# Patient Record
Sex: MALE | Race: WHITE | NOT HISPANIC OR LATINO | Employment: OTHER | URBAN - METROPOLITAN AREA
[De-identification: names, ages, dates, MRNs, and addresses within clinical notes are randomized per-mention and may not be internally consistent; named-entity substitution may affect disease eponyms.]

---

## 2017-01-12 ENCOUNTER — AMBULATORY - HEALTHEAST (OUTPATIENT)
Dept: LAB | Facility: CLINIC | Age: 72
End: 2017-01-12

## 2017-01-12 DIAGNOSIS — H15.109 EPISCLERITIS: ICD-10-CM

## 2017-01-12 LAB
ALT SERPL W P-5'-P-CCNC: 76 U/L (ref 0–45)
CREAT SERPL-MCNC: 1.07 MG/DL (ref 0.7–1.3)
GFR SERPL CREATININE-BSD FRML MDRD: >60 ML/MIN/1.73M2

## 2017-01-18 ENCOUNTER — COMMUNICATION - HEALTHEAST (OUTPATIENT)
Dept: INTERNAL MEDICINE | Facility: CLINIC | Age: 72
End: 2017-01-18

## 2017-01-18 DIAGNOSIS — R39.11 URINARY HESITANCY: ICD-10-CM

## 2017-01-19 ENCOUNTER — COMMUNICATION - HEALTHEAST (OUTPATIENT)
Dept: INTERNAL MEDICINE | Facility: CLINIC | Age: 72
End: 2017-01-19

## 2017-01-19 DIAGNOSIS — R39.11 URINARY HESITANCY: ICD-10-CM

## 2017-01-23 ENCOUNTER — OFFICE VISIT - HEALTHEAST (OUTPATIENT)
Dept: INTERNAL MEDICINE | Facility: CLINIC | Age: 72
End: 2017-01-23

## 2017-01-23 DIAGNOSIS — E11.9 TYPE 2 DIABETES MELLITUS (H): ICD-10-CM

## 2017-01-23 DIAGNOSIS — E29.1 HYPOGONADISM IN MALE: ICD-10-CM

## 2017-01-23 DIAGNOSIS — H61.23 HEARING LOSS DUE TO CERUMEN IMPACTION, BILATERAL: ICD-10-CM

## 2017-01-23 DIAGNOSIS — Z12.5 PROSTATE CANCER SCREENING: ICD-10-CM

## 2017-01-23 DIAGNOSIS — R74.8 ABNORMAL LIVER ENZYMES: ICD-10-CM

## 2017-01-23 DIAGNOSIS — I10 HYPERTENSION: ICD-10-CM

## 2017-01-23 DIAGNOSIS — R53.83 FATIGUE: ICD-10-CM

## 2017-01-23 DIAGNOSIS — E78.5 HYPERLIPIDEMIA: ICD-10-CM

## 2017-01-23 DIAGNOSIS — Z23 NEED FOR PROPHYLACTIC VACCINATION AND INOCULATION AGAINST INFLUENZA: ICD-10-CM

## 2017-01-23 DIAGNOSIS — Z23 NEED FOR PROPHYLACTIC VACCINATION AGAINST STREPTOCOCCUS PNEUMONIAE (PNEUMOCOCCUS) AND INFLUENZA: ICD-10-CM

## 2017-01-23 DIAGNOSIS — Z51.81 MEDICATION MONITORING ENCOUNTER: ICD-10-CM

## 2017-01-23 LAB
CHOLEST SERPL-MCNC: 154 MG/DL
FASTING STATUS PATIENT QL REPORTED: YES
HBA1C MFR BLD: 7.4 % (ref 3.5–6)
HDLC SERPL-MCNC: 66 MG/DL
LDLC SERPL CALC-MCNC: 74 MG/DL
PSA SERPL-MCNC: 0.1 NG/ML (ref 0–6.5)
TRIGL SERPL-MCNC: 71 MG/DL

## 2017-01-23 ASSESSMENT — MIFFLIN-ST. JEOR: SCORE: 1655.71

## 2017-01-25 LAB — HCV AB SERPL QL IA: NEGATIVE

## 2017-01-27 ENCOUNTER — COMMUNICATION - HEALTHEAST (OUTPATIENT)
Dept: INTERNAL MEDICINE | Facility: CLINIC | Age: 72
End: 2017-01-27

## 2017-02-10 ENCOUNTER — COMMUNICATION - HEALTHEAST (OUTPATIENT)
Dept: RHEUMATOLOGY | Facility: CLINIC | Age: 72
End: 2017-02-10

## 2017-02-10 DIAGNOSIS — H15.002 SCLERITIS OF LEFT EYE: ICD-10-CM

## 2017-03-09 ENCOUNTER — COMMUNICATION - HEALTHEAST (OUTPATIENT)
Dept: RHEUMATOLOGY | Facility: CLINIC | Age: 72
End: 2017-03-09

## 2017-03-09 DIAGNOSIS — H15.002 SCLERITIS OF LEFT EYE: ICD-10-CM

## 2017-03-20 ENCOUNTER — AMBULATORY - HEALTHEAST (OUTPATIENT)
Dept: LAB | Facility: CLINIC | Age: 72
End: 2017-03-20

## 2017-03-20 DIAGNOSIS — H15.109 EPISCLERITIS: ICD-10-CM

## 2017-03-20 LAB
ALT SERPL W P-5'-P-CCNC: 43 U/L (ref 0–45)
CREAT SERPL-MCNC: 1.1 MG/DL (ref 0.7–1.3)
GFR SERPL CREATININE-BSD FRML MDRD: >60 ML/MIN/1.73M2

## 2017-03-23 ENCOUNTER — OFFICE VISIT - HEALTHEAST (OUTPATIENT)
Dept: RHEUMATOLOGY | Facility: CLINIC | Age: 72
End: 2017-03-23

## 2017-03-23 DIAGNOSIS — M17.0 PRIMARY OSTEOARTHRITIS OF BOTH KNEES: ICD-10-CM

## 2017-03-23 DIAGNOSIS — H15.102 EPISCLERITIS, LEFT: ICD-10-CM

## 2017-03-23 DIAGNOSIS — R76.8 ANTINEUTROPHIL CYTOPLASMIC ANTIBODY (ANCA) POSITIVE: ICD-10-CM

## 2017-03-23 DIAGNOSIS — H15.002 SCLERITIS OF LEFT EYE: ICD-10-CM

## 2017-03-30 ENCOUNTER — COMMUNICATION - HEALTHEAST (OUTPATIENT)
Dept: INTERNAL MEDICINE | Facility: CLINIC | Age: 72
End: 2017-03-30

## 2017-03-30 DIAGNOSIS — E78.5 HYPERLIPIDEMIA: ICD-10-CM

## 2017-03-30 DIAGNOSIS — E11.9 TYPE 2 DIABETES MELLITUS (H): ICD-10-CM

## 2017-04-04 ENCOUNTER — COMMUNICATION - HEALTHEAST (OUTPATIENT)
Dept: INTERNAL MEDICINE | Facility: CLINIC | Age: 72
End: 2017-04-04

## 2017-04-04 ENCOUNTER — AMBULATORY - HEALTHEAST (OUTPATIENT)
Dept: INTERNAL MEDICINE | Facility: CLINIC | Age: 72
End: 2017-04-04

## 2017-04-04 DIAGNOSIS — H15.009 SCLERITIS: ICD-10-CM

## 2017-04-24 ENCOUNTER — COMMUNICATION - HEALTHEAST (OUTPATIENT)
Dept: INTERNAL MEDICINE | Facility: CLINIC | Age: 72
End: 2017-04-24

## 2017-04-24 DIAGNOSIS — R39.11 URINARY HESITANCY: ICD-10-CM

## 2017-05-01 ENCOUNTER — COMMUNICATION - HEALTHEAST (OUTPATIENT)
Dept: INTERNAL MEDICINE | Facility: CLINIC | Age: 72
End: 2017-05-01

## 2017-05-01 ENCOUNTER — COMMUNICATION - HEALTHEAST (OUTPATIENT)
Dept: RHEUMATOLOGY | Facility: CLINIC | Age: 72
End: 2017-05-01

## 2017-05-01 DIAGNOSIS — R12 HEARTBURN: ICD-10-CM

## 2017-05-01 DIAGNOSIS — H15.002 SCLERITIS OF LEFT EYE: ICD-10-CM

## 2017-05-15 ENCOUNTER — AMBULATORY - HEALTHEAST (OUTPATIENT)
Dept: LAB | Facility: CLINIC | Age: 72
End: 2017-05-15

## 2017-05-15 DIAGNOSIS — H15.109 EPISCLERITIS: ICD-10-CM

## 2017-05-15 LAB
ALT SERPL W P-5'-P-CCNC: 35 U/L (ref 0–45)
CREAT SERPL-MCNC: 1.1 MG/DL (ref 0.7–1.3)
GFR SERPL CREATININE-BSD FRML MDRD: >60 ML/MIN/1.73M2

## 2017-06-27 ENCOUNTER — COMMUNICATION - HEALTHEAST (OUTPATIENT)
Dept: RHEUMATOLOGY | Facility: CLINIC | Age: 72
End: 2017-06-27

## 2017-06-27 DIAGNOSIS — H15.002 SCLERITIS OF LEFT EYE: ICD-10-CM

## 2017-07-17 ENCOUNTER — AMBULATORY - HEALTHEAST (OUTPATIENT)
Dept: LAB | Facility: CLINIC | Age: 72
End: 2017-07-17

## 2017-07-17 DIAGNOSIS — H15.109 EPISCLERITIS: ICD-10-CM

## 2017-07-17 LAB
ALT SERPL W P-5'-P-CCNC: 37 U/L (ref 0–45)
CREAT SERPL-MCNC: 1.17 MG/DL (ref 0.7–1.3)
GFR SERPL CREATININE-BSD FRML MDRD: >60 ML/MIN/1.73M2

## 2017-07-24 ENCOUNTER — COMMUNICATION - HEALTHEAST (OUTPATIENT)
Dept: RHEUMATOLOGY | Facility: CLINIC | Age: 72
End: 2017-07-24

## 2017-07-24 DIAGNOSIS — H15.002 SCLERITIS OF LEFT EYE: ICD-10-CM

## 2017-09-05 ENCOUNTER — COMMUNICATION - HEALTHEAST (OUTPATIENT)
Dept: LAB | Facility: CLINIC | Age: 72
End: 2017-09-05

## 2017-09-05 DIAGNOSIS — H15.102 EPISCLERITIS, LEFT: ICD-10-CM

## 2017-09-18 ENCOUNTER — AMBULATORY - HEALTHEAST (OUTPATIENT)
Dept: LAB | Facility: CLINIC | Age: 72
End: 2017-09-18

## 2017-09-18 DIAGNOSIS — H15.102 EPISCLERITIS, LEFT: ICD-10-CM

## 2017-09-18 LAB
ALT SERPL W P-5'-P-CCNC: 38 U/L (ref 0–45)
CREAT SERPL-MCNC: 1.07 MG/DL (ref 0.7–1.3)
GFR SERPL CREATININE-BSD FRML MDRD: >60 ML/MIN/1.73M2

## 2017-09-21 ENCOUNTER — OFFICE VISIT - HEALTHEAST (OUTPATIENT)
Dept: RHEUMATOLOGY | Facility: CLINIC | Age: 72
End: 2017-09-21

## 2017-09-21 DIAGNOSIS — H15.002 SCLERITIS OF LEFT EYE: ICD-10-CM

## 2017-09-21 DIAGNOSIS — M17.0 PRIMARY OSTEOARTHRITIS OF BOTH KNEES: ICD-10-CM

## 2017-09-21 DIAGNOSIS — R76.8 ANTINEUTROPHIL CYTOPLASMIC ANTIBODY (ANCA) POSITIVE: ICD-10-CM

## 2017-09-21 ASSESSMENT — MIFFLIN-ST. JEOR: SCORE: 1672.95

## 2017-09-22 ENCOUNTER — COMMUNICATION - HEALTHEAST (OUTPATIENT)
Dept: RHEUMATOLOGY | Facility: CLINIC | Age: 72
End: 2017-09-22

## 2017-09-22 DIAGNOSIS — H15.002 SCLERITIS OF LEFT EYE: ICD-10-CM

## 2017-11-03 ENCOUNTER — COMMUNICATION - HEALTHEAST (OUTPATIENT)
Dept: INTERNAL MEDICINE | Facility: CLINIC | Age: 72
End: 2017-11-03

## 2017-11-10 ENCOUNTER — COMMUNICATION - HEALTHEAST (OUTPATIENT)
Dept: INTERNAL MEDICINE | Facility: CLINIC | Age: 72
End: 2017-11-10

## 2017-11-10 DIAGNOSIS — I10 ESSENTIAL HYPERTENSION, MALIGNANT: ICD-10-CM

## 2017-12-13 ENCOUNTER — COMMUNICATION - HEALTHEAST (OUTPATIENT)
Dept: RHEUMATOLOGY | Facility: CLINIC | Age: 72
End: 2017-12-13

## 2017-12-13 DIAGNOSIS — H15.002 SCLERITIS OF LEFT EYE: ICD-10-CM

## 2017-12-20 ENCOUNTER — AMBULATORY - HEALTHEAST (OUTPATIENT)
Dept: LAB | Facility: CLINIC | Age: 72
End: 2017-12-20

## 2017-12-20 DIAGNOSIS — H15.102 EPISCLERITIS, LEFT: ICD-10-CM

## 2017-12-20 LAB
ALT SERPL W P-5'-P-CCNC: 50 U/L (ref 0–45)
CREAT SERPL-MCNC: 1.19 MG/DL (ref 0.7–1.3)
GFR SERPL CREATININE-BSD FRML MDRD: 60 ML/MIN/1.73M2

## 2017-12-27 ENCOUNTER — COMMUNICATION - HEALTHEAST (OUTPATIENT)
Dept: INTERNAL MEDICINE | Facility: CLINIC | Age: 72
End: 2017-12-27

## 2017-12-27 DIAGNOSIS — E11.9 TYPE 2 DIABETES MELLITUS (H): ICD-10-CM

## 2017-12-27 DIAGNOSIS — E78.5 HYPERLIPIDEMIA: ICD-10-CM

## 2018-01-21 ENCOUNTER — COMMUNICATION - HEALTHEAST (OUTPATIENT)
Dept: INTERNAL MEDICINE | Facility: CLINIC | Age: 73
End: 2018-01-21

## 2018-01-21 DIAGNOSIS — R39.11 URINARY HESITANCY: ICD-10-CM

## 2018-02-02 ENCOUNTER — OFFICE VISIT - HEALTHEAST (OUTPATIENT)
Dept: INTERNAL MEDICINE | Facility: CLINIC | Age: 73
End: 2018-02-02

## 2018-02-02 DIAGNOSIS — E78.00 HYPERCHOLESTEROLEMIA: ICD-10-CM

## 2018-02-02 DIAGNOSIS — I45.10 RIGHT BUNDLE BRANCH BLOCK: ICD-10-CM

## 2018-02-02 DIAGNOSIS — E78.2 MIXED HYPERLIPIDEMIA: ICD-10-CM

## 2018-02-02 DIAGNOSIS — Z00.00 HEALTH CARE MAINTENANCE: ICD-10-CM

## 2018-02-02 DIAGNOSIS — Z12.5 PROSTATE CANCER SCREENING: ICD-10-CM

## 2018-02-02 DIAGNOSIS — E11.9 TYPE 2 DIABETES MELLITUS (H): ICD-10-CM

## 2018-02-02 DIAGNOSIS — I10 ESSENTIAL HYPERTENSION: ICD-10-CM

## 2018-02-02 DIAGNOSIS — H15.002 SCLERITIS OF LEFT EYE: ICD-10-CM

## 2018-02-02 DIAGNOSIS — Z12.11 COLON CANCER SCREENING: ICD-10-CM

## 2018-02-02 DIAGNOSIS — R25.2 MUSCLE CRAMPS: ICD-10-CM

## 2018-02-02 DIAGNOSIS — Z51.81 MEDICATION MONITORING ENCOUNTER: ICD-10-CM

## 2018-02-02 LAB
ALBUMIN SERPL-MCNC: 3.4 G/DL (ref 3.5–5)
ALBUMIN UR-MCNC: NEGATIVE MG/DL
ALP SERPL-CCNC: 115 U/L (ref 45–120)
ALT SERPL W P-5'-P-CCNC: 47 U/L (ref 0–45)
ANION GAP SERPL CALCULATED.3IONS-SCNC: 8 MMOL/L (ref 5–18)
APPEARANCE UR: CLEAR
AST SERPL W P-5'-P-CCNC: 41 U/L (ref 0–40)
BACTERIA #/AREA URNS HPF: ABNORMAL HPF
BILIRUB SERPL-MCNC: 0.6 MG/DL (ref 0–1)
BILIRUB UR QL STRIP: NEGATIVE
BUN SERPL-MCNC: 13 MG/DL (ref 8–28)
CALCIUM SERPL-MCNC: 8.7 MG/DL (ref 8.5–10.5)
CHLORIDE BLD-SCNC: 106 MMOL/L (ref 98–107)
CO2 SERPL-SCNC: 26 MMOL/L (ref 22–31)
COLOR UR AUTO: YELLOW
CREAT SERPL-MCNC: 1.12 MG/DL (ref 0.7–1.3)
CREAT UR-MCNC: 216.2 MG/DL
ERYTHROCYTE [DISTWIDTH] IN BLOOD BY AUTOMATED COUNT: 13.3 % (ref 11–14.5)
GFR SERPL CREATININE-BSD FRML MDRD: >60 ML/MIN/1.73M2
GLUCOSE BLD-MCNC: 138 MG/DL (ref 70–125)
GLUCOSE UR STRIP-MCNC: NEGATIVE MG/DL
HBA1C MFR BLD: 7.7 % (ref 3.5–6)
HCT VFR BLD AUTO: 34.3 % (ref 40–54)
HGB BLD-MCNC: 11.3 G/DL (ref 14–18)
HGB UR QL STRIP: NEGATIVE
KETONES UR STRIP-MCNC: NEGATIVE MG/DL
LEUKOCYTE ESTERASE UR QL STRIP: NEGATIVE
MAGNESIUM SERPL-MCNC: 1.2 MG/DL (ref 1.8–2.6)
MCH RBC QN AUTO: 29.5 PG (ref 27–34)
MCHC RBC AUTO-ENTMCNC: 33 G/DL (ref 32–36)
MCV RBC AUTO: 89 FL (ref 80–100)
MICROALBUMIN UR-MCNC: 1.03 MG/DL (ref 0–1.99)
MICROALBUMIN/CREAT UR: 4.8 MG/G
NITRATE UR QL: NEGATIVE
PH UR STRIP: 5.5 [PH] (ref 5–8)
PLATELET # BLD AUTO: 123 THOU/UL (ref 140–440)
PMV BLD AUTO: 8.2 FL (ref 7–10)
POTASSIUM BLD-SCNC: 4.4 MMOL/L (ref 3.5–5)
PROT SERPL-MCNC: 7 G/DL (ref 6–8)
PSA SERPL-MCNC: 0.2 NG/ML (ref 0–6.5)
RBC # BLD AUTO: 3.83 MILL/UL (ref 4.4–6.2)
RBC #/AREA URNS AUTO: ABNORMAL HPF
SODIUM SERPL-SCNC: 140 MMOL/L (ref 136–145)
SP GR UR STRIP: >=1.03 (ref 1–1.03)
SQUAMOUS #/AREA URNS AUTO: ABNORMAL LPF
UROBILINOGEN UR STRIP-ACNC: ABNORMAL
WBC #/AREA URNS AUTO: ABNORMAL HPF
WBC: 4.9 THOU/UL (ref 4–11)

## 2018-02-02 ASSESSMENT — MIFFLIN-ST. JEOR: SCORE: 1660.36

## 2018-02-07 LAB
CHOLEST SERPL-MCNC: 133 MG/DL
HDLC SERPL-MCNC: 64 MG/DL
LDLC SERPL CALC-MCNC: 56 MG/DL
TRIGL SERPL-MCNC: 66 MG/DL

## 2018-02-12 ENCOUNTER — COMMUNICATION - HEALTHEAST (OUTPATIENT)
Dept: INTERNAL MEDICINE | Facility: CLINIC | Age: 73
End: 2018-02-12

## 2018-02-12 DIAGNOSIS — E11.9 TYPE 2 DIABETES MELLITUS (H): ICD-10-CM

## 2018-02-12 DIAGNOSIS — E83.42 HYPOMAGNESEMIA: ICD-10-CM

## 2018-02-13 ENCOUNTER — COMMUNICATION - HEALTHEAST (OUTPATIENT)
Dept: INTERNAL MEDICINE | Facility: CLINIC | Age: 73
End: 2018-02-13

## 2018-02-14 ENCOUNTER — RECORDS - HEALTHEAST (OUTPATIENT)
Dept: ADMINISTRATIVE | Facility: OTHER | Age: 73
End: 2018-02-14

## 2018-02-22 ENCOUNTER — COMMUNICATION - HEALTHEAST (OUTPATIENT)
Dept: INTERNAL MEDICINE | Facility: CLINIC | Age: 73
End: 2018-02-22

## 2018-02-22 DIAGNOSIS — R19.5 POSITIVE COLORECTAL CANCER SCREENING USING DNA-BASED STOOL TEST: ICD-10-CM

## 2018-03-07 ENCOUNTER — COMMUNICATION - HEALTHEAST (OUTPATIENT)
Dept: RHEUMATOLOGY | Facility: CLINIC | Age: 73
End: 2018-03-07

## 2018-03-07 ENCOUNTER — COMMUNICATION - HEALTHEAST (OUTPATIENT)
Dept: INTERNAL MEDICINE | Facility: CLINIC | Age: 73
End: 2018-03-07

## 2018-03-07 DIAGNOSIS — H15.002 SCLERITIS OF LEFT EYE: ICD-10-CM

## 2018-03-07 DIAGNOSIS — E11.9 TYPE 2 DIABETES MELLITUS (H): ICD-10-CM

## 2018-03-09 ENCOUNTER — COMMUNICATION - HEALTHEAST (OUTPATIENT)
Dept: NURSING | Facility: CLINIC | Age: 73
End: 2018-03-09

## 2018-03-09 DIAGNOSIS — E11.9 TYPE 2 DIABETES MELLITUS (H): ICD-10-CM

## 2018-03-12 ENCOUNTER — COMMUNICATION - HEALTHEAST (OUTPATIENT)
Dept: INTERNAL MEDICINE | Facility: CLINIC | Age: 73
End: 2018-03-12

## 2018-03-12 DIAGNOSIS — R12 HEARTBURN: ICD-10-CM

## 2018-03-21 ENCOUNTER — AMBULATORY - HEALTHEAST (OUTPATIENT)
Dept: LAB | Facility: CLINIC | Age: 73
End: 2018-03-21

## 2018-03-21 DIAGNOSIS — H15.102 EPISCLERITIS, LEFT: ICD-10-CM

## 2018-03-21 LAB
ALBUMIN SERPL-MCNC: 3.3 G/DL (ref 3.5–5)
ALT SERPL W P-5'-P-CCNC: 52 U/L (ref 0–45)
CREAT SERPL-MCNC: 1.28 MG/DL (ref 0.7–1.3)
ERYTHROCYTE [DISTWIDTH] IN BLOOD BY AUTOMATED COUNT: 14 % (ref 11–14.5)
GFR SERPL CREATININE-BSD FRML MDRD: 55 ML/MIN/1.73M2
HCT VFR BLD AUTO: 31.9 % (ref 40–54)
HGB BLD-MCNC: 10.9 G/DL (ref 14–18)
MCH RBC QN AUTO: 30.5 PG (ref 27–34)
MCHC RBC AUTO-ENTMCNC: 34.1 G/DL (ref 32–36)
MCV RBC AUTO: 89 FL (ref 80–100)
PLATELET # BLD AUTO: 126 THOU/UL (ref 140–440)
PMV BLD AUTO: 9.1 FL (ref 7–10)
RBC # BLD AUTO: 3.56 MILL/UL (ref 4.4–6.2)
WBC: 4.6 THOU/UL (ref 4–11)

## 2018-03-26 ENCOUNTER — OFFICE VISIT - HEALTHEAST (OUTPATIENT)
Dept: RHEUMATOLOGY | Facility: CLINIC | Age: 73
End: 2018-03-26

## 2018-03-26 DIAGNOSIS — D64.9 ANEMIA: ICD-10-CM

## 2018-03-26 DIAGNOSIS — H15.002 SCLERITIS OF LEFT EYE: ICD-10-CM

## 2018-03-26 DIAGNOSIS — R76.8 ANTINEUTROPHIL CYTOPLASMIC ANTIBODY (ANCA) POSITIVE: ICD-10-CM

## 2018-03-26 DIAGNOSIS — M17.0 PRIMARY OSTEOARTHRITIS OF BOTH KNEES: ICD-10-CM

## 2018-03-26 DIAGNOSIS — R74.01 ALT (SGPT) LEVEL RAISED: ICD-10-CM

## 2018-03-26 ASSESSMENT — MIFFLIN-ST. JEOR: SCORE: 1648.11

## 2018-04-23 ENCOUNTER — COMMUNICATION - HEALTHEAST (OUTPATIENT)
Dept: INTERNAL MEDICINE | Facility: CLINIC | Age: 73
End: 2018-04-23

## 2018-04-23 DIAGNOSIS — R39.11 URINARY HESITANCY: ICD-10-CM

## 2018-05-07 ENCOUNTER — RECORDS - HEALTHEAST (OUTPATIENT)
Dept: ADMINISTRATIVE | Facility: OTHER | Age: 73
End: 2018-05-07

## 2018-05-08 ENCOUNTER — RECORDS - HEALTHEAST (OUTPATIENT)
Dept: ADMINISTRATIVE | Facility: OTHER | Age: 73
End: 2018-05-08

## 2018-05-09 ENCOUNTER — AMBULATORY - HEALTHEAST (OUTPATIENT)
Dept: LAB | Facility: CLINIC | Age: 73
End: 2018-05-09

## 2018-05-09 DIAGNOSIS — H15.102 EPISCLERITIS, LEFT: ICD-10-CM

## 2018-05-09 LAB
ALBUMIN SERPL-MCNC: 3.2 G/DL (ref 3.5–5)
ALT SERPL W P-5'-P-CCNC: 46 U/L (ref 0–45)
CREAT SERPL-MCNC: 1.34 MG/DL (ref 0.7–1.3)
ERYTHROCYTE [DISTWIDTH] IN BLOOD BY AUTOMATED COUNT: 14.1 % (ref 11–14.5)
GFR SERPL CREATININE-BSD FRML MDRD: 53 ML/MIN/1.73M2
HCT VFR BLD AUTO: 31.5 % (ref 40–54)
HGB BLD-MCNC: 10.6 G/DL (ref 14–18)
MCH RBC QN AUTO: 29.6 PG (ref 27–34)
MCHC RBC AUTO-ENTMCNC: 33.8 G/DL (ref 32–36)
MCV RBC AUTO: 88 FL (ref 80–100)
PLATELET # BLD AUTO: 120 THOU/UL (ref 140–440)
PMV BLD AUTO: 8.1 FL (ref 7–10)
RBC # BLD AUTO: 3.6 MILL/UL (ref 4.4–6.2)
WBC: 5.4 THOU/UL (ref 4–11)

## 2018-06-27 ENCOUNTER — AMBULATORY - HEALTHEAST (OUTPATIENT)
Dept: LAB | Facility: CLINIC | Age: 73
End: 2018-06-27

## 2018-06-27 DIAGNOSIS — H15.102 EPISCLERITIS, LEFT: ICD-10-CM

## 2018-06-27 LAB
ALBUMIN SERPL-MCNC: 3.2 G/DL (ref 3.5–5)
ALT SERPL W P-5'-P-CCNC: 34 U/L (ref 0–45)
CREAT SERPL-MCNC: 1.29 MG/DL (ref 0.7–1.3)
ERYTHROCYTE [DISTWIDTH] IN BLOOD BY AUTOMATED COUNT: 15.5 % (ref 11–14.5)
GFR SERPL CREATININE-BSD FRML MDRD: 55 ML/MIN/1.73M2
HCT VFR BLD AUTO: 28.1 % (ref 40–54)
HGB BLD-MCNC: 9.1 G/DL (ref 14–18)
MCH RBC QN AUTO: 28.2 PG (ref 27–34)
MCHC RBC AUTO-ENTMCNC: 32.2 G/DL (ref 32–36)
MCV RBC AUTO: 88 FL (ref 80–100)
PLATELET # BLD AUTO: 124 THOU/UL (ref 140–440)
PMV BLD AUTO: 7.9 FL (ref 7–10)
RBC # BLD AUTO: 3.21 MILL/UL (ref 4.4–6.2)
WBC: 4.1 THOU/UL (ref 4–11)

## 2018-08-06 ENCOUNTER — OFFICE VISIT - HEALTHEAST (OUTPATIENT)
Dept: INTERNAL MEDICINE | Facility: CLINIC | Age: 73
End: 2018-08-06

## 2018-08-06 DIAGNOSIS — I10 ESSENTIAL HYPERTENSION, MALIGNANT: ICD-10-CM

## 2018-08-06 DIAGNOSIS — E11.9 DIABETES MELLITUS, TYPE 2 (H): ICD-10-CM

## 2018-08-06 DIAGNOSIS — D64.9 ANEMIA: ICD-10-CM

## 2018-08-06 DIAGNOSIS — R06.02 SHORTNESS OF BREATH: ICD-10-CM

## 2018-08-06 DIAGNOSIS — Z86.73 HISTORY OF CVA (CEREBROVASCULAR ACCIDENT): ICD-10-CM

## 2018-08-06 DIAGNOSIS — E78.2 MIXED HYPERLIPIDEMIA: ICD-10-CM

## 2018-08-06 DIAGNOSIS — Z51.81 ENCOUNTER FOR MEDICATION MONITORING: ICD-10-CM

## 2018-08-06 LAB
ANION GAP SERPL CALCULATED.3IONS-SCNC: 10 MMOL/L (ref 5–18)
AST SERPL W P-5'-P-CCNC: 34 U/L (ref 0–40)
BNP SERPL-MCNC: 25 PG/ML (ref 0–70)
BUN SERPL-MCNC: 15 MG/DL (ref 8–28)
CALCIUM SERPL-MCNC: 8.5 MG/DL (ref 8.5–10.5)
CHLORIDE BLD-SCNC: 107 MMOL/L (ref 98–107)
CHOLEST SERPL-MCNC: 116 MG/DL
CO2 SERPL-SCNC: 21 MMOL/L (ref 22–31)
CREAT SERPL-MCNC: 1.3 MG/DL (ref 0.7–1.3)
ERYTHROCYTE [DISTWIDTH] IN BLOOD BY AUTOMATED COUNT: 15.4 % (ref 11–14.5)
FASTING STATUS PATIENT QL REPORTED: YES
GFR SERPL CREATININE-BSD FRML MDRD: 54 ML/MIN/1.73M2
GLUCOSE BLD-MCNC: 140 MG/DL (ref 70–125)
HBA1C MFR BLD: 7.5 % (ref 3.5–6)
HCT VFR BLD AUTO: 29.6 % (ref 40–54)
HDLC SERPL-MCNC: 53 MG/DL
HGB BLD-MCNC: 9.6 G/DL (ref 14–18)
LDLC SERPL CALC-MCNC: 50 MG/DL
MCH RBC QN AUTO: 27.9 PG (ref 27–34)
MCHC RBC AUTO-ENTMCNC: 32.5 G/DL (ref 32–36)
MCV RBC AUTO: 86 FL (ref 80–100)
PLATELET # BLD AUTO: 110 THOU/UL (ref 140–440)
PMV BLD AUTO: 9 FL (ref 7–10)
POTASSIUM BLD-SCNC: 4.8 MMOL/L (ref 3.5–5)
RBC # BLD AUTO: 3.44 MILL/UL (ref 4.4–6.2)
SODIUM SERPL-SCNC: 138 MMOL/L (ref 136–145)
TRIGL SERPL-MCNC: 66 MG/DL
WBC: 5.3 THOU/UL (ref 4–11)

## 2018-08-07 LAB
FERRITIN SERPL-MCNC: 9 NG/ML (ref 27–300)
IRON SATN MFR SERPL: 13 % (ref 20–50)
IRON SERPL-MCNC: 56 UG/DL (ref 42–175)
TIBC SERPL-MCNC: 447 UG/DL (ref 313–563)
TRANSFERRIN SERPL-MCNC: 358 MG/DL (ref 212–360)
VIT B12 SERPL-MCNC: 1748 PG/ML (ref 213–816)

## 2018-08-13 ENCOUNTER — COMMUNICATION - HEALTHEAST (OUTPATIENT)
Dept: INTERNAL MEDICINE | Facility: CLINIC | Age: 73
End: 2018-08-13

## 2018-08-13 ENCOUNTER — AMBULATORY - HEALTHEAST (OUTPATIENT)
Dept: INTERNAL MEDICINE | Facility: CLINIC | Age: 73
End: 2018-08-13

## 2018-08-13 DIAGNOSIS — D50.9 IRON DEFICIENCY ANEMIA, UNSPECIFIED IRON DEFICIENCY ANEMIA TYPE: ICD-10-CM

## 2018-08-20 ENCOUNTER — HOSPITAL ENCOUNTER (OUTPATIENT)
Dept: CARDIOLOGY | Facility: CLINIC | Age: 73
Discharge: HOME | End: 2018-08-20
Attending: INTERNAL MEDICINE

## 2018-08-20 DIAGNOSIS — R06.02 SHORTNESS OF BREATH: ICD-10-CM

## 2018-08-20 ASSESSMENT — MIFFLIN-ST. JEOR: SCORE: 1681.23

## 2018-08-21 ENCOUNTER — COMMUNICATION - HEALTHEAST (OUTPATIENT)
Dept: INTERNAL MEDICINE | Facility: CLINIC | Age: 73
End: 2018-08-21

## 2018-08-21 LAB
AORTIC ROOT: 3.7 CM
BSA FOR ECHO PROCEDURE: 2.15 M2
CV ECHO HEIGHT: 69.8 IN
CV ECHO WEIGHT: 207 LBS
DOP CALC LVOT AREA: 3.14 CM2
DOP CALC LVOT DIAMETER: 2 CM
DOP CALC LVOT PEAK VEL: 99.3 CM/S
DOP CALC LVOT STROKE VOLUME: 69.1 CM3
DOP CALCLVOT PEAK VEL VTI: 22 CM
ECHO EJECTION FRACTION ESTIMATED: 60 %
LA AREA 1: 22.2 CM2
LA AREA 2: 20.3 CM2
LEFT ATRIUM LENGTH: 5.73 CM
LEFT ATRIUM VOLUME INDEX: 31.1 ML/M2
LEFT ATRIUM VOLUME: 66.9 ML
LEFT VENTRICULAR OUTFLOW TRACT MEAN GRADIENT: 2 MMHG
LEFT VENTRICULAR OUTFLOW TRACT MEAN VELOCITY: 73.5 CM/S
LEFT VENTRICULAR OUTFLOW TRACT PEAK GRADIENT: 4 MMHG
LV STROKE VOLUME INDEX: 32.1 ML/M2
MITRAL VALVE E/A RATIO: 0.8
MV AVERAGE E/E' RATIO: 10.6 CM/S
MV DECELERATION TIME: 271 MS
MV E'TISSUE VEL-LAT: 7.7 CM/S
MV E'TISSUE VEL-MED: 4.48 CM/S
MV LATERAL E/E' RATIO: 8.4
MV MEDIAL E/E' RATIO: 14.5
MV PEAK A VELOCITY: 79.1 CM/S
MV PEAK E VELOCITY: 64.8 CM/S
NUC REST DIASTOLIC VOLUME INDEX: 3312 LBS
NUC REST SYSTOLIC VOLUME INDEX: 69.75 IN
TRICUSPID VALVE ANULAR PLANE SYSTOLIC EXCURSION: 2.3 CM

## 2018-09-24 ENCOUNTER — AMBULATORY - HEALTHEAST (OUTPATIENT)
Dept: LAB | Facility: CLINIC | Age: 73
End: 2018-09-24

## 2018-09-24 ENCOUNTER — COMMUNICATION - HEALTHEAST (OUTPATIENT)
Dept: INTERNAL MEDICINE | Facility: CLINIC | Age: 73
End: 2018-09-24

## 2018-09-24 DIAGNOSIS — D50.9 IRON DEFICIENCY ANEMIA, UNSPECIFIED IRON DEFICIENCY ANEMIA TYPE: ICD-10-CM

## 2018-09-24 LAB
ERYTHROCYTE [DISTWIDTH] IN BLOOD BY AUTOMATED COUNT: 16.2 % (ref 11–14.5)
HCT VFR BLD AUTO: 29.8 % (ref 40–54)
HGB BLD-MCNC: 9.9 G/DL (ref 14–18)
MCH RBC QN AUTO: 28.8 PG (ref 27–34)
MCHC RBC AUTO-ENTMCNC: 33.1 G/DL (ref 32–36)
MCV RBC AUTO: 87 FL (ref 80–100)
PLATELET # BLD AUTO: 111 THOU/UL (ref 140–440)
PMV BLD AUTO: 8.2 FL (ref 7–10)
RBC # BLD AUTO: 3.43 MILL/UL (ref 4.4–6.2)
WBC: 4.2 THOU/UL (ref 4–11)

## 2018-09-26 ENCOUNTER — OFFICE VISIT - HEALTHEAST (OUTPATIENT)
Dept: RHEUMATOLOGY | Facility: CLINIC | Age: 73
End: 2018-09-26

## 2018-09-26 DIAGNOSIS — Z79.899 HIGH RISK MEDICATION USE: ICD-10-CM

## 2018-09-26 DIAGNOSIS — M17.0 PRIMARY OSTEOARTHRITIS OF BOTH KNEES: ICD-10-CM

## 2018-09-26 DIAGNOSIS — H15.002 SCLERITIS OF LEFT EYE: ICD-10-CM

## 2018-09-26 DIAGNOSIS — R76.8 ANTINEUTROPHIL CYTOPLASMIC ANTIBODY (ANCA) POSITIVE: ICD-10-CM

## 2018-10-15 ENCOUNTER — COMMUNICATION - HEALTHEAST (OUTPATIENT)
Dept: RHEUMATOLOGY | Facility: CLINIC | Age: 73
End: 2018-10-15

## 2018-10-15 DIAGNOSIS — H15.002 SCLERITIS OF LEFT EYE: ICD-10-CM

## 2018-10-22 ENCOUNTER — COMMUNICATION - HEALTHEAST (OUTPATIENT)
Dept: INTERNAL MEDICINE | Facility: CLINIC | Age: 73
End: 2018-10-22

## 2018-10-22 ENCOUNTER — OFFICE VISIT - HEALTHEAST (OUTPATIENT)
Dept: INTERNAL MEDICINE | Facility: CLINIC | Age: 73
End: 2018-10-22

## 2018-10-22 DIAGNOSIS — E78.2 MIXED HYPERLIPIDEMIA: ICD-10-CM

## 2018-10-22 DIAGNOSIS — E11.9 DIABETES MELLITUS, TYPE 2 (H): ICD-10-CM

## 2018-10-22 DIAGNOSIS — Z00.00 HEALTHCARE MAINTENANCE: ICD-10-CM

## 2018-10-22 DIAGNOSIS — R39.11 URINARY HESITANCY: ICD-10-CM

## 2018-10-22 DIAGNOSIS — D50.9 ANEMIA, IRON DEFICIENCY: ICD-10-CM

## 2018-10-22 DIAGNOSIS — Z23 FLU VACCINE NEED: ICD-10-CM

## 2018-10-22 LAB
CHOLEST SERPL-MCNC: 122 MG/DL
ERYTHROCYTE [DISTWIDTH] IN BLOOD BY AUTOMATED COUNT: 15.8 % (ref 11–14.5)
FASTING STATUS PATIENT QL REPORTED: YES
FERRITIN SERPL-MCNC: 17 NG/ML (ref 27–300)
HBA1C MFR BLD: 7.3 % (ref 3.5–6)
HCT VFR BLD AUTO: 31.4 % (ref 40–54)
HDLC SERPL-MCNC: 55 MG/DL
HGB BLD-MCNC: 10.5 G/DL (ref 14–18)
LDLC SERPL CALC-MCNC: 51 MG/DL
MCH RBC QN AUTO: 28.9 PG (ref 27–34)
MCHC RBC AUTO-ENTMCNC: 33.6 G/DL (ref 32–36)
MCV RBC AUTO: 86 FL (ref 80–100)
PLATELET # BLD AUTO: 113 THOU/UL (ref 140–440)
PMV BLD AUTO: 7.8 FL (ref 7–10)
RBC # BLD AUTO: 3.65 MILL/UL (ref 4.4–6.2)
TRIGL SERPL-MCNC: 78 MG/DL
WBC: 6 THOU/UL (ref 4–11)

## 2018-10-23 ENCOUNTER — COMMUNICATION - HEALTHEAST (OUTPATIENT)
Dept: INTERNAL MEDICINE | Facility: CLINIC | Age: 73
End: 2018-10-23

## 2018-12-20 ENCOUNTER — OFFICE VISIT - HEALTHEAST (OUTPATIENT)
Dept: FAMILY MEDICINE | Facility: CLINIC | Age: 73
End: 2018-12-20

## 2018-12-20 ENCOUNTER — COMMUNICATION - HEALTHEAST (OUTPATIENT)
Dept: SCHEDULING | Facility: CLINIC | Age: 73
End: 2018-12-20

## 2018-12-20 DIAGNOSIS — Z79.899 IMMUNOSUPPRESSION DUE TO DRUG THERAPY (H): ICD-10-CM

## 2018-12-20 DIAGNOSIS — R10.32 LLQ ABDOMINAL PAIN: ICD-10-CM

## 2018-12-20 DIAGNOSIS — R50.81 FEVER IN OTHER DISEASES: ICD-10-CM

## 2018-12-20 DIAGNOSIS — D84.821 IMMUNOSUPPRESSION DUE TO DRUG THERAPY (H): ICD-10-CM

## 2018-12-20 DIAGNOSIS — M62.81 GENERALIZED MUSCLE WEAKNESS: ICD-10-CM

## 2018-12-20 LAB
ALBUMIN UR-MCNC: NEGATIVE MG/DL
APPEARANCE UR: CLEAR
BILIRUB UR QL STRIP: NEGATIVE
COLOR UR AUTO: YELLOW
GLUCOSE UR STRIP-MCNC: NEGATIVE MG/DL
HGB UR QL STRIP: NEGATIVE
KETONES UR STRIP-MCNC: NEGATIVE MG/DL
LEUKOCYTE ESTERASE UR QL STRIP: NEGATIVE
NITRATE UR QL: NEGATIVE
PH UR STRIP: 6.5 [PH] (ref 5–8)
SP GR UR STRIP: 1.02 (ref 1–1.03)
UROBILINOGEN UR STRIP-ACNC: NORMAL

## 2018-12-26 ENCOUNTER — COMMUNICATION - HEALTHEAST (OUTPATIENT)
Dept: CARE COORDINATION | Facility: CLINIC | Age: 73
End: 2018-12-26

## 2018-12-26 ENCOUNTER — AMBULATORY - HEALTHEAST (OUTPATIENT)
Dept: LAB | Facility: CLINIC | Age: 73
End: 2018-12-26

## 2018-12-26 DIAGNOSIS — Z79.899 HIGH RISK MEDICATION USE: ICD-10-CM

## 2018-12-26 LAB
ALBUMIN SERPL-MCNC: 3.1 G/DL (ref 3.5–5)
ALT SERPL W P-5'-P-CCNC: 46 U/L (ref 0–45)
CREAT SERPL-MCNC: 1.26 MG/DL (ref 0.7–1.3)
ERYTHROCYTE [DISTWIDTH] IN BLOOD BY AUTOMATED COUNT: 16.1 % (ref 11–14.5)
GFR SERPL CREATININE-BSD FRML MDRD: 56 ML/MIN/1.73M2
HCT VFR BLD AUTO: 30.5 % (ref 40–54)
HGB BLD-MCNC: 9.9 G/DL (ref 14–18)
MCH RBC QN AUTO: 29.3 PG (ref 27–34)
MCHC RBC AUTO-ENTMCNC: 32.6 G/DL (ref 32–36)
MCV RBC AUTO: 90 FL (ref 80–100)
PLATELET # BLD AUTO: 104 THOU/UL (ref 140–440)
PMV BLD AUTO: 7.8 FL (ref 7–10)
RBC # BLD AUTO: 3.39 MILL/UL (ref 4.4–6.2)
WBC: 3.9 THOU/UL (ref 4–11)

## 2018-12-28 ENCOUNTER — OFFICE VISIT - HEALTHEAST (OUTPATIENT)
Dept: INTERNAL MEDICINE | Facility: CLINIC | Age: 73
End: 2018-12-28

## 2018-12-28 ENCOUNTER — COMMUNICATION - HEALTHEAST (OUTPATIENT)
Dept: INTERNAL MEDICINE | Facility: CLINIC | Age: 73
End: 2018-12-28

## 2018-12-28 DIAGNOSIS — E78.5 HYPERLIPIDEMIA: ICD-10-CM

## 2018-12-28 DIAGNOSIS — I10 ESSENTIAL HYPERTENSION, BENIGN: ICD-10-CM

## 2018-12-28 DIAGNOSIS — E11.9 TYPE 2 DIABETES MELLITUS (H): ICD-10-CM

## 2018-12-28 DIAGNOSIS — D50.0 IRON DEFICIENCY ANEMIA DUE TO CHRONIC BLOOD LOSS: ICD-10-CM

## 2018-12-28 DIAGNOSIS — K74.60 HEPATIC CIRRHOSIS, UNSPECIFIED HEPATIC CIRRHOSIS TYPE (H): ICD-10-CM

## 2018-12-28 LAB
IRON SATN MFR SERPL: 32 % (ref 20–50)
IRON SERPL-MCNC: 126 UG/DL (ref 42–175)
TIBC SERPL-MCNC: 398 UG/DL (ref 313–563)
TRANSFERRIN SERPL-MCNC: 318 MG/DL (ref 212–360)

## 2018-12-28 ASSESSMENT — MIFFLIN-ST. JEOR: SCORE: 1704.25

## 2018-12-31 ENCOUNTER — COMMUNICATION - HEALTHEAST (OUTPATIENT)
Dept: INTERNAL MEDICINE | Facility: CLINIC | Age: 73
End: 2018-12-31

## 2018-12-31 LAB — HEPATITIS B SURFACE ANTIBODY LHE- HISTORICAL: NEGATIVE

## 2019-01-03 ENCOUNTER — COMMUNICATION - HEALTHEAST (OUTPATIENT)
Dept: RHEUMATOLOGY | Facility: CLINIC | Age: 74
End: 2019-01-03

## 2019-01-03 DIAGNOSIS — H15.002 SCLERITIS OF LEFT EYE: ICD-10-CM

## 2019-01-11 ENCOUNTER — COMMUNICATION - HEALTHEAST (OUTPATIENT)
Dept: SCHEDULING | Facility: CLINIC | Age: 74
End: 2019-01-11

## 2019-01-18 ENCOUNTER — RECORDS - HEALTHEAST (OUTPATIENT)
Dept: ADMINISTRATIVE | Facility: OTHER | Age: 74
End: 2019-01-18

## 2019-01-21 ENCOUNTER — COMMUNICATION - HEALTHEAST (OUTPATIENT)
Dept: ADMINISTRATIVE | Facility: CLINIC | Age: 74
End: 2019-01-21

## 2019-01-21 ENCOUNTER — RECORDS - HEALTHEAST (OUTPATIENT)
Dept: ADMINISTRATIVE | Facility: OTHER | Age: 74
End: 2019-01-21

## 2019-01-22 ENCOUNTER — RECORDS - HEALTHEAST (OUTPATIENT)
Dept: ADMINISTRATIVE | Facility: OTHER | Age: 74
End: 2019-01-22

## 2019-01-29 ENCOUNTER — OFFICE VISIT - HEALTHEAST (OUTPATIENT)
Dept: RHEUMATOLOGY | Facility: CLINIC | Age: 74
End: 2019-01-29

## 2019-01-29 DIAGNOSIS — R76.8 ANTINEUTROPHIL CYTOPLASMIC ANTIBODY (ANCA) POSITIVE: ICD-10-CM

## 2019-01-29 DIAGNOSIS — Z79.899 HIGH RISK MEDICATION USE: ICD-10-CM

## 2019-01-29 DIAGNOSIS — H15.002 SCLERITIS OF LEFT EYE: ICD-10-CM

## 2019-01-29 DIAGNOSIS — R18.8 CIRRHOSIS OF LIVER WITH ASCITES, UNSPECIFIED HEPATIC CIRRHOSIS TYPE (H): ICD-10-CM

## 2019-01-29 DIAGNOSIS — K74.60 CIRRHOSIS OF LIVER WITH ASCITES, UNSPECIFIED HEPATIC CIRRHOSIS TYPE (H): ICD-10-CM

## 2019-01-31 ENCOUNTER — COMMUNICATION - HEALTHEAST (OUTPATIENT)
Dept: ADMINISTRATIVE | Facility: CLINIC | Age: 74
End: 2019-01-31

## 2019-01-31 LAB
ANA SER QL: 0.6 U
MYELOPEROXIDASE AB SER IA-ACNC: <0.2 AI (ref 0–0.9)
PROTEINASE3 AB SER IA-ACNC: <0.2 AI (ref 0–0.9)

## 2019-02-01 LAB
GAMMA INTERFERON BACKGROUND BLD IA-ACNC: 0.03 IU/ML
M TB IFN-G BLD-IMP: NEGATIVE
MITOGEN IGNF BCKGRD COR BLD-ACNC: 0.01 IU/ML
MITOGEN IGNF BCKGRD COR BLD-ACNC: 0.01 IU/ML
QTF INTERPRETATION: NORMAL
QTF MITOGEN - NIL: 7.87 IU/ML

## 2019-02-05 ENCOUNTER — OFFICE VISIT - HEALTHEAST (OUTPATIENT)
Dept: RHEUMATOLOGY | Facility: CLINIC | Age: 74
End: 2019-02-05

## 2019-02-05 DIAGNOSIS — K74.60 CIRRHOSIS OF LIVER WITH ASCITES, UNSPECIFIED HEPATIC CIRRHOSIS TYPE (H): ICD-10-CM

## 2019-02-05 DIAGNOSIS — H44.119 PANUVEITIS, UNSPECIFIED LATERALITY: ICD-10-CM

## 2019-02-05 DIAGNOSIS — H15.002 SCLERITIS OF LEFT EYE: ICD-10-CM

## 2019-02-05 DIAGNOSIS — R18.8 CIRRHOSIS OF LIVER WITH ASCITES, UNSPECIFIED HEPATIC CIRRHOSIS TYPE (H): ICD-10-CM

## 2019-02-05 DIAGNOSIS — H15.102 EPISCLERITIS OF LEFT EYE: ICD-10-CM

## 2019-02-05 ASSESSMENT — MIFFLIN-ST. JEOR: SCORE: 1710.15

## 2019-02-08 ENCOUNTER — RECORDS - HEALTHEAST (OUTPATIENT)
Dept: ADMINISTRATIVE | Facility: OTHER | Age: 74
End: 2019-02-08

## 2019-02-15 ENCOUNTER — COMMUNICATION - HEALTHEAST (OUTPATIENT)
Dept: ADMINISTRATIVE | Facility: CLINIC | Age: 74
End: 2019-02-15

## 2019-02-15 DIAGNOSIS — H15.002 SCLERITIS OF LEFT EYE: ICD-10-CM

## 2019-02-20 ENCOUNTER — COMMUNICATION - HEALTHEAST (OUTPATIENT)
Dept: INTERNAL MEDICINE | Facility: CLINIC | Age: 74
End: 2019-02-20

## 2019-02-22 ENCOUNTER — COMMUNICATION - HEALTHEAST (OUTPATIENT)
Dept: SCHEDULING | Facility: CLINIC | Age: 74
End: 2019-02-22

## 2019-03-19 ENCOUNTER — RECORDS - HEALTHEAST (OUTPATIENT)
Dept: ADMINISTRATIVE | Facility: OTHER | Age: 74
End: 2019-03-19

## 2019-03-20 ENCOUNTER — COMMUNICATION - HEALTHEAST (OUTPATIENT)
Dept: INTERNAL MEDICINE | Facility: CLINIC | Age: 74
End: 2019-03-20

## 2019-03-20 DIAGNOSIS — D50.9 ANEMIA, IRON DEFICIENCY: ICD-10-CM

## 2019-03-27 ENCOUNTER — RECORDS - HEALTHEAST (OUTPATIENT)
Dept: HEALTH INFORMATION MANAGEMENT | Facility: CLINIC | Age: 74
End: 2019-03-27

## 2019-03-28 ENCOUNTER — INFUSION - HEALTHEAST (OUTPATIENT)
Dept: INFUSION THERAPY | Facility: CLINIC | Age: 74
End: 2019-03-28

## 2019-03-28 DIAGNOSIS — H15.002 SCLERITIS OF LEFT EYE: ICD-10-CM

## 2019-03-28 DIAGNOSIS — H44.112 PANUVEITIS OF LEFT EYE: ICD-10-CM

## 2019-03-28 DIAGNOSIS — H44.119 PANUVEITIS, UNSPECIFIED LATERALITY: ICD-10-CM

## 2019-04-02 ENCOUNTER — RECORDS - HEALTHEAST (OUTPATIENT)
Dept: ADMINISTRATIVE | Facility: OTHER | Age: 74
End: 2019-04-02

## 2019-04-03 ENCOUNTER — COMMUNICATION - HEALTHEAST (OUTPATIENT)
Dept: RHEUMATOLOGY | Facility: CLINIC | Age: 74
End: 2019-04-03

## 2019-04-11 ENCOUNTER — INFUSION - HEALTHEAST (OUTPATIENT)
Dept: INFUSION THERAPY | Facility: CLINIC | Age: 74
End: 2019-04-11

## 2019-04-11 DIAGNOSIS — H44.119 PANUVEITIS, UNSPECIFIED LATERALITY: ICD-10-CM

## 2019-04-11 DIAGNOSIS — H44.112 PANUVEITIS OF LEFT EYE: ICD-10-CM

## 2019-04-11 DIAGNOSIS — H15.002 SCLERITIS OF LEFT EYE: ICD-10-CM

## 2019-04-15 ENCOUNTER — RECORDS - HEALTHEAST (OUTPATIENT)
Dept: HEALTH INFORMATION MANAGEMENT | Facility: CLINIC | Age: 74
End: 2019-04-15

## 2019-04-16 ENCOUNTER — RECORDS - HEALTHEAST (OUTPATIENT)
Dept: ADMINISTRATIVE | Facility: OTHER | Age: 74
End: 2019-04-16

## 2019-04-20 ENCOUNTER — COMMUNICATION - HEALTHEAST (OUTPATIENT)
Dept: INTERNAL MEDICINE | Facility: CLINIC | Age: 74
End: 2019-04-20

## 2019-04-20 DIAGNOSIS — R12 HEARTBURN: ICD-10-CM

## 2019-04-30 ENCOUNTER — RECORDS - HEALTHEAST (OUTPATIENT)
Dept: ADMINISTRATIVE | Facility: OTHER | Age: 74
End: 2019-04-30

## 2019-05-09 ENCOUNTER — INFUSION - HEALTHEAST (OUTPATIENT)
Dept: INFUSION THERAPY | Facility: CLINIC | Age: 74
End: 2019-05-09

## 2019-05-09 DIAGNOSIS — H15.002 SCLERITIS OF LEFT EYE: ICD-10-CM

## 2019-05-09 DIAGNOSIS — H44.112 PANUVEITIS OF LEFT EYE: ICD-10-CM

## 2019-05-09 DIAGNOSIS — H44.119 PANUVEITIS, UNSPECIFIED LATERALITY: ICD-10-CM

## 2019-05-28 ENCOUNTER — RECORDS - HEALTHEAST (OUTPATIENT)
Dept: ADMINISTRATIVE | Facility: OTHER | Age: 74
End: 2019-05-28

## 2019-06-25 ENCOUNTER — COMMUNICATION - HEALTHEAST (OUTPATIENT)
Dept: INTERNAL MEDICINE | Facility: CLINIC | Age: 74
End: 2019-06-25

## 2019-06-25 DIAGNOSIS — E11.9 TYPE 2 DIABETES MELLITUS (H): ICD-10-CM

## 2019-07-02 ENCOUNTER — COMMUNICATION - HEALTHEAST (OUTPATIENT)
Dept: INTERNAL MEDICINE | Facility: CLINIC | Age: 74
End: 2019-07-02

## 2019-07-02 DIAGNOSIS — E11.65 TYPE 2 DIABETES MELLITUS WITH HYPERGLYCEMIA, WITHOUT LONG-TERM CURRENT USE OF INSULIN (H): ICD-10-CM

## 2019-07-11 ENCOUNTER — INFUSION - HEALTHEAST (OUTPATIENT)
Dept: INFUSION THERAPY | Facility: CLINIC | Age: 74
End: 2019-07-11

## 2019-07-11 DIAGNOSIS — H44.112 PANUVEITIS OF LEFT EYE: ICD-10-CM

## 2019-07-11 DIAGNOSIS — H44.119 PANUVEITIS, UNSPECIFIED LATERALITY: ICD-10-CM

## 2019-07-11 DIAGNOSIS — H15.002 SCLERITIS OF LEFT EYE: ICD-10-CM

## 2019-07-30 ENCOUNTER — RECORDS - HEALTHEAST (OUTPATIENT)
Dept: ADMINISTRATIVE | Facility: OTHER | Age: 74
End: 2019-07-30

## 2019-08-02 ENCOUNTER — RECORDS - HEALTHEAST (OUTPATIENT)
Dept: HEALTH INFORMATION MANAGEMENT | Facility: CLINIC | Age: 74
End: 2019-08-02

## 2019-08-02 ENCOUNTER — COMMUNICATION - HEALTHEAST (OUTPATIENT)
Dept: INTERNAL MEDICINE | Facility: CLINIC | Age: 74
End: 2019-08-02

## 2019-08-02 DIAGNOSIS — Z86.73 HISTORY OF CVA (CEREBROVASCULAR ACCIDENT): ICD-10-CM

## 2019-08-05 ENCOUNTER — AMBULATORY - HEALTHEAST (OUTPATIENT)
Dept: LAB | Facility: CLINIC | Age: 74
End: 2019-08-05

## 2019-08-05 DIAGNOSIS — Z79.899 HIGH RISK MEDICATION USE: ICD-10-CM

## 2019-08-05 LAB
ALBUMIN SERPL-MCNC: 3.4 G/DL (ref 3.5–5)
ALT SERPL W P-5'-P-CCNC: 31 U/L (ref 0–45)
CREAT SERPL-MCNC: 1.42 MG/DL (ref 0.7–1.3)
ERYTHROCYTE [DISTWIDTH] IN BLOOD BY AUTOMATED COUNT: 12.5 % (ref 11–14.5)
GFR SERPL CREATININE-BSD FRML MDRD: 49 ML/MIN/1.73M2
HCT VFR BLD AUTO: 35.6 % (ref 40–54)
HGB BLD-MCNC: 12 G/DL (ref 14–18)
MCH RBC QN AUTO: 30.8 PG (ref 27–34)
MCHC RBC AUTO-ENTMCNC: 33.6 G/DL (ref 32–36)
MCV RBC AUTO: 92 FL (ref 80–100)
PLATELET # BLD AUTO: 102 THOU/UL (ref 140–440)
PMV BLD AUTO: 8.2 FL (ref 7–10)
RBC # BLD AUTO: 3.88 MILL/UL (ref 4.4–6.2)
WBC: 5.1 THOU/UL (ref 4–11)

## 2019-08-08 ENCOUNTER — OFFICE VISIT - HEALTHEAST (OUTPATIENT)
Dept: RHEUMATOLOGY | Facility: CLINIC | Age: 74
End: 2019-08-08

## 2019-08-08 DIAGNOSIS — H44.112 PANUVEITIS OF LEFT EYE: ICD-10-CM

## 2019-08-08 DIAGNOSIS — H15.002 SCLERITIS OF LEFT EYE: ICD-10-CM

## 2019-08-08 DIAGNOSIS — M17.0 PRIMARY OSTEOARTHRITIS OF BOTH KNEES: ICD-10-CM

## 2019-08-08 DIAGNOSIS — Z79.899 HIGH RISK MEDICATION USE: ICD-10-CM

## 2019-08-08 DIAGNOSIS — R76.8 ANTINEUTROPHIL CYTOPLASMIC ANTIBODY (ANCA) POSITIVE: ICD-10-CM

## 2019-08-13 ENCOUNTER — COMMUNICATION - HEALTHEAST (OUTPATIENT)
Dept: INTERNAL MEDICINE | Facility: CLINIC | Age: 74
End: 2019-08-13

## 2019-08-13 DIAGNOSIS — I10 ESSENTIAL HYPERTENSION, MALIGNANT: ICD-10-CM

## 2019-09-05 ENCOUNTER — INFUSION - HEALTHEAST (OUTPATIENT)
Dept: INFUSION THERAPY | Facility: CLINIC | Age: 74
End: 2019-09-05

## 2019-09-05 DIAGNOSIS — H44.112 PANUVEITIS OF LEFT EYE: ICD-10-CM

## 2019-09-05 DIAGNOSIS — H15.002 SCLERITIS OF LEFT EYE: ICD-10-CM

## 2019-09-05 DIAGNOSIS — H44.119 PANUVEITIS, UNSPECIFIED LATERALITY: ICD-10-CM

## 2019-09-27 ENCOUNTER — OFFICE VISIT - HEALTHEAST (OUTPATIENT)
Dept: INTERNAL MEDICINE | Facility: CLINIC | Age: 74
End: 2019-09-27

## 2019-09-27 DIAGNOSIS — B02.7 DISSEMINATED HERPES ZOSTER: ICD-10-CM

## 2019-09-27 DIAGNOSIS — G50.0 TIC DOULOUREUX: ICD-10-CM

## 2019-09-27 DIAGNOSIS — B02.29 POSTHERPETIC NEURALGIA: ICD-10-CM

## 2019-09-27 DIAGNOSIS — E78.5 DYSLIPIDEMIA: ICD-10-CM

## 2019-09-27 DIAGNOSIS — Z12.5 SCREENING FOR PROSTATE CANCER: ICD-10-CM

## 2019-09-27 DIAGNOSIS — I10 ESSENTIAL HYPERTENSION, BENIGN: ICD-10-CM

## 2019-09-27 DIAGNOSIS — I45.10 RIGHT BUNDLE BRANCH BLOCK: ICD-10-CM

## 2019-09-27 DIAGNOSIS — E11.8 TYPE 2 DIABETES MELLITUS WITH COMPLICATION, WITHOUT LONG-TERM CURRENT USE OF INSULIN (H): ICD-10-CM

## 2019-09-27 DIAGNOSIS — Z00.00 MEDICARE ANNUAL WELLNESS VISIT, SUBSEQUENT: ICD-10-CM

## 2019-09-27 DIAGNOSIS — K74.60 CIRRHOSIS OF LIVER WITH ASCITES, UNSPECIFIED HEPATIC CIRRHOSIS TYPE (H): ICD-10-CM

## 2019-09-27 DIAGNOSIS — R18.8 CIRRHOSIS OF LIVER WITH ASCITES, UNSPECIFIED HEPATIC CIRRHOSIS TYPE (H): ICD-10-CM

## 2019-09-27 LAB
ALBUMIN SERPL-MCNC: 3.9 G/DL (ref 3.5–5)
ALBUMIN UR-MCNC: NEGATIVE MG/DL
ALP SERPL-CCNC: 145 U/L (ref 45–120)
ALT SERPL W P-5'-P-CCNC: 65 U/L (ref 0–45)
ANION GAP SERPL CALCULATED.3IONS-SCNC: 11 MMOL/L (ref 5–18)
APPEARANCE UR: CLEAR
AST SERPL W P-5'-P-CCNC: 72 U/L (ref 0–40)
BILIRUB SERPL-MCNC: 0.8 MG/DL (ref 0–1)
BILIRUB UR QL STRIP: NEGATIVE
BUN SERPL-MCNC: 17 MG/DL (ref 8–28)
CALCIUM SERPL-MCNC: 9.8 MG/DL (ref 8.5–10.5)
CHLORIDE BLD-SCNC: 98 MMOL/L (ref 98–107)
CHOLEST SERPL-MCNC: 121 MG/DL
CO2 SERPL-SCNC: 24 MMOL/L (ref 22–31)
COLOR UR AUTO: YELLOW
CREAT SERPL-MCNC: 1.5 MG/DL (ref 0.7–1.3)
CREAT UR-MCNC: 113.9 MG/DL
FASTING STATUS PATIENT QL REPORTED: YES
GFR SERPL CREATININE-BSD FRML MDRD: 46 ML/MIN/1.73M2
GLUCOSE BLD-MCNC: 143 MG/DL (ref 70–125)
GLUCOSE UR STRIP-MCNC: NEGATIVE MG/DL
HBA1C MFR BLD: 7.5 % (ref 3.5–6)
HDLC SERPL-MCNC: 56 MG/DL
HGB UR QL STRIP: NEGATIVE
KETONES UR STRIP-MCNC: NEGATIVE MG/DL
LDLC SERPL CALC-MCNC: 42 MG/DL
LEUKOCYTE ESTERASE UR QL STRIP: NEGATIVE
MICROALBUMIN UR-MCNC: 0.64 MG/DL (ref 0–1.99)
MICROALBUMIN/CREAT UR: 5.6 MG/G
NITRATE UR QL: NEGATIVE
PH UR STRIP: 6 [PH] (ref 5–8)
POTASSIUM BLD-SCNC: 4.8 MMOL/L (ref 3.5–5)
PROT SERPL-MCNC: 8.2 G/DL (ref 6–8)
PSA SERPL-MCNC: <0.1 NG/ML (ref 0–6.5)
SODIUM SERPL-SCNC: 133 MMOL/L (ref 136–145)
SP GR UR STRIP: 1.01 (ref 1–1.03)
TRIGL SERPL-MCNC: 117 MG/DL
UROBILINOGEN UR STRIP-ACNC: NORMAL

## 2019-09-27 RX ORDER — HYDROCODONE BITARTRATE AND ACETAMINOPHEN 5; 325 MG/1; MG/1
1-2 TABLET ORAL EVERY 6 HOURS PRN
Qty: 30 TABLET | Refills: 0 | Status: SHIPPED | OUTPATIENT
Start: 2019-09-27 | End: 2021-09-20

## 2019-09-27 RX ORDER — GABAPENTIN 100 MG/1
CAPSULE ORAL
Qty: 180 CAPSULE | Refills: 1 | Status: SHIPPED | OUTPATIENT
Start: 2019-09-27 | End: 2021-09-20

## 2019-09-27 ASSESSMENT — ANXIETY QUESTIONNAIRES
1. FEELING NERVOUS, ANXIOUS, OR ON EDGE: NOT AT ALL
2. NOT BEING ABLE TO STOP OR CONTROL WORRYING: NOT AT ALL

## 2019-09-27 ASSESSMENT — MIFFLIN-ST. JEOR: SCORE: 1660.25

## 2019-09-29 ENCOUNTER — COMMUNICATION - HEALTHEAST (OUTPATIENT)
Dept: INTERNAL MEDICINE | Facility: CLINIC | Age: 74
End: 2019-09-29

## 2019-09-29 DIAGNOSIS — E11.65 TYPE 2 DIABETES MELLITUS WITH HYPERGLYCEMIA, WITHOUT LONG-TERM CURRENT USE OF INSULIN (H): ICD-10-CM

## 2019-09-30 ENCOUNTER — COMMUNICATION - HEALTHEAST (OUTPATIENT)
Dept: INTERNAL MEDICINE | Facility: CLINIC | Age: 74
End: 2019-09-30

## 2019-09-30 ENCOUNTER — COMMUNICATION - HEALTHEAST (OUTPATIENT)
Dept: ADMINISTRATIVE | Facility: CLINIC | Age: 74
End: 2019-09-30

## 2019-10-01 ENCOUNTER — RECORDS - HEALTHEAST (OUTPATIENT)
Dept: ADMINISTRATIVE | Facility: OTHER | Age: 74
End: 2019-10-01

## 2019-10-11 ENCOUNTER — OFFICE VISIT - HEALTHEAST (OUTPATIENT)
Dept: INTERNAL MEDICINE | Facility: CLINIC | Age: 74
End: 2019-10-11

## 2019-10-11 DIAGNOSIS — B02.8 HERPES ZOSTER WITH COMPLICATION: ICD-10-CM

## 2019-10-11 DIAGNOSIS — R18.8 CIRRHOSIS OF LIVER WITH ASCITES, UNSPECIFIED HEPATIC CIRRHOSIS TYPE (H): ICD-10-CM

## 2019-10-11 DIAGNOSIS — H15.002 SCLERITIS OF LEFT EYE: ICD-10-CM

## 2019-10-11 DIAGNOSIS — E11.8 TYPE 2 DIABETES MELLITUS WITH COMPLICATION, WITHOUT LONG-TERM CURRENT USE OF INSULIN (H): ICD-10-CM

## 2019-10-11 DIAGNOSIS — Z23 FLU VACCINE NEED: ICD-10-CM

## 2019-10-11 DIAGNOSIS — K74.60 CIRRHOSIS OF LIVER WITH ASCITES, UNSPECIFIED HEPATIC CIRRHOSIS TYPE (H): ICD-10-CM

## 2019-10-11 ASSESSMENT — MIFFLIN-ST. JEOR: SCORE: 1692

## 2019-10-22 ENCOUNTER — COMMUNICATION - HEALTHEAST (OUTPATIENT)
Dept: INTERNAL MEDICINE | Facility: CLINIC | Age: 74
End: 2019-10-22

## 2019-10-22 DIAGNOSIS — R12 HEARTBURN: ICD-10-CM

## 2019-10-22 DIAGNOSIS — R39.11 URINARY HESITANCY: ICD-10-CM

## 2019-10-24 ENCOUNTER — COMMUNICATION - HEALTHEAST (OUTPATIENT)
Dept: ADMINISTRATIVE | Facility: CLINIC | Age: 74
End: 2019-10-24

## 2019-11-05 ENCOUNTER — RECORDS - HEALTHEAST (OUTPATIENT)
Dept: ADMINISTRATIVE | Facility: OTHER | Age: 74
End: 2019-11-05

## 2019-11-05 LAB — RETINOPATHY: NEGATIVE

## 2019-11-07 ENCOUNTER — COMMUNICATION - HEALTHEAST (OUTPATIENT)
Dept: INTERNAL MEDICINE | Facility: CLINIC | Age: 74
End: 2019-11-07

## 2019-11-07 DIAGNOSIS — I10 ESSENTIAL HYPERTENSION, MALIGNANT: ICD-10-CM

## 2019-11-08 ENCOUNTER — INFUSION - HEALTHEAST (OUTPATIENT)
Dept: INFUSION THERAPY | Facility: CLINIC | Age: 74
End: 2019-11-08

## 2019-11-08 DIAGNOSIS — H44.119 PANUVEITIS, UNSPECIFIED LATERALITY: ICD-10-CM

## 2019-11-08 DIAGNOSIS — H44.112 PANUVEITIS OF LEFT EYE: ICD-10-CM

## 2019-11-08 DIAGNOSIS — H15.002 SCLERITIS OF LEFT EYE: ICD-10-CM

## 2019-11-15 ENCOUNTER — RECORDS - HEALTHEAST (OUTPATIENT)
Dept: HEALTH INFORMATION MANAGEMENT | Facility: CLINIC | Age: 74
End: 2019-11-15

## 2019-12-26 ENCOUNTER — COMMUNICATION - HEALTHEAST (OUTPATIENT)
Dept: INTERNAL MEDICINE | Facility: CLINIC | Age: 74
End: 2019-12-26

## 2019-12-26 DIAGNOSIS — D50.9 ANEMIA, IRON DEFICIENCY: ICD-10-CM

## 2019-12-26 DIAGNOSIS — E11.9 TYPE 2 DIABETES MELLITUS (H): ICD-10-CM

## 2019-12-26 DIAGNOSIS — I10 ESSENTIAL HYPERTENSION, BENIGN: ICD-10-CM

## 2019-12-26 DIAGNOSIS — E78.5 HYPERLIPIDEMIA: ICD-10-CM

## 2020-01-03 ENCOUNTER — INFUSION - HEALTHEAST (OUTPATIENT)
Dept: INFUSION THERAPY | Facility: CLINIC | Age: 75
End: 2020-01-03

## 2020-01-03 DIAGNOSIS — H44.112 PANUVEITIS OF LEFT EYE: ICD-10-CM

## 2020-01-03 DIAGNOSIS — H15.002 SCLERITIS OF LEFT EYE: ICD-10-CM

## 2020-01-03 DIAGNOSIS — H44.119 PANUVEITIS, UNSPECIFIED LATERALITY: ICD-10-CM

## 2020-01-07 ENCOUNTER — RECORDS - HEALTHEAST (OUTPATIENT)
Dept: ADMINISTRATIVE | Facility: OTHER | Age: 75
End: 2020-01-07

## 2020-01-21 ENCOUNTER — COMMUNICATION - HEALTHEAST (OUTPATIENT)
Dept: INTERNAL MEDICINE | Facility: CLINIC | Age: 75
End: 2020-01-21

## 2020-01-21 DIAGNOSIS — R39.11 URINARY HESITANCY: ICD-10-CM

## 2020-01-21 DIAGNOSIS — R12 HEARTBURN: ICD-10-CM

## 2020-01-31 ENCOUNTER — COMMUNICATION - HEALTHEAST (OUTPATIENT)
Dept: LAB | Facility: CLINIC | Age: 75
End: 2020-01-31

## 2020-01-31 DIAGNOSIS — H15.002 SCLERITIS OF LEFT EYE: ICD-10-CM

## 2020-01-31 DIAGNOSIS — Z79.899 HIGH RISK MEDICATION USE: ICD-10-CM

## 2020-01-31 DIAGNOSIS — R76.8 ANTINEUTROPHIL CYTOPLASMIC ANTIBODY (ANCA) POSITIVE: ICD-10-CM

## 2020-02-06 ENCOUNTER — AMBULATORY - HEALTHEAST (OUTPATIENT)
Dept: LAB | Facility: CLINIC | Age: 75
End: 2020-02-06

## 2020-02-06 DIAGNOSIS — R76.8 ANTINEUTROPHIL CYTOPLASMIC ANTIBODY (ANCA) POSITIVE: ICD-10-CM

## 2020-02-06 DIAGNOSIS — H15.002 SCLERITIS OF LEFT EYE: ICD-10-CM

## 2020-02-06 DIAGNOSIS — Z79.899 HIGH RISK MEDICATION USE: ICD-10-CM

## 2020-02-06 LAB
ALBUMIN SERPL-MCNC: 3.5 G/DL (ref 3.5–5)
ALT SERPL W P-5'-P-CCNC: 28 U/L (ref 0–45)
CREAT SERPL-MCNC: 1.37 MG/DL (ref 0.7–1.3)
ERYTHROCYTE [DISTWIDTH] IN BLOOD BY AUTOMATED COUNT: 12.5 % (ref 11–14.5)
GFR SERPL CREATININE-BSD FRML MDRD: 51 ML/MIN/1.73M2
HCT VFR BLD AUTO: 31.9 % (ref 40–54)
HGB BLD-MCNC: 10.7 G/DL (ref 14–18)
MCH RBC QN AUTO: 31.6 PG (ref 27–34)
MCHC RBC AUTO-ENTMCNC: 33.6 G/DL (ref 32–36)
MCV RBC AUTO: 94 FL (ref 80–100)
PLATELET # BLD AUTO: 118 THOU/UL (ref 140–440)
PMV BLD AUTO: 7.9 FL (ref 7–10)
RBC # BLD AUTO: 3.4 MILL/UL (ref 4.4–6.2)
WBC: 5.8 THOU/UL (ref 4–11)

## 2020-02-10 ENCOUNTER — OFFICE VISIT - HEALTHEAST (OUTPATIENT)
Dept: RHEUMATOLOGY | Facility: CLINIC | Age: 75
End: 2020-02-10

## 2020-02-10 DIAGNOSIS — R76.8 ANTINEUTROPHIL CYTOPLASMIC ANTIBODY (ANCA) POSITIVE: ICD-10-CM

## 2020-02-10 DIAGNOSIS — H15.002 SCLERITIS OF LEFT EYE: ICD-10-CM

## 2020-02-10 DIAGNOSIS — Z79.899 HIGH RISK MEDICATION USE: ICD-10-CM

## 2020-02-10 DIAGNOSIS — N18.30 CRF (CHRONIC RENAL FAILURE), STAGE 3 (MODERATE) (H): ICD-10-CM

## 2020-02-10 ASSESSMENT — MIFFLIN-ST. JEOR: SCORE: 1646.64

## 2020-03-13 ENCOUNTER — INFUSION - HEALTHEAST (OUTPATIENT)
Dept: INFUSION THERAPY | Facility: CLINIC | Age: 75
End: 2020-03-13

## 2020-03-13 DIAGNOSIS — H15.002 SCLERITIS OF LEFT EYE: ICD-10-CM

## 2020-03-13 DIAGNOSIS — H44.112 PANUVEITIS OF LEFT EYE: ICD-10-CM

## 2020-03-13 DIAGNOSIS — H44.119 PANUVEITIS, UNSPECIFIED LATERALITY: ICD-10-CM

## 2020-03-17 ENCOUNTER — COMMUNICATION - HEALTHEAST (OUTPATIENT)
Dept: ADMINISTRATIVE | Facility: CLINIC | Age: 75
End: 2020-03-17

## 2020-03-25 ENCOUNTER — OFFICE VISIT - HEALTHEAST (OUTPATIENT)
Dept: INTERNAL MEDICINE | Facility: CLINIC | Age: 75
End: 2020-03-25

## 2020-03-25 DIAGNOSIS — L29.9 PRURITIC DISORDER: ICD-10-CM

## 2020-03-25 RX ORDER — HYDROXYZINE HYDROCHLORIDE 25 MG/1
25 TABLET, FILM COATED ORAL EVERY 6 HOURS PRN
Qty: 60 TABLET | Refills: 5 | Status: SHIPPED | OUTPATIENT
Start: 2020-03-25 | End: 2021-09-20

## 2020-05-08 ENCOUNTER — INFUSION - HEALTHEAST (OUTPATIENT)
Dept: INFUSION THERAPY | Facility: CLINIC | Age: 75
End: 2020-05-08

## 2020-05-08 DIAGNOSIS — H44.119 PANUVEITIS, UNSPECIFIED LATERALITY: ICD-10-CM

## 2020-05-08 DIAGNOSIS — H44.112 PANUVEITIS OF LEFT EYE: ICD-10-CM

## 2020-05-08 DIAGNOSIS — H15.002 SCLERITIS OF LEFT EYE: ICD-10-CM

## 2020-05-11 ENCOUNTER — AMBULATORY - HEALTHEAST (OUTPATIENT)
Dept: LAB | Facility: CLINIC | Age: 75
End: 2020-05-11

## 2020-05-11 DIAGNOSIS — R76.8 ANTINEUTROPHIL CYTOPLASMIC ANTIBODY (ANCA) POSITIVE: ICD-10-CM

## 2020-05-11 DIAGNOSIS — Z79.899 HIGH RISK MEDICATION USE: ICD-10-CM

## 2020-05-11 DIAGNOSIS — H15.002 SCLERITIS OF LEFT EYE: ICD-10-CM

## 2020-05-11 LAB
ALBUMIN SERPL-MCNC: 3.5 G/DL (ref 3.5–5)
ALT SERPL W P-5'-P-CCNC: 25 U/L (ref 0–45)
CREAT SERPL-MCNC: 1.34 MG/DL (ref 0.7–1.3)
ERYTHROCYTE [DISTWIDTH] IN BLOOD BY AUTOMATED COUNT: 12.6 % (ref 11–14.5)
GFR SERPL CREATININE-BSD FRML MDRD: 52 ML/MIN/1.73M2
HCT VFR BLD AUTO: 34.2 % (ref 40–54)
HGB BLD-MCNC: 11.5 G/DL (ref 14–18)
MCH RBC QN AUTO: 31.2 PG (ref 27–34)
MCHC RBC AUTO-ENTMCNC: 33.6 G/DL (ref 32–36)
MCV RBC AUTO: 93 FL (ref 80–100)
PLATELET # BLD AUTO: 117 THOU/UL (ref 140–440)
PMV BLD AUTO: 7.9 FL (ref 7–10)
RBC # BLD AUTO: 3.68 MILL/UL (ref 4.4–6.2)
WBC: 5 THOU/UL (ref 4–11)

## 2020-05-26 ENCOUNTER — RECORDS - HEALTHEAST (OUTPATIENT)
Dept: ADMINISTRATIVE | Facility: OTHER | Age: 75
End: 2020-05-26

## 2020-05-26 LAB — RETINOPATHY: NEGATIVE

## 2020-06-01 ENCOUNTER — RECORDS - HEALTHEAST (OUTPATIENT)
Dept: HEALTH INFORMATION MANAGEMENT | Facility: CLINIC | Age: 75
End: 2020-06-01

## 2020-07-03 ENCOUNTER — INFUSION - HEALTHEAST (OUTPATIENT)
Dept: INFUSION THERAPY | Facility: CLINIC | Age: 75
End: 2020-07-03

## 2020-07-03 DIAGNOSIS — H15.002 SCLERITIS OF LEFT EYE: ICD-10-CM

## 2020-07-03 DIAGNOSIS — H44.119 PANUVEITIS, UNSPECIFIED LATERALITY: ICD-10-CM

## 2020-07-03 DIAGNOSIS — H44.112 PANUVEITIS OF LEFT EYE: ICD-10-CM

## 2020-07-08 ENCOUNTER — COMMUNICATION - HEALTHEAST (OUTPATIENT)
Dept: INTERNAL MEDICINE | Facility: CLINIC | Age: 75
End: 2020-07-08

## 2020-07-08 DIAGNOSIS — I10 ESSENTIAL HYPERTENSION, MALIGNANT: ICD-10-CM

## 2020-07-08 DIAGNOSIS — D50.9 ANEMIA, IRON DEFICIENCY: ICD-10-CM

## 2020-08-01 ENCOUNTER — COMMUNICATION - HEALTHEAST (OUTPATIENT)
Dept: INTERNAL MEDICINE | Facility: CLINIC | Age: 75
End: 2020-08-01

## 2020-08-01 DIAGNOSIS — Z86.73 HISTORY OF CVA (CEREBROVASCULAR ACCIDENT): ICD-10-CM

## 2020-08-02 RX ORDER — CLOPIDOGREL BISULFATE 75 MG/1
TABLET ORAL
Qty: 90 TABLET | Refills: 3 | Status: SHIPPED | OUTPATIENT
Start: 2020-08-02 | End: 2021-09-23 | Stop reason: SINTOL

## 2020-08-06 ENCOUNTER — COMMUNICATION - HEALTHEAST (OUTPATIENT)
Dept: RHEUMATOLOGY | Facility: CLINIC | Age: 75
End: 2020-08-06

## 2020-08-06 ENCOUNTER — AMBULATORY - HEALTHEAST (OUTPATIENT)
Dept: LAB | Facility: CLINIC | Age: 75
End: 2020-08-06

## 2020-08-06 DIAGNOSIS — R76.8 ANTINEUTROPHIL CYTOPLASMIC ANTIBODY (ANCA) POSITIVE: ICD-10-CM

## 2020-08-06 DIAGNOSIS — Z79.899 HIGH RISK MEDICATION USE: ICD-10-CM

## 2020-08-06 DIAGNOSIS — H15.002 SCLERITIS OF LEFT EYE: ICD-10-CM

## 2020-08-06 LAB
ALBUMIN SERPL-MCNC: 3.5 G/DL (ref 3.5–5)
ALT SERPL W P-5'-P-CCNC: 23 U/L (ref 0–45)
CREAT SERPL-MCNC: 1.34 MG/DL (ref 0.7–1.3)
ERYTHROCYTE [DISTWIDTH] IN BLOOD BY AUTOMATED COUNT: 12.7 % (ref 11–14.5)
GFR SERPL CREATININE-BSD FRML MDRD: 52 ML/MIN/1.73M2
HCT VFR BLD AUTO: 31.5 % (ref 40–54)
HGB BLD-MCNC: 10.6 G/DL (ref 14–18)
MCH RBC QN AUTO: 31.1 PG (ref 27–34)
MCHC RBC AUTO-ENTMCNC: 33.8 G/DL (ref 32–36)
MCV RBC AUTO: 92 FL (ref 80–100)
PLATELET # BLD AUTO: 114 THOU/UL (ref 140–440)
PMV BLD AUTO: 7.9 FL (ref 7–10)
RBC # BLD AUTO: 3.42 MILL/UL (ref 4.4–6.2)
WBC: 4.4 THOU/UL (ref 4–11)

## 2020-08-10 ENCOUNTER — OFFICE VISIT - HEALTHEAST (OUTPATIENT)
Dept: RHEUMATOLOGY | Facility: CLINIC | Age: 75
End: 2020-08-10

## 2020-08-10 DIAGNOSIS — R76.8 ANTINEUTROPHIL CYTOPLASMIC ANTIBODY (ANCA) POSITIVE: ICD-10-CM

## 2020-08-10 DIAGNOSIS — Z79.899 HIGH RISK MEDICATION USE: ICD-10-CM

## 2020-08-10 DIAGNOSIS — N18.30 CRF (CHRONIC RENAL FAILURE), STAGE 3 (MODERATE) (H): ICD-10-CM

## 2020-08-10 DIAGNOSIS — H44.112 PANUVEITIS OF LEFT EYE: ICD-10-CM

## 2020-08-10 RX ORDER — PREDNISONE 10 MG/1
30 TABLET ORAL PRN
Qty: 12 TABLET | Refills: 0 | Status: SHIPPED | OUTPATIENT
Start: 2020-08-10 | End: 2021-08-10

## 2020-08-28 ENCOUNTER — INFUSION - HEALTHEAST (OUTPATIENT)
Dept: INFUSION THERAPY | Facility: CLINIC | Age: 75
End: 2020-08-28

## 2020-08-28 DIAGNOSIS — H15.002 SCLERITIS OF LEFT EYE: ICD-10-CM

## 2020-08-28 DIAGNOSIS — H44.119 PANUVEITIS, UNSPECIFIED LATERALITY: ICD-10-CM

## 2020-08-28 DIAGNOSIS — H44.112 PANUVEITIS OF LEFT EYE: ICD-10-CM

## 2020-09-22 ENCOUNTER — RECORDS - HEALTHEAST (OUTPATIENT)
Dept: ADMINISTRATIVE | Facility: OTHER | Age: 75
End: 2020-09-22

## 2020-09-22 ENCOUNTER — COMMUNICATION - HEALTHEAST (OUTPATIENT)
Dept: INTERNAL MEDICINE | Facility: CLINIC | Age: 75
End: 2020-09-22

## 2020-09-22 DIAGNOSIS — E11.9 TYPE 2 DIABETES MELLITUS (H): ICD-10-CM

## 2020-10-03 ENCOUNTER — COMMUNICATION - HEALTHEAST (OUTPATIENT)
Dept: INTERNAL MEDICINE | Facility: CLINIC | Age: 75
End: 2020-10-03

## 2020-10-03 DIAGNOSIS — E78.5 HYPERLIPIDEMIA: ICD-10-CM

## 2020-10-03 DIAGNOSIS — R39.11 URINARY HESITANCY: ICD-10-CM

## 2020-10-03 DIAGNOSIS — I10 ESSENTIAL HYPERTENSION, BENIGN: ICD-10-CM

## 2020-10-03 DIAGNOSIS — E11.65 TYPE 2 DIABETES MELLITUS WITH HYPERGLYCEMIA, WITHOUT LONG-TERM CURRENT USE OF INSULIN (H): ICD-10-CM

## 2020-10-03 DIAGNOSIS — I10 ESSENTIAL HYPERTENSION, MALIGNANT: ICD-10-CM

## 2020-10-03 DIAGNOSIS — R12 HEARTBURN: ICD-10-CM

## 2020-10-03 DIAGNOSIS — E11.9 TYPE 2 DIABETES MELLITUS (H): ICD-10-CM

## 2020-10-05 RX ORDER — LISINOPRIL 10 MG/1
TABLET ORAL
Qty: 90 TABLET | Refills: 3 | Status: SHIPPED | OUTPATIENT
Start: 2020-10-05 | End: 2021-10-08

## 2020-10-05 RX ORDER — PIOGLITAZONEHYDROCHLORIDE 30 MG/1
TABLET ORAL
Qty: 90 TABLET | Refills: 3 | Status: SHIPPED | OUTPATIENT
Start: 2020-10-05 | End: 2021-10-06

## 2020-10-22 ENCOUNTER — COMMUNICATION - HEALTHEAST (OUTPATIENT)
Dept: ADMINISTRATIVE | Facility: CLINIC | Age: 75
End: 2020-10-22

## 2020-10-23 ENCOUNTER — INFUSION - HEALTHEAST (OUTPATIENT)
Dept: INFUSION THERAPY | Facility: CLINIC | Age: 75
End: 2020-10-23

## 2020-10-23 DIAGNOSIS — H44.112 PANUVEITIS OF LEFT EYE: ICD-10-CM

## 2020-10-23 DIAGNOSIS — H15.002 SCLERITIS OF LEFT EYE: ICD-10-CM

## 2020-10-23 DIAGNOSIS — R76.8 ANTINEUTROPHIL CYTOPLASMIC ANTIBODY (ANCA) POSITIVE: ICD-10-CM

## 2020-12-18 ENCOUNTER — INFUSION - HEALTHEAST (OUTPATIENT)
Dept: INFUSION THERAPY | Facility: CLINIC | Age: 75
End: 2020-12-18

## 2020-12-18 DIAGNOSIS — R76.8 ANTINEUTROPHIL CYTOPLASMIC ANTIBODY (ANCA) POSITIVE: ICD-10-CM

## 2020-12-18 DIAGNOSIS — H15.002 SCLERITIS OF LEFT EYE: ICD-10-CM

## 2020-12-18 DIAGNOSIS — H44.112 PANUVEITIS OF LEFT EYE: ICD-10-CM

## 2021-01-26 ENCOUNTER — RECORDS - HEALTHEAST (OUTPATIENT)
Dept: ADMINISTRATIVE | Facility: OTHER | Age: 76
End: 2021-01-26

## 2021-01-26 LAB — RETINOPATHY: NEGATIVE

## 2021-01-29 ENCOUNTER — RECORDS - HEALTHEAST (OUTPATIENT)
Dept: HEALTH INFORMATION MANAGEMENT | Facility: CLINIC | Age: 76
End: 2021-01-29

## 2021-02-08 ENCOUNTER — AMBULATORY - HEALTHEAST (OUTPATIENT)
Dept: LAB | Facility: CLINIC | Age: 76
End: 2021-02-08

## 2021-02-08 ENCOUNTER — AMBULATORY - HEALTHEAST (OUTPATIENT)
Dept: RHEUMATOLOGY | Facility: CLINIC | Age: 76
End: 2021-02-08

## 2021-02-08 DIAGNOSIS — H44.112 PANUVEITIS OF LEFT EYE: ICD-10-CM

## 2021-02-08 DIAGNOSIS — H15.002 SCLERITIS OF LEFT EYE: ICD-10-CM

## 2021-02-08 LAB
ALBUMIN SERPL-MCNC: 3.7 G/DL (ref 3.5–5)
ALT SERPL W P-5'-P-CCNC: 24 U/L (ref 0–45)
CREAT SERPL-MCNC: 1.43 MG/DL (ref 0.7–1.3)
ERYTHROCYTE [DISTWIDTH] IN BLOOD BY AUTOMATED COUNT: 14.3 % (ref 11–14.5)
GFR SERPL CREATININE-BSD FRML MDRD: 48 ML/MIN/1.73M2
HCT VFR BLD AUTO: 30.6 % (ref 40–54)
HGB BLD-MCNC: 9.8 G/DL (ref 14–18)
MCH RBC QN AUTO: 29.3 PG (ref 27–34)
MCHC RBC AUTO-ENTMCNC: 32 G/DL (ref 32–36)
MCV RBC AUTO: 91 FL (ref 80–100)
PLATELET # BLD AUTO: 121 THOU/UL (ref 140–440)
PMV BLD AUTO: 9.9 FL (ref 7–10)
RBC # BLD AUTO: 3.35 MILL/UL (ref 4.4–6.2)
WBC: 4.9 THOU/UL (ref 4–11)

## 2021-02-11 ENCOUNTER — OFFICE VISIT - HEALTHEAST (OUTPATIENT)
Dept: RHEUMATOLOGY | Facility: CLINIC | Age: 76
End: 2021-02-11

## 2021-02-11 DIAGNOSIS — R76.8 ANTINEUTROPHIL CYTOPLASMIC ANTIBODY (ANCA) POSITIVE: ICD-10-CM

## 2021-02-11 DIAGNOSIS — Z79.899 HIGH RISK MEDICATION USE: ICD-10-CM

## 2021-02-11 DIAGNOSIS — M17.0 PRIMARY OSTEOARTHRITIS OF BOTH KNEES: ICD-10-CM

## 2021-02-11 DIAGNOSIS — H15.002 SCLERITIS OF LEFT EYE: ICD-10-CM

## 2021-02-23 ENCOUNTER — INFUSION - HEALTHEAST (OUTPATIENT)
Dept: INFUSION THERAPY | Facility: CLINIC | Age: 76
End: 2021-02-23

## 2021-02-23 DIAGNOSIS — H44.112 PANUVEITIS OF LEFT EYE: ICD-10-CM

## 2021-02-23 DIAGNOSIS — R76.8 ANTINEUTROPHIL CYTOPLASMIC ANTIBODY (ANCA) POSITIVE: ICD-10-CM

## 2021-02-23 DIAGNOSIS — H15.002 SCLERITIS OF LEFT EYE: ICD-10-CM

## 2021-03-23 ENCOUNTER — COMMUNICATION - HEALTHEAST (OUTPATIENT)
Dept: INTERNAL MEDICINE | Facility: CLINIC | Age: 76
End: 2021-03-23

## 2021-03-23 DIAGNOSIS — E11.9 TYPE 2 DIABETES MELLITUS (H): ICD-10-CM

## 2021-03-23 DIAGNOSIS — E78.5 HYPERLIPIDEMIA: ICD-10-CM

## 2021-03-23 DIAGNOSIS — R39.11 URINARY HESITANCY: ICD-10-CM

## 2021-03-23 DIAGNOSIS — D50.9 ANEMIA, IRON DEFICIENCY: ICD-10-CM

## 2021-03-23 DIAGNOSIS — R12 HEARTBURN: ICD-10-CM

## 2021-03-23 DIAGNOSIS — I10 ESSENTIAL HYPERTENSION, BENIGN: ICD-10-CM

## 2021-03-24 RX ORDER — TAMSULOSIN HYDROCHLORIDE 0.4 MG/1
CAPSULE ORAL
Qty: 90 CAPSULE | Refills: 0 | Status: SHIPPED | OUTPATIENT
Start: 2021-03-24 | End: 2021-08-10

## 2021-03-24 RX ORDER — PROPRANOLOL HYDROCHLORIDE 20 MG/1
TABLET ORAL
Qty: 180 TABLET | Refills: 0 | Status: SHIPPED | OUTPATIENT
Start: 2021-03-24 | End: 2021-08-10

## 2021-03-24 RX ORDER — FERROUS SULFATE 325(65) MG
TABLET ORAL
Qty: 90 TABLET | Refills: 0 | Status: SHIPPED | OUTPATIENT
Start: 2021-03-24 | End: 2021-09-20

## 2021-03-24 RX ORDER — OMEPRAZOLE 40 MG/1
CAPSULE, DELAYED RELEASE ORAL
Qty: 90 CAPSULE | Refills: 0 | Status: SHIPPED | OUTPATIENT
Start: 2021-03-24 | End: 2021-08-10

## 2021-03-24 RX ORDER — ATORVASTATIN CALCIUM 40 MG/1
TABLET, FILM COATED ORAL
Qty: 90 TABLET | Refills: 0 | Status: SHIPPED | OUTPATIENT
Start: 2021-03-24 | End: 2021-10-06

## 2021-04-07 ENCOUNTER — AMBULATORY - HEALTHEAST (OUTPATIENT)
Dept: PHARMACY | Facility: HOSPITAL | Age: 76
End: 2021-04-07

## 2021-04-20 ENCOUNTER — INFUSION - HEALTHEAST (OUTPATIENT)
Dept: INFUSION THERAPY | Facility: CLINIC | Age: 76
End: 2021-04-20

## 2021-04-20 DIAGNOSIS — R76.8 ANTINEUTROPHIL CYTOPLASMIC ANTIBODY (ANCA) POSITIVE: ICD-10-CM

## 2021-04-20 DIAGNOSIS — H44.112 PANUVEITIS OF LEFT EYE: ICD-10-CM

## 2021-04-20 DIAGNOSIS — H15.002 SCLERITIS OF LEFT EYE: ICD-10-CM

## 2021-05-05 ENCOUNTER — RECORDS - HEALTHEAST (OUTPATIENT)
Dept: RHEUMATOLOGY | Facility: CLINIC | Age: 76
End: 2021-05-05

## 2021-05-05 DIAGNOSIS — H44.112 PANUVEITIS OF LEFT EYE: ICD-10-CM

## 2021-05-05 DIAGNOSIS — R76.8 ANTINEUTROPHIL CYTOPLASMIC ANTIBODY (ANCA) POSITIVE: ICD-10-CM

## 2021-05-05 DIAGNOSIS — H15.002 SCLERITIS OF LEFT EYE: ICD-10-CM

## 2021-05-25 ENCOUNTER — RECORDS - HEALTHEAST (OUTPATIENT)
Dept: ADMINISTRATIVE | Facility: CLINIC | Age: 76
End: 2021-05-25

## 2021-05-26 NOTE — TELEPHONE ENCOUNTER
Refill Approved    Rx renewed per Medication Renewal Policy. Medication was last renewed on 10/22/18.    Mary Deluna, [facility] Triage/Med Refill 3/22/2019     Requested Prescriptions   Pending Prescriptions Disp Refills     ferrous sulfate 325 (65 FE) MG tablet [Pharmacy Med Name: FERROUS SULFATE 325MG (5GR) TABS] 30 tablet 0     Sig: TAKE 1 TABLET BY MOUTH EVERY DAY WITH BREAKFAST    Iron Supplements Refill Protocol Passed - 3/20/2019  9:25 AM       Passed - PCP or prescribing provider visit in past 12 months      Last office visit with prescriber/PCP: 12/28/2018 Fei Lopez MD OR same dept: 12/28/2018 Fei Lopez MD OR same specialty: 12/28/2018 Fei Lopez MD  Last physical: 2/2/2018 Last MTM visit: Visit date not found   Next visit within 3 mo: Visit date not found  Next physical within 3 mo: Visit date not found  Prescriber OR PCP: Fei Lopez MD  Last diagnosis associated with med order: 1. Anemia, iron deficiency  - ferrous sulfate 325 (65 FE) MG tablet [Pharmacy Med Name: FERROUS SULFATE 325MG (5GR) TABS]; TAKE 1 TABLET BY MOUTH EVERY DAY WITH BREAKFAST  Dispense: 30 tablet; Refill: 0    If protocol passes may refill for 12 months if within 3 months of last provider visit (or a total of 15 months).            Passed - Hemoglobin or Hematocrit in last 12 months    Hemoglobin   Date Value Ref Range Status   12/26/2018 9.9 (L) 14.0 - 18.0 g/dL Final     Hematocrit   Date Value Ref Range Status   12/26/2018 30.5 (L) 40.0 - 54.0 % Final

## 2021-05-27 VITALS — TEMPERATURE: 98.3 F | DIASTOLIC BLOOD PRESSURE: 67 MMHG | HEART RATE: 61 BPM | SYSTOLIC BLOOD PRESSURE: 132 MMHG

## 2021-05-27 NOTE — PROGRESS NOTES
Reviewed education to patient and patients wife, Gema, on remicade infusion and side effects. D/C instructions performed. Pt. Verbalizes understanding. Observed patient for 30 minutes after infusion and VSS.

## 2021-05-27 NOTE — TELEPHONE ENCOUNTER
Per Dr Chong- symptoms are unlikely to be related to Remicade infusion, Dr Chong recommends pt have second infusion as scheduled and call us back if he develops any other symptoms or issues. Pt notified and verbalized understanding. Pt states symptoms have not changed since this morning.

## 2021-05-27 NOTE — TELEPHONE ENCOUNTER
"Pt called stating he had his first Remicade infusion on 3/28/19, pt states last night he started having a sore throat and today he still feels the sore throat but really only hurts when he swallows. Pt states he has a discomfort further down his throat \"below the alli's apple\" when he swallows as well, pt denies history of acid reflex. Pt also complains of the skin in his mouth feeling different, more rough and loose feeling but pt states the color is not changed, no sores in the mouth, otherwise looks normal. Will have Dr Chong advise and call pt back.  "

## 2021-05-28 ENCOUNTER — RECORDS - HEALTHEAST (OUTPATIENT)
Dept: ADMINISTRATIVE | Facility: CLINIC | Age: 76
End: 2021-05-28

## 2021-05-28 NOTE — TELEPHONE ENCOUNTER
Refill Approved    Rx renewed per Medication Renewal Policy. Medication was last renewed on 3/12/18.    Mary Deluna, [facility] Triage/Med Refill 4/23/2019     Requested Prescriptions   Pending Prescriptions Disp Refills     omeprazole (PRILOSEC) 40 MG capsule [Pharmacy Med Name: OMEPRAZOLE 40MG CAPSULES] 90 capsule 0     Sig: TAKE 1 CAPSULE(40 MG) BY MOUTH DAILY       GI Medications Refill Protocol Passed - 4/20/2019  8:51 AM        Passed - PCP or prescribing provider visit in last 12 or next 3 months.     Last office visit with prescriber/PCP: 12/28/2018 Fei Lopez MD OR same dept: 12/28/2018 Fei Lopez MD OR same specialty: 12/28/2018 Fei Lopez MD  Last physical: 2/2/2018 Last MTM visit: Visit date not found   Next visit within 3 mo: Visit date not found  Next physical within 3 mo: Visit date not found  Prescriber OR PCP: Fei Lopez MD  Last diagnosis associated with med order: 1. Heartburn  - omeprazole (PRILOSEC) 40 MG capsule [Pharmacy Med Name: OMEPRAZOLE 40MG CAPSULES]; TAKE 1 CAPSULE(40 MG) BY MOUTH DAILY  Dispense: 90 capsule; Refill: 0    If protocol passes may refill for 12 months if within 3 months of last provider visit (or a total of 15 months).

## 2021-05-30 VITALS — WEIGHT: 206.9 LBS | BODY MASS INDEX: 29.69 KG/M2

## 2021-05-30 VITALS — WEIGHT: 200.5 LBS | HEIGHT: 70 IN | BODY MASS INDEX: 28.71 KG/M2

## 2021-05-30 NOTE — TELEPHONE ENCOUNTER
Medication Question or Clarification  Who is calling: Patient  What medication are you calling about?:  Test strips   What dose do you take?:   How often are you taking the medication?:   Who prescribed the medication?: .elder   What is your question/concern?:  RX not covered. Need NEW RX , a form faxed to MD 2x today from pharmacy, Pt has Medicare insurance, need notes and documentation supporting test strips.  how many times testing . Dose & Sig.   Pharmacy:  [facility]   Okay to leave a detailed message?: Yes  Site CMT - Please call the pharmacy to obtain any additional needed information.

## 2021-05-30 NOTE — TELEPHONE ENCOUNTER
Contacted pharmacy and they are unable to help because the patient is on Medicare, Emmanuelle gave phone number [phone] to contact and get update on form status.    Contacted [facility] Medicare department and after 18 minutes they state they are faxing the CMN form to Dr Lopez [phone].

## 2021-05-30 NOTE — TELEPHONE ENCOUNTER
Clinic has not yet received fax. On hold for 10 minutes.  Contacted [facility]'s Medicare Department and they will be faxing form to clinic fax [phone].

## 2021-05-30 NOTE — TELEPHONE ENCOUNTER
RN cannot approve Refill Request    RN can NOT refill this medication Protocol failed and RN gave 3 month refill.       Mary Deluna, [facility] Triage/Med Refill 6/26/2019    Requested Prescriptions   Pending Prescriptions Disp Refills     BREEZE 2 TEST STRIPS Strp [Pharmacy Med Name: BREEZE 2 TEST DISCS 100'S]  0     Sig: USE TO TEST UP TO TWICE DAILY AS DIRECTED       Diabetic Supplies Refill Protocol Failed - 6/25/2019 10:33 AM        Failed - A1C in last 6 months     Hemoglobin A1c   Date Value Ref Range Status   10/22/2018 7.3 (H) 3.5 - 6.0 % Final               Passed - Visit with PCP or prescribing provider visit in last 6 months     Last office visit with prescriber/PCP: 12/28/2018 Fei Lopez MD OR same dept: 12/28/2018 Fei Lopez MD OR same specialty: 12/28/2018 Fei Lopez MD  Last physical: 2/2/2018 Last MTM visit: Visit date not found   Next visit within 3 mo: Visit date not found  Next physical within 3 mo: Visit date not found  Prescriber OR PCP: Fei Lopez MD  Last diagnosis associated with med order: 1. Type 2 diabetes mellitus (H)  - BREEZE 2 TEST STRIPS Strp [Pharmacy Med Name: BREEZE 2 TEST DISCS 100'S]; USE TO TEST UP TO TWICE DAILY AS DIRECTED; Refill: 0    If protocol passes may refill for 12 months if within 3 months of last provider visit (or a total of 15 months).

## 2021-05-31 ENCOUNTER — RECORDS - HEALTHEAST (OUTPATIENT)
Dept: ADMINISTRATIVE | Facility: CLINIC | Age: 76
End: 2021-05-31

## 2021-05-31 VITALS — HEIGHT: 70 IN | BODY MASS INDEX: 28.98 KG/M2 | WEIGHT: 202.4 LBS

## 2021-05-31 VITALS — BODY MASS INDEX: 29.25 KG/M2 | HEIGHT: 70 IN | WEIGHT: 204.3 LBS

## 2021-05-31 NOTE — TELEPHONE ENCOUNTER
RN cannot approve Refill Request    RN can NOT refill this medication Protocol failed and NO refill given.     Mary Deluna, [facility] Triage/Med Refill 8/2/2019    Requested Prescriptions   Pending Prescriptions Disp Refills     clopidogrel (PLAVIX) 75 mg tablet [Pharmacy Med Name: CLOPIDOGREL 75MG TABLETS] 90 tablet 0     Sig: TAKE 1 TABLET(75 MG) BY MOUTH DAILY       Clopidogrel/Prasugrel/Ticagrelor Refill Protocol Failed - 8/2/2019  5:25 PM        Failed - PCP or prescribing provider visit in past 6 months       Last office visit with prescriber/PCP: Visit date not found OR same dept: 12/28/2018 Fei Lopez MD OR same specialty: 12/28/2018 Fei Lopez MD Last physical: Visit date not found Last MTM visit: Visit date not found     Next appt within 3 mo: Visit date not found  Next physical within 3 mo: Visit date not found  Prescriber OR PCP: Fei Lopez MD  Last diagnosis associated with med order: 1. History of CVA (cerebrovascular accident)  - clopidogrel (PLAVIX) 75 mg tablet [Pharmacy Med Name: CLOPIDOGREL 75MG TABLETS]; TAKE 1 TABLET(75 MG) BY MOUTH DAILY  Dispense: 90 tablet; Refill: 0    If protocol passes may refill for 6 months if within 3 months of last provider visit (or a total of 9 months).              Passed - Hemoglobin in past 12 months     Hemoglobin   Date Value Ref Range Status   12/26/2018 9.9 (L) 14.0 - 18.0 g/dL Final

## 2021-05-31 NOTE — TELEPHONE ENCOUNTER
Who is calling:  Patient   Reason for Call:  Refill Request , States he is out of medication . Please expedite   Date of last appointment with primary care: 08/08/19  Okay to leave a detailed message: Yes

## 2021-05-31 NOTE — TELEPHONE ENCOUNTER
Refill Given    Refill given per Policy, patient informed they are overdue for Labs and Physical  Patient is due for physical before next refill due.  Call 24/7 to schedule      Andra Hanley, [facility] Connection Triage/Med Refill 8/14/2019    Requested Prescriptions   Pending Prescriptions Disp Refills     lisinopril (PRINIVIL,ZESTRIL) 10 MG tablet [Pharmacy Med Name: LISINOPRIL 10MG TABLETS] 90 tablet 0     Sig: TAKE 1 TABLET(10 MG) BY MOUTH DAILY       Ace Inhibitors Refill Protocol Passed - 8/14/2019  9:13 AM        Passed - PCP or prescribing provider visit in past 12 months       Last office visit with prescriber/PCP: 12/28/2018 Fei Lopez MD OR same dept: 12/28/2018 Fei Lopez MD OR same specialty: 12/28/2018 Fei Lopez MD  Last physical: 2/2/2018 Last MTM visit: Visit date not found   Next visit within 3 mo: Visit date not found  Next physical within 3 mo: Visit date not found  Prescriber OR PCP: Fei Lopez MD  Last diagnosis associated with med order: 1. Essential hypertension, malignant  - lisinopril (PRINIVIL,ZESTRIL) 10 MG tablet [Pharmacy Med Name: LISINOPRIL 10MG TABLETS]; TAKE 1 TABLET(10 MG) BY MOUTH DAILY  Dispense: 90 tablet; Refill: 0    If protocol passes may refill for 12 months if within 3 months of last provider visit (or a total of 15 months).             Passed - Serum Potassium in past 12 months     Lab Results   Component Value Date    Potassium 4.3 12/21/2018             Passed - Blood pressure filed in past 12 months     BP Readings from Last 1 Encounters:   08/08/19 102/60             Passed - Serum Creatinine in past 12 months     Creatinine   Date Value Ref Range Status   08/05/2019 1.42 (H) 0.70 - 1.30 mg/dL Final

## 2021-05-31 NOTE — PROGRESS NOTES
ASSESSMENT AND PLAN   Frank Obando 74 y.o. 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  He had noted pain in his left shoulder, knee consistent with tendinopathy, osteoarthritis respectively.  He is well aware of the management options.  Drop in GFR noted, hemoglobin improved.  He has no evidence of vasculitis, given the background of positive ANCA, negative for NC-3/MP0.  He was seen in ophthalmology recently.  Will meet here in 6 months.                 Diagnoses and all orders for this visit:    Scleritis of left eye    Panuveitis of left eye    Primary osteoarthritis of both knees    Antineutrophil cytoplasmic antibody (ANCA) positive    High risk medication use      HISTORY OF PRESENTING ILLNESS:  Frank Obando, 74 y.o., male is here for follow-up.  He is now on Remicade infusion every 8 weeks, gets at [facility], for left eye scleritis that has been well controlled.  He was seen in ophthalmology recently.  Around 4 July weekend there was some delay and he had recurrence of symptoms in the left eye.  He has noted pain in the knees worse with activity such as going up the steps, left shoulder such as with reaching particularly abduction.  He is known to have osteoarthritis.  He had worsening of his renal function he does not take nonsteroidals.  While he is positive for ANCA without proteinase 3 or myeloperoxidase there is little to suggest sinus, respiratory, cutaneous, peripheral neurologic, synovial symptoms.   Recently evaluated for abdominal discomfort, fever, found to have fatty liver and found to have cirrhosis of the liver.  He has been seen flaring up in his eye as well.  I discussed this with Dr. Abdi his ophthalmologist.  He clearly requires more intensive treatment.  Not a candidate for methotrexate leflunomide  azathioprine class of drugs.  We will go for tumor necrosis factor inhibitors.  American College of rheumatology handout is provided major side effects outlined.          .  He is also had over the past few weeks redness of the eye.  He is due to see ophthalmologist in May.  I have asked him to have that done as soon as possible made his way to the leg.  He has noted mild discomfort in the knees, first MTPs, known to have osteoarthritis, but without morning stiffness.   Dr. Abdi in the next few weeks.  He understands that inflammation is under good control despite lowering of the methotrexate dose in view of the liver function abnormalities.  He gets some discomfort in his knees at rest at night or when he is up and about for long time such as visiting the [facility].  He noted pain level to be mild, 1.0/10, able to do all his day-to-day activities without difficulty.  He has no stiffness when he wakes up in the morning.  There is no fever weight loss blurry vision eye redness mouth also nausea or rash.   Further historical information and ADL limitations as noted in the multidimensional health assessment questionnaire attached in the EMR.   ALLERGIES:Morphine; Other allergy (see comments); and Scopolamine hbr    PAST MEDICAL/ACTIVE PROBLEMS/MEDICATION/ FAMILY HISTORY/SOCIAL DATA:  The patient has a family history of  Past Medical History:   Diagnosis Date     Anemia      Arthritis     osteoarthritis     Calculus of kidney      Diabetes mellitus (H)      Episcleritis of left eye      Hyperlipidemia      Hypertension      Peripheral neuropathy      Stroke (H)      Social History     Tobacco Use   Smoking Status Never Smoker   Smokeless Tobacco Never Used     Patient Active Problem List   Diagnosis     Nephrolithiasis     Normocytic anemia     Hypercholesterolemia     Peripheral Neuropathy     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     Essential hypertension, benign     Type 2 diabetes mellitus  with complication, without long-term current use of insulin (H)     Carpal tunnel syndrome     Episcleritis     Right bundle branch block     Scleritis, unspecified     Primary osteoarthritis of both knees     ALT (SGPT) level raised     Scleritis of left eye     Urinary hesitancy     Antineutrophil cytoplasmic antibody (ANCA) positive     High risk medication use     Abdominal pain     Cholecystitis     Dyslipidemia     Thrombocytopenia (H)     Cirrhosis of liver with ascites, unspecified hepatic cirrhosis type (H)     Hyponatremia     Acute renal failure, unspecified acute renal failure type (H)     SIRS (systemic inflammatory response syndrome) (H)     Episcleritis of left eye     Panuveitis, unspecified eye     Current Outpatient Medications   Medication Sig Dispense Refill     atorvastatin (LIPITOR) 40 MG tablet Take 1 tablet (40 mg total) by mouth daily. 90 tablet 3     blood glucose meter (GLUCOMETER) Use 1 each As Directed 2 (two) times a day. Dispense glucometer brand per patient's insurance at pharmacy discretion. 1 each 0     blood glucose test strips Use 1 each As Directed 2 (two) times a day. Dispense brand per patient's insurance at pharmacy discretion. 100 strip 3     BREEZE 2 TEST STRIPS Strp USE TO TEST UP TO TWICE DAILY AS DIRECTED 200 strip 3     clopidogrel (PLAVIX) 75 mg tablet TAKE 1 TABLET(75 MG) BY MOUTH DAILY 90 tablet 3     cyanocobalamin, vitamin B-12, 2,500 mcg Tab Take 2,500 mcg by mouth daily.       ferrous sulfate 325 (65 FE) MG tablet TAKE 1 TABLET BY MOUTH EVERY DAY WITH BREAKFAST 90 tablet 2     generic lancets (ACCU-CHEK SOFTCLIX LANCETS) Use 1 each As Directed 2 (two) times a day. Dispense brand per patient's insurance at pharmacy discretion. 100 each 3     lisinopril (PRINIVIL,ZESTRIL) 10 MG tablet Take 1 tablet (10 mg total) by mouth daily. 90 tablet 3     magnesium oxide 250 mg Tab Take 250 mg by mouth 2 (two) times a day.       metFORMIN (GLUCOPHAGE) 500 MG tablet TAKE 2  TABLETS BY MOUTH TWICE DAILY AT 6AM AND 4PM 360 tablet 3     omeprazole (PRILOSEC) 40 MG capsule TAKE 1 CAPSULE(40 MG) BY MOUTH DAILY 90 capsule 1     pioglitazone (ACTOS) 15 MG tablet TAKE 1 TABLET(15 MG) BY MOUTH DAILY 90 tablet 3     propranolol (INDERAL) 20 MG tablet TAKE 1 TABLET(20 MG) BY MOUTH TWICE DAILY 180 tablet 3     tamsulosin (FLOMAX) 0.4 mg cap TAKE ONE CAPSULE BY MOUTH DAILY AFTER SUPPER 90 capsule 3     No current facility-administered medications for this visit.      DETAILED EXAMINATION  08/08/19  :  There were no vitals filed for this visit.  Alert oriented. Head including the face is examined for malar rash, heliotropes, scarring, lupus pernio. Eyes examined for redness such as in episcleritis/scleritis, periorbital lesions.   Neck/ Face examined for parotid gland swelling, range of motion of neck.  Left upper and lower and right upper and lower extremities examined for tenderness, swelling, warmth of the appendicular joints, range of motion, edema, rash.  Some of the important findings included: No apparent ocular inflammatory changes.  There is no deformity of the nose, ears.  He does not have synovitis of palpable joints of upper extremities.  Knees minimal JLT.  Full range of motion of the shoulder but painful arc on the left side.   .        LAB / IMAGING DATA:  ALT   Date Value Ref Range Status   08/05/2019 31 0 - 45 U/L Final   12/26/2018 46 (H) 0 - 45 U/L Final   12/21/2018 37 0 - 45 U/L Final     Albumin   Date Value Ref Range Status   08/05/2019 3.4 (L) 3.5 - 5.0 g/dL Final   12/26/2018 3.1 (L) 3.5 - 5.0 g/dL Final   12/21/2018 2.9 (L) 3.5 - 5.0 g/dL Final     Creatinine   Date Value Ref Range Status   08/05/2019 1.42 (H) 0.70 - 1.30 mg/dL Final   12/26/2018 1.26 0.70 - 1.30 mg/dL Final   12/21/2018 1.27 0.70 - 1.30 mg/dL Final       WBC   Date Value Ref Range Status   08/05/2019 5.1 4.0 - 11.0 thou/uL Final   12/26/2018 3.9 (L) 4.0 - 11.0 ou/uL Final   08/27/2015 7.0 4.0 - 11.0  thou/uL Final   07/31/2015 7.1 4.0 - 11.0 thou/uL Final     Hemoglobin   Date Value Ref Range Status   08/05/2019 12.0 (L) 14.0 - 18.0 g/dL Final   12/26/2018 9.9 (L) 14.0 - 18.0 g/dL Final   12/22/2018 9.3 (L) 14.0 - 18.0 g/dL Final     Platelets   Date Value Ref Range Status   08/05/2019 102 (L) 140 - 440 thou/uL Final   12/26/2018 104 (L) 140 - 440 thou/uL Final   12/22/2018 81 (L) 140 - 440 thou/uL Final       Lab Results   Component Value Date    RF <15.0 06/25/2014    SEDRATE 12 04/17/2015

## 2021-06-01 ENCOUNTER — RECORDS - HEALTHEAST (OUTPATIENT)
Dept: ADMINISTRATIVE | Facility: CLINIC | Age: 76
End: 2021-06-01

## 2021-06-01 VITALS — BODY MASS INDEX: 28.59 KG/M2 | HEIGHT: 70 IN | WEIGHT: 199.7 LBS

## 2021-06-01 VITALS — BODY MASS INDEX: 29.91 KG/M2 | WEIGHT: 207 LBS

## 2021-06-01 VITALS — HEIGHT: 70 IN | BODY MASS INDEX: 29.63 KG/M2 | WEIGHT: 207 LBS

## 2021-06-01 NOTE — TELEPHONE ENCOUNTER
Pt was seen in ER and started on Valtrex last Tuesday 9/24, pt also seen PCP last Friday, pt states shingles have improved somewhat since starting Valtrex. Pt states the rash was on his stomach, back and neck. Pt wondering about Remicade infusion and if he can have the next one or if he will have to switch to a different medication. Instructed pt to call a week prior to next Remicade infusion which is scheduled for 11/1 to let Dr Chong know how he is doing with the Shingles and to see if he would recommend having the infusion at that time or holding it longer. Pt verbalized understanding and will call back the end of October.

## 2021-06-01 NOTE — TELEPHONE ENCOUNTER
Castillo    In regards of remicade, has caused him to get shingles in two seprate area: stomach runs together to back and face, has seen PCP on friday.     He can be reached @ [phone]     Pt has been notified that Dr Chong is out of the office this week. Wondering if CSS can suggest anything.

## 2021-06-01 NOTE — TELEPHONE ENCOUNTER
Left pt detailed message that he had been scheduled for an 8:00am appt with PCP for 10/11/19.  Advised pt to call back with further questions.

## 2021-06-01 NOTE — TELEPHONE ENCOUNTER
New Appointment Needed  What is the reason for the visit:    Office visit, 2 wk follow up  Provider Preference: Dr Lopez  How soon do you need to be seen?: approximately 10/11/19, in the am  Waitlist offered?: Yes  Okay to leave a detailed message:  Yes

## 2021-06-01 NOTE — PROGRESS NOTES
Assessment and Plan:     1. Medicare annual wellness visit, subsequent  Antonio is seen with his wife.  He is semiretired.  Currently is ill with acute shingles involving 2 different dermatomes.  He scores 4 on mini cog.  No cognitive deficits are noted.  His health risk assessment is reviewed.  He is generally independent in activities of daily living.  He does have a healthcare directive    2. Type 2 diabetes mellitus with complication, without long-term current use of insulin (H)  He is currently treated with metformin and pioglitazone.  He denies adverse effects from the current regimen.  Hemoglobin A1c will be checked.  - Glycosylated Hemoglobin A1c  - Comprehensive Metabolic Panel  - Microalbumin, Random Urine  - Lipid Cascade    3. Essential hypertension, benign  Treated with lisinopril.  He is also on low-dose propranolol to decrease portal pressure.  Pressure is acceptable  - Comprehensive Metabolic Panel  - Urinalysis-UC if Indicated    4. Right bundle branch block  No evidence for more advanced degrees of heart block    5. Dyslipidemia  Treated with atorvastatin 40 mg daily.  Fasting lipid cascade will be checked  - Lipid Cascade    6. Cirrhosis of liver with ascites, unspecified hepatic cirrhosis type (H)  He has no history of excessive alcohol intake or infectious hepatitis.  This is felt to possibly be an adverse reaction to methotrexate or possibly due to nonalcoholic steatohepatitis.  Liver enzymes will be checked.  He will check with [facility] as to when they would like to see him back in follow-up.  He has not had an upper GI endoscopy to check for varices  - Comprehensive Metabolic Panel    7. Screening for prostate cancer  PSA will be checked  - PSA (Prostatic-Specific Antigen), Annual Screen    8.  Herpes zoster:  He initially developed an outbreak in the right lower thoracic area.  He subsequently has developed an outbreak on the left side of the neck and lower face.  He is on Remicade which would  increase the risk for dissemination  He has already started on Valtrex.    9.  Possible trigeminal neuralgia:  He was started on carbamazepine for possible trigeminal neuralgia when seen in the Emergency room on 9/24.  I suspect his headache and symptoms were related to the herpes zoster.  He does note postural instability which could possibly be related to carbamazepine.  I would favor changing to gabapentin for pain related to the herpes zoster.    10 history of scleritis:  He sees Dr. Chong and is now treated with Remicade.  He formerly was on methotrexate.  I symptoms are under good control, but the Remicade does increase risk for disseminated herpes zoster    11.  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  The patient's current medical problems were reviewed.  2.  Stop carbamazepine.  Start gabapentin 100 mg.  Work gradually up to 3 tabs 3 times daily.  3.  Complete Valtrex  4.  Refill hydrocodone for pain related to herpes zoster  5.  Shingrix vaccine in the future  6..  He will be notified of test results  7.  See for follow-up of herpes zoster in 2 weeks or as needed.  Diabetic recheck in 4 to 6 months  8.  Follow-up with [facility] regarding hepatic cirrhosis  9.  He will discuss his shingles outbreak with Dr. Chong prior to his next Remicade infusion  The following health maintenance schedule was reviewed with the patient and provided in printed form in the after visit summary:   Health Maintenance   Topic Date Due     ZOSTER VACCINES (1 of 2) 07/21/1996     MEDICARE ANNUAL WELLNESS VISIT  02/02/2019     DIABETES URINE MICROALBUMIN  02/02/2019     DIABETES FOLLOW-UP  02/06/2019     DIABETES HEMOGLOBIN A1C  04/22/2019     INFLUENZA VACCINE RULE BASED (1) 08/01/2019     DIABETES FOOT EXAM  08/06/2019     PNEUMOCOCCAL IMMUNIZATION 65+ LOW/MEDIUM RISK (2 of 2 - PPSV23) 10/22/2019     FALL RISK ASSESSMENT  12/28/2019     DIABETES  OPHTHALMOLOGY EXAM  07/30/2020     ADVANCE CARE PLANNING  02/02/2023     TD 18+ HE  12/06/2023     COLONOSCOPY  05/07/2028     HEPATITIS C SCREENING  Completed        Subjective:   Chief Complaint: Frank Obando is an 74 y.o. male here for an Annual Wellness visit with his wife.  HPI:     Shingles: He visited the ED four days ago for shingles. He has the rash on the left side of his face, his neck, as well as his stomach and back. He is experiencing some severe back pain that waxes and wanes. Additionally, he complained of some intermittent sharp pain on the left side of his face. He was prescribed carbamazepine 200 mg, hydrocodone-acetaminophen 325 mg, and valacyclovir 1000 mg. Last night after he took the medication he had a hard time walking, he felt weak, and had poor balance.    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     Health Maintenance: He has not been able to get an endoscopy due to being on Plavix.     Review of Systems:    He has not had any problems with swelling, sticking of food when swallowing, or bladder problems.  Please see above.  The rest of the review of systems are negative for all systems.       Patient Care Team:  eFi Lopez MD as PCP - General  Fei Lopez MD as Assigned PCP     Patient Active Problem List   Diagnosis     Nephrolithiasis     Normocytic anemia     Hypercholesterolemia     Peripheral Neuropathy     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     Essential hypertension, benign     Type 2 diabetes mellitus with complication, without long-term current use of insulin (H)     Carpal tunnel syndrome     Right bundle branch block     Primary osteoarthritis of both knees     ALT (SGPT) level raised     Scleritis of left eye     Urinary hesitancy     Antineutrophil cytoplasmic antibody (ANCA) positive     High risk medication use     Abdominal pain     Cholecystitis     Dyslipidemia     Thrombocytopenia (H)      Cirrhosis of liver with ascites, unspecified hepatic cirrhosis type (H)     Hyponatremia     Panuveitis, unspecified eye     Past Medical History:   Diagnosis Date     Anemia      Arthritis     osteoarthritis     Calculus of kidney      Diabetes mellitus (H)      Episcleritis of left eye      Hyperlipidemia      Hypertension      Peripheral neuropathy      Stroke (H)       Past Surgical History:   Procedure Laterality Date     CYSTOSCOPY TUMOR / CONDYLOMATA W/ LASER Left 7/18/2014     [place] CYSTOSCOPY,INSERT URETERAL STENT      Description: Cystoscopy With Insertion Of Ureteral Stent Right;  Recorded: 10/14/2009;  Comments: stone      Family History   Problem Relation Age of Onset     Lung cancer Father      Coronary artery disease Mother      Diabetes Mother       Social History     Socioeconomic History     Marital status:      Spouse name: Not on file     Number of children: Not on file     Years of education: Not on file     Highest education level: Not on file   Occupational History     Not on file   Social Needs     Financial resource strain: Not on file     Food insecurity:     Worry: Not on file     Inability: Not on file     Transportation needs:     Medical: Not on file     Non-medical: Not on file   Tobacco Use     Smoking status: Never Smoker     Smokeless tobacco: Never Used   Substance and Sexual Activity     Alcohol use: No     Drug use: No     Sexual activity: Not on file   Lifestyle     Physical activity:     Days per week: Not on file     Minutes per session: Not on file     Stress: Not on file   Relationships     Social connections:     Talks on phone: Not on file     Gets together: Not on file     Attends Yazidism service: Not on file     Active member of club or organization: Not on file     Attends meetings of clubs or organizations: Not on file     Relationship status: Not on file     Intimate partner violence:     Fear of current or ex partner: Not on file     Emotionally abused: Not  on file     Physically abused: Not on file     Forced sexual activity: Not on file   Other Topics Concern     Not on file   Social History Narrative     Not on file      Current Outpatient Medications   Medication Sig Dispense Refill     atorvastatin (LIPITOR) 40 MG tablet Take 1 tablet (40 mg total) by mouth daily. 90 tablet 3     blood glucose meter (GLUCOMETER) Use 1 each As Directed 2 (two) times a day. Dispense glucometer brand per patient's insurance at pharmacy discretion. 1 each 0     blood glucose test strips Use 1 each As Directed 2 (two) times a day. Dispense brand per patient's insurance at pharmacy discretion. 100 strip 3     BREEZE 2 TEST STRIPS Strp USE TO TEST UP TO TWICE DAILY AS DIRECTED 200 strip 3     carBAMazepine (TEGRETOL) 200 mg tablet Take 1 tablet (200 mg total) by mouth 2 (two) times a day. After 5 days, may increase to two tablets BID if pain is poorly controlled 30 tablet 0     clopidogrel (PLAVIX) 75 mg tablet TAKE 1 TABLET(75 MG) BY MOUTH DAILY 90 tablet 3     cyanocobalamin, vitamin B-12, 2,500 mcg Tab Take 2,500 mcg by mouth daily.       ferrous sulfate 325 (65 FE) MG tablet TAKE 1 TABLET BY MOUTH EVERY DAY WITH BREAKFAST 90 tablet 2     generic lancets (ACCU-CHEK SOFTCLIX LANCETS) Use 1 each As Directed 2 (two) times a day. Dispense brand per patient's insurance at pharmacy discretion. 100 each 3     HYDROcodone-acetaminophen 5-325 mg per tablet Take 1-2 tablets by mouth every 6 (six) hours as needed for pain. 30 tablet 0     lisinopril (PRINIVIL,ZESTRIL) 10 MG tablet TAKE 1 TABLET(10 MG) BY MOUTH DAILY 90 tablet 0     magnesium oxide 250 mg Tab Take 250 mg by mouth 2 (two) times a day.       metFORMIN (GLUCOPHAGE) 500 MG tablet TAKE 2 TABLETS BY MOUTH TWICE DAILY AT 6AM AND 4PM 360 tablet 3     omeprazole (PRILOSEC) 40 MG capsule TAKE 1 CAPSULE(40 MG) BY MOUTH DAILY 90 capsule 1     pioglitazone (ACTOS) 15 MG tablet TAKE 1 TABLET(15 MG) BY MOUTH DAILY 90 tablet 3     propranolol  "(INDERAL) 20 MG tablet TAKE 1 TABLET(20 MG) BY MOUTH TWICE DAILY 180 tablet 3     tamsulosin (FLOMAX) 0.4 mg cap TAKE ONE CAPSULE BY MOUTH DAILY AFTER SUPPER 90 capsule 3     valACYclovir (VALTREX) 1000 MG tablet Take 1 tablet (1,000 mg total) by mouth 3 (three) times a day for 7 days. 21 tablet 0     gabapentin (NEURONTIN) 100 MG capsule One tab three times daily, work up gradually to three tabs three times daily 180 capsule 1     No current facility-administered medications for this visit.       Objective:   Vital Signs:   Visit Vitals  /80   Pulse 62   Resp 18   Ht 5' 11\" (1.803 m)   Wt 198 lb (89.8 kg)   SpO2 96%   BMI 27.62 kg/m         VisionScreening:  No exam data present       PHYSICAL EXAM  Constitutional:   Reveals an alert talkative man that appears weak.  Vitals: per nursing notes.  BP by MD: 122/80  HEENT: Atraumatic. Ears:  External canals, TMs clear.    Eyes:  EOMs full, PERRL.  Oropharynx:   Mouth and throat clear, no thrush or exudate.  Neck:  Supple, no carotid bruits or adenopathy.  Back:  No spine or CVA pain.  Thorax:  No bony deformities.  Lungs: Clear to A&P without rales or wheezes.  Respiratory effort normal.  Cardiac:   Regular rate and rhythm, normal S1, S2, no murmur or gallop.  Abdomen:  Soft, active bowel sounds. Right lower quadrant scar. No bruits, mass, or tenderness. No ascites.  :  Deferred.   Extremities:   No peripheral edema, pulses in the feet intact.    Skin: Erythematous eruption in a dermatomal distribution in the right flank with grouped vesicles on a erythematous base. Eruption on the left side of the neck extending to the lower jaw on the left. Grouped vesicles on a erythematous base. Skin otherwise clear.  Neuro:  Appears weak and indicates mild numbness in his toes. No obvious stocking neuropathy on diabetic foot exam. Cranial nerves, motor and sensory exams otherwise seem intact.   Psychiatric:  Memory intact, mood appropriate.    Assessment Results 9/27/2019 "   Activities of Daily Living No help needed   Instrumental Activities of Daily Living No help needed   Get Up and Go Score Less than 12 seconds   Mini Cog Total Score 4   Some recent data might be hidden     A Mini-Cog score of 0-2 suggests the possibility of dementia, score of 3-5 suggests no dementia    Identified Health Risks:     The patient was provided with suggestions to help him develop a healthy physical lifestyle.   He is at risk for lack of exercise and has been provided with information to increase physical activity for the benefit of his well-being.  The patient was counseled and encouraged to consider modifying their diet and eating habits. He was provided with information on recommended healthy diet options.  The patient was provided with written information regarding signs of hearing loss.  Patient's advanced directive was discussed and I am comfortable with the patient's wishes.      ADDITIONAL HISTORY SUMMARIZED (2): Reviewed 2/06/2019 GI note. Reviewed 8/08/2019 note. Reviewed 2/05/2019 [facility] note. Reviewed 9/24/2019 ED note. Additional information from wife.  DECISION TO OBTAIN EXTRA INFORMATION (1): None.   RADIOLOGY TESTS (1): None.  LABS (1): Labs from 8/5/2019 reviewed. Labs ordered.   MEDICINE TESTS (1): None.  INDEPENDENT REVIEW (2 each): None.       The visit lasted a total of 40 minutes face to face with the patient. Over 50% of the time was spent counseling and educating the patient about general health maintenance and shingles.    ISuyapa, am scribing for and in the presence of, Dr. Lopez.    I, Dr. Fei Lopez, personally performed the services described in this documentation, as scribed by Suyapa Norman in my presence, and it is both accurate and complete.    Total data points: 3

## 2021-06-01 NOTE — TELEPHONE ENCOUNTER
Refill Approved    Rx renewed per Medication Renewal Policy. Medication was last renewed on 9/29/29 #30.    Nelsy Muñoz, [facility] Triage/Med Refill 9/29/2019     Requested Prescriptions   Pending Prescriptions Disp Refills     pioglitazone (ACTOS) 30 MG tablet [Pharmacy Med Name: PIOGLITAZONE 30MG TABLETS] 90 tablet 11     Sig: TAKE 1 TABLET(30 MG) BY MOUTH DAILY       Pioglitazone Protocol Passed - 9/29/2019 12:10 PM        Passed - Blood pressure in last 12 months     BP Readings from Last 1 Encounters:   09/27/19 122/80             Passed - LFT or AST or ALT in last 12 months     Albumin   Date Value Ref Range Status   09/27/2019 3.9 3.5 - 5.0 g/dL Final     Bilirubin, Total   Date Value Ref Range Status   09/27/2019 0.8 0.0 - 1.0 mg/dL Final     Bilirubin, Direct   Date Value Ref Range Status   08/04/2016 0.3 <=0.5 mg/dL Final     Alkaline Phosphatase   Date Value Ref Range Status   09/27/2019 145 (H) 45 - 120 U/L Final     AST   Date Value Ref Range Status   09/27/2019 72 (H) 0 - 40 U/L Final     ALT   Date Value Ref Range Status   09/27/2019 65 (H) 0 - 45 U/L Final     Protein, Total   Date Value Ref Range Status   09/27/2019 8.2 (H) 6.0 - 8.0 g/dL Final                Passed - Visit with PCP or prescribing provider visit in last 6 months      Last office visit with prescriber/PCP: Visit date not found OR same dept: 12/28/2018 Fei Lopez MD OR same specialty: 12/28/2018 Fei Lopez MD Last physical: 9/27/2019 Last [facility] visit: Visit date not found         Next appt within 3 mo: Visit date not found  Next physical within 3 mo: Visit date not found  Prescriber OR PCP: Fei Lopez MD  Last diagnosis associated with med order: 1. Type 2 diabetes mellitus with hyperglycemia, without long-term current use of insulin (H)  - pioglitazone (ACTOS) 30 MG tablet [Pharmacy Med Name: PIOGLITAZONE 30MG TABLETS]; TAKE 1 TABLET(30 MG) BY MOUTH DAILY  Dispense: 90 tablet; Refill: 11     If protocol  passes may refill for 12 months if within 3 months of last provider visit (or a total of 15 months).           Passed - A1C in last 6 months     Hemoglobin A1c   Date Value Ref Range Status   09/27/2019 7.5 (H) 3.5 - 6.0 % Final               Passed - Microalbumin in last year     Microalbumin, Random Urine   Date Value Ref Range Status   09/27/2019 0.64 0.00 - 1.99 mg/dL Final                  Passed - Serum creatinine in last year     Creatinine   Date Value Ref Range Status   09/27/2019 1.50 (H) 0.70 - 1.30 mg/dL Final

## 2021-06-02 VITALS — WEIGHT: 212.7 LBS | BODY MASS INDEX: 29.67 KG/M2

## 2021-06-02 VITALS — BODY MASS INDEX: 21.24 KG/M2 | WEIGHT: 147 LBS

## 2021-06-02 VITALS — HEIGHT: 71 IN | BODY MASS INDEX: 29.26 KG/M2 | WEIGHT: 209 LBS

## 2021-06-02 VITALS — BODY MASS INDEX: 29.91 KG/M2 | WEIGHT: 207 LBS

## 2021-06-02 VITALS — WEIGHT: 208.6 LBS | BODY MASS INDEX: 30.15 KG/M2

## 2021-06-02 VITALS — WEIGHT: 207 LBS | BODY MASS INDEX: 28.87 KG/M2

## 2021-06-02 VITALS — BODY MASS INDEX: 29.08 KG/M2 | HEIGHT: 71 IN | WEIGHT: 207.7 LBS

## 2021-06-02 NOTE — TELEPHONE ENCOUNTER
Last ov 8/8/19  Dx: Scleritis  On Remicade  Last labs 8/5/19 Drop in GFR noted, hemoglobin improved  Next appt 2/10/2020.

## 2021-06-02 NOTE — PATIENT INSTRUCTIONS - HE
1. Stop Gabapentin    2.  Use ibuprofen up to 600 mg three times daily as needed for pain    3.  Get Shingrix vaccine in one month    4.  Flu shot today    5.  Call GI to set follow-up    6.  DM check four to six months.  Earlier if cataract surgery is planned.

## 2021-06-02 NOTE — TELEPHONE ENCOUNTER
Pt called back in regards of this. His next infusion is this Friday 11.01.     Please call him back @ [phone]

## 2021-06-02 NOTE — PROGRESS NOTES
"ASSESSMENT:  1. Flu vaccine need  He will receive an influenza vaccine today  - Influenza High Dose,Seasonal,PF 65+ Yrs    2.  Herpes zoster  He had this both in the left neck and face, and right thoracic area.  He still has some postherpetic pain in the left side of the face, he otherwise is doing well.  He did not tolerate gabapentin well.  Since his symptoms are fairly mild, I would advise stopping it.  Shingrix is recommended in 1 month.    3.  Scleritis left eye:  He has done well on Remicade, but did have shingles in 2 dermatomal distributions.  He will follow-up with Dr. Chong.    4.  Type 2 diabetes mellitus  Recent hemoglobin A1c was 7.5    5.  Nonalcoholic hepatic cirrhosis  He was advised to call the GI clinic about follow-up for this    PLAN:  Patient Instructions   1. Stop Gabapentin    2.  Use ibuprofen up to 600 mg three times daily as needed for pain    3.  Get Shingrix vaccine in one month    4.  Flu shot today    5.  Call GI to set follow-up    6.  DM check four to six months.  Earlier if cataract surgery is planned.      Orders Placed This Encounter   Procedures     Influenza High Dose,Seasonal,PF 65+ Yrs     There are no discontinued medications.    Return in about 6 months (around 4/11/2020) for Recheck.      ASSESSED PROBLEMS:  1. Flu vaccine need  Influenza High Dose,Seasonal,PF 65+ Yrs       CHIEF COMPLAINT:  Chief Complaint   Patient presents with     Follow-up     two weeks     Medication Management     Discuss about gebapantin       HISTORY OF PRESENT ILLNESS:  Frank is a 74 y.o. male presenting to the clinic today for a two week follow-up regarding their hospitalization due to herpes zoster without complication. Accompanied by his wife.     Herpes zoster: He stated he is feeling a lot better, but he still has pain around his L ear and jaw down to his neck. He also has pain leading up into his hair on that side. He states the pain feels sore after palpation. His R flank is \"clearing up\" " "and does not have any associated pain. Pt reports taking his 1 pill of Gabapentin 3x/day posed no problems, but increasing it to 2 pills 3x/day caused some issues. After taking his morning dose, he reported feeling sick to his stomach and dizzy. He said he overall did not think the medication was helping much. He has been taking 3 Advil  3x/day for the pain and stated it has been quite helpful. He said he tried Tylenol and it did not have much effect for him.     He has not checked in with GI yet as he has been low in energy and preoccupied with maintaining his health.     He discussed his next infusion with Dr. Chong regarding his scleritis in his L eye.     Pt stated after visiting the eye doctor, he was told he is due for cataract surgery, but would like to wait until his current symptoms fade away.     He is interested in getting a shingles shot as soon as possible.     Pt is due for his flu shot and is willing to take it today.     REVIEW OF SYSTEMS:   All other systems are negative.    PFSH:  Reviewed, as below.     TOBACCO USE:  Social History     Tobacco Use   Smoking Status Never Smoker   Smokeless Tobacco Never Used       VITALS:  Vitals:    10/11/19 0808   BP: 120/60   Patient Site: Left Arm   Patient Position: Sitting   Cuff Size: Adult Regular   Pulse: (!) 48   SpO2: 97%   Weight: 205 lb (93 kg)   Height: 5' 11\" (1.803 m)     Wt Readings from Last 3 Encounters:   10/11/19 205 lb (93 kg)   09/27/19 198 lb (89.8 kg)   09/24/19 210 lb (95.3 kg)     Body mass index is 28.59 kg/m .    PHYSICAL EXAM:  Constitutional:   Reveals an alert, talkative male. Affect appropriate. Does not appear acutely ill.  Vitals: per nursing notes.  HEENT: Previously noted eruption left side of face resolved. Ears: External canals, TMs clear.    Neck:  Supple, no carotid bruits or adenopathy.  Back:  No spine or CVA pain.  Thorax:  No bony deformities.  Lungs: Clear to A&P without rales or wheezes.  Respiratory effort " normal.  Cardiac:   Regular rate and rhythm, normal S1, S2, no murmur or gallop.  Skin scabbed eruption in R flank with no open viscles noted- skin otherwise clear.   Psychiatric:  Memory intact, mood appropriate.      ADDITIONAL HISTORY SUMMARIZED (2): None.  DECISION TO OBTAIN EXTRA INFORMATION (1): None.   RADIOLOGY TESTS (1): None.  LABS (1): None.  MEDICINE TESTS (1): None.  INDEPENDENT REVIEW (2 each): None.     The visit lasted a total of 18 minutes face to face with the patient. Over 50% of the time was spent counseling and educating the patient about his shingles.    ISavita, am scribing for and in the presence of, Dr. Lopez.    IFei, personally performed the services described in this documentation, as scribed by Savita Kim in my presence, and it is both accurate and complete.    Dragon dictation was used for this note.  Speech recognition errors are a possibility.    MEDICATIONS:  Current Outpatient Medications   Medication Sig Dispense Refill     atorvastatin (LIPITOR) 40 MG tablet Take 1 tablet (40 mg total) by mouth daily. 90 tablet 3     blood glucose meter (GLUCOMETER) Use 1 each As Directed 2 (two) times a day. Dispense glucometer brand per patient's insurance at pharmacy discretion. 1 each 0     blood glucose test strips Use 1 each As Directed 2 (two) times a day. Dispense brand per patient's insurance at pharmacy discretion. 100 strip 3     BREEZE 2 TEST STRIPS Strp USE TO TEST UP TO TWICE DAILY AS DIRECTED 200 strip 3     clopidogrel (PLAVIX) 75 mg tablet TAKE 1 TABLET(75 MG) BY MOUTH DAILY 90 tablet 3     cyanocobalamin, vitamin B-12, 2,500 mcg Tab Take 2,500 mcg by mouth daily.       ferrous sulfate 325 (65 FE) MG tablet TAKE 1 TABLET BY MOUTH EVERY DAY WITH BREAKFAST 90 tablet 2     gabapentin (NEURONTIN) 100 MG capsule One tab three times daily, work up gradually to three tabs three times daily 180 capsule 1     generic lancets (ACCU-CHEK SOFTCLIX LANCETS) Use 1 each As  Directed 2 (two) times a day. Dispense brand per patient's insurance at pharmacy discretion. 100 each 3     HYDROcodone-acetaminophen 5-325 mg per tablet Take 1-2 tablets by mouth every 6 (six) hours as needed for pain. 30 tablet 0     lisinopril (PRINIVIL,ZESTRIL) 10 MG tablet TAKE 1 TABLET(10 MG) BY MOUTH DAILY 90 tablet 0     magnesium oxide 250 mg Tab Take 250 mg by mouth 2 (two) times a day.       metFORMIN (GLUCOPHAGE) 500 MG tablet TAKE 2 TABLETS BY MOUTH TWICE DAILY AT 6AM AND 4PM 360 tablet 3     omeprazole (PRILOSEC) 40 MG capsule TAKE 1 CAPSULE(40 MG) BY MOUTH DAILY 90 capsule 1     pioglitazone (ACTOS) 30 MG tablet TAKE 1 TABLET(30 MG) BY MOUTH DAILY 90 tablet 3     propranolol (INDERAL) 20 MG tablet TAKE 1 TABLET(20 MG) BY MOUTH TWICE DAILY 180 tablet 3     tamsulosin (FLOMAX) 0.4 mg cap TAKE ONE CAPSULE BY MOUTH DAILY AFTER SUPPER 90 capsule 3     No current facility-administered medications for this visit.        Total data points: 0

## 2021-06-02 NOTE — TELEPHONE ENCOUNTER
I called the pt to inform that no changes need to be made if the infusion is helping his eyes. The pt informs me that he was asking because he has shingles. I informed the pt he should reschedule his infusion until the shingles are completely resolved. The pt states that the areas on his chest and back are almost completely resolved, however his neck and ear itch, but he does not see wounds. I advise he reschedule one week out. Pt will do so.

## 2021-06-02 NOTE — TELEPHONE ENCOUNTER
Refill Approved    Rx renewed per Medication Renewal Policy. Medication was last renewed on 10/22/18 and 4/23/19.    Alexandra Sinclair, [facility] Triage/Med Refill 10/22/2019     Requested Prescriptions   Pending Prescriptions Disp Refills     omeprazole (PRILOSEC) 40 MG capsule [Pharmacy Med Name: OMEPRAZOLE 40MG CAPSULES] 90 capsule 0     Sig: TAKE 1 CAPSULE(40 MG) BY MOUTH DAILY       GI Medications Refill Protocol Passed - 10/22/2019  9:43 AM        Passed - PCP or prescribing provider visit in last 12 or next 3 months.     Last office visit with prescriber/PCP: 10/11/2019 Fei Lopez MD OR same dept: 10/11/2019 Fei Lopez MD OR same specialty: 10/11/2019 Fei Lopez MD  Last physical: 9/27/2019 Last MTM visit: Visit date not found   Next visit within 3 mo: Visit date not found  Next physical within 3 mo: Visit date not found  Prescriber OR PCP: Fei Lopez MD  Last diagnosis associated with med order: 1. Heartburn  - omeprazole (PRILOSEC) 40 MG capsule [Pharmacy Med Name: OMEPRAZOLE 40MG CAPSULES]; TAKE 1 CAPSULE(40 MG) BY MOUTH DAILY  Dispense: 90 capsule; Refill: 0    2. Urinary hesitancy  - tamsulosin (FLOMAX) 0.4 mg cap [Pharmacy Med Name: TAMSULOSIN 0.4MG CAPSULES]; TAKE 1 CAPSULE BY MOUTH DAILY AFTER SUPPER  Dispense: 90 capsule; Refill: 0    If protocol passes may refill for 12 months if within 3 months of last provider visit (or a total of 15 months).             tamsulosin (FLOMAX) 0.4 mg cap [Pharmacy Med Name: TAMSULOSIN 0.4MG CAPSULES] 90 capsule 0     Sig: TAKE 1 CAPSULE BY MOUTH DAILY AFTER SUPPER       Alfuzosin/Tamsulosin/Silodosin Refill Protocol  Passed - 10/22/2019  9:43 AM        Passed - PCP or prescribing provider visit in past 12 months       Last office visit with prescriber/PCP: 10/11/2019 Fei Lopez MD OR same dept: 10/11/2019 Fei Lopez MD OR same specialty: 10/11/2019 Fei Lopez MD  Last physical: 9/27/2019 Last MTM visit: Visit date not found    Next visit within 3 mo: Visit date not found  Next physical within 3 mo: Visit date not found  Prescriber OR PCP: Fei Lopez MD  Last diagnosis associated with med order: 1. Heartburn  - omeprazole (PRILOSEC) 40 MG capsule [Pharmacy Med Name: OMEPRAZOLE 40MG CAPSULES]; TAKE 1 CAPSULE(40 MG) BY MOUTH DAILY  Dispense: 90 capsule; Refill: 0    2. Urinary hesitancy  - tamsulosin (FLOMAX) 0.4 mg cap [Pharmacy Med Name: TAMSULOSIN 0.4MG CAPSULES]; TAKE 1 CAPSULE BY MOUTH DAILY AFTER SUPPER  Dispense: 90 capsule; Refill: 0    If protocol passes may refill for 12 months if within 3 months of last provider visit (or a total of 15 months).

## 2021-06-03 VITALS — WEIGHT: 209 LBS | BODY MASS INDEX: 29.15 KG/M2

## 2021-06-03 VITALS — BODY MASS INDEX: 30.15 KG/M2 | WEIGHT: 216.2 LBS

## 2021-06-03 VITALS
SYSTOLIC BLOOD PRESSURE: 120 MMHG | WEIGHT: 205 LBS | DIASTOLIC BLOOD PRESSURE: 60 MMHG | HEIGHT: 71 IN | BODY MASS INDEX: 28.7 KG/M2 | HEART RATE: 48 BPM | OXYGEN SATURATION: 97 %

## 2021-06-03 VITALS
SYSTOLIC BLOOD PRESSURE: 139 MMHG | TEMPERATURE: 98.1 F | OXYGEN SATURATION: 96 % | DIASTOLIC BLOOD PRESSURE: 64 MMHG | BODY MASS INDEX: 28.73 KG/M2 | HEART RATE: 67 BPM | WEIGHT: 206 LBS

## 2021-06-03 VITALS — BODY MASS INDEX: 30.13 KG/M2 | WEIGHT: 216 LBS

## 2021-06-03 VITALS
HEART RATE: 62 BPM | WEIGHT: 198 LBS | SYSTOLIC BLOOD PRESSURE: 122 MMHG | HEIGHT: 71 IN | OXYGEN SATURATION: 96 % | BODY MASS INDEX: 27.72 KG/M2 | RESPIRATION RATE: 18 BRPM | DIASTOLIC BLOOD PRESSURE: 80 MMHG

## 2021-06-03 VITALS — WEIGHT: 212.74 LBS | BODY MASS INDEX: 29.67 KG/M2

## 2021-06-03 NOTE — PROGRESS NOTES
Patient presents for IV remicade today. IV started without difficulty. He tolerated infusion without incident. Upon completion, IV d/c'd and he left ambulatory per self. Brook Stark

## 2021-06-03 NOTE — TELEPHONE ENCOUNTER
Refill Approved    Rx renewed per Medication Renewal Policy. Medication was last renewed on 8/14/19.    Norma Burden, [facility] Triage/Med Refill 11/7/2019     Requested Prescriptions   Pending Prescriptions Disp Refills     lisinopril (PRINIVIL,ZESTRIL) 10 MG tablet [Pharmacy Med Name: LISINOPRIL 10MG TABLETS] 90 tablet 0     Sig: TAKE 1 TABLET BY MOUTH DAILY       Ace Inhibitors Refill Protocol Passed - 11/7/2019  9:23 AM        Passed - PCP or prescribing provider visit in past 12 months       Last office visit with prescriber/PCP: 10/11/2019 Fei Lopez MD OR same dept: 10/11/2019 Fei Lopez MD OR same specialty: 10/11/2019 Fei Lopez MD  Last physical: 9/27/2019 Last MTM visit: Visit date not found   Next visit within 3 mo: Visit date not found  Next physical within 3 mo: Visit date not found  Prescriber OR PCP: Fei Lopez MD  Last diagnosis associated with med order: 1. Essential hypertension, malignant  - lisinopril (PRINIVIL,ZESTRIL) 10 MG tablet [Pharmacy Med Name: LISINOPRIL 10MG TABLETS]; TAKE 1 TABLET BY MOUTH DAILY  Dispense: 90 tablet; Refill: 0    If protocol passes may refill for 12 months if within 3 months of last provider visit (or a total of 15 months).             Passed - Serum Potassium in past 12 months     Lab Results   Component Value Date    Potassium 4.8 09/27/2019             Passed - Blood pressure filed in past 12 months     BP Readings from Last 1 Encounters:   10/11/19 120/60             Passed - Serum Creatinine in past 12 months     Creatinine   Date Value Ref Range Status   09/27/2019 1.50 (H) 0.70 - 1.30 mg/dL Final

## 2021-06-04 VITALS
HEART RATE: 66 BPM | BODY MASS INDEX: 27.3 KG/M2 | WEIGHT: 195 LBS | DIASTOLIC BLOOD PRESSURE: 68 MMHG | HEIGHT: 71 IN | SYSTOLIC BLOOD PRESSURE: 110 MMHG

## 2021-06-04 VITALS
TEMPERATURE: 97.6 F | HEART RATE: 60 BPM | WEIGHT: 207 LBS | OXYGEN SATURATION: 96 % | DIASTOLIC BLOOD PRESSURE: 75 MMHG | BODY MASS INDEX: 28.87 KG/M2 | SYSTOLIC BLOOD PRESSURE: 131 MMHG

## 2021-06-04 VITALS
TEMPERATURE: 98 F | WEIGHT: 204 LBS | SYSTOLIC BLOOD PRESSURE: 121 MMHG | BODY MASS INDEX: 28.45 KG/M2 | DIASTOLIC BLOOD PRESSURE: 62 MMHG | HEART RATE: 69 BPM | OXYGEN SATURATION: 96 %

## 2021-06-04 VITALS — WEIGHT: 195 LBS | BODY MASS INDEX: 27.2 KG/M2

## 2021-06-04 VITALS — BODY MASS INDEX: 28.31 KG/M2 | WEIGHT: 203 LBS

## 2021-06-04 VITALS — BODY MASS INDEX: 28.87 KG/M2 | WEIGHT: 207 LBS

## 2021-06-04 NOTE — TELEPHONE ENCOUNTER
Refill Approved    Rx renewed per Medication Renewal Policy. Medication was last renewed on 12/28/18.3/22/19.    Mary Deluna, [facility] Triage/Med Refill 12/28/2019     Requested Prescriptions   Pending Prescriptions Disp Refills     propranolol (INDERAL) 20 MG tablet [Pharmacy Med Name: PROPRANOLOL 20MG TABLETS] 180 tablet 3     Sig: TAKE 1 TABLET(20 MG) BY MOUTH TWICE DAILY       Beta-Blockers Refill Protocol Passed - 12/26/2019  1:40 PM        Passed - PCP or prescribing provider visit in past 12 months or next 3 months     Last office visit with prescriber/PCP: 10/11/2019 Fei Lopez MD OR same dept: 10/11/2019 Fei Lopez MD OR same specialty: 10/11/2019 Fei Lopez MD  Last physical: 9/27/2019 Last MTM visit: Visit date not found   Next visit within 3 mo: Visit date not found  Next physical within 3 mo: Visit date not found  Prescriber OR PCP: Fei Lopez MD  Last diagnosis associated with med order: 1. Essential hypertension, benign  - propranolol (INDERAL) 20 MG tablet [Pharmacy Med Name: PROPRANOLOL 20MG TABLETS]; TAKE 1 TABLET(20 MG) BY MOUTH TWICE DAILY  Dispense: 180 tablet; Refill: 3    2. Anemia, iron deficiency  - ferrous sulfate 325 (65 FE) MG tablet [Pharmacy Med Name: FERROUS SULFATE 325MG (5GR) TABS]; TAKE 1 TABLET BY MOUTH EVERY DAY WITH BREAKFAST  Dispense: 90 tablet; Refill: 2    3. Hyperlipidemia  - atorvastatin (LIPITOR) 40 MG tablet [Pharmacy Med Name: ATORVASTATIN 40MG TABLETS]; TAKE 1 TABLET(40 MG) BY MOUTH DAILY  Dispense: 90 tablet; Refill: 3    4. Type 2 diabetes mellitus (H)  - atorvastatin (LIPITOR) 40 MG tablet [Pharmacy Med Name: ATORVASTATIN 40MG TABLETS]; TAKE 1 TABLET(40 MG) BY MOUTH DAILY  Dispense: 90 tablet; Refill: 3    If protocol passes may refill for 12 months if within 3 months of last provider visit (or a total of 15 months).             Passed - Blood pressure filed in past 12 months     BP Readings from Last 1 Encounters:   11/08/19 139/64              ferrous sulfate 325 (65 FE) MG tablet [Pharmacy Med Name: FERROUS SULFATE 325MG (5GR) TABS] 90 tablet 2     Sig: TAKE 1 TABLET BY MOUTH EVERY DAY WITH BREAKFAST       Iron Supplements Refill Protocol Passed - 12/26/2019  1:40 PM        Passed - PCP or prescribing provider visit in past 12 months       Last office visit with prescriber/PCP: 10/11/2019 Fei Lopez MD OR same dept: 10/11/2019 Fei Lopez MD OR same specialty: 10/11/2019 Fei Lopez MD  Last physical: 9/27/2019 Last MTM visit: Visit date not found   Next visit within 3 mo: Visit date not found  Next physical within 3 mo: Visit date not found  Prescriber OR PCP: Fei Lopez MD  Last diagnosis associated with med order: 1. Essential hypertension, benign  - propranolol (INDERAL) 20 MG tablet [Pharmacy Med Name: PROPRANOLOL 20MG TABLETS]; TAKE 1 TABLET(20 MG) BY MOUTH TWICE DAILY  Dispense: 180 tablet; Refill: 3    2. Anemia, iron deficiency  - ferrous sulfate 325 (65 FE) MG tablet [Pharmacy Med Name: FERROUS SULFATE 325MG (5GR) TABS]; TAKE 1 TABLET BY MOUTH EVERY DAY WITH BREAKFAST  Dispense: 90 tablet; Refill: 2    3. Hyperlipidemia  - atorvastatin (LIPITOR) 40 MG tablet [Pharmacy Med Name: ATORVASTATIN 40MG TABLETS]; TAKE 1 TABLET(40 MG) BY MOUTH DAILY  Dispense: 90 tablet; Refill: 3    4. Type 2 diabetes mellitus (H)  - atorvastatin (LIPITOR) 40 MG tablet [Pharmacy Med Name: ATORVASTATIN 40MG TABLETS]; TAKE 1 TABLET(40 MG) BY MOUTH DAILY  Dispense: 90 tablet; Refill: 3    If protocol passes may refill for 12 months if within 3 months of last provider visit (or a total of 15 months).             Passed - Hemoglobin or Hematocrit in last 12 months     Hemoglobin   Date Value Ref Range Status   08/05/2019 12.0 (L) 14.0 - 18.0 g/dL Final     Hematocrit   Date Value Ref Range Status   08/05/2019 35.6 (L) 40.0 - 54.0 % Final             metFORMIN (GLUCOPHAGE) 500 MG tablet [Pharmacy Med Name: METFORMIN 500MG TABLETS] 360 tablet  3     Sig: TAKE 2 TABLETS BY MOUTH TWICE DAILY AT 6 AM AND AT 4 PM       Metformin Refill Protocol Passed - 12/26/2019  1:40 PM        Passed - Blood pressure in last 12 months     BP Readings from Last 1 Encounters:   11/08/19 139/64             Passed - LFT or AST or ALT in last 12 months     Albumin   Date Value Ref Range Status   09/27/2019 3.9 3.5 - 5.0 g/dL Final     Bilirubin, Total   Date Value Ref Range Status   09/27/2019 0.8 0.0 - 1.0 mg/dL Final     Bilirubin, Direct   Date Value Ref Range Status   08/04/2016 0.3 <=0.5 mg/dL Final     Alkaline Phosphatase   Date Value Ref Range Status   09/27/2019 145 (H) 45 - 120 U/L Final     AST   Date Value Ref Range Status   09/27/2019 72 (H) 0 - 40 U/L Final     ALT   Date Value Ref Range Status   09/27/2019 65 (H) 0 - 45 U/L Final     Protein, Total   Date Value Ref Range Status   09/27/2019 8.2 (H) 6.0 - 8.0 g/dL Final                Passed - GFR or Serum Creatinine in last 6 months     GFR MDRD Non Af Amer   Date Value Ref Range Status   09/27/2019 46 (L) >60 mL/min/1.73m2 Final     GFR MDRD Af Amer   Date Value Ref Range Status   09/27/2019 56 (L) >60 mL/min/1.73m2 Final             Passed - Visit with PCP or prescribing provider visit in last 6 months or next 3 months     Last office visit with prescriber/PCP: 10/11/2019 OR same dept: 10/11/2019 Fei Lopez MD OR same specialty: 10/11/2019 Fei Lopez MD Last physical: 9/27/2019 Last MTM visit: Visit date not found         Next appt within 3 mo: Visit date not found  Next physical within 3 mo: Visit date not found  Prescriber OR PCP: Fei Lopez MD  Last diagnosis associated with med order: 1. Essential hypertension, benign  - propranolol (INDERAL) 20 MG tablet [Pharmacy Med Name: PROPRANOLOL 20MG TABLETS]; TAKE 1 TABLET(20 MG) BY MOUTH TWICE DAILY  Dispense: 180 tablet; Refill: 3    2. Anemia, iron deficiency  - ferrous sulfate 325 (65 FE) MG tablet [Pharmacy Med Name: FERROUS SULFATE 325MG  (5GR) TABS]; TAKE 1 TABLET BY MOUTH EVERY DAY WITH BREAKFAST  Dispense: 90 tablet; Refill: 2    3. Hyperlipidemia  - atorvastatin (LIPITOR) 40 MG tablet [Pharmacy Med Name: ATORVASTATIN 40MG TABLETS]; TAKE 1 TABLET(40 MG) BY MOUTH DAILY  Dispense: 90 tablet; Refill: 3    4. Type 2 diabetes mellitus (H)  - atorvastatin (LIPITOR) 40 MG tablet [Pharmacy Med Name: ATORVASTATIN 40MG TABLETS]; TAKE 1 TABLET(40 MG) BY MOUTH DAILY  Dispense: 90 tablet; Refill: 3     If protocol passes may refill for 12 months if within 3 months of last provider visit (or a total of 15 months).           Passed - A1C in last 6 months     Hemoglobin A1c   Date Value Ref Range Status   09/27/2019 7.5 (H) 3.5 - 6.0 % Final               Passed - Microalbumin in last year      Microalbumin, Random Urine   Date Value Ref Range Status   09/27/2019 0.64 0.00 - 1.99 mg/dL Final                  atorvastatin (LIPITOR) 40 MG tablet [Pharmacy Med Name: ATORVASTATIN 40MG TABLETS] 90 tablet 3     Sig: TAKE 1 TABLET(40 MG) BY MOUTH DAILY       Statins Refill Protocol (Hmg CoA Reductase Inhibitors) Passed - 12/26/2019  1:40 PM        Passed - PCP or prescribing provider visit in past 12 months      Last office visit with prescriber/PCP: 10/11/2019 Fei Lopez MD OR same dept: 10/11/2019 Fei Lopez MD OR same specialty: 10/11/2019 Fei Lopez MD  Last physical: 9/27/2019 Last MTM visit: Visit date not found   Next visit within 3 mo: Visit date not found  Next physical within 3 mo: Visit date not found  Prescriber OR PCP: Fei Lopez MD  Last diagnosis associated with med order: 1. Essential hypertension, benign  - propranolol (INDERAL) 20 MG tablet [Pharmacy Med Name: PROPRANOLOL 20MG TABLETS]; TAKE 1 TABLET(20 MG) BY MOUTH TWICE DAILY  Dispense: 180 tablet; Refill: 3    2. Anemia, iron deficiency  - ferrous sulfate 325 (65 FE) MG tablet [Pharmacy Med Name: FERROUS SULFATE 325MG (5GR) TABS]; TAKE 1 TABLET BY MOUTH EVERY DAY WITH  BREAKFAST  Dispense: 90 tablet; Refill: 2    3. Hyperlipidemia  - atorvastatin (LIPITOR) 40 MG tablet [Pharmacy Med Name: ATORVASTATIN 40MG TABLETS]; TAKE 1 TABLET(40 MG) BY MOUTH DAILY  Dispense: 90 tablet; Refill: 3    4. Type 2 diabetes mellitus (H)  - atorvastatin (LIPITOR) 40 MG tablet [Pharmacy Med Name: ATORVASTATIN 40MG TABLETS]; TAKE 1 TABLET(40 MG) BY MOUTH DAILY  Dispense: 90 tablet; Refill: 3    If protocol passes may refill for 12 months if within 3 months of last provider visit (or a total of 15 months).

## 2021-06-05 ENCOUNTER — RECORDS - HEALTHEAST (OUTPATIENT)
Dept: UROLOGY | Facility: CLINIC | Age: 76
End: 2021-06-05

## 2021-06-05 VITALS
SYSTOLIC BLOOD PRESSURE: 110 MMHG | TEMPERATURE: 98.1 F | BODY MASS INDEX: 29.57 KG/M2 | OXYGEN SATURATION: 96 % | WEIGHT: 212 LBS | DIASTOLIC BLOOD PRESSURE: 68 MMHG | HEART RATE: 66 BPM

## 2021-06-05 VITALS
HEART RATE: 64 BPM | OXYGEN SATURATION: 97 % | DIASTOLIC BLOOD PRESSURE: 69 MMHG | WEIGHT: 210 LBS | BODY MASS INDEX: 29.29 KG/M2 | TEMPERATURE: 97.8 F | SYSTOLIC BLOOD PRESSURE: 119 MMHG

## 2021-06-05 VITALS
DIASTOLIC BLOOD PRESSURE: 70 MMHG | OXYGEN SATURATION: 96 % | TEMPERATURE: 97.6 F | HEART RATE: 64 BPM | BODY MASS INDEX: 28.59 KG/M2 | SYSTOLIC BLOOD PRESSURE: 114 MMHG | WEIGHT: 205 LBS

## 2021-06-05 DIAGNOSIS — N20.1 CALCULUS OF URETER: ICD-10-CM

## 2021-06-05 NOTE — TELEPHONE ENCOUNTER
Refill Approved    Rx renewed per Medication Renewal Policy. Medication was last renewed on 10/22/19.    Mary Deluna, [facility] Triage/Med Refill 1/22/2020     Requested Prescriptions   Pending Prescriptions Disp Refills     tamsulosin (FLOMAX) 0.4 mg cap [Pharmacy Med Name: TAMSULOSIN 0.4MG CAPSULES] 90 capsule 0     Sig: TAKE 1 CAPSULE BY MOUTH DAILY AFTER SUPPER       Alfuzosin/Tamsulosin/Silodosin Refill Protocol  Passed - 1/21/2020  2:03 PM        Passed - PCP or prescribing provider visit in past 12 months       Last office visit with prescriber/PCP: 10/11/2019 Fei Lopez MD OR same dept: 10/11/2019 Fei Lopez MD OR same specialty: 10/11/2019 Fei Lopez MD  Last physical: 9/27/2019 Last MTM visit: Visit date not found   Next visit within 3 mo: Visit date not found  Next physical within 3 mo: Visit date not found  Prescriber OR PCP: Fei Lopez MD  Last diagnosis associated with med order: 1. Urinary hesitancy  - tamsulosin (FLOMAX) 0.4 mg cap [Pharmacy Med Name: TAMSULOSIN 0.4MG CAPSULES]; TAKE 1 CAPSULE BY MOUTH DAILY AFTER SUPPER  Dispense: 90 capsule; Refill: 0    2. Heartburn  - omeprazole (PRILOSEC) 40 MG capsule [Pharmacy Med Name: OMEPRAZOLE 40MG CAPSULES]; TAKE 1 CAPSULE(40 MG) BY MOUTH DAILY  Dispense: 90 capsule; Refill: 0    If protocol passes may refill for 12 months if within 3 months of last provider visit (or a total of 15 months).             omeprazole (PRILOSEC) 40 MG capsule [Pharmacy Med Name: OMEPRAZOLE 40MG CAPSULES] 90 capsule 0     Sig: TAKE 1 CAPSULE(40 MG) BY MOUTH DAILY       GI Medications Refill Protocol Passed - 1/21/2020  2:03 PM        Passed - PCP or prescribing provider visit in last 12 or next 3 months.     Last office visit with prescriber/PCP: 10/11/2019 Fei Lopez MD OR same dept: 10/11/2019 Fei Lopez MD OR same specialty: 10/11/2019 Fei Lopez MD  Last physical: 9/27/2019 Last MTM visit: Visit date not found   Next visit  within 3 mo: Visit date not found  Next physical within 3 mo: Visit date not found  Prescriber OR PCP: Fei Lopez MD  Last diagnosis associated with med order: 1. Urinary hesitancy  - tamsulosin (FLOMAX) 0.4 mg cap [Pharmacy Med Name: TAMSULOSIN 0.4MG CAPSULES]; TAKE 1 CAPSULE BY MOUTH DAILY AFTER SUPPER  Dispense: 90 capsule; Refill: 0    2. Heartburn  - omeprazole (PRILOSEC) 40 MG capsule [Pharmacy Med Name: OMEPRAZOLE 40MG CAPSULES]; TAKE 1 CAPSULE(40 MG) BY MOUTH DAILY  Dispense: 90 capsule; Refill: 0    If protocol passes may refill for 12 months if within 3 months of last provider visit (or a total of 15 months).

## 2021-06-05 NOTE — PROGRESS NOTES
ASSESSMENT AND PLAN   Frank Obando 74 y.o. male is here for follow-up.  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  Chronic anemia.  Recent labs are reviewed.  He is going to continue Remicade as now.  For arthralgias associated with the above he is aware to avoid nonsteroidals and go for acetaminophen.  We will meet here in 6 months labs every 3.                      Diagnoses and all orders for this visit:    Scleritis of left eye    High risk medication use    Antineutrophil cytoplasmic antibody (ANCA) positive    CRF (chronic renal failure), stage 3 (moderate) (H)      HISTORY OF PRESENTING ILLNESS:  Frank Obando, 74 y.o., male is here for follow-up.  He is now on Remicade infusion every 8 weeks, gets at [facility], for left eye scleritis that has been well controlled.  He was seen in ophthalmology recently.  He has positive ANCA without CT-3/MPO, without systemic vasculitis, osteoarthritis, left shoulder pain intermittently commensurate with tendinopathy.  In the past he has had a fall resulting in right wrist injury for which she had taken Advil.  He has renal impairment.  He has history of steatohepatitis associated cirrhosis recent albumin ALT were normal.  He was seen in ophthalmology 3 weeks ago was reassured.  He has noted pain in the knees worse with activity such as going up the steps, left shoulder such as with reaching particularly abduction.  He is known to have osteoarthritis.  He had worsening of his renal function he does not take nonsteroidals.  While he is positive for ANCA without proteinase 3 or myeloperoxidase there is little to suggest sinus, respiratory, cutaneous, peripheral neurologic, synovial symptoms.  Not a  candidate for methotrexate leflunomide azathioprine class of drugs.  We will go for tumor necrosis factor inhibitors.  [employer] College of rheumat[employer] handout is provided major side effects outlined.          .  He is also had over the past few weeks redness of the eye.  He is due to see ophthalmologist in May.  I have asked him to have that done as soon as possible made his way to the leg.  He has noted mild discomfort in the knees, first MTPs, known to have osteoarthritis, but without morning stiffness.   Dr. Abdi in the next few weeks.  He understands that inflammation is under good control despite lowering of the methotrexate dose in view of the liver function abnormalities.  He gets some discomfort in his knees at rest at night or when he is up and about for long time such as visiting the [facility].  He noted pain level to be mild, 1.0/10, able to do all his day-to-day activities without difficulty.  He has no stiffness when he wakes up in the morning.  There is no fever weight loss blurry vision eye redness mouth also nausea or rash.   Further historical information and ADL limitations as noted in the multidimensional health assessment questionnaire attached in the EMR.   ALLERGIES:Morphine; Other allergy (see comments); and Scopolamine hbr    PAST MEDICAL/ACTIVE PROBLEMS/MEDICATION/ FAMILY HISTORY/SOCIAL DATA:  The patient has a family history of  Past Medical History:   Diagnosis Date     Anemia      Arthritis     osteoarthritis     Calculus of kidney      Diabetes mellitus (H)      Episcleritis of left eye      Hyperlipidemia      Hypertension      Peripheral neuropathy      Stroke (H)      Social History     Tobacco Use   Smoking Status Never Smoker   Smokeless Tobacco Never Used     Patient Active Problem List   Diagnosis     Nephrolithiasis     Normocytic anemia     Hypercholesterolemia     Peripheral Neuropathy     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     Essential  hypertension, benign     Type 2 diabetes mellitus with complication, without long-term current use of insulin (H)     Carpal tunnel syndrome     Right bundle branch block     Primary osteoarthritis of both knees     ALT (SGPT) level raised     Scleritis of left eye     Urinary hesitancy     Antineutrophil cytoplasmic antibody (ANCA) positive     High risk medication use     Abdominal pain     Cholecystitis     Dyslipidemia     Thrombocytopenia (H)     Cirrhosis of liver with ascites, unspecified hepatic cirrhosis type (H)     Hyponatremia     Panuveitis, unspecified eye     Current Outpatient Medications   Medication Sig Dispense Refill     atorvastatin (LIPITOR) 40 MG tablet TAKE 1 TABLET(40 MG) BY MOUTH DAILY 90 tablet 3     blood glucose meter (GLUCOMETER) Use 1 each As Directed 2 (two) times a day. Dispense glucometer brand per patient's insurance at pharmacy discretion. 1 each 0     blood glucose test strips Use 1 each As Directed 2 (two) times a day. Dispense brand per patient's insurance at pharmacy discretion. 100 strip 3     BREEZE 2 TEST STRIPS Strp USE TO TEST UP TO TWICE DAILY AS DIRECTED 200 strip 3     clopidogrel (PLAVIX) 75 mg tablet TAKE 1 TABLET(75 MG) BY MOUTH DAILY 90 tablet 3     cyanocobalamin, vitamin B-12, 2,500 mcg Tab Take 2,500 mcg by mouth daily.       ferrous sulfate 325 (65 FE) MG tablet TAKE 1 TABLET BY MOUTH EVERY DAY WITH BREAKFAST 90 tablet 2     gabapentin (NEURONTIN) 100 MG capsule One tab three times daily, work up gradually to three tabs three times daily 180 capsule 1     generic lancets (ACCU-CHEK SOFTCLIX LANCETS) Use 1 each As Directed 2 (two) times a day. Dispense brand per patient's insurance at pharmacy discretion. 100 each 3     HYDROcodone-acetaminophen 5-325 mg per tablet Take 1-2 tablets by mouth every 6 (six) hours as needed for pain. 30 tablet 0     lisinopril (PRINIVIL,ZESTRIL) 10 MG tablet TAKE 1 TABLET BY MOUTH DAILY 90 tablet 2     magnesium oxide 250 mg Tab  "Take 250 mg by mouth 2 (two) times a day.       metFORMIN (GLUCOPHAGE) 500 MG tablet TAKE 2 TABLETS BY MOUTH TWICE DAILY AT 6 AM AND AT 4  tablet 2     omeprazole (PRILOSEC) 40 MG capsule TAKE 1 CAPSULE(40 MG) BY MOUTH DAILY 90 capsule 2     pioglitazone (ACTOS) 30 MG tablet TAKE 1 TABLET(30 MG) BY MOUTH DAILY 90 tablet 3     propranolol (INDERAL) 20 MG tablet TAKE 1 TABLET(20 MG) BY MOUTH TWICE DAILY 180 tablet 3     tamsulosin (FLOMAX) 0.4 mg cap TAKE 1 CAPSULE BY MOUTH DAILY AFTER SUPPER 90 capsule 2     No current facility-administered medications for this visit.      DETAILED EXAMINATION  02/10/20  :  Vitals:    02/10/20 0836   BP: 110/68   Patient Site: Right Arm   Patient Position: Sitting   Cuff Size: Adult Regular   Pulse: 66   Weight: 195 lb (88.5 kg)   Height: 5' 11\" (1.803 m)     Alert oriented. Head including the face is examined for malar rash, heliotropes, scarring, lupus pernio. Eyes examined for redness such as in episcleritis/scleritis, periorbital lesions.   Neck/ Face examined for parotid gland swelling, range of motion of neck.  Left upper and lower and right upper and lower extremities examined for tenderness, swelling, warmth of the appendicular joints, range of motion, edema, rash.  Some of the important findings included: No apparent ocular inflammatory changes.  There is no deformity of the nose, ears.  He does not have synovitis of palpable joints of upper extremities.  Knees minimal JLT.  Full range of motion of the shoulder with minimal impingement left shoulder.   .        LAB / IMAGING DATA:  ALT   Date Value Ref Range Status   02/06/2020 28 0 - 45 U/L Final   09/27/2019 65 (H) 0 - 45 U/L Final   08/05/2019 31 0 - 45 U/L Final     Albumin   Date Value Ref Range Status   02/06/2020 3.5 3.5 - 5.0 g/dL Final   09/27/2019 3.9 3.5 - 5.0 g/dL Final   08/05/2019 3.4 (L) 3.5 - 5.0 g/dL Final     Creatinine   Date Value Ref Range Status   02/06/2020 1.37 (H) 0.70 - 1.30 mg/dL Final "   09/27/2019 1.50 (H) 0.70 - 1.30 mg/dL Final   08/05/2019 1.42 (H) 0.70 - 1.30 mg/dL Final       WBC   Date Value Ref Range Status   02/06/2020 5.8 4.0 - 11.0 thou/uL Final   08/05/2019 5.1 4.0 - 11.0 thou/uL Final   08/27/2015 7.0 4.0 - 11.0 thou/uL Final   07/31/2015 7.1 4.0 - 11.0 thou/uL Final     Hemoglobin   Date Value Ref Range Status   02/06/2020 10.7 (L) 14.0 - 18.0 g/dL Final   08/05/2019 12.0 (L) 14.0 - 18.0 g/dL Final   12/26/2018 9.9 (L) 14.0 - 18.0 g/dL Final     Platelets   Date Value Ref Range Status   02/06/2020 118 (L) 140 - 440 thou/uL Final   08/05/2019 102 (L) 140 - 440 thou/uL Final   12/26/2018 104 (L) 140 - 440 thou/uL Final       Lab Results   Component Value Date    RF <15.0 06/25/2014    SEDRATE 12 04/17/2015

## 2021-06-05 NOTE — TELEPHONE ENCOUNTER
Patient has a future lab appointment on 02/06/20 . There are no lab orders could you review chart and place orders? Thank you.

## 2021-06-06 NOTE — TELEPHONE ENCOUNTER
Pt notified that Dr Chong and Dr Cherry agree pt should continue Remicade infusions, pt notified and agrees. He has appt scheduled at infusion center on 5/8/20

## 2021-06-06 NOTE — TELEPHONE ENCOUNTER
Dr Chong    Pt would like a refill/RX sent to continue Infusions.    Please refill/send a new RX.    Any questions, he can be reached @ [phone]

## 2021-06-06 NOTE — PROGRESS NOTES
Pt arrived ambulatory to Infusion Appt. IV started in left wrist. Pre-meds given, Remicade infused with no adverse effects. Pt reports feeling well. IV removed. Pt reminded of Remicade prescription order expiring 3/20/20.

## 2021-06-06 NOTE — TELEPHONE ENCOUNTER
Dr Chong    Please call Dr Vu from [facility].    They will be closing soon.

## 2021-06-07 ENCOUNTER — COMMUNICATION - HEALTHEAST (OUTPATIENT)
Dept: INTERNAL MEDICINE | Facility: CLINIC | Age: 76
End: 2021-06-07

## 2021-06-07 DIAGNOSIS — E11.8 TYPE 2 DIABETES MELLITUS WITH COMPLICATION, WITHOUT LONG-TERM CURRENT USE OF INSULIN (H): ICD-10-CM

## 2021-06-07 RX ORDER — BLOOD SUGAR DIAGNOSTIC
1 STRIP MISCELLANEOUS 2 TIMES DAILY
Qty: 200 STRIP | Refills: 11 | Status: SHIPPED | OUTPATIENT
Start: 2021-06-07 | End: 2023-04-25

## 2021-06-07 NOTE — PROGRESS NOTES
"Frank Obando is a 74 y.o. male who is being evaluated via a billable telephone visit.      Frank Obando complains of    Chief Complaint   Patient presents with     Pruritus     On Back and Feet     Assessment/Plan:    1. Pruritic disorder  Unclear etiology.  No clinical indication of acute renal changes or hepatitis.  Suspect dry skin.  He will stop 'soaking' and use mild Dove soap.  Continue prn moisturizing lotion but use aquaphor cream prn.  Also hydroxyzine as needed.  Follow up if fails to improve or new symptoms or rash develops.    - hydrOXYzine HCL (ATARAX) 25 MG tablet; Take 1 tablet (25 mg total) by mouth every 6 (six) hours as needed for itching.  Dispense: 60 tablet; Refill: 5    Additional provider notes: he has diffuse pruritis, started a few days ago.  Started on feet, then hands, and now trunk.  He does not feel ill, or have fever. There is no rash.  He denies yellow eyes or brown urine.  No fever, or cough.  Had remicade 10 days ago without issues.  On no other medications.  Uses ivory soap and has 'soaked' in the bathtube.  Has started to try Aveda lotion.      I have reviewed and updated the patient's Past Medical History, Social History, Family History, Allergies and Medication List.    The patient has been notified of following:     \"This telephone visit will be conducted via a call between you and your physician/provider. We have found that certain health care needs can be provided without the need for a physical exam.  This service lets us provide the care you need with a short phone conversation.  If a prescription is necessary we can send it directly to your pharmacy.  If lab work is needed we can place an order for that and you can then stop by our lab to have the test done at a later time.    If during the course of the call the physician/provider feels a telephone visit is not appropriate, you will not be charged for this service.\"       Phone call duration:  10 " minutes

## 2021-06-08 NOTE — PROGRESS NOTES
Assessment and Plan:   1.  Annual wellness assessment:  Antonio scores 4 on the Mini-cog.  Has no difficulties on activities of daily living or other facets of the annual wellness review    1. Type 2 diabetes mellitus  1 Antonio is overduefor a recheck of the hemoglobin A1c.  This came back at 7.4 despite metformin 1000 mg twice daily.  I would favor adding Januvia if his insurance coverage is reasonable.  If not he should work harder with diet.  Is up-to-date on eye checks.  Microalbumin and other monitoring labs will be checked today.  - Glycosylated Hemoglobin A1c  - Comprehensive Metabolic Panel  - Microalbumin, Random Urine  - Lipid Cascade; Future  - Lipid Cascade    2. Hyperlipidemia  He remains on Lipitor 40 mg daily with good tolerance.  Fasting lipids will be checked today  - Lipid Cascade; Future  - Lipid Cascade    3. Hypertension  Control is good on lisinopril 10 mg  - Comprehensive Metabolic Panel  - Urinalysis-UC if Indicated    4. Prostate cancer screening  PSA will be checked.  The pros and cons of PSA testing were reviewed  - PSA (Prostatic-Specific Antigen), Annual Screen    5. Medication monitoring encounter  Labs for medication monitoring were drawn as below  - Comprehensive Metabolic Panel  - HM2(CBC w/o Differential)    6. Need for prophylactic vaccination and inoculation against influenza  Flu shot was advised  - Influenza High Dose, Seasonal 65+ yrs    7. Need for prophylactic vaccination against Streptococcus pneumoniae (pneumococcus) and influenza  Prevnar vaccine was advised  - Pneumococcal conjugate vaccine 13-valent 6wks-17yrs; >50yrs  8.  Hypogonadism:  Free and total testosterone were low when checked last year.  He does note increased weakness particularly in his legs.  Testosterone level will be rechecked.  9.  Abnormal liver enzymes:  AST was mildly elevated on monitoring labs drawn by Dr. Chong for methotrexate monitoring.  Liver enzymes will be rechecked.  Hepatitis C antibody also  will be checked.  10.  Diminished hearing due to cerumen impaction:  Ears will be irrigated  11.  Scleritis left eye:  He has had regular eye checks.  He has done well on methotrexate  12.  Right bundle branch block:  No issues with more advanced heart block noted  13.  History of CVA:  On Plavix.  No recent problems    The patient's current medical problems were reviewed.  Plan:  1.  Labs as outlined.  He will be notified of test results.  2.  Colonoscopy due 10/17  3.  Body fat is 28.1%.  A goal under 25% was advised  4.  Add Januvia 100 mg daily  assuming insurance coverage is reasonable.  5.  Clinic follow-up 6 months or as needed  The following high BMI interventions were performed this visit: encouragement to exercise  The following health maintenance schedule was reviewed with the patient and provided in printed form in the after visit summary:   Health Maintenance   Topic Date Due     ZOSTER VACCINE  07/21/2006     PNEUMOCOCCAL POLYSACCHARIDE VACCINE AGE 65 AND OVER  07/21/2011     COLONOSCOPY  10/24/2017     DIABETES FOLLOW-UP  07/23/2017     DIABETES OPHTHALMOLOGY EXAM  09/09/2017     DIABETES FOOT EXAM  01/23/2018     DIABETES URINE MICROALBUMIN  01/23/2018     FALL RISK ASSESSMENT  01/23/2018     ADVANCE DIRECTIVES DISCUSSED WITH PATIENT  01/23/2022     TD 18+ HE  12/06/2023     INFLUENZA VACCINE RULE BASED  Completed     PNEUMOCOCCAL CONJUGATE VACCINE FOR ADULTS (PCV13 OR PREVNAR)  Completed        Subjective:   Chief Complaint: Frank Obando is an 71 y.o. male here for an Annual Wellness visit. Overall, he has felt well.   HPI:    Night sweats: He has noticed that at night he becomes very cold, often shivering as he falls asleep. He has been using an electric blanket and quilts and wakes at 3 AM drenched in sweat, but not feeling chilled.     Leg weakness: Overall, his strength remains good, however, recently he has noticed that when he gets on his knees he has trouble standing back up. The  weakness is not profound but he would like more leg strength. He notes occasional right knee, though not with walking. He only has the right knee pain if he flexes and moves it a certain way while sitting.     Health maintenance:  His last colonoscopy was in 2007 and was recommended to have a 10 year follow up. He is up to date on immunizations.     Review of Systems:    He denies any joint pains, aside from those above.     Please see above.  The rest of the review of systems are negative for all systems.    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     Hypertension     Type 2 Diabetes Mellitus     Carpal tunnel syndrome     Episcleritis     Osteoarthrosis, unspecified whether generalized or localized, lower leg     Right bundle branch block     Heart burn     Primary osteoarthritis of both knees     ALT (SGPT) level raised     Scleritis of left eye     Urinary hesitancy     Past Medical History   Diagnosis Date     Anemia      Arthritis      osteoarthritis     Calculus of kidney      Diabetes mellitus      Episcleritis of left eye      Hyperlipidemia      Hypertension      Peripheral neuropathy      Stroke       Past Surgical History   Procedure Laterality Date     Pr cystoscopy,insert ureteral stent       Description: Cystoscopy With Insertion Of Ureteral Stent Right;  Recorded: 10/14/2009;  Comments: stone     Cystoscopy tumor / condylomata w/ laser Left 7/18/2014      Family History   Problem Relation Age of Onset     Lung cancer Father      Coronary artery disease Mother      Diabetes Mother       Social History     Social History     Marital status:      Spouse name: N/A     Number of children: N/A     Years of education: N/A     Occupational History     Not on file.     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    Current Outpatient Prescriptions   Medication Sig Dispense Refill     atorvastatin (LIPITOR) 40 MG tablet Take 1 tablet (40 mg total) by mouth daily. 90 tablet 3     BREEZE 2 TEST STRIPS Strp USE AS DIRECTED UP TO TWICE DAILY 200 strip 3     clopidogrel (PLAVIX) 75 mg tablet TAKE 1 TABLET BY MOUTH DAILY 90 tablet 3     cyanocobalamin (VITAMIN B-12) 1000 MCG tablet Take 2,000 mcg by mouth daily.       erythromycin ophthalmic ointment   11     folic acid (FOLVITE) 1 MG tablet TAKE 1 TO 3 TABLETS BY MOUTH EVERY DAY AS DIRECTED. START WITH 1 TABLET DAILY AND SKIP DAY OF METHOTREXATE. 270 tablet 11     lisinopril (PRINIVIL,ZESTRIL) 10 MG tablet TAKE 1 TABLET BY MOUTH DAILY 90 tablet 3     metFORMIN (GLUCOPHAGE) 500 MG tablet TAKE 2 TABLETS BY MOUTH TWICE DAILY AT 6AM AND 4PM 360 tablet 3     methotrexate 2.5 MG tablet TAKE 6 TABLETS(15 MG) BY MOUTH 1 TIME A WEEK 24 tablet 1     omeprazole (PRILOSEC) 40 MG capsule TAKE ONE CAPSULE BY MOUTH EVERY DAY 90 capsule 3     tamsulosin (FLOMAX) 0.4 mg Cp24 TAKE ONE CAPSULE BY MOUTH DAILY AFTER SUPPER 90 capsule 0     tamsulosin (FLOMAX) 0.4 mg Cp24 TAKE ONE CAPSULE BY MOUTH DAILY AFTER SUPPER 90 capsule 0     No current facility-administered medications for this visit.       Objective:   Vital Signs:   Visit Vitals     /76 (Patient Site: Left Arm, Patient Position: Sitting, Cuff Size: Adult Regular)     Pulse 72     Ht 5' 10\" (1.778 m)     Wt 200 lb 8 oz (90.9 kg)     BMI 28.77 kg/m2        VisionScreening:  No exam data present     Assessment Results 1/23/2017   Activities of Daily Living No help needed   Instrumental Activities " of Daily Living No help needed   Get Up and Go Score Less than 12 seconds   Mini Cog Total Score 4     A Mini-Cog score of 0-2 suggests the possibility of dementia, score of 3-5 suggests no dementia    Identified Health Risks:     He is at risk for lack of exercise and has been provided with information to increase physical activity for the benefit of his well-being.  The patient was counseled and encouraged to consider modifying their diet and eating habits. He was provided with information on recommended healthy diet options.  The patient was provided with written information regarding signs of hearing loss.  Information regarding advance directives (living [place]), including where he can download the appropriate form, was provided to the patient via the AVS.     PHYSICAL EXAM:  Constitutional:   Reveals an alert, pleasant male. Does not appear acutely ill. Affect appropriate. Body fat percentage: 28.1%  Vitals: per nursing notes.  HEENT: Atraumatic.  Ears: Occluded with cerumen bilaterally  External canals, TMs clear.    Eyes:  EOMs full, PERRL.   Oropharynx:   Mouth and throat clear, no thrush or exudate.  Neck:  Supple, no carotid bruits or adenopathy.  Back:  No spine or CVA pain.  Thorax:  No bony deformities.  Lungs: Scant basilar crackles.   Respiratory effort normal.  Cardiac:   Regular rate and rhythm, normal S1, S2, no murmur or gallop.  Abdomen:  Right lower abdominal scar, ventral hernia, no masses or tenderness  Extremities:   No peripheral edema, pulses in the feet intact.  Diabetic foot exam: Pulses intact, no foot ulcers, capillary filling normal. No evidence for stocking neuropathy  :  (Men)  No testicular masses, no penile lesions.    No inguinal hernias.  Testes down.   Skin:  No jaundice, peripheral cyanosis or lesions to suggest malignancy.  Neuro:  Alert and oriented. Cranial nerves, motor, sensory exams are intact.  No gross focal deficits.  Psychiatric:  Memory intact, mood  appropriate.    ADDITIONAL HISTORY SUMMARIZED (2): Notes from 4/17/2015 reviewed regarding body fat percent. Colonoscopy from 2007 reviewed.  DECISION TO OBTAIN EXTRA INFORMATION (1): None.   RADIOLOGY TESTS (1): None.  LABS (1): Labs ordered.  MEDICINE TESTS (1): None.  INDEPENDENT REVIEW (2 each): None.     The visit lasted a total of 21 minutes face to face with the patient. Over 50% of the time was spent counseling and educating the patient about health maintenance.    IMalachi, am scribing for and in the presence of, Dr. Lopez.    I, Dr. Lopez, personally performed the services described in this documentation, as scribed by Malachi Rodriguez in my presence, and it is both accurate and complete.    Dragon dictation was used for this note.  Speech recognition errors are a possibility.    Total data points: 3.

## 2021-06-09 NOTE — PROGRESS NOTES
ASSESSMENT AND PLAN   Frank Obando 71 y.o. male is here for follow-up of immunosuppression, arthralgias.  He has left-sided scleritis.  He reduce the dose of methotrexate on his previous visit 6 months ago in view of the abnormal liver function studies but despite that and taking 15 mg of methotrexate understands that the inflammation in his eye remains quite distant.  He was seen in ophthalmology 6 weeks ago.  There is polyarthralgias in association with osteoarthritis that is little to suggest an inflammatory arthropathy such as rheumatoid arthritis in association with his ocular inflammation.  His liver function abnormalities are back to normal.  With the background of an Anca P positivity there are no features to suggest any other vasculitic process. I have asked him to return for follow-up in 6 months.  Management principles of osteoarthritis were reviewed.        Diagnoses and all orders for this visit:    Scleritis of left eye    Primary osteoarthritis of both knees    Episcleritis, left    Antineutrophil cytoplasmic antibody (ANCA) positive      HISTORY OF PRESENTING ILLNESS:  Frank Obando, 71 y.o., male who is a  at a local home is here for follow-up of scleritis.  This has affected his left eye.  Since his previous visit in September 2016 he has not had a flareup of scleritis.  He saw Dr. Abdi a few weeks ago.  He understands that inflammation is under good control despite lowering of the methotrexate dose in view of the liver function abnormalities.  He gets some discomfort in his knees at rest at night not usually during the day when he is able to do most of his day-to-day activities without difficulty.    There are a few features to suggest inflammatory joint disease.  He has not had hemoptysis, he has recent respiratory symptoms but no long-standing sinus issues other ocular issues there is no history of rash, there are no features to suggest an nephritis.  His DE  "3M p.o. have been negative.  .  At one point he was given the understanding that he may have rheumatoid arthritis.  He reports he is able to do all his day-to-day activities without difficulty there is no stiffness in the morning when he wakes up.  He noted no mouth ulcers, photosensitivity, rash, there is no pleuritic symptoms, DVT PE.  He has numbness and tingling in the feet felt to be secondary to diabetes.     He used to be on methotrexate orally for approximately 2 months.  It was deemed at that time that this was \"not working\" or he had an upper GI side effects.  He was then changed to subcutaneous methotrexate.  This was initially done with the regular syringe and then recommendation for the automated 10.  Meanwhile there was some questions around various aspects of methotrexate management.  He at one point considered going to [facility] for second opinion.  At that stage V weeks or so ago when he was seen by Dr. Abdi his ophthalmologist his you his scleritis had cleared up there for that appointment at [facility] was canceled.  Since then it has recurred.  His scleritis symptoms were predominantly are watering of the eye, and mild discomfort especially when he pushes or touches the eye.  There has been no photosensitivity or blurry vision.  At one point he had a positive p-ANCA.  We will check his TN 3, MPO status.  He reports discomfort in his joints such as the left index finger MCP and the right knee.  This is particularly the case to especially for the knee when he is in bed at nighttime Further historical information and ADL limitations as noted in the multidimensional health assessment questionnaire attached in the EMR.   ALLERGIES:Morphine and Other allergy (see comments)    PAST MEDICAL/ACTIVE PROBLEMS/MEDICATION/ FAMILY HISTORY/SOCIAL DATA:  The patient has a family history of  Past Medical History:   Diagnosis Date     Anemia      Arthritis     osteoarthritis     Calculus of kidney      Diabetes " mellitus      Episcleritis of left eye      Hyperlipidemia      Hypertension      Peripheral neuropathy      Stroke      History   Smoking Status     Never Smoker   Smokeless Tobacco     Never Used     Patient Active Problem List   Diagnosis     Nephrolithiasis     Anemia     Hyperlipidemia     Peripheral Neuropathy     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 90 tablet 3     BREEZE 2 TEST STRIPS Strp USE AS DIRECTED UP TO TWICE DAILY 200 strip 3     clopidogrel (PLAVIX) 75 mg tablet TAKE 1 TABLET BY MOUTH DAILY 90 tablet 3     cyanocobalamin (VITAMIN B-12) 1000 MCG tablet Take 2,000 mcg by mouth daily.       folic acid (FOLVITE) 1 MG tablet TAKE 1 TO 3 TABLETS BY MOUTH EVERY DAY AS DIRECTED. START WITH 1 TABLET DAILY AND SKIP DAY OF METHOTREXATE. 270 tablet 11     lisinopril (PRINIVIL,ZESTRIL) 10 MG tablet TAKE 1 TABLET BY MOUTH DAILY 90 tablet 3     metFORMIN (GLUCOPHAGE) 500 MG tablet TAKE 2 TABLETS BY MOUTH TWICE DAILY AT 6AM AND 4PM 360 tablet 3     methotrexate 2.5 MG tablet TAKE 6 TABLETS(15 MG) BY MOUTH 1 TIME A WEEK 24 tablet 1     omeprazole (PRILOSEC) 40 MG capsule TAKE ONE CAPSULE BY MOUTH EVERY DAY 90 capsule 3     tamsulosin (FLOMAX) 0.4 mg Cp24 TAKE ONE CAPSULE BY MOUTH DAILY AFTER SUPPER 90 capsule 0     tamsulosin (FLOMAX) 0.4 mg Cp24 TAKE ONE CAPSULE BY MOUTH DAILY AFTER SUPPER 90 capsule 0     sitaGLIPtin (JANUVIA) 100 MG tablet Take 1 tablet (100 mg total) by mouth daily. With or without food 90 tablet 3     No current facility-administered medications for this visit.      DETAILED EXAMINATION (six  area) :  Vitals:    03/23/17 0840   BP: 120/72   Patient Site: Right Arm   Patient Position: Sitting   Cuff Size: Adult Regular   Pulse: 76   Weight: 206 lb 14.4 oz (93.8 kg)     Alert oriented. Head including the face is examined for malar rash, heliotropes, scarring, lupus pernio. Eyes examined for redness such as in episcleritis/scleritis, periorbital lesions.   Neck examined  for lymph nodes, range of motion Both upper and lower extremities (all four) examined for swollen, warm &/or  tender joints, range of motion, rash, muscle weakness, edema. The salient normal / abnormal findings are appended.    He has tenderness in the joint lines both the knees, scattered bony enlargement of the PIPs.  There is no synovitis in any of his palpable appendicular joints.    LAB / IMAGING DATA:  ALT   Date Value Ref Range Status   03/20/2017 43 0 - 45 U/L Final   01/23/2017 46 (H) 0 - 45 U/L Final   01/12/2017 76 (H) 0 - 45 U/L Final     Albumin   Date Value Ref Range Status   03/20/2017 3.8 3.5 - 5.0 g/dL Final   01/23/2017 3.8 3.5 - 5.0 g/dL Final   01/12/2017 3.8 3.5 - 5.0 g/dL Final     Creatinine   Date Value Ref Range Status   03/20/2017 1.10 0.70 - 1.30 mg/dL Final   01/23/2017 1.01 0.70 - 1.30 mg/dL Final   01/12/2017 1.07 0.70 - 1.30 mg/dL Final       WBC   Date Value Ref Range Status   03/20/2017 9.6 4.0 - 11.0 thou/uL Final   01/23/2017 5.2 4.0 - 11.0 thou/uL Final   08/27/2015 7.0 4.0 - 11.0 thou/uL Final   07/31/2015 7.1 4.0 - 11.0 thou/uL Final     Hemoglobin   Date Value Ref Range Status   03/20/2017 13.4 (L) 14.0 - 18.0 g/dL Final   01/23/2017 12.6 (L) 14.0 - 18.0 g/dL Final   01/12/2017 12.6 (L) 14.0 - 18.0 g/dL Final     Platelets   Date Value Ref Range Status   03/20/2017 167 140 - 440 thou/uL Final   01/23/2017 191 140 - 440 thou/uL Final   01/12/2017 202 140 - 440 thou/uL Final       Lab Results   Component Value Date    RF <15.0 06/25/2014    SEDRATE 12 04/17/2015

## 2021-06-09 NOTE — TELEPHONE ENCOUNTER
Refill Approved    Rx renewed per Medication Renewal Policy. Medication was last renewed on 12/28/19.11/7/19.    Mary Deluna, [facility] Triage/Med Refill 7/10/2020     Requested Prescriptions   Pending Prescriptions Disp Refills     lisinopriL (PRINIVIL,ZESTRIL) 10 MG tablet [Pharmacy Med Name: LISINOPRIL 10MG TABLETS] 90 tablet 2     Sig: TAKE 1 TABLET BY MOUTH DAILY       Ace Inhibitors Refill Protocol Passed - 7/8/2020  6:07 AM        Passed - PCP or prescribing provider visit in past 12 months       Last office visit with prescriber/PCP: 10/11/2019 Fei Lopez MD OR same dept: 10/11/2019 Fei Lopez MD OR same specialty: 10/11/2019 Fei Lopez MD  Last physical: 9/27/2019 Last MTM visit: Visit date not found   Next visit within 3 mo: Visit date not found  Next physical within 3 mo: Visit date not found  Prescriber OR PCP: Fei Lopez MD  Last diagnosis associated with med order: 1. Essential hypertension, malignant  - lisinopriL (PRINIVIL,ZESTRIL) 10 MG tablet [Pharmacy Med Name: LISINOPRIL 10MG TABLETS]; TAKE 1 TABLET BY MOUTH DAILY  Dispense: 90 tablet; Refill: 2    2. Anemia, iron deficiency  - ferrous sulfate 325 (65 FE) MG tablet [Pharmacy Med Name: IRON 325MG HIGH POTENCY TABLETS]; TAKE 1 TABLET BY MOUTH EVERY DAY WITH BREAKFAST  Dispense: 90 tablet; Refill: 2    If protocol passes may refill for 12 months if within 3 months of last provider visit (or a total of 15 months).             Passed - Serum Potassium in past 12 months     Lab Results   Component Value Date    Potassium 4.8 09/27/2019             Passed - Blood pressure filed in past 12 months     BP Readings from Last 1 Encounters:   03/13/20 131/75             Passed - Serum Creatinine in past 12 months     Creatinine   Date Value Ref Range Status   05/11/2020 1.34 (H) 0.70 - 1.30 mg/dL Final                ferrous sulfate 325 (65 FE) MG tablet [Pharmacy Med Name: IRON 325MG HIGH POTENCY TABLETS] 90 tablet 2     Sig:  TAKE 1 TABLET BY MOUTH EVERY DAY WITH BREAKFAST       Iron Supplements Refill Protocol Passed - 7/8/2020  6:07 AM        Passed - PCP or prescribing provider visit in past 12 months       Last office visit with prescriber/PCP: 10/11/2019 Fei Lopez MD OR same dept: 10/11/2019 Fei Lopez MD OR same specialty: 10/11/2019 Fei Lopez MD  Last physical: 9/27/2019 Last MTM visit: Visit date not found   Next visit within 3 mo: Visit date not found  Next physical within 3 mo: Visit date not found  Prescriber OR PCP: Fei Lopez MD  Last diagnosis associated with med order: 1. Essential hypertension, malignant  - lisinopriL (PRINIVIL,ZESTRIL) 10 MG tablet [Pharmacy Med Name: LISINOPRIL 10MG TABLETS]; TAKE 1 TABLET BY MOUTH DAILY  Dispense: 90 tablet; Refill: 2    2. Anemia, iron deficiency  - ferrous sulfate 325 (65 FE) MG tablet [Pharmacy Med Name: IRON 325MG HIGH POTENCY TABLETS]; TAKE 1 TABLET BY MOUTH EVERY DAY WITH BREAKFAST  Dispense: 90 tablet; Refill: 2    If protocol passes may refill for 12 months if within 3 months of last provider visit (or a total of 15 months).             Passed - Hemoglobin or Hematocrit in last 12 months     Hemoglobin   Date Value Ref Range Status   05/11/2020 11.5 (L) 14.0 - 18.0 g/dL Final     Hematocrit   Date Value Ref Range Status   05/11/2020 34.2 (L) 40.0 - 54.0 % Final

## 2021-06-10 NOTE — TELEPHONE ENCOUNTER
[facility] to reschedule because provider's hours have changed from the pandemic restrictions. appt on Monday 8/10/2020 is canceled.     JEFFY Magdaleno [facility] 8/6/2020 11:23 AM

## 2021-06-10 NOTE — TELEPHONE ENCOUNTER
RN cannot approve Refill Request    RN can NOT refill this medication PCP messaged that patient is overdue for Office Visit. Last office visit: 10/11/2019 Fei Lopez MD Last Physical: 9/27/2019 Last MTM visit: Visit date not found Last visit same specialty: 10/11/2019 Fei Lopez MD.  Next visit within 3 mo: Visit date not found  Next physical within 3 mo: Visit date not found      Natalia Taylor, [facility] Triage/Med Refill 8/2/2020    Requested Prescriptions   Pending Prescriptions Disp Refills     clopidogreL (PLAVIX) 75 mg tablet [Pharmacy Med Name: CLOPIDOGREL 75MG TABLETS] 90 tablet 3     Sig: TAKE 1 TABLET(75 MG) BY MOUTH DAILY       Clopidogrel/Prasugrel/Ticagrelor Refill Protocol Failed - 8/1/2020 11:37 AM        Failed - PCP or prescribing provider visit in past 6 months       Last office visit with prescriber/PCP: Visit date not found OR same dept: 10/11/2019 Fei Lopez MD OR same specialty: 10/11/2019 Fei Lopez MD Last physical: Visit date not found Last MTM visit: Visit date not found     Next appt within 3 mo: Visit date not found  Next physical within 3 mo: Visit date not found  Prescriber OR PCP: Fei Lopez MD  Last diagnosis associated with med order: 1. History of CVA (cerebrovascular accident)  - clopidogreL (PLAVIX) 75 mg tablet [Pharmacy Med Name: CLOPIDOGREL 75MG TABLETS]; TAKE 1 TABLET(75 MG) BY MOUTH DAILY  Dispense: 90 tablet; Refill: 3    If protocol passes may refill for 6 months if within 3 months of last provider visit (or a total of 9 months).              Passed - Hemoglobin in past 12 months     Hemoglobin   Date Value Ref Range Status   05/11/2020 11.5 (L) 14.0 - 18.0 g/dL Final

## 2021-06-10 NOTE — PROGRESS NOTES
"Frank Obando is a 75 y.o. male who is being evaluated via a billable telephone visit.      The patient has been notified of following:     \"This telephone visit will be conducted via a call between you and your physician/provider. We have found that certain health care needs can be provided without the need for a physical exam.  This service lets us provide the care you need with a short phone conversation.  If a prescription is necessary we can send it directly to your pharmacy.  If lab work is needed we can place an order for that and you can then stop by our lab to have the test done at a later time.    Telephone visits are billed at different rates depending on your insurance coverage. During this emergency period, for some insurers they may be billed the same as an in-person visit.  Please reach out to your insurance provider with any questions.    If during the course of the call the physician/provider feels a telephone visit is not appropriate, you will not be charged for this service.\"    Patient has given verbal consent to a Telephone visit? Yes    What phone number would you like to be contacted at? [phone]    Patient would like to receive their AVS by AVS Preference: Kori.      ASSESSMENT AND PLAN:    Diagnoses and all orders for this visit:    Panuveitis of left eye    Antineutrophil cytoplasmic antibody (ANCA) positive  -     predniSONE (DELTASONE) 10 mg tablet; Take 30 mg by mouth as needed (6 hours prior to the remicade infusion).  Dispense: 12 tablet; Refill: 0    CRF (chronic renal failure), stage 3 (moderate) (H)    High risk medication use  -     predniSONE (DELTASONE) 10 mg tablet; Take 30 mg by mouth as needed (6 hours prior to the remicade infusion).  Dispense: 12 tablet; Refill: 0    Other orders  -     acetaminophen tablet 650 mg (TYLENOL)  -     sodium chloride 0.9% 250 mL  -     diphenhydrAMINE tablet 25 mg (BENADRYL)  -     inFLIXimab 500 mg in sodium chloride 0.9% 200 mL " (REMICADE)  -     heparin lockflush (PF) porcine 300-600 Units  -     sodium chloride flush 10 mL (NS)  -     diphenhydrAMINE injection 50 mg (BENADRYL)  -     famotidine 20 mg/2 mL injection 20 mg (PEPCID)  -     hydrocortisone sod succ (PF) injection 100 mg  -     acetaminophen tablet 1,000 mg (TYLENOL)  -     sodium chloride 0.9% 500 mL          HISTORY OF PRESENTING ILLNESS:  Frank Obando 75 y.o. is evaluated here via video link.  This is for follow-up.  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  He is on Remicade every 2 months.  He is due to see ophthalmology in the next few weeks.  He is asymptomatic from a ocular perspective.  He has noted no new joint symptoms, rash, hemoptysis, he has not had pulmonary symptoms.  He reports no new joint pains.  He is aware to avoid nonsteroidals given renal impairment.  He has noted itching during her Remicade infusion.  He does not think there is a rash.  The itching location changes.  This is happened in the past 2 or 3 infusions.  He does get Benadryl.  He has not informed nursing staff that point yet.  There are no associated pulmonary symptoms no swelling of the lips, throat.  There is no wheezing.  I have encouraged him to make sure to see his ophthalmologist shortly.  Meanwhile we discussed options with regards to the itching.  One would be to take prednisone a few hours prior and the other one would be to change to different agent such as Humira.  As we reviewed his options we agreed to stay the course at prednisone 30 mg 6 hours prior to the infusion time that typically is 11:11 AM.  He is going to call us each time he gets the infusion.  Will follow-up accordingly should there be no new issues his symptoms subside will meet here in 6 months. ROS enquiry held for fever, ocular symptoms, rash, headache,  GI issues.  Today we also discussed the issues related to the current pandemic, the pros  and cons of the current treatment plan, the CDC guidelines such as social distancing washing the hands covering the cough.  ALLERGIES:Morphine; Other allergy (see comments); and Scopolamine hbr    PAST MEDICAL/ACTIVE PROBLEMS/MEDICATION/SOCIAL DATA  Past Medical History:   Diagnosis Date     Anemia      Arthritis     osteoarthritis     Calculus of kidney      Diabetes mellitus (H)      Episcleritis of left eye      Hyperlipidemia      Hypertension      Peripheral neuropathy      Stroke (H)      Social History     Tobacco Use   Smoking Status Never Smoker   Smokeless Tobacco Never Used     Patient Active Problem List   Diagnosis     Nephrolithiasis     Normocytic anemia     Hypercholesterolemia     Peripheral Neuropathy     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     Essential hypertension, benign     Type 2 diabetes mellitus with complication, without long-term current use of insulin (H)     Carpal tunnel syndrome     Right bundle branch block     Primary osteoarthritis of both knees     ALT (SGPT) level raised     Scleritis of left eye     Urinary hesitancy     Antineutrophil cytoplasmic antibody (ANCA) positive     High risk medication use     Abdominal pain     Cholecystitis     Dyslipidemia     Thrombocytopenia (H)     Cirrhosis of liver with ascites, unspecified hepatic cirrhosis type (H)     Hyponatremia     Panuveitis, unspecified eye     CRF (chronic renal failure), stage 3 (moderate) (H)     Current Outpatient Medications   Medication Sig Dispense Refill     atorvastatin (LIPITOR) 40 MG tablet TAKE 1 TABLET(40 MG) BY MOUTH DAILY 90 tablet 3     blood glucose meter (GLUCOMETER) Use 1 each As Directed 2 (two) times a day. Dispense glucometer brand per patient's insurance at pharmacy discretion. 1 each 0     BREEZE 2 TEST STRIPS Strp USE TO TEST UP TO TWICE DAILY AS DIRECTED 200 strip 3     clopidogreL (PLAVIX) 75 mg tablet TAKE 1 TABLET(75 MG) BY MOUTH DAILY 90 tablet 3     cyanocobalamin,  vitamin B-12, 2,500 mcg Tab Take 2,500 mcg by mouth daily.       ferrous sulfate 325 (65 FE) MG tablet TAKE 1 TABLET BY MOUTH EVERY DAY WITH BREAKFAST 90 tablet 2     gabapentin (NEURONTIN) 100 MG capsule One tab three times daily, work up gradually to three tabs three times daily 180 capsule 1     generic lancets (ACCU-CHEK SOFTCLIX LANCETS) Use 1 each As Directed 2 (two) times a day. Dispense brand per patient's insurance at pharmacy discretion. 100 each 3     HYDROcodone-acetaminophen 5-325 mg per tablet Take 1-2 tablets by mouth every 6 (six) hours as needed for pain. 30 tablet 0     hydrOXYzine HCL (ATARAX) 25 MG tablet Take 1 tablet (25 mg total) by mouth every 6 (six) hours as needed for itching. 60 tablet 5     lisinopriL (PRINIVIL,ZESTRIL) 10 MG tablet TAKE 1 TABLET BY MOUTH DAILY 90 tablet 0     magnesium oxide 250 mg Tab Take 250 mg by mouth 2 (two) times a day.       metFORMIN (GLUCOPHAGE) 500 MG tablet TAKE 2 TABLETS BY MOUTH TWICE DAILY AT 6 AM AND AT 4  tablet 2     min oil-petrolat (MINERAL OIL-HYDROPHILIC PETROLATUM) ointment Apply four times a day prn for itching. 50 g 5     omeprazole (PRILOSEC) 40 MG capsule TAKE 1 CAPSULE(40 MG) BY MOUTH DAILY 90 capsule 2     pioglitazone (ACTOS) 30 MG tablet TAKE 1 TABLET(30 MG) BY MOUTH DAILY 90 tablet 3     propranolol (INDERAL) 20 MG tablet TAKE 1 TABLET(20 MG) BY MOUTH TWICE DAILY 180 tablet 3     tamsulosin (FLOMAX) 0.4 mg cap TAKE 1 CAPSULE BY MOUTH DAILY AFTER SUPPER 90 capsule 2     No current facility-administered medications for this visit.          EXAMINATION: He sounded comfortable, he has intact speech, he is sounding alert oriented, his recall memory appears to be intact he was not sounding short of breath did not sound like he was in pain.  LAB / IMAGING DATA:  ALT   Date Value Ref Range Status   08/06/2020 23 0 - 45 U/L Final   05/11/2020 25 0 - 45 U/L Final   02/06/2020 28 0 - 45 U/L Final     Albumin   Date Value Ref Range Status    08/06/2020 3.5 3.5 - 5.0 g/dL Final   05/11/2020 3.5 3.5 - 5.0 g/dL Final   02/06/2020 3.5 3.5 - 5.0 g/dL Final     Creatinine   Date Value Ref Range Status   08/06/2020 1.34 (H) 0.70 - 1.30 mg/dL Final   05/11/2020 1.34 (H) 0.70 - 1.30 mg/dL Final   02/06/2020 1.37 (H) 0.70 - 1.30 mg/dL Final       WBC   Date Value Ref Range Status   08/06/2020 4.4 4.0 - 11.0 thou/uL Final   05/11/2020 5.0 4.0 - 11.0 thou/uL Final   08/27/2015 7.0 4.0 - 11.0 thou/uL Final   07/31/2015 7.1 4.0 - 11.0 thou/uL Final     Hemoglobin   Date Value Ref Range Status   08/06/2020 10.6 (L) 14.0 - 18.0 g/dL Final   05/11/2020 11.5 (L) 14.0 - 18.0 g/dL Final   02/06/2020 10.7 (L) 14.0 - 18.0 g/dL Final     Platelets   Date Value Ref Range Status   08/06/2020 114 (L) 140 - 440 thou/uL Final   05/11/2020 117 (L) 140 - 440 thou/uL Final   02/06/2020 118 (L) 140 - 440 thou/uL Final       Lab Results   Component Value Date    RF <15.0 06/25/2014    SEDRATE 12 04/17/2015     Duration of the call:10  Minutes  Call start: 940am   Call end:   950 am

## 2021-06-11 NOTE — TELEPHONE ENCOUNTER
RN cannot approve Refill Request    RN can NOT refill this medication Protocol failed and NO refill given. Last office visit: 10/11/2019 Fei Lopez MD Last Physical: 9/27/2019 Last MTM visit: Visit date not found Last visit same specialty: 10/11/2019 Fei Lopez MD.  Next visit within 3 mo: Visit date not found  Next physical within 3 mo: Visit date not found      Mary Deluna, [facility] [place] Connection Triage/Med Refill 9/24/2020    Requested Prescriptions   Pending Prescriptions Disp Refills     metFORMIN (GLUCOPHAGE) 500 MG tablet [Pharmacy Med Name: METFORMIN 500MG TABLETS] 360 tablet 2     Sig: TAKE 2 TABLETS BY MOUTH TWICE DAILY AT 6 AM AND AT 4 PM       Metformin Refill Protocol Failed - 9/22/2020 10:20 AM        Failed - Visit with PCP or prescribing provider visit in last 6 months or next 3 months     Last office visit with prescriber/PCP: Visit date not found OR same dept: 10/11/2019 Fei Lopez MD OR same specialty: 10/11/2019 Fei Lopez MD Last physical: Visit date not found Last MTM visit: Visit date not found         Next appt within 3 mo: Visit date not found  Next physical within 3 mo: Visit date not found  Prescriber OR PCP: Fei Lopez MD  Last diagnosis associated with med order: 1. Type 2 diabetes mellitus (H)  - metFORMIN (GLUCOPHAGE) 500 MG tablet [Pharmacy Med Name: METFORMIN 500MG TABLETS]; TAKE 2 TABLETS BY MOUTH TWICE DAILY AT 6 AM AND AT 4 PM  Dispense: 360 tablet; Refill: 2     If protocol passes may refill for 12 months if within 3 months of last provider visit (or a total of 15 months).           Failed - A1C in last 6 months     Hemoglobin A1c   Date Value Ref Range Status   09/27/2019 7.5 (H) 3.5 - 6.0 % Final               Passed - Blood pressure in last 12 months     BP Readings from Last 1 Encounters:   08/28/20 121/62             Passed - LFT or AST or ALT in last 12 months     Albumin   Date Value Ref Range Status   08/06/2020 3.5 3.5 - 5.0 g/dL Final      Bilirubin, Total   Date Value Ref Range Status   09/27/2019 0.8 0.0 - 1.0 mg/dL Final     Bilirubin, Direct   Date Value Ref Range Status   08/04/2016 0.3 <=0.5 mg/dL Final     Alkaline Phosphatase   Date Value Ref Range Status   09/27/2019 145 (H) 45 - 120 U/L Final     AST   Date Value Ref Range Status   09/27/2019 72 (H) 0 - 40 U/L Final     ALT   Date Value Ref Range Status   08/06/2020 23 0 - 45 U/L Final     Protein, Total   Date Value Ref Range Status   09/27/2019 8.2 (H) 6.0 - 8.0 g/dL Final                Passed - GFR or Serum Creatinine in last 6 months     GFR MDRD Non Af Amer   Date Value Ref Range Status   08/06/2020 52 (L) >60 mL/min/1.73m2 Final     GFR MDRD Af Amer   Date Value Ref Range Status   08/06/2020 >60 >60 mL/min/1.73m2 Final             Passed - Microalbumin in last year      Microalbumin, Random Urine   Date Value Ref Range Status   09/27/2019 0.64 0.00 - 1.99 mg/dL Final

## 2021-06-12 NOTE — TELEPHONE ENCOUNTER
Last OV 8/10/20  Last labs: 8/6/20, LFT WNL, creatinine elevated but stable, Hgb and platelets low but stable.  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  He is on Remicade every 2 months

## 2021-06-12 NOTE — TELEPHONE ENCOUNTER
Refill Approved    Rx renewed per Medication Renewal Policy. Medication was last renewed on 12/28/19.1/22/20.    Mary Deluna, [facility] Triage/Med Refill 10/5/2020     Requested Prescriptions   Pending Prescriptions Disp Refills     propranoloL (INDERAL) 20 MG tablet [Pharmacy Med Name: PROPRANOLOL 20MG TABLETS] 180 tablet 3     Sig: TAKE 1 TABLET(20 MG) BY MOUTH TWICE DAILY       Beta-Blockers Refill Protocol Passed - 10/3/2020  5:19 AM        Passed - PCP or prescribing provider visit in past 12 months or next 3 months     Last office visit with prescriber/PCP: 10/11/2019 Fei Lopez MD OR same dept: 10/11/2019 Fei Lopez MD OR same specialty: 10/11/2019 Fei Lopez MD  Last physical: 9/27/2019 Last MTM visit: Visit date not found   Next visit within 3 mo: Visit date not found  Next physical within 3 mo: Visit date not found  Prescriber OR PCP: Fei Lopez MD  Last diagnosis associated with med order: 1. Essential hypertension, benign  - propranoloL (INDERAL) 20 MG tablet [Pharmacy Med Name: PROPRANOLOL 20MG TABLETS]; TAKE 1 TABLET(20 MG) BY MOUTH TWICE DAILY  Dispense: 180 tablet; Refill: 3    2. Hyperlipidemia  - atorvastatin (LIPITOR) 40 MG tablet [Pharmacy Med Name: ATORVASTATIN 40MG TABLETS]; TAKE 1 TABLET(40 MG) BY MOUTH DAILY  Dispense: 90 tablet; Refill: 3    3. Type 2 diabetes mellitus (H)  - atorvastatin (LIPITOR) 40 MG tablet [Pharmacy Med Name: ATORVASTATIN 40MG TABLETS]; TAKE 1 TABLET(40 MG) BY MOUTH DAILY  Dispense: 90 tablet; Refill: 3    4. Urinary hesitancy  - tamsulosin (FLOMAX) 0.4 mg cap [Pharmacy Med Name: TAMSULOSIN 0.4MG CAPSULES]; TAKE 1 CAPSULE BY MOUTH DAILY AFTER SUPPER  Dispense: 90 capsule; Refill: 2    5. Heartburn  - omeprazole (PRILOSEC) 40 MG capsule [Pharmacy Med Name: OMEPRAZOLE 40MG CAPSULES]; TAKE 1 CAPSULE(40 MG) BY MOUTH DAILY  Dispense: 90 capsule; Refill: 2    If protocol passes may refill for 12 months if within 3 months of last provider visit  (or a total of 15 months).             Passed - Blood pressure filed in past 12 months     BP Readings from Last 1 Encounters:   08/28/20 121/62                atorvastatin (LIPITOR) 40 MG tablet [Pharmacy Med Name: ATORVASTATIN 40MG TABLETS] 90 tablet 3     Sig: TAKE 1 TABLET(40 MG) BY MOUTH DAILY       Statins Refill Protocol (Hmg CoA Reductase Inhibitors) Passed - 10/3/2020  5:19 AM        Passed - PCP or prescribing provider visit in past 12 months      Last office visit with prescriber/PCP: 10/11/2019 Fei Lopez MD OR same dept: 10/11/2019 Fei Lopez MD OR same specialty: 10/11/2019 Fei Lopez MD  Last physical: 9/27/2019 Last MTM visit: Visit date not found   Next visit within 3 mo: Visit date not found  Next physical within 3 mo: Visit date not found  Prescriber OR PCP: Fei Lopez MD  Last diagnosis associated with med order: 1. Essential hypertension, benign  - propranoloL (INDERAL) 20 MG tablet [Pharmacy Med Name: PROPRANOLOL 20MG TABLETS]; TAKE 1 TABLET(20 MG) BY MOUTH TWICE DAILY  Dispense: 180 tablet; Refill: 3    2. Hyperlipidemia  - atorvastatin (LIPITOR) 40 MG tablet [Pharmacy Med Name: ATORVASTATIN 40MG TABLETS]; TAKE 1 TABLET(40 MG) BY MOUTH DAILY  Dispense: 90 tablet; Refill: 3    3. Type 2 diabetes mellitus (H)  - atorvastatin (LIPITOR) 40 MG tablet [Pharmacy Med Name: ATORVASTATIN 40MG TABLETS]; TAKE 1 TABLET(40 MG) BY MOUTH DAILY  Dispense: 90 tablet; Refill: 3    4. Urinary hesitancy  - tamsulosin (FLOMAX) 0.4 mg cap [Pharmacy Med Name: TAMSULOSIN 0.4MG CAPSULES]; TAKE 1 CAPSULE BY MOUTH DAILY AFTER SUPPER  Dispense: 90 capsule; Refill: 2    5. Heartburn  - omeprazole (PRILOSEC) 40 MG capsule [Pharmacy Med Name: OMEPRAZOLE 40MG CAPSULES]; TAKE 1 CAPSULE(40 MG) BY MOUTH DAILY  Dispense: 90 capsule; Refill: 2    If protocol passes may refill for 12 months if within 3 months of last provider visit (or a total of 15 months).                tamsulosin (FLOMAX) 0.4 mg cap  [Pharmacy Med Name: TAMSULOSIN 0.4MG CAPSULES] 90 capsule 2     Sig: TAKE 1 CAPSULE BY MOUTH DAILY AFTER SUPPER       Alfuzosin/Tamsulosin/Silodosin Refill Protocol  Passed - 10/3/2020  5:19 AM        Passed - PCP or prescribing provider visit in past 12 months       Last office visit with prescriber/PCP: 10/11/2019 Fei Lopez MD OR same dept: 10/11/2019 Fei Lopez MD OR same specialty: 10/11/2019 Fei Lopez MD  Last physical: 9/27/2019 Last MTM visit: Visit date not found   Next visit within 3 mo: Visit date not found  Next physical within 3 mo: Visit date not found  Prescriber OR PCP: Fei Lopez MD  Last diagnosis associated with med order: 1. Essential hypertension, benign  - propranoloL (INDERAL) 20 MG tablet [Pharmacy Med Name: PROPRANOLOL 20MG TABLETS]; TAKE 1 TABLET(20 MG) BY MOUTH TWICE DAILY  Dispense: 180 tablet; Refill: 3    2. Hyperlipidemia  - atorvastatin (LIPITOR) 40 MG tablet [Pharmacy Med Name: ATORVASTATIN 40MG TABLETS]; TAKE 1 TABLET(40 MG) BY MOUTH DAILY  Dispense: 90 tablet; Refill: 3    3. Type 2 diabetes mellitus (H)  - atorvastatin (LIPITOR) 40 MG tablet [Pharmacy Med Name: ATORVASTATIN 40MG TABLETS]; TAKE 1 TABLET(40 MG) BY MOUTH DAILY  Dispense: 90 tablet; Refill: 3    4. Urinary hesitancy  - tamsulosin (FLOMAX) 0.4 mg cap [Pharmacy Med Name: TAMSULOSIN 0.4MG CAPSULES]; TAKE 1 CAPSULE BY MOUTH DAILY AFTER SUPPER  Dispense: 90 capsule; Refill: 2    5. Heartburn  - omeprazole (PRILOSEC) 40 MG capsule [Pharmacy Med Name: OMEPRAZOLE 40MG CAPSULES]; TAKE 1 CAPSULE(40 MG) BY MOUTH DAILY  Dispense: 90 capsule; Refill: 2    If protocol passes may refill for 12 months if within 3 months of last provider visit (or a total of 15 months).                omeprazole (PRILOSEC) 40 MG capsule [Pharmacy Med Name: OMEPRAZOLE 40MG CAPSULES] 90 capsule 2     Sig: TAKE 1 CAPSULE(40 MG) BY MOUTH DAILY       GI Medications Refill Protocol Passed - 10/3/2020  5:19 AM        Passed - PCP or  prescribing provider visit in last 12 or next 3 months.     Last office visit with prescriber/PCP: 10/11/2019 Fei Lopez MD OR same dept: 10/11/2019 Fei Lopez MD OR same specialty: 10/11/2019 Fei Lopez MD  Last physical: 9/27/2019 Last MTM visit: Visit date not found   Next visit within 3 mo: Visit date not found  Next physical within 3 mo: Visit date not found  Prescriber OR PCP: Fei Lopez MD  Last diagnosis associated with med order: 1. Essential hypertension, benign  - propranoloL (INDERAL) 20 MG tablet [Pharmacy Med Name: PROPRANOLOL 20MG TABLETS]; TAKE 1 TABLET(20 MG) BY MOUTH TWICE DAILY  Dispense: 180 tablet; Refill: 3    2. Hyperlipidemia  - atorvastatin (LIPITOR) 40 MG tablet [Pharmacy Med Name: ATORVASTATIN 40MG TABLETS]; TAKE 1 TABLET(40 MG) BY MOUTH DAILY  Dispense: 90 tablet; Refill: 3    3. Type 2 diabetes mellitus (H)  - atorvastatin (LIPITOR) 40 MG tablet [Pharmacy Med Name: ATORVASTATIN 40MG TABLETS]; TAKE 1 TABLET(40 MG) BY MOUTH DAILY  Dispense: 90 tablet; Refill: 3    4. Urinary hesitancy  - tamsulosin (FLOMAX) 0.4 mg cap [Pharmacy Med Name: TAMSULOSIN 0.4MG CAPSULES]; TAKE 1 CAPSULE BY MOUTH DAILY AFTER SUPPER  Dispense: 90 capsule; Refill: 2    5. Heartburn  - omeprazole (PRILOSEC) 40 MG capsule [Pharmacy Med Name: OMEPRAZOLE 40MG CAPSULES]; TAKE 1 CAPSULE(40 MG) BY MOUTH DAILY  Dispense: 90 capsule; Refill: 2    If protocol passes may refill for 12 months if within 3 months of last provider visit (or a total of 15 months).

## 2021-06-12 NOTE — TELEPHONE ENCOUNTER
RN cannot approve Refill Request    RN can NOT refill this medication PCP messaged that patient is overdue for Labs. Last office visit: 10/11/2019 Fei Lopez MD Last Physical: 9/27/2019 Last MTM visit: Visit date not found Last visit same specialty: 10/11/2019 Fei Lopez MD.  Next visit within 3 mo: Visit date not found  Next physical within 3 mo: Visit date not found      Natalia Taylor, [facility] Triage/Med Refill 10/4/2020    Requested Prescriptions   Pending Prescriptions Disp Refills     lisinopriL (PRINIVIL,ZESTRIL) 10 MG tablet [Pharmacy Med Name: LISINOPRIL 10MG TABLETS] 90 tablet 0     Sig: TAKE 1 TABLET BY MOUTH DAILY       Ace Inhibitors Refill Protocol Failed - 10/3/2020 11:23 AM        Failed - Serum Potassium in past 12 months     No results found for: LN-POTASSIUM          Passed - PCP or prescribing provider visit in past 12 months       Last office visit with prescriber/PCP: 10/11/2019 Fei Lopez MD OR same dept: 10/11/2019 Fei Lopez MD OR same specialty: 10/11/2019 Fei Lopez MD  Last physical: 9/27/2019 Last MTM visit: Visit date not found   Next visit within 3 mo: Visit date not found  Next physical within 3 mo: Visit date not found  Prescriber OR PCP: Fei Lopez MD  Last diagnosis associated with med order: 1. Essential hypertension, malignant  - lisinopriL (PRINIVIL,ZESTRIL) 10 MG tablet [Pharmacy Med Name: LISINOPRIL 10MG TABLETS]; TAKE 1 TABLET BY MOUTH DAILY  Dispense: 90 tablet; Refill: 0    2. Type 2 diabetes mellitus with hyperglycemia, without long-term current use of insulin (H)  - pioglitazone (ACTOS) 30 MG tablet [Pharmacy Med Name: PIOGLITAZONE 30MG TABLETS]; TAKE 1 TABLET(30 MG) BY MOUTH DAILY  Dispense: 90 tablet; Refill: 3    If protocol passes may refill for 12 months if within 3 months of last provider visit (or a total of 15 months).             Passed - Blood pressure filed in past 12 months     BP Readings from Last 1 Encounters:    08/28/20 121/62             Passed - Serum Creatinine in past 12 months     Creatinine   Date Value Ref Range Status   08/06/2020 1.34 (H) 0.70 - 1.30 mg/dL Final                pioglitazone (ACTOS) 30 MG tablet [Pharmacy Med Name: PIOGLITAZONE 30MG TABLETS] 90 tablet 3     Sig: TAKE 1 TABLET(30 MG) BY MOUTH DAILY       Pioglitazone Protocol Failed - 10/3/2020 11:23 AM        Failed - Visit with PCP or prescribing provider visit in last 6 months      Last office visit with prescriber/PCP: Visit date not found OR same dept: 10/11/2019 Fei Lopez MD OR same specialty: 10/11/2019 Fei Lopez MD Last physical: Visit date not found Last MTM visit: Visit date not found         Next appt within 3 mo: Visit date not found  Next physical within 3 mo: Visit date not found  Prescriber OR PCP: Fei Lopez MD  Last diagnosis associated with med order: 1. Essential hypertension, malignant  - lisinopriL (PRINIVIL,ZESTRIL) 10 MG tablet [Pharmacy Med Name: LISINOPRIL 10MG TABLETS]; TAKE 1 TABLET BY MOUTH DAILY  Dispense: 90 tablet; Refill: 0    2. Type 2 diabetes mellitus with hyperglycemia, without long-term current use of insulin (H)  - pioglitazone (ACTOS) 30 MG tablet [Pharmacy Med Name: PIOGLITAZONE 30MG TABLETS]; TAKE 1 TABLET(30 MG) BY MOUTH DAILY  Dispense: 90 tablet; Refill: 3     If protocol passes may refill for 12 months if within 3 months of last provider visit (or a total of 15 months).           Failed - A1C in last 6 months     Hemoglobin A1c   Date Value Ref Range Status   09/27/2019 7.5 (H) 3.5 - 6.0 % Final               Failed - Microalbumin in last year     Microalbumin, Random Urine   Date Value Ref Range Status   09/27/2019 0.64 0.00 - 1.99 mg/dL Final                  Passed - Blood pressure in last 12 months     BP Readings from Last 1 Encounters:   08/28/20 121/62             Passed - LFT or AST or ALT in last 12 months     Albumin   Date Value Ref Range Status   08/06/2020 3.5 3.5 - 5.0  g/dL Final     Bilirubin, Total   Date Value Ref Range Status   09/27/2019 0.8 0.0 - 1.0 mg/dL Final     Bilirubin, Direct   Date Value Ref Range Status   08/04/2016 0.3 <=0.5 mg/dL Final     Alkaline Phosphatase   Date Value Ref Range Status   09/27/2019 145 (H) 45 - 120 U/L Final     AST   Date Value Ref Range Status   09/27/2019 72 (H) 0 - 40 U/L Final     ALT   Date Value Ref Range Status   08/06/2020 23 0 - 45 U/L Final     Protein, Total   Date Value Ref Range Status   09/27/2019 8.2 (H) 6.0 - 8.0 g/dL Final                Passed - Serum creatinine in last year     Creatinine   Date Value Ref Range Status   08/06/2020 1.34 (H) 0.70 - 1.30 mg/dL Final

## 2021-06-13 NOTE — PROGRESS NOTES
ASSESSMENT AND PLAN   Frank Obando 72 y.o. 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  He is on methotrexate.  He is on folic acid.  It appears that his ocular disease is under good control he reports no symptoms.  There is been no clinical syndrome apart from the ocular involvement associated with the positive ANCA.  His recent labs are within acceptable range.  He requests 90 days of methotrexate.  I have asked him to continue as now.  He is aware of the management principles of osteoarthritis.  Return for follow-up in 6 months with labs now every 3 months.        Diagnoses and all orders for this visit:    Scleritis of left eye  -     methotrexate 2.5 MG tablet; TAKE 6 TABLETS(15 MG) BY MOUTH 1 TIME A WEEK  Dispense: 72 tablet; Refill: 0    Antineutrophil cytoplasmic antibody (ANCA) positive    Primary osteoarthritis of both knees      HISTORY OF PRESENTING ILLNESS:  Frank Obando, 72 y.o., male  who is a  at a local home is here for follow-up of scleritis.  This has affected his left eye.  Since his previous visit in March 2017 he has not had a flareup of scleritis.  He saw Dr. Abdi a few weeks ago.  He understands that inflammation is under good control despite lowering of the methotrexate dose in view of the liver function abnormalities.  He gets some discomfort in his knees at rest at night not usually during the day when he is able to do most of his day-to-day activities without difficulty.    There are a few features to suggest inflammatory joint disease.  He has not had hemoptysis, he has recent respiratory symptoms but no long-standing sinus issues other ocular issues there is no history of rash, there are no features to suggest an nephritis.  His NV 3M p.o. have been negative.  .  At one point he was given the understanding that he may have rheumatoid arthritis.  He reports he is able to do all his day-to-day activities without  "difficulty there is no stiffness in the morning when he wakes up.  He noted no mouth ulcers, photosensitivity, rash, there is no pleuritic symptoms, DVT PE.  He has numbness and tingling in the feet felt to be secondary to diabetes.     He used to be on methotrexate orally for approximately 2 months.  It was deemed at that time that this was \"not working\" or he had an upper GI side effects.  He was then changed to subcutaneous methotrexate.  This was initially done with the regular syringe and then recommendation for the automated 10.  Meanwhile there was some questions around various aspects of methotrexate management.  He at one point considered going to [facility] for second opinion.  At that stage V weeks or so ago when he was seen by Dr. Abdi his ophthalmologist his you his scleritis had cleared up there for that appointment at [facility] was canceled.  Since then it has recurred.  His scleritis symptoms were predominantly are watering of the eye, and mild discomfort especially when he pushes or touches the eye.  There has been no photosensitivity or blurry vision.  At one point he had a positive p-ANCA.  We will check his AL 3, MPO status.  He reports discomfort in his joints such as the left index finger MCP and the right knee.  This is particularly the case to especially for the knee when he is in bed at nighttime Further historical information and ADL limitations as noted in the multidimensional health assessment questionnaire attached in the EMR.   ALLERGIES:Morphine and Other allergy (see comments)    PAST MEDICAL/ACTIVE PROBLEMS/MEDICATION/ FAMILY HISTORY/SOCIAL DATA:  The patient has a family history of  Past Medical History:   Diagnosis Date     Anemia      Arthritis     osteoarthritis     Calculus of kidney      Diabetes mellitus      Episcleritis of left eye      Hyperlipidemia      Hypertension      Peripheral neuropathy      Stroke      History   Smoking Status     Never Smoker   Smokeless Tobacco     " "Never Used     Patient Active Problem List   Diagnosis     Nephrolithiasis     Anemia     Hyperlipidemia     Peripheral Neuropathy     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 90 tablet 2     BREEZE 2 TEST STRIPS Strp USE AS DIRECTED UP TO TWICE DAILY 200 strip 3     clopidogrel (PLAVIX) 75 mg tablet TAKE 1 TABLET BY MOUTH DAILY 90 tablet 3     cyanocobalamin (VITAMIN B-12) 1000 MCG tablet Take 2,000 mcg by mouth daily.       folic acid (FOLVITE) 1 MG tablet TAKE 1 TO 3 TABLETS BY MOUTH EVERY DAY AS DIRECTED. START WITH 1 TABLET DAILY AND SKIP DAY OF METHOTREXATE. 270 tablet 3     lisinopril (PRINIVIL,ZESTRIL) 10 MG tablet TAKE 1 TABLET BY MOUTH DAILY 90 tablet 3     metFORMIN (GLUCOPHAGE) 500 MG tablet TAKE 2 TABLETS BY MOUTH TWICE DAILY AT 6AM AND 4PM 360 tablet 3     methotrexate 2.5 MG tablet TAKE 6 TABLETS(15 MG) BY MOUTH 1 TIME A WEEK 24 tablet 1     omeprazole (PRILOSEC) 40 MG capsule TAKE 1 CAPSULE BY MOUTH EVERY DAY 90 capsule 2     tamsulosin (FLOMAX) 0.4 mg Cp24 TAKE ONE CAPSULE BY MOUTH DAILY AFTER SUPPER 90 capsule 2     sitaGLIPtin (JANUVIA) 100 MG tablet Take 1 tablet (100 mg total) by mouth daily. With or without food 90 tablet 3     No current facility-administered medications for this visit.      DETAILED EXAMINATION (six area) :  Vitals:    09/21/17 0928   BP: 124/78   Weight: 204 lb 4.8 oz (92.7 kg)   Height: 5' 10\" (1.778 m)     Alert oriented. Head including the face is examined for malar rash, heliotropes, scarring, lupus pernio. Eyes " examined for redness such as in episcleritis/scleritis, periorbital lesions.   Neck examined  for lymph nodes, range of motion Both upper and lower extremities (all four) examined for swollen, warm &/or  tender joints, range of motion, rash, muscle weakness, edema. The salient normal / abnormal findings are appended.    He has tenderness in the joint lines both the knees, scattered bony enlargement of the PIPs.  There is no synovitis in any of his palpable appendicular joints.    LAB / IMAGING DATA:  ALT   Date Value Ref Range Status   09/18/2017 38 0 - 45 U/L Final   07/17/2017 37 0 - 45 U/L Final   05/15/2017 35 0 - 45 U/L Final     Albumin   Date Value Ref Range Status   09/18/2017 3.4 (L) 3.5 - 5.0 g/dL Final   07/17/2017 3.5 3.5 - 5.0 g/dL Final   05/15/2017 3.5 3.5 - 5.0 g/dL Final     Creatinine   Date Value Ref Range Status   09/18/2017 1.07 0.70 - 1.30 mg/dL Final   07/17/2017 1.17 0.70 - 1.30 mg/dL Final   05/15/2017 1.10 0.70 - 1.30 mg/dL Final       WBC   Date Value Ref Range Status   09/18/2017 5.7 4.0 - 11.0 thou/uL Final   07/17/2017 5.2 4.0 - 11.0 thou/uL Final   08/27/2015 7.0 4.0 - 11.0 thou/uL Final   07/31/2015 7.1 4.0 - 11.0 thou/uL Final     Hemoglobin   Date Value Ref Range Status   09/18/2017 12.2 (L) 14.0 - 18.0 g/dL Final   07/17/2017 12.2 (L) 14.0 - 18.0 g/dL Final   05/15/2017 12.1 (L) 14.0 - 18.0 g/dL Final     Platelets   Date Value Ref Range Status   09/18/2017 135 (L) 140 - 440 thou/uL Final   07/17/2017 146 140 - 440 thou/uL Final   05/15/2017 144 140 - 440 thou/uL Final       Lab Results   Component Value Date    RF <15.0 06/25/2014    SEDRATE 12 04/17/2015

## 2021-06-15 ENCOUNTER — RECORDS - HEALTHEAST (OUTPATIENT)
Dept: RHEUMATOLOGY | Facility: CLINIC | Age: 76
End: 2021-06-15

## 2021-06-15 ENCOUNTER — INFUSION - HEALTHEAST (OUTPATIENT)
Dept: INFUSION THERAPY | Facility: CLINIC | Age: 76
End: 2021-06-15

## 2021-06-15 DIAGNOSIS — H44.112 PANUVEITIS OF LEFT EYE: ICD-10-CM

## 2021-06-15 DIAGNOSIS — H15.002 SCLERITIS OF LEFT EYE: ICD-10-CM

## 2021-06-15 DIAGNOSIS — R76.8 ANTINEUTROPHIL CYTOPLASMIC ANTIBODY (ANCA) POSITIVE: ICD-10-CM

## 2021-06-15 NOTE — PROGRESS NOTES
Frank Obando is a 75 y.o. male who is being evaluated via a billable telephone visit.      What phone number would you like to be contacted at? [phone]  How would you like to obtain your AVS? AVS Preference: [employer].  Phone call duration: 7 minutes    This document was created using a software with less than 100% fidelity, at times resulting in unintended, even erroneous syntax and grammar.  The reader is advised to keep this under consideration while reviewing, interpreting this note.           ASSESSMENT AND PLAN:    Diagnoses and all orders for this visit:    Scleritis of left eye    Antineutrophil cytoplasmic antibody (ANCA) positive    High risk medication use    Primary osteoarthritis of both knees          HISTORY OF PRESENTING ILLNESS:  Frank Obando 75 y.o. is evaluated here via audio link. This is for follow-up.  This is for scleritis of the left eye in the background of positive ANCA, negative IL-3/MPO autoantibodies.  He is on Remicade every 2 months.  He saw the ophthalmologist recently.  He was reassured.  He wonders about Covid vaccination that he is due to get the Saturday.  He has a Remicade infusion due tomorrow.  We discussed how the information in his context is very limited, given the participants in the trials did not have his profile.  It may be reasonable for him to postpone Remicade infusion for 2 weeks.  We discussed management of osteoarthritis is currently of the stomach limited well aware.  Continue Remicade as now.  Follow-up in 6 months with labs prior.  He is asymptomatic from a ocular perspective.  He has noted no new joint symptoms, rash, hemoptysis, he has not had pulmonary symptoms.  He reports no new joint pains.  . ROS enquiry held for fever, ocular symptoms, rash, headache,  GI issues.  Today we also discussed the issues related to the current pandemic, the pros and cons of the current treatment plan, the CDC guidelines such as social distancing washing the  hands covering the cough.         ROS enquiry held for fever, ocular symptoms, rash, headache,  GI issues.  Today we also discussed the issues related to the current pandemic, the pros and cons of the current treatment plan, the CDC guidelines such as social distancing washing the hands covering the cough.  ALLERGIES:Morphine, Other allergy (see comments), and Scopolamine hbr    PAST MEDICAL/ACTIVE PROBLEMS/MEDICATION/SOCIAL DATA  Past Medical History:   Diagnosis Date     Anemia      Arthritis     osteoarthritis     Calculus of kidney      Diabetes mellitus (H)      Episcleritis of left eye      Hyperlipidemia      Hypertension      Peripheral neuropathy      Stroke (H)      Social History     Tobacco Use   Smoking Status Never Smoker   Smokeless Tobacco Never Used     Patient Active Problem List   Diagnosis     Nephrolithiasis     Normocytic anemia     Hypercholesterolemia     Peripheral Neuropathy     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     Essential hypertension, benign     Type 2 diabetes mellitus with complication, without long-term current use of insulin (H)     Carpal tunnel syndrome     Right bundle branch block     Primary osteoarthritis of both knees     ALT (SGPT) level raised     Scleritis of left eye     Urinary hesitancy     Antineutrophil cytoplasmic antibody (ANCA) positive     High risk medication use     Abdominal pain     Cholecystitis     Dyslipidemia     Thrombocytopenia (H)     Cirrhosis of liver with ascites, unspecified hepatic cirrhosis type (H)     Hyponatremia     Panuveitis, unspecified eye     CRF (chronic renal failure), stage 3 (moderate)     Current Outpatient Medications   Medication Sig Dispense Refill     atorvastatin (LIPITOR) 40 MG tablet TAKE 1 TABLET(40 MG) BY MOUTH DAILY 90 tablet 1     blood glucose meter (GLUCOMETER) Use 1 each As Directed 2 (two) times a day. Dispense glucometer brand per patient's insurance at pharmacy discretion. 1 each 0     BREEZE 2  TEST STRIPS Strp USE TO TEST UP TO TWICE DAILY AS DIRECTED 200 strip 3     clopidogreL (PLAVIX) 75 mg tablet TAKE 1 TABLET(75 MG) BY MOUTH DAILY 90 tablet 3     cyanocobalamin, vitamin B-12, 2,500 mcg Tab Take 2,500 mcg by mouth daily.       ferrous sulfate 325 (65 FE) MG tablet TAKE 1 TABLET BY MOUTH EVERY DAY WITH BREAKFAST 90 tablet 2     gabapentin (NEURONTIN) 100 MG capsule One tab three times daily, work up gradually to three tabs three times daily 180 capsule 1     generic lancets (ACCU-CHEK SOFTCLIX LANCETS) Use 1 each As Directed 2 (two) times a day. Dispense brand per patient's insurance at pharmacy discretion. 100 each 3     HYDROcodone-acetaminophen 5-325 mg per tablet Take 1-2 tablets by mouth every 6 (six) hours as needed for pain. 30 tablet 0     hydrOXYzine HCL (ATARAX) 25 MG tablet Take 1 tablet (25 mg total) by mouth every 6 (six) hours as needed for itching. 60 tablet 5     lisinopriL (PRINIVIL,ZESTRIL) 10 MG tablet TAKE 1 TABLET BY MOUTH DAILY 90 tablet 3     magnesium oxide 250 mg Tab Take 250 mg by mouth 2 (two) times a day.       metFORMIN (GLUCOPHAGE) 500 MG tablet TAKE 2 TABLETS BY MOUTH TWICE DAILY AT 6 AM AND AT 4  tablet 3     min oil-petrolat (MINERAL OIL-HYDROPHILIC PETROLATUM) ointment Apply four times a day prn for itching. 50 g 5     omeprazole (PRILOSEC) 40 MG capsule TAKE 1 CAPSULE(40 MG) BY MOUTH DAILY 90 capsule 1     pioglitazone (ACTOS) 30 MG tablet TAKE 1 TABLET(30 MG) BY MOUTH DAILY 90 tablet 3     predniSONE (DELTASONE) 10 mg tablet Take 30 mg by mouth as needed (6 hours prior to the remicade infusion). 12 tablet 0     propranoloL (INDERAL) 20 MG tablet TAKE 1 TABLET(20 MG) BY MOUTH TWICE DAILY 180 tablet 1     tamsulosin (FLOMAX) 0.4 mg cap TAKE 1 CAPSULE BY MOUTH DAILY AFTER SUPPER 90 capsule 1     No current facility-administered medications for this visit.          EXAMINATION:     On the phone he sounded comfortable, alert, oriented, speech was fluent,  memory  recall appeared normal,  did not sound like he was in pain or short of breath.          LAB / IMAGING DATA:  ALT   Date Value Ref Range Status   02/08/2021 24 0 - 45 U/L Final   08/06/2020 23 0 - 45 U/L Final   05/11/2020 25 0 - 45 U/L Final     Albumin   Date Value Ref Range Status   02/08/2021 3.7 3.5 - 5.0 g/dL Final   08/06/2020 3.5 3.5 - 5.0 g/dL Final   05/11/2020 3.5 3.5 - 5.0 g/dL Final     Creatinine   Date Value Ref Range Status   02/08/2021 1.43 (H) 0.70 - 1.30 mg/dL Final   08/06/2020 1.34 (H) 0.70 - 1.30 mg/dL Final   05/11/2020 1.34 (H) 0.70 - 1.30 mg/dL Final       WBC   Date Value Ref Range Status   02/08/2021 4.9 4.0 - 11.0 thou/uL Final   08/06/2020 4.4 4.0 - 11.0 thou/uL Final   08/27/2015 7.0 4.0 - 11.0 thou/uL Final   07/31/2015 7.1 4.0 - 11.0 thou/uL Final     Hemoglobin   Date Value Ref Range Status   02/08/2021 9.8 (L) 14.0 - 18.0 g/dL Final   08/06/2020 10.6 (L) 14.0 - 18.0 g/dL Final   05/11/2020 11.5 (L) 14.0 - 18.0 g/dL Final     Platelets   Date Value Ref Range Status   02/08/2021 121 (L) 140 - 440 thou/uL Final   08/06/2020 114 (L) 140 - 440 thou/uL Final   05/11/2020 117 (L) 140 - 440 thou/uL Final       Lab Results   Component Value Date    RF <15.0 06/25/2014    SEDRATE 12 04/17/2015

## 2021-06-16 PROBLEM — H44.119: Status: ACTIVE | Noted: 2019-03-19

## 2021-06-16 PROBLEM — R10.9 ABDOMINAL PAIN: Status: ACTIVE | Noted: 2018-12-20

## 2021-06-16 PROBLEM — N18.30 CRF (CHRONIC RENAL FAILURE), STAGE 3 (MODERATE) (H): Chronic | Status: ACTIVE | Noted: 2020-02-10

## 2021-06-16 PROBLEM — Z79.899 HIGH RISK MEDICATION USE: Status: ACTIVE | Noted: 2018-09-26

## 2021-06-16 NOTE — TELEPHONE ENCOUNTER
Refill Approved    Rx renewed per Medication Renewal Policy. Medication was last renewed on 7/10/20, 10/5/20.    Duane Lambert, [facility] Triage/Med Refill 3/24/2021     Requested Prescriptions   Pending Prescriptions Disp Refills     ferrous sulfate 325 (65 FE) MG tablet [Pharmacy Med Name: FERROUS SULFATE 325MG (5GR) TABS] 90 tablet 2     Sig: TAKE 1 TABLET BY MOUTH EVERY DAY WITH BREAKFAST       Iron Supplements Refill Protocol Passed - 3/23/2021  5:18 AM        Passed - PCP or prescribing provider visit in past 12 months       Last office visit with prescriber/PCP: 10/11/2019 Fei Lopez MD OR same dept: Visit date not found OR same specialty: 10/11/2019 Fei Lopez MD  Last physical: 9/27/2019 Last MTM visit: Visit date not found   Next visit within 3 mo: Visit date not found  Next physical within 3 mo: Visit date not found  Prescriber OR PCP: Fei Lopez MD  Last diagnosis associated with med order: 1. Anemia, iron deficiency  - ferrous sulfate 325 (65 FE) MG tablet [Pharmacy Med Name: FERROUS SULFATE 325MG (5GR) TABS]; TAKE 1 TABLET BY MOUTH EVERY DAY WITH BREAKFAST  Dispense: 90 tablet; Refill: 2    2. Essential hypertension, benign  - propranoloL (INDERAL) 20 MG tablet [Pharmacy Med Name: PROPRANOLOL 20MG TABLETS]; TAKE 1 TABLET(20 MG) BY MOUTH TWICE DAILY  Dispense: 180 tablet; Refill: 1    3. Hyperlipidemia  - atorvastatin (LIPITOR) 40 MG tablet [Pharmacy Med Name: ATORVASTATIN 40MG TABLETS]; TAKE 1 TABLET(40 MG) BY MOUTH DAILY  Dispense: 90 tablet; Refill: 1    4. Type 2 diabetes mellitus (H)  - atorvastatin (LIPITOR) 40 MG tablet [Pharmacy Med Name: ATORVASTATIN 40MG TABLETS]; TAKE 1 TABLET(40 MG) BY MOUTH DAILY  Dispense: 90 tablet; Refill: 1    5. Heartburn  - omeprazole (PRILOSEC) 40 MG capsule [Pharmacy Med Name: OMEPRAZOLE 40MG CAPSULES]; TAKE 1 CAPSULE(40 MG) BY MOUTH DAILY  Dispense: 90 capsule; Refill: 1    6. Urinary hesitancy  - tamsulosin (FLOMAX) 0.4 mg cap [Pharmacy Med  Name: TAMSULOSIN 0.4MG CAPSULES]; TAKE 1 CAPSULE BY MOUTH DAILY AFTER SUPPER  Dispense: 90 capsule; Refill: 1    If protocol passes may refill for 12 months if within 3 months of last provider visit (or a total of 15 months).             Passed - Hemoglobin or Hematocrit in last 12 months     Hemoglobin   Date Value Ref Range Status   02/08/2021 9.8 (L) 14.0 - 18.0 g/dL Final     Hematocrit   Date Value Ref Range Status   02/08/2021 30.6 (L) 40.0 - 54.0 % Final                propranoloL (INDERAL) 20 MG tablet [Pharmacy Med Name: PROPRANOLOL 20MG TABLETS] 180 tablet 1     Sig: TAKE 1 TABLET(20 MG) BY MOUTH TWICE DAILY       Beta-Blockers Refill Protocol Passed - 3/23/2021  5:18 AM        Passed - PCP or prescribing provider visit in past 12 months or next 3 months     Last office visit with prescriber/PCP: 10/11/2019 Fei Lopez MD OR same dept: Visit date not found OR same specialty: 10/11/2019 Fei Lopez MD  Last physical: 9/27/2019 Last MTM visit: Visit date not found   Next visit within 3 mo: Visit date not found  Next physical within 3 mo: Visit date not found  Prescriber OR PCP: Fei Lopez MD  Last diagnosis associated with med order: 1. Anemia, iron deficiency  - ferrous sulfate 325 (65 FE) MG tablet [Pharmacy Med Name: FERROUS SULFATE 325MG (5GR) TABS]; TAKE 1 TABLET BY MOUTH EVERY DAY WITH BREAKFAST  Dispense: 90 tablet; Refill: 2    2. Essential hypertension, benign  - propranoloL (INDERAL) 20 MG tablet [Pharmacy Med Name: PROPRANOLOL 20MG TABLETS]; TAKE 1 TABLET(20 MG) BY MOUTH TWICE DAILY  Dispense: 180 tablet; Refill: 1    3. Hyperlipidemia  - atorvastatin (LIPITOR) 40 MG tablet [Pharmacy Med Name: ATORVASTATIN 40MG TABLETS]; TAKE 1 TABLET(40 MG) BY MOUTH DAILY  Dispense: 90 tablet; Refill: 1    4. Type 2 diabetes mellitus (H)  - atorvastatin (LIPITOR) 40 MG tablet [Pharmacy Med Name: ATORVASTATIN 40MG TABLETS]; TAKE 1 TABLET(40 MG) BY MOUTH DAILY  Dispense: 90 tablet; Refill: 1    5.  Heartburn  - omeprazole (PRILOSEC) 40 MG capsule [Pharmacy Med Name: OMEPRAZOLE 40MG CAPSULES]; TAKE 1 CAPSULE(40 MG) BY MOUTH DAILY  Dispense: 90 capsule; Refill: 1    6. Urinary hesitancy  - tamsulosin (FLOMAX) 0.4 mg cap [Pharmacy Med Name: TAMSULOSIN 0.4MG CAPSULES]; TAKE 1 CAPSULE BY MOUTH DAILY AFTER SUPPER  Dispense: 90 capsule; Refill: 1    If protocol passes may refill for 12 months if within 3 months of last provider visit (or a total of 15 months).             Passed - Blood pressure filed in past 12 months     BP Readings from Last 1 Encounters:   02/23/21 119/69                atorvastatin (LIPITOR) 40 MG tablet [Pharmacy Med Name: ATORVASTATIN 40MG TABLETS] 90 tablet 1     Sig: TAKE 1 TABLET(40 MG) BY MOUTH DAILY       Statins Refill Protocol (Hmg CoA Reductase Inhibitors) Passed - 3/23/2021  5:18 AM        Passed - PCP or prescribing provider visit in past 12 months      Last office visit with prescriber/PCP: 10/11/2019 Fei Lopez MD OR same dept: Visit date not found OR same specialty: 10/11/2019 Fei Lopez MD  Last physical: 9/27/2019 Last MTM visit: Visit date not found   Next visit within 3 mo: Visit date not found  Next physical within 3 mo: Visit date not found  Prescriber OR PCP: Fei Lopez MD  Last diagnosis associated with med order: 1. Anemia, iron deficiency  - ferrous sulfate 325 (65 FE) MG tablet [Pharmacy Med Name: FERROUS SULFATE 325MG (5GR) TABS]; TAKE 1 TABLET BY MOUTH EVERY DAY WITH BREAKFAST  Dispense: 90 tablet; Refill: 2    2. Essential hypertension, benign  - propranoloL (INDERAL) 20 MG tablet [Pharmacy Med Name: PROPRANOLOL 20MG TABLETS]; TAKE 1 TABLET(20 MG) BY MOUTH TWICE DAILY  Dispense: 180 tablet; Refill: 1    3. Hyperlipidemia  - atorvastatin (LIPITOR) 40 MG tablet [Pharmacy Med Name: ATORVASTATIN 40MG TABLETS]; TAKE 1 TABLET(40 MG) BY MOUTH DAILY  Dispense: 90 tablet; Refill: 1    4. Type 2 diabetes mellitus (H)  - atorvastatin (LIPITOR) 40 MG tablet  [Pharmacy Med Name: ATORVASTATIN 40MG TABLETS]; TAKE 1 TABLET(40 MG) BY MOUTH DAILY  Dispense: 90 tablet; Refill: 1    5. Heartburn  - omeprazole (PRILOSEC) 40 MG capsule [Pharmacy Med Name: OMEPRAZOLE 40MG CAPSULES]; TAKE 1 CAPSULE(40 MG) BY MOUTH DAILY  Dispense: 90 capsule; Refill: 1    6. Urinary hesitancy  - tamsulosin (FLOMAX) 0.4 mg cap [Pharmacy Med Name: TAMSULOSIN 0.4MG CAPSULES]; TAKE 1 CAPSULE BY MOUTH DAILY AFTER SUPPER  Dispense: 90 capsule; Refill: 1    If protocol passes may refill for 12 months if within 3 months of last provider visit (or a total of 15 months).                omeprazole (PRILOSEC) 40 MG capsule [Pharmacy Med Name: OMEPRAZOLE 40MG CAPSULES] 90 capsule 1     Sig: TAKE 1 CAPSULE(40 MG) BY MOUTH DAILY       GI Medications Refill Protocol Passed - 3/23/2021  5:18 AM        Passed - PCP or prescribing provider visit in last 12 or next 3 months.     Last office visit with prescriber/PCP: 10/11/2019 Fei Lopez MD OR same dept: Visit date not found OR same specialty: 10/11/2019 Fei Lopez MD  Last physical: 9/27/2019 Last MTM visit: Visit date not found   Next visit within 3 mo: Visit date not found  Next physical within 3 mo: Visit date not found  Prescriber OR PCP: Fei Lopez MD  Last diagnosis associated with med order: 1. Anemia, iron deficiency  - ferrous sulfate 325 (65 FE) MG tablet [Pharmacy Med Name: FERROUS SULFATE 325MG (5GR) TABS]; TAKE 1 TABLET BY MOUTH EVERY DAY WITH BREAKFAST  Dispense: 90 tablet; Refill: 2    2. Essential hypertension, benign  - propranoloL (INDERAL) 20 MG tablet [Pharmacy Med Name: PROPRANOLOL 20MG TABLETS]; TAKE 1 TABLET(20 MG) BY MOUTH TWICE DAILY  Dispense: 180 tablet; Refill: 1    3. Hyperlipidemia  - atorvastatin (LIPITOR) 40 MG tablet [Pharmacy Med Name: ATORVASTATIN 40MG TABLETS]; TAKE 1 TABLET(40 MG) BY MOUTH DAILY  Dispense: 90 tablet; Refill: 1    4. Type 2 diabetes mellitus (H)  - atorvastatin (LIPITOR) 40 MG tablet [Pharmacy Med  Name: ATORVASTATIN 40MG TABLETS]; TAKE 1 TABLET(40 MG) BY MOUTH DAILY  Dispense: 90 tablet; Refill: 1    5. Heartburn  - omeprazole (PRILOSEC) 40 MG capsule [Pharmacy Med Name: OMEPRAZOLE 40MG CAPSULES]; TAKE 1 CAPSULE(40 MG) BY MOUTH DAILY  Dispense: 90 capsule; Refill: 1    6. Urinary hesitancy  - tamsulosin (FLOMAX) 0.4 mg cap [Pharmacy Med Name: TAMSULOSIN 0.4MG CAPSULES]; TAKE 1 CAPSULE BY MOUTH DAILY AFTER SUPPER  Dispense: 90 capsule; Refill: 1    If protocol passes may refill for 12 months if within 3 months of last provider visit (or a total of 15 months).                tamsulosin (FLOMAX) 0.4 mg cap [Pharmacy Med Name: TAMSULOSIN 0.4MG CAPSULES] 90 capsule 1     Sig: TAKE 1 CAPSULE BY MOUTH DAILY AFTER SUPPER       Alfuzosin/Tamsulosin/Silodosin Refill Protocol  Passed - 3/23/2021  5:18 AM        Passed - PCP or prescribing provider visit in past 12 months       Last office visit with prescriber/PCP: 10/11/2019 Fei Lopez MD OR same dept: Visit date not found OR same specialty: 10/11/2019 Fei Lopez MD  Last physical: 9/27/2019 Last MTM visit: Visit date not found   Next visit within 3 mo: Visit date not found  Next physical within 3 mo: Visit date not found  Prescriber OR PCP: Fei Lopez MD  Last diagnosis associated with med order: 1. Anemia, iron deficiency  - ferrous sulfate 325 (65 FE) MG tablet [Pharmacy Med Name: FERROUS SULFATE 325MG (5GR) TABS]; TAKE 1 TABLET BY MOUTH EVERY DAY WITH BREAKFAST  Dispense: 90 tablet; Refill: 2    2. Essential hypertension, benign  - propranoloL (INDERAL) 20 MG tablet [Pharmacy Med Name: PROPRANOLOL 20MG TABLETS]; TAKE 1 TABLET(20 MG) BY MOUTH TWICE DAILY  Dispense: 180 tablet; Refill: 1    3. Hyperlipidemia  - atorvastatin (LIPITOR) 40 MG tablet [Pharmacy Med Name: ATORVASTATIN 40MG TABLETS]; TAKE 1 TABLET(40 MG) BY MOUTH DAILY  Dispense: 90 tablet; Refill: 1    4. Type 2 diabetes mellitus (H)  - atorvastatin (LIPITOR) 40 MG tablet [Pharmacy Med  Name: ATORVASTATIN 40MG TABLETS]; TAKE 1 TABLET(40 MG) BY MOUTH DAILY  Dispense: 90 tablet; Refill: 1    5. Heartburn  - omeprazole (PRILOSEC) 40 MG capsule [Pharmacy Med Name: OMEPRAZOLE 40MG CAPSULES]; TAKE 1 CAPSULE(40 MG) BY MOUTH DAILY  Dispense: 90 capsule; Refill: 1    6. Urinary hesitancy  - tamsulosin (FLOMAX) 0.4 mg cap [Pharmacy Med Name: TAMSULOSIN 0.4MG CAPSULES]; TAKE 1 CAPSULE BY MOUTH DAILY AFTER SUPPER  Dispense: 90 capsule; Refill: 1    If protocol passes may refill for 12 months if within 3 months of last provider visit (or a total of 15 months).

## 2021-06-16 NOTE — PROGRESS NOTES
ASSESSMENT AND PLAN   Frank Obando 72 y.o. male is a for follow-up.  Left eye scleritis, positive ANCA, osteoarthritis on methotrexate.  Mild JLT abnormalities, modest anemia, increase folic acid to 3 mg daily.  Continue current dose of methotrexate.  During his most recent visit to the ophthalmology recalls that the information was thought to be under good control.  Follow-up here in 6 months labs every 3 months.      Diagnoses and all orders for this visit:    Scleritis of left eye  -     Discontinue: methotrexate 2.5 MG tablet; TAKE 6 TABLETS(15 MG) BY MOUTH 1 TIME A WEEK  Dispense: 24 tablet; Refill: 1  -     methotrexate 2.5 MG tablet; TAKE 6 TABLETS(15 MG) BY MOUTH 1 TIME A WEEK  Dispense: 72 tablet; Refill: 0    Antineutrophil cytoplasmic antibody (ANCA) positive    Primary osteoarthritis of both knees    ALT (SGPT) level raised  -     folic acid (FOLVITE) 1 MG tablet; Take 3 tablets (3 mg total) by mouth daily.  Dispense: 270 tablet; Refill: 3    Anemia  -     folic acid (FOLVITE) 1 MG tablet; Take 3 tablets (3 mg total) by mouth daily.  Dispense: 270 tablet; Refill: 3      HISTORY OF PRESENTING ILLNESS:  Frank Obando, 72 y.o., male is here for follow-up.  He has scleritis in the left eye with associated positive ANCA, osteoarthritis, mild JLT abnormalities and modest anemia.  He is a  at a local home is here for follow-up of scleritis.  This has affected his left eye.  Since his previous visit in March 2017 he has not had a flareup of scleritis.  He saw Dr. Abdi a few weeks ago.  He understands that inflammation is under good control despite lowering of the methotrexate dose in view of the liver function abnormalities.  He gets some discomfort in his knees at rest at night not usually during the day when he is able to do most of his day-to-day activities without difficulty.    There are a few features to suggest inflammatory joint disease.  He has not had hemoptysis, he  has recent respiratory symptoms but no long-standing sinus issues other ocular issues there is no history of rash, there are no features to suggest an nephritis.  His KS 3M p.o. have been negative.  .  At one point he was given the understanding that he may have rheumatoid arthritis.  He reports he is able to do all his day-to-day activities without difficulty there is no stiffness in the morning when he wakes up.  He noted no mouth ulcers, photosensitivity, rash, there is no pleuritic symptoms, DVT PE.  He has numbness and tingling in the feet felt to be secondary to diabetes. He reports discomfort in his joints such as the left index finger MCP and the right knee.  This is particularly the case to especially for the knee when he is in bed at nighttime Further historical information and ADL limitations as noted in the multidimensional health assessment questionnaire attached in the EMR.   ALLERGIES:Morphine and Other allergy (see comments)    PAST MEDICAL/ACTIVE PROBLEMS/MEDICATION/ FAMILY HISTORY/SOCIAL DATA:  The patient has a family history of  Past Medical History:   Diagnosis Date     Anemia      Arthritis     osteoarthritis     Calculus of kidney      Diabetes mellitus      Episcleritis of left eye      Hyperlipidemia      Hypertension      Peripheral neuropathy      Stroke      History   Smoking Status     Never Smoker   Smokeless Tobacco     Never Used     Patient Active Problem List   Diagnosis     Nephrolithiasis     Anemia     Hypercholesterolemia     Peripheral Neuropathy     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     Hypertension     Type 2 diabetes mellitus     Carpal tunnel syndrome     Episcleritis     Right bundle branch block     Primary osteoarthritis of both knees     ALT (SGPT) level raised     Scleritis of left eye     Urinary hesitancy     Antineutrophil cytoplasmic antibody (ANCA) positive     Current Outpatient Prescriptions   Medication Sig Dispense Refill     atorvastatin  "(LIPITOR) 40 MG tablet TAKE 1 TABLET(40 MG) BY MOUTH DAILY 90 tablet 3     BREEZE 2 TEST STRIPS Strp USE TO TEST UP TO TWICE DAILY AS DIRECTED 200 strip 1     clopidogrel (PLAVIX) 75 mg tablet TAKE 1 TABLET BY MOUTH DAILY 90 tablet 3     cyanocobalamin (VITAMIN B-12) 1000 MCG tablet Take 2,000 mcg by mouth daily.       folic acid (FOLVITE) 1 MG tablet TAKE 1 TO 3 TABLETS BY MOUTH EVERY DAY AS DIRECTED. START WITH 1 TABLET DAILY AND SKIP DAY OF METHOTREXATE. 270 tablet 3     glimepiride (AMARYL) 2 MG tablet Take 1 tablet (2 mg total) by mouth every morning. 90 tablet 3     lisinopril (PRINIVIL,ZESTRIL) 10 MG tablet Take 1 tablet (10 mg total) by mouth daily. 90 tablet 2     magnesium oxide (MAGOX) 400 mg tablet Take 1 tablet (400 mg total) by mouth 2 (two) times a day. 180 tablet 1     metFORMIN (GLUCOPHAGE) 500 MG tablet TAKE 2 TABLETS BY MOUTH TWICE DAILY AT 6AM AND 4PM 360 tablet 3     methotrexate 2.5 MG tablet TAKE 6 TABLETS(15 MG) BY MOUTH 1 TIME A WEEK 24 tablet 1     omeprazole (PRILOSEC) 40 MG capsule Take 1 capsule (40 mg total) by mouth daily. 90 capsule 3     tamsulosin (FLOMAX) 0.4 mg Cp24 TAKE ONE CAPSULE BY MOUTH DAILY AFTER SUPPER 90 capsule 2     No current facility-administered medications for this visit.      DETAILED EXAMINATION  03/26/18  :  Vitals:    03/26/18 0913   BP: 122/64   Weight: 199 lb 11.2 oz (90.6 kg)   Height: 5' 9.75\" (1.772 m)     Alert oriented. Head including the face is examined for malar rash, heliotropes, scarring, lupus pernio. Eyes examined for redness such as in episcleritis/scleritis, periorbital lesions.   Neck/ Face examined for parotid gland swelling, range of motion of neck.  Left upper and lower and right upper and lower extremities examined for tenderness, swelling, warmth of the appendicular joints, range of motion, edema, rash.  Some of the important findings included: He has mild malacia of the left eye inferomedial quadrant.  Small Heberden nodes scattered PIP " tenderness.  No synovitis of the palpable appendicular joint.  Does not have tenderness of the sinuses, wheezing on auscultation.    LAB / IMAGING DATA:  ALT   Date Value Ref Range Status   03/21/2018 52 (H) 0 - 45 U/L Final   02/02/2018 47 (H) 0 - 45 U/L Final   12/20/2017 50 (H) 0 - 45 U/L Final     Albumin   Date Value Ref Range Status   03/21/2018 3.3 (L) 3.5 - 5.0 g/dL Final   02/02/2018 3.4 (L) 3.5 - 5.0 g/dL Final   12/20/2017 3.3 (L) 3.5 - 5.0 g/dL Final     Creatinine   Date Value Ref Range Status   03/21/2018 1.28 0.70 - 1.30 mg/dL Final   02/02/2018 1.12 0.70 - 1.30 mg/dL Final   12/20/2017 1.19 0.70 - 1.30 mg/dL Final       WBC   Date Value Ref Range Status   03/21/2018 4.6 4.0 - 11.0 thou/uL Final   02/02/2018 4.9 4.0 - 11.0 thou/uL Final   08/27/2015 7.0 4.0 - 11.0 thou/uL Final   07/31/2015 7.1 4.0 - 11.0 thou/uL Final     Hemoglobin   Date Value Ref Range Status   03/21/2018 10.9 (L) 14.0 - 18.0 g/dL Final   02/02/2018 11.3 (L) 14.0 - 18.0 g/dL Final   12/20/2017 11.9 (L) 14.0 - 18.0 g/dL Final     Platelets   Date Value Ref Range Status   03/21/2018 126 (L) 140 - 440 thou/uL Final   02/02/2018 123 (L) 140 - 440 thou/uL Final   12/20/2017 139 (L) 140 - 440 thou/uL Final       Lab Results   Component Value Date    RF <15.0 06/25/2014    SEDRATE 12 04/17/2015

## 2021-06-17 DIAGNOSIS — H44.119 PANUVEITIS, UNSPECIFIED EYE: Primary | ICD-10-CM

## 2021-06-17 RX ORDER — ALBUTEROL SULFATE 90 UG/1
1-2 AEROSOL, METERED RESPIRATORY (INHALATION)
Status: CANCELLED
Start: 2021-08-10

## 2021-06-17 RX ORDER — ALBUTEROL SULFATE 0.83 MG/ML
2.5 SOLUTION RESPIRATORY (INHALATION)
Status: CANCELLED | OUTPATIENT
Start: 2021-08-10

## 2021-06-17 RX ORDER — METHYLPREDNISOLONE SODIUM SUCCINATE 125 MG/2ML
125 INJECTION, POWDER, LYOPHILIZED, FOR SOLUTION INTRAMUSCULAR; INTRAVENOUS
Status: CANCELLED
Start: 2021-08-10

## 2021-06-17 RX ORDER — MEPERIDINE HYDROCHLORIDE 25 MG/ML
25 INJECTION INTRAMUSCULAR; INTRAVENOUS; SUBCUTANEOUS EVERY 30 MIN PRN
Status: CANCELLED | OUTPATIENT
Start: 2021-08-10

## 2021-06-17 RX ORDER — HEPARIN SODIUM,PORCINE 10 UNIT/ML
5 VIAL (ML) INTRAVENOUS
Status: CANCELLED | OUTPATIENT
Start: 2021-08-10

## 2021-06-17 RX ORDER — DIPHENHYDRAMINE HCL 25 MG
25 CAPSULE ORAL ONCE
Status: CANCELLED
Start: 2021-08-10 | End: 2021-08-10

## 2021-06-17 RX ORDER — ACETAMINOPHEN 325 MG/1
650 TABLET ORAL ONCE
Status: CANCELLED
Start: 2021-08-10 | End: 2021-08-10

## 2021-06-17 RX ORDER — DIPHENHYDRAMINE HYDROCHLORIDE 50 MG/ML
50 INJECTION INTRAMUSCULAR; INTRAVENOUS
Status: CANCELLED
Start: 2021-08-10

## 2021-06-17 RX ORDER — EPINEPHRINE 1 MG/ML
0.3 INJECTION, SOLUTION, CONCENTRATE INTRAVENOUS EVERY 5 MIN PRN
Status: CANCELLED | OUTPATIENT
Start: 2021-08-10

## 2021-06-17 RX ORDER — HEPARIN SODIUM (PORCINE) LOCK FLUSH IV SOLN 100 UNIT/ML 100 UNIT/ML
5 SOLUTION INTRAVENOUS
Status: CANCELLED | OUTPATIENT
Start: 2021-08-10

## 2021-06-17 RX ORDER — NALOXONE HYDROCHLORIDE 0.4 MG/ML
0.2 INJECTION, SOLUTION INTRAMUSCULAR; INTRAVENOUS; SUBCUTANEOUS
Status: CANCELLED | OUTPATIENT
Start: 2021-08-10

## 2021-06-17 NOTE — PATIENT INSTRUCTIONS - HE
Patient Instructions by Suyapa Norman Scribe at 9/27/2019 12:30 PM     Author: Suyapa Norman Scribe Service: -- Author Type: Ugo    Filed: 9/27/2019  1:12 PM Encounter Date: 9/27/2019 Status: Addendum    : Fei Lopez MD (Physician)    Related Notes: Original Note by Suyapa Norman Scribe ([email or web address]) filed at 9/27/2019 12:33 PM         Patient Education     Your Health Risk Assessment indicates you feel you are not in good physical health.    A healthy lifestyle helps keep the body fit and the mind alert. It helps protect you from disease, helps you fight disease, and helps prevent chronic disease (disease that doesn't go away) from getting worse. This is important as you get older and begin to notice twinges in muscles and joints and a decline in the strength and stamina you once took for granted. A healthy lifestyle includes good healthcare, good nutrition, weight control, recreation, and regular exercise. Avoid harmful substances and do what you can to keep safe. Another part of a healthy lifestyle is stay mentally active and socially involved.    Good healthcare     Have a wellness visit every year.     If you have new symptoms, let us know right away. Don't wait until the next checkup.     Take medicines exactly as prescribed and keep your medicines in a safe place. Tell us if your medicine causes problems.   Healthy diet and weight control     Eat 3 or 4 small, nutritious, low-fat, high-fiber meals a day. Include a variety of fruits, vegetables, and whole-grain foods.     Make sure you get enough calcium in your diet. Calcium, vitamin D, and exercise help prevent osteoporosis (bone thinning).     If you live alone, try eating with others when you can. That way you get a good meal and have company while you eat it.     Try to keep a healthy weight. If you eat more calories than your body uses for energy, it will be stored as fat and you will gain weight.     Recreation   Recreation is not limited to  sports and team events. It includes any activity that provides relaxation, interest, enjoyment, and exercise. Recreation provides an outlet for physical, mental, and social energy. It can give a sense of worth and achievement. It can help you stay healthy.       Patient Education     Exercise for a Healthier Heart  You may wonder how you can improve the health of your heart. If youre thinking about exercise, youre on the right track. You dont need to become an athlete, but you do need a certain amount of brisk exercise to help strengthen your heart. If you have been diagnosed with a heart condition, your doctor may recommend exercise to help stabilize your condition. To help make exercise a habit, choose safe, fun activities.       Be sure to check with your health care provider before starting an exercise program.    Why exercise?  Exercising regularly offers many healthy rewards. It can help you do all of the following:    Improve your blood cholesterol levels to help prevent further heart trouble    Lower your blood pressure to help prevent a stroke or heart attack    Control diabetes, or reduce your risk of getting this disease    Improve your heart and lung function    Reach and maintain a healthy weight    Make your muscles stronger and more limber so you can stay active    Prevent falls and fractures by slowing the loss of bone mass (osteoporosis)    Manage stress better  Exercise tips  Ease into your routine. Set small goals. Then build on them.  Exercise on most days. Aim for a total of 150 or more minutes of moderate to  vigorous intensity activity each week. Consider 40 minutes, 3 to 4 times a week. For best results, activity should last for 40 minutes on average. It is OK to work up to the 40 minute period over time. Examples of moderate-intensity activity is walking one mile in 15 minutes or 30 to 45 minutes of yard work.  Step up your daily activity level. Along with your exercise program, try being  more active throughout the day. Walk instead of drive. Do more household tasks or yard work.  Choose one or more activities you enjoy. Walking is one of the easiest things you can do. You can also try swimming, riding a bike, or taking an exercise class.  Stop exercising and call your doctor if you:    Have chest pain or feel dizzy or lightheaded    Feel burning, tightness, pressure, or heaviness in your chest, neck, shoulders, back, or arms    Have unusual shortness of breath    Have increased joint or muscle pain    Have palpitations or an irregular heartbeat      2549-5540 [employer]. [address] [place] [address]. All rights reserved. This information is not intended as a substitute for professional medical care. Always follow your healthcare professional's instructions.         Patient Education   Understanding [employer] MyPlate  The USDA (US Department of Agriculture) has guidelines to help you make healthy food choices. These are called MyPlate. MyPlate shows the food groups that make up healthy meals using the image of a place setting. Before you eat, think about the healthiest choices for what to put onto your plate or into your cup or bowl. To learn more about building a healthy plate, visit www.choosemyplate.gov.       The Food Groups    Fruits: Any fruit or 100% fruit juice counts as part of the Fruit Group. Fruits may be fresh, canned, frozen, or dried, and may be whole, cut-up, or pureed. Make half your plate fruits and vegetables.    Vegetables: Any vegetable or 100% vegetable juice counts as a member of the Vegetable Group. Vegetables may be fresh, frozen, canned, or dried. They can be served raw or cooked and may be whole, cut-up, or mashed. Make half your plate fruits and vegetables.     Grains: All foods made from grains are part of the Grains Group. These include wheat, rice, oats, cornmeal, and barley such as bread, pasta, oatmeal, cereal, tortillas, and grits. Grains  should be no more than a quarter of your plate. At least half of your grains should be whole grains.    Protein: This group includes meat, poultry, seafood, beans and peas, eggs, processed soy products (like tofu), nuts (including nut butters), and seeds. Make protein choices no more than a quarter of your plate. Meat and poultry choices should be lean or low fat.    Dairy: All fluid milk products and foods made from milk that contain calcium, like yogurt and cheese are part of the Dairy Group. (Foods that have little calcium, such as cream, butter, and cream cheese, are not part of the group.) Most dairy choices should be low-fat or fat-free.    Oils: These are fats that are liquid at room temperature. They include canola, corn, olive, soybean, and sunflower oil. Foods that are mainly oil include mayonnaise, certain salad dressings, and soft margarines. You should have only 5 to 7 teaspoons of oils a day. You probably already get this much from the food you eat.  Use Dick or Bro to Help Build Your Meals  The SuperTracker can help you plan and track your meals and activity. You can look up individual foods to see or compare their nutritional value. You can get guidelines for what and how much you should eat. You can compare your food choices. And you can assess personal physical activities and see ways you can improve. Go to www.Shoptagr.gov/supertracker/.    0870-2539 [employer]. [address] [place] [address]. All rights reserved. This information is not intended as a substitute for professional medical care. Always follow your healthcare professional's instructions.           Patient Education   Signs of Hearing Loss  Hearing loss is a problem shared by many people. In fact, it is one of the most common health conditions, particularly as people age. Most people over age 65 have some hearing loss, and by age 80, almost everyone does. Because hearing loss usually occurs slowly over  the years, you may not realize your hearing ability has gotten worse.       Have your hearing checked  Contact your [facility] care provider if you:    Have to strain to hear normal conversation.    Have to watch other peoples faces very carefully to follow what theyre saying.    Need to ask people to repeat what theyve said.    Often misunderstand what people are saying.    Turn the volume of the television or radio up so high that others complain.    Feel that people are mumbling when theyre talking to you.    Find that the effort to hear leaves you feeling tired and irritated.    Notice, when using the phone, that you hear better with 1 ear than the other.    2889-1727 [employer]. [address] [place] [address], [place] [address]. All rights reserved. This information is not intended as a substitute for professional medical care. Always follow your healthcare professional's instructions.           Advance Directive  Patients advance directive was discussed and I am comfortable with the patients wishes.  Patient Education   Personalized Prevention Plan  You are due for the preventive services outlined below.  Your care team is available to assist you in scheduling these services.  If you have already completed any of these items, please share that information with your care team to update in your medical record.  Health Maintenance   Topic Date Due   ? ZOSTER VACCINES (1 of 2) 07/21/1996   ? MEDICARE ANNUAL WELLNESS VISIT  02/02/2019   ? DIABETES URINE MICROALBUMIN  02/02/2019   ? DIABETES FOLLOW-UP  02/06/2019   ? DIABETES HEMOGLOBIN A1C  04/22/2019   ? INFLUENZA VACCINE RULE BASED (1) 08/01/2019   ? DIABETES FOOT EXAM  08/06/2019   ? PNEUMOCOCCAL IMMUNIZATION 65+ LOW/MEDIUM RISK (2 of 2 - PPSV23) 10/22/2019   ? FALL RISK ASSESSMENT  12/28/2019   ? DIABETES OPHTHALMOLOGY EXAM  07/30/2020   ? ADVANCE CARE PLANNING  02/02/2023   ? TD 18+ HE  12/06/2023   ? COLONOSCOPY  05/07/2028   ? HEPATITIS C SCREENING   Completed      1.  Gabapentin 100 mg three times daily.  Work up to three tabs three times daily    2.  Stop carbamazepine    3.  Complete Valtrex    4.  Refill hydrocodone    5.  Shingrix vaccine in the future    6.  I will notify you of test results    7.  Recheck in two weeks or as needed.  DM check 4 to 6 months    8.  Check with GI regarding follow-up for liver    9.  Notify Dr. Chong of [place]

## 2021-06-19 NOTE — LETTER
Letter by Fei Lopez MD at      Author: Fei Lopez MD Service: -- Author Type: --    Filed:  Encounter Date: 9/29/2019 Status: Signed         Frank [facility]bando  [address] [facility] [facility] [address]             September 29, 2019         Dear Mr. Obando,    Below are the results from your recent visit:    Resulted Orders   Glycosylated Hemoglobin A1c   Result Value Ref Range    Hemoglobin A1c 7.5 (H) 3.5 - 6.0 %   Comprehensive Metabolic Panel   Result Value Ref Range    Sodium 133 (L) 136 - 145 mmol/L    Potassium 4.8 3.5 - 5.0 mmol/L    Chloride 98 98 - 107 mmol/L    CO2 24 22 - 31 mmol/L    Anion Gap, Calculation 11 5 - 18 mmol/L    Glucose 143 (H) 70 - 125 mg/dL    BUN 17 8 - 28 mg/dL    Creatinine 1.50 (H) 0.70 - 1.30 mg/dL    GFR MDRD Af Amer 56 (L) >60 mL/min/1.73m2    GFR MDRD Non Af Amer 46 (L) >60 mL/min/1.73m2    Bilirubin, Total 0.8 0.0 - 1.0 mg/dL    Calcium 9.8 8.5 - 10.5 mg/dL    Protein, Total 8.2 (H) 6.0 - 8.0 g/dL    Albumin 3.9 3.5 - 5.0 g/dL    Alkaline Phosphatase 145 (H) 45 - 120 U/L    AST 72 (H) 0 - 40 U/L    ALT 65 (H) 0 - 45 U/L    Narrative    Fasting Glucose reference range is 70-99 mg/dL per  American Diabetes Association (ADA) guidelines.   Urinalysis-UC if Indicated   Result Value Ref Range    Color, UA Yellow Colorless, Yellow, Straw, Light Yellow    Clarity, UA Clear Clear    Glucose, UA Negative Negative    Bilirubin, UA Negative Negative    Ketones, UA Negative Negative    Specific Gravity, UA 1.015 1.005 - 1.030    Blood, UA Negative Negative    pH, UA 6.0 5.0 - 8.0    Protein, UA Negative Negative mg/dL    Urobilinogen, UA 0.2 E.U./dL 0.2 E.U./dL, 1.0 E.U./dL    Nitrite, UA Negative Negative    Leukocytes, UA Negative Negative    Narrative    Microscopic not indicated  UC not indicated   Microalbumin, Random Urine   Result Value Ref Range    Microalbumin, Random Urine 0.64 0.00 - 1.99 mg/dL    Creatinine, Urine 113.9 mg/dL    Microalbumin/Creatinine Ratio  Random Urine 5.6 <=19.9 mg/g    Narrative    Microalbumin, Random Urine  <2.0 mg/dL . . . . . . . . Normal  3.0-30.0 mg/dL . . . . . . Microalbuminuria  >30.0 mg/dL . . . . . .  . Clinical Proteinuria    Microalbumin/Creatinine Ratio, Random Urine  <20 mg/g . . . . .. . . . Normal   mg/g . . . . . . . Microalbuminuria  >300 mg/g . . . . . . . . Clinical Proteinuria       Lipid Cascade   Result Value Ref Range    Cholesterol 121 <=199 mg/dL    Triglycerides 117 <=149 mg/dL    HDL Cholesterol 56 >=40 mg/dL    LDL Calculated 42 <=129 mg/dL    Patient Fasting > 8hrs? Yes    PSA (Prostatic-Specific Antigen), Annual Screen   Result Value Ref Range    PSA <0.1 0.0 - 6.5 ng/mL    Narrative    Method is Abbott Prostate-Specific Antigen (PSA)  Standard-WHO 1st International (90:10)       Antonio: Diabetic control is fair with a blood sugar of 143 and a hemoglobin A1c of 7.5.  I would advise increasing your next pioglitazone prescription to 30 mg daily.  We will send a new prescription to your pharmacy.  The PSA, (prostate-specific antigen), is undetectable.  Lipids are great with a total cholesterol of 121 and an LDL fraction of 42.  Liver enzymes are notable for mild elevations in the alkaline phosphatase, AST, and ALT.  Labs should be rechecked in the future after you recover from shingles.  It was nice to see you.  I hope you have a quick recovery from shingles.    Please call with questions or contact us using [employer].    Sincerely,        Electronically signed by Fei Lopez MD

## 2021-06-19 NOTE — PROGRESS NOTES
ASSESSMENT:  1.  Type 2 diabetes mellitus with peripheral neuropathy: Hemoglobin A1c is fair at 7.5.  He has had occasional mild symptoms of hypoglycemia since beginning glimepiride.  He does not feel this is severe enough to warrant changing the medication.  His insurance coverage was poor on Januvia.  He did not feel he could afford to continue it.    2.  Essential hypertension: Good control on current regimen    3.  Shortness of breath: He has noted some increased shortness of breath with activity over the past 6 months.  No orthopnea, peripheral edema, or PND.  Chest x-ray today shows no evidence for COPD or pulmonary vascular congestion.  A BNP and echocardiogram will be done to evaluate for cardiac causes.  Hemogram will be checked to screen for anemia    4.  Minor cough: This is probably related to lisinopril.  He does not feel it is a severe enough issue to warrant changing the medication.  He had no evidence for pulmonary fibrosis on chest x-ray    5.  Past history of CVA: He is still on Plavix.  Aggressive management of risk factors is warranted    6.  Hypercholesterolemia: On atorvastatin 40 mg daily.  PLAN:  1.  Chest x-ray was done and reviewed.  There is no evidence for pulmonary fibrosis or CHF.  2.  Lab as outlined.  He will be notified of test results.  3.  Schedule echocardiogram  4.  Shingrix vaccine was advised.  Check insurance  5.  Consider stress test for further evaluation of shortness of breath based on echo result.  6.  Clinic follow-up in 6 months.  Possibly earlier based on test results    Orders Placed This Encounter   Procedures     XR Chest 2 Views     Order Specific Question:   Can the procedure be changed per Radiologist protocol?     Answer:   Yes     Glycosylated Hemoglobin A1C     JIC LAV     JIC RED     HM2(CBC w/o Differential)     BNP(B-type Natriuretic Peptide)     Lipid Cascade     Order Specific Question:   Fasting is required?     Answer:   Unknown     AST (SGOT)     Basic  Metabolic Panel     Echo Complete     Standing Status:   Future     Standing Expiration Date:   11/4/2018     Order Specific Question:   Reason for exam?     Answer:   Dyspnea, SOB, Wheezing, Tachypnea     Medications Discontinued During This Encounter   Medication Reason     cyanocobalamin (VITAMIN B-12) 2000 MCG tablet Therapy completed     cyanocobalamin 1,000 mcg/mL injection Therapy completed     tamsulosin (FLOMAX) 0.4 mg Cp24 Therapy completed     tamsulosin (FLOMAX) 0.4 mg Cp24 Duplicate order     clopidogrel (PLAVIX) 75 mg tablet Reorder     lisinopril (PRINIVIL,ZESTRIL) 10 MG tablet Reorder       No Follow-up on file.      ASSESSED PROBLEMS:  1. Diabetes mellitus, type 2 (H)  Glycosylated Hemoglobin A1C    JIC LAV    JIC RED    Basic Metabolic Panel   2. Essential hypertension, malignant  lisinopril (PRINIVIL,ZESTRIL) 10 MG tablet    Basic Metabolic Panel   3. Shortness of breath  XR Chest 2 Views    HM2(CBC w/o Differential)    BNP(B-type Natriuretic Peptide)    Echo Complete   4. Encounter for medication monitoring  AST (SGOT)   5. Mixed hyperlipidemia  Lipid Cascade   6. History of CVA (cerebrovascular accident)  clopidogrel (PLAVIX) 75 mg tablet       CHIEF COMPLAINT:  Chief Complaint   Patient presents with     Follow-up     Diabetes       HISTORY OF PRESENT ILLNESS:  Frank is a 73 y.o. male presenting to the clinic today for a diabetic check and with complaints of shortness of breath.     Diabetes: His last hemoglobin A1c was 7.7% on 2/2/2018. His A1c is 7.5% today. He continues to take metformin and glimepiride. His blood sugars have been dropping a little low during the day since starting glimepiride, but he tries to carry candy with him for episodes of hypoglycemia. He was going to try Januvia, but it was too expensive.     Shortness of Breath: He is frequently short of breath and is not sure what could be causing it. He gets the shortness of breath simply when walking around. He does not have an  issue at night. He does not have any lower extremity edema. This shortness of breath has been bothering him for quite a while. He has never had a problem with this in the past.     Cough: He develops a cough when he is sitting in his chair in the evening. It is a dry cough, and he does not get it during the middle of the day or at night. He has noticed this for the past four months or so. It is not overly bothersome to him at this point.     Hypertension: His blood pressure is in a good range today. He continues to take lisinopril 10 mg daily. It was discussed it could be the lisinopril that is causing his cough, but he does not want to try changing it at this time.     Health Maintenance: The Shingrix vaccine was discussed today.     REVIEW OF SYSTEMS:   He is developing cataracts, but his eyes are otherwise fine. His wife tells him he seems fatigued, but he does not think there is an issue. All other systems are negative.    PFSH:  He continues to work two days per week but plans to fully retire at the end of August.     TOBACCO USE:  History   Smoking Status     Never Smoker   Smokeless Tobacco     Never Used       VITALS:  Vitals:    08/06/18 0838 08/06/18 0900   BP: 110/62 118/70   Patient Site: Left Arm    Patient Position: Sitting    Cuff Size: Adult Regular    Pulse: 78    Weight: 207 lb (93.9 kg)      Wt Readings from Last 3 Encounters:   08/06/18 207 lb (93.9 kg)   03/26/18 199 lb 11.2 oz (90.6 kg)   02/02/18 202 lb 6.4 oz (91.8 kg)     Body mass index is 29.91 kg/(m^2).    PHYSICAL EXAM:  Constitutional:   Reveals an alert, pleasant, talkative male. Affect appropriate. Does not appear acutely ill.  Vitals: per nursing notes.  Repeat BP by MD: 118/70  HEENT: Atraumatic. Ears:  External canals, TMs clear.    Eyes: No conjunctival hyperemia. EOMs full, PERRL.  Oropharynx:   Mouth and throat clear, no thrush or exudate.  Neck:  Supple, no carotid bruits or adenopathy.  Back:  No spine or CVA pain.  Thorax:   No bony deformities.  Lungs: Clear to A&P without rales or wheezes.  Respiratory effort normal.  Cardiac:   Regular rate and rhythm, normal S1, S2, no murmur or gallop.  Abdomen:  Soft, active bowel sounds, old lower abdominal scar. Moderate ventral hernia, no incisional hernia.   Extremities:   No peripheral edema  Skin: Clear.   Neuro:  No gross focal deficits. Detailed exam not done.   Psychiatric:  Memory intact, mood appropriate.    Chest X-ray: Normal heart size, clear lung fields.       ADDITIONAL HISTORY SUMMARIZED (2): None.  DECISION TO OBTAIN EXTRA INFORMATION (1): None.   RADIOLOGY TESTS (1): Chest X-ray ordered.   LABS (1): Labs from 2/2/2/018 and 6/27/2018 reviewed. Labs ordered.   MEDICINE TESTS (1): Echo ordered.   INDEPENDENT REVIEW (2 each): Chest X-ray from today personally read.     The visit lasted a total of 23 minutes face to face with the patient. Over 50% of the time was spent counseling and educating the patient about his diabetes and shortness of breath.    I, Florentino Lizama, am scribing for and in the presence of, Dr. Lopez.    I, .Fei Lopez, personally performed the services described in this documentation, as scribed by Florentino Lizama in my presence, and it is both accurate and complete.    Dragon dictation was used for this note.  Speech recognition errors are a possibility.    MEDICATIONS:  Current Outpatient Prescriptions   Medication Sig Dispense Refill     atorvastatin (LIPITOR) 40 MG tablet TAKE 1 TABLET(40 MG) BY MOUTH DAILY 90 tablet 3     BREEZE 2 TEST STRIPS Strp USE TO TEST UP TO TWICE DAILY AS DIRECTED 200 strip 1     clopidogrel (PLAVIX) 75 mg tablet Take 1 tablet (75 mg total) by mouth daily. 90 tablet 3     cyanocobalamin (VITAMIN B-12) 1000 MCG tablet Take 2,000 mcg by mouth daily.       folic acid (FOLVITE) 1 MG tablet Take 3 tablets (3 mg total) by mouth daily. 270 tablet 3     glimepiride (AMARYL) 2 MG tablet Take 1 tablet (2 mg total) by mouth every morning. 90  tablet 3     lisinopril (PRINIVIL,ZESTRIL) 10 MG tablet Take 1 tablet (10 mg total) by mouth daily. 90 tablet 3     magnesium oxide (MAGOX) 400 mg tablet Take 1 tablet (400 mg total) by mouth 2 (two) times a day. 180 tablet 1     metFORMIN (GLUCOPHAGE) 500 MG tablet TAKE 2 TABLETS BY MOUTH TWICE DAILY AT 6AM AND 4PM 360 tablet 3     methotrexate 2.5 MG tablet TAKE 6 TABLETS(15 MG) BY MOUTH 1 TIME A WEEK 72 tablet 0     omeprazole (PRILOSEC) 40 MG capsule Take 1 capsule (40 mg total) by mouth daily. 90 capsule 3     tamsulosin (FLOMAX) 0.4 mg Cp24 TAKE ONE CAPSULE BY MOUTH DAILY AFTER SUPPER 90 capsule 2     No current facility-administered medications for this visit.        Total data points: 5

## 2021-06-20 NOTE — PROGRESS NOTES
ASSESSMENT AND PLAN   Frank Obando 73 y.o. male is a for follow-up.  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  He has osteoarthritis such as knees.  He is doing great as well as information his eyes concerned that methotrexate is due to be seen in ophthalmology shortly.  Recent labs within acceptable range.  Management principles of osteoarthritis are reviewed.  Continue methotrexate and the higher dose of folic acid as now we will meet her in 6 months labs every 3.          Diagnoses and all orders for this visit:    Scleritis of left eye  -     folic acid (FOLVITE) 1 MG tablet; Take 3 tablets (3 mg total) by mouth daily.  Dispense: 270 tablet; Refill: 3  -     methotrexate 2.5 MG tablet; TAKE 6 TABLETS(15 MG) BY MOUTH 1 TIME A WEEK  Dispense: 72 tablet; Refill: 0    Antineutrophil cytoplasmic antibody (ANCA) positive    High risk medication use  -     ALT (SGPT); Standing  -     Albumin; Standing  -     Creatinine; Standing  -     HM2(CBC w/o Differential); Standing    Primary osteoarthritis of both knees      HISTORY OF PRESENTING ILLNESS:  Frank Obando, 73 y.o., male is here for follow-up.  He has scleritis in the left eye with associated positive ANCA, without GA-3 and MPO.  He has bilateral knee osteoarthritis.  He is a  at a local home.  Since his previous visit he has not had a flareup of scleritis.  He is going to see his ophthalmologist, Dr. Abdi in the next few weeks.  He understands that inflammation is under good control despite lowering of the methotrexate dose in view of the liver function abnormalities.  He gets some discomfort in his knees at rest at night or when he is up and about for long time such as visiting the [facility].  He noted pain level to be mild, 1.0/10, able to do all his day-to-day activities without difficulty.  He has no stiffness when he wakes up in the morning.  There is no fever weight  loss blurry vision eye redness mouth also nausea or rash.   Further historical information and ADL limitations as noted in the multidimensional health assessment questionnaire attached in the EMR.   ALLERGIES:Morphine and Other allergy (see comments)    PAST MEDICAL/ACTIVE PROBLEMS/MEDICATION/ FAMILY HISTORY/SOCIAL DATA:  The patient has a family history of  Past Medical History:   Diagnosis Date     Anemia      Arthritis     osteoarthritis     Calculus of kidney      Diabetes mellitus (H)      Episcleritis of left eye      Hyperlipidemia      Hypertension      Peripheral neuropathy (H)      Stroke (H)      History   Smoking Status     Never Smoker   Smokeless Tobacco     Never Used     Patient Active Problem List   Diagnosis     Nephrolithiasis     Anemia     Hypercholesterolemia     Peripheral Neuropathy     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     Essential hypertension     Type 2 diabetes mellitus with neurologic complication (H)     Carpal tunnel syndrome     Episcleritis     Right bundle branch block     Primary osteoarthritis of both knees     ALT (SGPT) level raised     Scleritis of left eye     Urinary hesitancy     Antineutrophil cytoplasmic antibody (ANCA) positive     Current Outpatient Prescriptions   Medication Sig Dispense Refill     atorvastatin (LIPITOR) 40 MG tablet TAKE 1 TABLET(40 MG) BY MOUTH DAILY 90 tablet 3     BREEZE 2 TEST STRIPS Strp USE TO TEST UP TO TWICE DAILY AS DIRECTED 200 strip 1     clopidogrel (PLAVIX) 75 mg tablet Take 1 tablet (75 mg total) by mouth daily. 90 tablet 3     cyanocobalamin (VITAMIN B-12) 1000 MCG tablet Take 2,000 mcg by mouth daily.       ferrous sulfate 325 (65 FE) MG tablet Take 1 tablet (325 mg total) by mouth daily with breakfast. 30 tablet 3     glimepiride (AMARYL) 2 MG tablet Take 1 tablet (2 mg total) by mouth every morning. 90 tablet 3     lisinopril (PRINIVIL,ZESTRIL) 10 MG tablet Take 1 tablet (10 mg total) by mouth daily. 90 tablet 3      magnesium oxide (MAGOX) 400 mg tablet Take 1 tablet (400 mg total) by mouth 2 (two) times a day. 180 tablet 1     metFORMIN (GLUCOPHAGE) 500 MG tablet TAKE 2 TABLETS BY MOUTH TWICE DAILY AT 6AM AND 4PM 360 tablet 3     methotrexate 2.5 MG tablet TAKE 6 TABLETS(15 MG) BY MOUTH 1 TIME A WEEK 72 tablet 0     omeprazole (PRILOSEC) 40 MG capsule Take 1 capsule (40 mg total) by mouth daily. 90 capsule 3     tamsulosin (FLOMAX) 0.4 mg Cp24 TAKE ONE CAPSULE BY MOUTH DAILY AFTER SUPPER 90 capsule 2     folic acid (FOLVITE) 1 MG tablet Take 3 tablets (3 mg total) by mouth daily. 270 tablet 3     No current facility-administered medications for this visit.      DETAILED EXAMINATION  09/26/18  :  Vitals:    09/26/18 0931   BP: 100/56   Patient Site: Right Arm   Patient Position: Sitting   Cuff Size: Adult Regular   Pulse: 68   Weight: 207 lb (93.9 kg)     Alert oriented. Head including the face is examined for malar rash, heliotropes, scarring, lupus pernio. Eyes examined for redness such as in episcleritis/scleritis, periorbital lesions.   Neck/ Face examined for parotid gland swelling, range of motion of neck.  Left upper and lower and right upper and lower extremities examined for tenderness, swelling, warmth of the appendicular joints, range of motion, edema, rash.  Some of the important findings included: Tenderness scattered PIP is JLT of the knees.  He has scleromalacia of the left inferomedial quadrant. He does not have synovitis of the palpable joints of upper extremities.  No sinus tenderness wheezing on auscultation.  No redness of the eyes deformity of the nasal cartilage.    LAB / IMAGING DATA:  ALT   Date Value Ref Range Status   06/27/2018 34 0 - 45 U/L Final   05/09/2018 46 (H) 0 - 45 U/L Final   03/21/2018 52 (H) 0 - 45 U/L Final     Albumin   Date Value Ref Range Status   06/27/2018 3.2 (L) 3.5 - 5.0 g/dL Final   05/09/2018 3.2 (L) 3.5 - 5.0 g/dL Final   03/21/2018 3.3 (L) 3.5 - 5.0 g/dL Final      Creatinine   Date Value Ref Range Status   08/06/2018 1.30 0.70 - 1.30 mg/dL Final   06/27/2018 1.29 0.70 - 1.30 mg/dL Final   05/09/2018 1.34 (H) 0.70 - 1.30 mg/dL Final       WBC   Date Value Ref Range Status   09/24/2018 4.2 4.0 - 11.0 thou/uL Final   08/06/2018 5.3 4.0 - 11.0 thou/uL Final   08/27/2015 7.0 4.0 - 11.0 thou/uL Final   07/31/2015 7.1 4.0 - 11.0 thou/uL Final     Hemoglobin   Date Value Ref Range Status   09/24/2018 9.9 (L) 14.0 - 18.0 g/dL Final   08/06/2018 9.6 (L) 14.0 - 18.0 g/dL Final   06/27/2018 9.1 (L) 14.0 - 18.0 g/dL Final     Platelets   Date Value Ref Range Status   09/24/2018 111 (L) 140 - 440 thou/uL Final   08/06/2018 110 (L) 140 - 440 thou/uL Final   06/27/2018 124 (L) 140 - 440 thou/uL Final       Lab Results   Component Value Date    RF <15.0 06/25/2014    SEDRATE 12 04/17/2015

## 2021-06-21 ENCOUNTER — RECORDS - HEALTHEAST (OUTPATIENT)
Dept: INTERNAL MEDICINE | Facility: CLINIC | Age: 76
End: 2021-06-21

## 2021-06-21 DIAGNOSIS — E11.9 TYPE 2 DIABETES MELLITUS (H): ICD-10-CM

## 2021-06-21 DIAGNOSIS — I10 ESSENTIAL HYPERTENSION, BENIGN: ICD-10-CM

## 2021-06-21 DIAGNOSIS — E78.5 HYPERLIPIDEMIA: ICD-10-CM

## 2021-06-21 DIAGNOSIS — D50.9 ANEMIA, IRON DEFICIENCY: ICD-10-CM

## 2021-06-21 DIAGNOSIS — R39.11 URINARY HESITANCY: ICD-10-CM

## 2021-06-21 DIAGNOSIS — R12 HEARTBURN: ICD-10-CM

## 2021-06-21 DIAGNOSIS — Z86.73 HISTORY OF CVA (CEREBROVASCULAR ACCIDENT): ICD-10-CM

## 2021-06-21 RX ORDER — CLOPIDOGREL BISULFATE 75 MG/1
TABLET ORAL
Qty: 90 TABLET | Refills: 3 | Status: SHIPPED | OUTPATIENT
Start: 2021-06-21 | End: 2021-08-10

## 2021-06-21 RX ORDER — OMEPRAZOLE 40 MG/1
CAPSULE, DELAYED RELEASE ORAL
Qty: 90 CAPSULE | Refills: 0 | Status: SHIPPED | OUTPATIENT
Start: 2021-06-21 | End: 2021-10-06

## 2021-06-21 RX ORDER — ATORVASTATIN CALCIUM 40 MG/1
TABLET, FILM COATED ORAL
Qty: 90 TABLET | Refills: 0 | Status: SHIPPED | OUTPATIENT
Start: 2021-06-21 | End: 2021-08-10

## 2021-06-21 RX ORDER — FERROUS SULFATE 325(65) MG
TABLET ORAL
Qty: 90 TABLET | Refills: 0 | Status: SHIPPED | OUTPATIENT
Start: 2021-06-21 | End: 2021-09-23

## 2021-06-21 RX ORDER — TAMSULOSIN HYDROCHLORIDE 0.4 MG/1
CAPSULE ORAL
Qty: 90 CAPSULE | Refills: 0 | Status: SHIPPED | OUTPATIENT
Start: 2021-06-21 | End: 2021-10-06

## 2021-06-21 RX ORDER — PROPRANOLOL HYDROCHLORIDE 20 MG/1
TABLET ORAL
Qty: 180 TABLET | Refills: 0 | Status: SHIPPED | OUTPATIENT
Start: 2021-06-21 | End: 2021-10-06

## 2021-06-21 NOTE — PROGRESS NOTES
ASSESSMENT:  1.  Type 2 diabetes mellitus: Antonio has been treated with metformin and glimepiride.  Reports occasional unpredictable episodes of hypoglycemia since beginning glimepiride.  He previously was offered Januvia as an alternate, but found it too costly.  After discussion, he will try pioglitazone along with metformin.    2.  History of iron deficiency anemia: Hemogram and ferritin will be checked.    3.  Episcleritis: Currently doing well.  He is on methotrexate, and is followed by Dr. Chong as well as his ophthalmologist.    4.  Hypercholesterolemia: On atorvastatin 40 mg daily.  Fasting lipids will be checked    5.  Essential hypertension: On lisinopril 10 mg daily.  Blood pressure is good on current regimen.    6.  History of CVA involving the left anterior inferior cerebellar artery: No recurrent symptoms.  He remains on Plavix    7.  Health maintenance: He will receive a flu shot and pneumo-23 booster today.    8.  Urinary hesitancy: He feels Flomax has been a benefit.    PLAN:  1.  Stop glimepiride.  Start pioglitazone 15 mg daily.  Continue metformin  2.  Lab as below.  He will be notified of test results  4.  Clinic follow-up 3-4 months.  Recheck hemoglobin A1c then  5.  Shingrix is advised.  Check insurance  6.  Flu shot and pneumo-23 booster today    Orders Placed This Encounter   Procedures     Influenza High Dose, Seasonal     Pneumococcal polysaccharide vaccine 23-valent 3 yo or older, subq/IM     Glycosylated Hemoglobin A1c     Lipid Cascade     Order Specific Question:   Fasting is required?     Answer:   Unknown     HM2(CBC w/o Differential)     Ferritin     Medications Discontinued During This Encounter   Medication Reason     tamsulosin (FLOMAX) 0.4 mg Cp24 Reorder     ferrous sulfate 325 (65 FE) MG tablet Reorder         ASSESSED PROBLEMS:  1. Anemia, iron deficiency  ferrous sulfate 325 (65 FE) MG tablet    HM2(CBC w/o Differential)    Ferritin   2. Urinary hesitancy  tamsulosin (FLOMAX)  0.4 mg cap   3. Flu vaccine need  Influenza High Dose, Seasonal   4. Diabetes mellitus, type 2 (H)  Glycosylated Hemoglobin A1c    Lipid Cascade    pioglitazone (ACTOS) 15 MG tablet   5. Healthcare maintenance  Pneumococcal polysaccharide vaccine 23-valent 1 yo or older, subq/IM   6. Mixed hyperlipidemia         CHIEF COMPLAINT:  Chief Complaint   Patient presents with     Follow-up     1 month        HISTORY OF PRESENT ILLNESS:  Frank is a 73 y.o. male seen for follow-up of type 2 diabetes and other concerns.  Hemoglobin A1c was 7.5 on 8/6/18.  He has been on glimepiride 2 mg daily in addition to Metformin.  He is concerned due to episodes of sudden hypoglycemia since beginning Glimepiride.  Wonders about other options.  He was offered Januvia last spring, but found it too costly.  After discussion, he is interested in trying pioglitazone.    Other medical issues are stable.  He is followed by his ophthalmologist and Dr. Chong for episcleritis.  Symptoms have been under good control.  He does have methotrexate monitoring labs checked regularly.    He is on iron due to chronic iron deficiency anemia.  He tolerates that well.  Ferritin and hemoglobin will be checked.    Blood pressure has been under good control on lisinopril.    He remains on atorvastatin 40 mg daily for hyperlipidemia.    He is on tamsulosin due to symptoms of obstructive uropathy.  He feels it has been of benefit.    He has a previous history of a CVA involving the left anterior inferior cerebellar artery.  Remains on Plavix.  He has not had any symptoms to suggest a recent TIA.    REVIEW OF SYSTEMS:    Comprehensive review of systems is negative except as mentioned above    PFSH:  .  Still does some wrestling promotions    TOBACCO USE:  History   Smoking Status     Never Smoker   Smokeless Tobacco     Never Used       VITALS:  Vitals:    10/22/18 1430   BP: 104/58   Patient Site: Left Arm   Patient Position: Sitting   Cuff Size: Adult  Large   Pulse: 60   Resp: 14   Weight: 208 lb 9.6 oz (94.6 kg)     Wt Readings from Last 3 Encounters:   10/22/18 208 lb 9.6 oz (94.6 kg)   09/26/18 207 lb (93.9 kg)   08/20/18 207 lb (93.9 kg)       PHYSICAL EXAM:  Constitutional:   Reveals an alert pleasant talkative male who does not appear acutely ill.   Vitals: per nursing notes.  HEENT:      Eyes:  EOMs full, PERRL.  No conjunctival hyperemia  Oropharynx:   Mouth and throat clear, no thrush or exudate.  Neck:  Supple, no carotid bruits or adenopathy.  Back:  No spine or CVA pain.  Thorax:  No bony deformities.  Lungs: Clear to A&P without rales or wheezes.  Respiratory effort normal.  Cardiac:   Regular rate and rhythm, normal S1, S2, no murmur or gallop.  Abdomen:  Soft, active bowel sounds without bruits, mass, or tenderness.  :  (Men)  No testicular masses, no penile lesions.    Extremities:   No peripheral edema, or significant bony abnormality  Skin:  No jaundice, peripheral cyanosis or lesions to suggest malignancy.  Neuro:  Alert and oriented. .  No gross focal deficits.  Psychiatric:  Memory intact, mood appropriate.    DATA REVIEWED:  Additional History from Old Records Summarized (2): None.  Decision to Obtain Records (1): None.   Radiology Tests Summarized or Ordered (1): None.  Labs Reviewed or Ordered (1): None.  Medicine Test Summarized or Ordered (1): None.   Independent Review of EKG or X-RAY(2 each): None.    The visit lasted a total of 26 minutes face to face with the patient. Over 50% of the time was spent counseling and educating the patient about management of type 2 diabetes mellitus.    .    Dragon dictation was used for this note. Speech recognition errors are a possibility.     MEDICATIONS:  Current Outpatient Prescriptions   Medication Sig Dispense Refill     atorvastatin (LIPITOR) 40 MG tablet TAKE 1 TABLET(40 MG) BY MOUTH DAILY 90 tablet 3     BREEZE 2 TEST STRIPS Strp USE TO TEST UP TO TWICE DAILY AS DIRECTED 200 strip 1      clopidogrel (PLAVIX) 75 mg tablet Take 1 tablet (75 mg total) by mouth daily. 90 tablet 3     cyanocobalamin (VITAMIN B-12) 1000 MCG tablet Take 2,000 mcg by mouth daily.       ferrous sulfate 325 (65 FE) MG tablet Take 1 tablet (325 mg total) by mouth daily with breakfast. 30 tablet 3     folic acid (FOLVITE) 1 MG tablet Take 3 tablets (3 mg total) by mouth daily. 270 tablet 3     glimepiride (AMARYL) 2 MG tablet Take 1 tablet (2 mg total) by mouth every morning. 90 tablet 3     lisinopril (PRINIVIL,ZESTRIL) 10 MG tablet Take 1 tablet (10 mg total) by mouth daily. 90 tablet 3     magnesium oxide (MAGOX) 400 mg tablet Take 1 tablet (400 mg total) by mouth 2 (two) times a day. 180 tablet 1     metFORMIN (GLUCOPHAGE) 500 MG tablet TAKE 2 TABLETS BY MOUTH TWICE DAILY AT 6AM AND 4PM 360 tablet 3     methotrexate 2.5 MG tablet TAKE 6 TABLETS(15 MG) BY MOUTH 1 TIME A WEEK 72 tablet 0     omeprazole (PRILOSEC) 40 MG capsule Take 1 capsule (40 mg total) by mouth daily. 90 capsule 3     tamsulosin (FLOMAX) 0.4 mg cap TAKE ONE CAPSULE BY MOUTH DAILY AFTER SUPPER 90 capsule 3     pioglitazone (ACTOS) 15 MG tablet Take 1 tablet (15 mg total) by mouth daily. 30 tablet 11     No current facility-administered medications for this visit.

## 2021-06-22 NOTE — PROGRESS NOTES
TCM DISCHARGE FOLLOW UP CALL    Discharge Date:  12/22/2018  Reason for hospital stay (discharge diagnosis)::  Abdominal Pain  Are you feeling better, the same or worse since your discharge?:  Patient is feeling better (No N/V.  No recurrent abdominal pain)  Do you feel like you have a plan in the event of a health emergency?: Yes (Call Dr. Lopez's office)    As part of your discharge plan, were  home care services ordered for you?: No    Did you receive any new medications, or was there a change to your medications?: No    Do you have any follow up visits scheduled with your PCP or Specialist?:  Yes, with PCP (Dr. Lopez 12/28/18)  (RN) Is PCP appt scheduled soon enough (within 14 days of discharge date)?: Yes        Nelsy Patel RN Care Manager, Population Health

## 2021-06-22 NOTE — PROGRESS NOTES
ASSESSMENT:  1.  Hospital follow-up:  Antonio was hospitalized at [facility] from 12/20 to 12/22 with a left lower quadrant abdominal pain.  The ccause of his symptoms was not clear.  There was no evidence for acute diverticulitis.  He did have cholelithiasis noted, but did not have right upper quadrant pain.  He did have a hepato-biliary scan done which was normal.  He is now feeling back to baseline.  I feel this was a self-limited condition.       1. Hyperlipidemia  On atorvastatin 40 mg daily.  Lipids were excellent when checked in October with a total cholesterol of 122 and an LDL of 51  - atorvastatin (LIPITOR) 40 MG tablet; Take 1 tablet (40 mg total) by mouth daily.  Dispense: 90 tablet; Refill: 3    2. Type 2 diabetes mellitus (H)  Hemoglobin A1c was fair at 7.3 in October.  He is on pioglitazone which could possibly be of benefit for MCDONALD  - atorvastatin (LIPITOR) 40 MG tablet; Take 1 tablet (40 mg total) by mouth daily.  Dispense: 90 tablet; Refill: 3    3. Hepatic cirrhosis, unspecified hepatic cirrhosis type (H)  His CT scan did show evidence for hepatic cirrhosis, varices and splenomegaly.  He denies any past history of excessive alcohol intake.  He tested negative for hepatitis C several years ago.  CT of the abdomen without contrast in 2014 did not mention any liver abnormalities.  Previous iron studies showed no evidence for iron overload.   I suspect he has developed cirrhosis due to non-alcoholic steatohepatitis.  - Hepatitis B Surface Antibody (Anti-HBs)  - Ambulatory referral for Upper GI Endoscopy    4. Iron deficiency anemia due to chronic blood loss  He did have evidence for iron deficiency last summer.  Hemoglobin was down to 9.3 during his hospital stay.  I would be worried about occult GI blood loss related to portal hypertension.  Upper GI endoscopy will be ordered to see if he has significant varices.  He also will be started on low-dose propranolol  - Iron and Transferrin Iron Binding  Capacity  - Ambulatory referral for Upper GI Endoscopy    5.  Thrombocytopenia:  Most likely related to splenic sequestration associated with chronic liver disease    PLAN:  Patient Instructions   1.  Start Propranolol 20 mg twice daily.    2.  Check for chronic hepatitis B. Recheck iron studies.    3.  Schedule UGI endoscopy    4.  See in two to three months.  Diabetic studies will be checked at that time.    5.  I will check to see if cirrhosis could possibly be related to methotrexate      Orders Placed This Encounter   Procedures     Hepatitis B Surface Antibody (Anti-HBs)     Iron and Transferrin Iron Binding Capacity     Ambulatory referral for Upper GI Endoscopy     Referral Priority:   Routine     Referral Type:   Diagnostic Imaging     Referral Reason:   Evaluation and Treatment     Requested Specialty:   Gastroenterology     Number of Visits Requested:   1     Medications Discontinued During This Encounter   Medication Reason     metFORMIN (GLUCOPHAGE) 500 MG tablet Reorder     atorvastatin (LIPITOR) 40 MG tablet Reorder           All patient medications were reconciled.     ASSESSED PROBLEMS:  1. Hepatic cirrhosis, unspecified hepatic cirrhosis type (H)  Hepatitis B Surface Antibody (Anti-HBs)    Ambulatory referral for Upper GI Endoscopy   2. Hyperlipidemia  atorvastatin (LIPITOR) 40 MG tablet   3. Type 2 diabetes mellitus (H)  atorvastatin (LIPITOR) 40 MG tablet   4. Iron deficiency anemia due to chronic blood loss  Iron and Transferrin Iron Binding Capacity    Ambulatory referral for Upper GI Endoscopy       CHIEF COMPLAINT:  Chief Complaint   Patient presents with     Follow-up     ED: 12/20-12/23- abdominal pain       HISTORY OF PRESENT ILLNESS:  Frank is a 73 y.o. male presenting to the clinic today for outpatient follow up after discharge from a 2 day stay in the hospital for LLQ abdominal pain and weakness.     LLQ abdominal pain: He went to a party Wednesday night that had real spicy chicken  "wings, then got home about midnight. When he woke up on Thursday he had significant LLQ abdominal pain and felt very weak to the point that he could hardly get out of bed. So he went to ED. They could not determine the cause so they kept him in hospital and ran tests until Saturday morning. He has had no recurrence of symptoms and feels better now. There was no diarrhea or bowel problems. They looked at his gallbladder for gallstones and found 1, but the pain he was having was in his LLQ.     Cirrhosis: Ct Abdo pelvis showed some cirrhosis of the liver and paraesophageal varices. He does not drink any alcohol at all and never has. He does have diabetes. Last CT 5 years ago found liver WNLs.     DMII: 10/22/18 A1C 7.3. He is taking his medications and tolerating them well, including pioglitazone which is thought to help prevent MCDONALD cirrhosis.     Anemia: He is taking Fe for anemia. He is tolerating it well. He is very fatigued, and his significant other complains that he sleeps all the time. 12/26/18 HgB 9.9.    REVIEW OF SYSTEMS:   Denies constipation, diarrhea, nausea/vomitting, shortness of breath,   Admits to fatigue  All other systems are negative.    PFSH:  Denies alcohol intake, now or in the past.   He worked with the fire department in the past.   Reviewed as below.     TOBACCO USE:  Social History     Tobacco Use   Smoking Status Never Smoker   Smokeless Tobacco Never Used       VITALS:  Vitals:    12/28/18 1114   BP: 110/60   Patient Site: Left Arm   Patient Position: Sitting   Cuff Size: Adult Large   Pulse: 74   SpO2: 96%   Weight: 207 lb 11.2 oz (94.2 kg)   Height: 5' 11\" (1.803 m)     Wt Readings from Last 3 Encounters:   12/28/18 207 lb 11.2 oz (94.2 kg)   12/21/18 199 lb 9.6 oz (90.5 kg)   12/20/18 147 lb (66.7 kg)     Body mass index is 28.97 kg/m .    PHYSICAL EXAM:  Constitutional:   Reveals an alert talkative male, affect appropriate, does not apear acutely ill, Vitals: per nursing " notes.  HEENT: atraumatic   Neck: Supple, no carotid bruits or adenopathy.  Back:  No spine or CVA pain.  Thorax:  No bony deformities.  Lungs: Clear to A&P without rales or wheezes.  Respiratory effort normal.  Cardiac:   Regular rate and rhythm, normal S1, S2, no murmur or gallop.  Abdomen:  Soft mildly obese, ventral hernia seen upon standing, active bowel sounds without bruits, mass, or tenderness.  Extremities:   No peripheral edema    Skin:  Pale but clear, No jaundice, peripheral cyanosis or lesions to suggest malignancy.  Neuro:  Alert and oriented. No gross focal deficits.  Psychiatric:  Memory intact, mood appropriate.    QUALITY MEASURES:  The following high BMI interventions were performed this visit: encouragement to exercise and weight monitoring    ADDITIONAL HISTORY SUMMARIZED (2): Reviewed hospital records 12/20/18 - 12/22/18 showing one gallstone, no gallbladder obstruction, some liver cirrhosis, but no identified cause of his LLQ abdominal pain.   DECISION TO OBTAIN EXTRA INFORMATION (1): None.   RADIOLOGY TESTS (1): 12/20/18 CT abdo pelvis showed some cirrhosis of the liver and paraesophageal varices. 12/21/18 NM Hepatobiliary WO EF or Pharm found no evidence of acute cholecystitis or bile duct obstruction.   LABS (1): reviewed 12/21/18, 12/22/18, and 12/26/18 labs.   MEDICINE TESTS (1): 12/20/18 Abdo US reviewed showing calcification of anterior gallbladder wall, with possible 3mm fundal polyp and possible stone vs wall artifact, common bile duct 4.5mm, liver with increased echogenicity and increased attenuation, pancreas was obscured by bowel gas, and no possible renal cyst at inferior pole 2.1x2.3x1.7cm, no hydronephrosis.   INDEPENDENT REVIEW (2 each): None.     The visit lasted a total of 23 minutes face to face with the patient. Over 50% of the time was spent counseling and educating the patient about abdominal pain, MCDONALD/cirrhosis, and DMII.    Mat CHAWLA am scribing for and in the  presence of, Dr. Lopez.    I, Fei Loepz, personally performed the services described in this documentation, as scribed by Mat Gresham in my presence, and it is both accurate and complete.    Dragon dictation was used for this note.  Speech recognition errors are a possibility.    MEDICATIONS:  Current Outpatient Medications   Medication Sig Dispense Refill     atorvastatin (LIPITOR) 40 MG tablet Take 1 tablet (40 mg total) by mouth daily. 90 tablet 3     BREEZE 2 TEST STRIPS Strp USE TO TEST UP TO TWICE DAILY AS DIRECTED 200 strip 1     clopidogrel (PLAVIX) 75 mg tablet Take 1 tablet (75 mg total) by mouth daily. 90 tablet 3     cyanocobalamin, vitamin B-12, 2,500 mcg Tab Take 2,500 mcg by mouth daily.       ferrous sulfate 325 (65 FE) MG tablet Take 1 tablet (325 mg total) by mouth daily with breakfast. 30 tablet 3     lisinopril (PRINIVIL,ZESTRIL) 10 MG tablet Take 1 tablet (10 mg total) by mouth daily. 90 tablet 3     magnesium oxide 250 mg Tab Take 250 mg by mouth 2 (two) times a day.       metFORMIN (GLUCOPHAGE) 500 MG tablet TAKE 2 TABLETS BY MOUTH TWICE DAILY AT 6AM AND 4PM 360 tablet 3     methotrexate 2.5 MG tablet TAKE 6 TABLETS(15 MG) BY MOUTH 1 TIME A WEEK 72 tablet 0     omeprazole (PRILOSEC) 40 MG capsule Take 1 capsule (40 mg total) by mouth daily. (Patient taking differently: Take 40 mg by mouth see administration instructions. Take 40 mg once daily every day except Sundays when taking Methotrexate.      ) 90 capsule 3     pioglitazone (ACTOS) 15 MG tablet TAKE 1 TABLET(15 MG) BY MOUTH DAILY 90 tablet 3     tamsulosin (FLOMAX) 0.4 mg cap TAKE ONE CAPSULE BY MOUTH DAILY AFTER SUPPER 90 capsule 3     folic acid (FOLVITE) 1 MG tablet Take 3 tablets (3 mg total) by mouth daily. 270 tablet 3     propranolol (INDERAL) 20 MG tablet Take 1 tablet (20 mg total) by mouth 2 (two) times a day. 60 tablet 11     No current facility-administered medications for this visit.        Total data points: 5

## 2021-06-22 NOTE — PROGRESS NOTES
Chief Complaint   Patient presents with     Fever     x 1 hour ago , TEMP = 101F     Abdominal Pain     x last night     Fatigue     feels so week       ASSESSMENT & PLAN:   Diagnoses and all orders for this visit:    Abdominal pain, epigastric  -     Urinalysis-UC if Indicated        MDM:  Patient with negative urine and mild, dull left lower quadrant abdominal pain without other clear source for fever or infection.  Is ANCA-positive on methotrexate (dx as scleritis) and is a type II diabetic which puts him at higher risk for serious infection. Discussed with patient and family and recommended further    Discussed patient with Dr. Coy at [facility]. Patient here with wife who will drive him to hospital for further eval.  Feel patient will need more evaluation than can be done in the [facility] today - clinic closing in 15 minutes.        SUBJECTIVE    HPI:  Patient with a history of type 2 diabetes on multiple oral medications, scleritis on methotrexate, and kidney stones started feeling febrile with dull, constant, mild left lower quadrant abdominal pain as well as profound weakness last night.  Has not been eating or drinking much today.  Temperature today prior to arrival was 101.     Urine tested here was normal.    Denies cough, congestion, dysuria, flank pain, vomiting, diarrhea, history of diverticulitis, recent surgery or procedures.  Had appendix out as a teenager.        History obtained from the patient.    Past Medical History:   Diagnosis Date     Anemia      Arthritis     osteoarthritis     Calculus of kidney      Diabetes mellitus (H)      Episcleritis of left eye      Hyperlipidemia      Hypertension      Peripheral neuropathy      Stroke (H)        Problem List:  2018-09: High risk medication use  2017-03: Antineutrophil cytoplasmic antibody (ANCA) positive  2017-01: Urinary hesitancy  2016-03: ALT (SGPT) level raised  2016-03: Scleritis of left eye  2015-11: Bilateral chronic knee pain  2015-11:  Primary osteoarthritis of both knees  2015-01: Heart burn  2014-11: Carpal tunnel syndrome  2014-11: Episcleritis  2014-11: Osteoarthrosis, unspecified whether generalized or localized,  lower leg  2014-11: Right bundle branch block  2014-11: Scleritis, unspecified  2014-07: Calculus of ureter  2014-07: Gross hematuria  Nephrolithiasis  Anemia  Hypercholesterolemia  Peripheral Neuropathy  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      OBJECTIVE    Vitals:    12/20/18 1800   BP: 137/75   Patient Site: Left Arm   Patient Position: Sitting   Cuff Size: Adult Regular   Pulse: 100   Resp: 20   Temp: 99.6  F (37.6  C)   TempSrc: Oral   SpO2: 99%   Weight: 147 lb (66.7 kg)       Physical Exam   Constitutional: He is oriented to person, place, and time. He appears well-developed and well-nourished. No distress.   HENT:   Right Ear: External ear normal.   Left Ear: External ear normal.   Eyes: Conjunctivae are normal. Right eye exhibits no discharge. Left eye exhibits no discharge.   Cardiovascular: Intact distal pulses.   Pulmonary/Chest: Effort normal.   Abdominal: Soft. Bowel sounds are normal. He exhibits no distension. There is no tenderness (not particularly tender over area of pain).   Musculoskeletal: Normal range of motion.   Neurological: He is alert and oriented to person, place, and time.   Able to get from w/c to exam table unassisted.   Skin: Skin is warm and dry. Capillary refill takes less than 2 seconds. No rash noted. No pallor.   Warm to touch   Psychiatric: He has a normal mood and affect. His behavior is normal. Judgment and thought content normal.       Labs:  Recent Results (from the past 240  hour(s))   Urinalysis-UC if Indicated   Result Value Ref Range    Color, UA Yellow Colorless, Yellow, Straw, Light Yellow    Clarity, UA Clear Clear    Glucose, UA Negative Negative    Bilirubin, UA Negative Negative    Ketones, UA Negative Negative    Specific Gravity, UA 1.020 1.005 - 1.030    Blood, UA Negative Negative    pH, UA 6.5 5.0 - 8.0    Protein, UA Negative Negative mg/dL    Urobilinogen, UA 0.2 E.U./dL 0.2 E.U./dL, 1.0 E.U./dL    Nitrite, UA Negative Negative    Leukocytes, UA Negative Negative         Radiology:    No results found.    PATIENT INSTRUCTIONS:   There are no Patient Instructions on file for this visit.

## 2021-06-23 NOTE — PROGRESS NOTES
ASSESSMENT AND PLAN   Frank Obando 73 y.o. 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 Abdi the other day his scleritis is now active, we discussed options today starting her M on tumor necrosis factor inhibitors, he is going to start Humira.  His QuantiFERON hep B and C are negative.  There are no contraindications such as cardiac, multiple sclerosis himself or the family.  He is to follow-up with his ophthalmologist on as suggested frequent interval and follow-up here in the next 4-6 months.              Diagnoses and all orders for this visit:    Panuveitis, unspecified laterality    Episcleritis of left eye    Cirrhosis of liver with ascites, unspecified hepatic cirrhosis type (H)    Scleritis of left eye    Other orders  -     acetaminophen tablet 650 mg (TYLENOL)  -     diphenhydrAMINE tablet 25 mg (BENADRYL)  -     acetaminophen tablet 650 mg (TYLENOL)  -     diphenhydrAMINE capsule 25 mg (BENADRYL)  -     acetaminophen tablet 650 mg (TYLENOL)  -     diphenhydrAMINE tablet 25 mg (BENADRYL)  -     sodium chloride 0.9% 250 mL  -     inFLIXimab 450 mg in sodium chloride 0.9% 205 mL (REMICADE)  -     inFLIXimab 450 mg in sodium chloride 0.9% 205 mL (REMICADE)  -     inFLIXimab 450 mg in sodium chloride 0.9% 205 mL (REMICADE)  -     heparin lockflush (PF) porcine 300-600 Units  -     sodium chloride flush 10 mL (NS)  -     diphenhydrAMINE injection 25 mg (BENADRYL)  -     famotidine 20 mg/2 mL injection 20 mg (PEPCID)  -     acetaminophen tablet 1,000 mg (TYLENOL)  -     hydrocortisone sod succ (PF) injection 100 mg      HISTORY OF PRESENTING ILLNESS:  Frank Obando, 73 y.o., male is here for follow-up.  He has until recently very well controlled scleritis in the left eye with just methotrexate, with associated positive ANCA,  without OR-3 and MPO.  He has bilateral knee osteoarthritis.  He is a  at a local home.  Recently evaluated for abdominal discomfort, fever, found to have fatty liver and found to have cirrhosis of the liver.  He has been seen flaring up in his eye as well.  I discussed this with Dr. Abdi his ophthalmologist.  He clearly requires more intensive treatment.  Not a candidate for methotrexate leflunomide azathioprine class of drugs.  We will go for tumor necrosis factor inhibitors.  [employer] of rheumat[employer] handout is provided major side effects outlined.          .  He is also had over the past few weeks redness of the eye.  He is due to see ophthalmologist in May.  I have asked him to have that done as soon as possible made his way to the leg.  He has noted mild discomfort in the knees, first MTPs, known to have osteoarthritis, but without morning stiffness.   Dr. Abdi in the next few weeks.  He understands that inflammation is under good control despite lowering of the methotrexate dose in view of the liver function abnormalities.  He gets some discomfort in his knees at rest at night or when he is up and about for long time such as visiting the [facility].  He noted pain level to be mild, 1.0/10, able to do all his day-to-day activities without difficulty.  He has no stiffness when he wakes up in the morning.  There is no fever weight loss blurry vision eye redness mouth also nausea or rash.   Further historical information and ADL limitations as noted in the multidimensional health assessment questionnaire attached in the EMR.   ALLERGIES:Morphine; Other allergy (see comments); and Scopolamine hbr    PAST MEDICAL/ACTIVE PROBLEMS/MEDICATION/ FAMILY HISTORY/SOCIAL DATA:  The patient has a family history of  Past Medical History:   Diagnosis Date     Anemia      Arthritis     osteoarthritis     Calculus of kidney      Diabetes mellitus (H)      Episcleritis of left eye       Hyperlipidemia      Hypertension      Peripheral neuropathy      Stroke (H)      Social History     Tobacco Use   Smoking Status Never Smoker   Smokeless Tobacco Never Used     Patient Active Problem List   Diagnosis     Nephrolithiasis     Normocytic anemia     Hypercholesterolemia     Peripheral Neuropathy     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     Essential hypertension, benign     Type 2 diabetes mellitus with complication, without long-term current use of insulin (H)     Carpal tunnel syndrome     Episcleritis     Right bundle branch block     Primary osteoarthritis of both knees     ALT (SGPT) level raised     Scleritis of left eye     Urinary hesitancy     Antineutrophil cytoplasmic antibody (ANCA) positive     High risk medication use     Abdominal pain     Cholecystitis     Dyslipidemia     Thrombocytopenia (H)     Cirrhosis of liver with ascites, unspecified hepatic cirrhosis type (H)     Hyponatremia     Acute renal failure, unspecified acute renal failure type (H)     SIRS (systemic inflammatory response syndrome) (H)     Current Outpatient Medications   Medication Sig Dispense Refill     atorvastatin (LIPITOR) 40 MG tablet Take 1 tablet (40 mg total) by mouth daily. 90 tablet 3     BREEZE 2 TEST STRIPS Strp USE TO TEST UP TO TWICE DAILY AS DIRECTED 200 strip 1     clopidogrel (PLAVIX) 75 mg tablet Take 1 tablet (75 mg total) by mouth daily. 90 tablet 3     cyanocobalamin, vitamin B-12, 2,500 mcg Tab Take 2,500 mcg by mouth daily.       ferrous sulfate 325 (65 FE) MG tablet Take 1 tablet (325 mg total) by mouth daily with breakfast. 30 tablet 3     lisinopril (PRINIVIL,ZESTRIL) 10 MG tablet Take 1 tablet (10 mg total) by mouth daily. 90 tablet 3     magnesium oxide 250 mg Tab Take 250 mg by mouth 2 (two) times a day.       metFORMIN (GLUCOPHAGE) 500 MG tablet TAKE 2 TABLETS BY MOUTH TWICE DAILY AT 6AM AND 4PM 360 tablet 3     omeprazole (PRILOSEC) 40 MG capsule Take 1 capsule (40 mg  "total) by mouth daily. (Patient taking differently: Take 40 mg by mouth see administration instructions. Take 40 mg once daily every day except Sundays when taking Methotrexate.      ) 90 capsule 3     pioglitazone (ACTOS) 15 MG tablet TAKE 1 TABLET(15 MG) BY MOUTH DAILY 90 tablet 3     propranolol (INDERAL) 20 MG tablet TAKE 1 TABLET(20 MG) BY MOUTH TWICE DAILY 180 tablet 3     tamsulosin (FLOMAX) 0.4 mg cap TAKE ONE CAPSULE BY MOUTH DAILY AFTER SUPPER 90 capsule 3     No current facility-administered medications for this visit.      DETAILED EXAMINATION  02/05/19  :  Vitals:    02/05/19 0837   BP: 122/78   Weight: 209 lb (94.8 kg)   Height: 5' 11\" (1.803 m)     Alert oriented. Head including the face is examined for malar rash, heliotropes, scarring, lupus pernio. Eyes examined for redness such as in episcleritis/scleritis, periorbital lesions.   Neck/ Face examined for parotid gland swelling, range of motion of neck.  Left upper and lower and right upper and lower extremities examined for tenderness, swelling, warmth of the appendicular joints, range of motion, edema, rash.  Some of the important findings included: Left eye redness inferior nasal quadrant, does not have synovitis of the palpable joints of upper extremities knees without effusion warmth or JLT, full range of motion of the shoulders.        LAB / IMAGING DATA:  ALT   Date Value Ref Range Status   12/26/2018 46 (H) 0 - 45 U/L Final   12/21/2018 37 0 - 45 U/L Final   12/21/2018 33 0 - 45 U/L Final     Albumin   Date Value Ref Range Status   12/26/2018 3.1 (L) 3.5 - 5.0 g/dL Final   12/21/2018 2.9 (L) 3.5 - 5.0 g/dL Final   12/21/2018 2.7 (L) 3.5 - 5.0 g/dL Final     Creatinine   Date Value Ref Range Status   12/26/2018 1.26 0.70 - 1.30 mg/dL Final   12/21/2018 1.27 0.70 - 1.30 mg/dL Final   12/21/2018 1.20 0.70 - 1.30 mg/dL Final       WBC   Date Value Ref Range Status   12/26/2018 3.9 (L) 4.0 - 11.0 thou/uL Final   12/22/2018 4.8 4.0 - 11.0 thou/uL " Final   08/27/2015 7.0 4.0 - 11.0 thou/uL Final   07/31/2015 7.1 4.0 - 11.0 thou/uL Final     Hemoglobin   Date Value Ref Range Status   12/26/2018 9.9 (L) 14.0 - 18.0 g/dL Final   12/22/2018 9.3 (L) 14.0 - 18.0 g/dL Final   12/21/2018 9.2 (L) 14.0 - 18.0 g/dL Final     Platelets   Date Value Ref Range Status   12/26/2018 104 (L) 140 - 440 thou/uL Final   12/22/2018 81 (L) 140 - 440 thou/uL Final   12/21/2018 77 (L) 140 - 440 thou/uL Final       Lab Results   Component Value Date    RF <15.0 06/25/2014    SEDRATE 12 04/17/2015

## 2021-06-23 NOTE — PROGRESS NOTES
ASSESSMENT AND PLAN   Frank Obando 73 y.o. 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  He has left eye redness.  I have recommended that he could consult with the ophthalmology urgently.  Depending upon how his eye is doing further course to be charted accordingly.  In gastroenterology a weakly positive DOMINGO is noted which is previously negative this will be checked again.             Diagnoses and all orders for this visit:    Scleritis of left eye  -     Antinuclear Antibody (DOMINGO) Cascade  -     Proteinase 3 (PR3)Antibody IgG(PR3IGG)  -     Myeloperoxidase Antibodies, IgG  -     QTF-Mycobacterium tuberculosis by QuantiFERON-TB Gold Plus    High risk medication use  -     QTF-Mycobacterium tuberculosis by QuantiFERON-TB Gold Plus    Cirrhosis of liver with ascites, unspecified hepatic cirrhosis type (H)    Antineutrophil cytoplasmic antibody (ANCA) positive  -     Antinuclear Antibody (DOMINGO) Cascade  -     Proteinase 3 (PR3)Antibody IgG(PR3IGG)  -     Myeloperoxidase Antibodies, IgG      HISTORY OF PRESENTING ILLNESS:  Frank Obando, 73 y.o., male is here for follow-up.  He has until recently very well controlled scleritis in the left eye with just methotrexate, with associated positive ANCA, without DC-3 and MPO.  He has bilateral knee osteoarthritis.  He is a  at a local home.  Recently evaluated for abdominal discomfort, fever, found to have fatty liver, usable.  He is also had over the past few weeks redness of the eye.  He is due to see ophthalmologist in May.  I have asked him to have that done as soon as possible made his way to the leg.  He has noted mild discomfort in the knees, first MTPs, known to have osteoarthritis,  but without morning stiffness.   Dr. Abdi in the next few weeks.  He understands that inflammation is under good control despite lowering of the methotrexate dose in view of the liver function abnormalities.  He gets some discomfort in his knees at rest at night or when he is up and about for long time such as visiting the [facility].  He noted pain level to be mild, 1.0/10, able to do all his day-to-day activities without difficulty.  He has no stiffness when he wakes up in the morning.  There is no fever weight loss blurry vision eye redness mouth also nausea or rash.   Further historical information and ADL limitations as noted in the multidimensional health assessment questionnaire attached in the EMR.   ALLERGIES:Morphine; Other allergy (see comments); and Scopolamine hbr    PAST MEDICAL/ACTIVE PROBLEMS/MEDICATION/ FAMILY HISTORY/SOCIAL DATA:  The patient has a family history of  Past Medical History:   Diagnosis Date     Anemia      Arthritis     osteoarthritis     Calculus of kidney      Diabetes mellitus (H)      Episcleritis of left eye      Hyperlipidemia      Hypertension      Peripheral neuropathy      Stroke (H)      Social History     Tobacco Use   Smoking Status Never Smoker   Smokeless Tobacco Never Used     Patient Active Problem List   Diagnosis     Nephrolithiasis     Normocytic anemia     Hypercholesterolemia     Peripheral Neuropathy     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     Essential hypertension, benign     Type 2 diabetes mellitus with complication, without long-term current use of insulin (H)     Carpal tunnel syndrome     Episcleritis     Right bundle branch block     Primary osteoarthritis of both knees     ALT (SGPT) level raised     Scleritis of left eye     Urinary hesitancy     Antineutrophil cytoplasmic antibody (ANCA) positive     High risk medication use     Abdominal pain     Cholecystitis     Dyslipidemia     Thrombocytopenia (H)     Cirrhosis of liver  with ascites, unspecified hepatic cirrhosis type (H)     Hyponatremia     Acute renal failure, unspecified acute renal failure type (H)     SIRS (systemic inflammatory response syndrome) (H)     Current Outpatient Medications   Medication Sig Dispense Refill     atorvastatin (LIPITOR) 40 MG tablet Take 1 tablet (40 mg total) by mouth daily. 90 tablet 3     BREEZE 2 TEST STRIPS Strp USE TO TEST UP TO TWICE DAILY AS DIRECTED 200 strip 1     clopidogrel (PLAVIX) 75 mg tablet Take 1 tablet (75 mg total) by mouth daily. 90 tablet 3     cyanocobalamin, vitamin B-12, 2,500 mcg Tab Take 2,500 mcg by mouth daily.       ferrous sulfate 325 (65 FE) MG tablet Take 1 tablet (325 mg total) by mouth daily with breakfast. 30 tablet 3     lisinopril (PRINIVIL,ZESTRIL) 10 MG tablet Take 1 tablet (10 mg total) by mouth daily. 90 tablet 3     magnesium oxide 250 mg Tab Take 250 mg by mouth 2 (two) times a day.       metFORMIN (GLUCOPHAGE) 500 MG tablet TAKE 2 TABLETS BY MOUTH TWICE DAILY AT 6AM AND 4PM 360 tablet 3     omeprazole (PRILOSEC) 40 MG capsule Take 1 capsule (40 mg total) by mouth daily. (Patient taking differently: Take 40 mg by mouth see administration instructions. Take 40 mg once daily every day except Sundays when taking Methotrexate.      ) 90 capsule 3     pioglitazone (ACTOS) 15 MG tablet TAKE 1 TABLET(15 MG) BY MOUTH DAILY 90 tablet 3     propranolol (INDERAL) 20 MG tablet TAKE 1 TABLET(20 MG) BY MOUTH TWICE DAILY 180 tablet 3     tamsulosin (FLOMAX) 0.4 mg cap TAKE ONE CAPSULE BY MOUTH DAILY AFTER SUPPER 90 capsule 3     folic acid (FOLVITE) 1 MG tablet Take 3 tablets (3 mg total) by mouth daily. 270 tablet 3     methotrexate 2.5 MG tablet TAKE 6 TABLETS(15 MG) BY MOUTH 1 TIME A WEEK 72 tablet 0     No current facility-administered medications for this visit.      DETAILED EXAMINATION  01/29/19  :  Vitals:    01/29/19 0819   BP: 118/60   Patient Site: Right Arm   Patient Position: Sitting   Cuff Size: Adult Large    Pulse: 64   Weight: 207 lb (93.9 kg)     Alert oriented. Head including the face is examined for malar rash, heliotropes, scarring, lupus pernio. Eyes examined for redness such as in episcleritis/scleritis, periorbital lesions.   Neck/ Face examined for parotid gland swelling, range of motion of neck.  Left upper and lower and right upper and lower extremities examined for tenderness, swelling, warmth of the appendicular joints, range of motion, edema, rash.  Some of the important findings included: Left eye redness inferior nasal quadrant, does not have synovitis of the palpable joints of upper extremities knees without effusion warmth or JLT, full range of motion of the shoulders.        LAB / IMAGING DATA:  ALT   Date Value Ref Range Status   12/26/2018 46 (H) 0 - 45 U/L Final   12/21/2018 37 0 - 45 U/L Final   12/21/2018 33 0 - 45 U/L Final     Albumin   Date Value Ref Range Status   12/26/2018 3.1 (L) 3.5 - 5.0 g/dL Final   12/21/2018 2.9 (L) 3.5 - 5.0 g/dL Final   12/21/2018 2.7 (L) 3.5 - 5.0 g/dL Final     Creatinine   Date Value Ref Range Status   12/26/2018 1.26 0.70 - 1.30 mg/dL Final   12/21/2018 1.27 0.70 - 1.30 mg/dL Final   12/21/2018 1.20 0.70 - 1.30 mg/dL Final       WBC   Date Value Ref Range Status   12/26/2018 3.9 (L) 4.0 - 11.0 thou/uL Final   12/22/2018 4.8 4.0 - 11.0 thou/uL Final   08/27/2015 7.0 4.0 - 11.0 thou/uL Final   07/31/2015 7.1 4.0 - 11.0 thou/uL Final     Hemoglobin   Date Value Ref Range Status   12/26/2018 9.9 (L) 14.0 - 18.0 g/dL Final   12/22/2018 9.3 (L) 14.0 - 18.0 g/dL Final   12/21/2018 9.2 (L) 14.0 - 18.0 g/dL Final     Platelets   Date Value Ref Range Status   12/26/2018 104 (L) 140 - 440 thou/uL Final   12/22/2018 81 (L) 140 - 440 thou/uL Final   12/21/2018 77 (L) 140 - 440 thou/uL Final       Lab Results   Component Value Date    RF <15.0 06/25/2014    SEDRATE 12 04/17/2015

## 2021-06-23 NOTE — TELEPHONE ENCOUNTER
Per Dr Chong- pt can stop taking methotrexate and should follow up in clinic to discuss treatment plan. Pt notified and will stop taking MTX and scheduled follow up 1/29/19 at 8:30am to see Dr Chong. Pt verbalized understanding.

## 2021-06-23 NOTE — TELEPHONE ENCOUNTER
"Physician to physician communication     Caller is MD Washington  [phone]         Call from GI MD       Seeing pt with open access appt today for upper GI.      Reports new cirrhoses and esophageal varices  on CT scan   (12/20/2018 Paraesophageal varices;  \"Evidence for hepatic cirrhosis\" per impression)     Notes pt is on clopidogrel (plavix) 75mg daily so canceled     She is going to arrange for liver clinic follow up to coordinate care     Ok to do a 7 day hold with pt's stroke history ?        Will send urgent note to his care team      Pierce Blanca RN   Triage and Medication Refills    Reason for Disposition    Physician call to PCP    Protocols used: PCP CALL - NO TRIAGE-A-AH      "

## 2021-06-23 NOTE — TELEPHONE ENCOUNTER
Dr Chong spoke to Dr Cherry- pt has eye inflammation and are recommending pt to go on biologic. Dr Chong would like to see pt in clinic to discuss this.     Left message with wife for pt to call back to schedule appt with Dr Chong. Ok to add pt to next week with Dr Chong in an open spot or possibly at 12:30 or 2:30 if available.

## 2021-06-23 NOTE — TELEPHONE ENCOUNTER
Dr Washington informed and will have the pt re-scheduled     Pt may have to adjust his methotrexate but will keep pcp updated

## 2021-06-24 NOTE — TELEPHONE ENCOUNTER
Pt notified that he was approved for [facility] pt assistance program through [employer].  Pt to call if he needs help doing the first injection once he gets first shipment. Pt verbalized understanding.

## 2021-06-24 NOTE — TELEPHONE ENCOUNTER
Left message for pt to call back regarding Humira.    Pt approved through [employer] pt assistance [facility].

## 2021-06-24 NOTE — TELEPHONE ENCOUNTER
Per Dr Chong- pt can try Remicade infusions as his insurance copay is too expensive for the biologics. Pt notified and understands it may take a week or two to hear back from insurance. Pt states he would like to start something soon as the prednisone his eye doctor is rx'ing is making his blood sugars go up.

## 2021-06-24 NOTE — TELEPHONE ENCOUNTER
Patient was told that Humira was approved and that he should receive a letter. He has not received a letter yet. He does not know what he should be doing from here? Is he suppose to tell the pharmacy that since it is approved to ship it? Because last time when he spoke with the pharmacy, it was still costing him 1k$.    He can be reached @ [phone], any time is okay.

## 2021-06-24 NOTE — TELEPHONE ENCOUNTER
Spoke to [employer]- they [facility] and it was actually denied as pt doesn't qualify. Pt notified and will have Dr Chong advise tomorrow what other options are available for pt that may be more cost affordable.

## 2021-06-24 NOTE — TELEPHONE ENCOUNTER
Pt states he completed [employer] pt assistance forms for Humira and faxed them in, will have Dr Chong complete he page and fax in to [employer]. Per Dr Chong if Humira is not affordable for pt, Remicade infusions are another option he can do. Pt notified and would like to see if he qualifies for pt assistance first.

## 2021-06-24 NOTE — TELEPHONE ENCOUNTER
[employer] [place] is a bit too expensive for patient. He has applied for the assistance program but he doesn't believe he will qualify for it.    Wondering if there an alternative medication??    He can be reached @ [phone], ok to lv msg

## 2021-06-24 NOTE — TELEPHONE ENCOUNTER
Pt calling.  Pt states he is taking prednisone and his blood glucose has been running high - sometimes in the 300's.  Denies any current Sx of hyperglycemia - denies frequent urination, increased weakness, vomiting, rapid breathing.    PLAN  Advised pt hyperglycemia  is a common side effect when taking prednisone.  Blood glucose will decrease when no longer taking medication.  Call back if experiencing any Sx or any further questions or concerns.  Pt can be reached at [phone].  OK to leave a message on .  Tamika Gonzalez, RN, [facility] RN Triage/Med Refills

## 2021-06-25 NOTE — TELEPHONE ENCOUNTER
Dr Chong entered Remicade orders, waiting for insurance approval prior to being able to schedule pt. Pt updated on this and explained that insurance can take a week or two to get back to us regarding coverage determination. Pt frustrated that it is taking a while, explained that Humira was approved but due to high copay pt unable to afford, pt did not qualify for pt assistance [facility] for [facility], now trying Remicade. I explained these meds are quite expensive and we are trying to find on that will be affordable for pt. Pt states understanding but states his eye is not getting better and is seeing eye doctor today and is still on prednisone. Will call pt back with any updates I get.

## 2021-06-26 ENCOUNTER — HEALTH MAINTENANCE LETTER (OUTPATIENT)
Age: 76
End: 2021-06-26

## 2021-06-26 NOTE — PROGRESS NOTES
Progress Notes by Fei Lpoez MD at 2/2/2018  8:49 AM     Author: Fei Lopez MD Service: -- Author Type: Physician    Filed: 2/5/2018  5:34 PM Encounter Date: 2/2/2018 Status: Signed    : Fei Lopez MD (Physician)         Assessment and Plan:   Annual wellness assessment: Antonio is largely retired and lives independently with his wife.  No deficits are noted on review of his mini cog.  He does have an advanced directive which was reviewed.  His health risk assessment was reviewed    1. Prostate cancer screening  Pros and cons of PSA testing were discussed he is interested in this  - PSA (Prostatic-Specific Antigen), Annual Screen    2. Type 2 diabetes mellitus  He tolerates Metformin without adverse effects.  Hemoglobin A1c and microalbumin will be checked.  He did not start Januvia in the past after reading about potential adverse effects.  - Glycosylated Hemoglobin A1c  - Comprehensive Metabolic Panel  - Microalbumin, Random Urine    3. Essential hypertension  Good control on current regimen.  - Comprehensive Metabolic Panel  - Urinalysis-UC if Indicated    4. Colon cancer screening  He declines colonoscopy, but is interested in colon guard.  This will be ordered  - Cologuard    5. Muscle cramps  Magnesium and electrolytes will be checked  - Magnesium  - Comprehensive Metabolic Panel    6. Health care maintenance  Flu shot was advised.  New shingles vaccine for the future.  Health maintenance and screening issues were discussed  - Influenza High Dose, Seasonal 65+ yrs  - HM2(CBC w/o Differential)  7.  History of CVA: Remains on Plavix.  No recent issues  8.  History of iritis.  C-ANCA positive.  Followed by Dr. Chong.  Overall he is doing well  Plan:  1.  Labs as outlined.  He will be notified of test results  2.  Flu shot.  New shingles vaccine in the future  3.  Cologuard for colon cancer screening  4.  Clinic follow-up 3-6 months based on test results  Patient Instructions         Exercise  for a Healthier Heart  You may wonder how you can improve the health of your heart. If youre thinking about exercise, youre on the right track. You dont need to become an athlete, but you do need a certain amount of brisk exercise to help strengthen your heart. If you have been diagnosed with a heart condition, your doctor may recommend exercise to help stabilize your condition. To help make exercise a habit, choose safe, fun activities.       Be sure to check with your health care provider before starting an exercise program.    Why exercise?  Exercising regularly offers many healthy rewards. It can help you do all of the following:    Improve your blood cholesterol levels to help prevent further heart trouble    Lower your blood pressure to help prevent a stroke or heart attack    Control diabetes, or reduce your risk of getting this disease    Improve your heart and lung function    Reach and maintain a healthy weight    Make your muscles stronger and more limber so you can stay active    Prevent falls and fractures by slowing the loss of bone mass (osteoporosis)    Manage stress better  Exercise tips  Ease into your routine. Set small goals. Then build on them.  Exercise on most days. Aim for a total of 150 or more minutes of moderate to  vigorous intensity activity each week. Consider 40 minutes, 3 to 4 times a week. For best results, activity should last for 40 minutes on average. It is OK to work up to the 40 minute period over time. Examples of moderate-intensity activity is walking one mile in 15 minutes or 30 to 45 minutes of yard work.  Step up your daily activity level. Along with your exercise program, try being more active throughout the day. Walk instead of drive. Do more household tasks or yard work.  Choose one or more activities you enjoy. Walking is one of the easiest things you can do. You can also try swimming, riding a bike, or taking an exercise class.  Stop exercising and call your doctor if  you:    Have chest pain or feel dizzy or lightheaded    Feel burning, tightness, pressure, or heaviness in your chest, neck, shoulders, back, or arms    Have unusual shortness of breath    Have increased joint or muscle pain    Have palpitations or an irregular heartbeat      4840-5977 The [employer]. [address] [place] [address]. All rights reserved. This information is not intended as a substitute for professional medical care. Always follow your healthcare professional's instructions.             Understanding USDA MyPlate  The USDA (US Department of Agriculture) has guidelines to help you make healthy food choices. These are called MyPlate. MyPlate shows the food groups that make up healthy meals using the image of a place setting. Before you eat, think about the healthiest choices for what to put onto your plate or into your cup or bowl. To learn more about building a healthy plate, visit www.choosemyplate.gov.       The Food Groups    Fruits: Any fruit or 100% fruit juice counts as part of the Fruit Group. Fruits may be fresh, canned, frozen, or dried, and may be whole, cut-up, or pureed. Make half your plate fruits and vegetables.    Vegetables: Any vegetable or 100% vegetable juice counts as a member of the Vegetable Group. Vegetables may be fresh, frozen, canned, or dried. They can be served raw or cooked and may be whole, cut-up, or mashed. Make half your plate fruits and vegetables.     Grains: All foods made from grains are part of the Grains Group. These include wheat, rice, oats, cornmeal, and barley such as bread, pasta, oatmeal, cereal, tortillas, and grits. Grains should be no more than a quarter of your plate. At least half of your grains should be whole grains.    Protein: This group includes meat, poultry, seafood, beans and peas, eggs, processed soy products (like tofu), nuts (including nut butters), and seeds. Make protein choices no more than a quarter of your plate.  Meat and poultry choices should be lean or low fat.    Dairy: All fluid milk products and foods made from milk that contain calcium, like yogurt and cheese are part of the Dairy Group. (Foods that have little calcium, such as cream, butter, and cream cheese, are not part of the group.) Most dairy choices should be low-fat or fat-free.    Oils: These are fats that are liquid at room temperature. They include canola, corn, olive, soybean, and sunflower oil. Foods that are mainly oil include mayonnaise, certain salad dressings, and soft margarines. You should have only 5 to 7 teaspoons of oils a day. You probably already get this much from the food you eat.  Use CodeEvaler to Help Build Your Meals  The SuperTracker can help you plan and track your meals and activity. You can look up individual foods to see or compare their nutritional value. You can get guidelines for what and how much you should eat. You can compare your food choices. And you can assess personal physical activities and see ways you can improve. Go to www.[employer].gov/supertracker/.    3202-2637 The [employer]. [address] [place] [address]. All rights reserved. This information is not intended as a substitute for professional medical care. Always follow your healthcare professional's instructions.             Signs of Hearing Loss  Hearing loss is a problem shared by many people. In fact, it is one of the most common health conditions, particularly as people age. Most people over age 65 have some hearing loss, and by age 80, almost everyone does. Because hearing loss usually occurs slowly over the years, you may not realize your hearing ability has gotten worse.       Have your hearing checked  Contact your [facility] care provider if you:    Have to strain to hear normal conversation.    Have to watch other peoples faces very carefully to follow what theyre saying.    Need to ask people to repeat what theyve said.    Often  misunderstand what people are saying.    Turn the volume of the television or radio up so high that others complain.    Feel that people are mumbling when theyre talking to you.    Find that the effort to hear leaves you feeling tired and irritated.    Notice, when using the phone, that you hear better with 1 ear than the other.    8032-8367 The [employer]. [address] [place] [address]. All rights reserved. This information is not intended as a substitute for professional medical care. Always follow your healthcare professional's instructions.             Advance Directive  Patients advance directive was discussed and I am comfortable with the patients wishes.    Health Maintenance   Topic Date Due   ? ZOSTER VACCINE  07/21/2006   ? PNEUMOCOCCAL POLYSACCHARIDE VACCINE AGE 65 AND OVER  07/21/2011   ? DIABETES HEMOGLOBIN A1C  07/23/2017   ? DIABETES FOLLOW-UP  07/23/2017   ? INFLUENZA VACCINE RULE BASED (1) 08/01/2017   ? DIABETES OPHTHALMOLOGY EXAM  09/09/2017   ? COLONOSCOPY  10/24/2017   ? DIABETES FOOT EXAM  01/23/2018   ? DIABETES URINE MICROALBUMIN  01/23/2018   ? FALL RISK ASSESSMENT  01/23/2018   ? ADVANCE DIRECTIVES DISCUSSED WITH PATIENT  01/23/2022   ? TD 18+ HE  12/06/2023   ? PNEUMOCOCCAL CONJUGATE VACCINE FOR ADULTS (PCV13 OR PREVNAR)  Completed             The patient's current medical problems were reviewed.    The following health maintenance schedule was reviewed with the patient and provided in printed form in the after visit summary:     Health Maintenance   Topic Date Due   ? ZOSTER VACCINE  07/21/2006   ? PNEUMOCOCCAL POLYSACCHARIDE VACCINE AGE 65 AND OVER  07/21/2011   ? DIABETES HEMOGLOBIN A1C  07/23/2017   ? DIABETES FOLLOW-UP  07/23/2017   ? INFLUENZA VACCINE RULE BASED (1) 08/01/2017   ? DIABETES OPHTHALMOLOGY EXAM  09/09/2017   ? COLONOSCOPY  10/24/2017   ? DIABETES FOOT EXAM  01/23/2018   ? DIABETES URINE MICROALBUMIN  01/23/2018   ? FALL RISK ASSESSMENT  01/23/2018    ? ADVANCE DIRECTIVES DISCUSSED WITH PATIENT  01/23/2022   ? TD 18+ HE  12/06/2023   ? PNEUMOCOCCAL CONJUGATE VACCINE FOR ADULTS (PCV13 OR PREVNAR)  Completed        Subjective:   Chief Complaint: Frank Obando is an 72 y.o. male here for an Annual Wellness visit.     HPI:      Hypertension: He has a blood pressure cuff at home, but has not been using it consistently. His wife Valerie is concerned that he does not measure his blood pressure frequently enough. He reports he has never recorded a blood pressure as low as that taken in the office today.   Diabetes: He is not presently using Januvia, and is unsure whether his insurance covers Januvia.   Health Maintenance: He states he has not yet had a flu shot, but that he is interested in a vaccine today. He is not particularly intersted in pursuing a colonoscopy. He drank only about half of the gallon of prep in advance of his last colonoscopy in 2007. After drinking about half of the prep, he began vomiting. He is amenable to cologuard.    Review of Systems:    He reports no trouble with medications. His eyes have not bothered him lately. He has Medicare and BCBS. He has been getting leg cramps, some severe, that generally subside in a few minutes. He wife states that he sleeps all of the time. He falls asleep while watching television. He stays up late and gets up early, and takes a few small naps throughout the day. He believes this warps Gails perspective on how much he truly sleeps. He is sometimes a bit short of breath while going up steps. He reports no bladder trouble. He states he has experienced numbness in his toes for years. His last eye check was in November.  Please see above.  The rest of the review of systems are negative for all systems.  PFS  Social: He continues to work two days per week. 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 Valerie will not be joining him for the trip.       Patient Care Team:  Fei Lopez MD as PCP - General     Patient Active Problem List   Diagnosis   ? Nephrolithiasis   ? Anemia   ? Hyperlipidemia   ? Peripheral Neuropathy   ? 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   ? Hypertension   ? Type 2 Diabetes Mellitus   ? Carpal tunnel syndrome   ? Episcleritis   ? Right bundle branch block   ? Primary osteoarthritis of both knees   ? ALT (SGPT) level raised   ? Scleritis of left eye   ? Urinary hesitancy   ? Antineutrophil cytoplasmic antibody (ANCA) positive     Past Medical History:   Diagnosis Date   ? Anemia    ? Arthritis     osteoarthritis   ? Calculus of kidney    ? Diabetes mellitus    ? Episcleritis of left eye    ? Hyperlipidemia    ? Hypertension    ? Peripheral neuropathy    ? Stroke       Past Surgical History:   Procedure Laterality Date   ? CYSTOSCOPY TUMOR / CONDYLOMATA W/ LASER Left 7/18/2014   ? FL CYSTOSCOPY,INSERT URETERAL STENT      Description: Cystoscopy With Insertion Of Ureteral Stent Right;  Recorded: 10/14/2009;  Comments: stone      Family History   Problem Relation Age of Onset   ? Lung cancer Father    ? Coronary artery disease Mother    ? Diabetes Mother       Social History     Social History   ? Marital status:      Spouse name: N/A   ? Number of children: N/A   ? Years of education: N/A     Occupational History   ? Not on file.     Social History Main Topics   ? Smoking status: Never Smoker   ? Smokeless tobacco: Never Used   ? Alcohol use No   ? Drug use: No   ? Sexual activity: Not on file     Other Topics Concern   ? Not on file     Social History Narrative      Current Outpatient Prescriptions   Medication Sig Dispense Refill   ? atorvastatin (LIPITOR) 40 MG tablet TAKE 1 TABLET(40 MG) BY MOUTH DAILY 90 tablet 3   ? clopidogrel (PLAVIX) 75 mg tablet TAKE 1 TABLET BY MOUTH DAILY 90 tablet 3   ? cyanocobalamin (VITAMIN B-12) 1000 MCG tablet Take 2,000 mcg by mouth daily.     ? folic acid (FOLVITE) 1 MG  "tablet TAKE 1 TO 3 TABLETS BY MOUTH EVERY DAY AS DIRECTED. START WITH 1 TABLET DAILY AND SKIP DAY OF METHOTREXATE. 270 tablet 3   ? lisinopril (PRINIVIL,ZESTRIL) 10 MG tablet Take 1 tablet (10 mg total) by mouth daily. 90 tablet 2   ? metFORMIN (GLUCOPHAGE) 500 MG tablet TAKE 2 TABLETS BY MOUTH TWICE DAILY AT 6AM AND 4PM 360 tablet 3   ? methotrexate 2.5 MG tablet TAKE 6 TABLETS(15 MG) BY MOUTH 1 TIME A WEEK 24 tablet 1   ? omeprazole (PRILOSEC) 40 MG capsule TAKE 1 CAPSULE BY MOUTH EVERY DAY 90 capsule 2   ? tamsulosin (FLOMAX) 0.4 mg Cp24 TAKE ONE CAPSULE BY MOUTH DAILY AFTER SUPPER 90 capsule 2   ? BREEZE 2 TEST STRIPS Strp USE AS DIRECTED UP TO TWICE DAILY 200 strip 3   ? sitaGLIPtin (JANUVIA) 100 MG tablet Take 1 tablet (100 mg total) by mouth daily. With or without food 90 tablet 3     No current facility-administered medications for this visit.       Objective:   Vital Signs:   Visit Vitals   ? /70 (Patient Site: Left Arm, Patient Position: Sitting, Cuff Size: Adult Regular)   ? Pulse 70   ? Ht 5' 9.75\" (1.772 m)   ? Wt 202 lb 6.4 oz (91.8 kg)   ? SpO2 97%   ? BMI 29.25 kg/m2        VisionScreening:  No exam data present     PHYSICAL EXAM  Constitutional:   Reveals an alert, pleasant man.  Vitals: per nursing notes.  HEENT:  Ears: Left ear contains a minor amount of cerumen.   Eyes:  EOMs full, reactive. PERRL.  Oropharynx:   Mouth and throat clear, no thrush or exudate.  Neck:  Supple, no carotid bruits or adenopathy.  Back:  No spine or CVA pain.  Thorax:  No bony deformities.  Lungs: Some crackles, otherwise clear to A&P without rales or wheezes.  Respiratory effort normal.  Cardiac:   Regular rate and rhythm, normal S1, S2, no murmur or gallop. No indication of JVD.   Abdomen:  Right lower quadrant scar. Soft, active bowel sounds without bruits, mass, or tenderness.  :   No testicular masses, no penile lesions.    Rectal: No masses.   Prostate without nodules.  No inguinal hernias.  Extremities:   " No peripheral edema, pulses in the feet intact.  Skin:  No jaundice, peripheral cyanosis or lesions to suggest malignancy.  Neuro:  Alert and oriented. Cranial nerves, motor, sensory exams are intact.  No gross focal deficits. No stocking neuropathy noted.    Psychiatric:  Memory intact, mood appropriate.      Assessment Results 2/2/2018   Activities of Daily Living No help needed   Instrumental Activities of Daily Living No help needed   Get Up and Go Score Less than 12 seconds   Mini Cog Total Score 5   Some recent data might be hidden     A Mini-Cog score of 0-2 suggests the possibility of dementia, score of 3-5 suggests no dementia    Identified Health Risks:     He is at risk for lack of exercise and has been provided with information to increase physical activity for the benefit of his well-being.  The patient was counseled and encouraged to consider modifying their diet and eating habits. He was provided with information on recommended healthy diet options.  The patient was provided with written information regarding signs of hearing loss.  Patient's advanced directive was discussed and I am comfortable with the patient's wishes.    QUALITY MEASURES:    ADDITIONAL HISTORY SUMMARIZED (2): Reviewed colonoscopy in 2007 from the [facility] as part of a study.   DECISION TO OBTAIN EXTRA INFORMATION (1): None   RADIOLOGY TESTS (1): None  LABS (1): Reviewed, ordered labs, TSH, lipids, CMP.   MEDICINE TESTS (1): None  INDEPENDENT REVIEW (2 each): None     Total data points: 3    The visit lasted a total of 26 minutes face to face with the patient. Over 50% of the time was spent counseling and educating the patient about hypertension.     I, Jaylin Paz, am scribing for and in the presence of, Dr. Lopez.    I, Dr. Lopez, personally performed the services described in this documentation, as scribed by Jaylin Paz in my presence, and it is both accurate and complete.

## 2021-06-26 NOTE — TELEPHONE ENCOUNTER
RN cannot approve Refill Request    RN can NOT refill this medication PCP messaged that patient is overdue for Labs and Office Visit. Last office visit: 10/11/2019 Fei Lopez MD Last Physical: 9/27/2019 Last MTM visit: Visit date not found Last visit same specialty: 10/11/2019 Fei Lopez MD.  Next visit within 3 mo: Visit date not found  Next physical within 3 mo: Visit date not found      Sara Chavez, [facility] Triage/Med Refill 6/21/2021    Requested Prescriptions   Pending Prescriptions Disp Refills     propranoloL (INDERAL) 20 MG tablet [Pharmacy Med Name: PROPRANOLOL 20MG TABLETS] 180 tablet 0     Sig: TAKE 1 TABLET(20 MG) BY MOUTH TWICE DAILY       Beta-Blockers Refill Protocol Failed - 6/21/2021  8:59 AM        Failed - PCP or prescribing provider visit in past 12 months or next 3 months     Last office visit with prescriber/PCP: 10/11/2019 Fei Lopez MD OR same dept: Visit date not found OR same specialty: 10/11/2019 Fei Lopez MD  Last physical: 9/27/2019 Last MTM visit: Visit date not found   Next visit within 3 mo: Visit date not found  Next physical within 3 mo: Visit date not found  Prescriber OR PCP: Fei Lopez MD  Last diagnosis associated with med order: 1. Essential hypertension, benign  - propranoloL (INDERAL) 20 MG tablet [Pharmacy Med Name: PROPRANOLOL 20MG TABLETS]; TAKE 1 TABLET(20 MG) BY MOUTH TWICE DAILY  Dispense: 180 tablet; Refill: 0    2. Anemia, iron deficiency  - FEROSUL 325 mg (65 mg iron) tablet [Pharmacy Med Name: FERROUS SULFATE 325MG (5GR) TABS]; TAKE 1 TABLET BY MOUTH EVERY DAY WITH BREAKFAST  Dispense: 90 tablet; Refill: 0    3. Hyperlipidemia  - atorvastatin (LIPITOR) 40 MG tablet [Pharmacy Med Name: ATORVASTATIN 40MG TABLETS]; TAKE 1 TABLET(40 MG) BY MOUTH DAILY  Dispense: 90 tablet; Refill: 0    4. Type 2 diabetes mellitus (H)  - atorvastatin (LIPITOR) 40 MG tablet [Pharmacy Med Name: ATORVASTATIN 40MG TABLETS]; TAKE 1 TABLET(40 MG)  BY MOUTH DAILY  Dispense: 90 tablet; Refill: 0  - metFORMIN (GLUCOPHAGE) 500 MG tablet [Pharmacy Med Name: METFORMIN 500MG TABLETS]; TAKE 2 TABLETS BY MOUTH TWICE DAILY AT 6 AM AND AT 4 PM  Dispense: 360 tablet; Refill: 3    5. Heartburn  - omeprazole (PRILOSEC) 40 MG capsule [Pharmacy Med Name: OMEPRAZOLE 40MG CAPSULES]; TAKE 1 CAPSULE(40 MG) BY MOUTH DAILY  Dispense: 90 capsule; Refill: 0    6. Urinary hesitancy  - tamsulosin (FLOMAX) 0.4 mg cap [Pharmacy Med Name: TAMSULOSIN 0.4MG CAPSULES]; TAKE 1 CAPSULE BY MOUTH DAILY AFTER SUPPER  Dispense: 90 capsule; Refill: 0    7. History of CVA (cerebrovascular accident)  - clopidogreL (PLAVIX) 75 mg tablet [Pharmacy Med Name: CLOPIDOGREL 75MG TABLETS]; TAKE 1 TABLET(75 MG) BY MOUTH DAILY  Dispense: 90 tablet; Refill: 3    If protocol passes may refill for 12 months if within 3 months of last provider visit (or a total of 15 months).             Passed - Blood pressure filed in past 12 months     BP Readings from Last 1 Encounters:   04/20/21 114/70                FEROSUL 325 mg (65 mg iron) tablet [Pharmacy Med Name: FERROUS SULFATE 325MG (5GR) TABS] 90 tablet 0     Sig: TAKE 1 TABLET BY MOUTH EVERY DAY WITH BREAKFAST       Iron Supplements Refill Protocol Failed - 6/21/2021  8:59 AM        Failed - PCP or prescribing provider visit in past 12 months       Last office visit with prescriber/PCP: 10/11/2019 Fei Lopez MD OR same dept: Visit date not found OR same specialty: 10/11/2019 Fei Lopez MD  Last physical: 9/27/2019 Last MTM visit: Visit date not found   Next visit within 3 mo: Visit date not found  Next physical within 3 mo: Visit date not found  Prescriber OR PCP: Fei Lopez MD  Last diagnosis associated with med order: 1. Essential hypertension, benign  - propranoloL (INDERAL) 20 MG tablet [Pharmacy Med Name: PROPRANOLOL 20MG TABLETS]; TAKE 1 TABLET(20 MG) BY MOUTH TWICE DAILY  Dispense: 180 tablet; Refill: 0    2. Anemia, iron deficiency  -  FEROSUL 325 mg (65 mg iron) tablet [Pharmacy Med Name: FERROUS SULFATE 325MG (5GR) TABS]; TAKE 1 TABLET BY MOUTH EVERY DAY WITH BREAKFAST  Dispense: 90 tablet; Refill: 0    3. Hyperlipidemia  - atorvastatin (LIPITOR) 40 MG tablet [Pharmacy Med Name: ATORVASTATIN 40MG TABLETS]; TAKE 1 TABLET(40 MG) BY MOUTH DAILY  Dispense: 90 tablet; Refill: 0    4. Type 2 diabetes mellitus (H)  - atorvastatin (LIPITOR) 40 MG tablet [Pharmacy Med Name: ATORVASTATIN 40MG TABLETS]; TAKE 1 TABLET(40 MG) BY MOUTH DAILY  Dispense: 90 tablet; Refill: 0  - metFORMIN (GLUCOPHAGE) 500 MG tablet [Pharmacy Med Name: METFORMIN 500MG TABLETS]; TAKE 2 TABLETS BY MOUTH TWICE DAILY AT 6 AM AND AT 4 PM  Dispense: 360 tablet; Refill: 3    5. Heartburn  - omeprazole (PRILOSEC) 40 MG capsule [Pharmacy Med Name: OMEPRAZOLE 40MG CAPSULES]; TAKE 1 CAPSULE(40 MG) BY MOUTH DAILY  Dispense: 90 capsule; Refill: 0    6. Urinary hesitancy  - tamsulosin (FLOMAX) 0.4 mg cap [Pharmacy Med Name: TAMSULOSIN 0.4MG CAPSULES]; TAKE 1 CAPSULE BY MOUTH DAILY AFTER SUPPER  Dispense: 90 capsule; Refill: 0    7. History of CVA (cerebrovascular accident)  - clopidogreL (PLAVIX) 75 mg tablet [Pharmacy Med Name: CLOPIDOGREL 75MG TABLETS]; TAKE 1 TABLET(75 MG) BY MOUTH DAILY  Dispense: 90 tablet; Refill: 3    If protocol passes may refill for 12 months if within 3 months of last provider visit (or a total of 15 months).             Passed - Hemoglobin or Hematocrit in last 12 months     Hemoglobin   Date Value Ref Range Status   02/08/2021 9.8 (L) 14.0 - 18.0 g/dL Final     Hematocrit   Date Value Ref Range Status   02/08/2021 30.6 (L) 40.0 - 54.0 % Final                atorvastatin (LIPITOR) 40 MG tablet [Pharmacy Med Name: ATORVASTATIN 40MG TABLETS] 90 tablet 0     Sig: TAKE 1 TABLET(40 MG) BY MOUTH DAILY       Statins Refill Protocol (Hmg CoA Reductase Inhibitors) Failed - 6/21/2021  8:59 AM        Failed - PCP or prescribing provider visit in past 12 months      Last office  visit with prescriber/PCP: 10/11/2019 Fei Lopez MD OR same dept: Visit date not found OR same specialty: 10/11/2019 Fei Lopez MD  Last physical: 9/27/2019 Last MTM visit: Visit date not found   Next visit within 3 mo: Visit date not found  Next physical within 3 mo: Visit date not found  Prescriber OR PCP: Fei Lopez MD  Last diagnosis associated with med order: 1. Essential hypertension, benign  - propranoloL (INDERAL) 20 MG tablet [Pharmacy Med Name: PROPRANOLOL 20MG TABLETS]; TAKE 1 TABLET(20 MG) BY MOUTH TWICE DAILY  Dispense: 180 tablet; Refill: 0    2. Anemia, iron deficiency  - FEROSUL 325 mg (65 mg iron) tablet [Pharmacy Med Name: FERROUS SULFATE 325MG (5GR) TABS]; TAKE 1 TABLET BY MOUTH EVERY DAY WITH BREAKFAST  Dispense: 90 tablet; Refill: 0    3. Hyperlipidemia  - atorvastatin (LIPITOR) 40 MG tablet [Pharmacy Med Name: ATORVASTATIN 40MG TABLETS]; TAKE 1 TABLET(40 MG) BY MOUTH DAILY  Dispense: 90 tablet; Refill: 0    4. Type 2 diabetes mellitus (H)  - atorvastatin (LIPITOR) 40 MG tablet [Pharmacy Med Name: ATORVASTATIN 40MG TABLETS]; TAKE 1 TABLET(40 MG) BY MOUTH DAILY  Dispense: 90 tablet; Refill: 0  - metFORMIN (GLUCOPHAGE) 500 MG tablet [Pharmacy Med Name: METFORMIN 500MG TABLETS]; TAKE 2 TABLETS BY MOUTH TWICE DAILY AT 6 AM AND AT 4 PM  Dispense: 360 tablet; Refill: 3    5. Heartburn  - omeprazole (PRILOSEC) 40 MG capsule [Pharmacy Med Name: OMEPRAZOLE 40MG CAPSULES]; TAKE 1 CAPSULE(40 MG) BY MOUTH DAILY  Dispense: 90 capsule; Refill: 0    6. Urinary hesitancy  - tamsulosin (FLOMAX) 0.4 mg cap [Pharmacy Med Name: TAMSULOSIN 0.4MG CAPSULES]; TAKE 1 CAPSULE BY MOUTH DAILY AFTER SUPPER  Dispense: 90 capsule; Refill: 0    7. History of CVA (cerebrovascular accident)  - clopidogreL (PLAVIX) 75 mg tablet [Pharmacy Med Name: CLOPIDOGREL 75MG TABLETS]; TAKE 1 TABLET(75 MG) BY MOUTH DAILY  Dispense: 90 tablet; Refill: 3    If protocol passes may refill for 12 months if within 3 months of last  provider visit (or a total of 15 months).                omeprazole (PRILOSEC) 40 MG capsule [Pharmacy Med Name: OMEPRAZOLE 40MG CAPSULES] 90 capsule 0     Sig: TAKE 1 CAPSULE(40 MG) BY MOUTH DAILY       GI Medications Refill Protocol Failed - 6/21/2021  8:59 AM        Failed - PCP or prescribing provider visit in last 12 or next 3 months.     Last office visit with prescriber/PCP: 10/11/2019 Fei Lopez MD OR same dept: Visit date not found OR same specialty: 10/11/2019 Fei Lopez MD  Last physical: 9/27/2019 Last MTM visit: Visit date not found   Next visit within 3 mo: Visit date not found  Next physical within 3 mo: Visit date not found  Prescriber OR PCP: Fei Lopez MD  Last diagnosis associated with med order: 1. Essential hypertension, benign  - propranoloL (INDERAL) 20 MG tablet [Pharmacy Med Name: PROPRANOLOL 20MG TABLETS]; TAKE 1 TABLET(20 MG) BY MOUTH TWICE DAILY  Dispense: 180 tablet; Refill: 0    2. Anemia, iron deficiency  - FEROSUL 325 mg (65 mg iron) tablet [Pharmacy Med Name: FERROUS SULFATE 325MG (5GR) TABS]; TAKE 1 TABLET BY MOUTH EVERY DAY WITH BREAKFAST  Dispense: 90 tablet; Refill: 0    3. Hyperlipidemia  - atorvastatin (LIPITOR) 40 MG tablet [Pharmacy Med Name: ATORVASTATIN 40MG TABLETS]; TAKE 1 TABLET(40 MG) BY MOUTH DAILY  Dispense: 90 tablet; Refill: 0    4. Type 2 diabetes mellitus (H)  - atorvastatin (LIPITOR) 40 MG tablet [Pharmacy Med Name: ATORVASTATIN 40MG TABLETS]; TAKE 1 TABLET(40 MG) BY MOUTH DAILY  Dispense: 90 tablet; Refill: 0  - metFORMIN (GLUCOPHAGE) 500 MG tablet [Pharmacy Med Name: METFORMIN 500MG TABLETS]; TAKE 2 TABLETS BY MOUTH TWICE DAILY AT 6 AM AND AT 4 PM  Dispense: 360 tablet; Refill: 3    5. Heartburn  - omeprazole (PRILOSEC) 40 MG capsule [Pharmacy Med Name: OMEPRAZOLE 40MG CAPSULES]; TAKE 1 CAPSULE(40 MG) BY MOUTH DAILY  Dispense: 90 capsule; Refill: 0    6. Urinary hesitancy  - tamsulosin (FLOMAX) 0.4 mg cap [Pharmacy Med Name: TAMSULOSIN 0.4MG  CAPSULES]; TAKE 1 CAPSULE BY MOUTH DAILY AFTER SUPPER  Dispense: 90 capsule; Refill: 0    7. History of CVA (cerebrovascular accident)  - clopidogreL (PLAVIX) 75 mg tablet [Pharmacy Med Name: CLOPIDOGREL 75MG TABLETS]; TAKE 1 TABLET(75 MG) BY MOUTH DAILY  Dispense: 90 tablet; Refill: 3    If protocol passes may refill for 12 months if within 3 months of last provider visit (or a total of 15 months).                tamsulosin (FLOMAX) 0.4 mg cap [Pharmacy Med Name: TAMSULOSIN 0.4MG CAPSULES] 90 capsule 0     Sig: TAKE 1 CAPSULE BY MOUTH DAILY AFTER SUPPER       Alfuzosin/Tamsulosin/Silodosin Refill Protocol  Failed - 6/21/2021  8:59 AM        Failed - PCP or prescribing provider visit in past 12 months       Last office visit with prescriber/PCP: 10/11/2019 Fei Lopez MD OR same dept: Visit date not found OR same specialty: 10/11/2019 Fei Lopez MD  Last physical: 9/27/2019 Last MTM visit: Visit date not found   Next visit within 3 mo: Visit date not found  Next physical within 3 mo: Visit date not found  Prescriber OR PCP: Fei Lopez MD  Last diagnosis associated with med order: 1. Essential hypertension, benign  - propranoloL (INDERAL) 20 MG tablet [Pharmacy Med Name: PROPRANOLOL 20MG TABLETS]; TAKE 1 TABLET(20 MG) BY MOUTH TWICE DAILY  Dispense: 180 tablet; Refill: 0    2. Anemia, iron deficiency  - FEROSUL 325 mg (65 mg iron) tablet [Pharmacy Med Name: FERROUS SULFATE 325MG (5GR) TABS]; TAKE 1 TABLET BY MOUTH EVERY DAY WITH BREAKFAST  Dispense: 90 tablet; Refill: 0    3. Hyperlipidemia  - atorvastatin (LIPITOR) 40 MG tablet [Pharmacy Med Name: ATORVASTATIN 40MG TABLETS]; TAKE 1 TABLET(40 MG) BY MOUTH DAILY  Dispense: 90 tablet; Refill: 0    4. Type 2 diabetes mellitus (H)  - atorvastatin (LIPITOR) 40 MG tablet [Pharmacy Med Name: ATORVASTATIN 40MG TABLETS]; TAKE 1 TABLET(40 MG) BY MOUTH DAILY  Dispense: 90 tablet; Refill: 0  - metFORMIN (GLUCOPHAGE) 500 MG tablet [Pharmacy Med Name: METFORMIN  500MG TABLETS]; TAKE 2 TABLETS BY MOUTH TWICE DAILY AT 6 AM AND AT 4 PM  Dispense: 360 tablet; Refill: 3    5. Heartburn  - omeprazole (PRILOSEC) 40 MG capsule [Pharmacy Med Name: OMEPRAZOLE 40MG CAPSULES]; TAKE 1 CAPSULE(40 MG) BY MOUTH DAILY  Dispense: 90 capsule; Refill: 0    6. Urinary hesitancy  - tamsulosin (FLOMAX) 0.4 mg cap [Pharmacy Med Name: TAMSULOSIN 0.4MG CAPSULES]; TAKE 1 CAPSULE BY MOUTH DAILY AFTER SUPPER  Dispense: 90 capsule; Refill: 0    7. History of CVA (cerebrovascular accident)  - clopidogreL (PLAVIX) 75 mg tablet [Pharmacy Med Name: CLOPIDOGREL 75MG TABLETS]; TAKE 1 TABLET(75 MG) BY MOUTH DAILY  Dispense: 90 tablet; Refill: 3    If protocol passes may refill for 12 months if within 3 months of last provider visit (or a total of 15 months).                metFORMIN (GLUCOPHAGE) 500 MG tablet [Pharmacy Med Name: METFORMIN 500MG TABLETS] 360 tablet 3     Sig: TAKE 2 TABLETS BY MOUTH TWICE DAILY AT 6 AM AND AT 4 PM       Metformin Refill Protocol Failed - 6/21/2021  8:59 AM        Failed - Visit with PCP or prescribing provider visit in last 6 months or next 3 months     Last office visit with prescriber/PCP: Visit date not found OR same dept: Visit date not found OR same specialty: 10/11/2019 Fei Lopez MD Last physical: Visit date not found Last MTM visit: Visit date not found         Next appt within 3 mo: Visit date not found  Next physical within 3 mo: Visit date not found  Prescriber OR PCP: Fei Lopez MD  Last diagnosis associated with med order: 1. Essential hypertension, benign  - propranoloL (INDERAL) 20 MG tablet [Pharmacy Med Name: PROPRANOLOL 20MG TABLETS]; TAKE 1 TABLET(20 MG) BY MOUTH TWICE DAILY  Dispense: 180 tablet; Refill: 0    2. Anemia, iron deficiency  - FEROSUL 325 mg (65 mg iron) tablet [Pharmacy Med Name: FERROUS SULFATE 325MG (5GR) TABS]; TAKE 1 TABLET BY MOUTH EVERY DAY WITH BREAKFAST  Dispense: 90 tablet; Refill: 0    3. Hyperlipidemia  - atorvastatin  (LIPITOR) 40 MG tablet [Pharmacy Med Name: ATORVASTATIN 40MG TABLETS]; TAKE 1 TABLET(40 MG) BY MOUTH DAILY  Dispense: 90 tablet; Refill: 0    4. Type 2 diabetes mellitus (H)  - atorvastatin (LIPITOR) 40 MG tablet [Pharmacy Med Name: ATORVASTATIN 40MG TABLETS]; TAKE 1 TABLET(40 MG) BY MOUTH DAILY  Dispense: 90 tablet; Refill: 0  - metFORMIN (GLUCOPHAGE) 500 MG tablet [Pharmacy Med Name: METFORMIN 500MG TABLETS]; TAKE 2 TABLETS BY MOUTH TWICE DAILY AT 6 AM AND AT 4 PM  Dispense: 360 tablet; Refill: 3    5. Heartburn  - omeprazole (PRILOSEC) 40 MG capsule [Pharmacy Med Name: OMEPRAZOLE 40MG CAPSULES]; TAKE 1 CAPSULE(40 MG) BY MOUTH DAILY  Dispense: 90 capsule; Refill: 0    6. Urinary hesitancy  - tamsulosin (FLOMAX) 0.4 mg cap [Pharmacy Med Name: TAMSULOSIN 0.4MG CAPSULES]; TAKE 1 CAPSULE BY MOUTH DAILY AFTER SUPPER  Dispense: 90 capsule; Refill: 0    7. History of CVA (cerebrovascular accident)  - clopidogreL (PLAVIX) 75 mg tablet [Pharmacy Med Name: CLOPIDOGREL 75MG TABLETS]; TAKE 1 TABLET(75 MG) BY MOUTH DAILY  Dispense: 90 tablet; Refill: 3     If protocol passes may refill for 12 months if within 3 months of last provider visit (or a total of 15 months).           Failed - A1C in last 6 months     Hemoglobin A1c   Date Value Ref Range Status   09/27/2019 7.5 (H) 3.5 - 6.0 % Final               Failed - Microalbumin in last year      Microalbumin, Random Urine   Date Value Ref Range Status   09/27/2019 0.64 0.00 - 1.99 mg/dL Final                  Passed - Blood pressure in last 12 months     BP Readings from Last 1 Encounters:   04/20/21 114/70             Passed - LFT or AST or ALT in last 12 months     Albumin   Date Value Ref Range Status   02/08/2021 3.7 3.5 - 5.0 g/dL Final     Bilirubin, Total   Date Value Ref Range Status   09/27/2019 0.8 0.0 - 1.0 mg/dL Final     Bilirubin, Direct   Date Value Ref Range Status   08/04/2016 0.3 <=0.5 mg/dL Final     Alkaline Phosphatase   Date Value Ref Range Status    09/27/2019 145 (H) 45 - 120 U/L Final     AST   Date Value Ref Range Status   09/27/2019 72 (H) 0 - 40 U/L Final     ALT   Date Value Ref Range Status   02/08/2021 24 0 - 45 U/L Final     Protein, Total   Date Value Ref Range Status   09/27/2019 8.2 (H) 6.0 - 8.0 g/dL Final                Passed - GFR or Serum Creatinine in last 6 months     GFR MDRD Non Af Amer   Date Value Ref Range Status   02/08/2021 48 (L) >60 mL/min/1.73m2 Final     GFR MDRD Af Amer   Date Value Ref Range Status   02/08/2021 59 (L) >60 mL/min/1.73m2 Final                clopidogreL (PLAVIX) 75 mg tablet [Pharmacy Med Name: CLOPIDOGREL 75MG TABLETS] 90 tablet 3     Sig: TAKE 1 TABLET(75 MG) BY MOUTH DAILY       Clopidogrel/Prasugrel/Ticagrelor Refill Protocol Failed - 6/21/2021  8:59 AM        Failed - PCP or prescribing provider visit in past 6 months       Last office visit with prescriber/PCP: Visit date not found OR same dept: Visit date not found OR same specialty: 10/11/2019 Fei Lopez MD Last physical: Visit date not found Last MTM visit: Visit date not found     Next appt within 3 mo: Visit date not found  Next physical within 3 mo: Visit date not found  Prescriber OR PCP: Fei Lopez MD  Last diagnosis associated with med order: 1. Essential hypertension, benign  - propranoloL (INDERAL) 20 MG tablet [Pharmacy Med Name: PROPRANOLOL 20MG TABLETS]; TAKE 1 TABLET(20 MG) BY MOUTH TWICE DAILY  Dispense: 180 tablet; Refill: 0    2. Anemia, iron deficiency  - FEROSUL 325 mg (65 mg iron) tablet [Pharmacy Med Name: FERROUS SULFATE 325MG (5GR) TABS]; TAKE 1 TABLET BY MOUTH EVERY DAY WITH BREAKFAST  Dispense: 90 tablet; Refill: 0    3. Hyperlipidemia  - atorvastatin (LIPITOR) 40 MG tablet [Pharmacy Med Name: ATORVASTATIN 40MG TABLETS]; TAKE 1 TABLET(40 MG) BY MOUTH DAILY  Dispense: 90 tablet; Refill: 0    4. Type 2 diabetes mellitus (H)  - atorvastatin (LIPITOR) 40 MG tablet [Pharmacy Med Name: ATORVASTATIN 40MG TABLETS]; TAKE 1  TABLET(40 MG) BY MOUTH DAILY  Dispense: 90 tablet; Refill: 0  - metFORMIN (GLUCOPHAGE) 500 MG tablet [Pharmacy Med Name: METFORMIN 500MG TABLETS]; TAKE 2 TABLETS BY MOUTH TWICE DAILY AT 6 AM AND AT 4 PM  Dispense: 360 tablet; Refill: 3    5. Heartburn  - omeprazole (PRILOSEC) 40 MG capsule [Pharmacy Med Name: OMEPRAZOLE 40MG CAPSULES]; TAKE 1 CAPSULE(40 MG) BY MOUTH DAILY  Dispense: 90 capsule; Refill: 0    6. Urinary hesitancy  - tamsulosin (FLOMAX) 0.4 mg cap [Pharmacy Med Name: TAMSULOSIN 0.4MG CAPSULES]; TAKE 1 CAPSULE BY MOUTH DAILY AFTER SUPPER  Dispense: 90 capsule; Refill: 0    7. History of CVA (cerebrovascular accident)  - clopidogreL (PLAVIX) 75 mg tablet [Pharmacy Med Name: CLOPIDOGREL 75MG TABLETS]; TAKE 1 TABLET(75 MG) BY MOUTH DAILY  Dispense: 90 tablet; Refill: 3    If protocol passes may refill for 6 months if within 3 months of last provider visit (or a total of 9 months).              Passed - Hemoglobin in past 12 months     Hemoglobin   Date Value Ref Range Status   02/08/2021 9.8 (L) 14.0 - 18.0 g/dL Final

## 2021-06-26 NOTE — PROGRESS NOTES
Patient presented to infusion for Remicade. Peripheral IV placed to left wrist with good blood return. Premedicated with tylenol and benadryl. Remicade infused, tolerated well. PIV flushed with saline and removed. Patient left infusion center ambulating independently.

## 2021-07-03 NOTE — ADDENDUM NOTE
Addendum Note by Francisca Teran RN at 3/18/2019  3:44 PM     Author: Francisca Teran RN Service: -- Author Type: Registered Nurse    Filed: 3/18/2019  3:44 PM Encounter Date: 2/15/2019 Status: Signed    : Francisca Teran RN (Registered Nurse)    Addended by: FRANCISCA TERAN on: 3/18/2019 03:44 PM        Modules accepted: Orders

## 2021-07-03 NOTE — ADDENDUM NOTE
Addendum Note by Norbert Blood CMA at 1/24/2017  5:20 PM     Author: Norbert Blood CMA Service: -- Author Type: Certified Medical Assistant    Filed: 1/24/2017  5:20 PM Encounter Date: 1/23/2017 Status: Signed    : Norbert Blood CMA (Certified Medical Assistant)    Addended by: NORBERT BLOOD on: 1/24/2017 05:20 PM        Modules accepted: Orders

## 2021-07-03 NOTE — ADDENDUM NOTE
Addendum Note by Prerna Harding MBBS at 8/10/2020  9:30 AM     Author: Prerna Harding MBBS Service: -- Author Type: Physician    Filed: 10/22/2020  6:21 PM Encounter Date: 8/10/2020 Status: Signed    : Prerna Harding MBBS (Physician)    Addended by: PRERNA HARDING on: 10/22/2020 06:21 PM        Modules accepted: Orders

## 2021-07-03 NOTE — ADDENDUM NOTE
Addendum Note by Fei Patel MD at 2/6/2018  6:46 PM     Author: Fei Patel MD Service: -- Author Type: Physician    Filed: 2/6/2018  6:46 PM Encounter Date: 2/2/2018 Status: Signed    : Fei Patel MD (Physician)    Addended by: FEI PAETL on: 2/6/2018 06:46 PM        Modules accepted: Orders

## 2021-07-03 NOTE — ADDENDUM NOTE
Addendum Note by Fei Patel MD at 8/6/2018  6:15 PM     Author: Fei Patel MD Service: -- Author Type: Physician    Filed: 8/6/2018  6:15 PM Encounter Date: 8/6/2018 Status: Signed    : Fei Patel MD (Physician)    Addended by: FEI PATEL on: 8/6/2018 06:15 PM        Modules accepted: Orders

## 2021-07-03 NOTE — ADDENDUM NOTE
Addendum Note by Prerna Harding MBBS at 2/5/2019  8:30 AM     Author: Prerna Harding MBBS Service: -- Author Type: Physician    Filed: 3/18/2019  8:20 AM Encounter Date: 2/5/2019 Status: Signed    : Prerna Harding MBBS (Physician)    Addended by: PRERNA HARDING on: 3/18/2019 08:20 AM        Modules accepted: Orders

## 2021-07-27 ENCOUNTER — TRANSFERRED RECORDS (OUTPATIENT)
Dept: HEALTH INFORMATION MANAGEMENT | Facility: CLINIC | Age: 76
End: 2021-07-27

## 2021-07-27 LAB — RETINOPATHY: NORMAL

## 2021-08-02 ENCOUNTER — NURSE TRIAGE (OUTPATIENT)
Dept: NURSING | Facility: CLINIC | Age: 76
End: 2021-08-02

## 2021-08-02 NOTE — TELEPHONE ENCOUNTER
COVID 19 Nurse Triage Plan/Patient Instructions    Please be aware that novel coronavirus (COVID-19) may be circulating in the community. If you develop symptoms such as fever, cough, or SOB or if you have concerns about the presence of another infection including coronavirus (COVID-19), please contact your health care provider or visit [email or web address].     Disposition/Instructions    In-Person Visit with provider recommended. Reference Visit Selection Guide.    Thank you for taking steps to prevent the spread of this virus.  o Limit your contact with others.  o Wear a simple mask to cover your cough.  o Wash your hands well and often.    Resources    [facility]: About COVID-19: [email or web address]    CDC: What to Do If You're Sick: www.cdc.gov/coronavirus/2019-ncov/about/steps-when-sick.html    CDC: Ending Home Isolation: www.cdc.gov/coronavirus/2019-ncov/hcp/disposition-in-home-patients.html     CDC: Caring for Someone: www.cdc.gov/coronavirus/2019-ncov/if-you-are-sick/care-for-someone.html     [facility]: Interim Guidance for Hospital Discharge to Home: www.health.[facility].[place]/diseases/coronavirus/hcp/hospdischarge.pdf    [facility] clinical trials (COVID-19 research studies): clinicalaffairs.[facility]-clinical-trials     Below are the COVID-19 hotlines at the [place] [facility] of [facility] ([facility]). Interpreters are available.   o For health questions: Call 450-977-4594 or 1-536.563.2684 (7 a.m. to 7 p.m.)  o For questions about schools and childcare: Call 407-459-7412 or 1-150.414.5841 (7 a.m. to 7 p.m.)     Caller was transferred to [facility] for triage because he mentioned that he gets shortness of breath when he walks up a flight of stairs of long distances. He had also mentioned this to his PCP at his last visit last year and estimates that it has been happening for about 14 months and has not changed or gotten worse. No chest pain, no swelling in ankles. Also mentions that  "back itchs quite a lot, mostly between shoulder blades.              Reason for Disposition    MILD longstanding difficulty breathing (e.g., speaks in phrases, SOB even at rest, pulse 100-120) and SAME as normal    Additional Information    Negative: Breathing stopped and hasn't returned    Negative: Choking on something    Negative: SEVERE difficulty breathing (e.g., struggling for each breath, speaks in single words, pulse > 120)    Negative: Bluish (or gray) lips or face    Negative: Difficult to awaken or acting confused (e.g., disoriented, slurred speech)    Negative: Passed out (i.e., fainted, collapsed and was not responding)    Negative: Wheezing started suddenly after medicine, an allergic food, or bee sting    Negative: Stridor    Negative: Slow, shallow and weak breathing    Negative: Sounds like a life-threatening emergency to the triager    Negative: Chest pain    Negative: Wheezing (high pitched whistling sound) and previous asthma attacks or use of asthma medicines    Negative: Difficulty breathing and only present when coughing    Negative: Difficulty breathing and only from stuffy or runny nose    Negative: Difficulty breathing and within 14 days of COVID-19 Exposure    Negative: MODERATE difficulty breathing (e.g., speaks in phrases, SOB even at rest, pulse 100-120) of new onset or worse than normal    Negative: Wheezing can be heard across the room    Negative: Drooling or spitting out saliva (because can't swallow)    Negative: Any history of prior \"blood clot\" in leg or lungs    Negative: Illness requiring prolonged bedrest in past month (e.g., immobilization, long hospital stay)    Negative: Hip or leg fracture (broken bone) in past month (or had cast on leg or ankle in past month)    Negative: Major surgery in the past month    Negative: Long-distance travel in past month (e.g., car, bus, train, plane; with trip lasting 6 or more hours)    Negative: Extra heart beats OR irregular heart beating " "  (i.e., \"palpitations\")    Negative: Fever > 103 F (39.4 C)    Negative: Fever > 101 F (38.3 C) and over 60 years of age    Negative: Fever > 100.0 F (37.8 C) and bedridden (e.g., nursing home patient, stroke, chronic illness, recovering from surgery)    Negative: Fever > 100.0 F (37.8 C) and diabetes mellitus or weak immune system (e.g., HIV positive, cancer chemo, splenectomy, organ transplant, chronic steroids)    Negative: Periods where breathing stops and then resumes normally and bedridden (e.g., nursing home patient, CVA)    Negative: Pregnant or postpartum (from 0 to 6 weeks after delivery)    Negative: Patient sounds very sick or weak to the triager    Negative: MILD difficulty breathing (e.g., minimal/no SOB at rest, SOB with walking, pulse < 100) of new onset or worse than normal    Negative: Longstanding difficulty breathing (e.g., CHF, COPD, emphysema) and worse than normal    Negative: Longstanding difficulty breathing and not responding to usual therapy    Negative: Continuous (nonstop) coughing    Negative: Patient wants to be seen    Negative: MODERATE longstanding difficulty breathing (e.g., speaks in phrases, SOB even at rest, pulse 100-120) and SAME as normal    Answer Assessment - Initial Assessment Questions  1. RESPIRATORY STATUS: \"Describe your breathing?\" (e.g., wheezing, shortness of breath, unable to speak, severe coughing)       Has to stop and rest when he walk long distances or climbs stairs  2. ONSET: \"When did this breathing problem begin?\"       More than a year ago  3. PATTERN \"Does the difficult breathing come and go, or has it been constant since it started?\"       All the time  4. SEVERITY: \"How bad is your breathing?\" (e.g., mild, moderate, severe)     - MILD: No SOB at rest, mild SOB with walking, speaks normally in sentences, can lay down, no retractions, pulse < 100.     - MODERATE: SOB at rest, SOB with minimal exertion and prefers to sit, cannot lie down flat, speaks in " "phrases, mild retractions, audible wheezing, pulse 100-120.     - SEVERE: Very SOB at rest, speaks in single words, struggling to breathe, sitting hunched forward, retractions, pulse > 120       Mild to moderate  5. RECURRENT SYMPTOM: \"Have you had difficulty breathing before?\" If so, ask: \"When was the last time?\" and \"What happened that time?\"       Every day because he has stairs in his housse  6. CARDIAC HISTORY: \"Do you have any history of heart disease?\" (e.g., heart attack, angina, bypass surgery, angioplasty)       No heart history, has had a stroke  7. LUNG HISTORY: \"Do you have any history of lung disease?\"  (e.g., pulmonary embolus, asthma, emphysema)      no  8. CAUSE: \"What do you think is causing the breathing problem?\"       ?  9. OTHER SYMPTOMS: \"Do you have any other symptoms? (e.g., dizziness, runny nose, cough, chest pain, fever)      no  10. PREGNANCY: \"Is there any chance you are pregnant?\" \"When was your last menstrual period?\"        no  11. TRAVEL: \"Have you traveled out of the country in the last month?\" (e.g., travel history, exposures)        no    Protocols used: BREATHING DIFFICULTY-A-[place]      "

## 2021-08-10 ENCOUNTER — INFUSION THERAPY VISIT (OUTPATIENT)
Dept: INFUSION THERAPY | Facility: CLINIC | Age: 76
End: 2021-08-10
Attending: INTERNAL MEDICINE
Payer: MEDICARE

## 2021-08-10 VITALS
SYSTOLIC BLOOD PRESSURE: 109 MMHG | OXYGEN SATURATION: 97 % | TEMPERATURE: 98.1 F | HEART RATE: 69 BPM | RESPIRATION RATE: 16 BRPM | DIASTOLIC BLOOD PRESSURE: 65 MMHG

## 2021-08-10 DIAGNOSIS — H44.112 PANUVEITIS OF LEFT EYE: ICD-10-CM

## 2021-08-10 DIAGNOSIS — H44.119 PANUVEITIS, UNSPECIFIED EYE: Primary | ICD-10-CM

## 2021-08-10 PROCEDURE — 250N000013 HC RX MED GY IP 250 OP 250 PS 637: Performed by: INTERNAL MEDICINE

## 2021-08-10 PROCEDURE — 258N000003 HC RX IP 258 OP 636: Performed by: INTERNAL MEDICINE

## 2021-08-10 PROCEDURE — 250N000011 HC RX IP 250 OP 636: Performed by: INTERNAL MEDICINE

## 2021-08-10 PROCEDURE — 96413 CHEMO IV INFUSION 1 HR: CPT

## 2021-08-10 RX ORDER — EPINEPHRINE 1 MG/ML
0.3 INJECTION, SOLUTION INTRAMUSCULAR; SUBCUTANEOUS EVERY 5 MIN PRN
Status: DISCONTINUED | OUTPATIENT
Start: 2021-08-10 | End: 2021-08-10 | Stop reason: HOSPADM

## 2021-08-10 RX ORDER — DIPHENHYDRAMINE HCL 25 MG
25 TABLET ORAL ONCE
Status: COMPLETED | OUTPATIENT
Start: 2021-08-10 | End: 2021-08-10

## 2021-08-10 RX ORDER — ALBUTEROL SULFATE 0.83 MG/ML
2.5 SOLUTION RESPIRATORY (INHALATION)
Status: DISCONTINUED | OUTPATIENT
Start: 2021-08-10 | End: 2021-08-10 | Stop reason: HOSPADM

## 2021-08-10 RX ORDER — ALBUTEROL SULFATE 90 UG/1
1-2 AEROSOL, METERED RESPIRATORY (INHALATION)
Status: DISCONTINUED | OUTPATIENT
Start: 2021-08-10 | End: 2021-08-10 | Stop reason: HOSPADM

## 2021-08-10 RX ORDER — ALBUTEROL SULFATE 90 UG/1
1-2 AEROSOL, METERED RESPIRATORY (INHALATION)
Status: CANCELLED
Start: 2021-10-05

## 2021-08-10 RX ORDER — ACETAMINOPHEN 325 MG/1
650 TABLET ORAL ONCE
Status: COMPLETED | OUTPATIENT
Start: 2021-08-10 | End: 2021-08-10

## 2021-08-10 RX ORDER — HEPARIN SODIUM (PORCINE) LOCK FLUSH IV SOLN 100 UNIT/ML 100 UNIT/ML
5 SOLUTION INTRAVENOUS
Status: CANCELLED | OUTPATIENT
Start: 2021-10-05

## 2021-08-10 RX ORDER — ALBUTEROL SULFATE 0.83 MG/ML
2.5 SOLUTION RESPIRATORY (INHALATION)
Status: CANCELLED | OUTPATIENT
Start: 2021-10-05

## 2021-08-10 RX ORDER — METHYLPREDNISOLONE SODIUM SUCCINATE 125 MG/2ML
125 INJECTION, POWDER, LYOPHILIZED, FOR SOLUTION INTRAMUSCULAR; INTRAVENOUS
Status: DISCONTINUED | OUTPATIENT
Start: 2021-08-10 | End: 2021-08-10 | Stop reason: HOSPADM

## 2021-08-10 RX ORDER — MEPERIDINE HYDROCHLORIDE 50 MG/ML
25 INJECTION INTRAMUSCULAR; INTRAVENOUS; SUBCUTANEOUS EVERY 30 MIN PRN
Status: CANCELLED | OUTPATIENT
Start: 2021-10-05

## 2021-08-10 RX ORDER — HEPARIN SODIUM,PORCINE 10 UNIT/ML
5 VIAL (ML) INTRAVENOUS
Status: CANCELLED | OUTPATIENT
Start: 2021-10-05

## 2021-08-10 RX ORDER — METHYLPREDNISOLONE SODIUM SUCCINATE 125 MG/2ML
125 INJECTION, POWDER, LYOPHILIZED, FOR SOLUTION INTRAMUSCULAR; INTRAVENOUS
Status: CANCELLED
Start: 2021-10-05

## 2021-08-10 RX ORDER — NALOXONE HYDROCHLORIDE 0.4 MG/ML
0.2 INJECTION, SOLUTION INTRAMUSCULAR; INTRAVENOUS; SUBCUTANEOUS
Status: CANCELLED | OUTPATIENT
Start: 2021-10-05

## 2021-08-10 RX ORDER — EPINEPHRINE 1 MG/ML
0.3 INJECTION, SOLUTION INTRAMUSCULAR; SUBCUTANEOUS EVERY 5 MIN PRN
Status: CANCELLED | OUTPATIENT
Start: 2021-10-05

## 2021-08-10 RX ORDER — MEPERIDINE HYDROCHLORIDE 50 MG/ML
25 INJECTION INTRAMUSCULAR; INTRAVENOUS; SUBCUTANEOUS EVERY 30 MIN PRN
Status: DISCONTINUED | OUTPATIENT
Start: 2021-08-10 | End: 2021-08-10 | Stop reason: HOSPADM

## 2021-08-10 RX ORDER — ACETAMINOPHEN 325 MG/1
650 TABLET ORAL ONCE
Status: CANCELLED
Start: 2021-10-05 | End: 2021-10-05

## 2021-08-10 RX ORDER — NALOXONE HYDROCHLORIDE 0.4 MG/ML
0.2 INJECTION, SOLUTION INTRAMUSCULAR; INTRAVENOUS; SUBCUTANEOUS
Status: DISCONTINUED | OUTPATIENT
Start: 2021-08-10 | End: 2021-08-10 | Stop reason: HOSPADM

## 2021-08-10 RX ORDER — HEPARIN SODIUM (PORCINE) LOCK FLUSH IV SOLN 100 UNIT/ML 100 UNIT/ML
5 SOLUTION INTRAVENOUS
Status: DISCONTINUED | OUTPATIENT
Start: 2021-08-10 | End: 2021-08-10 | Stop reason: HOSPADM

## 2021-08-10 RX ORDER — HEPARIN SODIUM,PORCINE 10 UNIT/ML
5 VIAL (ML) INTRAVENOUS
Status: DISCONTINUED | OUTPATIENT
Start: 2021-08-10 | End: 2021-08-10 | Stop reason: HOSPADM

## 2021-08-10 RX ORDER — DIPHENHYDRAMINE HYDROCHLORIDE 50 MG/ML
50 INJECTION INTRAMUSCULAR; INTRAVENOUS
Status: CANCELLED
Start: 2021-10-05

## 2021-08-10 RX ORDER — DIPHENHYDRAMINE HYDROCHLORIDE 50 MG/ML
50 INJECTION INTRAMUSCULAR; INTRAVENOUS
Status: DISCONTINUED | OUTPATIENT
Start: 2021-08-10 | End: 2021-08-10 | Stop reason: HOSPADM

## 2021-08-10 RX ADMIN — INFLIXIMAB 500 MG: 100 INJECTION, POWDER, LYOPHILIZED, FOR SOLUTION INTRAVENOUS at 11:51

## 2021-08-10 RX ADMIN — SODIUM CHLORIDE 250 ML: 9 INJECTION, SOLUTION INTRAVENOUS at 11:00

## 2021-08-10 RX ADMIN — DIPHENHYDRAMINE HCL 25 MG: 25 TABLET ORAL at 11:06

## 2021-08-10 RX ADMIN — ACETAMINOPHEN 650 MG: 325 TABLET ORAL at 11:05

## 2021-08-10 NOTE — PROGRESS NOTES
~~~ NOTE: If the patient answers yes to any of the questions below, hold the infusion and contact ordering provider or on-call provider.    1. Have you recently had an elevated temperature, fever, chills, productive cough, coughing for 3 weeks or longer or hemoptysis,  abnormal vital signs, night sweats,  chest pain or have you noticed a decrease in your appetite, unexplained weight loss or fatigue? No  2. Do you have any open wounds or new incisions? No  3. Do you have any recent or upcoming hospitalizations, surgeries or dental procedures? No  4. Do you currently have or recently have had any signs of illness or infection or are you on any antibiotics? No  5. Have you had any new, sudden or worsening abdominal pain? No  6. Have you or anyone in your household received a live vaccination in the past 4 weeks? Please note:  No live vaccines while on biologic/chemotherapy until 6 months after the last treatment.  Patient can receive the flu vaccine (shot only) and the pneumovax.  It is optimal for the patient to get these vaccines mid cycle, but they can be given at any time as long as it is not on the day of the infusion. No  7. Have you recently been diagnosed with any new nervous system diseases (ie. Multiple sclerosis, Guillain [place], seizures, neurological changes) or cancer diagnosis? Are you on any form of radiation or chemotherapy? No  8. Are you pregnant or breast feeding or do you have plans of pregnancy in the future? No  9. Have you been having any signs of worsening depression or suicidal ideations?  (benlysta only) No  10. Have there been any other new onset medical symptoms? No  Infusion Nursing Note:  Frank Obando presents today for  remicade  Patient seen by provider today: No   present during visit today: Not Applicable.    Note: na.      Intravenous Access:  Peripheral IV placed      Discharge Plan:   Discharge instructions reviewed with: Patient.      Jeny Epps,  RN

## 2021-09-20 ENCOUNTER — OFFICE VISIT (OUTPATIENT)
Dept: INTERNAL MEDICINE | Facility: CLINIC | Age: 76
End: 2021-09-20
Payer: MEDICARE

## 2021-09-20 VITALS
SYSTOLIC BLOOD PRESSURE: 110 MMHG | WEIGHT: 205.1 LBS | HEART RATE: 68 BPM | BODY MASS INDEX: 29.36 KG/M2 | HEIGHT: 70 IN | DIASTOLIC BLOOD PRESSURE: 76 MMHG | OXYGEN SATURATION: 98 %

## 2021-09-20 DIAGNOSIS — N18.30 CRF (CHRONIC RENAL FAILURE), STAGE 3 (MODERATE) (H): Chronic | ICD-10-CM

## 2021-09-20 DIAGNOSIS — M17.0 PRIMARY OSTEOARTHRITIS OF BOTH KNEES: ICD-10-CM

## 2021-09-20 DIAGNOSIS — N20.0 CALCULUS OF KIDNEY: ICD-10-CM

## 2021-09-20 DIAGNOSIS — Z00.00 MEDICARE ANNUAL WELLNESS VISIT, SUBSEQUENT: Primary | ICD-10-CM

## 2021-09-20 DIAGNOSIS — E11.8 TYPE 2 DIABETES MELLITUS WITH COMPLICATION, WITHOUT LONG-TERM CURRENT USE OF INSULIN (H): ICD-10-CM

## 2021-09-20 DIAGNOSIS — D50.9 IRON DEFICIENCY ANEMIA, UNSPECIFIED IRON DEFICIENCY ANEMIA TYPE: ICD-10-CM

## 2021-09-20 DIAGNOSIS — R18.8 CIRRHOSIS OF LIVER WITH ASCITES, UNSPECIFIED HEPATIC CIRRHOSIS TYPE (H): ICD-10-CM

## 2021-09-20 DIAGNOSIS — E78.00 HYPERCHOLESTEROLEMIA: ICD-10-CM

## 2021-09-20 DIAGNOSIS — E83.42 HYPOMAGNESEMIA: ICD-10-CM

## 2021-09-20 DIAGNOSIS — K74.60 CIRRHOSIS OF LIVER WITH ASCITES, UNSPECIFIED HEPATIC CIRRHOSIS TYPE (H): ICD-10-CM

## 2021-09-20 DIAGNOSIS — Z12.5 SCREENING FOR PROSTATE CANCER: ICD-10-CM

## 2021-09-20 DIAGNOSIS — R23.1 PALLOR: ICD-10-CM

## 2021-09-20 DIAGNOSIS — Z23 NEED FOR INFLUENZA VACCINATION: ICD-10-CM

## 2021-09-20 DIAGNOSIS — R00.2 PALPITATIONS: ICD-10-CM

## 2021-09-20 DIAGNOSIS — R06.02 SHORTNESS OF BREATH: ICD-10-CM

## 2021-09-20 DIAGNOSIS — H44.112 PANUVEITIS OF LEFT EYE: ICD-10-CM

## 2021-09-20 LAB
AFP SERPL-MCNC: <2 NG/ML
ALBUMIN SERPL-MCNC: 3.5 G/DL (ref 3.5–5)
ALBUMIN UR-MCNC: NEGATIVE MG/DL
ALP SERPL-CCNC: 77 U/L (ref 45–120)
ALT SERPL W P-5'-P-CCNC: 20 U/L (ref 0–45)
ANION GAP SERPL CALCULATED.3IONS-SCNC: 10 MMOL/L (ref 5–18)
APPEARANCE UR: CLEAR
AST SERPL W P-5'-P-CCNC: 25 U/L (ref 0–40)
ATRIAL RATE - MUSE: 71 BPM
BASOPHILS # BLD AUTO: 0 10E3/UL (ref 0–0.2)
BASOPHILS NFR BLD AUTO: 1 %
BILIRUB SERPL-MCNC: 0.3 MG/DL (ref 0–1)
BILIRUB UR QL STRIP: NEGATIVE
BUN SERPL-MCNC: 17 MG/DL (ref 8–28)
CALCIUM SERPL-MCNC: 9 MG/DL (ref 8.5–10.5)
CHLORIDE BLD-SCNC: 105 MMOL/L (ref 98–107)
CHOLEST SERPL-MCNC: 126 MG/DL
CO2 SERPL-SCNC: 23 MMOL/L (ref 22–31)
COLOR UR AUTO: YELLOW
CREAT SERPL-MCNC: 1.2 MG/DL (ref 0.7–1.3)
CREAT UR-MCNC: 116 MG/DL
DIASTOLIC BLOOD PRESSURE - MUSE: NORMAL MMHG
EOSINOPHIL # BLD AUTO: 0.1 10E3/UL (ref 0–0.7)
EOSINOPHIL NFR BLD AUTO: 3 %
ERYTHROCYTE [DISTWIDTH] IN BLOOD BY AUTOMATED COUNT: 13.8 % (ref 10–15)
FASTING STATUS PATIENT QL REPORTED: YES
FERRITIN SERPL-MCNC: 4 NG/ML (ref 27–300)
GFR SERPL CREATININE-BSD FRML MDRD: 59 ML/MIN/1.73M2
GLUCOSE BLD-MCNC: 132 MG/DL (ref 70–125)
GLUCOSE UR STRIP-MCNC: NEGATIVE MG/DL
HBA1C MFR BLD: 5.9 % (ref 0–5.6)
HCT VFR BLD AUTO: 21.6 % (ref 40–53)
HDLC SERPL-MCNC: 53 MG/DL
HGB BLD-MCNC: 6.4 G/DL (ref 13.3–17.7)
HGB UR QL STRIP: NEGATIVE
IMM GRANULOCYTES # BLD: 0 10E3/UL
IMM GRANULOCYTES NFR BLD: 0 %
INTERPRETATION ECG - MUSE: NORMAL
KETONES UR STRIP-MCNC: NEGATIVE MG/DL
LDLC SERPL CALC-MCNC: 54 MG/DL
LEUKOCYTE ESTERASE UR QL STRIP: NEGATIVE
LYMPHOCYTES # BLD AUTO: 1.1 10E3/UL (ref 0.8–5.3)
LYMPHOCYTES NFR BLD AUTO: 26 %
MAGNESIUM SERPL-MCNC: 1.6 MG/DL (ref 1.8–2.6)
MCH RBC QN AUTO: 25.7 PG (ref 26.5–33)
MCHC RBC AUTO-ENTMCNC: 29.6 G/DL (ref 31.5–36.5)
MCV RBC AUTO: 87 FL (ref 78–100)
MICROALBUMIN UR-MCNC: 0.95 MG/DL (ref 0–1.99)
MICROALBUMIN/CREAT UR: 8.2 MG/G CR
MONOCYTES # BLD AUTO: 0.5 10E3/UL (ref 0–1.3)
MONOCYTES NFR BLD AUTO: 11 %
NEUTROPHILS # BLD AUTO: 2.6 10E3/UL (ref 1.6–8.3)
NEUTROPHILS NFR BLD AUTO: 59 %
NITRATE UR QL: NEGATIVE
P AXIS - MUSE: 27 DEGREES
PH UR STRIP: 6 [PH] (ref 5–8)
PLATELET # BLD AUTO: 126 10E3/UL (ref 150–450)
POTASSIUM BLD-SCNC: 4.6 MMOL/L (ref 3.5–5)
PR INTERVAL - MUSE: 156 MS
PROT SERPL-MCNC: 7.6 G/DL (ref 6–8)
PSA SERPL-MCNC: <0.1 UG/L (ref 0–6.5)
QRS DURATION - MUSE: 146 MS
QT - MUSE: 486 MS
QTC - MUSE: 528 MS
R AXIS - MUSE: 46 DEGREES
RBC # BLD AUTO: 2.49 10E6/UL (ref 4.4–5.9)
SODIUM SERPL-SCNC: 138 MMOL/L (ref 136–145)
SP GR UR STRIP: 1.02 (ref 1–1.03)
SYSTOLIC BLOOD PRESSURE - MUSE: NORMAL MMHG
T AXIS - MUSE: 0 DEGREES
TRIGL SERPL-MCNC: 93 MG/DL
UROBILINOGEN UR STRIP-ACNC: 0.2 E.U./DL
VENTRICULAR RATE- MUSE: 71 BPM
WBC # BLD AUTO: 4.4 10E3/UL (ref 4–11)

## 2021-09-20 PROCEDURE — 80053 COMPREHEN METABOLIC PANEL: CPT | Performed by: INTERNAL MEDICINE

## 2021-09-20 PROCEDURE — 82043 UR ALBUMIN QUANTITATIVE: CPT | Performed by: INTERNAL MEDICINE

## 2021-09-20 PROCEDURE — G0008 ADMIN INFLUENZA VIRUS VAC: HCPCS | Performed by: INTERNAL MEDICINE

## 2021-09-20 PROCEDURE — 36415 COLL VENOUS BLD VENIPUNCTURE: CPT | Performed by: INTERNAL MEDICINE

## 2021-09-20 PROCEDURE — 90662 IIV NO PRSV INCREASED AG IM: CPT | Performed by: INTERNAL MEDICINE

## 2021-09-20 PROCEDURE — 93005 ELECTROCARDIOGRAM TRACING: CPT | Performed by: INTERNAL MEDICINE

## 2021-09-20 PROCEDURE — 85025 COMPLETE CBC W/AUTO DIFF WBC: CPT | Performed by: INTERNAL MEDICINE

## 2021-09-20 PROCEDURE — 82105 ALPHA-FETOPROTEIN SERUM: CPT | Performed by: INTERNAL MEDICINE

## 2021-09-20 PROCEDURE — 81003 URINALYSIS AUTO W/O SCOPE: CPT | Performed by: INTERNAL MEDICINE

## 2021-09-20 PROCEDURE — 80061 LIPID PANEL: CPT | Performed by: INTERNAL MEDICINE

## 2021-09-20 PROCEDURE — 83036 HEMOGLOBIN GLYCOSYLATED A1C: CPT | Performed by: INTERNAL MEDICINE

## 2021-09-20 PROCEDURE — G0103 PSA SCREENING: HCPCS | Performed by: INTERNAL MEDICINE

## 2021-09-20 PROCEDURE — 99214 OFFICE O/P EST MOD 30 MIN: CPT | Mod: 25 | Performed by: INTERNAL MEDICINE

## 2021-09-20 PROCEDURE — 83735 ASSAY OF MAGNESIUM: CPT | Performed by: INTERNAL MEDICINE

## 2021-09-20 PROCEDURE — 93010 ELECTROCARDIOGRAM REPORT: CPT | Mod: OFF | Performed by: INTERNAL MEDICINE

## 2021-09-20 PROCEDURE — 82728 ASSAY OF FERRITIN: CPT | Performed by: INTERNAL MEDICINE

## 2021-09-20 PROCEDURE — G0439 PPPS, SUBSEQ VISIT: HCPCS | Performed by: INTERNAL MEDICINE

## 2021-09-20 ASSESSMENT — ACTIVITIES OF DAILY LIVING (ADL): CURRENT_FUNCTION: NO ASSISTANCE NEEDED

## 2021-09-20 ASSESSMENT — MIFFLIN-ST. JEOR: SCORE: 1663.64

## 2021-09-20 NOTE — PATIENT INSTRUCTIONS
1.  Flu shot today    2.  Third covid vaccine in November    3.  EKG today    4.  I will notify you of test results.  Anticipate evaluation of anemia    5.  Schedule abdominal ultrasound    6  Reassess in one month

## 2021-09-20 NOTE — PROGRESS NOTES
ANNUAL WELLNESS VISIT    Assessment and Plan:     DX: 1. Medicare annual wellness visit, subsequent  Antonio is  and retired.  No cognitive deficits are noted.  His health risk assessment was reviewed.  He recently has noted increased dyspnea limits activity.  Otherwise, health maintenance habits are good.  He does have a healthcare directive    2. Type 2 diabetes mellitus with complication, without long-term current use of insulin (H)  He has not had a hemoglobin A1c checked since 9/27/2019.  This was 7.5 at that time.  He is on Metformin and pioglitazone pioglitazone was added due to cirrhosis felt nonalcoholic in etiology.  Labs will be checked today  - **A1C FUTURE 3mo; Future  - Comprehensive metabolic panel; Future  - Albumin Random Urine Quantitative with Creat Ratio; Future  - **A1C FUTURE 3mo  - Comprehensive metabolic panel  - Albumin Random Urine Quantitative with Creat Ratio    3. Hypercholesterolemia  He is on atorvastatin 40 mg daily.  Fasting lipid cascade will be checked  - Lipid panel reflex to direct LDL Fasting; Future  - Lipid panel reflex to direct LDL Fasting    4. Cirrhosis of liver with ascites, unspecified hepatic cirrhosis type (H)  Previous CT scan showed fatty infiltration of the liver with splenomegaly and varices.. He denied any past history of excessive alcohol intake.  He should be followed with serial alpha-fetoprotein values and ultrasounds to screen for malignancy.  He is on propranolol to hopefully decrease portal pressure  - Comprehensive metabolic panel; Future  - AFP tumor marker; Future  - US Abdomen Limited; Future  - Comprehensive metabolic panel  - AFP tumor marker    5. CRF (chronic renal failure), stage 3 (moderate)  Renal function will be rechecked    6. Nephrolithiasis  Analysis is checked.  He has no recent problems suggestive of stones  - UA Macro with Reflex to Micro and Culture - lab collect; Future  - UA Macro with Reflex to Micro and Culture - lab collect    7.  Primary osteoarthritis of both knees  Some knee discomfort with activity    8. Panuveitis of left eye  He is on Remicade per Dr. Chong.  Eyes have not troubled him lately    9. Screening for prostate cancer  PSA will be checked  - PSA, screen; Future  - PSA, screen    10. Iron deficiency anemia, unspecified iron deficiency anemia type  He has been on ferrous sulfate 325 mg daily.  Hemoglobin was 9.8 when asked by Dr. Chong in February.  I have not seen him in the clinic since October 2019.  He currently appears markedly pale.  Expect the hemoglobin will be much lower.  He last had colonoscopy in 2018.  He would be at risk for upper GI sources of blood loss given his liver disease with suggestion of varices  - Ferritin; Future  - CBC with platelets differential; Future  - Ferritin  - CBC with platelets differential    11. Hypomagnesemia  He is on a PPI chronically.  Magnesium will be checked  - Magnesium; Future  - Magnesium    12. Palpitations  Irregular beats were noted on exam.  EKG shows sinus rhythm with right bundle branch block and frequent PVCs  - EKG 12-lead, tracing only    13. Need for influenza vaccination  Flu shot is advised.  Third Covid vaccine was recommended for later  - INFLUENZA, QUAD, HD, PF, 65+ (FLUZONE HD)    14. Shortness of breath  He complains of shortness of breath with minor activity such as walking to the mailbox.  As noted above, he appears profoundly pale.  I suspect his dyspnea is related to marked anemia.  Hemogram will be checked    15. Pallor  As noted above.  I expect marked anemia         Patient Instructions   1.  Flu shot today    2.  Third covid vaccine in November    3.  EKG today    4.  I will notify you of test results.  Anticipate evaluation of anemia.  If iron deficiency is confirmed, I would recommend upper GI endoscopy.  He also would require increase in iron supplement.    5.  Schedule abdominal ultrasound    6  Reassess in one month    7.  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  He made a good recovery and has had no further issues.  Ongoing use of Plavix needs to be reassessed given the marked anemia       The patient's current medical problems were reviewed.          The following health maintenance items are reviewed in Epic and correct as of today:  Health Maintenance Due   Topic Date Due     DIABETIC FOOT EXAM  Never done     ANNUAL REVIEW OF HM ORDERS  Never done     A1C  03/27/2020     MEDICARE ANNUAL WELLNESS VISIT  09/27/2020     BMP  09/27/2020     LIPID  09/27/2020     MICROALBUMIN  09/27/2020     FALL RISK ASSESSMENT  09/27/2020     INFLUENZA VACCINE (1) 09/01/2021       Appropriate preventive services were discussed with this patient, including applicable screening as appropriate for cardiovascular disease, diabetes, osteopenia/osteoporosis, and glaucoma.  As appropriate for age/gender, discussed screening for colorectal cancer, prostate cancer, breast cancer, and cervical cancer. Checklist reviewing preventive services available has been given to the patient.    Reviewed patients plan of care and provided an AVS. The Basic Care Plan for Frank meets the Care Plan requirement. This Care Plan has been established and reviewed with the Patient, and printed in the AVS.    The following health maintenance schedule was reviewed with the patient and provided in printed form in the after visit summary:   Health Maintenance   Topic Date Due     DIABETIC FOOT EXAM  Never done     ANNUAL REVIEW OF HM ORDERS  Never done     A1C  03/27/2020     MEDICARE ANNUAL WELLNESS VISIT  09/27/2020     BMP  09/27/2020     LIPID  09/27/2020     MICROALBUMIN  09/27/2020     FALL RISK ASSESSMENT  09/27/2020     INFLUENZA VACCINE (1) 09/01/2021     EYE EXAM  07/27/2022     DTAP/TDAP/TD IMMUNIZATION (2 - Td or Tdap) 12/06/2023     ADVANCE CARE PLANNING  09/27/2024     COLORECTAL CANCER SCREENING  05/11/2028     HEPATITIS C SCREENING   "Completed     PHQ-2  Completed     Pneumococcal Vaccine: 65+ Years  Completed     ZOSTER IMMUNIZATION  Completed     AORTIC ANEURYSM SCREENING (SYSTEM ASSIGNED)  Completed     COVID-19 Vaccine  Completed     IPV IMMUNIZATION  Aged Out     MENINGITIS IMMUNIZATION  Aged Out        Subjective:   Frank is a 76 year old male who presents to the clinic today for an Annual Wellness Visit.    CHIEF COMPLAINT:  Chief Complaint   Patient presents with     Wellness Visit       HPI: 76-year-old man seen for an annual wellness visit.  Antonio was last seen in October 2019.  He denies any cognitive deficits.  Health risk assessment was reviewed.  He complains of shortness of breath with minor activity such as walking to the mailbox.  This has been present for months.  There is no orthopnea PND or chest pain.  Health risk assessment was reviewed.  Overall, health maintenance habits have been good aside from his difficulty with shortness of breath.  He does have a healthcare directive    He has a history of panuveitis.  He is on Remicade per Dr. Chong.  He previously was on methotrexate.  Eyes currently are doing well    He has a history of type 2 diabetes.  He is on Metformin.  He has also on pioglitazone both for diabetes and for hepatic cirrhosis.  Hemoglobin A1c was 7.5 when last checked in 2019.  He had a diabetic eye check in August    He has no history of excessive alcohol intake.  Liver disease is felt to be related either to methotrexate or to \"fatty infiltration\".  He is on propranolol to hopefully decrease portal pressure    He appears markedly pale today.  He has been on an iron supplement due to iron deficiency.  Hemoglobin was 9.8 when last checked by Dr. Chong in February.  Last colonoscopy was in 2018.  He did have varices suggested on previous CT scan, but has not had upper GI endoscopy    He has a prior history of CVA with excellent recovery.  He has been on Plavix.    He remains on lisinopril for essential " hypertension.  Pressure has been good.    He is on omeprazole for reflux.      He has on atorvastatin for hypercholesterolemia    Review of Systems: Notable predominantly for shortness of breath with minor activity.  He also notes some knee discomfort.  No chest pain.  No peripheral edema.  He denies black tarry stools or hematemesis    Patient Care Team:  Fei Lopez MD as PCP - General (Internal Medicine)  Fei Lopez MD as Assigned PCP  Prerna Chong MBBS as Assigned Rheumatology Provider   Brandon Cherry MD ophthalmology  Patient Active Problem List   Diagnosis     Nephrolithiasis     Normocytic anemia     Hypercholesterolemia     Peripheral Neuropathy     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     Essential hypertension, benign     Type 2 diabetes mellitus with complication, without long-term current use of insulin (H)     Carpal tunnel syndrome     Right bundle branch block     Primary osteoarthritis of both knees     ALT (SGPT) level raised     Scleritis of left eye     Urinary hesitancy     Antineutrophil cytoplasmic antibody (ANCA) positive     High risk medication use     Abdominal pain     Cholecystitis     Dyslipidemia     Thrombocytopenia (H)     Cirrhosis of liver with ascites, unspecified hepatic cirrhosis type (H)     Hyponatremia     Panuveitis, unspecified eye     CRF (chronic renal failure), stage 3 (moderate)     Past Medical History:   Diagnosis Date     Anemia      Arthritis     osteoarthritis     Calculus of kidney      Diabetes mellitus (H)      Episcleritis of left eye      Hyperlipidemia      Hypertension      Peripheral neuropathy      Stroke (H)       Past Surgical History:   Procedure Laterality Date     CYSTOSCOPY TUMOR / CONDYLOMATA W/ LASER Left 7/18/2014      CYSTOSCOPY,INSERT URETERAL STENT      Description: Cystoscopy With Insertion Of Ureteral Stent Right;  Recorded: 10/14/2009;  Comments: stone      Family History   Problem Relation Age of Onset      Lung Cancer Father      Coronary Artery Disease Mother      Diabetes Mother       Social History     Socioeconomic History     Marital status:      Spouse name: Not on file     Number of children: Not on file     Years of education: Not on file     Highest education level: Not on file   Occupational History     Not on file   Tobacco Use     Smoking status: Never Smoker     Smokeless tobacco: Never Used   Substance and Sexual Activity     Alcohol use: No     Drug use: No     Sexual activity: Not on file   Other Topics Concern     Not on file   Social History Narrative     Not on file     Social Determinants of Health     Financial Resource Strain:      Difficulty of Paying Living Expenses:    Food Insecurity:      Worried About Running Out of Food in the Last Year:      Ran Out of Food in the Last Year:    Transportation Needs:      Lack of Transportation (Medical):      Lack of Transportation (Non-Medical):    Physical Activity:      Days of Exercise per Week:      Minutes of Exercise per Session:    Stress:      Feeling of Stress :    Social Connections:      Frequency of Communication with Friends and Family:      Frequency of Social Gatherings with Friends and Family:      Attends Jainism Services:      Active Member of Clubs or Organizations:      Attends Club or Organization Meetings:      Marital Status:    Intimate Partner Violence:      Fear of Current or Ex-Partner:      Emotionally Abused:      Physically Abused:      Sexually Abused:       Social History     Social History Narrative      .  Retired.  Formerly arranged professional wrestling events       Current Outpatient Medications   Medication Sig Dispense Refill     atorvastatin (LIPITOR) 40 MG tablet [ATORVASTATIN (LIPITOR) 40 MG TABLET] TAKE 1 TABLET(40 MG) BY MOUTH DAILY 90 tablet 0     blood glucose meter (GLUCOMETER) [BLOOD GLUCOSE METER (GLUCOMETER)] Use 1 each As Directed 2 (two) times a day. Dispense glucometer brand per  patient's insurance at pharmacy discretion. 1 each 0     blood glucose test (ACCU-CHEK JACOB PLUS TEST STRP) strips [BLOOD GLUCOSE TEST (ACCU-CHEK JACOB PLUS TEST STRP) STRIPS] Use 1 each As Directed 2 (two) times a day. Accu-chek Jacob Plus 200 strip 11     clopidogreL (PLAVIX) 75 mg tablet [CLOPIDOGREL (PLAVIX) 75 MG TABLET] TAKE 1 TABLET(75 MG) BY MOUTH DAILY 90 tablet 3     cyanocobalamin, vitamin B-12, 2,500 mcg Tab [CYANOCOBALAMIN, VITAMIN B-12, 2,500 MCG TAB] Take 2,500 mcg by mouth daily.       FEROSUL 325 mg (65 mg iron) tablet [FEROSUL 325 MG (65 MG IRON) TABLET] TAKE 1 TABLET BY MOUTH EVERY DAY WITH BREAKFAST 90 tablet 0     generic lancets (ACCU-CHEK SOFTCLIX LANCETS) [GENERIC LANCETS (ACCU-CHEK SOFTCLIX LANCETS)] Use 1 each As Directed 2 (two) times a day. Dispense brand per patient's insurance at pharmacy discretion. 100 each 3     inFLIXimab (REMICADE IV) Every 8 weeks       lisinopriL (PRINIVIL,ZESTRIL) 10 MG tablet [LISINOPRIL (PRINIVIL,ZESTRIL) 10 MG TABLET] TAKE 1 TABLET BY MOUTH DAILY 90 tablet 3     magnesium oxide 250 mg Tab [MAGNESIUM OXIDE 250 MG TAB] Take 250 mg by mouth 2 (two) times a day.       metFORMIN (GLUCOPHAGE) 500 MG tablet [METFORMIN (GLUCOPHAGE) 500 MG TABLET] TAKE 2 TABLETS BY MOUTH TWICE DAILY AT 6 AM AND AT 4  tablet 3     omeprazole (PRILOSEC) 40 MG capsule [OMEPRAZOLE (PRILOSEC) 40 MG CAPSULE] TAKE 1 CAPSULE(40 MG) BY MOUTH DAILY 90 capsule 0     pioglitazone (ACTOS) 30 MG tablet [PIOGLITAZONE (ACTOS) 30 MG TABLET] TAKE 1 TABLET(30 MG) BY MOUTH DAILY 90 tablet 3     propranoloL (INDERAL) 20 MG tablet [PROPRANOLOL (INDERAL) 20 MG TABLET] TAKE 1 TABLET(20 MG) BY MOUTH TWICE DAILY 180 tablet 0     tamsulosin (FLOMAX) 0.4 mg cap [TAMSULOSIN (FLOMAX) 0.4 MG CAP] TAKE 1 CAPSULE BY MOUTH DAILY AFTER SUPPER 90 capsule 0     ferrous sulfate 325 (65 FE) MG tablet [FERROUS SULFATE 325 (65 FE) MG TABLET] TAKE 1 TABLET BY MOUTH EVERY DAY WITH BREAKFAST 90 tablet 0     gabapentin  "(NEURONTIN) 100 MG capsule [GABAPENTIN (NEURONTIN) 100 MG CAPSULE] One tab three times daily, work up gradually to three tabs three times daily 180 capsule 1     HYDROcodone-acetaminophen 5-325 mg per tablet [HYDROCODONE-ACETAMINOPHEN 5-325 MG PER TABLET] Take 1-2 tablets by mouth every 6 (six) hours as needed for pain. 30 tablet 0     hydrOXYzine HCL (ATARAX) 25 MG tablet [HYDROXYZINE HCL (ATARAX) 25 MG TABLET] Take 1 tablet (25 mg total) by mouth every 6 (six) hours as needed for itching. 60 tablet 5     MEDICATION CANNOT BE REORDERED - PLEASE MANUALLY REORDER AND DISCONTINUE THE OLD ORDER [MIN OIL-PETROLAT (MINERAL OIL-HYDROPHILIC PETROLATUM) OINTMENT] Apply four times a day prn for itching. 50 g 5      Objective:   /76 (BP Location: Left arm, Patient Position: Sitting, Cuff Size: Adult Large)   Pulse 68   Ht 1.765 m (5' 9.5\")   Wt 93 kg (205 lb 1.6 oz)   SpO2 98%   BMI 29.85 kg/m   Estimated body mass index is 29.85 kg/m  as calculated from the following:    Height as of this encounter: 1.765 m (5' 9.5\").    Weight as of this encounter: 93 kg (205 lb 1.6 oz).    VisionScreening:  No exam data present     PHYSICAL EXAM   /76 (BP Location: Left arm, Patient Position: Sitting, Cuff Size: Adult Large)   Pulse 68   Ht 1.765 m (5' 9.5\")   Wt 93 kg (205 lb 1.6 oz)   SpO2 98%   BMI 29.85 kg/m      General Appearance:  Alert, cooperative, no distress, appears stated age   Head:  Normocephalic, without obvious abnormality, atraumatic   Eyes:  PERRL, conjunctiva/corneas clear, EOM's intact   Ears:  Normal TM's and external ear canals, both ears   Nose: Nares normal, septum midline, mucosa normal, no drainage   Throat: Lips, mucosa, and tongue normal; teeth and gums normal   Neck: Supple, symmetrical, trachea midline, no adenopathy, thyroid: not enlarged, symmetric, no tenderness/mass/nodules, no carotid bruit or JVD   Back:   Symmetric, no curvature, ROM normal, no CVA tenderness   Lungs:   Clear to " auscultation bilaterally, respirations unlabored   Chest Wall:  No tenderness or deformity   Heart:  Regular rate and rhythm with frequent irregular beats, S1, S2 normal, no murmur, rub or gallop   Abdomen:   Soft, non-tender, bowel sounds active all four quadrants,  no masses, no organomegaly.  No ascites   Genitalia:  No penile lesions or testicular masses.  No inguinal hernia   Rectal:  Normal tone, normal prostate, no masses or tenderness   Extremities: Extremities normal, atraumatic, no cyanosis or edema   Skin:  Profound pallor noted.  No jaundice.  Small spider telangiectasia upper chest   Lymph nodes: Cervical and supraclavicular normal   Neurologic: No dysarthria or aphasia.  Cranial nerves, motor and sensory exams intact with symmetric DTRs           No flowsheet data found.  Cognitive Screening   1) Repeat 3 items (Leader, Season, Table)    2) Clock draw: NORMAL  3) 3 item recall: Recalls 3 objects  Results: 3 items recalled: COGNITIVE IMPAIRMENT LESS LIKELY    Mini-CogTM Copyright [employer]. Licensed by the author for use in [facility]; reprinted with permission ([email or web address]). All rights reserved.    A Mini-Cog score of 0-2 suggests the possibility of dementia, score of 3-5 suggests no dementia    ROOMING STAFF:    Patient has been advised of split billing requirements and indicates understanding: Yes   Are you in the first 12 months of your Medicare coverage?  No    Do you feel safe in your environment? Yes    Have you ever done Advance Care Planning? (For example, a Health Directive, POLST, or a discussion with a medical provider or your loved ones about your wishes): Yes, advance care planning is on file.       Fall risk  Fallen 2 or more times in the past year?: No  Any fall with injury in the past year?: No  click delete button to remove this line now    Healthy Habits:     In general, how would you rate your overall health?  Fair    Duration of exercise:  N/A    Do you usually eat at  "least 4 servings of fruit and vegetables a day, include whole grains    & fiber and avoid regularly eating high fat or \"junk\" foods?  No    Taking medications regularly:  No    Medication side effects:  Not applicable    Ability to successfully perform activities of daily living:  No assistance needed    Home Safety:  No safety concerns identified    Hearing Impairment:  Need to ask people to speak up or repeat themselves and difficulty understanding soft or whispered speech    In the past 6 months, have you been bothered by leaking of urine?  No    In general, how would you rate your overall mental or emotional health?  Excellent      PHQ-2 Total Score: 0      Do you have sleep apnea, excessive snoring or daytime drowsiness?: no      Face to face plus orders:  35 minutes  Documentation time:  10 minutes    The visit lasted a total of 45 minutes     Reviewed and updated as needed this visit by clinical staff  Tobacco  Allergies               Fei Lopez MD  [facility] [place]  "

## 2021-09-21 ENCOUNTER — TELEPHONE (OUTPATIENT)
Dept: INTERNAL MEDICINE | Facility: CLINIC | Age: 76
End: 2021-09-21

## 2021-09-21 DIAGNOSIS — K74.60 HEPATIC CIRRHOSIS, UNSPECIFIED HEPATIC CIRRHOSIS TYPE (H): ICD-10-CM

## 2021-09-21 DIAGNOSIS — D50.9 ANEMIA, IRON DEFICIENCY: ICD-10-CM

## 2021-09-21 DIAGNOSIS — D50.0 IRON DEFICIENCY ANEMIA DUE TO CHRONIC BLOOD LOSS: Primary | ICD-10-CM

## 2021-09-21 NOTE — TELEPHONE ENCOUNTER
Reason for Call:  Other call back    Detailed comments: pt stating PCP increased dosage of his Ferrous Sulfate to 3 tabs daily    Pharm:  [address]    Phone Number Patient can be reached at: Work number on file:  [phone] (work)    Best Time: anytime    Can we leave a detailed message on this number? YES    Call taken on 9/21/2021 at 2:52 PM by Manisha Sylvester

## 2021-09-22 ENCOUNTER — TELEPHONE (OUTPATIENT)
Dept: INTERNAL MEDICINE | Facility: CLINIC | Age: 76
End: 2021-09-22

## 2021-09-22 NOTE — TELEPHONE ENCOUNTER
Reason for Call:  Other call back    Detailed comments: pt is having Upper Endoscopy on Monday 9/27 and Dr Lopez told pt to stop plavix and then [facility] told him that he doesn't have to stop taking it and that he needed to check with us.    Please let pt know.    Phone Number Patient can be reached at: Work number on file:  [phone] (work)    Best Time: any    Can we leave a detailed message on this number? YES    Call taken on 9/22/2021 at 11:44 AM by Pam J. Behr

## 2021-09-23 ENCOUNTER — HOSPITAL ENCOUNTER (OUTPATIENT)
Dept: ULTRASOUND IMAGING | Facility: CLINIC | Age: 76
Discharge: HOME | End: 2021-09-23
Attending: INTERNAL MEDICINE | Admitting: INTERNAL MEDICINE
Payer: MEDICARE

## 2021-09-23 DIAGNOSIS — R18.8 CIRRHOSIS OF LIVER WITH ASCITES, UNSPECIFIED HEPATIC CIRRHOSIS TYPE (H): ICD-10-CM

## 2021-09-23 DIAGNOSIS — K74.60 CIRRHOSIS OF LIVER WITH ASCITES, UNSPECIFIED HEPATIC CIRRHOSIS TYPE (H): ICD-10-CM

## 2021-09-23 PROCEDURE — 76705 ECHO EXAM OF ABDOMEN: CPT

## 2021-09-23 RX ORDER — FERROUS SULFATE 325(65) MG
TABLET ORAL
Qty: 270 TABLET | Refills: 3 | Status: CANCELLED | OUTPATIENT
Start: 2021-09-23

## 2021-09-23 RX ORDER — FERROUS SULFATE 325(65) MG
325 TABLET ORAL
Qty: 90 TABLET | Refills: 11 | Status: SHIPPED | OUTPATIENT
Start: 2021-09-23 | End: 2022-01-21

## 2021-09-27 ENCOUNTER — TRANSFERRED RECORDS (OUTPATIENT)
Dept: HEALTH INFORMATION MANAGEMENT | Facility: CLINIC | Age: 76
End: 2021-09-27

## 2021-09-27 ENCOUNTER — TELEPHONE (OUTPATIENT)
Dept: INTERNAL MEDICINE | Facility: CLINIC | Age: 76
End: 2021-09-27

## 2021-09-27 NOTE — TELEPHONE ENCOUNTER
----- Message from Fei Lopez MD sent at 9/21/2021  9:52 AM CDT -----  Make sure upper GI endoscopy is scheduled please

## 2021-09-28 ENCOUNTER — LAB (OUTPATIENT)
Dept: LAB | Facility: CLINIC | Age: 76
End: 2021-09-28
Payer: MEDICARE

## 2021-09-28 DIAGNOSIS — D50.9 IRON DEFICIENCY ANEMIA, UNSPECIFIED IRON DEFICIENCY ANEMIA TYPE: ICD-10-CM

## 2021-09-28 DIAGNOSIS — D50.0 IRON DEFICIENCY ANEMIA DUE TO CHRONIC BLOOD LOSS: Primary | ICD-10-CM

## 2021-09-28 DIAGNOSIS — R23.1 PALLOR: ICD-10-CM

## 2021-09-28 LAB
ERYTHROCYTE [DISTWIDTH] IN BLOOD BY AUTOMATED COUNT: 14.4 % (ref 10–15)
HCT VFR BLD AUTO: 17.9 % (ref 40–53)
HGB BLD-MCNC: 6.4 G/DL (ref 13.3–17.7)
MCH RBC QN AUTO: 32.3 PG (ref 26.5–33)
MCHC RBC AUTO-ENTMCNC: 35.8 G/DL (ref 31.5–36.5)
MCV RBC AUTO: 90 FL (ref 78–100)
PLATELET # BLD AUTO: 139 10E3/UL (ref 150–450)
RBC # BLD AUTO: 1.98 10E6/UL (ref 4.4–5.9)
WBC # BLD AUTO: 3.8 10E3/UL (ref 4–11)

## 2021-09-28 PROCEDURE — 36415 COLL VENOUS BLD VENIPUNCTURE: CPT

## 2021-09-28 PROCEDURE — 85027 COMPLETE CBC AUTOMATED: CPT

## 2021-10-05 ENCOUNTER — INFUSION THERAPY VISIT (OUTPATIENT)
Dept: INFUSION THERAPY | Facility: CLINIC | Age: 76
End: 2021-10-05
Payer: MEDICARE

## 2021-10-05 ENCOUNTER — HOSPITAL ENCOUNTER (EMERGENCY)
Facility: CLINIC | Age: 76
Discharge: HOME | End: 2021-10-05
Attending: EMERGENCY MEDICINE | Admitting: EMERGENCY MEDICINE
Payer: MEDICARE

## 2021-10-05 VITALS
BODY MASS INDEX: 29.84 KG/M2 | DIASTOLIC BLOOD PRESSURE: 60 MMHG | HEART RATE: 60 BPM | WEIGHT: 205 LBS | RESPIRATION RATE: 18 BRPM | SYSTOLIC BLOOD PRESSURE: 127 MMHG | OXYGEN SATURATION: 99 %

## 2021-10-05 VITALS
SYSTOLIC BLOOD PRESSURE: 106 MMHG | WEIGHT: 205 LBS | DIASTOLIC BLOOD PRESSURE: 60 MMHG | HEART RATE: 65 BPM | BODY MASS INDEX: 29.84 KG/M2

## 2021-10-05 DIAGNOSIS — K74.60 CIRRHOSIS OF LIVER WITH ASCITES, UNSPECIFIED HEPATIC CIRRHOSIS TYPE (H): ICD-10-CM

## 2021-10-05 DIAGNOSIS — H44.112 PANUVEITIS OF LEFT EYE: Primary | ICD-10-CM

## 2021-10-05 DIAGNOSIS — R18.8 CIRRHOSIS OF LIVER WITH ASCITES, UNSPECIFIED HEPATIC CIRRHOSIS TYPE (H): ICD-10-CM

## 2021-10-05 DIAGNOSIS — D50.0 IRON DEFICIENCY ANEMIA DUE TO CHRONIC BLOOD LOSS: ICD-10-CM

## 2021-10-05 DIAGNOSIS — D64.9 ANEMIA, UNSPECIFIED TYPE: ICD-10-CM

## 2021-10-05 LAB
ABO/RH(D): NORMAL
ALBUMIN SERPL-MCNC: 3.4 G/DL (ref 3.5–5)
ALP SERPL-CCNC: 65 U/L (ref 45–120)
ALT SERPL W P-5'-P-CCNC: 17 U/L (ref 0–45)
ANION GAP SERPL CALCULATED.3IONS-SCNC: 11 MMOL/L (ref 5–18)
ANTIBODY SCREEN: NEGATIVE
APTT PPP: 29 SECONDS (ref 22–38)
AST SERPL W P-5'-P-CCNC: 23 U/L (ref 0–40)
BASOPHILS # BLD AUTO: 0 10E3/UL (ref 0–0.2)
BASOPHILS NFR BLD AUTO: 1 %
BILIRUB SERPL-MCNC: 0.3 MG/DL (ref 0–1)
BUN SERPL-MCNC: 17 MG/DL (ref 8–28)
CALCIUM SERPL-MCNC: 8.6 MG/DL (ref 8.5–10.5)
CHLORIDE BLD-SCNC: 105 MMOL/L (ref 98–107)
CO2 SERPL-SCNC: 22 MMOL/L (ref 22–31)
CREAT SERPL-MCNC: 1.19 MG/DL (ref 0.7–1.3)
EOSINOPHIL # BLD AUTO: 0.1 10E3/UL (ref 0–0.7)
EOSINOPHIL NFR BLD AUTO: 3 %
ERYTHROCYTE [DISTWIDTH] IN BLOOD BY AUTOMATED COUNT: 15.9 % (ref 10–15)
ERYTHROCYTE [DISTWIDTH] IN BLOOD BY AUTOMATED COUNT: 15.9 % (ref 10–15)
GFR SERPL CREATININE-BSD FRML MDRD: 59 ML/MIN/1.73M2
GLUCOSE BLD-MCNC: 97 MG/DL (ref 70–125)
HCT VFR BLD AUTO: 19.1 % (ref 40–53)
HCT VFR BLD AUTO: 21.5 % (ref 40–53)
HGB BLD-MCNC: 5.9 G/DL (ref 13.3–17.7)
HGB BLD-MCNC: 6.3 G/DL (ref 13.3–17.7)
HOLD SPECIMEN: NORMAL
IMM GRANULOCYTES # BLD: 0 10E3/UL
IMM GRANULOCYTES NFR BLD: 0 %
INR PPP: 1.11 (ref 0.85–1.15)
LYMPHOCYTES # BLD AUTO: 1.2 10E3/UL (ref 0.8–5.3)
LYMPHOCYTES NFR BLD AUTO: 29 %
MAGNESIUM SERPL-MCNC: 1.9 MG/DL (ref 1.8–2.6)
MCH RBC QN AUTO: 26 PG (ref 26.5–33)
MCH RBC QN AUTO: 27.4 PG (ref 26.5–33)
MCHC RBC AUTO-ENTMCNC: 29.3 G/DL (ref 31.5–36.5)
MCHC RBC AUTO-ENTMCNC: 30.9 G/DL (ref 31.5–36.5)
MCV RBC AUTO: 89 FL (ref 78–100)
MCV RBC AUTO: 89 FL (ref 78–100)
MONOCYTES # BLD AUTO: 0.4 10E3/UL (ref 0–1.3)
MONOCYTES NFR BLD AUTO: 11 %
NEUTROPHILS # BLD AUTO: 2.3 10E3/UL (ref 1.6–8.3)
NEUTROPHILS NFR BLD AUTO: 56 %
NRBC # BLD AUTO: 0 10E3/UL
NRBC BLD AUTO-RTO: 0 /100
PLATELET # BLD AUTO: 134 10E3/UL (ref 150–450)
PLATELET # BLD AUTO: 139 10E3/UL (ref 150–450)
POTASSIUM BLD-SCNC: 4.3 MMOL/L (ref 3.5–5)
PROT SERPL-MCNC: 7.2 G/DL (ref 6–8)
RBC # BLD AUTO: 2.15 10E6/UL (ref 4.4–5.9)
RBC # BLD AUTO: 2.42 10E6/UL (ref 4.4–5.9)
SODIUM SERPL-SCNC: 138 MMOL/L (ref 136–145)
SPECIMEN EXPIRATION DATE: NORMAL
WBC # BLD AUTO: 3.7 10E3/UL (ref 4–11)
WBC # BLD AUTO: 4 10E3/UL (ref 4–11)

## 2021-10-05 PROCEDURE — 82040 ASSAY OF SERUM ALBUMIN: CPT | Performed by: EMERGENCY MEDICINE

## 2021-10-05 PROCEDURE — 250N000011 HC RX IP 250 OP 636: Performed by: INTERNAL MEDICINE

## 2021-10-05 PROCEDURE — 36415 COLL VENOUS BLD VENIPUNCTURE: CPT | Performed by: EMERGENCY MEDICINE

## 2021-10-05 PROCEDURE — 36415 COLL VENOUS BLD VENIPUNCTURE: CPT

## 2021-10-05 PROCEDURE — 85025 COMPLETE CBC W/AUTO DIFF WBC: CPT | Mod: 91

## 2021-10-05 PROCEDURE — 258N000003 HC RX IP 258 OP 636: Performed by: INTERNAL MEDICINE

## 2021-10-05 PROCEDURE — 99283 EMERGENCY DEPT VISIT LOW MDM: CPT

## 2021-10-05 PROCEDURE — 85027 COMPLETE CBC AUTOMATED: CPT | Performed by: EMERGENCY MEDICINE

## 2021-10-05 PROCEDURE — 86900 BLOOD TYPING SEROLOGIC ABO: CPT | Performed by: EMERGENCY MEDICINE

## 2021-10-05 PROCEDURE — 85730 THROMBOPLASTIN TIME PARTIAL: CPT | Performed by: EMERGENCY MEDICINE

## 2021-10-05 PROCEDURE — 96413 CHEMO IV INFUSION 1 HR: CPT

## 2021-10-05 PROCEDURE — 85610 PROTHROMBIN TIME: CPT | Performed by: EMERGENCY MEDICINE

## 2021-10-05 PROCEDURE — 250N000013 HC RX MED GY IP 250 OP 250 PS 637: Performed by: INTERNAL MEDICINE

## 2021-10-05 PROCEDURE — 83735 ASSAY OF MAGNESIUM: CPT | Performed by: EMERGENCY MEDICINE

## 2021-10-05 RX ORDER — HEPARIN SODIUM (PORCINE) LOCK FLUSH IV SOLN 100 UNIT/ML 100 UNIT/ML
5 SOLUTION INTRAVENOUS
Status: CANCELLED | OUTPATIENT
Start: 2021-11-30

## 2021-10-05 RX ORDER — HEPARIN SODIUM,PORCINE 10 UNIT/ML
5 VIAL (ML) INTRAVENOUS
Status: CANCELLED | OUTPATIENT
Start: 2021-11-30

## 2021-10-05 RX ORDER — DIPHENHYDRAMINE HCL 25 MG
25 TABLET ORAL ONCE
Status: COMPLETED | OUTPATIENT
Start: 2021-10-05 | End: 2021-10-05

## 2021-10-05 RX ORDER — ACETAMINOPHEN 325 MG/1
650 TABLET ORAL ONCE
Status: COMPLETED | OUTPATIENT
Start: 2021-10-05 | End: 2021-10-05

## 2021-10-05 RX ORDER — METHYLPREDNISOLONE SODIUM SUCCINATE 125 MG/2ML
125 INJECTION, POWDER, LYOPHILIZED, FOR SOLUTION INTRAMUSCULAR; INTRAVENOUS
Status: CANCELLED
Start: 2021-11-30

## 2021-10-05 RX ORDER — MEPERIDINE HYDROCHLORIDE 50 MG/ML
25 INJECTION INTRAMUSCULAR; INTRAVENOUS; SUBCUTANEOUS EVERY 30 MIN PRN
Status: CANCELLED | OUTPATIENT
Start: 2021-11-30

## 2021-10-05 RX ORDER — EPINEPHRINE 1 MG/ML
0.3 INJECTION, SOLUTION INTRAMUSCULAR; SUBCUTANEOUS EVERY 5 MIN PRN
Status: CANCELLED | OUTPATIENT
Start: 2021-11-30

## 2021-10-05 RX ORDER — NALOXONE HYDROCHLORIDE 0.4 MG/ML
0.2 INJECTION, SOLUTION INTRAMUSCULAR; INTRAVENOUS; SUBCUTANEOUS
Status: CANCELLED | OUTPATIENT
Start: 2021-11-30

## 2021-10-05 RX ORDER — DIPHENHYDRAMINE HYDROCHLORIDE 50 MG/ML
50 INJECTION INTRAMUSCULAR; INTRAVENOUS
Status: CANCELLED
Start: 2021-11-30

## 2021-10-05 RX ORDER — ALBUTEROL SULFATE 0.83 MG/ML
2.5 SOLUTION RESPIRATORY (INHALATION)
Status: CANCELLED | OUTPATIENT
Start: 2021-11-30

## 2021-10-05 RX ORDER — ACETAMINOPHEN 325 MG/1
650 TABLET ORAL ONCE
Status: CANCELLED
Start: 2021-11-30 | End: 2021-11-30

## 2021-10-05 RX ORDER — ALBUTEROL SULFATE 90 UG/1
1-2 AEROSOL, METERED RESPIRATORY (INHALATION)
Status: CANCELLED
Start: 2021-11-30

## 2021-10-05 RX ORDER — DIPHENHYDRAMINE HCL 25 MG
25 CAPSULE ORAL ONCE
Status: CANCELLED
Start: 2021-11-30 | End: 2021-11-30

## 2021-10-05 RX ADMIN — SODIUM CHLORIDE 250 ML: 9 INJECTION, SOLUTION INTRAVENOUS at 11:00

## 2021-10-05 RX ADMIN — DIPHENHYDRAMINE HCL 25 MG: 25 TABLET ORAL at 11:03

## 2021-10-05 RX ADMIN — ACETAMINOPHEN 650 MG: 325 TABLET ORAL at 11:03

## 2021-10-05 RX ADMIN — INFLIXIMAB 500 MG: 100 INJECTION, POWDER, LYOPHILIZED, FOR SOLUTION INTRAVENOUS at 11:37

## 2021-10-05 NOTE — ED PROVIDER NOTES
Emergency Department Encounter     Evaluation Date & Time:   No admission date for patient encounter.    CHIEF COMPLAINT:  Anemia      Triage Note:Pt sent here from infusion center with low hemaglobin. Pt was there to get Remicaid for arthritis and found to have a hgb of 5.9. Pt very pale. Denies feeling weak or light headed. No pain.         Impression and Plan     ED COURSE & MEDICAL DECISION MAKING:    3:11 PM I met the patient and performed my initial interview and exam. I saw the patient in PPE including a face mask, goggles, and gloves.    I had been following along the patient's course from the time that the infusion center called.  Please see my note below about talking with the infusion center nurse.  Essentially, he has no new symptoms and has been having problems with a progressive anemia.  His primary care plan was to increase his iron therapy to see if that will help with his symptoms and at this point he is really only been on the increased iron dosing for a week.  So, it really has not had long enough to take effect.  With no new symptoms, and really no symptoms of chest pain or specifically difficulty breathing, just fatigue with activities, I discussed with he and his wife not doing a transfusion today and just following up with their primary care physician Dr. Lopez through the clinic.  They are quite comfortable with this.  They had questions about how much activity he should be allowed to do at home and we discussed normal daily activities are fine but he had a  coming up this weekend where he was supposed to be setting up tables and chairs and I advised that he simply attend the  and not be a part of the physical set up.  His wife was quite happy with this plan and will have other people at the  be responsible for the physical set up.    They will however return here as needed in the meantime.  Certainly the variation in lab testing could be just normal lab  result variation, or there may be a mild progression of his anemia but either way with no significant new symptoms or signs of bleeding I think he can continue to follow-up as an outpatient following the previous outpatient plan.  He and his wife are comfortable with that and were discharged home.    At the conclusion of the encounter I discussed the results of all the tests and the disposition. The questions were answered. The patient or family acknowledged understanding and was agreeable with the care plan.        FINAL IMPRESSION:    ICD-10-CM    1. Anemia, unspecified type  D64.9        0 minutes of critical care time        MEDICATIONS GIVEN IN THE EMERGENCY DEPARTMENT:  Medications - No data to display    NEW PRESCRIPTIONS STARTED AT TODAY'S ED VISIT:  Discharge Medication List as of 10/5/2021  3:24 PM          HPI     HPI     Frank Obando is a 76 year old male with a pertinent history of anemia, stroke, chronic renal failure, diabetes mellitus type 2, hypertension, and hyperlipidemia who presents to this ED via walk in for evaluation of low hemoglobin.    The patient himself denies any new shortness of breath, chest pain, or increased frequency of stooling.  He chronically has dark stools because of iron therapy and has not noticed a change in them in the past week.  He actually feels that his fatigue is somewhat improved in the past week.  He had seen his primary care physician for regular follow-up and has underlying liver disease as well as a history of some GI bleeding in the past.  They found that his hemoglobin was lower than about a year ago and so did set up an endoscopy.  His endoscopy did not show any evidence of bleeding.  There was no significant abnormality on his endoscopy that the patient is aware of.  It was done through [facility] so I do not have those results specifically here but the patient tells me no changes were necessary after having the endoscopy.  His primary care  physician want to treat his anemia with increasing his iron therapy which he started in the last week.    I did review the patient's medical chart through the clinic and do see the above history being accurate.  Today, he went to the infusion center to get his Remicade infusion that is ordered by his rheumatologist, and I spoke with the nurse there and found out that she had seen the weekly order for the hemoglobin check which she did assuming they would transfuse if needed there, but when she called the on-call physician was advised instead to send him to the emergency department for evaluation as the on-call physician was not comfortable ordering an infusion at the center without him being checked.  He was comfortable with this plan and so did present today to the emergency department to be evaluated for the decreasing hemoglobin found on his blood work today.    REVIEW OF SYSTEMS:  Review of Systems  Denies new abd pains or diarrhea.  No chest pain.  Is maintaining his usual activity level for the last week same as the last few months, though gets fatigued which is normal for him.  remainder of systems are all otherwise negative.        Medical History     Past Medical History:   Diagnosis Date     Anemia      Arthritis      Calculus of kidney      Diabetes mellitus (H)      Episcleritis of left eye      Hyperlipidemia      Hypertension      Peripheral neuropathy      Stroke (H)        Past Surgical History:   Procedure Laterality Date     CYSTOSCOPY TUMOR / CONDYLOMATA W/ LASER Left 7/18/2014      CYSTOSCOPY,INSERT URETERAL STENT      Description: Cystoscopy With Insertion Of Ureteral Stent Right;  Recorded: 10/14/2009;  Comments: stone       Family History   Problem Relation Age of Onset     Lung Cancer Father      Coronary Artery Disease Mother      Diabetes Mother        Social History     Tobacco Use     Smoking status: Never Smoker     Smokeless tobacco: Never Used   Substance Use Topics     Alcohol use:  No     Drug use: No       atorvastatin (LIPITOR) 40 MG tablet  blood glucose meter (GLUCOMETER)  blood glucose test (ACCU-CHEK JACOB PLUS TEST STRP) strips  cyanocobalamin, vitamin B-12, 2,500 mcg Tab  ferrous sulfate (FEROSUL) 325 (65 Fe) MG tablet  generic lancets (ACCU-CHEK SOFTCLIX LANCETS)  inFLIXimab (REMICADE IV)  lisinopriL (PRINIVIL,ZESTRIL) 10 MG tablet  magnesium oxide 250 mg Tab  metFORMIN (GLUCOPHAGE) 500 MG tablet  omeprazole (PRILOSEC) 40 MG capsule  pioglitazone (ACTOS) 30 MG tablet  propranoloL (INDERAL) 20 MG tablet  tamsulosin (FLOMAX) 0.4 mg cap        Physical Exam     First Vitals:  Patient Vitals for the past 24 hrs:   BP Pulse Resp SpO2 Weight   10/05/21 1512 -- 60 18 99 % --   10/05/21 1337 127/60 60 16 100 % 93 kg (205 lb)       PHYSICAL EXAM:   Constitutional:   In nad sitting up in a chair in triage.  Somewhat pale.   HENT:  Normocephalic, posterior pharynx wnl, mucous membranes moist and moderately pink, otherwise wearing a mask   Eyes:  PERRL, EOMI, Conjunctiva normal, No discharge, no scleral icterus.  Respiratory:  Breathing easily,   Cardiovascular:  rrr nl   Peripheral pulses dp, pt, and radial are wnl.  GI:  Bowel sounds normal, Soft  Musculoskeletal:  Moves all extremities.  No erythematous or swollen major joints,   Integument:  Pale, not diaphoretic  Neurologic:  Alert & oriented x 3, Normal motor function, Normal sensory function, No focal deficits noted. Normal speech.  Psychiatric:  Affect normal, Judgment normal, Mood normal.     Results     LAB:  All pertinent labs reviewed and interpreted  Results for orders placed or performed during the hospital encounter of 10/05/21   Extra Red Top Tube     Status: None   Result Value Ref Range    Hold Specimen JIC    Extra Green Top (Lithium Heparin) Tube     Status: None   Result Value Ref Range    Hold Specimen JIC    Extra Purple Top Tube     Status: None   Result Value Ref Range    Hold Specimen JIC    Extra Purple Top Tube      Status: None   Result Value Ref Range    Hold Specimen [facility]    INR     Status: Normal   Result Value Ref Range    INR 1.11 0.85 - 1.15   Comprehensive metabolic panel     Status: Abnormal   Result Value Ref Range    Sodium 138 136 - 145 mmol/L    Potassium 4.3 3.5 - 5.0 mmol/L    Chloride 105 98 - 107 mmol/L    Carbon Dioxide (CO2) 22 22 - 31 mmol/L    Anion Gap 11 5 - 18 mmol/L    Urea Nitrogen 17 8 - 28 mg/dL    Creatinine 1.19 0.70 - 1.30 mg/dL    Calcium 8.6 8.5 - 10.5 mg/dL    Glucose 97 70 - 125 mg/dL    Alkaline Phosphatase 65 45 - 120 U/L    AST 23 0 - 40 U/L    ALT 17 0 - 45 U/L    Protein Total 7.2 6.0 - 8.0 g/dL    Albumin 3.4 (L) 3.5 - 5.0 g/dL    Bilirubin Total 0.3 0.0 - 1.0 mg/dL    GFR Estimate 59 (L) >60 mL/min/1.73m2   Magnesium     Status: Normal   Result Value Ref Range    Magnesium 1.9 1.8 - 2.6 mg/dL   Partial thromboplastin time     Status: Normal   Result Value Ref Range    aPTT 29 22 - 38 Seconds   CBC with platelets and differential     Status: Abnormal   Result Value Ref Range    WBC Count 4.0 4.0 - 11.0 10e3/uL    RBC Count 2.42 (L) 4.40 - 5.90 10e6/uL    Hemoglobin 6.3 (LL) 13.3 - 17.7 g/dL    Hematocrit 21.5 (L) 40.0 - 53.0 %    MCV 89 78 - 100 fL    MCH 26.0 (L) 26.5 - 33.0 pg    MCHC 29.3 (L) 31.5 - 36.5 g/dL    RDW 15.9 (H) 10.0 - 15.0 %    Platelet Count 139 (L) 150 - 450 10e3/uL    % Neutrophils 56 %    % Lymphocytes 29 %    % Monocytes 11 %    % Eosinophils 3 %    % Basophils 1 %    % Immature Granulocytes 0 %    NRBCs per 100 WBC 0 <1 /100    Absolute Neutrophils 2.3 1.6 - 8.3 10e3/uL    Absolute Lymphocytes 1.2 0.8 - 5.3 10e3/uL    Absolute Monocytes 0.4 0.0 - 1.3 10e3/uL    Absolute Eosinophils 0.1 0.0 - 0.7 10e3/uL    Absolute Basophils 0.0 0.0 - 0.2 10e3/uL    Absolute Immature Granulocytes 0.0 <=0.0 10e3/uL    Absolute NRBCs 0.0 10e3/uL   Extra Tube     Status: None    Narrative    The following orders were created for panel order Extra Tube.  Procedure                                Abnormality         Status                     ---------                               -----------         ------                     Extra Red Top Tube[[id number]]                               Final result               Extra Green Top (Lithium...[[id number]]                      Final result               Extra Purple Top Tube[[id number]]                            Final result               Extra Purple Top Tube[[id number]]                            Final result                 Please view results for these tests on the individual orders.   CBC with platelets differential     Status: Abnormal    Narrative    The following orders were created for panel order CBC with platelets differential.  Procedure                               Abnormality         Status                     ---------                               -----------         ------                     CBC with platelets and d...[[id number]]  Abnormal            Final result                 Please view results for these tests on the individual orders.   ABO/Rh type and screen     Status: None (In process)    Narrative    The following orders were created for panel order ABO/Rh type and screen.  Procedure                               Abnormality         Status                     ---------                               -----------         ------                     Adult Type and Screen[[id number]]                            In process                   Please view results for these tests on the individual orders.   Results for orders placed or performed in visit on 10/05/21   CBC with platelets     Status: Abnormal   Result Value Ref Range    WBC Count 3.7 (L) 4.0 - 11.0 10e3/uL    RBC Count 2.15 (L) 4.40 - 5.90 10e6/uL    Hemoglobin 5.9 (LL) 13.3 - 17.7 g/dL    Hematocrit 19.1 (L) 40.0 - 53.0 %    MCV 89 78 - 100 fL    MCH 27.4 26.5 - 33.0 pg    MCHC 30.9 (L) 31.5 - 36.5 g/dL    RDW 15.9 (H) 10.0 - 15.0 %    Platelet Count 134 (L) 150 - 450 10e3/uL        RADIOLOGY:  US Liver    Result Date: 9/23/2021  EXAM: ULTRASOUND LIVER LIMITED LOCATION: [facility] DATE/TIME: 9/23/2021 8:40 AM INDICATION:  Cirrhosis of liver with ascites, unspecified hepatic cirrhosis type (H),  TECHNIQUE: Limited liver ultrasound for surveillance. COMPARISON: CT and ultrasound 12/20/2018 and older studies. FINDINGS: Ultrasound visualization score: A: No or minimal limitations. GALLBLADDER: Normal. There is some dependent sludge. No gallstones, wall thickening, or pericholecystic fluid. BILE DUCTS: No biliary dilatation. The common duct measures 4 mm. LIVER: Slightly coarse parenchyma with smooth contour. No focal mass. SPLEEN: Normal. No ascites. Normal antegrade flow in the main portal vein which measures 1.5 cm.     IMPRESSION: 1.  Coarsened hepatic echotexture likely due to hepatic parenchymal disease. 2.  No focal hepatic mass. US LI-RADS Category: US-1 Negative or definitely benign observation.           The creation of this record is based on the scribe s observations of the work being performed by Layne Gamble and the provider s statements to them. This document has been checked and approved by MD Ethel Pacheco MD  Emergency Medicine  [facility] EMERGENCY ROOM       Ethel Caballero MD  10/05/21 2562

## 2021-10-05 NOTE — ED TRIAGE NOTES
Pt sent here from infusion center with low hemaglobin. Pt was there to get Remicaid for arthritis and found to have a hgb of 5.9. Pt very pale. Denies feeling weak or light headed. No pain.

## 2021-10-05 NOTE — ED PROVIDER NOTES
Expected Patient Referral to ED  1:11 PM    Referring Clinic/Provider:  [facility]    Reason for referral/Clinical facts:  Patient presented to [facility] center for Remicade infusion for Crohn's disease.  Found to have low hemoglobin.  Previous hgb 6.4. now down to 5.9.  Oncologist refers to emergency department evaluation    Recommendations provided:  Proceed at this facility    Caller was informed that this institution does  possess the capabilities and/or resources to provide for patient and should be transferred to our institution.    Based on the information provided, discussed that this patient likely is nota good candidate for direct admission to this institution and that provider could proceed as such.  If however direct admit is sought and any hurdles encountered, this ED would be happy to see the patient and evaluate.    Informed caller that recommendations provided are recommendations based only on the facts provided and that they responsible to accept or reject the advice, or to seek a formal in person consultation as needed and that this ED will see/treat patient should they arrive.      Tyshawn Molina MD  Emergency Medicine  [facility] EMERGENCY ROOM  [facility] [place] [id number] [phone]       Tyshawn Molina MD  10/05/21 3119

## 2021-10-05 NOTE — ED NOTES
I called and spoke with the nurse at the infusion center, Stephanie.  She tells me that he in fact was there for his Remicade shot that is ordered by the rheumatologist, but the nurse was looking at his treatment plans and there was a standing treatment plan from his internal medicine physician Dr. Fei Lopez for a weekly hemogram.  She ariel the blood assuming that they would be giving a blood transfusion if needed and so when she called the clinic with the results, Dr. Lopez was not there and so she spoke with one of his partners who is not familiar with the patient and was not comfortable ordering a blood transfusion at the infusion center with the patient going home without having been seen.  I did review the chart and it appears that Dr. Lopez does feel that this is a chronic issue of iron deficiency anemia.  He did just have an endoscopy done through [facility] on September 27 as well.  It looks like earlier this year his hemoglobin was higher but on September 20 and then again on the 27th it was only in the sixes.     Ethel Caballero MD  10/05/21 [id number]

## 2021-10-05 NOTE — PROGRESS NOTES
~~~ NOTE: If the patient answers yes to any of the questions below, hold the infusion and contact ordering provider or on-call provider.    1. Have you recently had an elevated temperature, fever, chills, productive cough, coughing for 3 weeks or longer or hemoptysis,  abnormal vital signs, night sweats,  chest pain or have you noticed a decrease in your appetite, unexplained weight loss or fatigue? No  2. Do you have any open wounds or new incisions? No  3. Do you have any recent or upcoming hospitalizations, surgeries or dental procedures? No  4. Do you currently have or recently have had any signs of illness or infection or are you on any antibiotics? No  5. Have you had any new, sudden or worsening abdominal pain? No  6. Have you or anyone in your household received a live vaccination in the past 4 weeks? Please note:  No live vaccines while on biologic/chemotherapy until 6 months after the last treatment.  Patient can receive the flu vaccine (shot only) and the pneumovax.  It is optimal for the patient to get these vaccines mid cycle, but they can be given at any time as long as it is not on the day of the infusion. No  7. Have you recently been diagnosed with any new nervous system diseases (ie. Multiple sclerosis, Guillain [place], seizures, neurological changes) or cancer diagnosis? Are you on any form of radiation or chemotherapy? No  8. Are you pregnant or breast feeding or do you have plans of pregnancy in the future? No  9. Have you been having any signs of worsening depression or suicidal ideations?  (benlysta only) No  10. Have there been any other new onset medical symptoms? No  Infusion Nursing Note:  Frank Obando presents today for Labs and remicade    Patient seen by provider today: No   present during visit today: Not Applicable.    Note: PRN lab drawn per PCP  Hgb is 5.9,  Primary MDs office called.  DR Lopez not in today his associate called and stated he did not know this  patient and felt he should be evaluated by ED.  Patient transferred to ED via wheelchair.  Tolerated Remicade well.  Called his wife to report he was being transfered.      Intravenous Access:  Labs drawn without difficulty.  Peripheral IV place        Post Infusion Assessment:  Blood return noted pre and post infusion.  No evidence of extravasations.  Access discontinued per protocol.       Discharge Plan:   Patient and/or family verbalized understanding of discharge instructions and all questions answered.      Angelica Tejeda RN

## 2021-10-05 NOTE — DISCHARGE INSTRUCTIONS
The blood work that was done in the infusion center was showing some variability that was likely just due to lab processing.  Your hemoglobin is staying fairly stable from last week and since Dr. Lopez had wanted to try the increased dose of your iron last time instead of other therapies, I would stick with the increased iron dosing that Dr. Lopez told you to do last week.  It has not yet been long enough for that increased dose of iron to take effect.  Call the clinic and have them discuss with Dr. Lopez if he wants to make any other changes to your medications or 2 infusions you might need going forwards.  It looks like he wants you to get your hemoglobin checked once a week.  You can also do this through your [employer] account if you are on my chart by sending a message to him directly that way.    In the meantime, things to watch out for are worsened weakness, shortness of breath, or chest pains or frequent very loose bowel movements.  If you experience these and are you concerned that your hemoglobin level is going down please either be seen at the clinic right away or more likely return to the emergency department for repeat evaluation.    You should not overly exert yourself but I think normal activities at home are okay but do not do any heavy exercise or lifting so for example at the [facility] that you are due to attend this weekend you should not be a part of the physical work of setting up but you certainly can be there to attend and help organize.  You are also at higher risk of developing an infection when around others like covid because of the anemia, so be sure to protect yourself by wearing a mask, maintaining social distancing, hand washing especially before eating, etcetera.

## 2021-10-06 ENCOUNTER — TELEPHONE (OUTPATIENT)
Dept: INTERNAL MEDICINE | Facility: CLINIC | Age: 76
End: 2021-10-06

## 2021-10-06 DIAGNOSIS — E11.65 TYPE 2 DIABETES MELLITUS WITH HYPERGLYCEMIA, WITHOUT LONG-TERM CURRENT USE OF INSULIN (H): ICD-10-CM

## 2021-10-06 DIAGNOSIS — I10 ESSENTIAL HYPERTENSION, BENIGN: ICD-10-CM

## 2021-10-06 DIAGNOSIS — E11.9 TYPE 2 DIABETES MELLITUS (H): ICD-10-CM

## 2021-10-06 DIAGNOSIS — R12 HEARTBURN: ICD-10-CM

## 2021-10-06 DIAGNOSIS — R39.11 URINARY HESITANCY: ICD-10-CM

## 2021-10-06 DIAGNOSIS — E78.5 HYPERLIPIDEMIA: ICD-10-CM

## 2021-10-06 NOTE — TELEPHONE ENCOUNTER
Pending Prescriptions:                       Disp   Refills    atorvastatin (LIPITOR) 40 MG tablet       90 tab*3            Sig: [ATORVASTATIN (LIPITOR) 40 MG TABLET] TAKE 1           TABLET(40 MG) BY MOUTH DAILY    omeprazole (PRILOSEC) 40 MG DR capsule    90 cap*3            Sig: [OMEPRAZOLE (PRILOSEC) 40 MG CAPSULE] TAKE 1           CAPSULE(40 MG) BY MOUTH DAILY    tamsulosin (FLOMAX) 0.4 MG capsule        90 cap*3            Sig: [TAMSULOSIN (FLOMAX) 0.4 MG CAP] TAKE 1 CAPSULE           BY MOUTH DAILY AFTER SUPPER    pioglitazone (ACTOS) 30 MG tablet         90 tab*3            Sig: [PIOGLITAZONE (ACTOS) 30 MG TABLET] TAKE 1           TABLET(30 MG) BY MOUTH DAILY    propranolol (INDERAL) 20 MG tablet        180 ta*3            Sig: [PROPRANOLOL (INDERAL) 20 MG TABLET] TAKE 1           TABLET(20 MG) BY MOUTH TWICE DAILY    Annual Wellness Visit done 9/20/21 with Dr. Lopez. Blood pressure this day was 110/76.    Lipid panel done 9/20/21.       Last A1C level done on 9/20/21 = Result: 5.9

## 2021-10-06 NOTE — TELEPHONE ENCOUNTER
Reason for Call:  Other call back    Detailed comments: pt out of meds:  Atorvastatin  Tamsulosin  Propranlol  Omeprazole  prioglitazone    [facility]:  [facility]    Phone Number Patient can be reached at: Home number on file [phone] (home)    Best Time: anytime    Can we leave a detailed message on this number? YES    Call taken on 10/6/2021 at 3:54 PM by Manisha Sylvester

## 2021-10-07 DIAGNOSIS — I10 ESSENTIAL HYPERTENSION, MALIGNANT: ICD-10-CM

## 2021-10-07 RX ORDER — TAMSULOSIN HYDROCHLORIDE 0.4 MG/1
CAPSULE ORAL
Qty: 90 CAPSULE | Refills: 0 | Status: SHIPPED | OUTPATIENT
Start: 2021-10-07 | End: 2021-12-20

## 2021-10-07 RX ORDER — PROPRANOLOL HYDROCHLORIDE 20 MG/1
TABLET ORAL
Qty: 180 TABLET | Refills: 0 | Status: SHIPPED | OUTPATIENT
Start: 2021-10-07 | End: 2021-12-20

## 2021-10-07 RX ORDER — OMEPRAZOLE 40 MG/1
CAPSULE, DELAYED RELEASE ORAL
Qty: 90 CAPSULE | Refills: 0 | Status: SHIPPED | OUTPATIENT
Start: 2021-10-07 | End: 2021-12-20

## 2021-10-07 RX ORDER — ATORVASTATIN CALCIUM 40 MG/1
TABLET, FILM COATED ORAL
Qty: 90 TABLET | Refills: 0 | Status: SHIPPED | OUTPATIENT
Start: 2021-10-07 | End: 2021-12-20

## 2021-10-07 RX ORDER — PIOGLITAZONEHYDROCHLORIDE 30 MG/1
TABLET ORAL
Qty: 90 TABLET | Refills: 0 | Status: SHIPPED | OUTPATIENT
Start: 2021-10-07 | End: 2021-12-20

## 2021-10-07 NOTE — TELEPHONE ENCOUNTER
Unclear why these were not renewed at his recent visit.  I have sent 90 days to his pharmacy.  Please let him know.

## 2021-10-08 RX ORDER — LISINOPRIL 10 MG/1
10 TABLET ORAL DAILY
Qty: 90 TABLET | Refills: 3 | Status: SHIPPED | OUTPATIENT
Start: 2021-10-08 | End: 2022-10-12

## 2021-10-08 NOTE — TELEPHONE ENCOUNTER
"Last Written Prescription Date:  10/5/20  Last Fill Quantity: 90,  # refills: 3   Last office visit provider:  9/20/21     Requested Prescriptions   Pending Prescriptions Disp Refills     lisinopril (ZESTRIL) 10 MG tablet [Pharmacy Med Name: LISINOPRIL 10MG TABLETS] 90 tablet 3     Sig: TAKE 1 TABLET BY MOUTH DAILY       ACE Inhibitors (Including Combos) Protocol Passed - 10/7/2021  1:15 PM        Passed - Blood pressure under 140/90 in past 12 months     BP Readings from Last 3 Encounters:   10/05/21 127/60   10/05/21 106/60   09/20/21 110/76                 Passed - Recent (12 mo) or future (30 days) visit within the authorizing provider's specialty     Patient has had an office visit with the authorizing provider or a provider within the authorizing providers department within the previous 12 mos or has a future within next 30 days. See \"Patient Info\" tab in inbasket, or \"Choose Columns\" in Meds & Orders section of the refill encounter.              Passed - Medication is active on med list        Passed - Patient is age 18 or older        Passed - Normal serum creatinine on file in past 12 months     Recent Labs   Lab Test 10/05/21  1437   CR 1.19       Ok to refill medication if creatinine is low          Passed - Normal serum potassium on file in past 12 months     Recent Labs   Lab Test 10/05/21  1437   POTASSIUM 4.3                  Duane Lambert RN 10/08/21 10:35 AM  "

## 2021-10-26 ENCOUNTER — TRANSFERRED RECORDS (OUTPATIENT)
Dept: HEALTH INFORMATION MANAGEMENT | Facility: CLINIC | Age: 76
End: 2021-10-26
Payer: MEDICARE

## 2021-10-26 LAB — RETINOPATHY: NEGATIVE

## 2021-11-01 ENCOUNTER — OFFICE VISIT (OUTPATIENT)
Dept: INTERNAL MEDICINE | Facility: CLINIC | Age: 76
End: 2021-11-01
Payer: MEDICARE

## 2021-11-01 VITALS
RESPIRATION RATE: 18 BRPM | HEIGHT: 70 IN | OXYGEN SATURATION: 98 % | SYSTOLIC BLOOD PRESSURE: 122 MMHG | BODY MASS INDEX: 30.05 KG/M2 | DIASTOLIC BLOOD PRESSURE: 64 MMHG | WEIGHT: 209.9 LBS | HEART RATE: 56 BPM

## 2021-11-01 DIAGNOSIS — K74.60 CIRRHOSIS OF LIVER WITH ASCITES, UNSPECIFIED HEPATIC CIRRHOSIS TYPE (H): ICD-10-CM

## 2021-11-01 DIAGNOSIS — E11.8 TYPE 2 DIABETES MELLITUS WITH COMPLICATION, WITHOUT LONG-TERM CURRENT USE OF INSULIN (H): ICD-10-CM

## 2021-11-01 DIAGNOSIS — I63.322 CEREBROVASCULAR ACCIDENT (CVA) DUE TO THROMBOSIS OF LEFT ANTERIOR CEREBRAL ARTERY (H): ICD-10-CM

## 2021-11-01 DIAGNOSIS — I10 ESSENTIAL HYPERTENSION, BENIGN: ICD-10-CM

## 2021-11-01 DIAGNOSIS — R18.8 CIRRHOSIS OF LIVER WITH ASCITES, UNSPECIFIED HEPATIC CIRRHOSIS TYPE (H): ICD-10-CM

## 2021-11-01 DIAGNOSIS — Z23 HIGH PRIORITY FOR 2019-NCOV VACCINE: Primary | ICD-10-CM

## 2021-11-01 DIAGNOSIS — D50.9 IRON DEFICIENCY ANEMIA, UNSPECIFIED IRON DEFICIENCY ANEMIA TYPE: ICD-10-CM

## 2021-11-01 LAB
ERYTHROCYTE [DISTWIDTH] IN BLOOD BY AUTOMATED COUNT: 17.4 % (ref 10–15)
FERRITIN SERPL-MCNC: 5 NG/ML (ref 27–300)
HCT VFR BLD AUTO: 23.2 % (ref 40–53)
HGB BLD-MCNC: 6.6 G/DL (ref 13.3–17.7)
MCH RBC QN AUTO: 26.3 PG (ref 26.5–33)
MCHC RBC AUTO-ENTMCNC: 28.4 G/DL (ref 31.5–36.5)
MCV RBC AUTO: 92 FL (ref 78–100)
PLATELET # BLD AUTO: 130 10E3/UL (ref 150–450)
RBC # BLD AUTO: 2.51 10E6/UL (ref 4.4–5.9)
RETICS # AUTO: 0.08 10E6/UL (ref 0.01–0.11)
RETICS/RBC NFR AUTO: 3.9 % (ref 0.8–2.7)
WBC # BLD AUTO: 3.7 10E3/UL (ref 4–11)

## 2021-11-01 PROCEDURE — 91301 COVID-19,PF,MODERNA (18+ YRS BOOSTER .25ML): CPT | Performed by: INTERNAL MEDICINE

## 2021-11-01 PROCEDURE — 0013A PR ADMIN COVID VAC MODERNA, 3RD DOSE IMM COMP PT: CPT | Performed by: INTERNAL MEDICINE

## 2021-11-01 PROCEDURE — 85045 AUTOMATED RETICULOCYTE COUNT: CPT | Performed by: INTERNAL MEDICINE

## 2021-11-01 PROCEDURE — 99214 OFFICE O/P EST MOD 30 MIN: CPT | Mod: 25 | Performed by: INTERNAL MEDICINE

## 2021-11-01 PROCEDURE — 82728 ASSAY OF FERRITIN: CPT | Performed by: INTERNAL MEDICINE

## 2021-11-01 PROCEDURE — 85027 COMPLETE CBC AUTOMATED: CPT | Performed by: INTERNAL MEDICINE

## 2021-11-01 PROCEDURE — 36415 COLL VENOUS BLD VENIPUNCTURE: CPT | Performed by: INTERNAL MEDICINE

## 2021-11-01 ASSESSMENT — MIFFLIN-ST. JEOR: SCORE: 1685.41

## 2021-11-01 NOTE — PATIENT INSTRUCTIONS
1.  Schedule colonoscopy due to iron deficiency without obvious bleeding source on UGI endoscopy    2.  IV iron if hemoglobin is not improving    3.  Recheck hemoglobin A1c in 2 to 3 months    4.  Moderna covid vaccine today.    5.  Stop plavix.

## 2021-11-02 DIAGNOSIS — D50.0 IRON DEFICIENCY ANEMIA DUE TO CHRONIC BLOOD LOSS: Primary | ICD-10-CM

## 2021-11-02 DIAGNOSIS — Z20.822 ENCOUNTER FOR LABORATORY TESTING FOR COVID-19 VIRUS: ICD-10-CM

## 2021-11-02 RX ORDER — MEPERIDINE HYDROCHLORIDE 25 MG/ML
25 INJECTION INTRAMUSCULAR; INTRAVENOUS; SUBCUTANEOUS EVERY 30 MIN PRN
Status: CANCELLED | OUTPATIENT
Start: 2021-11-03

## 2021-11-02 RX ORDER — HEPARIN SODIUM (PORCINE) LOCK FLUSH IV SOLN 100 UNIT/ML 100 UNIT/ML
5 SOLUTION INTRAVENOUS
Status: CANCELLED | OUTPATIENT
Start: 2021-11-03

## 2021-11-02 RX ORDER — ALBUTEROL SULFATE 90 UG/1
1-2 AEROSOL, METERED RESPIRATORY (INHALATION)
Status: CANCELLED
Start: 2021-11-03

## 2021-11-02 RX ORDER — HEPARIN SODIUM,PORCINE 10 UNIT/ML
5 VIAL (ML) INTRAVENOUS
Status: CANCELLED | OUTPATIENT
Start: 2021-11-03

## 2021-11-02 RX ORDER — DIPHENHYDRAMINE HYDROCHLORIDE 50 MG/ML
50 INJECTION INTRAMUSCULAR; INTRAVENOUS
Status: CANCELLED
Start: 2021-11-03

## 2021-11-02 RX ORDER — ALBUTEROL SULFATE 0.83 MG/ML
2.5 SOLUTION RESPIRATORY (INHALATION)
Status: CANCELLED | OUTPATIENT
Start: 2021-11-03

## 2021-11-02 RX ORDER — EPINEPHRINE 1 MG/ML
0.3 INJECTION, SOLUTION, CONCENTRATE INTRAVENOUS EVERY 5 MIN PRN
Status: CANCELLED | OUTPATIENT
Start: 2021-11-03

## 2021-11-02 RX ORDER — NALOXONE HYDROCHLORIDE 0.4 MG/ML
0.2 INJECTION, SOLUTION INTRAMUSCULAR; INTRAVENOUS; SUBCUTANEOUS
Status: CANCELLED | OUTPATIENT
Start: 2021-11-03

## 2021-11-02 RX ORDER — METHYLPREDNISOLONE SODIUM SUCCINATE 125 MG/2ML
125 INJECTION, POWDER, LYOPHILIZED, FOR SOLUTION INTRAMUSCULAR; INTRAVENOUS
Status: CANCELLED
Start: 2021-11-03

## 2021-11-04 NOTE — PROGRESS NOTES
"ASSESSMENT:  1. High priority for 2019-nCoV vaccine  He will receive a third Moderna Covid vaccine today  - COVID-19,PF,MODERNA (18+ Yrs BOOSTER .25mL)    2. Iron deficiency anemia, unspecified iron deficiency anemia type  Antonio had upper GI endoscopy done due to a history of cirrhosis.  This showed a small esophageal varices with no active bleeding.  GAVE was noted in the stomach, but this was felt to be very mild and unlikely to be a source of bleeding.  Last colonoscopy was 2018.  I would advise repeating this and progressing to a PillCam if no bleeding sources noted.  He still appears very pale despite the oral iron supplement.  I would favor switching to IV Injectafer if hemoglobin has not improved  - Ferritin; Future  - CBC with platelets; Future  - Reticulocyte count; Future  - Adult Gastro Ref - Procedure Only; Future  - Ferritin  - CBC with platelets  - Reticulocyte count    3. Cirrhosis of liver with ascites, unspecified hepatic cirrhosis type (H)  Previous investigation has shown cirrhosis.  There is no history of excessive alcohol intake.  This could be an adverse reaction to methotrexate or possibly related to \"fatty infiltration\".    4. Type 2 diabetes mellitus with complication, without long-term current use of insulin (H)  Hemoglobin A1c was excellent at 5.9 when checked in September    5. Essential hypertension, benign  Blood pressure is under good control    6. Cerebrovascular accident (CVA) due to thrombosis of left anterior cerebral artery (H)  He was taken off of Plavix when the profound iron deficiency anemia was noted.  He restarted it after the upper GI endoscopy due to a misunderstanding.  I would prefer to stay off until anemia has been corrected.    PLAN:  Patient Instructions   1.  Schedule colonoscopy due to iron deficiency without obvious bleeding source on UGI endoscopy    2.  IV iron if hemoglobin is not improving    3.  Recheck hemoglobin A1c in 2 to 3 months    4.  Moderna covid " "vaccine today.    5.  Stop plavix.      Orders Placed This Encounter   Procedures     COVID-19,PF,MODERNA (18+ Yrs BOOSTER .25mL)     Ferritin     CBC with platelets     Reticulocyte count     Adult Gastro Ref - Procedure Only           ASSESSED PROBLEMS:  See above    CHIEF COMPLAINT:  Follow-up iron deficiency anemia    HISTORY OF PRESENT ILLNESS:  Frank is a 76 year old male seen for follow-up of iron deficiency anemia.  He had an upper GI endoscopy done to look for a source of occult GI tract blood loss.  This showed esophageal varices and mild G AVE in the stomach.  Findings were not felt to explain blood loss.  Last colonoscopy was in 2018.  He formerly was on Plavix following a CVA.  This was held when iron deficiency anemia was detected.  He restarted the Plavix after the endoscopy due to a misunderstanding.  He has been taking iron.  He does not feel any better and still notes marked shortness of breath with minor activity.    Blood pressure has been good when checked.  He has no orthostatic dizziness    REVIEW OF SYSTEMS:    Comprehensive review of systems is otherwise negative.    PFSH:   and retired.  Formerly promoted [employer]    TOBACCO USE:  History   Smoking Status     Never Smoker   Smokeless Tobacco     Never Used       VITALS:  Vitals:    11/01/21 1201   BP: 122/64   BP Location: Left arm   Patient Position: Sitting   Cuff Size: Adult Large   Pulse: 56   Resp: 18   SpO2: 98%   Weight: 95.2 kg (209 lb 14.4 oz)   Height: 1.765 m (5' 9.5\")     Wt Readings from Last 3 Encounters:   11/01/21 95.2 kg (209 lb 14.4 oz)   10/05/21 93 kg (205 lb)   10/05/21 93 kg (205 lb)       PHYSICAL EXAM:  Constitutional:   Reveals an alert pleasant man who appears extremely pale.    Vitals: per nursing notes.  HEENT:   atraumatic  Eyes:   no scleral icterus.  Oropharynx:   Mouth and throat clear, no thrush or exudate.  Neck:  Supple, no carotid bruits or adenopathy.  Back:  No spine or CVA " pain.  Thorax:  No bony deformities.  Lungs: Clear to A&P without rales or wheezes.  Respiratory effort normal.  Cardiac:   Regular rate and rhythm, normal S1, S2, no murmur or gallop.  Abdomen:  Soft, active bowel sounds without bruits, mass, or tenderness.  No ascites  Extremities:   No peripheral edema, pulses in the feet intact.    Skin: extremely pale.  No spider telangiectasias noted   Neuro:  Alert and oriented.   No gross focal deficits.  Psychiatric:  Memory intact, mood appropriate.    DATA REVIEWED:  Additional History from Old Records Summarized (2): None.  Decision to Obtain Records (1): None.   Radiology Tests Summarized or Ordered (1): None.  Labs Reviewed or Ordered (1):  labs reviewed and ordered  Medicine Test Summarized or Ordered (1):  upper GI endoscopy and previous colonoscopy reviewed  Independent Review of EKG or X-RAY(2 each): None.    The visit lasted a total of 30 minutes face to face with the patient. Over 50% of the time was spent counseling and educating the patient about evaluation and treatment of iron deficiency anemia, presumably related to occult blood loss.    Dragon dictation was used for this note. Speech recognition errors are a possibility.     MEDICATIONS:  Current Outpatient Medications   Medication Sig Dispense Refill     atorvastatin (LIPITOR) 40 MG tablet [ATORVASTATIN (LIPITOR) 40 MG TABLET] TAKE 1 TABLET(40 MG) BY MOUTH DAILY 90 tablet 0     blood glucose meter (GLUCOMETER) [BLOOD GLUCOSE METER (GLUCOMETER)] Use 1 each As Directed 2 (two) times a day. Dispense glucometer brand per patient's insurance at pharmacy discretion. 1 each 0     blood glucose test (ACCU-CHEK JACOB PLUS TEST STRP) strips [BLOOD GLUCOSE TEST (ACCU-CHEK JACOB PLUS TEST STRP) STRIPS] Use 1 each As Directed 2 (two) times a day. Accu-chek Jacob Plus 200 strip 11     cyanocobalamin, vitamin B-12, 2,500 mcg Tab [CYANOCOBALAMIN, VITAMIN B-12, 2,500 MCG TAB] Take 2,500 mcg by mouth daily.       ferrous  sulfate (FEROSUL) 325 (65 Fe) MG tablet Take 1 tablet (325 mg) by mouth 3 times daily (with meals) 90 tablet 11     generic lancets (ACCU-CHEK SOFTCLIX LANCETS) [GENERIC LANCETS (ACCU-CHEK SOFTCLIX LANCETS)] Use 1 each As Directed 2 (two) times a day. Dispense brand per patient's insurance at pharmacy discretion. 100 each 3     inFLIXimab (REMICADE IV) Every 8 weeks       lisinopril (ZESTRIL) 10 MG tablet Take 1 tablet (10 mg) by mouth daily 90 tablet 3     magnesium oxide 250 mg Tab [MAGNESIUM OXIDE 250 MG TAB] Take 250 mg by mouth 2 (two) times a day.       metFORMIN (GLUCOPHAGE) 500 MG tablet [METFORMIN (GLUCOPHAGE) 500 MG TABLET] TAKE 2 TABLETS BY MOUTH TWICE DAILY AT 6 AM AND AT 4  tablet 3     omeprazole (PRILOSEC) 40 MG DR capsule [OMEPRAZOLE (PRILOSEC) 40 MG CAPSULE] TAKE 1 CAPSULE(40 MG) BY MOUTH DAILY 90 capsule 0     pioglitazone (ACTOS) 30 MG tablet [PIOGLITAZONE (ACTOS) 30 MG TABLET] TAKE 1 TABLET(30 MG) BY MOUTH DAILY 90 tablet 0     propranolol (INDERAL) 20 MG tablet [PROPRANOLOL (INDERAL) 20 MG TABLET] TAKE 1 TABLET(20 MG) BY MOUTH TWICE DAILY 180 tablet 0     tamsulosin (FLOMAX) 0.4 MG capsule [TAMSULOSIN (FLOMAX) 0.4 MG CAP] TAKE 1 CAPSULE BY MOUTH DAILY AFTER SUPPER 90 capsule 0

## 2021-11-06 DIAGNOSIS — D50.0 IRON DEFICIENCY ANEMIA DUE TO CHRONIC BLOOD LOSS: Primary | ICD-10-CM

## 2021-11-09 DIAGNOSIS — Z11.59 ENCOUNTER FOR SCREENING FOR OTHER VIRAL DISEASES: ICD-10-CM

## 2021-11-12 ENCOUNTER — LAB (OUTPATIENT)
Dept: LAB | Facility: CLINIC | Age: 76
End: 2021-11-12
Payer: MEDICARE

## 2021-11-12 DIAGNOSIS — Z11.59 ENCOUNTER FOR SCREENING FOR OTHER VIRAL DISEASES: ICD-10-CM

## 2021-11-12 PROCEDURE — U0003 INFECTIOUS AGENT DETECTION BY NUCLEIC ACID (DNA OR RNA); SEVERE ACUTE RESPIRATORY SYNDROME CORONAVIRUS 2 (SARS-COV-2) (CORONAVIRUS DISEASE [COVID-19]), AMPLIFIED PROBE TECHNIQUE, MAKING USE OF HIGH THROUGHPUT TECHNOLOGIES AS DESCRIBED BY CMS-2020-01-R: HCPCS

## 2021-11-12 PROCEDURE — U0005 INFEC AGEN DETEC AMPLI PROBE: HCPCS

## 2021-11-13 LAB — SARS-COV-2 RNA RESP QL NAA+PROBE: NEGATIVE

## 2021-11-16 ENCOUNTER — HOSPITAL ENCOUNTER (OUTPATIENT)
Facility: HOSPITAL | Age: 76
Discharge: HOME | End: 2021-11-16
Attending: INTERNAL MEDICINE | Admitting: INTERNAL MEDICINE
Payer: MEDICARE

## 2021-11-16 VITALS
HEART RATE: 55 BPM | SYSTOLIC BLOOD PRESSURE: 104 MMHG | TEMPERATURE: 97.8 F | OXYGEN SATURATION: 96 % | DIASTOLIC BLOOD PRESSURE: 59 MMHG | RESPIRATION RATE: 10 BRPM

## 2021-11-16 LAB
COLONOSCOPY: NORMAL
UPPER GI ENDOSCOPY: NORMAL

## 2021-11-16 PROCEDURE — G0500 MOD SEDAT ENDO SERVICE >5YRS: HCPCS | Performed by: INTERNAL MEDICINE

## 2021-11-16 PROCEDURE — 43239 EGD BIOPSY SINGLE/MULTIPLE: CPT | Mod: XS | Performed by: INTERNAL MEDICINE

## 2021-11-16 PROCEDURE — 250N000011 HC RX IP 250 OP 636: Performed by: INTERNAL MEDICINE

## 2021-11-16 PROCEDURE — 43270 EGD LESION ABLATION: CPT

## 2021-11-16 PROCEDURE — 250N000009 HC RX 250: Performed by: INTERNAL MEDICINE

## 2021-11-16 PROCEDURE — 88342 IMHCHEM/IMCYTCHM 1ST ANTB: CPT | Mod: TC | Performed by: INTERNAL MEDICINE

## 2021-11-16 PROCEDURE — 99153 MOD SED SAME PHYS/QHP EA: CPT | Performed by: INTERNAL MEDICINE

## 2021-11-16 PROCEDURE — 45385 COLONOSCOPY W/LESION REMOVAL: CPT | Performed by: INTERNAL MEDICINE

## 2021-11-16 PROCEDURE — 45380 COLONOSCOPY AND BIOPSY: CPT | Performed by: INTERNAL MEDICINE

## 2021-11-16 RX ORDER — NALOXONE HYDROCHLORIDE 0.4 MG/ML
0.4 INJECTION, SOLUTION INTRAMUSCULAR; INTRAVENOUS; SUBCUTANEOUS
Status: CANCELLED | OUTPATIENT
Start: 2021-11-16

## 2021-11-16 RX ORDER — PROCHLORPERAZINE MALEATE 5 MG
5 TABLET ORAL EVERY 6 HOURS PRN
Status: CANCELLED | OUTPATIENT
Start: 2021-11-16

## 2021-11-16 RX ORDER — NALOXONE HYDROCHLORIDE 0.4 MG/ML
0.2 INJECTION, SOLUTION INTRAMUSCULAR; INTRAVENOUS; SUBCUTANEOUS
Status: CANCELLED | OUTPATIENT
Start: 2021-11-16

## 2021-11-16 RX ORDER — ONDANSETRON 2 MG/ML
4 INJECTION INTRAMUSCULAR; INTRAVENOUS EVERY 6 HOURS PRN
Status: CANCELLED | OUTPATIENT
Start: 2021-11-16

## 2021-11-16 RX ORDER — FENTANYL CITRATE 50 UG/ML
INJECTION, SOLUTION INTRAMUSCULAR; INTRAVENOUS PRN
Status: COMPLETED | OUTPATIENT
Start: 2021-11-16 | End: 2021-11-16

## 2021-11-16 RX ORDER — ONDANSETRON 4 MG/1
4 TABLET, ORALLY DISINTEGRATING ORAL EVERY 6 HOURS PRN
Status: CANCELLED | OUTPATIENT
Start: 2021-11-16

## 2021-11-16 RX ADMIN — MIDAZOLAM HYDROCHLORIDE 1 MG: 1 INJECTION, SOLUTION INTRAMUSCULAR; INTRAVENOUS at 13:55

## 2021-11-16 RX ADMIN — MIDAZOLAM HYDROCHLORIDE 1 MG: 1 INJECTION, SOLUTION INTRAMUSCULAR; INTRAVENOUS at 13:59

## 2021-11-16 RX ADMIN — FENTANYL CITRATE 50 MCG: 50 INJECTION, SOLUTION INTRAMUSCULAR; INTRAVENOUS at 14:30

## 2021-11-16 RX ADMIN — BENZOCAINE 2 SPRAY: 220 SPRAY, METERED PERIODONTAL at 13:55

## 2021-11-16 RX ADMIN — FENTANYL CITRATE 50 MCG: 50 INJECTION, SOLUTION INTRAMUSCULAR; INTRAVENOUS at 13:55

## 2021-11-16 NOTE — H&P
Frank Obando is an 76 year old male with anemia.  Hemoglobin down to 5.9 and was started on iron and repeat hemoglobin 6.6 per patient report.  Patient with cirrhosis.  Recent EGD 9/27/2021 by Dr. Leonard showed 1 esophageal varix that was small and did not need treatment.  There was also mild GAVE there was felt to not necessarily be the source of anemia and was not treated.  Patient has been on Plavix.  He reports that was stopped November 1.  He reports having a stroke 5 years ago and was started on Plavix at that time apparently.  He is unsure whether Plavix is going to be restarted but does not think so.  The last colonoscopy was 5/7/2018 also done by Dr. Castro.  This was for positive Cologuard.  This showed 2 adenomatous polyps were removed.  Diverticulosis and internal hemorrhoids were also seen.  No change in bowel habits.  No other obvious blood loss such as epistaxis, blood in the urine etc.    Past Medical History:   Diagnosis Date     Anemia      Arthritis     osteoarthritis     Calculus of kidney      Diabetes mellitus (H)      Episcleritis of left eye      Hyperlipidemia      Hypertension      Iron deficiency anemia due to chronic blood loss 11/2/2021     Peripheral neuropathy      Stroke (H)        Allergies:   Allergies   Allergen Reactions     Morphine Nausea and Vomiting     Other Allergy (See Comments) [External Allergen Needs Reconciliation - See Comment] Unknown     Scopolamine HBr CHANG, 08/27/2013.       Scopolamine Hbr [Scopolamine] Unknown       Active Problems:    * No active hospital problems. *    Blood pressure 117/70, pulse 61, temperature 97.8  F (36.6  C), temperature source Oral, resp. rate 12, SpO2 99 %.    Review of Systems    Physical Exam     General: No acute distress.  Cor: Regular rate and rhythm  Neck: No cervical lymphadenopathy.  Lungs: Clear to auscultation.  Abdomen: Soft, nontender, bowel sounds are normal.  Extremities: No lower extremity edema.  Mallampati  score is 3    ASA 3    Assessment:  Iron deficiency anemia-patient was scheduled for colonoscopy.  Last colonoscopy 3 years ago looked unremarkable.  AVMs possibility.  Polyps, tumor, other causes are certainly possible.  As long as the patient is here, I think it would be best to also proceed with upper endoscopy at the same time, reassess theGAVE , see if there are other potential abnormalities.  Potentially treat the GAVE.    Plan:  Plan to proceed with colonoscopy and also upper endoscopy today.    Risk, benefits, alternatives were discussed with the patient and informed consent was obtained.    Dex Finch MD  11/16/2021

## 2021-11-18 LAB
PATH REPORT.COMMENTS IMP SPEC: NORMAL
PATH REPORT.COMMENTS IMP SPEC: NORMAL
PATH REPORT.FINAL DX SPEC: NORMAL
PATH REPORT.GROSS SPEC: NORMAL
PATH REPORT.MICROSCOPIC SPEC OTHER STN: NORMAL
PATH REPORT.RELEVANT HX SPEC: NORMAL
PHOTO IMAGE: NORMAL

## 2021-11-18 PROCEDURE — 88342 IMHCHEM/IMCYTCHM 1ST ANTB: CPT | Mod: 26 | Performed by: PATHOLOGY

## 2021-11-18 PROCEDURE — 88305 TISSUE EXAM BY PATHOLOGIST: CPT | Mod: 26 | Performed by: PATHOLOGY

## 2021-11-19 ENCOUNTER — INFUSION THERAPY VISIT (OUTPATIENT)
Dept: INFUSION THERAPY | Facility: CLINIC | Age: 76
End: 2021-11-19
Attending: INTERNAL MEDICINE
Payer: MEDICARE

## 2021-11-19 VITALS
SYSTOLIC BLOOD PRESSURE: 95 MMHG | TEMPERATURE: 97.6 F | DIASTOLIC BLOOD PRESSURE: 65 MMHG | HEART RATE: 64 BPM | RESPIRATION RATE: 16 BRPM | OXYGEN SATURATION: 96 %

## 2021-11-19 DIAGNOSIS — D50.0 IRON DEFICIENCY ANEMIA DUE TO CHRONIC BLOOD LOSS: Primary | ICD-10-CM

## 2021-11-19 PROCEDURE — 250N000011 HC RX IP 250 OP 636: Performed by: INTERNAL MEDICINE

## 2021-11-19 PROCEDURE — 258N000003 HC RX IP 258 OP 636: Performed by: INTERNAL MEDICINE

## 2021-11-19 PROCEDURE — 96374 THER/PROPH/DIAG INJ IV PUSH: CPT

## 2021-11-19 RX ORDER — ALBUTEROL SULFATE 0.83 MG/ML
2.5 SOLUTION RESPIRATORY (INHALATION)
Status: CANCELLED | OUTPATIENT
Start: 2021-11-26

## 2021-11-19 RX ORDER — HEPARIN SODIUM,PORCINE 10 UNIT/ML
5 VIAL (ML) INTRAVENOUS
Status: DISCONTINUED | OUTPATIENT
Start: 2021-11-19 | End: 2021-11-19 | Stop reason: HOSPADM

## 2021-11-19 RX ORDER — NALOXONE HYDROCHLORIDE 0.4 MG/ML
0.2 INJECTION, SOLUTION INTRAMUSCULAR; INTRAVENOUS; SUBCUTANEOUS
Status: DISCONTINUED | OUTPATIENT
Start: 2021-11-19 | End: 2021-11-19 | Stop reason: HOSPADM

## 2021-11-19 RX ORDER — HEPARIN SODIUM (PORCINE) LOCK FLUSH IV SOLN 100 UNIT/ML 100 UNIT/ML
5 SOLUTION INTRAVENOUS
Status: DISCONTINUED | OUTPATIENT
Start: 2021-11-19 | End: 2021-11-19 | Stop reason: HOSPADM

## 2021-11-19 RX ORDER — METHYLPREDNISOLONE SODIUM SUCCINATE 125 MG/2ML
125 INJECTION, POWDER, LYOPHILIZED, FOR SOLUTION INTRAMUSCULAR; INTRAVENOUS
Status: CANCELLED
Start: 2021-11-26

## 2021-11-19 RX ORDER — DIPHENHYDRAMINE HYDROCHLORIDE 50 MG/ML
50 INJECTION INTRAMUSCULAR; INTRAVENOUS
Status: CANCELLED
Start: 2021-11-26

## 2021-11-19 RX ORDER — EPINEPHRINE 1 MG/ML
0.3 INJECTION, SOLUTION INTRAMUSCULAR; SUBCUTANEOUS EVERY 5 MIN PRN
Status: CANCELLED | OUTPATIENT
Start: 2021-11-26

## 2021-11-19 RX ORDER — HEPARIN SODIUM,PORCINE 10 UNIT/ML
5 VIAL (ML) INTRAVENOUS
Status: CANCELLED | OUTPATIENT
Start: 2021-11-26

## 2021-11-19 RX ORDER — ALBUTEROL SULFATE 90 UG/1
1-2 AEROSOL, METERED RESPIRATORY (INHALATION)
Status: CANCELLED
Start: 2021-11-26

## 2021-11-19 RX ORDER — EPINEPHRINE 1 MG/ML
0.3 INJECTION, SOLUTION INTRAMUSCULAR; SUBCUTANEOUS EVERY 5 MIN PRN
Status: DISCONTINUED | OUTPATIENT
Start: 2021-11-19 | End: 2021-11-19 | Stop reason: HOSPADM

## 2021-11-19 RX ORDER — ALBUTEROL SULFATE 90 UG/1
1-2 AEROSOL, METERED RESPIRATORY (INHALATION)
Status: DISCONTINUED | OUTPATIENT
Start: 2021-11-19 | End: 2021-11-19 | Stop reason: HOSPADM

## 2021-11-19 RX ORDER — HEPARIN SODIUM (PORCINE) LOCK FLUSH IV SOLN 100 UNIT/ML 100 UNIT/ML
5 SOLUTION INTRAVENOUS
Status: CANCELLED | OUTPATIENT
Start: 2021-11-26

## 2021-11-19 RX ORDER — ALBUTEROL SULFATE 0.83 MG/ML
2.5 SOLUTION RESPIRATORY (INHALATION)
Status: DISCONTINUED | OUTPATIENT
Start: 2021-11-19 | End: 2021-11-19 | Stop reason: HOSPADM

## 2021-11-19 RX ORDER — DIPHENHYDRAMINE HYDROCHLORIDE 50 MG/ML
50 INJECTION INTRAMUSCULAR; INTRAVENOUS
Status: DISCONTINUED | OUTPATIENT
Start: 2021-11-19 | End: 2021-11-19 | Stop reason: HOSPADM

## 2021-11-19 RX ORDER — NALOXONE HYDROCHLORIDE 0.4 MG/ML
0.2 INJECTION, SOLUTION INTRAMUSCULAR; INTRAVENOUS; SUBCUTANEOUS
Status: CANCELLED | OUTPATIENT
Start: 2021-11-26

## 2021-11-19 RX ORDER — MEPERIDINE HYDROCHLORIDE 50 MG/ML
25 INJECTION INTRAMUSCULAR; INTRAVENOUS; SUBCUTANEOUS EVERY 30 MIN PRN
Status: DISCONTINUED | OUTPATIENT
Start: 2021-11-19 | End: 2021-11-19 | Stop reason: HOSPADM

## 2021-11-19 RX ORDER — FERRIC CARBOXYMALTOSE INJECTION 50 MG/ML
750 INJECTION, SOLUTION INTRAVENOUS
Qty: 15 ML | Refills: 1
Start: 2021-11-19 | End: 2022-03-08

## 2021-11-19 RX ORDER — METHYLPREDNISOLONE SODIUM SUCCINATE 125 MG/2ML
125 INJECTION, POWDER, LYOPHILIZED, FOR SOLUTION INTRAMUSCULAR; INTRAVENOUS
Status: DISCONTINUED | OUTPATIENT
Start: 2021-11-19 | End: 2021-11-19 | Stop reason: HOSPADM

## 2021-11-19 RX ORDER — MEPERIDINE HYDROCHLORIDE 50 MG/ML
25 INJECTION INTRAMUSCULAR; INTRAVENOUS; SUBCUTANEOUS EVERY 30 MIN PRN
Status: CANCELLED | OUTPATIENT
Start: 2021-11-26

## 2021-11-19 RX ADMIN — FERRIC CARBOXYMALTOSE INJECTION 750 MG: 50 INJECTION, SOLUTION INTRAVENOUS at 11:50

## 2021-11-19 NOTE — PROGRESS NOTES
Pt here today for first dose of injectafer. Medication and side effects reviewed. BP did drop slightly, but he reported no dizziness or lightheadedness. He stood up with no issues and was encouraged to drink plenty of fluids today. He left clinic ambulatory.

## 2021-11-26 ENCOUNTER — INFUSION THERAPY VISIT (OUTPATIENT)
Dept: INFUSION THERAPY | Facility: CLINIC | Age: 76
End: 2021-11-26
Attending: INTERNAL MEDICINE
Payer: MEDICARE

## 2021-11-26 VITALS
OXYGEN SATURATION: 99 % | HEART RATE: 63 BPM | SYSTOLIC BLOOD PRESSURE: 119 MMHG | RESPIRATION RATE: 18 BRPM | DIASTOLIC BLOOD PRESSURE: 74 MMHG

## 2021-11-26 DIAGNOSIS — D50.0 IRON DEFICIENCY ANEMIA DUE TO CHRONIC BLOOD LOSS: Primary | ICD-10-CM

## 2021-11-26 PROCEDURE — 250N000011 HC RX IP 250 OP 636: Performed by: INTERNAL MEDICINE

## 2021-11-26 PROCEDURE — 96365 THER/PROPH/DIAG IV INF INIT: CPT

## 2021-11-26 PROCEDURE — 258N000003 HC RX IP 258 OP 636: Performed by: INTERNAL MEDICINE

## 2021-11-26 RX ORDER — HEPARIN SODIUM,PORCINE 10 UNIT/ML
5 VIAL (ML) INTRAVENOUS
Status: CANCELLED | OUTPATIENT
Start: 2021-11-26

## 2021-11-26 RX ORDER — HEPARIN SODIUM (PORCINE) LOCK FLUSH IV SOLN 100 UNIT/ML 100 UNIT/ML
5 SOLUTION INTRAVENOUS
Status: DISCONTINUED | OUTPATIENT
Start: 2021-11-26 | End: 2021-11-26 | Stop reason: HOSPADM

## 2021-11-26 RX ORDER — EPINEPHRINE 1 MG/ML
0.3 INJECTION, SOLUTION INTRAMUSCULAR; SUBCUTANEOUS EVERY 5 MIN PRN
Status: CANCELLED | OUTPATIENT
Start: 2021-11-26

## 2021-11-26 RX ORDER — ALBUTEROL SULFATE 90 UG/1
1-2 AEROSOL, METERED RESPIRATORY (INHALATION)
Status: CANCELLED
Start: 2021-11-26

## 2021-11-26 RX ORDER — MEPERIDINE HYDROCHLORIDE 50 MG/ML
25 INJECTION INTRAMUSCULAR; INTRAVENOUS; SUBCUTANEOUS EVERY 30 MIN PRN
Status: CANCELLED | OUTPATIENT
Start: 2021-11-26

## 2021-11-26 RX ORDER — HEPARIN SODIUM (PORCINE) LOCK FLUSH IV SOLN 100 UNIT/ML 100 UNIT/ML
5 SOLUTION INTRAVENOUS
Status: CANCELLED | OUTPATIENT
Start: 2021-11-26

## 2021-11-26 RX ORDER — HEPARIN SODIUM,PORCINE 10 UNIT/ML
5 VIAL (ML) INTRAVENOUS
Status: DISCONTINUED | OUTPATIENT
Start: 2021-11-26 | End: 2021-11-26 | Stop reason: HOSPADM

## 2021-11-26 RX ORDER — ALBUTEROL SULFATE 0.83 MG/ML
2.5 SOLUTION RESPIRATORY (INHALATION)
Status: CANCELLED | OUTPATIENT
Start: 2021-11-26

## 2021-11-26 RX ORDER — DIPHENHYDRAMINE HYDROCHLORIDE 50 MG/ML
50 INJECTION INTRAMUSCULAR; INTRAVENOUS
Status: CANCELLED
Start: 2021-11-26

## 2021-11-26 RX ORDER — METHYLPREDNISOLONE SODIUM SUCCINATE 125 MG/2ML
125 INJECTION, POWDER, LYOPHILIZED, FOR SOLUTION INTRAMUSCULAR; INTRAVENOUS
Status: CANCELLED
Start: 2021-11-26

## 2021-11-26 RX ORDER — NALOXONE HYDROCHLORIDE 0.4 MG/ML
0.2 INJECTION, SOLUTION INTRAMUSCULAR; INTRAVENOUS; SUBCUTANEOUS
Status: CANCELLED | OUTPATIENT
Start: 2021-11-26

## 2021-11-26 RX ADMIN — FERRIC CARBOXYMALTOSE INJECTION 750 MG: 50 INJECTION, SOLUTION INTRAVENOUS at 08:15

## 2021-11-26 NOTE — PROGRESS NOTES
Infusion Nursing Note:  Frank CRAIG Yazminmariia presents today for iron infusion.    Patient seen by provider today: No   present during visit today: Not Applicable.    Note: /74 (BP Location: Right arm, Patient Position: Sitting)   Pulse 63   Resp 18   SpO2 99% .    Intravenous Access:  Peripheral IV placed.    Treatment Conditions:  Not Applicable.      Post Infusion Assessment:  Injectafer infused.  Patient tolerated infusion without incident.  Blood return noted pre and post infusion.  Site patent and intact, free from redness, edema or discomfort.  No evidence of extravasations.  Access discontinued per protocol.       Discharge Plan:   Patient and/or family verbalized understanding of discharge instructions and all questions answered.  Patient discharged in stable condition accompanied by: self.  Departure Mode: Ambulatory.      Cindy Barragan RN

## 2021-11-29 ENCOUNTER — TELEPHONE (OUTPATIENT)
Dept: RHEUMATOLOGY | Facility: CLINIC | Age: 76
End: 2021-11-29
Payer: MEDICARE

## 2021-11-29 DIAGNOSIS — H44.112 PANUVEITIS OF LEFT EYE: Primary | ICD-10-CM

## 2021-11-29 NOTE — TELEPHONE ENCOUNTER
Pascale from  [facility] is calling for updated Remicade infusion orders. Patient is scheduled for a infusion tomorrow.

## 2021-11-30 ENCOUNTER — INFUSION THERAPY VISIT (OUTPATIENT)
Dept: INFUSION THERAPY | Facility: CLINIC | Age: 76
End: 2021-11-30
Attending: INTERNAL MEDICINE
Payer: MEDICARE

## 2021-11-30 VITALS
TEMPERATURE: 98 F | RESPIRATION RATE: 16 BRPM | DIASTOLIC BLOOD PRESSURE: 70 MMHG | BODY MASS INDEX: 29.66 KG/M2 | SYSTOLIC BLOOD PRESSURE: 126 MMHG | HEART RATE: 65 BPM | WEIGHT: 203.8 LBS | OXYGEN SATURATION: 97 %

## 2021-11-30 DIAGNOSIS — H44.112 PANUVEITIS OF LEFT EYE: Primary | ICD-10-CM

## 2021-11-30 DIAGNOSIS — H15.002 SCLERITIS OF LEFT EYE: ICD-10-CM

## 2021-11-30 DIAGNOSIS — D50.0 IRON DEFICIENCY ANEMIA DUE TO CHRONIC BLOOD LOSS: ICD-10-CM

## 2021-11-30 LAB
ALBUMIN SERPL-MCNC: 3.3 G/DL (ref 3.5–5)
ALT SERPL W P-5'-P-CCNC: 29 U/L (ref 0–45)
CREAT SERPL-MCNC: 1.15 MG/DL (ref 0.7–1.3)
ERYTHROCYTE [DISTWIDTH] IN BLOOD BY AUTOMATED COUNT: 19.9 % (ref 10–15)
GFR SERPL CREATININE-BSD FRML MDRD: 62 ML/MIN/1.73M2
HCT VFR BLD AUTO: 24.9 % (ref 40–53)
HGB BLD-MCNC: 7.5 G/DL (ref 13.3–17.7)
MCH RBC QN AUTO: 26.7 PG (ref 26.5–33)
MCHC RBC AUTO-ENTMCNC: 30.1 G/DL (ref 31.5–36.5)
MCV RBC AUTO: 89 FL (ref 78–100)
PLATELET # BLD AUTO: 117 10E3/UL (ref 150–450)
RBC # BLD AUTO: 2.81 10E6/UL (ref 4.4–5.9)
WBC # BLD AUTO: 4.2 10E3/UL (ref 4–11)

## 2021-11-30 PROCEDURE — 36415 COLL VENOUS BLD VENIPUNCTURE: CPT

## 2021-11-30 PROCEDURE — 96413 CHEMO IV INFUSION 1 HR: CPT

## 2021-11-30 PROCEDURE — 85027 COMPLETE CBC AUTOMATED: CPT

## 2021-11-30 PROCEDURE — 250N000013 HC RX MED GY IP 250 OP 250 PS 637: Performed by: INTERNAL MEDICINE

## 2021-11-30 PROCEDURE — 84460 ALANINE AMINO (ALT) (SGPT): CPT

## 2021-11-30 PROCEDURE — 258N000003 HC RX IP 258 OP 636: Performed by: INTERNAL MEDICINE

## 2021-11-30 PROCEDURE — 82565 ASSAY OF CREATININE: CPT

## 2021-11-30 PROCEDURE — 250N000011 HC RX IP 250 OP 636: Performed by: INTERNAL MEDICINE

## 2021-11-30 PROCEDURE — 82040 ASSAY OF SERUM ALBUMIN: CPT

## 2021-11-30 RX ORDER — ACETAMINOPHEN 325 MG/1
650 TABLET ORAL ONCE
Status: COMPLETED | OUTPATIENT
Start: 2021-11-30 | End: 2021-11-30

## 2021-11-30 RX ORDER — ALBUTEROL SULFATE 0.83 MG/ML
2.5 SOLUTION RESPIRATORY (INHALATION)
Status: DISCONTINUED | OUTPATIENT
Start: 2021-11-30 | End: 2021-11-30 | Stop reason: HOSPADM

## 2021-11-30 RX ORDER — HEPARIN SODIUM (PORCINE) LOCK FLUSH IV SOLN 100 UNIT/ML 100 UNIT/ML
5 SOLUTION INTRAVENOUS
Status: CANCELLED | OUTPATIENT
Start: 2021-11-30

## 2021-11-30 RX ORDER — METHYLPREDNISOLONE SODIUM SUCCINATE 125 MG/2ML
125 INJECTION, POWDER, LYOPHILIZED, FOR SOLUTION INTRAMUSCULAR; INTRAVENOUS
Status: CANCELLED
Start: 2021-11-30

## 2021-11-30 RX ORDER — ACETAMINOPHEN 325 MG/1
650 TABLET ORAL ONCE
Status: CANCELLED
Start: 2022-01-25 | End: 2022-01-25

## 2021-11-30 RX ORDER — DIPHENHYDRAMINE HCL 25 MG
25 CAPSULE ORAL ONCE
Status: COMPLETED | OUTPATIENT
Start: 2021-11-30 | End: 2021-11-30

## 2021-11-30 RX ORDER — NALOXONE HYDROCHLORIDE 0.4 MG/ML
0.2 INJECTION, SOLUTION INTRAMUSCULAR; INTRAVENOUS; SUBCUTANEOUS
Status: CANCELLED | OUTPATIENT
Start: 2022-01-25

## 2021-11-30 RX ORDER — EPINEPHRINE 1 MG/ML
0.3 INJECTION, SOLUTION INTRAMUSCULAR; SUBCUTANEOUS EVERY 5 MIN PRN
Status: DISCONTINUED | OUTPATIENT
Start: 2021-11-30 | End: 2021-11-30 | Stop reason: HOSPADM

## 2021-11-30 RX ORDER — ALBUTEROL SULFATE 0.83 MG/ML
2.5 SOLUTION RESPIRATORY (INHALATION)
Status: CANCELLED | OUTPATIENT
Start: 2021-11-30

## 2021-11-30 RX ORDER — DIPHENHYDRAMINE HYDROCHLORIDE 50 MG/ML
50 INJECTION INTRAMUSCULAR; INTRAVENOUS
Status: DISCONTINUED | OUTPATIENT
Start: 2021-11-30 | End: 2021-11-30 | Stop reason: HOSPADM

## 2021-11-30 RX ORDER — NALOXONE HYDROCHLORIDE 0.4 MG/ML
0.2 INJECTION, SOLUTION INTRAMUSCULAR; INTRAVENOUS; SUBCUTANEOUS
Status: CANCELLED | OUTPATIENT
Start: 2021-11-30

## 2021-11-30 RX ORDER — EPINEPHRINE 1 MG/ML
0.3 INJECTION, SOLUTION INTRAMUSCULAR; SUBCUTANEOUS EVERY 5 MIN PRN
Status: CANCELLED | OUTPATIENT
Start: 2022-01-25

## 2021-11-30 RX ORDER — DIPHENHYDRAMINE HCL 25 MG
25 CAPSULE ORAL ONCE
Status: CANCELLED
Start: 2022-01-25 | End: 2022-01-25

## 2021-11-30 RX ORDER — MEPERIDINE HYDROCHLORIDE 50 MG/ML
25 INJECTION INTRAMUSCULAR; INTRAVENOUS; SUBCUTANEOUS EVERY 30 MIN PRN
Status: DISCONTINUED | OUTPATIENT
Start: 2021-11-30 | End: 2021-11-30 | Stop reason: HOSPADM

## 2021-11-30 RX ORDER — MEPERIDINE HYDROCHLORIDE 50 MG/ML
25 INJECTION INTRAMUSCULAR; INTRAVENOUS; SUBCUTANEOUS EVERY 30 MIN PRN
Status: CANCELLED | OUTPATIENT
Start: 2022-01-25

## 2021-11-30 RX ORDER — NALOXONE HYDROCHLORIDE 0.4 MG/ML
0.2 INJECTION, SOLUTION INTRAMUSCULAR; INTRAVENOUS; SUBCUTANEOUS
Status: DISCONTINUED | OUTPATIENT
Start: 2021-11-30 | End: 2021-11-30 | Stop reason: HOSPADM

## 2021-11-30 RX ORDER — METHYLPREDNISOLONE SODIUM SUCCINATE 125 MG/2ML
125 INJECTION, POWDER, LYOPHILIZED, FOR SOLUTION INTRAMUSCULAR; INTRAVENOUS
Status: CANCELLED
Start: 2022-01-25

## 2021-11-30 RX ORDER — MEPERIDINE HYDROCHLORIDE 50 MG/ML
25 INJECTION INTRAMUSCULAR; INTRAVENOUS; SUBCUTANEOUS EVERY 30 MIN PRN
Status: CANCELLED | OUTPATIENT
Start: 2021-11-30

## 2021-11-30 RX ORDER — METHYLPREDNISOLONE SODIUM SUCCINATE 125 MG/2ML
125 INJECTION, POWDER, LYOPHILIZED, FOR SOLUTION INTRAMUSCULAR; INTRAVENOUS
Status: DISCONTINUED | OUTPATIENT
Start: 2021-11-30 | End: 2021-11-30 | Stop reason: HOSPADM

## 2021-11-30 RX ORDER — EPINEPHRINE 1 MG/ML
0.3 INJECTION, SOLUTION INTRAMUSCULAR; SUBCUTANEOUS EVERY 5 MIN PRN
Status: CANCELLED | OUTPATIENT
Start: 2021-11-30

## 2021-11-30 RX ORDER — DIPHENHYDRAMINE HYDROCHLORIDE 50 MG/ML
50 INJECTION INTRAMUSCULAR; INTRAVENOUS
Status: CANCELLED
Start: 2022-01-25

## 2021-11-30 RX ORDER — ALBUTEROL SULFATE 90 UG/1
1-2 AEROSOL, METERED RESPIRATORY (INHALATION)
Status: CANCELLED
Start: 2021-11-30

## 2021-11-30 RX ORDER — DIPHENHYDRAMINE HYDROCHLORIDE 50 MG/ML
50 INJECTION INTRAMUSCULAR; INTRAVENOUS
Status: CANCELLED
Start: 2021-11-30

## 2021-11-30 RX ORDER — ALBUTEROL SULFATE 90 UG/1
1-2 AEROSOL, METERED RESPIRATORY (INHALATION)
Status: CANCELLED
Start: 2022-01-25

## 2021-11-30 RX ORDER — ALBUTEROL SULFATE 90 UG/1
1-2 AEROSOL, METERED RESPIRATORY (INHALATION)
Status: DISCONTINUED | OUTPATIENT
Start: 2021-11-30 | End: 2021-11-30 | Stop reason: HOSPADM

## 2021-11-30 RX ORDER — HEPARIN SODIUM,PORCINE 10 UNIT/ML
5 VIAL (ML) INTRAVENOUS
Status: CANCELLED | OUTPATIENT
Start: 2021-11-30

## 2021-11-30 RX ORDER — ALBUTEROL SULFATE 0.83 MG/ML
2.5 SOLUTION RESPIRATORY (INHALATION)
Status: CANCELLED | OUTPATIENT
Start: 2022-01-25

## 2021-11-30 RX ADMIN — SODIUM CHLORIDE 250 ML: 9 INJECTION, SOLUTION INTRAVENOUS at 12:34

## 2021-11-30 RX ADMIN — ACETAMINOPHEN 650 MG: 325 TABLET ORAL at 12:34

## 2021-11-30 RX ADMIN — INFLIXIMAB 500 MG: 100 INJECTION, POWDER, LYOPHILIZED, FOR SOLUTION INTRAVENOUS at 12:47

## 2021-11-30 RX ADMIN — DIPHENHYDRAMINE HYDROCHLORIDE 25 MG: 25 CAPSULE ORAL at 12:34

## 2021-11-30 NOTE — PROGRESS NOTES
Infusion Nursing Note:  Frank Obando presents today for Remicade   Patient seen by provider today: No   present during visit today: Not Applicable.    Note: N/A.      Intravenous Access:  Peripheral IV placed.    Treatment Conditions:  Biological Infusion Checklist:  ~~~ NOTE: If the patient answers yes to any of the questions below, hold the infusion and contact ordering provider or on-call provider.    1. Have you recently had an elevated temperature, fever, chills, productive cough, coughing for 3 weeks or longer or hemoptysis, abnormal vital signs, night sweats,  chest pain or have you noticed a decrease in your appetite, unexplained weight loss or fatigue? No  2. Do you have any open wounds or new incisions? No  3. Do you have any recent or upcoming hospitalizations, surgeries or dental procedures? No  4. Do you currently have or recently have had any signs of illness or infection or are you on any antibiotics? No  5. Have you had any new, sudden or worsening abdominal pain? No  6. Have you or anyone in your household received a live vaccination in the past 4 weeks? Please note:  No live vaccines while on biologic/chemotherapy until 6 months after the last treatment.  Patient can receive the flu vaccine (shot only) and the pneumovax.  It is optimal for the patient to get these vaccines mid cycle, but they can be given at any time as long as it is not on the day of the infusion. No  7. Have you recently been diagnosed with any new nervous system diseases (ie. Multiple sclerosis, Guillain Dermott, seizures, neurological changes) or cancer diagnosis? No  8. Are you on any form of radiation or chemotherapy? No  9. Are you pregnant or breast feeding or do you have plans of pregnancy in the future? No  10. Have you been having any signs of worsening depression or suicidal ideations?  (benlysta only) No  11. Have there been any other new onset medical symptoms? No        Post Infusion  Assessment:  Patient tolerated infusion without incident.  Access discontinued per protocol.       Discharge Plan:   Patient and/or family verbalized understanding of discharge instructions and all questions answered.      Pascale Samuels RN

## 2021-12-10 ENCOUNTER — OFFICE VISIT (OUTPATIENT)
Dept: INTERNAL MEDICINE | Facility: CLINIC | Age: 76
End: 2021-12-10
Payer: MEDICARE

## 2021-12-10 VITALS
OXYGEN SATURATION: 95 % | SYSTOLIC BLOOD PRESSURE: 118 MMHG | WEIGHT: 203.5 LBS | HEIGHT: 70 IN | HEART RATE: 56 BPM | BODY MASS INDEX: 29.13 KG/M2 | DIASTOLIC BLOOD PRESSURE: 58 MMHG

## 2021-12-10 DIAGNOSIS — I10 ESSENTIAL HYPERTENSION, BENIGN: ICD-10-CM

## 2021-12-10 DIAGNOSIS — D50.0 IRON DEFICIENCY ANEMIA DUE TO CHRONIC BLOOD LOSS: Primary | ICD-10-CM

## 2021-12-10 DIAGNOSIS — E11.8 TYPE 2 DIABETES MELLITUS WITH COMPLICATION, WITHOUT LONG-TERM CURRENT USE OF INSULIN (H): ICD-10-CM

## 2021-12-10 DIAGNOSIS — K74.60 CIRRHOSIS OF LIVER WITH ASCITES, UNSPECIFIED HEPATIC CIRRHOSIS TYPE (H): ICD-10-CM

## 2021-12-10 DIAGNOSIS — I63.322 CEREBROVASCULAR ACCIDENT (CVA) DUE TO THROMBOSIS OF LEFT ANTERIOR CEREBRAL ARTERY (H): ICD-10-CM

## 2021-12-10 DIAGNOSIS — R18.8 CIRRHOSIS OF LIVER WITH ASCITES, UNSPECIFIED HEPATIC CIRRHOSIS TYPE (H): ICD-10-CM

## 2021-12-10 DIAGNOSIS — Z12.10 ENCOUNTER FOR SCREENING FOR MALIGNANT NEOPLASM OF INTESTINAL TRACT, UNSPECIFIED: ICD-10-CM

## 2021-12-10 DIAGNOSIS — Z00.00 HEALTHCARE MAINTENANCE: ICD-10-CM

## 2021-12-10 LAB
ERYTHROCYTE [DISTWIDTH] IN BLOOD BY AUTOMATED COUNT: 21.4 % (ref 10–15)
FERRITIN SERPL-MCNC: 165 NG/ML (ref 27–300)
HCT VFR BLD AUTO: 31.4 % (ref 40–53)
HGB BLD-MCNC: 9.5 G/DL (ref 13.3–17.7)
MCH RBC QN AUTO: 28.1 PG (ref 26.5–33)
MCHC RBC AUTO-ENTMCNC: 30.3 G/DL (ref 31.5–36.5)
MCV RBC AUTO: 93 FL (ref 78–100)
PLATELET # BLD AUTO: 124 10E3/UL (ref 150–450)
RBC # BLD AUTO: 3.38 10E6/UL (ref 4.4–5.9)
WBC # BLD AUTO: 3.6 10E3/UL (ref 4–11)

## 2021-12-10 PROCEDURE — 36415 COLL VENOUS BLD VENIPUNCTURE: CPT | Performed by: INTERNAL MEDICINE

## 2021-12-10 PROCEDURE — 82728 ASSAY OF FERRITIN: CPT | Performed by: INTERNAL MEDICINE

## 2021-12-10 PROCEDURE — 85027 COMPLETE CBC AUTOMATED: CPT | Performed by: INTERNAL MEDICINE

## 2021-12-10 PROCEDURE — 99214 OFFICE O/P EST MOD 30 MIN: CPT | Performed by: INTERNAL MEDICINE

## 2021-12-10 ASSESSMENT — MIFFLIN-ST. JEOR: SCORE: 1664.32

## 2021-12-10 NOTE — PATIENT INSTRUCTIONS
1.  Stop iron supplement for one week, then check stool  Heme test.    2.  Resume iron one daily after stool test.    3.  Consider pill cam study if stool test returns positive for blood.    4.  Recheck hemoglobin A-1c at next visit.    5.  See in 4 to 6 weeks

## 2021-12-12 NOTE — PROGRESS NOTES
ASSESSMENT:  1. Iron deficiency anemia due to chronic blood loss  Colin was noted to have profound iron deficiency anemia with a hemoglobin of 6.4 and a ferritin of 4 when seen on 9/20.  He now has had 2 infusions of Injectafer.  He is still taking an oral iron supplement.  He is off of Plavix.  He has had upper GI endoscopy and colonoscopy with no definite bleeding source found.  He has noted improved exercise tolerance but still appears rather pale.  I would favor checking stool Hemoccult if positive, a PillCam would be advised  - Ferritin; Future  - CBC with platelets; Future  - Occult blood fecal HGB immuno; Future  - Ferritin  - CBC with platelets    2. Healthcare maintenance  Health maintenance and screening issues were reviewed.  He is up-to-date on immunizations  - REVIEW OF HEALTH MAINTENANCE PROTOCOL ORDERS    3. Encounter for screening for malignant neoplasm of intestinal tract, unspecified   Stool Hemoccult will be checked as above  - Occult blood fecal HGB immuno; Future    4. Cerebrovascular accident (CVA) due to thrombosis of left anterior cerebral artery (H)  He was taken off of Plavix due to concerns that this would contribute to occult GI blood loss    5. Cirrhosis of liver with ascites, unspecified hepatic cirrhosis type (H)  He has been noted to have cirrhosis.  There is no history of excessive alcohol intake.  This could possibly be related to previous methotrexate therapy or to nonalcoholic steatohepatitis.    6. Type 2 diabetes mellitus with complication, without long-term current use of insulin (H)  Hemoglobin A1c was excellent at 5.9 when checked on September 20    7. Essential hypertension, benign  Good control on current regimen    PLAN:  Patient Instructions   1.  Stop iron supplement for one week, then check stool  Heme test.    2.  Resume iron one daily after stool test.    3.  Consider pill cam study if stool test returns positive for blood.    4.  Recheck hemoglobin A-1c at next  visit.    5.  See in 4 to 6 weeks    Orders Placed This Encounter   Procedures     REVIEW OF HEALTH MAINTENANCE PROTOCOL ORDERS     Ferritin     CBC with platelets     Occult blood fecal HGB immuno     There are no discontinued medications.    Return in about 6 weeks (around 1/21/2022) for Follow up.    ASSESSED PROBLEMS:  See above      CHIEF COMPLAINT:  Follow-up of iron deficiency and other concerns    HISTORY OF PRESENT ILLNESS:  Frank is a 76 year old male seen for follow-up of iron deficiency and other concerns.  He initially was seen in 9/20 for an annual wellness exam.  He was noted to be markedly pale at that time with a hemoglobin of 6.4.  Iron studies were consistent with iron deficiency.  He subsequently has had upper GI endoscopy and colonoscopy with no evidence for malignancy or obvious bleeding source detected.  He has been on Plavix due to history of CVA.  Plavix was discontinued.  He has received 2 infusions of Injectafer after the hemoglobin improved only slowly on oral supplements.  Reports that his exercise tolerance has improved.  He has no further peripheral edema.  Friend still reports that he appears pale.    He has a history of hepatic cirrhosis.  There is no history of excessive alcohol intake.  He has been on methotrexate which subsequently has been discontinued.  He also could have nonalcoholic steatohepatitis.    He has a history of type 2 diabetes.  Hemoglobin A1c was excellent at 5.9 when checked on 9/20.  He sees Dr. Chong due to scleritis.        REVIEW OF SYSTEMS:    Comprehensive review of systems is otherwise negative.    PFSH:   and retired.  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  TOBACCO USE:  History   Smoking Status     Never Smoker   Smokeless Tobacco     Never Used       VITALS:  Vitals:    12/10/21 1215   BP: 118/58   BP Location: Left arm   Patient Position: Sitting   Cuff Size: Adult Regular   Pulse: 56   SpO2: 95%   Weight: 92.3 kg (203  "lb 8 oz)   Height: 1.778 m (5' 10\")     Wt Readings from Last 3 Encounters:   12/10/21 92.3 kg (203 lb 8 oz)   11/30/21 92.4 kg (203 lb 12.8 oz)   11/01/21 95.2 kg (209 lb 14.4 oz)       PHYSICAL EXAM:  Constitutional:   Reveals an alert pleasant talkative man who does not seem in acute distress.  He still appears pale, but not to the degree from 9/20.    Vitals: per nursing notes.  HEENT: Atraumatic  Eyes: No scleral icterus  Oropharynx:   Mouth and throat clear, no thrush or exudate.  Neck:  Supple, no carotid bruits or adenopathy.  Back:  No spine or CVA pain.  Thorax:  No bony deformities.  Lungs: Clear to A&P without rales or wheezes.  Respiratory effort normal.  Cardiac:   Regular rate and rhythm, normal S1, S2, no murmur or gallop.  Abdomen:  Soft, active bowel sounds without bruits, mass, or tenderness.  Extremities:   No peripheral edema, pulses in the feet intact.    Skin:  No jaundice, peripheral cyanosis or lesions to suggest malignancy.  Neuro:  Alert and oriented. .  No gross focal deficits.  Psychiatric:  Memory intact, mood appropriate.    DATA REVIEWED:  Additional History from Old Records Summarized (2): None.  Decision to Obtain Records (1): None.   Radiology Tests Summarized or Ordered (1): None.  Labs Reviewed or Ordered (1): Labs reviewed and ordered  Medicine Test Summarized or Ordered (1):  upper GI endoscopy and colonoscopy reviewed    Independent Review of EKG or X-RAY(2 each): None.    The visit lasted a total of 30 minutes face to face with the patient. Over 50% of the time was spent counseling and educating the patient about management of iron deficiency anemia.    Dragon dictation was used for this note. Speech recognition errors are a possibility.     MEDICATIONS:  Current Outpatient Medications   Medication Sig Dispense Refill     atorvastatin (LIPITOR) 40 MG tablet [ATORVASTATIN (LIPITOR) 40 MG TABLET] TAKE 1 TABLET(40 MG) BY MOUTH DAILY 90 tablet 0     blood glucose meter " (GLUCOMETER) [BLOOD GLUCOSE METER (GLUCOMETER)] Use 1 each As Directed 2 (two) times a day. Dispense glucometer brand per patient's insurance at pharmacy discretion. 1 each 0     blood glucose test (ACCU-CHEK JACBO PLUS TEST STRP) strips [BLOOD GLUCOSE TEST (ACCU-CHEK JACOB PLUS TEST STRP) STRIPS] Use 1 each As Directed 2 (two) times a day. Accu-chek Jacob Plus 200 strip 11     cyanocobalamin, vitamin B-12, 2,500 mcg Tab [CYANOCOBALAMIN, VITAMIN B-12, 2,500 MCG TAB] Take 2,500 mcg by mouth daily.       ferric carboxymaltose (INJECTAFER) 50 MG/ML injection Inject 15 mLs (750 mg) into the vein every 7 days 15 mL 1     ferrous sulfate (FEROSUL) 325 (65 Fe) MG tablet Take 1 tablet (325 mg) by mouth 3 times daily (with meals) 90 tablet 11     generic lancets (ACCU-CHEK SOFTCLIX LANCETS) [GENERIC LANCETS (ACCU-CHEK SOFTCLIX LANCETS)] Use 1 each As Directed 2 (two) times a day. Dispense brand per patient's insurance at pharmacy discretion. 100 each 3     inFLIXimab (REMICADE IV) Every 8 weeks       lisinopril (ZESTRIL) 10 MG tablet Take 1 tablet (10 mg) by mouth daily 90 tablet 3     magnesium oxide 250 mg Tab [MAGNESIUM OXIDE 250 MG TAB] Take 250 mg by mouth 2 (two) times a day.       metFORMIN (GLUCOPHAGE) 500 MG tablet [METFORMIN (GLUCOPHAGE) 500 MG TABLET] TAKE 2 TABLETS BY MOUTH TWICE DAILY AT 6 AM AND AT 4  tablet 3     omeprazole (PRILOSEC) 40 MG DR capsule [OMEPRAZOLE (PRILOSEC) 40 MG CAPSULE] TAKE 1 CAPSULE(40 MG) BY MOUTH DAILY 90 capsule 0     pioglitazone (ACTOS) 30 MG tablet [PIOGLITAZONE (ACTOS) 30 MG TABLET] TAKE 1 TABLET(30 MG) BY MOUTH DAILY 90 tablet 0     propranolol (INDERAL) 20 MG tablet [PROPRANOLOL (INDERAL) 20 MG TABLET] TAKE 1 TABLET(20 MG) BY MOUTH TWICE DAILY 180 tablet 0     tamsulosin (FLOMAX) 0.4 MG capsule [TAMSULOSIN (FLOMAX) 0.4 MG CAP] TAKE 1 CAPSULE BY MOUTH DAILY AFTER SUPPER 90 capsule 0       Answers for HPI/ROS submitted by the patient on 12/9/2021  How many servings of fruits  and vegetables do you eat daily?: 0-1  On average, how many sweetened beverages do you drink each day (Examples: soda, juice, sweet tea, etc.  Do NOT count diet or artificially sweetened beverages)?: 0  How many minutes a day do you exercise enough to make your heart beat faster?: 9 or less  How many days a week do you exercise enough to make your heart beat faster?: 3 or less  How many days per week do you miss taking your medication?: 0

## 2022-01-11 ENCOUNTER — LAB (OUTPATIENT)
Dept: LAB | Facility: CLINIC | Age: 77
End: 2022-01-11
Payer: MEDICARE

## 2022-01-11 DIAGNOSIS — Z12.11 COLON CANCER SCREENING: Primary | ICD-10-CM

## 2022-01-11 LAB
HEMOCCULT SP1 STL QL: NEGATIVE
HEMOCCULT SP2 STL QL: NEGATIVE
HEMOCCULT SP3 STL QL: NEGATIVE

## 2022-01-11 PROCEDURE — 82274 ASSAY TEST FOR BLOOD FECAL: CPT

## 2022-01-21 ENCOUNTER — OFFICE VISIT (OUTPATIENT)
Dept: INTERNAL MEDICINE | Facility: CLINIC | Age: 77
End: 2022-01-21
Payer: MEDICARE

## 2022-01-21 VITALS
OXYGEN SATURATION: 96 % | DIASTOLIC BLOOD PRESSURE: 64 MMHG | SYSTOLIC BLOOD PRESSURE: 120 MMHG | BODY MASS INDEX: 28.92 KG/M2 | WEIGHT: 202 LBS | HEART RATE: 80 BPM | HEIGHT: 70 IN

## 2022-01-21 DIAGNOSIS — D50.0 IRON DEFICIENCY ANEMIA DUE TO CHRONIC BLOOD LOSS: ICD-10-CM

## 2022-01-21 DIAGNOSIS — I63.322 CEREBROVASCULAR ACCIDENT (CVA) DUE TO THROMBOSIS OF LEFT ANTERIOR CEREBRAL ARTERY (H): ICD-10-CM

## 2022-01-21 DIAGNOSIS — K74.60 CIRRHOSIS OF LIVER WITHOUT ASCITES, UNSPECIFIED HEPATIC CIRRHOSIS TYPE (H): ICD-10-CM

## 2022-01-21 DIAGNOSIS — H15.002 SCLERITIS OF LEFT EYE: ICD-10-CM

## 2022-01-21 DIAGNOSIS — I10 ESSENTIAL HYPERTENSION, BENIGN: ICD-10-CM

## 2022-01-21 DIAGNOSIS — E11.65 TYPE 2 DIABETES MELLITUS WITH HYPERGLYCEMIA, WITHOUT LONG-TERM CURRENT USE OF INSULIN (H): Primary | ICD-10-CM

## 2022-01-21 PROBLEM — K63.5 POLYP OF COLON: Status: ACTIVE | Noted: 2018-05-07

## 2022-01-21 PROBLEM — I65.8 OCCLUSION AND STENOSIS OF OTHER SPECIFIED PRECEREBRAL ARTERY WITHOUT MENTION OF CEREBRAL INFARCTION: Status: ACTIVE | Noted: 2022-01-21

## 2022-01-21 PROBLEM — I85.00 ESOPHAGEAL VARICES WITHOUT BLEEDING (H): Status: ACTIVE | Noted: 2021-09-27

## 2022-01-21 PROBLEM — K31.819 ANGIODYSPLASIA OF STOMACH: Status: ACTIVE | Noted: 2021-09-27

## 2022-01-21 PROBLEM — K57.30 DIVERTICULAR DISEASE OF LARGE INTESTINE: Status: ACTIVE | Noted: 2018-05-07

## 2022-01-21 LAB
ALBUMIN SERPL-MCNC: 3.8 G/DL (ref 3.5–5)
ALP SERPL-CCNC: 87 U/L (ref 45–120)
ALT SERPL W P-5'-P-CCNC: 23 U/L (ref 0–45)
ANION GAP SERPL CALCULATED.3IONS-SCNC: 9 MMOL/L (ref 5–18)
AST SERPL W P-5'-P-CCNC: 30 U/L (ref 0–40)
BILIRUB SERPL-MCNC: 0.4 MG/DL (ref 0–1)
BUN SERPL-MCNC: 13 MG/DL (ref 8–28)
CALCIUM SERPL-MCNC: 9.2 MG/DL (ref 8.5–10.5)
CHLORIDE BLD-SCNC: 104 MMOL/L (ref 98–107)
CO2 SERPL-SCNC: 24 MMOL/L (ref 22–31)
CREAT SERPL-MCNC: 1.27 MG/DL (ref 0.7–1.3)
ERYTHROCYTE [DISTWIDTH] IN BLOOD BY AUTOMATED COUNT: 16.9 % (ref 10–15)
FERRITIN SERPL-MCNC: 24 NG/ML (ref 27–300)
GFR SERPL CREATININE-BSD FRML MDRD: 59 ML/MIN/1.73M2
GLUCOSE BLD-MCNC: 147 MG/DL (ref 70–125)
HBA1C MFR BLD: 6.2 % (ref 0–5.6)
HCT VFR BLD AUTO: 33.2 % (ref 40–53)
HGB BLD-MCNC: 10.6 G/DL (ref 13.3–17.7)
MCH RBC QN AUTO: 29.9 PG (ref 26.5–33)
MCHC RBC AUTO-ENTMCNC: 31.9 G/DL (ref 31.5–36.5)
MCV RBC AUTO: 94 FL (ref 78–100)
PLATELET # BLD AUTO: 113 10E3/UL (ref 150–450)
POTASSIUM BLD-SCNC: 4.3 MMOL/L (ref 3.5–5)
PROT SERPL-MCNC: 8.2 G/DL (ref 6–8)
RBC # BLD AUTO: 3.55 10E6/UL (ref 4.4–5.9)
SODIUM SERPL-SCNC: 137 MMOL/L (ref 136–145)
WBC # BLD AUTO: 3.9 10E3/UL (ref 4–11)

## 2022-01-21 PROCEDURE — 85027 COMPLETE CBC AUTOMATED: CPT | Performed by: INTERNAL MEDICINE

## 2022-01-21 PROCEDURE — 83036 HEMOGLOBIN GLYCOSYLATED A1C: CPT | Performed by: INTERNAL MEDICINE

## 2022-01-21 PROCEDURE — 36415 COLL VENOUS BLD VENIPUNCTURE: CPT | Performed by: INTERNAL MEDICINE

## 2022-01-21 PROCEDURE — 99214 OFFICE O/P EST MOD 30 MIN: CPT | Performed by: INTERNAL MEDICINE

## 2022-01-21 PROCEDURE — 80053 COMPREHEN METABOLIC PANEL: CPT | Performed by: INTERNAL MEDICINE

## 2022-01-21 PROCEDURE — 82728 ASSAY OF FERRITIN: CPT | Performed by: INTERNAL MEDICINE

## 2022-01-21 RX ORDER — FERROUS SULFATE 325(65) MG
325 TABLET ORAL DAILY
Qty: 90 TABLET | Refills: 3
Start: 2022-01-21 | End: 2022-10-12

## 2022-01-21 RX ORDER — CLOPIDOGREL BISULFATE 75 MG/1
75 TABLET ORAL DAILY
COMMUNITY
Start: 2021-12-22 | End: 2022-01-21 | Stop reason: ALTCHOICE

## 2022-01-21 ASSESSMENT — MIFFLIN-ST. JEOR: SCORE: 1657.52

## 2022-01-21 NOTE — PATIENT INSTRUCTIONS
1.  I will notify you of test results.    2.  Consider restarting low dose aspirin if hemoglobin is acceptable.    3.  Consider hematology consult if hemoglobin is lower.    4.  See in three months or as needed

## 2022-01-21 NOTE — PROGRESS NOTES
"ASSESSMENT:  1. Iron deficiency anemia due to chronic blood loss  Antonio has received IV Injectafer.  He is taking 1 iron tablet daily.  Recent stool Hemoccults returned negative.  He did have previous upper GI endoscopy and colonoscopy with no bleeding source noted.  He has been off of Plavix.  Hemogram and ferritin will be rechecked.  If hemoglobin has not improved, I would advise a hematology consult.  If ferritin now is high normal, the iron supplement could be temporarily stopped  - CBC with platelets; Future  - Ferritin; Future  - ferrous sulfate (FEROSUL) 325 (65 Fe) MG tablet; Take 1 tablet (325 mg) by mouth daily  Dispense: 90 tablet; Refill: 3  - CBC with platelets  - Ferritin    2. Type 2 diabetes mellitus with hyperglycemia, without long-term current use of insulin (H)  Hemoglobin A1c was excellent at 5.9 when checked on September 20.  He acknowledges he has not been as careful with diet over the holidays.  Hemoglobin A1c will be rechecked  - Hemoglobin A1c; Future  - Comprehensive metabolic panel; Future  - Hemoglobin A1c  - Comprehensive metabolic panel    3. Cirrhosis of liver without ascites, unspecified hepatic cirrhosis type (H)  Has a history of cirrhosis.  There is no history of excessive alcohol intake.  This could be due to methotrexate or to \"fatty infiltration\".  Liver enzymes will be rechecked.  - Comprehensive metabolic panel; Future  - Comprehensive metabolic panel    4. Essential hypertension, benign  Good control on current regimen    5. Scleritis of left eye  Now on Remicade per Dr. Chong.  He has noted recent inflammation and redness of the left eye    6.  History of CVA:  He previously was on Plavix.  This was discontinued due to the development of profound iron deficiency anemia.  If hemoglobin has improved, I would favor starting low-dose aspirin as an alternate    PLAN:  Patient Instructions   1.  I will notify you of test results.    2.  Consider restarting low dose aspirin if " hemoglobin is acceptable.    3.  Consider hematology consult if hemoglobin is lower.    4.  See in three months or as needed    Orders Placed This Encounter   Procedures     Hemoglobin A1c     CBC with platelets     Ferritin     Comprehensive metabolic panel     Medications Discontinued During This Encounter   Medication Reason     clopidogrel (PLAVIX) 75 MG tablet Alternate therapy     ferrous sulfate (FEROSUL) 325 (65 Fe) MG tablet Reorder       No follow-ups on file.    ASSESSED PROBLEMS:  See above    CHIEF COMPLAINT:  Follow-up iron deficiency anemia, type 2 diabetes, and other concerns    HISTORY OF PRESENT ILLNESS:  Frank is a 76 year old male seen for follow-up of iron deficiency anemia, type 2 diabetes, and other concerns.  He initially was seen for an annual wellness visit in September.  He noted marked fatigue with shortness of breath with minor activity at that time.  Hemoglobin was 6.4 with findings consistent with iron deficiency.  Initially was placed on an oral iron supplement.  He then received IV Injectafer when there was only slow improvement in the hemoglobin.  He had been on Plavix due to a history of CVA.  He was taken off of the Plavix.  Reports exercise tolerance has improved, but is not quite back to his previous baseline.  He did have upper GI endoscopy and colonoscopy done with no obvious bleeding sources noted.  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  He did have varices noted, but no active bleeding.  Recent stool Hemoccult testing done after stopping Plavix was negative.    He is on metformin and pioglitazone for type 2 diabetes.  Hemoglobin A1c was excellent at 5.9 when checked in September.  He acknowledges he has been less compliant with diet since the holidays.    Blood pressure has been under good control with lisinopril.  He remains on omeprazole for symptoms of esophageal reflux.  Propranolol is used to decrease portal pressure.  He is on atorvastatin for  "hypercholesterolemia    REVIEW OF SYSTEMS:  See above  Comprehensive review of systems is otherwise negative.    PFSH:   and retired.  Remains quite active    TOBACCO USE:  History   Smoking Status     Never Smoker   Smokeless Tobacco     Never Used       VITALS:  Vitals:    01/21/22 0820   BP: 120/64   BP Location: Left arm   Patient Position: Sitting   Cuff Size: Adult Small   Pulse: 80   SpO2: 96%   Weight: 91.6 kg (202 lb)   Height: 1.778 m (5' 10\")     Wt Readings from Last 3 Encounters:   01/21/22 91.6 kg (202 lb)   12/10/21 92.3 kg (203 lb 8 oz)   11/30/21 92.4 kg (203 lb 12.8 oz)       PHYSICAL EXAM:  Constitutional:   Reveals an pleasant soft-spoken man with appropriate affect.  He does not seem in acute distress  Vitals: per nursing notes.  HEENT: Atraumatic.    Eyes: Conjunctival hyperemia on the left  Oropharynx:   Mouth and throat clear, no thrush or exudate.  Neck:  Supple, no carotid bruits or adenopathy.  Back:  No spine or CVA pain.  Thorax:  No bony deformities.  Lungs: Clear to A&P without rales or wheezes.  Respiratory effort normal.  Cardiac:   Regular rate and rhythm, normal S1, S2, no murmur or gallop.  Abdomen:  Soft, active bowel sounds without bruits, mass, or tenderness.  No ascites or masses.  Extremities:   No peripheral edema.    Skin:  No jaundice, peripheral cyanosis or lesions to suggest malignancy.  Skin still seems slightly pale, but less so than last visit  Neuro:  Alert and oriented.   No gross focal deficits.  Psychiatric:  Memory intact, mood appropriate.    DATA REVIEWED:  Additional History from Old Records Summarized (2): Otology records reviewed  Decision to Obtain Records (1): None.   Radiology Tests Summarized or Ordered (1): None.  Labs Reviewed or Ordered (1): Labs reviewed and ordered  Medicine Test Summarized or Ordered (1): Colonoscopy and upper GI endoscopy reviewed  Independent Review of EKG or X-RAY(2 each): None.    The visit lasted a total of 30 minutes " face to face with the patient. Over 50% of the time was spent counseling and educating the patient about management of iron deficiency anemia and type 2 diabetes  .    Dragon dictation was used for this note. Speech recognition errors are a possibility.     MEDICATIONS:  Current Outpatient Medications   Medication Sig Dispense Refill     atorvastatin (LIPITOR) 40 MG tablet TAKE 1 TABLET BY MOUTH DAILY 90 tablet 0     blood glucose meter (GLUCOMETER) [BLOOD GLUCOSE METER (GLUCOMETER)] Use 1 each As Directed 2 (two) times a day. Dispense glucometer brand per patient's insurance at pharmacy discretion. 1 each 0     blood glucose test (ACCU-CHEK JACOB PLUS TEST STRP) strips [BLOOD GLUCOSE TEST (ACCU-CHEK JACOB PLUS TEST STRP) STRIPS] Use 1 each As Directed 2 (two) times a day. Accu-chek Jacob Plus 200 strip 11     cyanocobalamin, vitamin B-12, 2,500 mcg Tab [CYANOCOBALAMIN, VITAMIN B-12, 2,500 MCG TAB] Take 2,500 mcg by mouth daily.       ferric carboxymaltose (INJECTAFER) 50 MG/ML injection Inject 15 mLs (750 mg) into the vein every 7 days 15 mL 1     ferrous sulfate (FEROSUL) 325 (65 Fe) MG tablet Take 1 tablet (325 mg) by mouth daily 90 tablet 3     generic lancets (ACCU-CHEK SOFTCLIX LANCETS) [GENERIC LANCETS (ACCU-CHEK SOFTCLIX LANCETS)] Use 1 each As Directed 2 (two) times a day. Dispense brand per patient's insurance at pharmacy discretion. 100 each 3     inFLIXimab (REMICADE IV) Every 8 weeks       lisinopril (ZESTRIL) 10 MG tablet Take 1 tablet (10 mg) by mouth daily 90 tablet 3     magnesium oxide 250 mg Tab [MAGNESIUM OXIDE 250 MG TAB] Take 250 mg by mouth 2 (two) times a day.       metFORMIN (GLUCOPHAGE) 500 MG tablet [METFORMIN (GLUCOPHAGE) 500 MG TABLET] TAKE 2 TABLETS BY MOUTH TWICE DAILY AT 6 AM AND AT 4  tablet 3     omeprazole (PRILOSEC) 40 MG DR capsule TAKE 1 CAPSULE BY MOUTH DAILY 90 capsule 0     pioglitazone (ACTOS) 30 MG tablet TAKE 1 TABLET BY MOUTH EVERY DAY 90 tablet 0     propranolol  (INDERAL) 20 MG tablet TAKE 1 TABLET BY MOUTH TWICE DAILY 180 tablet 0     tamsulosin (FLOMAX) 0.4 MG capsule TAKE 1 CAPSULE BY MOUTH DAILY AFTER SUPPER 90 capsule 0

## 2022-01-25 ENCOUNTER — INFUSION THERAPY VISIT (OUTPATIENT)
Dept: INFUSION THERAPY | Facility: CLINIC | Age: 77
End: 2022-01-25
Attending: INTERNAL MEDICINE
Payer: MEDICARE

## 2022-01-25 VITALS
RESPIRATION RATE: 16 BRPM | HEART RATE: 68 BPM | TEMPERATURE: 97.8 F | SYSTOLIC BLOOD PRESSURE: 132 MMHG | OXYGEN SATURATION: 98 % | DIASTOLIC BLOOD PRESSURE: 75 MMHG

## 2022-01-25 DIAGNOSIS — H44.112 PANUVEITIS OF LEFT EYE: Primary | ICD-10-CM

## 2022-01-25 PROCEDURE — 250N000013 HC RX MED GY IP 250 OP 250 PS 637: Performed by: INTERNAL MEDICINE

## 2022-01-25 PROCEDURE — 250N000011 HC RX IP 250 OP 636: Performed by: INTERNAL MEDICINE

## 2022-01-25 PROCEDURE — 96413 CHEMO IV INFUSION 1 HR: CPT

## 2022-01-25 PROCEDURE — 96365 THER/PROPH/DIAG IV INF INIT: CPT

## 2022-01-25 PROCEDURE — 258N000003 HC RX IP 258 OP 636: Performed by: INTERNAL MEDICINE

## 2022-01-25 RX ORDER — MEPERIDINE HYDROCHLORIDE 50 MG/ML
25 INJECTION INTRAMUSCULAR; INTRAVENOUS; SUBCUTANEOUS EVERY 30 MIN PRN
Status: CANCELLED | OUTPATIENT
Start: 2022-03-22

## 2022-01-25 RX ORDER — METHYLPREDNISOLONE SODIUM SUCCINATE 125 MG/2ML
125 INJECTION, POWDER, LYOPHILIZED, FOR SOLUTION INTRAMUSCULAR; INTRAVENOUS
Status: CANCELLED
Start: 2022-03-22

## 2022-01-25 RX ORDER — EPINEPHRINE 1 MG/ML
0.3 INJECTION, SOLUTION, CONCENTRATE INTRAVENOUS EVERY 5 MIN PRN
Status: CANCELLED | OUTPATIENT
Start: 2022-03-22

## 2022-01-25 RX ORDER — ALBUTEROL SULFATE 0.83 MG/ML
2.5 SOLUTION RESPIRATORY (INHALATION)
Status: CANCELLED | OUTPATIENT
Start: 2022-03-22

## 2022-01-25 RX ORDER — ACETAMINOPHEN 325 MG/1
650 TABLET ORAL ONCE
Status: COMPLETED | OUTPATIENT
Start: 2022-01-25 | End: 2022-01-25

## 2022-01-25 RX ORDER — ACETAMINOPHEN 325 MG/1
650 TABLET ORAL ONCE
Status: CANCELLED
Start: 2022-03-22 | End: 2022-03-22

## 2022-01-25 RX ORDER — ALBUTEROL SULFATE 90 UG/1
1-2 AEROSOL, METERED RESPIRATORY (INHALATION)
Status: CANCELLED
Start: 2022-03-22

## 2022-01-25 RX ORDER — DIPHENHYDRAMINE HCL 25 MG
25 CAPSULE ORAL ONCE
Status: CANCELLED
Start: 2022-03-22 | End: 2022-03-22

## 2022-01-25 RX ORDER — NALOXONE HYDROCHLORIDE 0.4 MG/ML
0.2 INJECTION, SOLUTION INTRAMUSCULAR; INTRAVENOUS; SUBCUTANEOUS
Status: CANCELLED | OUTPATIENT
Start: 2022-03-22

## 2022-01-25 RX ORDER — DIPHENHYDRAMINE HCL 25 MG
25 CAPSULE ORAL ONCE
Status: COMPLETED | OUTPATIENT
Start: 2022-01-25 | End: 2022-01-25

## 2022-01-25 RX ORDER — DIPHENHYDRAMINE HYDROCHLORIDE 50 MG/ML
50 INJECTION INTRAMUSCULAR; INTRAVENOUS
Status: CANCELLED
Start: 2022-03-22

## 2022-01-25 RX ADMIN — INFLIXIMAB 500 MG: 100 INJECTION, POWDER, LYOPHILIZED, FOR SOLUTION INTRAVENOUS at 11:00

## 2022-01-25 RX ADMIN — ACETAMINOPHEN 650 MG: 325 TABLET ORAL at 10:45

## 2022-01-25 RX ADMIN — DIPHENHYDRAMINE HYDROCHLORIDE 25 MG: 25 CAPSULE ORAL at 10:44

## 2022-01-25 NOTE — PROGRESS NOTES
Infusion Nursing Note:  Frank CRAIG Juliodena presents today for remicade infusion.    Patient seen by provider today: No   present during visit today: Not Applicable.    Note: /75 (BP Location: Left arm, Patient Position: Sitting, Cuff Size: Adult Regular)   Pulse 68   Temp 97.8  F (36.6  C) (Oral)   Resp 16   SpO2 98%       Intravenous Access:  Peripheral IV placed.    Treatment Conditions:  Not Applicable.    Post Infusion Assessment:  Premedicated with tylenol and benadryl. Remicade given over 1 hour.  Patient tolerated infusion without incident.  Blood return noted pre and post infusion.  Site patent and intact, free from redness, edema or discomfort.  No evidence of extravasations.  Access discontinued per protocol.       Discharge Plan:   Patient and/or family verbalized understanding of discharge instructions and all questions answered.  Patient discharged in stable condition accompanied by: self.  Departure Mode: Ambulatory.      Cindy Barragan RN

## 2022-02-08 ENCOUNTER — TRANSFERRED RECORDS (OUTPATIENT)
Dept: HEALTH INFORMATION MANAGEMENT | Facility: CLINIC | Age: 77
End: 2022-02-08
Payer: MEDICARE

## 2022-02-08 LAB — RETINOPATHY: NEGATIVE

## 2022-02-10 ENCOUNTER — OFFICE VISIT (OUTPATIENT)
Dept: RHEUMATOLOGY | Facility: CLINIC | Age: 77
End: 2022-02-10
Payer: MEDICARE

## 2022-02-10 VITALS
SYSTOLIC BLOOD PRESSURE: 104 MMHG | WEIGHT: 202.6 LBS | DIASTOLIC BLOOD PRESSURE: 74 MMHG | HEIGHT: 70 IN | BODY MASS INDEX: 29.01 KG/M2 | HEART RATE: 60 BPM

## 2022-02-10 DIAGNOSIS — R76.8 ANTINEUTROPHIL CYTOPLASMIC ANTIBODY (ANCA) POSITIVE: ICD-10-CM

## 2022-02-10 DIAGNOSIS — Z79.899 HIGH RISK MEDICATION USE: ICD-10-CM

## 2022-02-10 DIAGNOSIS — H44.112 PANUVEITIS OF LEFT EYE: Primary | ICD-10-CM

## 2022-02-10 DIAGNOSIS — M17.0 PRIMARY OSTEOARTHRITIS OF BOTH KNEES: ICD-10-CM

## 2022-02-10 PROCEDURE — 99214 OFFICE O/P EST MOD 30 MIN: CPT | Performed by: INTERNAL MEDICINE

## 2022-02-10 RX ORDER — PREDNISONE 10 MG/1
1 TABLET ORAL DAILY
COMMUNITY
Start: 2022-02-08 | End: 2022-04-29

## 2022-02-10 ASSESSMENT — MIFFLIN-ST. JEOR: SCORE: 1660.24

## 2022-02-10 NOTE — PROGRESS NOTES
Rheumatology follow-up visit note     Frank is a 76 year old male presents today for follow-up.    Frank was seen today for recheck.    Diagnoses and all orders for this visit:    Panuveitis of left eye    Antineutrophil cytoplasmic antibody (ANCA) positive    High risk medication use    Primary osteoarthritis of both knees        He had a flareup in his left eye of the uveitis is first over the past few years, and currently on oral prednisone, I discussed the situation with his ophthalmologist Dr. Abdi on the phone.  We were both in agreement that given how well he has done so far if there are no further such episodes we will continue Remicade if on the other hand he has had a flareup in short order will need to look into other options.  This time we will meet here at a shorter interval in 3 months.  He is to continue management of osteoarthritis is now.    Follow up in 3 months.    HPI    Frank Obando is a 76 year old male is here for follow-up.  He follows up with rheumatology for the immunosuppression management for scleritis of the left eye in the background of positive ANCA, negative UT-3/MPO autoantibodies.  He is on Remicade every 2 months.  He saw the ophthalmologist on Tuesday after having had more discomfort in his left eye and turned out to have a flareup there.  He was started on prednisone.  He has not had one during the past few years..  He was reassured.  He is fully vaccinated for COVID-19.  And he was boosted.  He has a Remicade infusion due tomorrow.  We discussed how the information in his context is very limited, given the participants in the trials did not have his profile.  It may be reasonable for him to postpone Remicade infusion for 2 weeks.  We discussed management of osteoarthritis.  Continue Remicade as now.   He reports no new joint pains, occasional locking or clicking of his left index finger now better this morning..  . ROS enquiry held for fever, ocular symptoms,  "rash, headache,  GI issues.  Today we also discussed the issues related to the current pandemic, the pros and cons of the current treatment plan, the CDC guidelines such as social distancing washing the hands covering the cough.              DETAILED EXAMINATION  02/10/22  :    Vitals:    02/10/22 0811   BP: 104/74   BP Location: Right arm   Patient Position: Sitting   Cuff Size: Adult Regular   Pulse: 60   Weight: 91.9 kg (202 lb 9.6 oz)   Height: 1.778 m (5' 10\")     Alert oriented. Head including the face is examined for malar rash, heliotropes, scarring, lupus pernio. Eyes examined for redness such as in episcleritis/scleritis, periorbital lesions.   Neck/ Face examined for parotid gland swelling, range of motion of neck.  Left upper and lower and right upper and lower extremities examined for tenderness, swelling, warmth of the appendicular joints, range of motion, edema, rash.  Some of the important findings included: he does not have evidence of synovitis in the palpable joints of the upper extremities.  No significant deformities of the digits.  + Heberden nodes.  Range of motion of the shoulders  show full abduction.  No JLT effusion or warmth of the knees.  he does not have dactylitis of the digits.     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  Created by Conversion       Iron deficiency anemia due to chronic blood loss 11/02/2021     Priority: Medium     Angiodysplasia of stomach 09/27/2021     Priority: Medium     Esophageal varices without bleeding (H) 09/27/2021     Priority: Medium     CRF (chronic renal failure), stage 3 (moderate) (H) 02/10/2020     Priority: Medium     Panuveitis, unspecified eye 03/19/2019     Priority: Medium     Normocytic anemia      Priority: Medium     Created by " Conversion      Formatting of this note might be different from the original.  Created by Conversion       Essential hypertension, benign      Priority: Medium     Created by Conversion  Replacement Utility updated for latest IMO load      Formatting of this note might be different from the original.  Created by Conversion    Replacement Utility updated for latest IMO load       Type 2 diabetes mellitus with complication, without long-term current use of insulin (H)      Priority: Medium     Created by Conversion      Formatting of this note might be different from the original.  Created by Conversion       Cholecystitis      Priority: Medium     Dyslipidemia      Priority: Medium     Thrombocytopenia (H)      Priority: Medium     Cirrhosis of liver with ascites, unspecified hepatic cirrhosis type (H)      Priority: Medium     Hyponatremia      Priority: Medium     Abdominal pain 12/20/2018     Priority: Medium     High risk medication use 09/26/2018     Priority: Medium     Diverticular disease of large intestine 05/07/2018     Priority: Medium     Polyp of colon 05/07/2018     Priority: Medium     Hypercholesterolemia      Priority: Medium     Created by Conversion      Formatting of this note might be different from the original.  Created by Conversion       Antineutrophil cytoplasmic antibody (ANCA) positive 03/23/2017     Priority: Medium     Urinary hesitancy 01/21/2017     Priority: Medium     Peripheral Neuropathy      Priority: Medium     Created by Conversion  Replacement Utility updated for latest IMO load      Formatting of this note might be different from the original.  Created by Conversion    Replacement Utility updated for latest IMO load       ALT (SGPT) level raised 03/17/2016     Priority: Medium     Scleritis of left eye 03/17/2016     Priority: Medium     Primary osteoarthritis of both knees 11/16/2015     Priority: Medium     Carpal tunnel syndrome 11/24/2014     Priority: Medium     Right  bundle branch block 11/24/2014     Priority: Medium     Nephrolithiasis      Priority: Medium     Created by Conversion  [facility] Annotation: Oct 14 2009  4:44PM - Pernell Chance: R stent      Formatting of this note might be different from the original.  Created by [facility] Annotation: Oct 14 2009  4:44PM - Pernell Chance: R stent       Cerebrovascular accident (CVA) due to thrombosis of left anterior cerebral artery (H)      Priority: Medium     Created by Conversion         Diverticular disease of colon 10/29/2007     Priority: Medium     Past Surgical History:   Procedure Laterality Date     COLONOSCOPY N/A 11/16/2021    Procedure: COLONOSCOPY with polypectomy;  Surgeon: Dex Finch MD;  Location: [facility]     CYSTOSCOPY TUMOR / CONDYLOMATA W/ LASER Left 7/18/2014     ESOPHAGOSCOPY, GASTROSCOPY, DUODENOSCOPY (EGD), COMBINED N/A 11/16/2021    Procedure: ESOPHAGOGASTRODUODENOSCOPY (EGD) with biopsies and argon plasma coagulation;  Surgeon: Dex Finch MD;  Location: [facility]     HC CYSTOSCOPY,INSERT URETERAL STENT      Description: Cystoscopy With Insertion Of Ureteral Stent Right;  Recorded: 10/14/2009;  Comments: stone      Past Medical History:   Diagnosis Date     Anemia      Arthritis     osteoarthritis     Calculus of kidney      Diabetes mellitus (H)      Episcleritis of left eye      Hyperlipidemia      Hypertension      Iron deficiency anemia due to chronic blood loss 11/2/2021     Peripheral neuropathy      Stroke (H)      Allergies   Allergen Reactions     Morphine Nausea and Vomiting     Other Allergy (See Comments) [External Allergen Needs Reconciliation - See Comment] Unknown     Scopolamine HBr CHANG, 08/27/2013.       Scopolamine Hbr [Scopolamine] Unknown     Current Outpatient Medications   Medication Sig Dispense Refill     atorvastatin (LIPITOR) 40 MG tablet TAKE 1 TABLET BY MOUTH DAILY 90 tablet 0     blood glucose meter (GLUCOMETER) [BLOOD  GLUCOSE METER (GLUCOMETER)] Use 1 each As Directed 2 (two) times a day. Dispense glucometer brand per patient's insurance at pharmacy discretion. 1 each 0     blood glucose test (ACCU-CHEK AJCOB PLUS TEST STRP) strips [BLOOD GLUCOSE TEST (ACCU-CHEK JACOB PLUS TEST STRP) STRIPS] Use 1 each As Directed 2 (two) times a day. Accu-chek Jacob Plus 200 strip 11     cyanocobalamin, vitamin B-12, 2,500 mcg Tab [CYANOCOBALAMIN, VITAMIN B-12, 2,500 MCG TAB] Take 2,500 mcg by mouth daily.       ferrous sulfate (FEROSUL) 325 (65 Fe) MG tablet Take 1 tablet (325 mg) by mouth daily 90 tablet 3     generic lancets (ACCU-CHEK SOFTCLIX LANCETS) [GENERIC LANCETS (ACCU-CHEK SOFTCLIX LANCETS)] Use 1 each As Directed 2 (two) times a day. Dispense brand per patient's insurance at pharmacy discretion. 100 each 3     inFLIXimab (REMICADE IV) Every 8 weeks       lisinopril (ZESTRIL) 10 MG tablet Take 1 tablet (10 mg) by mouth daily 90 tablet 3     magnesium oxide 250 mg Tab [MAGNESIUM OXIDE 250 MG TAB] Take 250 mg by mouth 2 (two) times a day.       metFORMIN (GLUCOPHAGE) 500 MG tablet [METFORMIN (GLUCOPHAGE) 500 MG TABLET] TAKE 2 TABLETS BY MOUTH TWICE DAILY AT 6 AM AND AT 4  tablet 3     omeprazole (PRILOSEC) 40 MG DR capsule TAKE 1 CAPSULE BY MOUTH DAILY 90 capsule 0     pioglitazone (ACTOS) 30 MG tablet TAKE 1 TABLET BY MOUTH EVERY DAY 90 tablet 0     propranolol (INDERAL) 20 MG tablet TAKE 1 TABLET BY MOUTH TWICE DAILY 180 tablet 0     tamsulosin (FLOMAX) 0.4 MG capsule TAKE 1 CAPSULE BY MOUTH DAILY AFTER SUPPER 90 capsule 0     ferric carboxymaltose (INJECTAFER) 50 MG/ML injection Inject 15 mLs (750 mg) into the vein every 7 days (Patient not taking: Reported on 2/10/2022) 15 mL 1     predniSONE (DELTASONE) 10 MG tablet Take 1 tablet by mouth daily       family history includes Coronary Artery Disease in his mother; Diabetes in his mother; Lung Cancer in his father.  Social Connections: Not on file          WBC Count   Date Value  Ref Range Status   01/21/2022 3.9 (L) 4.0 - 11.0 10e3/uL Final     RBC Count   Date Value Ref Range Status   01/21/2022 3.55 (L) 4.40 - 5.90 10e6/uL Final     Hemoglobin   Date Value Ref Range Status   01/21/2022 10.6 (L) 13.3 - 17.7 g/dL Final     Hematocrit   Date Value Ref Range Status   01/21/2022 33.2 (L) 40.0 - 53.0 % Final     MCV   Date Value Ref Range Status   01/21/2022 94 78 - 100 fL Final     MCH   Date Value Ref Range Status   01/21/2022 29.9 26.5 - 33.0 pg Final     Platelet Count   Date Value Ref Range Status   01/21/2022 113 (L) 150 - 450 10e3/uL Final     % Lymphocytes   Date Value Ref Range Status   10/05/2021 29 % Final     AST   Date Value Ref Range Status   01/21/2022 30 0 - 40 U/L Final     ALT   Date Value Ref Range Status   01/21/2022 23 0 - 45 U/L Final     Albumin   Date Value Ref Range Status   01/21/2022 3.8 3.5 - 5.0 g/dL Final     Alkaline Phosphatase   Date Value Ref Range Status   01/21/2022 87 45 - 120 U/L Final     Creatinine   Date Value Ref Range Status   01/21/2022 1.27 0.70 - 1.30 mg/dL Final     GFR Estimate   Date Value Ref Range Status   01/21/2022 59 (L) >60 mL/min/1.73m2 Final     Comment:     Effective December 21, 2021 eGFRcr in adults is calculated using the 2021 CKD-EPI creatinine equation which includes age and gender (Edgard et al., NEJ, DOI: 10.1056/GORDny5106269)   02/08/2021 48 (L) >60 mL/min/1.73m2 Final     GFR Estimate If Black   Date Value Ref Range Status   02/08/2021 59 (L) >60 mL/min/1.73m2 Final     Creatinine Urine mg/dL   Date Value Ref Range Status   09/20/2021 116 mg/dL Final

## 2022-02-15 ENCOUNTER — TRANSFERRED RECORDS (OUTPATIENT)
Dept: HEALTH INFORMATION MANAGEMENT | Facility: CLINIC | Age: 77
End: 2022-02-15
Payer: MEDICARE

## 2022-02-15 LAB — RETINOPATHY: NEGATIVE

## 2022-02-22 ENCOUNTER — TRANSFERRED RECORDS (OUTPATIENT)
Dept: HEALTH INFORMATION MANAGEMENT | Facility: CLINIC | Age: 77
End: 2022-02-22

## 2022-02-22 ENCOUNTER — OFFICE VISIT (OUTPATIENT)
Dept: INTERNAL MEDICINE | Facility: CLINIC | Age: 77
End: 2022-02-22
Payer: MEDICARE

## 2022-02-22 DIAGNOSIS — D50.0 IRON DEFICIENCY ANEMIA DUE TO CHRONIC BLOOD LOSS: ICD-10-CM

## 2022-02-22 LAB
ERYTHROCYTE [DISTWIDTH] IN BLOOD BY AUTOMATED COUNT: 14.7 % (ref 10–15)
HCT VFR BLD AUTO: 33.4 % (ref 40–53)
HGB BLD-MCNC: 10.9 G/DL (ref 13.3–17.7)
MCH RBC QN AUTO: 30 PG (ref 26.5–33)
MCHC RBC AUTO-ENTMCNC: 32.6 G/DL (ref 31.5–36.5)
MCV RBC AUTO: 92 FL (ref 78–100)
PLATELET # BLD AUTO: 131 10E3/UL (ref 150–450)
RBC # BLD AUTO: 3.63 10E6/UL (ref 4.4–5.9)
RETINOPATHY: NEGATIVE
WBC # BLD AUTO: 11.6 10E3/UL (ref 4–11)

## 2022-02-22 PROCEDURE — 99207 PR NO CHARGE LOS: CPT | Performed by: INTERNAL MEDICINE

## 2022-02-22 PROCEDURE — 36415 COLL VENOUS BLD VENIPUNCTURE: CPT | Performed by: INTERNAL MEDICINE

## 2022-02-22 PROCEDURE — 85027 COMPLETE CBC AUTOMATED: CPT | Performed by: INTERNAL MEDICINE

## 2022-03-08 ENCOUNTER — TELEPHONE (OUTPATIENT)
Dept: INTERNAL MEDICINE | Facility: CLINIC | Age: 77
End: 2022-03-08

## 2022-03-08 ENCOUNTER — TRANSFERRED RECORDS (OUTPATIENT)
Dept: HEALTH INFORMATION MANAGEMENT | Facility: CLINIC | Age: 77
End: 2022-03-08
Payer: MEDICARE

## 2022-03-08 DIAGNOSIS — E11.65 TYPE 2 DIABETES MELLITUS WITH HYPERGLYCEMIA, WITHOUT LONG-TERM CURRENT USE OF INSULIN (H): Primary | ICD-10-CM

## 2022-03-08 LAB — RETINOPATHY: NEGATIVE

## 2022-03-08 RX ORDER — GLIMEPIRIDE 2 MG/1
2 TABLET ORAL
Qty: 30 TABLET | Refills: 1 | Status: SHIPPED | OUTPATIENT
Start: 2022-03-08 | End: 2022-04-29

## 2022-03-08 NOTE — TELEPHONE ENCOUNTER
Pt is calling back and his count is 250 at 11:10am      Prednisone-10 mg-he will be taking 3 today.

## 2022-03-08 NOTE — TELEPHONE ENCOUNTER
"Writer spoke to Frank.  He states he has been on Prednisone (put does not know the strength) and currently on a tapering dose.    Last night his blood sugar level at 10 p.m. was a 546 without any other symptoms.  He did take his metformin earlier that day.  He reported this blood sugar level to his eye doctor this morning and was told to contact PCP.      He has not checked blood sugar today and not at home to check his blood sugar level at the time of our call.  He states that he feels \"normal.\"    "

## 2022-03-08 NOTE — TELEPHONE ENCOUNTER
Antonio was started on a tapering dose of Prednisone by Dr. Cherry for panuveitis.  He started at 60 mg daily for 1 week, now is down to 30 mg daily.  He did have a blood sugar elevated to 456 last p.m. on 40 mg of prednisone daily.  Blood sugar was 250 this afternoon.  He is on Actos and Metformin for diabetes.  Hemoglobin A1c was 6.2 on January 21.    Marked hyperglycemia should be less of an issue as prednisone is decreased.  He will try adding glimepiride 2 mg in the a.m.  This should be taken daily until he is down to 10 mg of prednisone.  He then could discontinue it.  If blood sugars remain high on glimepiride, he would need to use insulin short-term.

## 2022-03-08 NOTE — TELEPHONE ENCOUNTER
Reason for Call:  Other call back    Detailed comments: Dr Cherry calling from [facility] to speak with PCP regarding patient high glucose    Phone Number Patient can be reached at: Other phone number:  [phone]    Best Time: ASAP    Can we leave a detailed message on this number? YES    Call taken on 3/8/2022 at 9:11 AM by Manisha Sylvester

## 2022-03-08 NOTE — TELEPHONE ENCOUNTER
Reason for Call:  Other call back    Detailed comments: Pt was seen at Eye physician's office today, had a high blood sugar, was asked to contact PCP     Please advise    Phone Number Patient can be reached at: Cell number on file:    Telephone Information:   Mobile [phone]       Best Time: anytime    Can we leave a detailed message on this number? YES    Call taken on 3/8/2022 at 9:28 AM by Manisha Sylvester

## 2022-03-10 DIAGNOSIS — E11.65 TYPE 2 DIABETES MELLITUS WITH HYPERGLYCEMIA, WITHOUT LONG-TERM CURRENT USE OF INSULIN (H): ICD-10-CM

## 2022-03-10 RX ORDER — GLIMEPIRIDE 2 MG/1
2 TABLET ORAL
Qty: 30 TABLET | Refills: 1 | Status: CANCELLED | OUTPATIENT
Start: 2022-03-10

## 2022-03-11 ENCOUNTER — TELEPHONE (OUTPATIENT)
Dept: INTERNAL MEDICINE | Facility: CLINIC | Age: 77
End: 2022-03-11
Payer: MEDICARE

## 2022-03-11 NOTE — TELEPHONE ENCOUNTER
Left message for Frank  Message to relay: Increase Glimepiride to 4 mg in the AM.  Report blood sugars next week.

## 2022-03-11 NOTE — TELEPHONE ENCOUNTER
Reason for Call:  Other call back    Detailed comments: calling in udpate of DM numbers.He was told to let Dr Lopez know his Blood sugar numbers.    3/8/22- 7pm-423  3/8/22 10pm 392  3/9/22-11am  216  3/9/22 2pm 318  3/9/22 930pm  367  3/10/22 7am 236  3/10/22  7pm 390  3/10/22 1130pm 258  3/11/22 12n 241    Phone Number Patient can be reached at: Cell number on file:    Telephone Information:   Mobile [phone]       Best Time: any    Can we leave a detailed message on this number? YES    Call taken on 3/11/2022 at 11:58 AM by Pam J. Behr

## 2022-03-16 ENCOUNTER — TRANSFERRED RECORDS (OUTPATIENT)
Dept: HEALTH INFORMATION MANAGEMENT | Facility: CLINIC | Age: 77
End: 2022-03-16
Payer: MEDICARE

## 2022-03-16 LAB — RETINOPATHY: NEGATIVE

## 2022-03-18 DIAGNOSIS — E78.00 HYPERCHOLESTEROLEMIA: ICD-10-CM

## 2022-03-18 DIAGNOSIS — E11.65 TYPE 2 DIABETES MELLITUS WITH HYPERGLYCEMIA, WITHOUT LONG-TERM CURRENT USE OF INSULIN (H): ICD-10-CM

## 2022-03-18 DIAGNOSIS — R39.11 URINARY HESITANCY: ICD-10-CM

## 2022-03-18 DIAGNOSIS — I10 ESSENTIAL HYPERTENSION, BENIGN: ICD-10-CM

## 2022-03-18 DIAGNOSIS — K21.00 GASTROESOPHAGEAL REFLUX DISEASE WITH ESOPHAGITIS WITHOUT HEMORRHAGE: ICD-10-CM

## 2022-03-18 RX ORDER — ATORVASTATIN CALCIUM 40 MG/1
TABLET, FILM COATED ORAL
Qty: 90 TABLET | Refills: 1 | Status: SHIPPED | OUTPATIENT
Start: 2022-03-18 | End: 2022-09-12

## 2022-03-18 RX ORDER — PROPRANOLOL HYDROCHLORIDE 20 MG/1
TABLET ORAL
Qty: 180 TABLET | Refills: 3 | Status: SHIPPED | OUTPATIENT
Start: 2022-03-18 | End: 2023-04-17

## 2022-03-18 RX ORDER — OMEPRAZOLE 40 MG/1
CAPSULE, DELAYED RELEASE ORAL
Qty: 90 CAPSULE | Refills: 3 | Status: SHIPPED | OUTPATIENT
Start: 2022-03-18 | End: 2023-04-04

## 2022-03-18 RX ORDER — TAMSULOSIN HYDROCHLORIDE 0.4 MG/1
CAPSULE ORAL
Qty: 90 CAPSULE | Refills: 3 | Status: SHIPPED | OUTPATIENT
Start: 2022-03-18 | End: 2023-04-04

## 2022-03-18 RX ORDER — PIOGLITAZONEHYDROCHLORIDE 30 MG/1
TABLET ORAL
Qty: 90 TABLET | Refills: 1 | Status: SHIPPED | OUTPATIENT
Start: 2022-03-18 | End: 2022-09-12

## 2022-03-18 NOTE — TELEPHONE ENCOUNTER
"atorvastatin (LIPITOR) 40 MG tablet  Last Written Prescription Date:  12/20/21  Last Fill Quantity: 90,  # refills: 0   omeprazole (PRILOSEC) 40 MG DR capsuleLast Written Prescription Date:  12/20/21  Last Fill Quantity: 90,  # refills: 0   pioglitazone (ACTOS) 30 MG tablet  Last Written Prescription Date:  12/20/21  Last Fill Quantity: 90,  # refills: 0  propranolol (INDERAL) 20 MG tablet   Last Written Prescription Date:  12/20/21  Last Fill Quantity: 180,  # refills: 0   tamsulosin (FLOMAX) 0.4 MG capsule  Last Written Prescription Date:  12/20/21  Last Fill Quantity: 90,  # refills: 0   Last office visit provider:  2/22/22     Requested Prescriptions   Pending Prescriptions Disp Refills     omeprazole (PRILOSEC) 40 MG DR capsule [Pharmacy Med Name: OMEPRAZOLE 40MG CAPSULES] 90 capsule 0     Sig: TAKE 1 CAPSULE BY MOUTH DAILY       PPI Protocol Passed - 3/18/2022  5:20 AM        Passed - Not on Clopidogrel (unless Pantoprazole ordered)        Passed - No diagnosis of osteoporosis on record        Passed - Recent (12 mo) or future (30 days) visit within the authorizing provider's specialty     Patient has had an office visit with the authorizing provider or a provider within the authorizing providers department within the previous 12 mos or has a future within next 30 days. See \"Patient Info\" tab in inbasket, or \"Choose Columns\" in Meds & Orders section of the refill encounter.              Passed - Medication is active on med list        Passed - Patient is age 18 or older           pioglitazone (ACTOS) 30 MG tablet [Pharmacy Med Name: PIOGLITAZONE 30MG TABLETS] 90 tablet 0     Sig: TAKE 1 TABLET BY MOUTH EVERY DAY       Thiazolidinedione Agents (TZDs)  Passed - 3/18/2022  5:20 AM        Passed - Patient has a normal ALT within the past 12 mos.     Recent Labs   Lab Test 01/21/22  0848   ALT 23             Passed - Patient has a normal AST within the past 12 mos.      Recent Labs   Lab Test 01/21/22  0848   AST " "30             Passed - Patient has documented A1c within the specified period of time.     If HgbA1C is 8 or greater, it needs to be on file within the past 3 months.  If less than 8, must be on file within the past 6 months.     Recent Labs   Lab Test 01/21/22  0848   A1C 6.2*             Passed - Diagnosis not CHF        Passed - Medication is active on med list        Passed - Patient is age 18 or older        Passed - Patient has a normal serum Creatinine in the past 12 months     Recent Labs   Lab Test 01/21/22  0848   CR 1.27       Ok to refill medication if creatinine is low          Passed - Recent (6 mo) or future (30 days) visit within the authorizing provider's specialty     Patient had office visit in the last 6 months or has a visit in the next 30 days with authorizing provider or within the authorizing provider's specialty.  See \"Patient Info\" tab in inYooDealet, or \"Choose Columns\" in Meds & Orders section of the refill encounter.               atorvastatin (LIPITOR) 40 MG tablet [Pharmacy Med Name: ATORVASTATIN 40MG TABLETS] 90 tablet 0     Sig: TAKE 1 TABLET BY MOUTH DAILY       Statins Protocol Passed - 3/18/2022  5:20 AM        Passed - LDL on file in past 12 months     Recent Labs   Lab Test 09/20/21  0928   LDL 54             Passed - No abnormal creatine kinase in past 12 months     No lab results found.             Passed - Recent (12 mo) or future (30 days) visit within the authorizing provider's specialty     Patient has had an office visit with the authorizing provider or a provider within the authorizing providers department within the previous 12 mos or has a future within next 30 days. See \"Patient Info\" tab in Spry, or \"Choose Columns\" in Meds & Orders section of the refill encounter.              Passed - Medication is active on med list        Passed - Patient is age 18 or older           tamsulosin (FLOMAX) 0.4 MG capsule [Pharmacy Med Name: TAMSULOSIN 0.4MG CAPSULES] 90 capsule 0 " "    Sig: TAKE 1 CAPSULE BY MOUTH DAILY AFTER AND SUPPER       Alpha Blockers Passed - 3/18/2022  5:20 AM        Passed - Blood pressure under 140/90 in past 12 months     BP Readings from Last 3 Encounters:   02/10/22 104/74   01/25/22 132/75   01/21/22 120/64                 Passed - Recent (12 mo) or future (30 days) visit within the authorizing provider's specialty     Patient has had an office visit with the authorizing provider or a provider within the authorizing providers department within the previous 12 mos or has a future within next 30 days. See \"Patient Info\" tab in inbasket, or \"Choose Columns\" in Meds & Orders section of the refill encounter.              Passed - Patient does not have Tadalafil, Vardenafil, or Sildenafil on their medication list        Passed - Medication is active on med list        Passed - Patient is 18 years of age or older           propranolol (INDERAL) 20 MG tablet [Pharmacy Med Name: PROPRANOLOL 20MG TABLETS] 180 tablet 0     Sig: TAKE 1 TABLET BY MOUTH TWICE DAILY       Beta-Blockers Protocol Passed - 3/18/2022  5:20 AM        Passed - Blood pressure under 140/90 in past 12 months     BP Readings from Last 3 Encounters:   02/10/22 104/74   01/25/22 132/75   01/21/22 120/64                 Passed - Patient is age 6 or older        Passed - Recent (12 mo) or future (30 days) visit within the authorizing provider's specialty     Patient has had an office visit with the authorizing provider or a provider within the authorizing providers department within the previous 12 mos or has a future within next 30 days. See \"Patient Info\" tab in inbasket, or \"Choose Columns\" in Meds & Orders section of the refill encounter.              Passed - Medication is active on med list             Radha Purcell RN 03/18/22 2:04 PM  "

## 2022-03-22 ENCOUNTER — INFUSION THERAPY VISIT (OUTPATIENT)
Dept: INFUSION THERAPY | Facility: CLINIC | Age: 77
End: 2022-03-22
Payer: MEDICARE

## 2022-03-22 VITALS
DIASTOLIC BLOOD PRESSURE: 60 MMHG | SYSTOLIC BLOOD PRESSURE: 100 MMHG | OXYGEN SATURATION: 97 % | HEART RATE: 71 BPM | RESPIRATION RATE: 16 BRPM | TEMPERATURE: 98.1 F

## 2022-03-22 DIAGNOSIS — H44.112 PANUVEITIS OF LEFT EYE: Primary | ICD-10-CM

## 2022-03-22 PROCEDURE — 250N000013 HC RX MED GY IP 250 OP 250 PS 637: Performed by: INTERNAL MEDICINE

## 2022-03-22 PROCEDURE — 258N000003 HC RX IP 258 OP 636: Performed by: INTERNAL MEDICINE

## 2022-03-22 PROCEDURE — 96413 CHEMO IV INFUSION 1 HR: CPT

## 2022-03-22 PROCEDURE — 250N000011 HC RX IP 250 OP 636: Performed by: INTERNAL MEDICINE

## 2022-03-22 RX ORDER — NALOXONE HYDROCHLORIDE 0.4 MG/ML
0.2 INJECTION, SOLUTION INTRAMUSCULAR; INTRAVENOUS; SUBCUTANEOUS
Status: DISCONTINUED | OUTPATIENT
Start: 2022-03-22 | End: 2022-03-22 | Stop reason: HOSPADM

## 2022-03-22 RX ORDER — DIPHENHYDRAMINE HYDROCHLORIDE 50 MG/ML
50 INJECTION INTRAMUSCULAR; INTRAVENOUS
Status: DISCONTINUED | OUTPATIENT
Start: 2022-03-22 | End: 2022-03-22 | Stop reason: HOSPADM

## 2022-03-22 RX ORDER — DIPHENHYDRAMINE HCL 25 MG
25 CAPSULE ORAL ONCE
Status: COMPLETED | OUTPATIENT
Start: 2022-03-22 | End: 2022-03-22

## 2022-03-22 RX ORDER — ACETAMINOPHEN 325 MG/1
650 TABLET ORAL ONCE
Status: COMPLETED | OUTPATIENT
Start: 2022-03-22 | End: 2022-03-22

## 2022-03-22 RX ORDER — ACETAMINOPHEN 325 MG/1
650 TABLET ORAL ONCE
Status: CANCELLED
Start: 2022-05-17 | End: 2022-05-17

## 2022-03-22 RX ORDER — EPINEPHRINE 1 MG/ML
0.3 INJECTION, SOLUTION, CONCENTRATE INTRAVENOUS EVERY 5 MIN PRN
Status: CANCELLED | OUTPATIENT
Start: 2022-05-17

## 2022-03-22 RX ORDER — METHYLPREDNISOLONE SODIUM SUCCINATE 125 MG/2ML
125 INJECTION, POWDER, LYOPHILIZED, FOR SOLUTION INTRAMUSCULAR; INTRAVENOUS
Status: CANCELLED
Start: 2022-05-17

## 2022-03-22 RX ORDER — MEPERIDINE HYDROCHLORIDE 50 MG/ML
25 INJECTION INTRAMUSCULAR; INTRAVENOUS; SUBCUTANEOUS EVERY 30 MIN PRN
Status: DISCONTINUED | OUTPATIENT
Start: 2022-03-22 | End: 2022-03-22 | Stop reason: HOSPADM

## 2022-03-22 RX ORDER — EPINEPHRINE 1 MG/ML
0.3 INJECTION, SOLUTION, CONCENTRATE INTRAVENOUS EVERY 5 MIN PRN
Status: DISCONTINUED | OUTPATIENT
Start: 2022-03-22 | End: 2022-03-22 | Stop reason: HOSPADM

## 2022-03-22 RX ORDER — ALBUTEROL SULFATE 0.83 MG/ML
2.5 SOLUTION RESPIRATORY (INHALATION)
Status: CANCELLED | OUTPATIENT
Start: 2022-05-17

## 2022-03-22 RX ORDER — NALOXONE HYDROCHLORIDE 0.4 MG/ML
0.2 INJECTION, SOLUTION INTRAMUSCULAR; INTRAVENOUS; SUBCUTANEOUS
Status: CANCELLED | OUTPATIENT
Start: 2022-05-17

## 2022-03-22 RX ORDER — ALBUTEROL SULFATE 90 UG/1
1-2 AEROSOL, METERED RESPIRATORY (INHALATION)
Status: DISCONTINUED | OUTPATIENT
Start: 2022-03-22 | End: 2022-03-22 | Stop reason: HOSPADM

## 2022-03-22 RX ORDER — METHYLPREDNISOLONE SODIUM SUCCINATE 125 MG/2ML
125 INJECTION, POWDER, LYOPHILIZED, FOR SOLUTION INTRAMUSCULAR; INTRAVENOUS
Status: DISCONTINUED | OUTPATIENT
Start: 2022-03-22 | End: 2022-03-22 | Stop reason: HOSPADM

## 2022-03-22 RX ORDER — ALBUTEROL SULFATE 0.83 MG/ML
2.5 SOLUTION RESPIRATORY (INHALATION)
Status: DISCONTINUED | OUTPATIENT
Start: 2022-03-22 | End: 2022-03-22 | Stop reason: HOSPADM

## 2022-03-22 RX ORDER — MEPERIDINE HYDROCHLORIDE 50 MG/ML
25 INJECTION INTRAMUSCULAR; INTRAVENOUS; SUBCUTANEOUS EVERY 30 MIN PRN
Status: CANCELLED | OUTPATIENT
Start: 2022-05-17

## 2022-03-22 RX ORDER — DIPHENHYDRAMINE HYDROCHLORIDE 50 MG/ML
50 INJECTION INTRAMUSCULAR; INTRAVENOUS
Status: CANCELLED
Start: 2022-05-17

## 2022-03-22 RX ORDER — ALBUTEROL SULFATE 90 UG/1
1-2 AEROSOL, METERED RESPIRATORY (INHALATION)
Status: CANCELLED
Start: 2022-05-17

## 2022-03-22 RX ORDER — DIPHENHYDRAMINE HCL 25 MG
25 CAPSULE ORAL ONCE
Status: CANCELLED
Start: 2022-05-17 | End: 2022-05-17

## 2022-03-22 RX ADMIN — DIPHENHYDRAMINE HYDROCHLORIDE 25 MG: 25 CAPSULE ORAL at 11:09

## 2022-03-22 RX ADMIN — ACETAMINOPHEN 650 MG: 325 TABLET ORAL at 11:09

## 2022-03-22 RX ADMIN — INFLIXIMAB 500 MG: 100 INJECTION, POWDER, LYOPHILIZED, FOR SOLUTION INTRAVENOUS at 11:54

## 2022-03-22 NOTE — PROGRESS NOTES
Infusion Nursing Note:  Frank CRAIG Yazminmariia presents today for remicade infusion.    Patient seen by provider today: No   present during visit today: Not Applicable.    Note: /60 (BP Location: Left arm, Patient Position: Sitting, Cuff Size: Adult Large)   Pulse 71   Temp 98.1  F (36.7  C) (Oral)   Resp 16   SpO2 97% .  Swelling noted post infusion. IV still flushing okay and not pailful. Offered heated blanket, patient declined and will use heat at home.     Intravenous Access:  Peripheral IV placed.    Treatment Conditions:  Not Applicable.      Post Infusion Assessment:  Premedicated with tylenol and benadryl. Remicade infused.  Patient tolerated infusion without incident.  Blood return noted pre and post infusion.  Site patent and intact, free from redness, edema or discomfort.  No evidence of extravasations.  Access discontinued per protocol.       Discharge Plan:   Patient and/or family verbalized understanding of discharge instructions and all questions answered.  Patient discharged in stable condition accompanied by: self.  Departure Mode: Ambulatory.      Cindy Barragan RN

## 2022-03-23 ENCOUNTER — LAB (OUTPATIENT)
Dept: LAB | Facility: CLINIC | Age: 77
End: 2022-03-23
Payer: MEDICARE

## 2022-03-23 DIAGNOSIS — D50.0 IRON DEFICIENCY ANEMIA DUE TO CHRONIC BLOOD LOSS: ICD-10-CM

## 2022-03-23 LAB
ERYTHROCYTE [DISTWIDTH] IN BLOOD BY AUTOMATED COUNT: 13.7 % (ref 10–15)
HCT VFR BLD AUTO: 35.3 % (ref 40–53)
HGB BLD-MCNC: 11.2 G/DL (ref 13.3–17.7)
MCH RBC QN AUTO: 29.9 PG (ref 26.5–33)
MCHC RBC AUTO-ENTMCNC: 31.7 G/DL (ref 31.5–36.5)
MCV RBC AUTO: 94 FL (ref 78–100)
PLATELET # BLD AUTO: 100 10E3/UL (ref 150–450)
RBC # BLD AUTO: 3.75 10E6/UL (ref 4.4–5.9)
WBC # BLD AUTO: 4.8 10E3/UL (ref 4–11)

## 2022-03-23 PROCEDURE — 36415 COLL VENOUS BLD VENIPUNCTURE: CPT

## 2022-03-23 PROCEDURE — 85027 COMPLETE CBC AUTOMATED: CPT

## 2022-04-08 ENCOUNTER — OFFICE VISIT (OUTPATIENT)
Dept: INTERNAL MEDICINE | Facility: CLINIC | Age: 77
End: 2022-04-08
Payer: MEDICARE

## 2022-04-08 ENCOUNTER — HOSPITAL ENCOUNTER (OUTPATIENT)
Dept: CT IMAGING | Facility: CLINIC | Age: 77
Discharge: HOME | End: 2022-04-08
Attending: INTERNAL MEDICINE | Admitting: INTERNAL MEDICINE
Payer: MEDICARE

## 2022-04-08 VITALS
WEIGHT: 205 LBS | OXYGEN SATURATION: 97 % | DIASTOLIC BLOOD PRESSURE: 64 MMHG | HEART RATE: 72 BPM | SYSTOLIC BLOOD PRESSURE: 108 MMHG | BODY MASS INDEX: 29.35 KG/M2 | HEIGHT: 70 IN

## 2022-04-08 DIAGNOSIS — Z23 HIGH PRIORITY FOR 2019-NCOV VACCINE: ICD-10-CM

## 2022-04-08 DIAGNOSIS — G44.85 PRIMARY STABBING HEADACHE: ICD-10-CM

## 2022-04-08 DIAGNOSIS — R10.32 LLQ ABDOMINAL PAIN: ICD-10-CM

## 2022-04-08 DIAGNOSIS — R10.32 LLQ ABDOMINAL PAIN: Primary | ICD-10-CM

## 2022-04-08 LAB
ALBUMIN UR-MCNC: NEGATIVE MG/DL
ANION GAP SERPL CALCULATED.3IONS-SCNC: 12 MMOL/L (ref 5–18)
APPEARANCE UR: CLEAR
BACTERIA #/AREA URNS HPF: ABNORMAL /HPF
BILIRUB UR QL STRIP: NEGATIVE
BUN SERPL-MCNC: 15 MG/DL (ref 8–28)
CALCIUM SERPL-MCNC: 9.4 MG/DL (ref 8.5–10.5)
CHLORIDE BLD-SCNC: 101 MMOL/L (ref 98–107)
CO2 SERPL-SCNC: 25 MMOL/L (ref 22–31)
COLOR UR AUTO: YELLOW
CREAT SERPL-MCNC: 1.39 MG/DL (ref 0.7–1.3)
ERYTHROCYTE [DISTWIDTH] IN BLOOD BY AUTOMATED COUNT: 13.6 % (ref 10–15)
GFR SERPL CREATININE-BSD FRML MDRD: 53 ML/MIN/1.73M2
GLUCOSE BLD-MCNC: 168 MG/DL (ref 70–125)
GLUCOSE UR STRIP-MCNC: NEGATIVE MG/DL
HCT VFR BLD AUTO: 34.6 % (ref 40–53)
HGB BLD-MCNC: 11 G/DL (ref 13.3–17.7)
HGB UR QL STRIP: NEGATIVE
KETONES UR STRIP-MCNC: NEGATIVE MG/DL
LEUKOCYTE ESTERASE UR QL STRIP: NEGATIVE
MCH RBC QN AUTO: 29.8 PG (ref 26.5–33)
MCHC RBC AUTO-ENTMCNC: 31.8 G/DL (ref 31.5–36.5)
MCV RBC AUTO: 94 FL (ref 78–100)
NITRATE UR QL: NEGATIVE
PH UR STRIP: 7 [PH] (ref 5–8)
PLATELET # BLD AUTO: 123 10E3/UL (ref 150–450)
POTASSIUM BLD-SCNC: 4.7 MMOL/L (ref 3.5–5)
RBC # BLD AUTO: 3.69 10E6/UL (ref 4.4–5.9)
RBC #/AREA URNS AUTO: ABNORMAL /HPF
SODIUM SERPL-SCNC: 138 MMOL/L (ref 136–145)
SP GR UR STRIP: 1.01 (ref 1–1.03)
SQUAMOUS #/AREA URNS AUTO: ABNORMAL /LPF
UROBILINOGEN UR STRIP-ACNC: 0.2 E.U./DL
WBC # BLD AUTO: 7.5 10E3/UL (ref 4–11)
WBC #/AREA URNS AUTO: ABNORMAL /HPF

## 2022-04-08 PROCEDURE — 99214 OFFICE O/P EST MOD 30 MIN: CPT | Performed by: INTERNAL MEDICINE

## 2022-04-08 PROCEDURE — 250N000011 HC RX IP 250 OP 636: Performed by: INTERNAL MEDICINE

## 2022-04-08 PROCEDURE — 36415 COLL VENOUS BLD VENIPUNCTURE: CPT | Performed by: INTERNAL MEDICINE

## 2022-04-08 PROCEDURE — 0064A COVID-19,PF,MODERNA (18+ YRS BOOSTER .25ML): CPT | Performed by: INTERNAL MEDICINE

## 2022-04-08 PROCEDURE — 85027 COMPLETE CBC AUTOMATED: CPT | Performed by: INTERNAL MEDICINE

## 2022-04-08 PROCEDURE — 91306 COVID-19,PF,MODERNA (18+ YRS BOOSTER .25ML): CPT | Performed by: INTERNAL MEDICINE

## 2022-04-08 PROCEDURE — 80048 BASIC METABOLIC PNL TOTAL CA: CPT | Performed by: INTERNAL MEDICINE

## 2022-04-08 PROCEDURE — 74177 CT ABD & PELVIS W/CONTRAST: CPT | Mod: MG

## 2022-04-08 PROCEDURE — 81001 URINALYSIS AUTO W/SCOPE: CPT | Performed by: INTERNAL MEDICINE

## 2022-04-08 RX ORDER — IOPAMIDOL 755 MG/ML
100 INJECTION, SOLUTION INTRAVASCULAR ONCE
Status: COMPLETED | OUTPATIENT
Start: 2022-04-08 | End: 2022-04-08

## 2022-04-08 RX ADMIN — IOPAMIDOL 75 ML: 755 INJECTION, SOLUTION INTRAVENOUS at 17:59

## 2022-04-08 NOTE — PROGRESS NOTES
Office Visit - Follow Up   Frank Obando   76 year old male    Date of Visit: 4/8/2022    Chief Complaint   Patient presents with     Abdominal Pain     L side abdomen x 1 day     Headache     comes and goes x 1 day     Imm/Inj     COVID-19 VACCINE        Assessment and Plan   1. LLQ abdominal pain  Concerning for diverticulitis.  Also has a history of kidney stones.  Check labs and imaging studies as below  - CT Abdomen w/o Contrast; Future  - UA with Microscopic reflex to Culture - lab collect; Future  - CBC with platelets; Future  - Basic metabolic panel  (Ca, Cl, CO2, Creat, Gluc, K, Na, BUN); Future    2. Primary stabbing headache  At this point is fairly intermittent and not too bothersome.  Suggestive of a trigeminal neuralgia type pain.  I have encouraged him to have close follow-up with his primary internist    3. High priority for 2019-nCoV vaccine  - COVID-19,PF,MODERNA (18+ Yrs BOOSTER .25mL)    Return in about 1 week (around 4/15/2022) for visit with PCP.     History of Present Illness   This 76 year old man comes in for evaluation of left lower quadrant abdominal pain.  This started in the past couple of days.  It is worse in the morning.  Better at rest.  Worse if he pushes on the abdomen or moves.  He has noticed no rash.  No back pain.  No urinary symptoms.  His stools are dark from iron.  Recently had a colonoscopy which showed extensive diverticulosis.  He has never had diverticulitis to his knowledge.  He also has been having a very intermittent stabbing left-sided headache.  This comes in very brief periods.  No vision issues, apart from his chronic issues for which she is following with ophthalmology.    Review of Systems: A comprehensive review of systems was negative except as noted.     Medications, Allergies and Problem List   Reviewed, reconciled and updated  Post Discharge Medication Reconciliation Status:   Social History     Social History Narrative     Not on file     "    Physical Exam   General Appearance:   No acute distress    /64 (BP Location: Left arm, Patient Position: Sitting, Cuff Size: Adult Regular)   Pulse 72   Ht 1.778 m (5' 10\")   Wt 93 kg (205 lb)   SpO2 97%   BMI 29.41 kg/m      He is tender with palpation in the left lower quadrant.  Heart rate controlled rhythm regular lungs clear no skin or     Additional Information   Current Outpatient Medications   Medication Sig Dispense Refill     atorvastatin (LIPITOR) 40 MG tablet TAKE 1 TABLET BY MOUTH DAILY 90 tablet 1     blood glucose meter (GLUCOMETER) [BLOOD GLUCOSE METER (GLUCOMETER)] Use 1 each As Directed 2 (two) times a day. Dispense glucometer brand per patient's insurance at pharmacy discretion. 1 each 0     blood glucose test (ACCU-CHEK JACOB PLUS TEST STRP) strips [BLOOD GLUCOSE TEST (ACCU-CHEK JACOB PLUS TEST STRP) STRIPS] Use 1 each As Directed 2 (two) times a day. Accu-chek Jacob Plus 200 strip 11     cyanocobalamin, vitamin B-12, 2,500 mcg Tab [CYANOCOBALAMIN, VITAMIN B-12, 2,500 MCG TAB] Take 2,500 mcg by mouth daily.       ferrous sulfate (FEROSUL) 325 (65 Fe) MG tablet Take 1 tablet (325 mg) by mouth daily 90 tablet 3     generic lancets (ACCU-CHEK SOFTCLIX LANCETS) [GENERIC LANCETS (ACCU-CHEK SOFTCLIX LANCETS)] Use 1 each As Directed 2 (two) times a day. Dispense brand per patient's insurance at pharmacy discretion. 100 each 3     glimepiride (AMARYL) 2 MG tablet Take 1 tablet (2 mg) by mouth every morning (before breakfast) 30 tablet 1     inFLIXimab (REMICADE IV) Every 8 weeks       lisinopril (ZESTRIL) 10 MG tablet Take 1 tablet (10 mg) by mouth daily 90 tablet 3     magnesium oxide 250 mg Tab [MAGNESIUM OXIDE 250 MG TAB] Take 250 mg by mouth 2 (two) times a day.       metFORMIN (GLUCOPHAGE) 500 MG tablet [METFORMIN (GLUCOPHAGE) 500 MG TABLET] TAKE 2 TABLETS BY MOUTH TWICE DAILY AT 6 AM AND AT 4  tablet 3     omeprazole (PRILOSEC) 40 MG DR capsule TAKE 1 CAPSULE BY MOUTH DAILY 90 " capsule 3     pioglitazone (ACTOS) 30 MG tablet TAKE 1 TABLET BY MOUTH EVERY DAY 90 tablet 1     predniSONE (DELTASONE) 10 MG tablet Take 1 tablet by mouth daily       propranolol (INDERAL) 20 MG tablet TAKE 1 TABLET BY MOUTH TWICE DAILY 180 tablet 3     tamsulosin (FLOMAX) 0.4 MG capsule TAKE 1 CAPSULE BY MOUTH DAILY AFTER AND SUPPER 90 capsule 3     Allergies   Allergen Reactions     Morphine Nausea and Vomiting     Other Allergy (See Comments) [External Allergen Needs Reconciliation - See Comment] Unknown     Scopolamine HBr CHANG, 08/27/2013.       Scopolamine Hbr [Scopolamine] Unknown     Social History     Tobacco Use     Smoking status: Never Smoker     Smokeless tobacco: Never Used   Substance Use Topics     Alcohol use: No     Drug use: No       Review and/or order of clinical lab tests:  Review and/or order of radiology tests:  Review and/or order of medicine tests:  Discussion of test results with performing physician:  Decision to obtain old records and/or obtain history from someone other than the patient:  Review and summarization of old records and/or obtaining history from someone other than the patient and.or discussion of case with another health care provider:  Independent visualization of image, tracing or specimen itself:    Time:      Flaco Lopez MD  Answers for HPI/ROS submitted by the patient on 4/8/2022  Headache Symptoms are: no change  How often are you getting headaches or migraines? : None  Are you able to do normal daily activities when you have a migraine?: Yes  Migraine Rescue/Relief Medications:: Tylenol  Effectiveness of rescue/relief medications:: I get some relief  Migraine Preventative Medications:: no medications to prevent migraines  ER or UC Visits:: 0 times  How many servings of fruits and vegetables do you eat daily?: 0-1  On average, how many sweetened beverages do you drink each day (Examples: soda, juice, sweet tea, etc.  Do NOT count diet or artificially sweetened  beverages)?: 2  How many minutes a day do you exercise enough to make your heart beat faster?: 9 or less  How many days a week do you exercise enough to make your heart beat faster?: 3 or less  How many days per week do you miss taking your medication?: 0  What is the reason for your visit today?: Pain side pain in head  When did your symptoms begin?: 1-3 days ago  What are your symptoms?: Pain side and head  Are your symptoms:: Staying the same  Have you had these symptoms before?: No  Is there anything that makes you feel worse?: No  Is there anything that makes you feel better?: No

## 2022-04-10 DIAGNOSIS — K57.32 DIVERTICULITIS OF COLON: Primary | ICD-10-CM

## 2022-04-12 ENCOUNTER — TRANSFERRED RECORDS (OUTPATIENT)
Dept: HEALTH INFORMATION MANAGEMENT | Facility: CLINIC | Age: 77
End: 2022-04-12
Payer: MEDICARE

## 2022-04-12 LAB — RETINOPATHY: NEGATIVE

## 2022-04-27 ENCOUNTER — LAB (OUTPATIENT)
Dept: LAB | Facility: CLINIC | Age: 77
End: 2022-04-27
Payer: MEDICARE

## 2022-04-27 DIAGNOSIS — D50.0 IRON DEFICIENCY ANEMIA DUE TO CHRONIC BLOOD LOSS: ICD-10-CM

## 2022-04-27 LAB
ERYTHROCYTE [DISTWIDTH] IN BLOOD BY AUTOMATED COUNT: 13.9 % (ref 10–15)
HCT VFR BLD AUTO: 32.3 % (ref 40–53)
HGB BLD-MCNC: 10.3 G/DL (ref 13.3–17.7)
MCH RBC QN AUTO: 30.4 PG (ref 26.5–33)
MCHC RBC AUTO-ENTMCNC: 31.9 G/DL (ref 31.5–36.5)
MCV RBC AUTO: 95 FL (ref 78–100)
PLATELET # BLD AUTO: 112 10E3/UL (ref 150–450)
RBC # BLD AUTO: 3.39 10E6/UL (ref 4.4–5.9)
WBC # BLD AUTO: 4.7 10E3/UL (ref 4–11)

## 2022-04-27 PROCEDURE — 85027 COMPLETE CBC AUTOMATED: CPT

## 2022-04-27 PROCEDURE — 36415 COLL VENOUS BLD VENIPUNCTURE: CPT

## 2022-04-29 ENCOUNTER — OFFICE VISIT (OUTPATIENT)
Dept: INTERNAL MEDICINE | Facility: CLINIC | Age: 77
End: 2022-04-29
Payer: MEDICARE

## 2022-04-29 VITALS
DIASTOLIC BLOOD PRESSURE: 60 MMHG | RESPIRATION RATE: 14 BRPM | BODY MASS INDEX: 29.83 KG/M2 | WEIGHT: 207.9 LBS | OXYGEN SATURATION: 97 % | HEART RATE: 74 BPM | SYSTOLIC BLOOD PRESSURE: 122 MMHG

## 2022-04-29 DIAGNOSIS — E11.65 TYPE 2 DIABETES MELLITUS WITH HYPERGLYCEMIA, WITHOUT LONG-TERM CURRENT USE OF INSULIN (H): ICD-10-CM

## 2022-04-29 DIAGNOSIS — H15.002 SCLERITIS OF LEFT EYE: ICD-10-CM

## 2022-04-29 DIAGNOSIS — I10 ESSENTIAL HYPERTENSION, BENIGN: ICD-10-CM

## 2022-04-29 DIAGNOSIS — I63.322 CEREBROVASCULAR ACCIDENT (CVA) DUE TO THROMBOSIS OF LEFT ANTERIOR CEREBRAL ARTERY (H): Primary | ICD-10-CM

## 2022-04-29 DIAGNOSIS — E78.2 MIXED HYPERLIPIDEMIA: ICD-10-CM

## 2022-04-29 DIAGNOSIS — D50.0 IRON DEFICIENCY ANEMIA DUE TO CHRONIC BLOOD LOSS: ICD-10-CM

## 2022-04-29 LAB
ANION GAP SERPL CALCULATED.3IONS-SCNC: 12 MMOL/L (ref 5–18)
BUN SERPL-MCNC: 14 MG/DL (ref 8–28)
CALCIUM SERPL-MCNC: 9.1 MG/DL (ref 8.5–10.5)
CHLORIDE BLD-SCNC: 102 MMOL/L (ref 98–107)
CHOLEST SERPL-MCNC: 145 MG/DL
CO2 SERPL-SCNC: 24 MMOL/L (ref 22–31)
CREAT SERPL-MCNC: 1.34 MG/DL (ref 0.7–1.3)
FASTING STATUS PATIENT QL REPORTED: YES
GFR SERPL CREATININE-BSD FRML MDRD: 55 ML/MIN/1.73M2
GLUCOSE BLD-MCNC: 148 MG/DL (ref 70–125)
HBA1C MFR BLD: 8.2 % (ref 0–5.6)
HDLC SERPL-MCNC: 55 MG/DL
LDLC SERPL CALC-MCNC: 65 MG/DL
POTASSIUM BLD-SCNC: 4.6 MMOL/L (ref 3.5–5)
SODIUM SERPL-SCNC: 138 MMOL/L (ref 136–145)
TRIGL SERPL-MCNC: 127 MG/DL

## 2022-04-29 PROCEDURE — 80061 LIPID PANEL: CPT | Performed by: INTERNAL MEDICINE

## 2022-04-29 PROCEDURE — 83036 HEMOGLOBIN GLYCOSYLATED A1C: CPT | Performed by: INTERNAL MEDICINE

## 2022-04-29 PROCEDURE — 80048 BASIC METABOLIC PNL TOTAL CA: CPT | Performed by: INTERNAL MEDICINE

## 2022-04-29 PROCEDURE — 36415 COLL VENOUS BLD VENIPUNCTURE: CPT | Performed by: INTERNAL MEDICINE

## 2022-04-29 PROCEDURE — 99214 OFFICE O/P EST MOD 30 MIN: CPT | Performed by: INTERNAL MEDICINE

## 2022-04-29 ASSESSMENT — PAIN SCALES - GENERAL: PAINLEVEL: MILD PAIN (3)

## 2022-04-29 NOTE — PATIENT INSTRUCTIONS
Increase fiber in diet.  Supplement such as Metamucil is advised.    2.  I will notify you of test results    3.  Talk to Dr. Chong about possible changes in therapy.    4.  See in three months or as needed    5.  OK for cataract surgery if hemoglobin remains stable.

## 2022-04-29 NOTE — PROGRESS NOTES
Answers for HPI/ROS submitted by the patient on 4/29/2022  How many servings of fruits and vegetables do you eat daily?: 0-1  On average, how many sweetened beverages do you drink each day (Examples: soda, juice, sweet tea, etc.  Do NOT count diet or artificially sweetened beverages)?: 0  How many minutes a day do you exercise enough to make your heart beat faster?: 9 or less  How many days a week do you exercise enough to make your heart beat faster?: 3 or less  How many days per week do you miss taking your medication?: 0  What is the reason for your visit today?: Pain  When did your symptoms begin?: More than a month  What are your symptoms?: Pain  How would you describe these symptoms?: Mild  Are your symptoms:: Worsening  Have you had these symptoms before?: Yes  Have you tried or received treatment for these symptoms before?: Yes  Did that treatment work? : No  Is there anything that makes you feel better?: Tylonal    ASSESSMENT:  1. 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  He is not on aspirin due to problems with anemia due to occult GI blood loss.  - ASPIRIN NOT PRESCRIBED (INTENTIONAL); Please choose reason not prescribed from choices below.    2. Type 2 diabetes mellitus with hyperglycemia, without long-term current use of insulin (H)  Hemoglobin A1c was excellent at 6.2 when checked in January.  This will be checked again today.  He is on Actos due to concerns that part of liver issues may be related to nonalcoholic steatohepatitis.  He is also on metformin.  - Hemoglobin A1c; Future  - Basic metabolic panel  (Ca, Cl, CO2, Creat, Gluc, K, Na, BUN); Future  - Hemoglobin A1c  - Basic metabolic panel  (Ca, Cl, CO2, Creat, Gluc, K, Na, BUN)    3. Mixed hyperlipidemia  On atorvastatin 40 mg daily.  Fasting lipids will be checked  - Lipid panel reflex to direct LDL Fasting; Future  - Lipid panel reflex to  direct LDL Fasting    4. Essential hypertension, benign  On lisinopril 10 mg daily    5. Iron deficiency anemia due to chronic blood loss  He is taking ferrous sulfate twice daily.  Previous endoscopy showed both esophageal varices and angiodysplasia.  As noted above, he is not on aspirin due to concerns regarding occult GI blood loss.    6. Scleritis of left eye  He sees Dr. Chong and Jo Ann.  He has active scleritis in the left eye currently, but does not notice any visual deficit.    7.  Diverticulitis:  He saw Dr. Lopez on 4/8.  He feels back to baseline after antibiotic therapy    PLAN:  Patient Instructions   1.  Increase fiber in diet.  Supplement such as Metamucil is advised due to diverticular disease    2.  I will notify you of test results    3.  Talk to Dr. Chong about possible changes in therapy due to recurrent scleritis    4.  See in three months or as needed    5.  OK for cataract surgery if hemoglobin remains stable.    Orders Placed This Encounter   Procedures     Hemoglobin A1c     Lipid panel reflex to direct LDL Fasting     Basic metabolic panel  (Ca, Cl, CO2, Creat, Gluc, K, Na, BUN)     Medications Discontinued During This Encounter   Medication Reason     predniSONE (DELTASONE) 10 MG tablet Therapy completed     glimepiride (AMARYL) 2 MG tablet Therapy completed       Return in about 3 months (around 7/29/2022) for Follow up.    ASSESSED PROBLEMS:  See above    CHIEF COMPLAINT:  Follow-up type 2 diabetes, iron deficiency anemia due to occult GI blood loss, and other concerns    HISTORY OF PRESENT ILLNESS:  Frank is a 76 year old male seen for follow-up of type 2 diabetes, anemia due to occult GI blood loss, and other concerns.  He remains on pioglitazone, and metformin for type 2 diabetes.  He is on pioglitazone due to a history of liver disease possibly related to nonalcoholic steatohepatitis..  He has had a prior CVA.  He is not on aspirin due to his issues with occult GI blood  "loss.    He sees Dr. Chong as a rheumatologist for scleritis.  He sees Dr. Cherry as an ophthalmologist.  He is on infliximab.  He had currently appears to have a flare in the left eye.    He has a history of recurrent iron deficiency anemia.  He is taking ferrous sulfate twice daily.  Upper GI endoscopy and colonoscopy has revealed esophageal varices and angiodysplasia, but no active bleeding.    Liver disease is felt to possibly be due to an adverse reaction to methotrexate or \"nonalcoholic steatohepatitis \".  There is no history of excessive alcohol intake.    REVIEW OF SYSTEMS:  See above  Comprehensive review of systems is otherwise negative.    PFSH:   and retired.  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    TOBACCO USE:  History   Smoking Status     Never Smoker   Smokeless Tobacco     Never Used       VITALS:  Vitals:    04/29/22 1157   BP: 122/60   BP Location: Left arm   Patient Position: Sitting   Cuff Size: Adult Large   Pulse: 74   Resp: 14   SpO2: 97%   Weight: 94.3 kg (207 lb 14.4 oz)     Wt Readings from Last 3 Encounters:   04/29/22 94.3 kg (207 lb 14.4 oz)   04/08/22 93 kg (205 lb)   02/10/22 91.9 kg (202 lb 9.6 oz)       PHYSICAL EXAM:  Constitutional:   Reveals an alert pleasant talkative man with appropriate affect.  He does not seem in acute distress.  Vitals: per nursing notes.  HEENT: Atraumatic.    Eyes: Scleritis left eye.  EOMs full  Neck:  Supple, no carotid bruits or adenopathy.  Back:  No spine or CVA pain.  Thorax:  No bony deformities.  Lungs: Clear to A&P without rales or wheezes.  Respiratory effort normal.  Cardiac:   Regular rate and rhythm, normal S1, S2, no murmur or gallop.  Abdomen:  Soft, active bowel sounds without bruits, mass, or tenderness.  Extremities:   No peripheral edema.  Skin:  No jaundice, peripheral cyanosis or lesions to suggest malignancy.  Neuro:  Alert and oriented.  "  No gross focal deficits.  Psychiatric:  Memory intact, mood appropriate.    DATA REVIEWED:  Additional History from Old Records Summarized (2): Rheumatology notes reviewed  Decision to Obtain Records (1): None.   Radiology Tests Summarized or Ordered (1): None.  Labs Reviewed or Ordered (1):  labs reviewed and ordered  Medicine Test Summarized or Ordered (1): Previous upper GI endoscopy reviewed  Independent Review of EKG or X-RAY(2 each): None.    The visit lasted a total of 30 minutes face to face with the patient. Over 50% of the time was spent counseling and educating the patient about management of type 2 diabetes, iron deficiency anemia due to occult GI blood loss, and other concerns.    .    Dragon dictation was used for this note. Speech recognition errors are a possibility.     MEDICATIONS:  Current Outpatient Medications   Medication Sig Dispense Refill     ASPIRIN NOT PRESCRIBED (INTENTIONAL) Please choose reason not prescribed from choices below.       atorvastatin (LIPITOR) 40 MG tablet TAKE 1 TABLET BY MOUTH DAILY 90 tablet 1     blood glucose meter (GLUCOMETER) [BLOOD GLUCOSE METER (GLUCOMETER)] Use 1 each As Directed 2 (two) times a day. Dispense glucometer brand per patient's insurance at pharmacy discretion. 1 each 0     blood glucose test (ACCU-CHEK JACOB PLUS TEST STRP) strips [BLOOD GLUCOSE TEST (ACCU-CHEK JACOB PLUS TEST STRP) STRIPS] Use 1 each As Directed 2 (two) times a day. Accu-chek Jacob Plus 200 strip 11     cyanocobalamin, vitamin B-12, 2,500 mcg Tab [CYANOCOBALAMIN, VITAMIN B-12, 2,500 MCG TAB] Take 2,500 mcg by mouth daily.       ferrous sulfate (FEROSUL) 325 (65 Fe) MG tablet Take 1 tablet (325 mg) by mouth daily 90 tablet 3     generic lancets (ACCU-CHEK SOFTCLIX LANCETS) [GENERIC LANCETS (ACCU-CHEK SOFTCLIX LANCETS)] Use 1 each As Directed 2 (two) times a day. Dispense brand per patient's insurance at pharmacy discretion. 100 each 3     inFLIXimab (REMICADE IV) Every 8 weeks        lisinopril (ZESTRIL) 10 MG tablet Take 1 tablet (10 mg) by mouth daily 90 tablet 3     magnesium oxide 250 mg Tab [MAGNESIUM OXIDE 250 MG TAB] Take 250 mg by mouth 2 (two) times a day.       metFORMIN (GLUCOPHAGE) 500 MG tablet [METFORMIN (GLUCOPHAGE) 500 MG TABLET] TAKE 2 TABLETS BY MOUTH TWICE DAILY AT 6 AM AND AT 4  tablet 3     omeprazole (PRILOSEC) 40 MG DR capsule TAKE 1 CAPSULE BY MOUTH DAILY 90 capsule 3     pioglitazone (ACTOS) 30 MG tablet TAKE 1 TABLET BY MOUTH EVERY DAY 90 tablet 1     propranolol (INDERAL) 20 MG tablet TAKE 1 TABLET BY MOUTH TWICE DAILY 180 tablet 3     tamsulosin (FLOMAX) 0.4 MG capsule TAKE 1 CAPSULE BY MOUTH DAILY AFTER AND SUPPER 90 capsule 3

## 2022-05-03 ENCOUNTER — TELEPHONE (OUTPATIENT)
Dept: RHEUMATOLOGY | Facility: CLINIC | Age: 77
End: 2022-05-03
Payer: MEDICARE

## 2022-05-03 NOTE — TELEPHONE ENCOUNTER
Patient is experiencing eye flare and would like a c/b to discuss treatment options. Ph: [phone]

## 2022-05-03 NOTE — TELEPHONE ENCOUNTER
Pt notified and he will contact his eye doctor. Encouraged pt to have eye doctor send us the report if he sees the eye doctor so Dr Chong has these notes for his appt next week if med changes are requested. Pt verbalized understanding

## 2022-05-03 NOTE — TELEPHONE ENCOUNTER
Left message for pt to call back    Per Dr Chong- pt needs to call/see eye doctor to have eye exam and for them to further evaluate to know if this is a flare up and if symptoms are uncontrolled and they request/recommend changes be made to pts medication to better control pts Panuveitis. If eye doctor recommends or requests change in medication, Dr Chong can advise or make these changes at pts appt next week.

## 2022-05-03 NOTE — TELEPHONE ENCOUNTER
Pt states he is having a flare up in his left eye, has redness, pain if he doesn't take Advil, Tylenol does not seem to help. Pt states he has blurry vision but has cataracts that needs to have removed but he has a low hemoglobin so this surgery has been pushed back. Pt has not called Dr Cherry as pt states he always wants to put him back on prednisone and pt does not want to go back on prednisone as it increases his blood sugars. Pt is due for next Remicade 5/17/22, has f/u appt with Dr Chong on 5/12/22. Pt wonders what can be done for his flare up. Pt thinks he stopped prednisone about a month or so ago.

## 2022-05-04 DIAGNOSIS — D50.0 IRON DEFICIENCY ANEMIA DUE TO CHRONIC BLOOD LOSS: Primary | ICD-10-CM

## 2022-05-04 DIAGNOSIS — H90.3 SENSORINEURAL HEARING LOSS, BILATERAL: ICD-10-CM

## 2022-05-04 NOTE — PROGRESS NOTES
Patient is coming in for lab only on 5/13/2022 no orders please advise thanks   Talked to patient and he said you said that he was to have his hgb done in 2weeks and to set up a hearing test but there are no orders for both I told him if you wanted this some would call him to set up the hearing test.  But we still need the lab order unless you don't want it. Please call patient back with instructions thanks

## 2022-05-06 ENCOUNTER — TELEPHONE (OUTPATIENT)
Dept: INTERNAL MEDICINE | Facility: CLINIC | Age: 77
End: 2022-05-06
Payer: MEDICARE

## 2022-05-06 DIAGNOSIS — E11.65 TYPE 2 DIABETES MELLITUS WITH HYPERGLYCEMIA, WITHOUT LONG-TERM CURRENT USE OF INSULIN (H): Primary | ICD-10-CM

## 2022-05-06 NOTE — TELEPHONE ENCOUNTER
Reason for Call:  Other    Detailed comments:  3 things    1- pt went back on the prednisone from the eye dr and pt is requesting a refill on / pharm I is updated.  glimepiride (AMARYL) 2 MG tablet (Discontinued) 30 tablet 1 3/8/2022 4/29/2022 --   Sig - Route: Take 1 tablet (2 mg) by mouth every morning (before breakfast) - Oral     2- Dr Artis said to have his blood test in 2 weeks and no order.  He did make lab only appt for 5/13.  Please place lab orders for pt.    3-pt said it was also talked about seeing an audiologist and he did make appt but needing an order.  Please put in order for that.    Phone Number Patient can be reached at: Cell number on file:    Telephone Information:   Mobile [phone]       Best Time: any    Can we leave a detailed message on this number? YES    Call taken on 5/6/2022 at 1:43 PM by Pam J. Behr

## 2022-05-10 RX ORDER — GLIMEPIRIDE 2 MG/1
2 TABLET ORAL
Qty: 30 TABLET | Refills: 3 | Status: SHIPPED | OUTPATIENT
Start: 2022-05-10 | End: 2023-01-03

## 2022-05-12 ENCOUNTER — OFFICE VISIT (OUTPATIENT)
Dept: RHEUMATOLOGY | Facility: CLINIC | Age: 77
End: 2022-05-12
Payer: MEDICARE

## 2022-05-12 VITALS
BODY MASS INDEX: 29.7 KG/M2 | SYSTOLIC BLOOD PRESSURE: 120 MMHG | HEART RATE: 78 BPM | WEIGHT: 207 LBS | DIASTOLIC BLOOD PRESSURE: 80 MMHG

## 2022-05-12 DIAGNOSIS — M17.0 PRIMARY OSTEOARTHRITIS OF BOTH KNEES: ICD-10-CM

## 2022-05-12 DIAGNOSIS — Z79.899 HIGH RISK MEDICATION USE: ICD-10-CM

## 2022-05-12 DIAGNOSIS — N18.30 CRF (CHRONIC RENAL FAILURE), STAGE 3 (MODERATE) (H): ICD-10-CM

## 2022-05-12 DIAGNOSIS — H44.112 PANUVEITIS OF LEFT EYE: Primary | ICD-10-CM

## 2022-05-12 DIAGNOSIS — R76.8 ANTINEUTROPHIL CYTOPLASMIC ANTIBODY (ANCA) POSITIVE: ICD-10-CM

## 2022-05-12 PROCEDURE — 99214 OFFICE O/P EST MOD 30 MIN: CPT | Performed by: INTERNAL MEDICINE

## 2022-05-12 RX ORDER — PREDNISONE 10 MG/1
30 TABLET ORAL DAILY
COMMUNITY
End: 2022-07-26

## 2022-05-12 NOTE — PROGRESS NOTES
Rheumatology follow-up visit note     Frank is a 76 year old male presents today for follow-up.    Frank was seen today for recheck.    Diagnoses and all orders for this visit:    Panuveitis of left eye    Antineutrophil cytoplasmic antibody (ANCA) positive    High risk medication use    Primary osteoarthritis of both knees    CRF (chronic renal failure), stage 3 (moderate) (H)    Other orders  -     inFLIXimab (REMICADE) 500 mg in sodium chloride 0.9 % 300 mL infusion        He has had another episode of relapse of uveitis, back on prednisone, increase the frequency of Remicade from every 8 weeks to every 6.  In the past methotrexate had been associated with abnormal liver function studies.  Management of OA reviewed.  Avoid nonsteroidals for OA related symptoms in view of the chronic renal impairment.  He will take acetaminophen or consider corticosteroid injections.  Follow-up in 4 months.    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  He is on Remicade every 2 months.  He has noted relapse.  This was associated with painful red eye.  On the left side.  Was seen in ophthalmology.  Given a course of prednisone.  Next Remicade is due on Tuesday, May 17. We discussed management of osteoarthritis.  Continue Remicade as now.   He reports no new joint pains, occasional locking or clicking of his left index finger now better this morning..  . ROS enquiry held for fever, ocular symptoms, rash, headache,  GI issues.  Today we also discussed the issues related to the current pandemic, the pros and cons of the current treatment plan, the CDC guidelines such as social distancing washing the hands covering the cough.         DETAILED EXAMINATION  05/12/22  :    Vitals:    05/12/22 0845   Weight: 93.9 kg (207 lb)     Alert oriented. Head  including the face is examined for malar rash, heliotropes, scarring, lupus pernio. Eyes examined for redness such as in episcleritis/scleritis, periorbital lesions.   Neck/ Face examined for parotid gland swelling, range of motion of neck.  Left upper and lower and right upper and lower extremities examined for tenderness, swelling, warmth of the appendicular joints, range of motion, edema, rash.  Some of the important findings included: he does not have evidence of synovitis in the palpable joints of the upper extremities.  No significant deformities of the digits.  + Heberden nodes.  Range of motion of the shoulders  show full abduction.  No JLT effusion or warmth of the knees.  he does not have dactylitis of the digits.     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  Created by Conversion       Iron deficiency anemia due to chronic blood loss 11/02/2021     Priority: Medium     Angiodysplasia of stomach 09/27/2021     Priority: Medium     Esophageal varices without bleeding (H) 09/27/2021     Priority: Medium     CRF (chronic renal failure), stage 3 (moderate) (H) 02/10/2020     Priority: Medium     Panuveitis, unspecified eye 03/19/2019     Priority: Medium     Normocytic anemia      Priority: Medium     Created by Conversion      Formatting of this note might be different from the original.  Created by Conversion       Essential hypertension, benign      Priority: Medium     Created by Conversion  Replacement Utility updated for latest IMO load      Formatting of this note might be different from the original.  Created by Conversion    Replacement Utility updated for latest IMO load       Type 2 diabetes mellitus with complication, without long-term current use of insulin (H)      Priority: Medium      Created by Conversion      Formatting of this note might be different from the original.  Created by Conversion       Cholecystitis      Priority: Medium     Dyslipidemia      Priority: Medium     Thrombocytopenia (H)      Priority: Medium     Cirrhosis of liver with ascites, unspecified hepatic cirrhosis type (H)      Priority: Medium     Hyponatremia      Priority: Medium     Abdominal pain 12/20/2018     Priority: Medium     High risk medication use 09/26/2018     Priority: Medium     Diverticular disease of large intestine 05/07/2018     Priority: Medium     Polyp of colon 05/07/2018     Priority: Medium     Hypercholesterolemia      Priority: Medium     Created by Conversion      Formatting of this note might be different from the original.  Created by Conversion       Antineutrophil cytoplasmic antibody (ANCA) positive 03/23/2017     Priority: Medium     Urinary hesitancy 01/21/2017     Priority: Medium     Peripheral Neuropathy      Priority: Medium     Created by Conversion  Replacement Utility updated for latest IMO load      Formatting of this note might be different from the original.  Created by Conversion    Replacement Utility updated for latest IMO load       ALT (SGPT) level raised 03/17/2016     Priority: Medium     Scleritis of left eye 03/17/2016     Priority: Medium     Primary osteoarthritis of both knees 11/16/2015     Priority: Medium     Carpal tunnel syndrome 11/24/2014     Priority: Medium     Right bundle branch block 11/24/2014     Priority: Medium     Nephrolithiasis      Priority: Medium     Created by Conversion  [facility] Annotation: Oct 14 2009  4:44PM - Pernell Chance: KITA stent      Formatting of this note might be different from the original.  Created by Conversion  [facility] Annotation: Oct 14 2009  4:44PM Carlyle Winters stent       Cerebrovascular accident (CVA) due to thrombosis of left anterior cerebral artery (H)      Priority: Medium     Created by Conversion          Diverticular disease of colon 10/29/2007     Priority: Medium     Past Surgical History:   Procedure Laterality Date     COLONOSCOPY N/A 11/16/2021    Procedure: COLONOSCOPY with polypectomy;  Surgeon: Dex Finch MD;  [facility]: [facility]     CYSTOSCOPY TUMOR / CONDYLOMATA W/ LASER Left 7/18/2014     ESOPHAGOSCOPY, GASTROSCOPY, DUODENOSCOPY (EGD), COMBINED N/A 11/16/2021    Procedure: ESOPHAGOGASTRODUODENOSCOPY (EGD) with biopsies and argon plasma coagulation;  Surgeon: Dex Finch MD;  Location: [facility] CYSTOSCOPY,INSERT URETERAL STENT      Description: Cystoscopy With Insertion Of Ureteral Stent Right;  Recorded: 10/14/2009;  Comments: stone      Past Medical History:   Diagnosis Date     Anemia      Arthritis     osteoarthritis     Calculus of kidney      Diabetes mellitus (H)      Episcleritis of left eye      Hyperlipidemia      Hypertension      Iron deficiency anemia due to chronic blood loss 11/2/2021     Peripheral neuropathy      Stroke (H)      Allergies   Allergen Reactions     Morphine Nausea and Vomiting     Other Allergy (See Comments) [External Allergen Needs Reconciliation - See Comment] Unknown     Scopolamine HBr CHANG, 08/27/2013.       Scopolamine Hbr [Scopolamine] Unknown     Current Outpatient Medications   Medication Sig Dispense Refill     ASPIRIN NOT PRESCRIBED (INTENTIONAL) Please choose reason not prescribed from choices below.       atorvastatin (LIPITOR) 40 MG tablet TAKE 1 TABLET BY MOUTH DAILY 90 tablet 1     blood glucose meter (GLUCOMETER) [BLOOD GLUCOSE METER (GLUCOMETER)] Use 1 each As Directed 2 (two) times a day. Dispense glucometer brand per patient's insurance at pharmacy discretion. 1 each 0     blood glucose test (ACCU-CHEK AURORA PLUS TEST STRP) strips [BLOOD GLUCOSE TEST (ACCU-CHEK AURORA PLUS TEST STRP) STRIPS] Use 1 each As Directed 2 (two) times a day. Accu-chek Aurora Plus 200 strip 11     cyanocobalamin, vitamin B-12, 2,500 mcg Tab  [CYANOCOBALAMIN, VITAMIN B-12, 2,500 MCG TAB] Take 2,500 mcg by mouth daily.       ferrous sulfate (FEROSUL) 325 (65 Fe) MG tablet Take 1 tablet (325 mg) by mouth daily 90 tablet 3     generic lancets (ACCU-CHEK SOFTCLIX LANCETS) [GENERIC LANCETS (ACCU-CHEK SOFTCLIX LANCETS)] Use 1 each As Directed 2 (two) times a day. Dispense brand per patient's insurance at pharmacy discretion. 100 each 3     glimepiride (AMARYL) 2 MG tablet Take 1 tablet (2 mg) by mouth every morning (before breakfast) 30 tablet 3     inFLIXimab (REMICADE IV) Every 8 weeks       lisinopril (ZESTRIL) 10 MG tablet Take 1 tablet (10 mg) by mouth daily 90 tablet 3     magnesium oxide 250 mg Tab [MAGNESIUM OXIDE 250 MG TAB] Take 250 mg by mouth 2 (two) times a day.       metFORMIN (GLUCOPHAGE) 500 MG tablet [METFORMIN (GLUCOPHAGE) 500 MG TABLET] TAKE 2 TABLETS BY MOUTH TWICE DAILY AT 6 AM AND AT 4  tablet 3     omeprazole (PRILOSEC) 40 MG DR capsule TAKE 1 CAPSULE BY MOUTH DAILY 90 capsule 3     pioglitazone (ACTOS) 30 MG tablet TAKE 1 TABLET BY MOUTH EVERY DAY 90 tablet 1     predniSONE (DELTASONE) 10 MG tablet Take 30 mg by mouth daily       propranolol (INDERAL) 20 MG tablet TAKE 1 TABLET BY MOUTH TWICE DAILY 180 tablet 3     tamsulosin (FLOMAX) 0.4 MG capsule TAKE 1 CAPSULE BY MOUTH DAILY AFTER AND SUPPER 90 capsule 3     family history includes Coronary Artery Disease in his mother; Diabetes in his mother; Lung Cancer in his father.  Social Connections: Not on file          WBC Count   Date Value Ref Range Status   04/27/2022 4.7 4.0 - 11.0 10e3/uL Final     RBC Count   Date Value Ref Range Status   04/27/2022 3.39 (L) 4.40 - 5.90 10e6/uL Final     Hemoglobin   Date Value Ref Range Status   04/27/2022 10.3 (L) 13.3 - 17.7 g/dL Final     Hematocrit   Date Value Ref Range Status   04/27/2022 32.3 (L) 40.0 - 53.0 % Final     MCV   Date Value Ref Range Status   04/27/2022 95 78 - 100 fL Final     MCH   Date Value Ref Range Status   04/27/2022  30.4 26.5 - 33.0 pg Final     Platelet Count   Date Value Ref Range Status   04/27/2022 112 (L) 150 - 450 10e3/uL Final     % Lymphocytes   Date Value Ref Range Status   10/05/2021 29 % Final     AST   Date Value Ref Range Status   01/21/2022 30 0 - 40 U/L Final     ALT   Date Value Ref Range Status   01/21/2022 23 0 - 45 U/L Final     Albumin   Date Value Ref Range Status   01/21/2022 3.8 3.5 - 5.0 g/dL Final     Alkaline Phosphatase   Date Value Ref Range Status   01/21/2022 87 45 - 120 U/L Final     Creatinine   Date Value Ref Range Status   04/29/2022 1.34 (H) 0.70 - 1.30 mg/dL Final     GFR Estimate   Date Value Ref Range Status   04/29/2022 55 (L) >60 mL/min/1.73m2 Final     Comment:     Effective December 21, 2021 eGFRcr in adults is calculated using the 2021 CKD-EPI creatinine equation which includes age and gender (Edgard et al., NEJ, DOI: 10.1056/XRGHlc4217741)   02/08/2021 48 (L) >60 mL/min/1.73m2 Final     GFR Estimate If Black   Date Value Ref Range Status   02/08/2021 59 (L) >60 mL/min/1.73m2 Final     Creatinine Urine mg/dL   Date Value Ref Range Status   09/20/2021 116 mg/dL Final

## 2022-05-13 ENCOUNTER — LAB (OUTPATIENT)
Dept: LAB | Facility: CLINIC | Age: 77
End: 2022-05-13
Payer: MEDICARE

## 2022-05-13 ENCOUNTER — TRANSFERRED RECORDS (OUTPATIENT)
Dept: HEALTH INFORMATION MANAGEMENT | Facility: CLINIC | Age: 77
End: 2022-05-13

## 2022-05-13 DIAGNOSIS — D50.0 IRON DEFICIENCY ANEMIA DUE TO CHRONIC BLOOD LOSS: ICD-10-CM

## 2022-05-13 LAB
ERYTHROCYTE [DISTWIDTH] IN BLOOD BY AUTOMATED COUNT: 13.5 % (ref 10–15)
HCT VFR BLD AUTO: 30.2 % (ref 40–53)
HGB BLD-MCNC: 9.9 G/DL (ref 13.3–17.7)
MCH RBC QN AUTO: 30.7 PG (ref 26.5–33)
MCHC RBC AUTO-ENTMCNC: 32.8 G/DL (ref 31.5–36.5)
MCV RBC AUTO: 94 FL (ref 78–100)
PLATELET # BLD AUTO: 128 10E3/UL (ref 150–450)
RBC # BLD AUTO: 3.23 10E6/UL (ref 4.4–5.9)
RETINOPATHY: NEGATIVE
WBC # BLD AUTO: 6.6 10E3/UL (ref 4–11)

## 2022-05-13 PROCEDURE — 85027 COMPLETE CBC AUTOMATED: CPT

## 2022-05-13 PROCEDURE — 36415 COLL VENOUS BLD VENIPUNCTURE: CPT

## 2022-05-17 ENCOUNTER — INFUSION THERAPY VISIT (OUTPATIENT)
Dept: INFUSION THERAPY | Facility: CLINIC | Age: 77
End: 2022-05-17
Attending: INTERNAL MEDICINE
Payer: MEDICARE

## 2022-05-17 VITALS
HEART RATE: 65 BPM | RESPIRATION RATE: 16 BRPM | DIASTOLIC BLOOD PRESSURE: 75 MMHG | TEMPERATURE: 98.2 F | SYSTOLIC BLOOD PRESSURE: 116 MMHG | OXYGEN SATURATION: 100 %

## 2022-05-17 DIAGNOSIS — H44.112 PANUVEITIS OF LEFT EYE: Primary | ICD-10-CM

## 2022-05-17 PROCEDURE — 250N000013 HC RX MED GY IP 250 OP 250 PS 637: Performed by: INTERNAL MEDICINE

## 2022-05-17 PROCEDURE — 96413 CHEMO IV INFUSION 1 HR: CPT

## 2022-05-17 PROCEDURE — 258N000003 HC RX IP 258 OP 636: Performed by: INTERNAL MEDICINE

## 2022-05-17 PROCEDURE — 250N000011 HC RX IP 250 OP 636: Performed by: INTERNAL MEDICINE

## 2022-05-17 RX ORDER — MEPERIDINE HYDROCHLORIDE 50 MG/ML
25 INJECTION INTRAMUSCULAR; INTRAVENOUS; SUBCUTANEOUS EVERY 30 MIN PRN
Status: DISCONTINUED | OUTPATIENT
Start: 2022-05-17 | End: 2022-05-17 | Stop reason: HOSPADM

## 2022-05-17 RX ORDER — ALBUTEROL SULFATE 0.83 MG/ML
2.5 SOLUTION RESPIRATORY (INHALATION)
Status: DISCONTINUED | OUTPATIENT
Start: 2022-05-17 | End: 2022-05-17 | Stop reason: HOSPADM

## 2022-05-17 RX ORDER — EPINEPHRINE 1 MG/ML
0.3 INJECTION, SOLUTION, CONCENTRATE INTRAVENOUS EVERY 5 MIN PRN
Status: DISCONTINUED | OUTPATIENT
Start: 2022-05-17 | End: 2022-05-17 | Stop reason: HOSPADM

## 2022-05-17 RX ORDER — NALOXONE HYDROCHLORIDE 0.4 MG/ML
0.2 INJECTION, SOLUTION INTRAMUSCULAR; INTRAVENOUS; SUBCUTANEOUS
Status: DISCONTINUED | OUTPATIENT
Start: 2022-05-17 | End: 2022-05-17 | Stop reason: HOSPADM

## 2022-05-17 RX ORDER — DIPHENHYDRAMINE HYDROCHLORIDE 50 MG/ML
50 INJECTION INTRAMUSCULAR; INTRAVENOUS
Status: CANCELLED
Start: 2022-06-23

## 2022-05-17 RX ORDER — ACETAMINOPHEN 325 MG/1
650 TABLET ORAL ONCE
Status: COMPLETED | OUTPATIENT
Start: 2022-05-17 | End: 2022-05-17

## 2022-05-17 RX ORDER — DIPHENHYDRAMINE HCL 25 MG
25 CAPSULE ORAL ONCE
Status: COMPLETED | OUTPATIENT
Start: 2022-05-17 | End: 2022-05-17

## 2022-05-17 RX ORDER — DIPHENHYDRAMINE HYDROCHLORIDE 50 MG/ML
50 INJECTION INTRAMUSCULAR; INTRAVENOUS
Status: DISCONTINUED | OUTPATIENT
Start: 2022-05-17 | End: 2022-05-17 | Stop reason: HOSPADM

## 2022-05-17 RX ORDER — ALBUTEROL SULFATE 0.83 MG/ML
2.5 SOLUTION RESPIRATORY (INHALATION)
Status: CANCELLED | OUTPATIENT
Start: 2022-06-23

## 2022-05-17 RX ORDER — ALBUTEROL SULFATE 90 UG/1
1-2 AEROSOL, METERED RESPIRATORY (INHALATION)
Status: CANCELLED
Start: 2022-06-23

## 2022-05-17 RX ORDER — EPINEPHRINE 1 MG/ML
0.3 INJECTION, SOLUTION, CONCENTRATE INTRAVENOUS EVERY 5 MIN PRN
Status: CANCELLED | OUTPATIENT
Start: 2022-06-23

## 2022-05-17 RX ORDER — METHYLPREDNISOLONE SODIUM SUCCINATE 125 MG/2ML
125 INJECTION, POWDER, LYOPHILIZED, FOR SOLUTION INTRAMUSCULAR; INTRAVENOUS
Status: CANCELLED
Start: 2022-06-23

## 2022-05-17 RX ORDER — MEPERIDINE HYDROCHLORIDE 50 MG/ML
25 INJECTION INTRAMUSCULAR; INTRAVENOUS; SUBCUTANEOUS EVERY 30 MIN PRN
Status: CANCELLED | OUTPATIENT
Start: 2022-06-23

## 2022-05-17 RX ORDER — ACETAMINOPHEN 325 MG/1
650 TABLET ORAL ONCE
Status: CANCELLED
Start: 2022-06-23 | End: 2022-06-23

## 2022-05-17 RX ORDER — DIPHENHYDRAMINE HCL 25 MG
25 CAPSULE ORAL ONCE
Status: CANCELLED
Start: 2022-06-23 | End: 2022-06-23

## 2022-05-17 RX ORDER — ALBUTEROL SULFATE 90 UG/1
1-2 AEROSOL, METERED RESPIRATORY (INHALATION)
Status: DISCONTINUED | OUTPATIENT
Start: 2022-05-17 | End: 2022-05-17 | Stop reason: HOSPADM

## 2022-05-17 RX ORDER — METHYLPREDNISOLONE SODIUM SUCCINATE 125 MG/2ML
125 INJECTION, POWDER, LYOPHILIZED, FOR SOLUTION INTRAMUSCULAR; INTRAVENOUS
Status: DISCONTINUED | OUTPATIENT
Start: 2022-05-17 | End: 2022-05-17 | Stop reason: HOSPADM

## 2022-05-17 RX ORDER — NALOXONE HYDROCHLORIDE 0.4 MG/ML
0.2 INJECTION, SOLUTION INTRAMUSCULAR; INTRAVENOUS; SUBCUTANEOUS
Status: CANCELLED | OUTPATIENT
Start: 2022-06-23

## 2022-05-17 RX ADMIN — INFLIXIMAB 500 MG: 100 INJECTION, POWDER, LYOPHILIZED, FOR SOLUTION INTRAVENOUS at 11:32

## 2022-05-17 RX ADMIN — ACETAMINOPHEN 650 MG: 325 TABLET ORAL at 10:57

## 2022-05-17 RX ADMIN — DIPHENHYDRAMINE HYDROCHLORIDE 25 MG: 25 CAPSULE ORAL at 10:57

## 2022-05-17 NOTE — PROGRESS NOTES
Infusion Nursing Note:  Frank CRAIG Yazminmariia presents today for remicade infusion.    Patient seen by provider today: No   present during visit today: Not Applicable.    Note: /75 (BP Location: Right arm, Patient Position: Semi-Lizama's, Cuff Size: Adult Regular)   Pulse 65   Temp 98.2  F (36.8  C) (Oral)   Resp 16   SpO2 100% .  Premedicated with oral tylenol and benadryl.    Intravenous Access:  Peripheral IV placed.    Treatment Conditions:  Not Applicable.    Post Infusion Assessment:  Remicade infused. Patient tolerated infusion without incident.  Blood return noted pre and post infusion.  Site patent and intact, free from redness, edema or discomfort.  No evidence of extravasations.  Access discontinued per protocol.       Discharge Plan:   Patient and/or family verbalized understanding of discharge instructions and all questions answered.  Patient discharged in stable condition accompanied by: self.  Departure Mode: Ambulatory.      Cindy Barragan RN

## 2022-05-24 ENCOUNTER — TRANSFERRED RECORDS (OUTPATIENT)
Dept: HEALTH INFORMATION MANAGEMENT | Facility: CLINIC | Age: 77
End: 2022-05-24
Payer: MEDICARE

## 2022-05-24 LAB — RETINOPATHY: NEGATIVE

## 2022-06-07 ENCOUNTER — TRANSFERRED RECORDS (OUTPATIENT)
Dept: HEALTH INFORMATION MANAGEMENT | Facility: CLINIC | Age: 77
End: 2022-06-07
Payer: MEDICARE

## 2022-06-07 LAB — RETINOPATHY: NEGATIVE

## 2022-06-10 ENCOUNTER — TELEPHONE (OUTPATIENT)
Dept: INTERNAL MEDICINE | Facility: CLINIC | Age: 77
End: 2022-06-10

## 2022-06-10 NOTE — TELEPHONE ENCOUNTER
"Spoke with patient who states his vision in his right eye is becoming \"quite blurry\" and wonders if Dr Lopez is ok with him getting the cataract surgery. States the ophthalmologist is waiting for the OK from Dr Lopez.    Samuel Messer RN  [facility]    "

## 2022-06-13 NOTE — TELEPHONE ENCOUNTER
Hemoglobin should be rechecked.  If stable, he can go ahead with surgery.  He would require a new preoperative assessment.

## 2022-06-14 DIAGNOSIS — H44.112 PANUVEITIS OF LEFT EYE: Primary | ICD-10-CM

## 2022-06-14 DIAGNOSIS — H15.002 SCLERITIS OF LEFT EYE: ICD-10-CM

## 2022-06-15 ENCOUNTER — LAB (OUTPATIENT)
Dept: LAB | Facility: CLINIC | Age: 77
End: 2022-06-15
Payer: MEDICARE

## 2022-06-15 DIAGNOSIS — Z01.818 PRE-OPERATIVE EXAMINATION: Primary | ICD-10-CM

## 2022-06-15 DIAGNOSIS — H44.112 PANUVEITIS OF LEFT EYE: ICD-10-CM

## 2022-06-15 DIAGNOSIS — H15.002 SCLERITIS OF LEFT EYE: ICD-10-CM

## 2022-06-15 DIAGNOSIS — D64.9 LOW HEMOGLOBIN: ICD-10-CM

## 2022-06-15 LAB
ALBUMIN SERPL-MCNC: 3.6 G/DL (ref 3.5–5)
ALT SERPL W P-5'-P-CCNC: 40 U/L (ref 0–45)
CREAT SERPL-MCNC: 1.51 MG/DL (ref 0.7–1.3)
ERYTHROCYTE [DISTWIDTH] IN BLOOD BY AUTOMATED COUNT: 13.5 % (ref 10–15)
GFR SERPL CREATININE-BSD FRML MDRD: 48 ML/MIN/1.73M2
HCT VFR BLD AUTO: 34 % (ref 40–53)
HGB BLD-MCNC: 11 G/DL (ref 13.3–17.7)
MCH RBC QN AUTO: 30.8 PG (ref 26.5–33)
MCHC RBC AUTO-ENTMCNC: 32.4 G/DL (ref 31.5–36.5)
MCV RBC AUTO: 95 FL (ref 78–100)
PLATELET # BLD AUTO: 118 10E3/UL (ref 150–450)
RBC # BLD AUTO: 3.57 10E6/UL (ref 4.4–5.9)
WBC # BLD AUTO: 5.5 10E3/UL (ref 4–11)

## 2022-06-15 PROCEDURE — 82040 ASSAY OF SERUM ALBUMIN: CPT

## 2022-06-15 PROCEDURE — 84460 ALANINE AMINO (ALT) (SGPT): CPT

## 2022-06-15 PROCEDURE — 85027 COMPLETE CBC AUTOMATED: CPT

## 2022-06-15 PROCEDURE — 82565 ASSAY OF CREATININE: CPT

## 2022-06-15 PROCEDURE — 36415 COLL VENOUS BLD VENIPUNCTURE: CPT

## 2022-06-23 NOTE — TELEPHONE ENCOUNTER
Medication Question or Clarification  Who is calling: [facility]  What medication are you calling about? (include dose and sig)    Disp Refills Start End    BREEZE 2 TEST STRIPS Strp 200 strip 3 6/26/2019     Sig: USE TO TEST UP TO TWICE DAILY AS DIRECTED    Sent to pharmacy as: BREEZE 2 TEST STRIPS Strp    E-Prescribing Status: Receipt confirmed by pharmacy (6/26/2019 12:31 PM CDT)      Who prescribed the medication?: Fei Lopez MD   What is your question/concern?: Fax stated the Breeze 2 test strips have been discontinued. Please send over an alternative meter, lancets, and test strips.  [facility]  Okay to leave a detailed message?: No  Site CMT - Please call the pharmacy to obtain any additional needed information.   Consent Type: Consent 1 (Standard)

## 2022-06-28 ENCOUNTER — INFUSION THERAPY VISIT (OUTPATIENT)
Dept: INFUSION THERAPY | Facility: CLINIC | Age: 77
End: 2022-06-28
Attending: INTERNAL MEDICINE
Payer: MEDICARE

## 2022-06-28 ENCOUNTER — TRANSFERRED RECORDS (OUTPATIENT)
Dept: HEALTH INFORMATION MANAGEMENT | Facility: CLINIC | Age: 77
End: 2022-06-28

## 2022-06-28 ENCOUNTER — NURSE TRIAGE (OUTPATIENT)
Dept: NURSING | Facility: CLINIC | Age: 77
End: 2022-06-28

## 2022-06-28 VITALS
TEMPERATURE: 97.7 F | OXYGEN SATURATION: 97 % | RESPIRATION RATE: 18 BRPM | SYSTOLIC BLOOD PRESSURE: 115 MMHG | DIASTOLIC BLOOD PRESSURE: 77 MMHG | HEART RATE: 77 BPM

## 2022-06-28 DIAGNOSIS — H44.112 PANUVEITIS OF LEFT EYE: Primary | ICD-10-CM

## 2022-06-28 LAB — RETINOPATHY: NEGATIVE

## 2022-06-28 PROCEDURE — 250N000013 HC RX MED GY IP 250 OP 250 PS 637: Performed by: INTERNAL MEDICINE

## 2022-06-28 PROCEDURE — 250N000011 HC RX IP 250 OP 636: Performed by: INTERNAL MEDICINE

## 2022-06-28 PROCEDURE — 96413 CHEMO IV INFUSION 1 HR: CPT

## 2022-06-28 PROCEDURE — 258N000003 HC RX IP 258 OP 636: Performed by: INTERNAL MEDICINE

## 2022-06-28 RX ORDER — ALBUTEROL SULFATE 0.83 MG/ML
2.5 SOLUTION RESPIRATORY (INHALATION)
Status: DISCONTINUED | OUTPATIENT
Start: 2022-06-28 | End: 2022-06-28 | Stop reason: HOSPADM

## 2022-06-28 RX ORDER — ALBUTEROL SULFATE 0.83 MG/ML
2.5 SOLUTION RESPIRATORY (INHALATION)
Status: CANCELLED | OUTPATIENT
Start: 2022-08-04

## 2022-06-28 RX ORDER — DIPHENHYDRAMINE HYDROCHLORIDE 50 MG/ML
50 INJECTION INTRAMUSCULAR; INTRAVENOUS
Status: DISCONTINUED | OUTPATIENT
Start: 2022-06-28 | End: 2022-06-28 | Stop reason: HOSPADM

## 2022-06-28 RX ORDER — ALBUTEROL SULFATE 90 UG/1
1-2 AEROSOL, METERED RESPIRATORY (INHALATION)
Status: DISCONTINUED | OUTPATIENT
Start: 2022-06-28 | End: 2022-06-28 | Stop reason: HOSPADM

## 2022-06-28 RX ORDER — EPINEPHRINE 1 MG/ML
0.3 INJECTION, SOLUTION, CONCENTRATE INTRAVENOUS EVERY 5 MIN PRN
Status: CANCELLED | OUTPATIENT
Start: 2022-08-04

## 2022-06-28 RX ORDER — NALOXONE HYDROCHLORIDE 0.4 MG/ML
0.2 INJECTION, SOLUTION INTRAMUSCULAR; INTRAVENOUS; SUBCUTANEOUS
Status: CANCELLED | OUTPATIENT
Start: 2022-08-04

## 2022-06-28 RX ORDER — METHYLPREDNISOLONE SODIUM SUCCINATE 125 MG/2ML
125 INJECTION, POWDER, LYOPHILIZED, FOR SOLUTION INTRAMUSCULAR; INTRAVENOUS
Status: CANCELLED
Start: 2022-08-04

## 2022-06-28 RX ORDER — METHYLPREDNISOLONE SODIUM SUCCINATE 125 MG/2ML
125 INJECTION, POWDER, LYOPHILIZED, FOR SOLUTION INTRAMUSCULAR; INTRAVENOUS
Status: DISCONTINUED | OUTPATIENT
Start: 2022-06-28 | End: 2022-06-28 | Stop reason: HOSPADM

## 2022-06-28 RX ORDER — MEPERIDINE HYDROCHLORIDE 50 MG/ML
25 INJECTION INTRAMUSCULAR; INTRAVENOUS; SUBCUTANEOUS EVERY 30 MIN PRN
Status: DISCONTINUED | OUTPATIENT
Start: 2022-06-28 | End: 2022-06-28 | Stop reason: HOSPADM

## 2022-06-28 RX ORDER — DIPHENHYDRAMINE HCL 25 MG
25 CAPSULE ORAL ONCE
Status: CANCELLED
Start: 2022-08-04 | End: 2022-08-04

## 2022-06-28 RX ORDER — ACETAMINOPHEN 325 MG/1
650 TABLET ORAL ONCE
Status: CANCELLED
Start: 2022-08-04 | End: 2022-08-04

## 2022-06-28 RX ORDER — NALOXONE HYDROCHLORIDE 0.4 MG/ML
0.2 INJECTION, SOLUTION INTRAMUSCULAR; INTRAVENOUS; SUBCUTANEOUS
Status: DISCONTINUED | OUTPATIENT
Start: 2022-06-28 | End: 2022-06-28 | Stop reason: HOSPADM

## 2022-06-28 RX ORDER — MEPERIDINE HYDROCHLORIDE 50 MG/ML
25 INJECTION INTRAMUSCULAR; INTRAVENOUS; SUBCUTANEOUS EVERY 30 MIN PRN
Status: CANCELLED | OUTPATIENT
Start: 2022-08-04

## 2022-06-28 RX ORDER — DIPHENHYDRAMINE HCL 25 MG
25 CAPSULE ORAL ONCE
Status: COMPLETED | OUTPATIENT
Start: 2022-06-28 | End: 2022-06-28

## 2022-06-28 RX ORDER — DIPHENHYDRAMINE HYDROCHLORIDE 50 MG/ML
50 INJECTION INTRAMUSCULAR; INTRAVENOUS
Status: CANCELLED
Start: 2022-08-04

## 2022-06-28 RX ORDER — ALBUTEROL SULFATE 90 UG/1
1-2 AEROSOL, METERED RESPIRATORY (INHALATION)
Status: CANCELLED
Start: 2022-08-04

## 2022-06-28 RX ORDER — ACETAMINOPHEN 325 MG/1
650 TABLET ORAL ONCE
Status: COMPLETED | OUTPATIENT
Start: 2022-06-28 | End: 2022-06-28

## 2022-06-28 RX ORDER — EPINEPHRINE 1 MG/ML
0.3 INJECTION, SOLUTION, CONCENTRATE INTRAVENOUS EVERY 5 MIN PRN
Status: DISCONTINUED | OUTPATIENT
Start: 2022-06-28 | End: 2022-06-28 | Stop reason: HOSPADM

## 2022-06-28 RX ADMIN — ACETAMINOPHEN 650 MG: 325 TABLET ORAL at 11:37

## 2022-06-28 RX ADMIN — DIPHENHYDRAMINE HYDROCHLORIDE 25 MG: 25 CAPSULE ORAL at 11:37

## 2022-06-28 RX ADMIN — INFLIXIMAB 500 MG: 100 INJECTION, POWDER, LYOPHILIZED, FOR SOLUTION INTRAVENOUS at 11:52

## 2022-06-28 NOTE — PROGRESS NOTES
Infusion Nursing Note:  Frank Obando presents today for Remicade with tylenol and benadryl orally.    Patient seen by provider today: No   present during visit today: Not Applicable.    Note:Pt. States that his MD decided to have him receive his remicade every 6 weeks instead of every 8 weeks. Pt. States at present time he does not have any symptoms yet of his peripheral neuropathy.    Intravenous Access:  Peripheral IV placed.    Treatment Conditions:  Not Applicable.    Post Infusion Assessment:  Patient tolerated infusion without incident.  Site patent and intact, free from redness, edema or discomfort.  No evidence of extravasations.  Access discontinued per protocol.     Discharge Plan:   Patient and/or family verbalized understanding of discharge instructions and all questions answered.      Pascale Samuels RN

## 2022-06-28 NOTE — TELEPHONE ENCOUNTER
Gina calling from [facility].  She says Dr. Cherry would like to speak with Dr. Lopez.  Attempt made to connect caller with Only clinic.  Caller disconnected.      Message sent to Dr. Lopez.  Dr. Cherry can be reached on his cell: [phone].    Fabiana Sharma, RN  Triage Nurse Advisor    Reason for Disposition    [1] Caller requests to speak to PCP now AND [2] won't tell us reason for call  (Exception: if 10 pm to 6 am, caller must first discuss reason for the call)    Protocols used: PCP CALL - NO TRIAGE-A-

## 2022-07-06 ENCOUNTER — OFFICE VISIT (OUTPATIENT)
Dept: AUDIOLOGY | Facility: CLINIC | Age: 77
End: 2022-07-06
Payer: MEDICARE

## 2022-07-06 DIAGNOSIS — H90.3 SENSORINEURAL HEARING LOSS, BILATERAL: ICD-10-CM

## 2022-07-06 DIAGNOSIS — H61.23 BILATERAL IMPACTED CERUMEN: Primary | ICD-10-CM

## 2022-07-06 PROCEDURE — V5299 HEARING SERVICE: HCPCS | Performed by: AUDIOLOGIST

## 2022-07-06 NOTE — PROGRESS NOTES
No charge appointment. Mr. Obando has noted decreased hearing and bilateral, nonpulsatile tinnitus for several years. He indicated he may be open to amplification at this time. Hearing was most recently assessed while he was working as a , and reportedly indicated some hearing loss. 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 He denied otalgia, otorrhea, recent illness, or vertigo.    Otoscopy revealed cerumen impactions, bilaterally. Hearing testing was deferred until cerumen can be safely removed by a physician.     Mr. Obando was given scheduling contact information, and will schedule an appointment with ENT first for cleaning, to be followed by an audiology appointment for testing, preferably the same day. Mr. Obando expressed verbal understanding of this information and plan.    Stephanie Shaw., [facility]  [place] Licensed Audiologist [id number]

## 2022-07-07 DIAGNOSIS — H90.6 MIXED HEARING LOSS, BILATERAL: Primary | ICD-10-CM

## 2022-07-26 ENCOUNTER — OFFICE VISIT (OUTPATIENT)
Dept: INTERNAL MEDICINE | Facility: CLINIC | Age: 77
End: 2022-07-26
Payer: MEDICARE

## 2022-07-26 VITALS
TEMPERATURE: 97.7 F | DIASTOLIC BLOOD PRESSURE: 53 MMHG | BODY MASS INDEX: 30.35 KG/M2 | OXYGEN SATURATION: 98 % | HEART RATE: 63 BPM | SYSTOLIC BLOOD PRESSURE: 99 MMHG | WEIGHT: 211.5 LBS

## 2022-07-26 DIAGNOSIS — H15.109 SCLERITIS AND EPISCLERITIS, UNSPECIFIED LATERALITY: ICD-10-CM

## 2022-07-26 DIAGNOSIS — I10 ESSENTIAL HYPERTENSION, BENIGN: ICD-10-CM

## 2022-07-26 DIAGNOSIS — H15.009 SCLERITIS AND EPISCLERITIS, UNSPECIFIED LATERALITY: ICD-10-CM

## 2022-07-26 DIAGNOSIS — D50.0 IRON DEFICIENCY ANEMIA DUE TO CHRONIC BLOOD LOSS: ICD-10-CM

## 2022-07-26 DIAGNOSIS — E78.00 HYPERCHOLESTEROLEMIA: ICD-10-CM

## 2022-07-26 DIAGNOSIS — H15.002 SCLERITIS OF LEFT EYE: ICD-10-CM

## 2022-07-26 DIAGNOSIS — E11.8 TYPE 2 DIABETES MELLITUS WITH COMPLICATION, WITHOUT LONG-TERM CURRENT USE OF INSULIN (H): ICD-10-CM

## 2022-07-26 DIAGNOSIS — Z01.818 PREOP EXAM FOR INTERNAL MEDICINE: Primary | ICD-10-CM

## 2022-07-26 DIAGNOSIS — N18.30 CRF (CHRONIC RENAL FAILURE), STAGE 3 (MODERATE) (H): Chronic | ICD-10-CM

## 2022-07-26 DIAGNOSIS — I45.10 RIGHT BUNDLE BRANCH BLOCK: ICD-10-CM

## 2022-07-26 LAB
ANION GAP SERPL CALCULATED.3IONS-SCNC: 13 MMOL/L (ref 7–15)
BUN SERPL-MCNC: 21.3 MG/DL (ref 8–23)
CALCIUM SERPL-MCNC: 9.7 MG/DL (ref 8.8–10.2)
CHLORIDE SERPL-SCNC: 100 MMOL/L (ref 98–107)
CREAT SERPL-MCNC: 1.4 MG/DL (ref 0.67–1.17)
DEPRECATED HCO3 PLAS-SCNC: 24 MMOL/L (ref 22–29)
ERYTHROCYTE [DISTWIDTH] IN BLOOD BY AUTOMATED COUNT: 13.2 % (ref 10–15)
FERRITIN SERPL-MCNC: 21 NG/ML (ref 31–409)
GFR SERPL CREATININE-BSD FRML MDRD: 52 ML/MIN/1.73M2
GLUCOSE SERPL-MCNC: 179 MG/DL (ref 70–99)
HCT VFR BLD AUTO: 32.6 % (ref 40–53)
HGB BLD-MCNC: 10.7 G/DL (ref 13.3–17.7)
MCH RBC QN AUTO: 30.8 PG (ref 26.5–33)
MCHC RBC AUTO-ENTMCNC: 32.8 G/DL (ref 31.5–36.5)
MCV RBC AUTO: 94 FL (ref 78–100)
PLATELET # BLD AUTO: 105 10E3/UL (ref 150–450)
POTASSIUM SERPL-SCNC: 4.8 MMOL/L (ref 3.4–5.3)
RBC # BLD AUTO: 3.47 10E6/UL (ref 4.4–5.9)
SODIUM SERPL-SCNC: 137 MMOL/L (ref 136–145)
WBC # BLD AUTO: 6.8 10E3/UL (ref 4–11)

## 2022-07-26 PROCEDURE — 80048 BASIC METABOLIC PNL TOTAL CA: CPT | Performed by: INTERNAL MEDICINE

## 2022-07-26 PROCEDURE — 82728 ASSAY OF FERRITIN: CPT | Performed by: INTERNAL MEDICINE

## 2022-07-26 PROCEDURE — 36415 COLL VENOUS BLD VENIPUNCTURE: CPT | Performed by: INTERNAL MEDICINE

## 2022-07-26 PROCEDURE — 93010 ELECTROCARDIOGRAM REPORT: CPT | Mod: OFF | Performed by: INTERNAL MEDICINE

## 2022-07-26 PROCEDURE — 85027 COMPLETE CBC AUTOMATED: CPT | Performed by: INTERNAL MEDICINE

## 2022-07-26 PROCEDURE — 93005 ELECTROCARDIOGRAM TRACING: CPT | Performed by: INTERNAL MEDICINE

## 2022-07-26 PROCEDURE — 99214 OFFICE O/P EST MOD 30 MIN: CPT | Performed by: INTERNAL MEDICINE

## 2022-07-26 RX ORDER — PREDNISONE 10 MG/1
5 TABLET ORAL DAILY
Qty: 30 TABLET | Refills: 3
Start: 2022-07-26 | End: 2022-11-18

## 2022-07-26 NOTE — PROGRESS NOTES
[facility]  [address] [place] [address]  Phone: [phone]  Fax: [phone]  Primary Provider: Fei Lopez        PREOPERATIVE EVALUATION:  Today's date: 7/26/2022    Frank Obando is a 77 year old male who presents for a preoperative evaluation.    Surgical Information:  Surgery/Procedure: Cataract Right eye  Surgery Location: [facility] eye  Surgeon: Dr Brandon Cherry  Surgery Date: 7/27  Time of Surgery: TBD  Where patient plans to recover: At home with family  Fax number for surgical facility: don't want faxed, needs to take copy    Type of Anesthesia Anticipated: Retrobulbar    1. Preop exam for internal medicine  Antonio is seen at the request of Dr. Cherry for preoperative medical clearance prior to undergoing right cataract extraction with intraocular lens implant.  He appears medically stable for the planned procedure  - EKG 12-lead, tracing only  - Basic metabolic panel  (Ca, Cl, CO2, Creat, Gluc, K, Na, BUN); Future  - CBC with platelets; Future    2. Iron deficiency anemia due to chronic blood loss  He has chronic issues with iron deficiency anemia felt related to occult GI blood loss.  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  He formerly was on Plavix and aspirin due to history of CVA.  He is now off of anticoagulants.  Hemogram will be checked preoperatively  - Ferritin; Future    3. Scleritis and episcleritis, unspecified laterality  He is treated with Remicade and a tapering course of prednisone.  He is now down to 5 mg daily.  - predniSONE (DELTASONE) 10 MG tablet; Take 0.5 tablets (5 mg) by mouth daily  Dispense: 30 tablet; Refill: 3    4. Type 2 diabetes mellitus with complication, without long-term current use of insulin (H)  Treated with metformin, pioglitazone, and glimepiride.  Blood sugars have been higher due to recent high-dose prednisone use    5. Hypercholesterolemia  On  atorvastatin 40 mg daily.  Aggressive control is warranted given his history of type 2 diabetes and cerebrovascular disease    6. Essential hypertension, benign  Blood pressure now is low normal    7. Right bundle branch block  No symptoms to suggest higher degree heart block    8. CRF (chronic renal failure), stage 3 (moderate) (H)  Renal function will be reassessed preoperatively.  Creatinine was 1.51 in June 9. Scleritis of left eye  See above    Patient Instructions   1.  EKG today    2.  BMP. Hemogram and ferritin level    3.  Morning of surgery;  Take prednisone, propranolol and omeprazole.    4.  Skip other meds and take after surgery    5.  Recheck hemoglobin A1c with next labs    6.  See in three months or as needed    7.  No contraindications to the planned surgery are noted    Subjective     HPI related to upcoming procedure: 77-year-old man seen at the request of Dr. Cherry for preoperative medical clearance prior to undergoing right cataract extraction with intraocular lens implant.  He has noted progressive blurring of vision in the right eye over the past year.  He states he now has difficulty seeing the large letters on visual testing.  He is treated for scleritis of the left eye with prednisone and Remicade.  He feels scleritis has been under good control.    He has a history of chronic iron deficiency anemia related to occult GI blood loss.  He is now taking 4 iron tabs daily.  He has had previous IV iron infusions.  He previously had been on aspirin and Plavix due to a CVA.  He has been taken off of anticoagulants.  He does have a history of nonalcoholic hepatic cirrhosis.  It is felt that GI blood loss is related to this.  He is on propranolol to hopefully decrease portal pressure.    He has a history of type 2 diabetes.  Control has been excellent when he is on low-dose prednisone or totally off of it with a hemoglobin A1c of 6.2 in January.  This had increased to 8.2 in April when on higher  prednisone dosing.  He is treated with metformin and glimepiride.  He is also on pioglitazone both for type 2 diabetes and liver disease    He is on lisinopril for essential hypertension.  He denies orthostatic lightheadedness.    Other medical issues are stable.  He does note fatigue with activities such as walking.  This is presumably related to anemia    Preop Questions 7/26/2022   1. Have you ever had a heart attack or stroke? YES -prior CVA   2. Have you ever had surgery on your heart or blood vessels, such as a stent placement, a coronary artery bypass, or surgery on an artery in your head, neck, heart, or legs? No   3. Do you have chest pain with activity? No   4. Do you have a history of  heart failure? No   5. Do you currently have a cold, bronchitis or symptoms of other infection? No   6. Do you have a cough, shortness of breath, or wheezing? No   7. Do you or anyone in your family have previous history of blood clots? No   8. Do you or does anyone in your family have a serious bleeding problem such as prolonged bleeding following surgeries or cuts? No   9. Have you ever had problems with anemia or been told to take iron pills? YES -    10. Have you had any abnormal blood loss such as black, tarry or bloody stools? No   11. Have you ever had a blood transfusion? No   12. Are you willing to have a blood transfusion if it is medically needed before, during, or after your surgery? Yes   13. Have you or any of your relatives ever had problems with anesthesia? No   14. Do you have sleep apnea, excessive snoring or daytime drowsiness? No   15. Do you have any artifical heart valves or other implanted medical devices like a pacemaker, defibrillator, or continuous glucose monitor? No   16. Do you have artificial joints? No   17. Are you allergic to latex? No       Health Care Directive:  Patient does not have a Health Care Directive or Living Will:     Preoperative Review of :   reviewed - no record of  controlled substances prescribed.          Review of Systems  CONSTITUTIONAL: NEGATIVE for fever, chills, change in weight  INTEGUMENTARY/SKIN: NEGATIVE for worrisome rashes, moles or lesions  EYES: Decreased vision right eye leading to planned surgery.  Treated for scleritis as above  ENT/MOUTH: Diminished hearing.  Has audiology check scheduled  RESP: NEGATIVE for significant cough or SOB.  Does note fatigue with activity  CV: NEGATIVE for chest pain, palpitations or peripheral edema  GI: NEGATIVE for nausea, abdominal pain, heartburn, or change in bowel habits.  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  Created by Conversion       Iron deficiency anemia due to chronic blood loss 11/02/2021     Priority: Medium     Angiodysplasia of stomach 09/27/2021     Priority: Medium     Esophageal varices without bleeding (H) 09/27/2021     Priority: Medium     CRF (chronic renal failure), stage 3 (moderate) (H) 02/10/2020     Priority: Medium     Panuveitis, unspecified eye 03/19/2019     Priority: Medium     Normocytic anemia      Priority: Medium     Created by Conversion      Formatting of this note might be different from the original.  Created by Conversion       Essential hypertension, benign      Priority: Medium     Created by Conversion  Replacement Utility updated for latest IMO load      Formatting of this note might be different  from the original.  Created by Conversion    Replacement Utility updated for latest IMO load       Type 2 diabetes mellitus with complication, without long-term current use of insulin (H)      Priority: Medium     Created by Conversion      Formatting of this note might be different from the original.  Created by Conversion       Cholecystitis      Priority: Medium     Dyslipidemia      Priority: Medium     Thrombocytopenia (H)      Priority: Medium     Cirrhosis of liver with ascites, unspecified hepatic cirrhosis type (H)      Priority: Medium     Hyponatremia      Priority: Medium     Abdominal pain 12/20/2018     Priority: Medium     High risk medication use 09/26/2018     Priority: Medium     Diverticular disease of large intestine 05/07/2018     Priority: Medium     Polyp of colon 05/07/2018     Priority: Medium     Hypercholesterolemia      Priority: Medium     Created by Conversion      Formatting of this note might be different from the original.  Created by Conversion       Antineutrophil cytoplasmic antibody (ANCA) positive 03/23/2017     Priority: Medium     Urinary hesitancy 01/21/2017     Priority: Medium     Peripheral Neuropathy      Priority: Medium     Created by Conversion  Replacement Utility updated for latest IMO load      Formatting of this note might be different from the original.  Created by Conversion    Replacement Utility updated for latest IMO load       ALT (SGPT) level raised 03/17/2016     Priority: Medium     Scleritis of left eye 03/17/2016     Priority: Medium     Primary osteoarthritis of both knees 11/16/2015     Priority: Medium     Carpal tunnel syndrome 11/24/2014     Priority: Medium     Right bundle branch block 11/24/2014     Priority: Medium     Nephrolithiasis      Priority: Medium     Created by Conversion  [facility] Annotation: Oct 14 2009  4:44PM - Carlyle Chance stent      Formatting of this note might be different from the original.  Created by  [facility] Annotation: Oct 14 2009  4:44PM - Pernell Chance: R stent       Cerebrovascular accident (CVA) due to thrombosis of left anterior cerebral artery (H)      Priority: Medium     Created by Conversion         Diverticular disease of colon 10/29/2007     Priority: Medium      Past Medical History:   Diagnosis Date     Anemia      Arthritis     osteoarthritis     Calculus of kidney      Diabetes mellitus (H)      Episcleritis of left eye      Hyperlipidemia      Hypertension      Iron deficiency anemia due to chronic blood loss 11/2/2021     Peripheral neuropathy      Stroke (H)      Past Surgical History:   Procedure Laterality Date     COLONOSCOPY N/A 11/16/2021    Procedure: COLONOSCOPY with polypectomy;  Surgeon: Dex Finch MD;  Location: [facility]     CYSTOSCOPY TUMOR / CONDYLOMATA W/ LASER Left 7/18/2014     ESOPHAGOSCOPY, GASTROSCOPY, DUODENOSCOPY (EGD), COMBINED N/A 11/16/2021    Procedure: ESOPHAGOGASTRODUODENOSCOPY (EGD) with biopsies and argon plasma coagulation;  Surgeon: Dex Finch MD;  Location: [facility] CYSTOSCOPY,INSERT URETERAL STENT      Description: Cystoscopy With Insertion Of Ureteral Stent Right;  Recorded: 10/14/2009;  Comments: stone     Current Outpatient Medications   Medication Sig Dispense Refill     atorvastatin (LIPITOR) 40 MG tablet TAKE 1 TABLET BY MOUTH DAILY 90 tablet 1     blood glucose meter (GLUCOMETER) [BLOOD GLUCOSE METER (GLUCOMETER)] Use 1 each As Directed 2 (two) times a day. Dispense glucometer brand per patient's insurance at pharmacy discretion. 1 each 0     blood glucose test (ACCU-CHEK AURORA PLUS TEST STRP) strips [BLOOD GLUCOSE TEST (ACCU-CHEK AURORA PLUS TEST STRP) STRIPS] Use 1 each As Directed 2 (two) times a day. Accu-chek Aurora Plus 200 strip 11     cyanocobalamin, vitamin B-12, 2,500 mcg Tab [CYANOCOBALAMIN, VITAMIN B-12, 2,500 MCG TAB] Take 2,500 mcg by mouth daily.       ferrous sulfate (FEROSUL) 325  (65 Fe) MG tablet Take 1 tablet (325 mg) by mouth daily 90 tablet 3     generic lancets (ACCU-CHEK SOFTCLIX LANCETS) [GENERIC LANCETS (ACCU-CHEK SOFTCLIX LANCETS)] Use 1 each As Directed 2 (two) times a day. Dispense brand per patient's insurance at pharmacy discretion. 100 each 3     glimepiride (AMARYL) 2 MG tablet Take 1 tablet (2 mg) by mouth every morning (before breakfast) 30 tablet 3     inFLIXimab (REMICADE IV) Every 8 weeks       lisinopril (ZESTRIL) 10 MG tablet Take 1 tablet (10 mg) by mouth daily 90 tablet 3     magnesium oxide 250 mg Tab [MAGNESIUM OXIDE 250 MG TAB] Take 250 mg by mouth 2 (two) times a day.       metFORMIN (GLUCOPHAGE) 500 MG tablet TAKE 2 TABLETS BY MOUTH TWICE DAILY AT 6 AM AND AT 4  tablet 3     omeprazole (PRILOSEC) 40 MG DR capsule TAKE 1 CAPSULE BY MOUTH DAILY 90 capsule 3     pioglitazone (ACTOS) 30 MG tablet TAKE 1 TABLET BY MOUTH EVERY DAY 90 tablet 1     predniSONE (DELTASONE) 10 MG tablet Take 30 mg by mouth daily       propranolol (INDERAL) 20 MG tablet TAKE 1 TABLET BY MOUTH TWICE DAILY 180 tablet 3     tamsulosin (FLOMAX) 0.4 MG capsule TAKE 1 CAPSULE BY MOUTH DAILY AFTER AND SUPPER 90 capsule 3     ASPIRIN NOT PRESCRIBED (INTENTIONAL) Please choose reason not prescribed from choices below. (Patient not taking: Reported on 7/26/2022)       clopidogrel (PLAVIX) 75 MG tablet TAKE 1 TABLET(75 MG) BY MOUTH DAILY (Patient not taking: Reported on 7/26/2022) 90 tablet 3       Allergies   Allergen Reactions     Morphine Nausea and Vomiting     Other Allergy (See Comments) [External Allergen Needs Reconciliation - See Comment] Unknown     Scopolamine HBr CHANG, 08/27/2013.       Scopolamine Hbr [Scopolamine] Unknown        Social History    and retired.  Previously promoted wrestling matches  Tobacco Use     Smoking status: Never Smoker     Smokeless tobacco: Never Used   Substance Use Topics     Alcohol use: No     Family History   Problem Relation Age of Onset      Lung Cancer Father      Coronary Artery Disease Mother      Diabetes Mother      History   Drug Use No         Objective     BP 99/53 (BP Location: Left arm, Patient Position: Sitting, Cuff Size: Adult Large)   Pulse 63   Temp 97.7  F (36.5  C) (Tympanic)   Wt 95.9 kg (211 lb 8 oz)   SpO2 98%   BMI 30.35 kg/m      Physical Exam  BP 99/53 (BP Location: Left arm, Patient Position: Sitting, Cuff Size: Adult Large)   Pulse 63   Temp 97.7  F (36.5  C) (Tympanic)   Wt 95.9 kg (211 lb 8 oz)   SpO2 98%   BMI 30.35 kg/m      General Appearance:  Alert, cooperative, no distress, appears stated age   Head:  Normocephalic, without obvious abnormality, atraumatic   Eyes:   EOMs full.  No conjunctival hyperemia.  Cataracts right greater than left   Ears:   Hearing.  Ears occluded with cerumen   Nose: Nares normal, septum midline, mucosa normal, no drainage   Throat: Lips, mucosa, and tongue normal; teeth and gums normal   Neck: Supple, symmetrical, trachea midline, no adenopathy, thyroid: not enlarged, symmetric, no tenderness/mass/nodules, no carotid bruit or JVD   Back:   Symmetric, no curvature, ROM normal, no CVA tenderness   Lungs:   Clear to auscultation bilaterally, respirations unlabored   Chest Wall:  No tenderness or deformity   Heart:  Regular rate and rhythm, S1, S2 normal, no murmur, rub or gallop   Abdomen:   Soft, non-tender, bowel sounds active all four quadrants,  no masses, no organomegaly.  Right lower abdominal scar   Genitalia:   Exam deferred   Rectal:   Exam deferred   Extremities: Extremities normal, atraumatic, no cyanosis or edema.  Pulses intact   Skin:  Rather pale.  No petechia   Lymph nodes: Cervical and supraclavicular normal   Neurologic: No dysarthria or aphasia.  Cranial nerves, motor or sensory exams grossly intact         Recent Labs   Lab Test 06/15/22  1043 05/13/22  0742 04/29/22  1240 04/27/22  0751 04/08/22  1055 02/22/22  1504 01/21/22  0848 11/01/21  1327 10/05/21  1437   HGB  11.0* 9.9*  --    < > 11.0*   < > 10.6*   < > 6.3*   * 128*  --    < > 123*   < > 113*   < > 139*   INR  --   --   --   --   --   --   --   --  1.11   NA  --   --  138  --  138  --  137  --  138   POTASSIUM  --   --  4.6  --  4.7  --  4.3  --  4.3   CR 1.51*  --  1.34*  --  1.39*  --  1.27   < > 1.19   A1C  --   --  8.2*  --   --   --  6.2*  --   --     < > = values in this interval not displayed.        Diagnostics:  Recent Results (from the past 24 hour(s))   EKG 12-lead, tracing only    Collection Time: 07/26/22 11:58 AM   Result Value Ref Range    Systolic Blood Pressure  mmHg    Diastolic Blood Pressure  mmHg    Ventricular Rate 60 BPM    Atrial Rate 60 BPM    AR Interval 158 ms    QRS Duration 140 ms     ms    QTc 484 ms    P Axis -6 degrees    R AXIS 49 degrees    T Axis 10 degrees    Interpretation ECG       Sinus rhythm  Right bundle branch block  Abnormal ECG  When compared with ECG of 20-SEP-2021 09:14,  Premature ventricular complexes are no longer Present            Revised Cardiac Risk Index (RCRI):  The patient has the following serious cardiovascular risks for perioperative complications:   - No serious cardiac risks = 0 points     RCRI Interpretation: 0 points: Class I (very low risk - 0.4% complication rate)           Signed Electronically by: Fei Lopez MD  Copy of this evaluation report is provided to requesting physician.

## 2022-07-26 NOTE — PATIENT INSTRUCTIONS
EKG today    2.  BMP. Hemogram and ferritin level    3.  Morning of surgery;  Take prednisone, propranolol and omeprazole.    4.  Skip other meds and take after surgery    5.  Recheck hemoglobin A1c with next labs    6.  See in three months or as needed

## 2022-07-27 LAB
ATRIAL RATE - MUSE: 60 BPM
DIASTOLIC BLOOD PRESSURE - MUSE: NORMAL MMHG
INTERPRETATION ECG - MUSE: NORMAL
P AXIS - MUSE: -6 DEGREES
PR INTERVAL - MUSE: 158 MS
QRS DURATION - MUSE: 140 MS
QT - MUSE: 484 MS
QTC - MUSE: 484 MS
R AXIS - MUSE: 49 DEGREES
SYSTOLIC BLOOD PRESSURE - MUSE: NORMAL MMHG
T AXIS - MUSE: 10 DEGREES
VENTRICULAR RATE- MUSE: 60 BPM

## 2022-08-09 ENCOUNTER — INFUSION THERAPY VISIT (OUTPATIENT)
Dept: INFUSION THERAPY | Facility: CLINIC | Age: 77
End: 2022-08-09
Attending: INTERNAL MEDICINE
Payer: MEDICARE

## 2022-08-09 VITALS
SYSTOLIC BLOOD PRESSURE: 103 MMHG | DIASTOLIC BLOOD PRESSURE: 69 MMHG | WEIGHT: 205 LBS | TEMPERATURE: 98.3 F | RESPIRATION RATE: 17 BRPM | BODY MASS INDEX: 29.41 KG/M2 | OXYGEN SATURATION: 95 % | HEART RATE: 79 BPM

## 2022-08-09 DIAGNOSIS — H44.112 PANUVEITIS OF LEFT EYE: Primary | ICD-10-CM

## 2022-08-09 PROCEDURE — 96413 CHEMO IV INFUSION 1 HR: CPT

## 2022-08-09 PROCEDURE — 250N000013 HC RX MED GY IP 250 OP 250 PS 637: Performed by: INTERNAL MEDICINE

## 2022-08-09 PROCEDURE — 258N000003 HC RX IP 258 OP 636: Performed by: INTERNAL MEDICINE

## 2022-08-09 PROCEDURE — 250N000011 HC RX IP 250 OP 636: Performed by: INTERNAL MEDICINE

## 2022-08-09 RX ORDER — EPINEPHRINE 1 MG/ML
0.3 INJECTION, SOLUTION, CONCENTRATE INTRAVENOUS EVERY 5 MIN PRN
Status: CANCELLED | OUTPATIENT
Start: 2022-09-15

## 2022-08-09 RX ORDER — DIPHENHYDRAMINE HCL 25 MG
25 CAPSULE ORAL ONCE
Status: COMPLETED | OUTPATIENT
Start: 2022-08-09 | End: 2022-08-09

## 2022-08-09 RX ORDER — DIPHENHYDRAMINE HCL 25 MG
25 CAPSULE ORAL ONCE
Status: CANCELLED
Start: 2022-09-15 | End: 2022-09-15

## 2022-08-09 RX ORDER — NALOXONE HYDROCHLORIDE 0.4 MG/ML
0.2 INJECTION, SOLUTION INTRAMUSCULAR; INTRAVENOUS; SUBCUTANEOUS
Status: DISCONTINUED | OUTPATIENT
Start: 2022-08-09 | End: 2022-08-09 | Stop reason: HOSPADM

## 2022-08-09 RX ORDER — METHYLPREDNISOLONE SODIUM SUCCINATE 125 MG/2ML
125 INJECTION, POWDER, LYOPHILIZED, FOR SOLUTION INTRAMUSCULAR; INTRAVENOUS
Status: CANCELLED
Start: 2022-09-15

## 2022-08-09 RX ORDER — EPINEPHRINE 1 MG/ML
0.3 INJECTION, SOLUTION, CONCENTRATE INTRAVENOUS EVERY 5 MIN PRN
Status: DISCONTINUED | OUTPATIENT
Start: 2022-08-09 | End: 2022-08-09 | Stop reason: HOSPADM

## 2022-08-09 RX ORDER — NALOXONE HYDROCHLORIDE 0.4 MG/ML
0.2 INJECTION, SOLUTION INTRAMUSCULAR; INTRAVENOUS; SUBCUTANEOUS
Status: CANCELLED | OUTPATIENT
Start: 2022-09-15

## 2022-08-09 RX ORDER — MEPERIDINE HYDROCHLORIDE 50 MG/ML
25 INJECTION INTRAMUSCULAR; INTRAVENOUS; SUBCUTANEOUS EVERY 30 MIN PRN
Status: DISCONTINUED | OUTPATIENT
Start: 2022-08-09 | End: 2022-08-09 | Stop reason: HOSPADM

## 2022-08-09 RX ORDER — METHYLPREDNISOLONE SODIUM SUCCINATE 125 MG/2ML
125 INJECTION, POWDER, LYOPHILIZED, FOR SOLUTION INTRAMUSCULAR; INTRAVENOUS
Status: DISCONTINUED | OUTPATIENT
Start: 2022-08-09 | End: 2022-08-09 | Stop reason: HOSPADM

## 2022-08-09 RX ORDER — MEPERIDINE HYDROCHLORIDE 50 MG/ML
25 INJECTION INTRAMUSCULAR; INTRAVENOUS; SUBCUTANEOUS EVERY 30 MIN PRN
Status: CANCELLED | OUTPATIENT
Start: 2022-09-15

## 2022-08-09 RX ORDER — ACETAMINOPHEN 325 MG/1
650 TABLET ORAL ONCE
Status: COMPLETED | OUTPATIENT
Start: 2022-08-09 | End: 2022-08-09

## 2022-08-09 RX ORDER — ALBUTEROL SULFATE 90 UG/1
1-2 AEROSOL, METERED RESPIRATORY (INHALATION)
Status: DISCONTINUED | OUTPATIENT
Start: 2022-08-09 | End: 2022-08-09 | Stop reason: HOSPADM

## 2022-08-09 RX ORDER — ALBUTEROL SULFATE 0.83 MG/ML
2.5 SOLUTION RESPIRATORY (INHALATION)
Status: DISCONTINUED | OUTPATIENT
Start: 2022-08-09 | End: 2022-08-09 | Stop reason: HOSPADM

## 2022-08-09 RX ORDER — DIPHENHYDRAMINE HYDROCHLORIDE 50 MG/ML
50 INJECTION INTRAMUSCULAR; INTRAVENOUS
Status: DISCONTINUED | OUTPATIENT
Start: 2022-08-09 | End: 2022-08-09 | Stop reason: HOSPADM

## 2022-08-09 RX ORDER — ACETAMINOPHEN 325 MG/1
650 TABLET ORAL ONCE
Status: CANCELLED
Start: 2022-09-15 | End: 2022-09-15

## 2022-08-09 RX ORDER — ALBUTEROL SULFATE 0.83 MG/ML
2.5 SOLUTION RESPIRATORY (INHALATION)
Status: CANCELLED | OUTPATIENT
Start: 2022-09-15

## 2022-08-09 RX ORDER — DIPHENHYDRAMINE HYDROCHLORIDE 50 MG/ML
50 INJECTION INTRAMUSCULAR; INTRAVENOUS
Status: CANCELLED
Start: 2022-09-15

## 2022-08-09 RX ORDER — ALBUTEROL SULFATE 90 UG/1
1-2 AEROSOL, METERED RESPIRATORY (INHALATION)
Status: CANCELLED
Start: 2022-09-15

## 2022-08-09 RX ADMIN — SODIUM CHLORIDE 250 ML: 9 INJECTION, SOLUTION INTRAVENOUS at 11:26

## 2022-08-09 RX ADMIN — DIPHENHYDRAMINE HYDROCHLORIDE 25 MG: 25 CAPSULE ORAL at 10:59

## 2022-08-09 RX ADMIN — INFLIXIMAB 500 MG: 100 INJECTION, POWDER, LYOPHILIZED, FOR SOLUTION INTRAVENOUS at 11:26

## 2022-08-09 RX ADMIN — ACETAMINOPHEN 650 MG: 325 TABLET ORAL at 10:58

## 2022-08-09 NOTE — PROGRESS NOTES
Infusion Nursing Note:  Frank Obando presents today for Remicade infusion  Patient seen by provider today: No   present during visit today: Not Applicable.    Note: pt takes benadryl and tylenol po pre meds.    Intravenous Access:  Peripheral IV placed.    Treatment Conditions:  Results reviewed, labs MET treatment parameters, ok to proceed with treatment.    Post Infusion Assessment:  Patient tolerated infusion without incident.     Discharge Plan:   Patient and/or family verbalized understanding of discharge instructions and all questions answered.      Jeny Epps RN

## 2022-08-23 ENCOUNTER — TRANSFERRED RECORDS (OUTPATIENT)
Dept: HEALTH INFORMATION MANAGEMENT | Facility: CLINIC | Age: 77
End: 2022-08-23

## 2022-08-23 LAB — RETINOPATHY: NEGATIVE

## 2022-08-25 ENCOUNTER — OFFICE VISIT (OUTPATIENT)
Dept: AUDIOLOGY | Facility: CLINIC | Age: 77
End: 2022-08-25
Payer: MEDICARE

## 2022-08-25 ENCOUNTER — OFFICE VISIT (OUTPATIENT)
Dept: OTOLARYNGOLOGY | Facility: CLINIC | Age: 77
End: 2022-08-25
Attending: INTERNAL MEDICINE
Payer: MEDICARE

## 2022-08-25 DIAGNOSIS — H61.23 BILATERAL IMPACTED CERUMEN: Primary | ICD-10-CM

## 2022-08-25 DIAGNOSIS — H90.3 SENSORINEURAL HEARING LOSS, BILATERAL: Primary | ICD-10-CM

## 2022-08-25 DIAGNOSIS — H93.13 TINNITUS OF BOTH EARS: ICD-10-CM

## 2022-08-25 DIAGNOSIS — H90.3 ASYMMETRICAL SENSORINEURAL HEARING LOSS: ICD-10-CM

## 2022-08-25 PROCEDURE — 92504 EAR MICROSCOPY EXAMINATION: CPT | Performed by: OTOLARYNGOLOGY

## 2022-08-25 PROCEDURE — 99202 OFFICE O/P NEW SF 15 MIN: CPT | Mod: 25 | Performed by: OTOLARYNGOLOGY

## 2022-08-25 PROCEDURE — 92550 TYMPANOMETRY & REFLEX THRESH: CPT | Performed by: AUDIOLOGIST

## 2022-08-25 PROCEDURE — 92557 COMPREHENSIVE HEARING TEST: CPT | Performed by: AUDIOLOGIST

## 2022-08-25 NOTE — LETTER
8/25/2022         RE: Frank Obando  [address]  [facility] [address]        Dear Colleague,    Thank you for referring your patient, Frank Obando, to the [facility]. Please see a copy of my visit note below.    HPI: This patient presents to clinic today for cerumen removal at the request of Audiology. Has noticed some fullness of the ears and muffled hearing, but denies otalgia, otorrhea, vertigo. Does note some gradual hearing loss in both ears over a period of years. He does not report that cerumen has been a recurrent issue for him. Has  service in his history and a CVA.     Past medical history, surgical history, family history, social history, medications, and allergies have been reviewed and are documented above.     Review of Systems: a 10-system review was performed. Symptoms are noted in the HPI and on a scanned document in the computer chart.    Physical Examination:   GEN: no acute distress, alert and oriented  EYES: extraocular movements intact, pupils equal and round. Sclera clear.   EARS: bilateral cerumen impactions cleared under binocular microscopy using suction/loop/forceps. The tympanic membranes are noted to be intact and clear with no signs of infections, effusions, perforations, or retractions.  PULM: breathing comfortably on room air with no stertor or stridor. Chest expansion symmetric.  CARDS: no cyanosis or clubbing, normal carotid pulses    AUDIOGRAM: mild to moderate sloping, symmetric SNHL. Type A tymps. WRS quite asymmetric, 96R/56L.    MEDICAL DECISION-MAKING: cerumen impactions cleared under binocular microscopy without difficulty. He has SNHL and is medically clear for HAs. The asymmetry in WRS is significant, but his CVA involved the left anterior inferior cerebellar artery; this could explain it. Am not recommending imaging at this point, but a repeat audiogram in 6 months would be reasonable.       Again, thank you  for allowing me to participate in the care of your patient.        Sincerely,        Nelsy Freitas MD

## 2022-08-25 NOTE — PROGRESS NOTES
HPI: This patient presents to clinic today for cerumen removal at the request of Audiology. Has noticed some fullness of the ears and muffled hearing, but denies otalgia, otorrhea, vertigo. Does note some gradual hearing loss in both ears over a period of years. He does not report that cerumen has been a recurrent issue for him. Has  service in his history and a CVA.     Past medical history, surgical history, family history, social history, medications, and allergies have been reviewed and are documented above.     Review of Systems: a 10-system review was performed. Symptoms are noted in the HPI and on a scanned document in the computer chart.    Physical Examination:   GEN: no acute distress, alert and oriented  EYES: extraocular movements intact, pupils equal and round. Sclera clear.   EARS: bilateral cerumen impactions cleared under binocular microscopy using suction/loop/forceps. The tympanic membranes are noted to be intact and clear with no signs of infections, effusions, perforations, or retractions.  PULM: breathing comfortably on room air with no stertor or stridor. Chest expansion symmetric.  CARDS: no cyanosis or clubbing, normal carotid pulses    AUDIOGRAM: mild to moderate sloping, symmetric SNHL. Type A tymps. WRS quite asymmetric, 96R/56L.    MEDICAL DECISION-MAKING: cerumen impactions cleared under binocular microscopy without difficulty. He has SNHL and is medically clear for HAs. The asymmetry in WRS is significant, but his CVA involved the left anterior inferior cerebellar artery; this could explain it. Am not recommending imaging at this point, but a repeat audiogram in 6 months would be reasonable.

## 2022-08-25 NOTE — PROGRESS NOTES
AUDIOLOGY REPORT    SUMMARY: Audiology visit completed. See audiogram for results. Abuse screening not completed due to same day appt with ENT clinic, where this is addressed.      RECOMMENDATIONS: Follow-up with ENT.      Luis Fernando Shaw, [facility]  [place] Licensed Audiologist [id number]

## 2022-09-13 ENCOUNTER — OFFICE VISIT (OUTPATIENT)
Dept: RHEUMATOLOGY | Facility: CLINIC | Age: 77
End: 2022-09-13
Payer: MEDICARE

## 2022-09-13 VITALS
HEART RATE: 74 BPM | DIASTOLIC BLOOD PRESSURE: 70 MMHG | BODY MASS INDEX: 31.29 KG/M2 | SYSTOLIC BLOOD PRESSURE: 118 MMHG | WEIGHT: 218.1 LBS

## 2022-09-13 DIAGNOSIS — M17.0 PRIMARY OSTEOARTHRITIS OF BOTH KNEES: ICD-10-CM

## 2022-09-13 DIAGNOSIS — R76.8 ANTINEUTROPHIL CYTOPLASMIC ANTIBODY (ANCA) POSITIVE: ICD-10-CM

## 2022-09-13 DIAGNOSIS — Z79.899 HIGH RISK MEDICATION USE: ICD-10-CM

## 2022-09-13 DIAGNOSIS — N18.30 CRF (CHRONIC RENAL FAILURE), STAGE 3 (MODERATE) (H): ICD-10-CM

## 2022-09-13 DIAGNOSIS — H44.112 PANUVEITIS OF LEFT EYE: Primary | ICD-10-CM

## 2022-09-13 PROCEDURE — 99214 OFFICE O/P EST MOD 30 MIN: CPT | Performed by: INTERNAL MEDICINE

## 2022-09-13 RX ORDER — MEPERIDINE HYDROCHLORIDE 25 MG/ML
25 INJECTION INTRAMUSCULAR; INTRAVENOUS; SUBCUTANEOUS EVERY 30 MIN PRN
Status: CANCELLED | OUTPATIENT
Start: 2022-09-20

## 2022-09-13 RX ORDER — ACETAMINOPHEN 325 MG/1
650 TABLET ORAL ONCE
Status: CANCELLED
Start: 2022-09-20 | End: 2022-09-20

## 2022-09-13 RX ORDER — METHYLPREDNISOLONE SODIUM SUCCINATE 125 MG/2ML
125 INJECTION, POWDER, LYOPHILIZED, FOR SOLUTION INTRAMUSCULAR; INTRAVENOUS
Status: CANCELLED
Start: 2022-09-20

## 2022-09-13 RX ORDER — DIPHENHYDRAMINE HCL 25 MG
25 CAPSULE ORAL ONCE
Status: CANCELLED
Start: 2022-09-20 | End: 2022-09-20

## 2022-09-13 RX ORDER — HEPARIN SODIUM,PORCINE 10 UNIT/ML
5 VIAL (ML) INTRAVENOUS
Status: CANCELLED | OUTPATIENT
Start: 2022-09-20

## 2022-09-13 RX ORDER — HEPARIN SODIUM (PORCINE) LOCK FLUSH IV SOLN 100 UNIT/ML 100 UNIT/ML
5 SOLUTION INTRAVENOUS
Status: CANCELLED | OUTPATIENT
Start: 2022-09-20

## 2022-09-13 RX ORDER — ALBUTEROL SULFATE 90 UG/1
1-2 AEROSOL, METERED RESPIRATORY (INHALATION)
Status: CANCELLED
Start: 2022-09-20

## 2022-09-13 RX ORDER — ALBUTEROL SULFATE 0.83 MG/ML
2.5 SOLUTION RESPIRATORY (INHALATION)
Status: CANCELLED | OUTPATIENT
Start: 2022-09-20

## 2022-09-13 RX ORDER — DIPHENHYDRAMINE HYDROCHLORIDE 50 MG/ML
50 INJECTION INTRAMUSCULAR; INTRAVENOUS
Status: CANCELLED
Start: 2022-09-20

## 2022-09-13 RX ORDER — EPINEPHRINE 1 MG/ML
0.3 INJECTION, SOLUTION, CONCENTRATE INTRAVENOUS EVERY 5 MIN PRN
Status: CANCELLED | OUTPATIENT
Start: 2022-09-20

## 2022-09-13 RX ORDER — METHYLPREDNISOLONE SODIUM SUCCINATE 125 MG/2ML
125 INJECTION, POWDER, LYOPHILIZED, FOR SOLUTION INTRAMUSCULAR; INTRAVENOUS ONCE
Status: CANCELLED | OUTPATIENT
Start: 2022-09-20 | End: 2022-09-20

## 2022-09-13 NOTE — PROGRESS NOTES
Rheumatology follow-up visit note     Frank is a 77 year old male presents today for follow-up.    Frank was seen today for recheck.    Diagnoses and all orders for this visit:    Panuveitis of left eye    Antineutrophil cytoplasmic antibody (ANCA) positive    High risk medication use    Primary osteoarthritis of both knees    CRF (chronic renal failure), stage 3 (moderate) (H)    Other orders  -     0.9% sodium chloride BOLUS  -     sodium chloride (PF) 0.9% PF flush 3-20 mL  -     heparin lock flush 10 UNIT/ML injection 5 mL  -     heparin 100 UNIT/ML injection 5 mL  -     acetaminophen (TYLENOL) tablet 650 mg  -     diphenhydrAMINE (BENADRYL) capsule 25 mg  -     diphenhydrAMINE (BENADRYL) 25 mg in sodium chloride 0.9 % 50.5 mL intermittent infusion  -     methylPREDNISolone sodium succinate (solu-MEDROL) injection 125 mg  -     inFLIXimab (REMICADE) 490 mg in sodium chloride 0.9 % 250 mL infusion  -     diphenhydrAMINE (BENADRYL) injection 50 mg  -     famotidine (PEPCID) injection 20 mg  -     methylPREDNISolone sodium succinate (solu-MEDROL) injection 125 mg  -     EPINEPHrine PF (ADRENALIN) injection 0.3 mg  -     0.9% sodium chloride BOLUS  -     albuterol (PROVENTIL HFA/VENTOLIN HFA) inhaler  -     albuterol (PROVENTIL) neb solution 2.5 mg  -     meperidine (DEMEROL) injection 25 mg        Well-controlled scleritis of the left eye, osteoarthritis, increased dose frequency of Remicade appears to have helped control his inflammation in the left knee.  In view of the renal impairment he is aware not to take nonsteroidals for knee symptoms    Follow up in 6 months.    HPI    Frank Obando is a 77 year old male is here for follow-up of rheumatology for the immunosuppression management, osteoarthritis.  The immunosuppression is for scleritis of the left eye in the background of positive ANCA, negative WI-3/MPO autoantibodies.  Since his previous relapse the Remicade is not every 6 weeks.  This is  reviewed today.  He had recently had cataract surgery in the right side.  He had a course of prednisone from his ophthalmologist.  He has noted discomfort in his right knee with activity he takes acetaminophen he is aware not to take nonsteroidals over-the-counter given the renal impairment.   . ROS enquiry held for fever, ocular symptoms, rash, headache,  GI issues.  Today we also discussed the issues related to the current pandemic, the pros and cons of the current treatment plan, the CDC guidelines such as social distancing washing the hands covering the cough.      DETAILED EXAMINATION  09/13/22  :    Vitals:    09/13/22 1037   BP: 118/70   Pulse: 74   Weight: 98.9 kg (218 lb 1.6 oz)     Alert oriented. Head including the face is examined for malar rash, heliotropes, scarring, lupus pernio. Eyes examined for redness such as in episcleritis/scleritis, periorbital lesions.   Neck/ Face examined for parotid gland swelling, range of motion of neck.  Left upper and lower and right upper and lower extremities examined for tenderness, swelling, warmth of the appendicular joints, range of motion, edema, rash.  Some of the important findings included: he does not have evidence of synovitis in the palpable joints of the upper extremities.  No significant deformities of the digits.  Small [place] nodes.  Range of motion of the shoulders  show full abduction.  No JLT effusion or warmth of the knees.  he does not have dactylitis of the digits.     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  Created by Conversion       Iron deficiency anemia due to chronic blood loss 11/02/2021     Priority: Medium     Angiodysplasia of stomach 09/27/2021     Priority: Medium     Esophageal varices without bleeding (H) 09/27/2021      Priority: Medium     CRF (chronic renal failure), stage 3 (moderate) (H) 02/10/2020     Priority: Medium     Panuveitis, unspecified eye 03/19/2019     Priority: Medium     Normocytic anemia      Priority: Medium     Created by Conversion      Formatting of this note might be different from the original.  Created by Conversion       Essential hypertension, benign      Priority: Medium     Created by Conversion  Replacement Utility updated for latest IMO load      Formatting of this note might be different from the original.  Created by Conversion    Replacement Utility updated for latest IMO load       Type 2 diabetes mellitus with complication, without long-term current use of insulin (H)      Priority: Medium     Created by Conversion      Formatting of this note might be different from the original.  Created by Conversion       Cholecystitis      Priority: Medium     Dyslipidemia      Priority: Medium     Thrombocytopenia (H)      Priority: Medium     Cirrhosis of liver with ascites, unspecified hepatic cirrhosis type (H)      Priority: Medium     Hyponatremia      Priority: Medium     Abdominal pain 12/20/2018     Priority: Medium     High risk medication use 09/26/2018     Priority: Medium     Diverticular disease of large intestine 05/07/2018     Priority: Medium     Polyp of colon 05/07/2018     Priority: Medium     Hypercholesterolemia      Priority: Medium     Created by Conversion      Formatting of this note might be different from the original.  Created by Conversion       Antineutrophil cytoplasmic antibody (ANCA) positive 03/23/2017     Priority: Medium     Urinary hesitancy 01/21/2017     Priority: Medium     Peripheral Neuropathy      Priority: Medium     Created by Conversion  Replacement Utility updated for latest IMO load      Formatting of this note might be different from the original.  Created by Conversion    Replacement Utility updated for latest IMO load       ALT (SGPT) level raised  03/17/2016     Priority: Medium     Scleritis of left eye 03/17/2016     Priority: Medium     Primary osteoarthritis of both knees 11/16/2015     Priority: Medium     Carpal tunnel syndrome 11/24/2014     Priority: Medium     Right bundle branch block 11/24/2014     Priority: Medium     Nephrolithiasis      Priority: Medium     Created by Conversion  [facility] Annotation: Oct 14 2009  4:44PM - Pernell Chance: R stent      Formatting of this note might be different from the original.  Created by [facility] Annotation: Oct 14 2009  4:44PM - Pernell Chance: R stent       Cerebrovascular accident (CVA) due to thrombosis of left anterior cerebral artery (H)      Priority: Medium     Created by Conversion         Diverticular disease of colon 10/29/2007     Priority: Medium     Past Surgical History:   Procedure Laterality Date     COLONOSCOPY N/A 11/16/2021    Procedure: COLONOSCOPY with polypectomy;  Surgeon: Dex Finch MD;  Location: [facility]     CYSTOSCOPY TUMOR / CONDYLOMATA W/ LASER Left 7/18/2014     ESOPHAGOSCOPY, GASTROSCOPY, DUODENOSCOPY (EGD), COMBINED N/A 11/16/2021    Procedure: ESOPHAGOGASTRODUODENOSCOPY (EGD) with biopsies and argon plasma coagulation;  Surgeon: Dex Finch MD;  Location: [facility]     HC CYSTOSCOPY,INSERT URETERAL STENT      Description: Cystoscopy With Insertion Of Ureteral Stent Right;  Recorded: 10/14/2009;  Comments: stone      Past Medical History:   Diagnosis Date     Anemia      Arthritis     osteoarthritis     Calculus of kidney      Diabetes mellitus (H)      Episcleritis of left eye      Hyperlipidemia      Hypertension      Iron deficiency anemia due to chronic blood loss 11/2/2021     Peripheral neuropathy      Stroke (H)      Allergies   Allergen Reactions     Morphine Nausea and Vomiting     Other Allergy (See Comments) [External Allergen Needs Reconciliation - See Comment] Unknown     Scopolamine HBr CHANG, 08/27/2013.        Scopolamine Hbr [Scopolamine] Unknown     Current Outpatient Medications   Medication Sig Dispense Refill     atorvastatin (LIPITOR) 40 MG tablet TAKE 1 TABLET BY MOUTH DAILY 90 tablet 2     blood glucose meter (GLUCOMETER) [BLOOD GLUCOSE METER (GLUCOMETER)] Use 1 each As Directed 2 (two) times a day. Dispense glucometer brand per patient's insurance at pharmacy discretion. 1 each 0     blood glucose test (ACCU-CHEK JACOB PLUS TEST STRP) strips [BLOOD GLUCOSE TEST (ACCU-CHEK JACOB PLUS TEST STRP) STRIPS] Use 1 each As Directed 2 (two) times a day. Accu-chek Jacob Plus 200 strip 11     cyanocobalamin, vitamin B-12, 2,500 mcg Tab [CYANOCOBALAMIN, VITAMIN B-12, 2,500 MCG TAB] Take 2,500 mcg by mouth daily.       ferrous sulfate (FEROSUL) 325 (65 Fe) MG tablet Take 1 tablet (325 mg) by mouth daily 90 tablet 3     generic lancets (ACCU-CHEK SOFTCLIX LANCETS) [GENERIC LANCETS (ACCU-CHEK SOFTCLIX LANCETS)] Use 1 each As Directed 2 (two) times a day. Dispense brand per patient's insurance at pharmacy discretion. 100 each 3     inFLIXimab (REMICADE IV) Every 8 weeks       lisinopril (ZESTRIL) 10 MG tablet Take 1 tablet (10 mg) by mouth daily 90 tablet 3     magnesium oxide 250 mg Tab [MAGNESIUM OXIDE 250 MG TAB] Take 250 mg by mouth 2 (two) times a day.       metFORMIN (GLUCOPHAGE) 500 MG tablet TAKE 2 TABLETS BY MOUTH TWICE DAILY AT 6 AM AND AT 4  tablet 3     omeprazole (PRILOSEC) 40 MG DR capsule TAKE 1 CAPSULE BY MOUTH DAILY 90 capsule 3     pioglitazone (ACTOS) 30 MG tablet TAKE 1 TABLET BY MOUTH EVERY DAY 90 tablet 3     propranolol (INDERAL) 20 MG tablet TAKE 1 TABLET BY MOUTH TWICE DAILY 180 tablet 3     tamsulosin (FLOMAX) 0.4 MG capsule TAKE 1 CAPSULE BY MOUTH DAILY AFTER AND SUPPER 90 capsule 3     glimepiride (AMARYL) 2 MG tablet Take 1 tablet (2 mg) by mouth every morning (before breakfast) (Patient not taking: Reported on 9/13/2022) 30 tablet 3     predniSONE (DELTASONE) 10 MG tablet Take 0.5 tablets (5  mg) by mouth daily (Patient not taking: Reported on 9/13/2022) 30 tablet 3     family history includes Coronary Artery Disease in his mother; Diabetes in his mother; Lung Cancer in his father.  Social Connections: Not on file          WBC Count   Date Value Ref Range Status   07/26/2022 6.8 4.0 - 11.0 10e3/uL Final     RBC Count   Date Value Ref Range Status   07/26/2022 3.47 (L) 4.40 - 5.90 10e6/uL Final     Hemoglobin   Date Value Ref Range Status   07/26/2022 10.7 (L) 13.3 - 17.7 g/dL Final     Hematocrit   Date Value Ref Range Status   07/26/2022 32.6 (L) 40.0 - 53.0 % Final     MCV   Date Value Ref Range Status   07/26/2022 94 78 - 100 fL Final     MCH   Date Value Ref Range Status   07/26/2022 30.8 26.5 - 33.0 pg Final     Platelet Count   Date Value Ref Range Status   07/26/2022 105 (L) 150 - 450 10e3/uL Final     % Lymphocytes   Date Value Ref Range Status   10/05/2021 29 % Final     AST   Date Value Ref Range Status   01/21/2022 30 0 - 40 U/L Final     ALT   Date Value Ref Range Status   06/15/2022 40 0 - 45 U/L Final     Albumin   Date Value Ref Range Status   06/15/2022 3.6 3.5 - 5.0 g/dL Final     Alkaline Phosphatase   Date Value Ref Range Status   01/21/2022 87 45 - 120 U/L Final     Creatinine   Date Value Ref Range Status   07/26/2022 1.40 (H) 0.67 - 1.17 mg/dL Final     GFR Estimate   Date Value Ref Range Status   07/26/2022 52 (L) >60 mL/min/1.73m2 Final     Comment:     Effective December 21, 2021 eGFRcr in adults is calculated using the 2021 CKD-EPI creatinine equation which includes age and gender (Edgard et al., NEJM, DOI: 10.1056/EDQOhl2550177)   02/08/2021 48 (L) >60 mL/min/1.73m2 Final     GFR Estimate If Black   Date Value Ref Range Status   02/08/2021 59 (L) >60 mL/min/1.73m2 Final     Creatinine Urine mg/dL   Date Value Ref Range Status   09/20/2021 116 mg/dL Final

## 2022-09-20 ENCOUNTER — INFUSION THERAPY VISIT (OUTPATIENT)
Dept: INFUSION THERAPY | Facility: CLINIC | Age: 77
End: 2022-09-20
Attending: INTERNAL MEDICINE
Payer: MEDICARE

## 2022-09-20 VITALS
RESPIRATION RATE: 16 BRPM | BODY MASS INDEX: 30.13 KG/M2 | TEMPERATURE: 98.2 F | SYSTOLIC BLOOD PRESSURE: 124 MMHG | DIASTOLIC BLOOD PRESSURE: 76 MMHG | HEART RATE: 73 BPM | WEIGHT: 210 LBS | OXYGEN SATURATION: 98 %

## 2022-09-20 DIAGNOSIS — H44.112 PANUVEITIS OF LEFT EYE: Primary | ICD-10-CM

## 2022-09-20 PROCEDURE — 250N000013 HC RX MED GY IP 250 OP 250 PS 637: Performed by: INTERNAL MEDICINE

## 2022-09-20 PROCEDURE — 258N000003 HC RX IP 258 OP 636: Performed by: INTERNAL MEDICINE

## 2022-09-20 PROCEDURE — 250N000011 HC RX IP 250 OP 636: Performed by: INTERNAL MEDICINE

## 2022-09-20 PROCEDURE — 96413 CHEMO IV INFUSION 1 HR: CPT

## 2022-09-20 PROCEDURE — 96375 TX/PRO/DX INJ NEW DRUG ADDON: CPT

## 2022-09-20 RX ORDER — DIPHENHYDRAMINE HYDROCHLORIDE 50 MG/ML
50 INJECTION INTRAMUSCULAR; INTRAVENOUS
Status: DISCONTINUED | OUTPATIENT
Start: 2022-09-20 | End: 2022-09-20 | Stop reason: HOSPADM

## 2022-09-20 RX ORDER — ALBUTEROL SULFATE 90 UG/1
1-2 AEROSOL, METERED RESPIRATORY (INHALATION)
Status: DISCONTINUED | OUTPATIENT
Start: 2022-09-20 | End: 2022-09-20 | Stop reason: HOSPADM

## 2022-09-20 RX ORDER — DIPHENHYDRAMINE HYDROCHLORIDE 50 MG/ML
50 INJECTION INTRAMUSCULAR; INTRAVENOUS
Status: CANCELLED
Start: 2022-10-04

## 2022-09-20 RX ORDER — ALBUTEROL SULFATE 0.83 MG/ML
2.5 SOLUTION RESPIRATORY (INHALATION)
Status: CANCELLED | OUTPATIENT
Start: 2022-10-04

## 2022-09-20 RX ORDER — HEPARIN SODIUM (PORCINE) LOCK FLUSH IV SOLN 100 UNIT/ML 100 UNIT/ML
5 SOLUTION INTRAVENOUS
Status: CANCELLED | OUTPATIENT
Start: 2022-10-04

## 2022-09-20 RX ORDER — ACETAMINOPHEN 325 MG/1
650 TABLET ORAL ONCE
Status: COMPLETED | OUTPATIENT
Start: 2022-09-20 | End: 2022-09-20

## 2022-09-20 RX ORDER — DIPHENHYDRAMINE HCL 25 MG
25 CAPSULE ORAL ONCE
Status: CANCELLED
Start: 2022-10-04 | End: 2022-10-04

## 2022-09-20 RX ORDER — MEPERIDINE HYDROCHLORIDE 50 MG/ML
25 INJECTION INTRAMUSCULAR; INTRAVENOUS; SUBCUTANEOUS EVERY 30 MIN PRN
Status: CANCELLED | OUTPATIENT
Start: 2022-10-04

## 2022-09-20 RX ORDER — METHYLPREDNISOLONE SODIUM SUCCINATE 125 MG/2ML
125 INJECTION, POWDER, LYOPHILIZED, FOR SOLUTION INTRAMUSCULAR; INTRAVENOUS ONCE
Status: CANCELLED | OUTPATIENT
Start: 2022-10-04 | End: 2022-10-04

## 2022-09-20 RX ORDER — MEPERIDINE HYDROCHLORIDE 50 MG/ML
25 INJECTION INTRAMUSCULAR; INTRAVENOUS; SUBCUTANEOUS EVERY 30 MIN PRN
Status: DISCONTINUED | OUTPATIENT
Start: 2022-09-20 | End: 2022-09-20 | Stop reason: HOSPADM

## 2022-09-20 RX ORDER — ALBUTEROL SULFATE 90 UG/1
1-2 AEROSOL, METERED RESPIRATORY (INHALATION)
Status: CANCELLED
Start: 2022-10-04

## 2022-09-20 RX ORDER — HEPARIN SODIUM,PORCINE 10 UNIT/ML
5 VIAL (ML) INTRAVENOUS
Status: CANCELLED | OUTPATIENT
Start: 2022-10-04

## 2022-09-20 RX ORDER — EPINEPHRINE 1 MG/ML
0.3 INJECTION, SOLUTION, CONCENTRATE INTRAVENOUS EVERY 5 MIN PRN
Status: DISCONTINUED | OUTPATIENT
Start: 2022-09-20 | End: 2022-09-20 | Stop reason: HOSPADM

## 2022-09-20 RX ORDER — ALBUTEROL SULFATE 0.83 MG/ML
2.5 SOLUTION RESPIRATORY (INHALATION)
Status: DISCONTINUED | OUTPATIENT
Start: 2022-09-20 | End: 2022-09-20 | Stop reason: HOSPADM

## 2022-09-20 RX ORDER — ACETAMINOPHEN 325 MG/1
650 TABLET ORAL ONCE
Status: CANCELLED
Start: 2022-10-04 | End: 2022-10-04

## 2022-09-20 RX ORDER — METHYLPREDNISOLONE SODIUM SUCCINATE 125 MG/2ML
125 INJECTION, POWDER, LYOPHILIZED, FOR SOLUTION INTRAMUSCULAR; INTRAVENOUS
Status: DISCONTINUED | OUTPATIENT
Start: 2022-09-20 | End: 2022-09-20 | Stop reason: HOSPADM

## 2022-09-20 RX ORDER — METHYLPREDNISOLONE SODIUM SUCCINATE 125 MG/2ML
125 INJECTION, POWDER, LYOPHILIZED, FOR SOLUTION INTRAMUSCULAR; INTRAVENOUS
Status: CANCELLED
Start: 2022-10-04

## 2022-09-20 RX ORDER — EPINEPHRINE 1 MG/ML
0.3 INJECTION, SOLUTION, CONCENTRATE INTRAVENOUS EVERY 5 MIN PRN
Status: CANCELLED | OUTPATIENT
Start: 2022-10-04

## 2022-09-20 RX ORDER — DIPHENHYDRAMINE HCL 25 MG
25 CAPSULE ORAL ONCE
Status: COMPLETED | OUTPATIENT
Start: 2022-09-20 | End: 2022-09-20

## 2022-09-20 RX ORDER — METHYLPREDNISOLONE SODIUM SUCCINATE 125 MG/2ML
125 INJECTION, POWDER, LYOPHILIZED, FOR SOLUTION INTRAMUSCULAR; INTRAVENOUS ONCE
Status: COMPLETED | OUTPATIENT
Start: 2022-09-20 | End: 2022-09-20

## 2022-09-20 RX ADMIN — ACETAMINOPHEN 650 MG: 325 TABLET ORAL at 11:10

## 2022-09-20 RX ADMIN — SODIUM CHLORIDE 250 ML: 9 INJECTION, SOLUTION INTRAVENOUS at 11:10

## 2022-09-20 RX ADMIN — METHYLPREDNISOLONE SODIUM SUCCINATE 125 MG: 125 INJECTION, POWDER, FOR SOLUTION INTRAMUSCULAR; INTRAVENOUS at 11:11

## 2022-09-20 RX ADMIN — INFLIXIMAB 500 MG: 100 INJECTION, POWDER, LYOPHILIZED, FOR SOLUTION INTRAVENOUS at 11:40

## 2022-09-20 RX ADMIN — DIPHENHYDRAMINE HYDROCHLORIDE 25 MG: 25 CAPSULE ORAL at 11:10

## 2022-09-20 NOTE — PROGRESS NOTES
Infusion Nursing Note:  Frank Obando presents today for Remicade with tyleno/benadryl po, and solumedrol IV.    Patient seen by provider today: No   present during visit today: Not Applicable.    Note: Denies any new concerns, changes, or questions.    Intravenous Access:  Peripheral IV placed.    Treatment Conditions:  Not Applicable.    Post Infusion Assessment:  Patient tolerated infusion without incident.  Site patent and intact, free from redness, edema or discomfort.  Access discontinued per protocol.     Discharge Plan:   Patient and/or family verbalized understanding of discharge instructions and all questions answered.      Pascale Samuels RN

## 2022-10-01 ENCOUNTER — HEALTH MAINTENANCE LETTER (OUTPATIENT)
Age: 77
End: 2022-10-01

## 2022-11-01 ENCOUNTER — INFUSION THERAPY VISIT (OUTPATIENT)
Dept: INFUSION THERAPY | Facility: CLINIC | Age: 77
End: 2022-11-01
Attending: INTERNAL MEDICINE
Payer: MEDICARE

## 2022-11-01 VITALS
SYSTOLIC BLOOD PRESSURE: 117 MMHG | DIASTOLIC BLOOD PRESSURE: 70 MMHG | RESPIRATION RATE: 16 BRPM | TEMPERATURE: 97.8 F | OXYGEN SATURATION: 95 % | HEART RATE: 71 BPM

## 2022-11-01 DIAGNOSIS — H15.002 SCLERITIS OF LEFT EYE: ICD-10-CM

## 2022-11-01 DIAGNOSIS — H44.112 PANUVEITIS OF LEFT EYE: Primary | ICD-10-CM

## 2022-11-01 LAB
ALBUMIN SERPL-MCNC: 3.4 G/DL (ref 3.5–5)
ALT SERPL W P-5'-P-CCNC: 20 U/L (ref 0–45)
CREAT SERPL-MCNC: 1.48 MG/DL (ref 0.7–1.3)
ERYTHROCYTE [DISTWIDTH] IN BLOOD BY AUTOMATED COUNT: 13 % (ref 10–15)
GFR SERPL CREATININE-BSD FRML MDRD: 49 ML/MIN/1.73M2
HCT VFR BLD AUTO: 31.6 % (ref 40–53)
HGB BLD-MCNC: 10.3 G/DL (ref 13.3–17.7)
MCH RBC QN AUTO: 30.9 PG (ref 26.5–33)
MCHC RBC AUTO-ENTMCNC: 32.6 G/DL (ref 31.5–36.5)
MCV RBC AUTO: 95 FL (ref 78–100)
PLATELET # BLD AUTO: 146 10E3/UL (ref 150–450)
RBC # BLD AUTO: 3.33 10E6/UL (ref 4.4–5.9)
WBC # BLD AUTO: 5.6 10E3/UL (ref 4–11)

## 2022-11-01 PROCEDURE — 82565 ASSAY OF CREATININE: CPT

## 2022-11-01 PROCEDURE — 84460 ALANINE AMINO (ALT) (SGPT): CPT

## 2022-11-01 PROCEDURE — 96413 CHEMO IV INFUSION 1 HR: CPT

## 2022-11-01 PROCEDURE — 82040 ASSAY OF SERUM ALBUMIN: CPT

## 2022-11-01 PROCEDURE — 36415 COLL VENOUS BLD VENIPUNCTURE: CPT

## 2022-11-01 PROCEDURE — 250N000011 HC RX IP 250 OP 636: Performed by: INTERNAL MEDICINE

## 2022-11-01 PROCEDURE — 258N000003 HC RX IP 258 OP 636: Performed by: INTERNAL MEDICINE

## 2022-11-01 PROCEDURE — 250N000013 HC RX MED GY IP 250 OP 250 PS 637: Performed by: INTERNAL MEDICINE

## 2022-11-01 PROCEDURE — 85027 COMPLETE CBC AUTOMATED: CPT

## 2022-11-01 RX ORDER — METHYLPREDNISOLONE SODIUM SUCCINATE 125 MG/2ML
125 INJECTION, POWDER, LYOPHILIZED, FOR SOLUTION INTRAMUSCULAR; INTRAVENOUS
Status: DISCONTINUED | OUTPATIENT
Start: 2022-11-01 | End: 2022-11-01 | Stop reason: HOSPADM

## 2022-11-01 RX ORDER — EPINEPHRINE 1 MG/ML
0.3 INJECTION, SOLUTION INTRAMUSCULAR; SUBCUTANEOUS EVERY 5 MIN PRN
Status: DISCONTINUED | OUTPATIENT
Start: 2022-11-01 | End: 2022-11-01 | Stop reason: HOSPADM

## 2022-11-01 RX ORDER — METHYLPREDNISOLONE SODIUM SUCCINATE 125 MG/2ML
125 INJECTION, POWDER, LYOPHILIZED, FOR SOLUTION INTRAMUSCULAR; INTRAVENOUS ONCE
Status: CANCELLED | OUTPATIENT
Start: 2022-12-13 | End: 2022-12-13

## 2022-11-01 RX ORDER — MEPERIDINE HYDROCHLORIDE 50 MG/ML
25 INJECTION INTRAMUSCULAR; INTRAVENOUS; SUBCUTANEOUS EVERY 30 MIN PRN
Status: DISCONTINUED | OUTPATIENT
Start: 2022-11-01 | End: 2022-11-01 | Stop reason: HOSPADM

## 2022-11-01 RX ORDER — DIPHENHYDRAMINE HYDROCHLORIDE 50 MG/ML
50 INJECTION INTRAMUSCULAR; INTRAVENOUS
Status: DISCONTINUED | OUTPATIENT
Start: 2022-11-01 | End: 2022-11-01 | Stop reason: HOSPADM

## 2022-11-01 RX ORDER — HEPARIN SODIUM (PORCINE) LOCK FLUSH IV SOLN 100 UNIT/ML 100 UNIT/ML
5 SOLUTION INTRAVENOUS
Status: CANCELLED | OUTPATIENT
Start: 2022-12-13

## 2022-11-01 RX ORDER — MEPERIDINE HYDROCHLORIDE 50 MG/ML
25 INJECTION INTRAMUSCULAR; INTRAVENOUS; SUBCUTANEOUS EVERY 30 MIN PRN
Status: CANCELLED | OUTPATIENT
Start: 2022-12-13

## 2022-11-01 RX ORDER — ALBUTEROL SULFATE 0.83 MG/ML
2.5 SOLUTION RESPIRATORY (INHALATION)
Status: DISCONTINUED | OUTPATIENT
Start: 2022-11-01 | End: 2022-11-01 | Stop reason: HOSPADM

## 2022-11-01 RX ORDER — ACETAMINOPHEN 325 MG/1
650 TABLET ORAL ONCE
Status: COMPLETED | OUTPATIENT
Start: 2022-11-01 | End: 2022-11-01

## 2022-11-01 RX ORDER — DIPHENHYDRAMINE HCL 25 MG
25 CAPSULE ORAL ONCE
Status: COMPLETED | OUTPATIENT
Start: 2022-11-01 | End: 2022-11-01

## 2022-11-01 RX ORDER — ACETAMINOPHEN 325 MG/1
650 TABLET ORAL ONCE
Status: CANCELLED
Start: 2022-12-13 | End: 2022-12-13

## 2022-11-01 RX ORDER — DIPHENHYDRAMINE HCL 25 MG
25 CAPSULE ORAL ONCE
Status: CANCELLED
Start: 2022-12-13 | End: 2022-12-13

## 2022-11-01 RX ORDER — ALBUTEROL SULFATE 0.83 MG/ML
2.5 SOLUTION RESPIRATORY (INHALATION)
Status: CANCELLED | OUTPATIENT
Start: 2022-12-13

## 2022-11-01 RX ORDER — EPINEPHRINE 1 MG/ML
0.3 INJECTION, SOLUTION INTRAMUSCULAR; SUBCUTANEOUS EVERY 5 MIN PRN
Status: CANCELLED | OUTPATIENT
Start: 2022-12-13

## 2022-11-01 RX ORDER — DIPHENHYDRAMINE HYDROCHLORIDE 50 MG/ML
50 INJECTION INTRAMUSCULAR; INTRAVENOUS
Status: CANCELLED
Start: 2022-12-13

## 2022-11-01 RX ORDER — METHYLPREDNISOLONE SODIUM SUCCINATE 125 MG/2ML
125 INJECTION, POWDER, LYOPHILIZED, FOR SOLUTION INTRAMUSCULAR; INTRAVENOUS
Status: CANCELLED
Start: 2022-12-13

## 2022-11-01 RX ORDER — ALBUTEROL SULFATE 90 UG/1
1-2 AEROSOL, METERED RESPIRATORY (INHALATION)
Status: DISCONTINUED | OUTPATIENT
Start: 2022-11-01 | End: 2022-11-01 | Stop reason: HOSPADM

## 2022-11-01 RX ORDER — HEPARIN SODIUM,PORCINE 10 UNIT/ML
5 VIAL (ML) INTRAVENOUS
Status: CANCELLED | OUTPATIENT
Start: 2022-12-13

## 2022-11-01 RX ORDER — ALBUTEROL SULFATE 90 UG/1
1-2 AEROSOL, METERED RESPIRATORY (INHALATION)
Status: CANCELLED
Start: 2022-12-13

## 2022-11-01 RX ADMIN — ACETAMINOPHEN 650 MG: 325 TABLET, FILM COATED ORAL at 11:06

## 2022-11-01 RX ADMIN — SODIUM CHLORIDE 250 ML: 9 INJECTION, SOLUTION INTRAVENOUS at 11:21

## 2022-11-01 RX ADMIN — INFLIXIMAB 500 MG: 100 INJECTION, POWDER, LYOPHILIZED, FOR SOLUTION INTRAVENOUS at 11:38

## 2022-11-01 RX ADMIN — DIPHENHYDRAMINE HYDROCHLORIDE 25 MG: 25 CAPSULE ORAL at 11:06

## 2022-11-01 NOTE — PROGRESS NOTES
Infusion Nursing Note:  Frank Obando presents today for labs and Remicade.    Patient seen by provider today: No   present during visit today: Not Applicable.    Note: Frank arrived ambulatory by himself for labs and Remicade infusion. Plan of care reviewed and Frank has no questions.  Pre medications Tylenol and oral Benadryl administered 30 minutes prior to Remicade per treatment plan. Frank does not require solu medrol prior to his infusions.  Remicade infused over 1 hour as Frank has tolerated two hour infusions in the past without issue.    Intravenous Access:  Labs drawn without difficulty.  Peripheral IV placed in left wrist.    Treatment Conditions:  Biological checklist completed. Ok to proceed with treatment.    Post Infusion Assessment:  Patient tolerated infusion without incident.  Blood return noted pre and post infusion.  Site patent and intact, free from redness, edema or discomfort.  Access discontinued per DEON Brown.     Discharge Plan:   Patient will return December 14th for next appointment.   Patient discharged in stable condition accompanied by: self.  Departure Mode: Ambulatory.      Deena Lee RN      ~~~ NOTE: If the patient answers yes to any of the questions below, hold the infusion and contact ordering provider or on-call provider.    1. Have you recently had an elevated temperature, fever, chills, productive cough, coughing for 3 weeks or longer or hemoptysis,  abnormal vital signs, night sweats,  chest pain or have you noticed a decrease in your appetite, unexplained weight loss or fatigue? No  2. Do you have any open wounds or new incisions? No  3. Do you have any upcoming hospitalizations or surgeries? Does not include esophagogastroduodenoscopy, colonoscopy, endoscopic retrograde cholangiopancreatography (ERCP), endoscopic ultrasound (EUS), dental procedures or joint aspiration/steroid injections No  4. Do you currently have any signs of illness or  infection or are you on any antibiotics? No  5. Have you had any new, sudden or worsening abdominal pain? No  6. Have you or anyone in your household received a live vaccination in the past 4 weeks? Please note: No live vaccines while on biologic/chemotherapy until 6 months after the last treatment. Patient can receive the flu vaccine (shot only), pneumovax and the Covid vaccine. It is optimal for the patient to get these vaccines mid cycle, but they can be given at any time as long as it is not on the day of the infusion. No  7. Have you recently been diagnosed with any new nervous system diseases (ie. Multiple sclerosis, Guillain [place], seizures, neurological changes) or cancer diagnosis? Are you on any form of radiation or chemotherapy? No  8. Are you pregnant or breast feeding or do you have plans of pregnancy in the future? NA  9. Have you been having any signs of worsening depression or suicidal ideations?  (benlysta only) NA  10. Have there been any other new onset medical symptoms? No  11. Have you had any new blood clots? (IVIG only) NA

## 2022-11-08 ENCOUNTER — TELEPHONE (OUTPATIENT)
Dept: INTERNAL MEDICINE | Facility: CLINIC | Age: 77
End: 2022-11-08

## 2022-11-08 NOTE — TELEPHONE ENCOUNTER
Reason for Call:  Appointment Request    Patient requesting this type of appt: Chronic Diease Management/Medication/Follow-Up    Requested provider: Fei Lopez    Reason patient unable to be scheduled: Not within requested timeframe    When does patient want to be seen/preferred time: as soon as pt can get in     Comments: with pcp or other provider     [facility] or would you prefer to receive a phone call?:   Patient would prefer a phone call   Okay to leave a detailed message?: Yes at Cell number on file:    Telephone Information:   Mobile [phone]       Call taken on 11/8/2022 at 9:52 AM by Hank Albarran

## 2022-11-18 ENCOUNTER — OFFICE VISIT (OUTPATIENT)
Dept: INTERNAL MEDICINE | Facility: CLINIC | Age: 77
End: 2022-11-18
Payer: MEDICARE

## 2022-11-18 VITALS
OXYGEN SATURATION: 95 % | HEART RATE: 71 BPM | SYSTOLIC BLOOD PRESSURE: 133 MMHG | BODY MASS INDEX: 29.23 KG/M2 | TEMPERATURE: 97.5 F | HEIGHT: 71 IN | WEIGHT: 208.8 LBS | DIASTOLIC BLOOD PRESSURE: 76 MMHG

## 2022-11-18 DIAGNOSIS — E11.8 TYPE 2 DIABETES MELLITUS WITH COMPLICATION, WITHOUT LONG-TERM CURRENT USE OF INSULIN (H): Primary | ICD-10-CM

## 2022-11-18 DIAGNOSIS — K74.60 CIRRHOSIS OF LIVER WITH ASCITES, UNSPECIFIED HEPATIC CIRRHOSIS TYPE (H): ICD-10-CM

## 2022-11-18 DIAGNOSIS — I10 ESSENTIAL HYPERTENSION, BENIGN: ICD-10-CM

## 2022-11-18 DIAGNOSIS — N18.30 CRF (CHRONIC RENAL FAILURE), STAGE 3 (MODERATE) (H): Chronic | ICD-10-CM

## 2022-11-18 DIAGNOSIS — R18.8 CIRRHOSIS OF LIVER WITH ASCITES, UNSPECIFIED HEPATIC CIRRHOSIS TYPE (H): ICD-10-CM

## 2022-11-18 DIAGNOSIS — Z23 HIGH PRIORITY FOR 2019-NCOV VACCINE: ICD-10-CM

## 2022-11-18 DIAGNOSIS — H44.112 PANUVEITIS OF LEFT EYE: ICD-10-CM

## 2022-11-18 DIAGNOSIS — D50.0 IRON DEFICIENCY ANEMIA DUE TO CHRONIC BLOOD LOSS: ICD-10-CM

## 2022-11-18 PROBLEM — K57.30 DIVERTICULAR DISEASE OF LARGE INTESTINE: Status: RESOLVED | Noted: 2018-05-07 | Resolved: 2022-11-18

## 2022-11-18 LAB
HBA1C MFR BLD: 7.1 % (ref 0–5.6)
TSH SERPL DL<=0.005 MIU/L-ACNC: 2.37 UIU/ML (ref 0.3–4.2)

## 2022-11-18 PROCEDURE — G0008 ADMIN INFLUENZA VIRUS VAC: HCPCS | Performed by: INTERNAL MEDICINE

## 2022-11-18 PROCEDURE — 99214 OFFICE O/P EST MOD 30 MIN: CPT | Mod: 25 | Performed by: INTERNAL MEDICINE

## 2022-11-18 PROCEDURE — 90662 IIV NO PRSV INCREASED AG IM: CPT | Performed by: INTERNAL MEDICINE

## 2022-11-18 PROCEDURE — 91313 COVID-19,PF,MODERNA BIVALENT: CPT | Performed by: INTERNAL MEDICINE

## 2022-11-18 PROCEDURE — 0134A COVID-19,PF,MODERNA BIVALENT: CPT | Performed by: INTERNAL MEDICINE

## 2022-11-18 PROCEDURE — 84443 ASSAY THYROID STIM HORMONE: CPT | Performed by: INTERNAL MEDICINE

## 2022-11-18 PROCEDURE — 83036 HEMOGLOBIN GLYCOSYLATED A1C: CPT | Performed by: INTERNAL MEDICINE

## 2022-11-18 PROCEDURE — 36415 COLL VENOUS BLD VENIPUNCTURE: CPT | Performed by: INTERNAL MEDICINE

## 2022-11-18 NOTE — PROGRESS NOTES
1. Type 2 diabetes mellitus with complication, without long-term current use of insulin (H)  Continue same check labs follow-up with primary internist  - Hemoglobin A1c; Future  - TSH with free T4 reflex; Future  - Hemoglobin A1c  - TSH with free T4 reflex    2. Essential hypertension, benign  Pressure control    3. CRF (chronic renal failure), stage 3 (moderate) (H)  Stable, follow-up with primary internist    4. Cirrhosis of liver with ascites, unspecified hepatic cirrhosis type (H)  Stable, follows with gastroenterology, reviewed recent upper endoscopy without variceal bleeding    5. High priority for 2019-nCoV vaccine  - COVID-19,PF,MODERNA BIVALENT 18+Yrs    6. Iron deficiency anemia due to chronic blood loss  Recent labs remained stable, continue on iron, follow-up with primary internist, anemia is likely multifactorial including iron deficiency of unknown certain etiology status post endoscopy colonoscopy, stool testing for blood which was negative, known chronic kidney disease, known cirrhosis, known inflammatory condition on immunosuppressive medication    7. Panuveitis of left eye  As above    Subjective   Frank is a 77 year old accompanied by his ALONE, presenting for the following health issues:  Recheck Medication and RECHECK (PT REPORTS DESIRE TO DISCUSS LABS RESULTS)      History of Present Illness       Diabetes:   He presents for follow up of diabetes.  He is checking home blood glucose a few times a week. He checks blood glucose at bedtime.  Blood glucose is never over 200 and never under 70. He is aware of hypoglycemia symptoms including lethargy. He has no concerns regarding his diabetes at this time.  He is having numbness in feet and excessive thirst.         He eats 0-1 servings of fruits and vegetables daily.He consumes 0 sweetened beverage(s) daily.He exercises with enough effort to increase his heart rate 9 or less minutes per day.  He exercises with enough effort to increase his heart  rate 3 or less days per week.   He is taking medications regularly.         Review of Systems       Objective    There were no vitals taken for this visit.  There is no height or weight on file to calculate BMI.  Physical Exam

## 2022-12-13 ENCOUNTER — OFFICE VISIT (OUTPATIENT)
Dept: AUDIOLOGY | Facility: CLINIC | Age: 77
End: 2022-12-13
Payer: MEDICARE

## 2022-12-13 DIAGNOSIS — H90.3 SENSORINEURAL HEARING LOSS, BILATERAL: Primary | ICD-10-CM

## 2022-12-13 PROCEDURE — V5299 HEARING SERVICE: HCPCS | Performed by: AUDIOLOGIST

## 2022-12-13 NOTE — PROGRESS NOTES
AUDIOLOGY REPORT (No charge appointment)    SUBJECTIVE: Frank Obando is a 77 year old male was seen in the Audiology Clinic at  [facility] on 12/13/22 to discuss concerns with hearing and functional communication difficulties. Mr. Obando has been seen previously on 8-25-22, and results revealed a mild, sloping to moderate-severe sensorineural hearing loss, bilaterally. The patient was medically evaluated and determined to be cleared for amplification by Nelsy Freitas MD, at ENT consult dated 8-25-22. Frank notes difficulty with communication in a variety of listening situations.    OBJECTIVE:  Abuse Screening:  Do you feel unsafe at home or work/school? No  Do you feel threatened by someone? No  Does anyone try to keep you from having contact with others, or doing things outside of your home? No  Physical signs of abuse present? No    Patient is a hearing aid candidate. He was unaware that hearing aids are dispensed and fit through [facility], and was under the impression that today's appointment would provide him with a prescription for amplification that could be filled elsewhere. He was not prepared to pursue amplification at this time.      ASSESSMENT:     ICD-10-CM    1. Sensorineural hearing loss, bilateral  H90.3           PLAN: Mr. Obando was provided with a copy of his 8-25-22 audiogram and the What to Known When Buying Hearing Aids brochure. No charge appointment today. Please contact this clinic with any questions or concerns.      Luis Fernando Shaw, [facility]  [place] Licensed Audiologist [id number]

## 2022-12-14 ENCOUNTER — INFUSION THERAPY VISIT (OUTPATIENT)
Dept: INFUSION THERAPY | Facility: CLINIC | Age: 77
End: 2022-12-14
Attending: INTERNAL MEDICINE
Payer: MEDICARE

## 2022-12-14 VITALS
DIASTOLIC BLOOD PRESSURE: 65 MMHG | SYSTOLIC BLOOD PRESSURE: 104 MMHG | WEIGHT: 200 LBS | HEART RATE: 65 BPM | BODY MASS INDEX: 28.29 KG/M2 | OXYGEN SATURATION: 96 % | RESPIRATION RATE: 16 BRPM | TEMPERATURE: 97.7 F

## 2022-12-14 DIAGNOSIS — H44.112 PANUVEITIS OF LEFT EYE: Primary | ICD-10-CM

## 2022-12-14 PROCEDURE — 250N000013 HC RX MED GY IP 250 OP 250 PS 637: Performed by: INTERNAL MEDICINE

## 2022-12-14 PROCEDURE — 96413 CHEMO IV INFUSION 1 HR: CPT

## 2022-12-14 PROCEDURE — 250N000011 HC RX IP 250 OP 636: Performed by: INTERNAL MEDICINE

## 2022-12-14 PROCEDURE — 258N000003 HC RX IP 258 OP 636: Performed by: INTERNAL MEDICINE

## 2022-12-14 RX ORDER — MEPERIDINE HYDROCHLORIDE 50 MG/ML
25 INJECTION INTRAMUSCULAR; INTRAVENOUS; SUBCUTANEOUS EVERY 30 MIN PRN
Status: DISCONTINUED | OUTPATIENT
Start: 2022-12-14 | End: 2022-12-14 | Stop reason: HOSPADM

## 2022-12-14 RX ORDER — DIPHENHYDRAMINE HCL 25 MG
25 CAPSULE ORAL ONCE
Status: CANCELLED
Start: 2022-12-27 | End: 2022-12-27

## 2022-12-14 RX ORDER — DIPHENHYDRAMINE HYDROCHLORIDE 50 MG/ML
50 INJECTION INTRAMUSCULAR; INTRAVENOUS
Status: CANCELLED
Start: 2022-12-27

## 2022-12-14 RX ORDER — EPINEPHRINE 1 MG/ML
0.3 INJECTION, SOLUTION INTRAMUSCULAR; SUBCUTANEOUS EVERY 5 MIN PRN
Status: DISCONTINUED | OUTPATIENT
Start: 2022-12-14 | End: 2022-12-14 | Stop reason: HOSPADM

## 2022-12-14 RX ORDER — METHYLPREDNISOLONE SODIUM SUCCINATE 125 MG/2ML
125 INJECTION, POWDER, LYOPHILIZED, FOR SOLUTION INTRAMUSCULAR; INTRAVENOUS
Status: DISCONTINUED | OUTPATIENT
Start: 2022-12-14 | End: 2022-12-14 | Stop reason: HOSPADM

## 2022-12-14 RX ORDER — DIPHENHYDRAMINE HCL 25 MG
25 CAPSULE ORAL ONCE
Status: COMPLETED | OUTPATIENT
Start: 2022-12-14 | End: 2022-12-14

## 2022-12-14 RX ORDER — METHYLPREDNISOLONE SODIUM SUCCINATE 125 MG/2ML
125 INJECTION, POWDER, LYOPHILIZED, FOR SOLUTION INTRAMUSCULAR; INTRAVENOUS
Status: CANCELLED
Start: 2022-12-27

## 2022-12-14 RX ORDER — ALBUTEROL SULFATE 90 UG/1
1-2 AEROSOL, METERED RESPIRATORY (INHALATION)
Status: DISCONTINUED | OUTPATIENT
Start: 2022-12-14 | End: 2022-12-14 | Stop reason: HOSPADM

## 2022-12-14 RX ORDER — MEPERIDINE HYDROCHLORIDE 50 MG/ML
25 INJECTION INTRAMUSCULAR; INTRAVENOUS; SUBCUTANEOUS EVERY 30 MIN PRN
Status: CANCELLED | OUTPATIENT
Start: 2022-12-27

## 2022-12-14 RX ORDER — ALBUTEROL SULFATE 0.83 MG/ML
2.5 SOLUTION RESPIRATORY (INHALATION)
Status: DISCONTINUED | OUTPATIENT
Start: 2022-12-14 | End: 2022-12-14 | Stop reason: HOSPADM

## 2022-12-14 RX ORDER — ALBUTEROL SULFATE 0.83 MG/ML
2.5 SOLUTION RESPIRATORY (INHALATION)
Status: CANCELLED | OUTPATIENT
Start: 2022-12-27

## 2022-12-14 RX ORDER — DIPHENHYDRAMINE HYDROCHLORIDE 50 MG/ML
50 INJECTION INTRAMUSCULAR; INTRAVENOUS
Status: DISCONTINUED | OUTPATIENT
Start: 2022-12-14 | End: 2022-12-14 | Stop reason: HOSPADM

## 2022-12-14 RX ORDER — ACETAMINOPHEN 325 MG/1
650 TABLET ORAL ONCE
Status: COMPLETED | OUTPATIENT
Start: 2022-12-14 | End: 2022-12-14

## 2022-12-14 RX ORDER — METHYLPREDNISOLONE SODIUM SUCCINATE 125 MG/2ML
125 INJECTION, POWDER, LYOPHILIZED, FOR SOLUTION INTRAMUSCULAR; INTRAVENOUS ONCE
Status: CANCELLED | OUTPATIENT
Start: 2022-12-27 | End: 2022-12-27

## 2022-12-14 RX ORDER — ACETAMINOPHEN 325 MG/1
650 TABLET ORAL ONCE
Status: CANCELLED
Start: 2022-12-27 | End: 2022-12-27

## 2022-12-14 RX ORDER — EPINEPHRINE 1 MG/ML
0.3 INJECTION, SOLUTION INTRAMUSCULAR; SUBCUTANEOUS EVERY 5 MIN PRN
Status: CANCELLED | OUTPATIENT
Start: 2022-12-27

## 2022-12-14 RX ORDER — HEPARIN SODIUM,PORCINE 10 UNIT/ML
5 VIAL (ML) INTRAVENOUS
Status: CANCELLED | OUTPATIENT
Start: 2022-12-27

## 2022-12-14 RX ORDER — ALBUTEROL SULFATE 90 UG/1
1-2 AEROSOL, METERED RESPIRATORY (INHALATION)
Status: CANCELLED
Start: 2022-12-27

## 2022-12-14 RX ORDER — HEPARIN SODIUM (PORCINE) LOCK FLUSH IV SOLN 100 UNIT/ML 100 UNIT/ML
5 SOLUTION INTRAVENOUS
Status: CANCELLED | OUTPATIENT
Start: 2022-12-27

## 2022-12-14 RX ADMIN — DIPHENHYDRAMINE HYDROCHLORIDE 25 MG: 25 CAPSULE ORAL at 10:58

## 2022-12-14 RX ADMIN — INFLIXIMAB 500 MG: 100 INJECTION, POWDER, LYOPHILIZED, FOR SOLUTION INTRAVENOUS at 11:43

## 2022-12-14 RX ADMIN — ACETAMINOPHEN 650 MG: 325 TABLET ORAL at 10:58

## 2022-12-14 NOTE — PROGRESS NOTES
~~~ NOTE: If the patient answers yes to any of the questions below, hold the infusion and contact ordering provider or on-call provider.    1. Have you recently had an elevated temperature, fever, chills, productive cough, coughing for 3 weeks or longer or hemoptysis,  abnormal vital signs, night sweats,  chest pain or have you noticed a decrease in your appetite, unexplained weight loss or fatigue? No  2. Do you have any open wounds or new incisions? No  3. Do you have any upcoming hospitalizations or surgeries? Does not include esophagogastroduodenoscopy, colonoscopy, endoscopic retrograde cholangiopancreatography (ERCP), endoscopic ultrasound (EUS), dental procedures or joint aspiration/steroid injections No  4. Do you currently have any signs of illness or infection or are you on any antibiotics? No  5. Have you had any new, sudden or worsening abdominal pain? No  6. Have you or anyone in your household received a live vaccination in the past 4 weeks? Please note: No live vaccines while on biologic/chemotherapy until 6 months after the last treatment. Patient can receive the flu vaccine (shot only), pneumovax and the Covid vaccine. It is optimal for the patient to get these vaccines mid cycle, but they can be given at any time as long as it is not on the day of the infusion. No  7. Have you recently been diagnosed with any new nervous system diseases (ie. Multiple sclerosis, Guillain [place], seizures, neurological changes) or cancer diagnosis? Are you on any form of radiation or chemotherapy? No  8. Are you pregnant or breast feeding or do you have plans of pregnancy in the future? No  9. Have you been having any signs of worsening depression or suicidal ideations?  (benlysta only) No  10. Have there been any other new onset medical symptoms? No  11. Have you had any new blood clots? (IVIG only) No  Infusion Nursing Note:  Frank Obando presents today for Remicade.    Patient seen by provider today:  No   present during visit today: Not Applicable.    Note: pt given tylenol and benadryl pre remicade    Intravenous Access:  Peripheral IV placed.    Treatment Conditions:  Not Applicable.    Post Infusion Assessment:  Patient tolerated infusion without incident.     Discharge Plan:   Patient and/or family verbalized understanding of discharge instructions and all questions answered.      Jeny Epps RN

## 2023-01-03 ENCOUNTER — OFFICE VISIT (OUTPATIENT)
Dept: INTERNAL MEDICINE | Facility: CLINIC | Age: 78
End: 2023-01-03
Payer: MEDICARE

## 2023-01-03 VITALS
DIASTOLIC BLOOD PRESSURE: 67 MMHG | RESPIRATION RATE: 16 BRPM | SYSTOLIC BLOOD PRESSURE: 120 MMHG | HEIGHT: 71 IN | WEIGHT: 204.2 LBS | TEMPERATURE: 97.7 F | OXYGEN SATURATION: 94 % | BODY MASS INDEX: 28.59 KG/M2 | HEART RATE: 80 BPM

## 2023-01-03 DIAGNOSIS — K74.60 CIRRHOSIS OF LIVER WITH ASCITES, UNSPECIFIED HEPATIC CIRRHOSIS TYPE (H): ICD-10-CM

## 2023-01-03 DIAGNOSIS — I10 ESSENTIAL HYPERTENSION, BENIGN: ICD-10-CM

## 2023-01-03 DIAGNOSIS — R18.8 CIRRHOSIS OF LIVER WITH ASCITES, UNSPECIFIED HEPATIC CIRRHOSIS TYPE (H): ICD-10-CM

## 2023-01-03 DIAGNOSIS — N18.30 CRF (CHRONIC RENAL FAILURE), STAGE 3 (MODERATE) (H): Primary | ICD-10-CM

## 2023-01-03 DIAGNOSIS — E11.65 TYPE 2 DIABETES MELLITUS WITH HYPERGLYCEMIA, WITHOUT LONG-TERM CURRENT USE OF INSULIN (H): ICD-10-CM

## 2023-01-03 DIAGNOSIS — D50.0 IRON DEFICIENCY ANEMIA DUE TO CHRONIC BLOOD LOSS: ICD-10-CM

## 2023-01-03 LAB
ERYTHROCYTE [DISTWIDTH] IN BLOOD BY AUTOMATED COUNT: 13.3 % (ref 10–15)
HCT VFR BLD AUTO: 33.6 % (ref 40–53)
HGB BLD-MCNC: 11.1 G/DL (ref 13.3–17.7)
MCH RBC QN AUTO: 29.9 PG (ref 26.5–33)
MCHC RBC AUTO-ENTMCNC: 33 G/DL (ref 31.5–36.5)
MCV RBC AUTO: 91 FL (ref 78–100)
PLATELET # BLD AUTO: 114 10E3/UL (ref 150–450)
RBC # BLD AUTO: 3.71 10E6/UL (ref 4.4–5.9)
WBC # BLD AUTO: 6.3 10E3/UL (ref 4–11)

## 2023-01-03 PROCEDURE — 82728 ASSAY OF FERRITIN: CPT | Performed by: INTERNAL MEDICINE

## 2023-01-03 PROCEDURE — 80048 BASIC METABOLIC PNL TOTAL CA: CPT | Performed by: INTERNAL MEDICINE

## 2023-01-03 PROCEDURE — 82570 ASSAY OF URINE CREATININE: CPT | Performed by: INTERNAL MEDICINE

## 2023-01-03 PROCEDURE — 85027 COMPLETE CBC AUTOMATED: CPT | Performed by: INTERNAL MEDICINE

## 2023-01-03 PROCEDURE — 99214 OFFICE O/P EST MOD 30 MIN: CPT | Performed by: INTERNAL MEDICINE

## 2023-01-03 PROCEDURE — 82043 UR ALBUMIN QUANTITATIVE: CPT | Performed by: INTERNAL MEDICINE

## 2023-01-03 PROCEDURE — 36415 COLL VENOUS BLD VENIPUNCTURE: CPT | Performed by: INTERNAL MEDICINE

## 2023-01-03 NOTE — PROGRESS NOTES
1. Type 2 diabetes mellitus with hyperglycemia, without long-term current use of insulin (H)  Antonio is treated with metformin and pioglitazone.  He is no longer on glimepiride.  Pioglitazone was added with the thought that it may be of theoretical benefit for nonalcoholic steatohepatitis.  Hemoglobin A1c was fair at 7.1 when checked in November.  Hemoglobin A1c should be rechecked at next appointment    2. Iron deficiency anemia due to chronic blood loss  Has a history of chronic iron deficiency anemia felt related to occult GI blood loss.  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  He is taking his iron supplement.  He is chronically on omeprazole and on propranolol to decrease portal pressure.  - CBC with platelets; Future  - Ferritin; Future  - CBC with platelets  - Ferritin    3. CRF (chronic renal failure), stage 3 (moderate) (H)  Renal function will be rechecked  - Albumin Random Urine Quantitative with Creat Ratio; Future  - Basic metabolic panel  (Ca, Cl, CO2, Creat, Gluc, K, Na, BUN); Future  - Albumin Random Urine Quantitative with Creat Ratio  - Basic metabolic panel  (Ca, Cl, CO2, Creat, Gluc, K, Na, BUN)    4. Cirrhosis of liver with ascites, unspecified hepatic cirrhosis type (H)  This was felt to possibly be due to methotrexate or to nonalcoholic steatohepatitis.    5. Essential hypertension, benign  Good control with lisinopril    6.  Diminished hearing:  He acknowledges decreased hearing and did have an audiology check wear hearing aids were advised.  He does report asymmetric hearing loss with hearing loss much more prominent in the left ear than the right.  Given this, an ENT evaluation seems appropriate.    Patient Instructions   1. Tetanus booster due in December    2.  Consider Dr. Morelos when I retire.  Another option is [facility].    3.  ENT referral due to asymmetric hearing loss.    4.  I will notify you of test results.    Jesse Marie is a 77 year old  accompanied by his , presenting for the following health issues:  Follow Up, Recheck Medication, and Refill Request (Pt is here for his 1 month follow up and a medication refill)  He last had a visit with Dr. Lopez in November.  Hemoglobin A1c was 7.1 at that time.  He is on metformin and pioglitazone.      He does have nonalcoholic hepatic cirrhosis.  He has chronic iron deficiency presumably related to occult GI blood loss.  He has had varices noted, but no problems with brisk bleeding.  He is on propranolol to decrease portal pressure.    He notes are chronic issues with fatigue.  He denies knowledge of loud snoring or apnea.  He does doze off watching TV.  TSH was in the normal range when checked in November.    He notes diminished hearing.  This is more pronounced in the left ear than the right.  He had an audiology visit where hearing aids were advised.    He has had a previous CVA.  He had been on Plavix.  This was discontinued due to the iron deficiency anemia.  He is on atorvastatin for hyperlipidemia and lisinopril for essential hypertension and renal insufficiency    History of Present Illness       Reason for visit:  Check in with doctor    He eats 0-1 servings of fruits and vegetables daily.He consumes 0 sweetened beverage(s) daily.He exercises with enough effort to increase his heart rate 9 or less minutes per day.  He exercises with enough effort to increase his heart rate 3 or less days per week.   He is taking medications regularly.       Pt is here for his 1 month follow up and needs a refill for the medication Ferrous Sulfate 325mg(5GR) tabs.      Review of Systems   Constitutional, HEENT, cardiovascular, pulmonary, GI, , musculoskeletal, neuro, skin, endocrine and psych systems are negative, except as otherwise noted.      Objective    /67 (BP Location: Left arm, Patient Position: Sitting, Cuff Size: Adult Regular)   Pulse 80   Temp 97.7  F (36.5  C) (Tympanic)   Resp 16   Ht 1.791 m  "(5' 10.5\")   Wt 92.6 kg (204 lb 3.2 oz)   SpO2 94%   BMI 28.89 kg/m    Body mass index is 28.89 kg/m .  Physical Exam   /67 (BP Location: Left arm, Patient Position: Sitting, Cuff Size: Adult Regular)   Pulse 80   Temp 97.7  F (36.5  C) (Tympanic)   Resp 16   Ht 1.791 m (5' 10.5\")   Wt 92.6 kg (204 lb 3.2 oz)   SpO2 94%   BMI 28.89 kg/m      General Appearance:  Alert, cooperative, no distress, appears stated age   Head:  Normocephalic, without obvious abnormality, atraumatic   Eyes:   No scleral icterus.  Pupils are dilated bilaterally.  (He had an eye exam earlier today).   Ears:   Minor cerumen on the right TMs appear normal   Nose: Nares normal, septum midline, mucosa normal, no drainage   Throat: Lips, mucosa, and tongue normal; teeth and gums normal   Neck: Supple, symmetrical, trachea midline, no adenopathy, thyroid: not enlarged, symmetric, no tenderness/mass/nodules, no carotid bruit or JVD   Back:   Symmetric, no curvature, ROM normal, no CVA tenderness   Lungs:   Clear to auscultation bilaterally, respirations unlabored   Chest Wall:  No tenderness or deformity   Heart:  Regular rate and rhythm, S1, S2 normal, no murmur, rub or gallop   Abdomen:   Soft, non-tender, bowel sounds active all four quadrants,  no masses, no organomegaly   Genitalia:  Not examined   Rectal:  Not examined   Extremities:  trace ankle edema   Skin:  Slightly pale.  No jaundice.  No petechiae or telangiectasias   Lymph nodes: Cervical and supraclavicular normal   Neurologic: No dysarthria or aphasia.  No gross focal deficits         Results for orders placed or performed in visit on 01/03/23   Albumin Random Urine Quantitative with Creat Ratio     Status: None   Result Value Ref Range    Albumin Urine mg/L 16.0 mg/L    Albumin Urine mg/g Cr 13.79 0.00 - 17.00 mg/g Cr    Creatinine Urine mg/dL 116.0 mg/dL   CBC with platelets     Status: Abnormal   Result Value Ref Range    WBC Count 6.3 4.0 - 11.0 10e3/uL    RBC " Count 3.71 (L) 4.40 - 5.90 10e6/uL    Hemoglobin 11.1 (L) 13.3 - 17.7 g/dL    Hematocrit 33.6 (L) 40.0 - 53.0 %    MCV 91 78 - 100 fL    MCH 29.9 26.5 - 33.0 pg    MCHC 33.0 31.5 - 36.5 g/dL    RDW 13.3 10.0 - 15.0 %    Platelet Count 114 (L) 150 - 450 10e3/uL   Ferritin     Status: Abnormal   Result Value Ref Range    Ferritin 27 (L) 31 - 409 ng/mL   Basic metabolic panel  (Ca, Cl, CO2, Creat, Gluc, K, Na, BUN)     Status: Abnormal   Result Value Ref Range    Sodium 137 136 - 145 mmol/L    Potassium 4.5 3.4 - 5.3 mmol/L    Chloride 102 98 - 107 mmol/L    Carbon Dioxide (CO2) 24 22 - 29 mmol/L    Anion Gap 11 7 - 15 mmol/L    Urea Nitrogen 17.2 8.0 - 23.0 mg/dL    Creatinine 1.19 (H) 0.67 - 1.17 mg/dL    Calcium 9.5 8.8 - 10.2 mg/dL    Glucose 257 (H) 70 - 99 mg/dL    GFR Estimate 63 >60 mL/min/1.73m2       Visit time 30 minutes

## 2023-01-03 NOTE — PATIENT INSTRUCTIONS
Tetanus booster due in December    2.  Consider Dr. Morelos when I retire.  Another option is [facility].    3.  ENT referral due to asymmetric hearing loss.    4.  I will notify you of test results.

## 2023-01-04 LAB
ANION GAP SERPL CALCULATED.3IONS-SCNC: 11 MMOL/L (ref 7–15)
BUN SERPL-MCNC: 17.2 MG/DL (ref 8–23)
CALCIUM SERPL-MCNC: 9.5 MG/DL (ref 8.8–10.2)
CHLORIDE SERPL-SCNC: 102 MMOL/L (ref 98–107)
CREAT SERPL-MCNC: 1.19 MG/DL (ref 0.67–1.17)
CREAT UR-MCNC: 116 MG/DL
DEPRECATED HCO3 PLAS-SCNC: 24 MMOL/L (ref 22–29)
FERRITIN SERPL-MCNC: 27 NG/ML (ref 31–409)
GFR SERPL CREATININE-BSD FRML MDRD: 63 ML/MIN/1.73M2
GLUCOSE SERPL-MCNC: 257 MG/DL (ref 70–99)
MICROALBUMIN UR-MCNC: 16 MG/L
MICROALBUMIN/CREAT UR: 13.79 MG/G CR (ref 0–17)
POTASSIUM SERPL-SCNC: 4.5 MMOL/L (ref 3.4–5.3)
SODIUM SERPL-SCNC: 137 MMOL/L (ref 136–145)

## 2023-01-27 ENCOUNTER — INFUSION THERAPY VISIT (OUTPATIENT)
Dept: INFUSION THERAPY | Facility: CLINIC | Age: 78
End: 2023-01-27
Attending: INTERNAL MEDICINE
Payer: MEDICARE

## 2023-01-27 VITALS
TEMPERATURE: 97.9 F | DIASTOLIC BLOOD PRESSURE: 82 MMHG | HEART RATE: 55 BPM | SYSTOLIC BLOOD PRESSURE: 133 MMHG | OXYGEN SATURATION: 96 % | RESPIRATION RATE: 16 BRPM

## 2023-01-27 DIAGNOSIS — H44.112 PANUVEITIS OF LEFT EYE: Primary | ICD-10-CM

## 2023-01-27 PROCEDURE — 250N000013 HC RX MED GY IP 250 OP 250 PS 637: Performed by: INTERNAL MEDICINE

## 2023-01-27 PROCEDURE — 96375 TX/PRO/DX INJ NEW DRUG ADDON: CPT

## 2023-01-27 PROCEDURE — 250N000011 HC RX IP 250 OP 636: Performed by: INTERNAL MEDICINE

## 2023-01-27 PROCEDURE — 96413 CHEMO IV INFUSION 1 HR: CPT

## 2023-01-27 PROCEDURE — 258N000003 HC RX IP 258 OP 636: Performed by: INTERNAL MEDICINE

## 2023-01-27 RX ORDER — ACETAMINOPHEN 325 MG/1
650 TABLET ORAL ONCE
Status: CANCELLED
Start: 2023-03-08 | End: 2023-03-08

## 2023-01-27 RX ORDER — DIPHENHYDRAMINE HCL 25 MG
25 CAPSULE ORAL ONCE
Status: CANCELLED
Start: 2023-03-08 | End: 2023-03-08

## 2023-01-27 RX ORDER — METHYLPREDNISOLONE SODIUM SUCCINATE 125 MG/2ML
125 INJECTION, POWDER, LYOPHILIZED, FOR SOLUTION INTRAMUSCULAR; INTRAVENOUS ONCE
Status: CANCELLED | OUTPATIENT
Start: 2023-03-08 | End: 2023-03-08

## 2023-01-27 RX ORDER — METHYLPREDNISOLONE SODIUM SUCCINATE 125 MG/2ML
125 INJECTION, POWDER, LYOPHILIZED, FOR SOLUTION INTRAMUSCULAR; INTRAVENOUS
Status: DISCONTINUED | OUTPATIENT
Start: 2023-01-27 | End: 2023-01-27 | Stop reason: HOSPADM

## 2023-01-27 RX ORDER — MEPERIDINE HYDROCHLORIDE 50 MG/ML
25 INJECTION INTRAMUSCULAR; INTRAVENOUS; SUBCUTANEOUS EVERY 30 MIN PRN
Status: DISCONTINUED | OUTPATIENT
Start: 2023-01-27 | End: 2023-01-27 | Stop reason: HOSPADM

## 2023-01-27 RX ORDER — ALBUTEROL SULFATE 0.83 MG/ML
2.5 SOLUTION RESPIRATORY (INHALATION)
Status: CANCELLED | OUTPATIENT
Start: 2023-03-08

## 2023-01-27 RX ORDER — ALBUTEROL SULFATE 0.83 MG/ML
2.5 SOLUTION RESPIRATORY (INHALATION)
Status: DISCONTINUED | OUTPATIENT
Start: 2023-01-27 | End: 2023-01-27 | Stop reason: HOSPADM

## 2023-01-27 RX ORDER — MEPERIDINE HYDROCHLORIDE 50 MG/ML
25 INJECTION INTRAMUSCULAR; INTRAVENOUS; SUBCUTANEOUS EVERY 30 MIN PRN
Status: CANCELLED | OUTPATIENT
Start: 2023-03-08

## 2023-01-27 RX ORDER — HEPARIN SODIUM,PORCINE 10 UNIT/ML
5 VIAL (ML) INTRAVENOUS
Status: CANCELLED | OUTPATIENT
Start: 2023-03-08

## 2023-01-27 RX ORDER — DIPHENHYDRAMINE HCL 25 MG
25 CAPSULE ORAL ONCE
Status: COMPLETED | OUTPATIENT
Start: 2023-01-27 | End: 2023-01-27

## 2023-01-27 RX ORDER — METHYLPREDNISOLONE SODIUM SUCCINATE 125 MG/2ML
125 INJECTION, POWDER, LYOPHILIZED, FOR SOLUTION INTRAMUSCULAR; INTRAVENOUS ONCE
Status: COMPLETED | OUTPATIENT
Start: 2023-01-27 | End: 2023-01-27

## 2023-01-27 RX ORDER — DIPHENHYDRAMINE HYDROCHLORIDE 50 MG/ML
50 INJECTION INTRAMUSCULAR; INTRAVENOUS
Status: DISCONTINUED | OUTPATIENT
Start: 2023-01-27 | End: 2023-01-27 | Stop reason: HOSPADM

## 2023-01-27 RX ORDER — METHYLPREDNISOLONE SODIUM SUCCINATE 125 MG/2ML
125 INJECTION, POWDER, LYOPHILIZED, FOR SOLUTION INTRAMUSCULAR; INTRAVENOUS
Status: CANCELLED
Start: 2023-03-08

## 2023-01-27 RX ORDER — ALBUTEROL SULFATE 90 UG/1
1-2 AEROSOL, METERED RESPIRATORY (INHALATION)
Status: DISCONTINUED | OUTPATIENT
Start: 2023-01-27 | End: 2023-01-27 | Stop reason: HOSPADM

## 2023-01-27 RX ORDER — ACETAMINOPHEN 325 MG/1
650 TABLET ORAL ONCE
Status: COMPLETED | OUTPATIENT
Start: 2023-01-27 | End: 2023-01-27

## 2023-01-27 RX ORDER — EPINEPHRINE 1 MG/ML
0.3 INJECTION, SOLUTION INTRAMUSCULAR; SUBCUTANEOUS EVERY 5 MIN PRN
Status: CANCELLED | OUTPATIENT
Start: 2023-03-08

## 2023-01-27 RX ORDER — EPINEPHRINE 1 MG/ML
0.3 INJECTION, SOLUTION INTRAMUSCULAR; SUBCUTANEOUS EVERY 5 MIN PRN
Status: DISCONTINUED | OUTPATIENT
Start: 2023-01-27 | End: 2023-01-27 | Stop reason: HOSPADM

## 2023-01-27 RX ORDER — DIPHENHYDRAMINE HYDROCHLORIDE 50 MG/ML
50 INJECTION INTRAMUSCULAR; INTRAVENOUS
Status: CANCELLED
Start: 2023-03-08

## 2023-01-27 RX ORDER — ALBUTEROL SULFATE 90 UG/1
1-2 AEROSOL, METERED RESPIRATORY (INHALATION)
Status: CANCELLED
Start: 2023-03-08

## 2023-01-27 RX ORDER — HEPARIN SODIUM (PORCINE) LOCK FLUSH IV SOLN 100 UNIT/ML 100 UNIT/ML
5 SOLUTION INTRAVENOUS
Status: CANCELLED | OUTPATIENT
Start: 2023-03-08

## 2023-01-27 RX ADMIN — SODIUM CHLORIDE 250 ML: 9 INJECTION, SOLUTION INTRAVENOUS at 11:26

## 2023-01-27 RX ADMIN — INFLIXIMAB 500 MG: 100 INJECTION, POWDER, LYOPHILIZED, FOR SOLUTION INTRAVENOUS at 12:01

## 2023-01-27 RX ADMIN — DIPHENHYDRAMINE HYDROCHLORIDE 25 MG: 25 CAPSULE ORAL at 11:27

## 2023-01-27 RX ADMIN — METHYLPREDNISOLONE SODIUM SUCCINATE 125 MG: 125 INJECTION, POWDER, FOR SOLUTION INTRAMUSCULAR; INTRAVENOUS at 11:27

## 2023-01-27 RX ADMIN — ACETAMINOPHEN 650 MG: 325 TABLET ORAL at 11:27

## 2023-01-27 NOTE — PROGRESS NOTES
Infusion Nursing Note:  Frank Obando presents today for Remicade.    Patient seen by provider today: No   present during visit today: Not Applicable.    Note: Pt reports he gets itchy about half way through infusion. He reports it resolves after completion of infusion. Per pharmacy, okay to continue to run Remicade over 1 hour. Premedications of Tylenol, Benadryl and Solu-medrol administered per orders. Upon completion, pt reported his itchiness was much less this infusion and it had resolved upon completion of the infusion.   ~~~ NOTE: If the patient answers yes to any of the questions below, hold the infusion and contact ordering provider or on-call provider.    1. Have you recently had an elevated temperature, fever, chills, productive cough, coughing for 3 weeks or longer or hemoptysis,  abnormal vital signs, night sweats,  chest pain or have you noticed a decrease in your appetite, unexplained weight loss or fatigue? No  2. Do you have any open wounds or new incisions? No  3. Do you have any upcoming hospitalizations or surgeries? Does not include esophagogastroduodenoscopy, colonoscopy, endoscopic retrograde cholangiopancreatography (ERCP), endoscopic ultrasound (EUS), dental procedures or joint aspiration/steroid injections No  4. Do you currently have any signs of illness or infection or are you on any antibiotics? No  5. Have you had any new, sudden or worsening abdominal pain? No  6. Have you or anyone in your household received a live vaccination in the past 4 weeks? Please note: No live vaccines while on biologic/chemotherapy until 6 months after the last treatment. Patient can receive the flu vaccine (shot only), pneumovax and the Covid vaccine. It is optimal for the patient to get these vaccines mid cycle, but they can be given at any time as long as it is not on the day of the infusion. No  7. Have you recently been diagnosed with any new nervous system diseases (ie. Multiple  sclerosis, Guillain [place], seizures, neurological changes) or cancer diagnosis? Are you on any form of radiation or chemotherapy? No  8. Are you pregnant or breast feeding or do you have plans of pregnancy in the future? N/A  9. Have you been having any signs of worsening depression or suicidal ideations?  (benlysta only) N/A  10. Have there been any other new onset medical symptoms? No  11. Have you had any new blood clots? (IVIG only) N/A    Intravenous Access:  Peripheral IV placed.    Treatment Conditions:  Not Applicable.    Post Infusion Assessment:  Patient tolerated infusion without incident.  Blood return noted pre and post infusion.  Site patent and intact, free from redness, edema or discomfort.  No evidence of extravasations.  Access discontinued per protocol.     Discharge Plan:   Patient will return 3/10 for next appointment.   Patient discharged in stable condition accompanied by: self.  Departure Mode: Ambulatory.      Marcia Lou RN

## 2023-02-04 ENCOUNTER — HEALTH MAINTENANCE LETTER (OUTPATIENT)
Age: 78
End: 2023-02-04

## 2023-03-09 ENCOUNTER — LAB (OUTPATIENT)
Dept: LAB | Facility: CLINIC | Age: 78
End: 2023-03-09
Payer: MEDICARE

## 2023-03-09 DIAGNOSIS — H44.112 PANUVEITIS OF LEFT EYE: ICD-10-CM

## 2023-03-09 DIAGNOSIS — H15.002 SCLERITIS OF LEFT EYE: ICD-10-CM

## 2023-03-09 LAB
ALBUMIN SERPL BCG-MCNC: 4 G/DL (ref 3.5–5.2)
ALT SERPL W P-5'-P-CCNC: 34 U/L (ref 10–50)
CREAT SERPL-MCNC: 1.28 MG/DL (ref 0.67–1.17)
ERYTHROCYTE [DISTWIDTH] IN BLOOD BY AUTOMATED COUNT: 12.9 % (ref 10–15)
GFR SERPL CREATININE-BSD FRML MDRD: 58 ML/MIN/1.73M2
HCT VFR BLD AUTO: 34.3 % (ref 40–53)
HGB BLD-MCNC: 11.2 G/DL (ref 13.3–17.7)
MCH RBC QN AUTO: 30.4 PG (ref 26.5–33)
MCHC RBC AUTO-ENTMCNC: 32.7 G/DL (ref 31.5–36.5)
MCV RBC AUTO: 93 FL (ref 78–100)
PLATELET # BLD AUTO: 119 10E3/UL (ref 150–450)
RBC # BLD AUTO: 3.69 10E6/UL (ref 4.4–5.9)
WBC # BLD AUTO: 4.6 10E3/UL (ref 4–11)

## 2023-03-09 PROCEDURE — 85027 COMPLETE CBC AUTOMATED: CPT

## 2023-03-09 PROCEDURE — 36415 COLL VENOUS BLD VENIPUNCTURE: CPT

## 2023-03-09 PROCEDURE — 82040 ASSAY OF SERUM ALBUMIN: CPT

## 2023-03-09 PROCEDURE — 82565 ASSAY OF CREATININE: CPT

## 2023-03-09 PROCEDURE — 84460 ALANINE AMINO (ALT) (SGPT): CPT

## 2023-03-10 ENCOUNTER — INFUSION THERAPY VISIT (OUTPATIENT)
Dept: INFUSION THERAPY | Facility: CLINIC | Age: 78
End: 2023-03-10
Attending: INTERNAL MEDICINE
Payer: MEDICARE

## 2023-03-10 VITALS
HEART RATE: 60 BPM | WEIGHT: 200 LBS | RESPIRATION RATE: 16 BRPM | BODY MASS INDEX: 28.29 KG/M2 | OXYGEN SATURATION: 97 % | DIASTOLIC BLOOD PRESSURE: 80 MMHG | TEMPERATURE: 97.7 F | SYSTOLIC BLOOD PRESSURE: 126 MMHG

## 2023-03-10 DIAGNOSIS — H44.112 PANUVEITIS OF LEFT EYE: Primary | ICD-10-CM

## 2023-03-10 PROCEDURE — 250N000011 HC RX IP 250 OP 636: Performed by: INTERNAL MEDICINE

## 2023-03-10 PROCEDURE — 96375 TX/PRO/DX INJ NEW DRUG ADDON: CPT

## 2023-03-10 PROCEDURE — 96413 CHEMO IV INFUSION 1 HR: CPT

## 2023-03-10 PROCEDURE — 258N000003 HC RX IP 258 OP 636: Performed by: INTERNAL MEDICINE

## 2023-03-10 PROCEDURE — 250N000013 HC RX MED GY IP 250 OP 250 PS 637: Performed by: INTERNAL MEDICINE

## 2023-03-10 RX ORDER — METHYLPREDNISOLONE SODIUM SUCCINATE 125 MG/2ML
125 INJECTION, POWDER, LYOPHILIZED, FOR SOLUTION INTRAMUSCULAR; INTRAVENOUS ONCE
Status: COMPLETED | OUTPATIENT
Start: 2023-03-10 | End: 2023-03-10

## 2023-03-10 RX ORDER — MEPERIDINE HYDROCHLORIDE 50 MG/ML
25 INJECTION INTRAMUSCULAR; INTRAVENOUS; SUBCUTANEOUS EVERY 30 MIN PRN
Status: CANCELLED | OUTPATIENT
Start: 2023-04-21

## 2023-03-10 RX ORDER — DIPHENHYDRAMINE HYDROCHLORIDE 50 MG/ML
50 INJECTION INTRAMUSCULAR; INTRAVENOUS
Status: DISCONTINUED | OUTPATIENT
Start: 2023-03-10 | End: 2023-03-10 | Stop reason: HOSPADM

## 2023-03-10 RX ORDER — HEPARIN SODIUM,PORCINE 10 UNIT/ML
5 VIAL (ML) INTRAVENOUS
Status: CANCELLED | OUTPATIENT
Start: 2023-04-21

## 2023-03-10 RX ORDER — METHYLPREDNISOLONE SODIUM SUCCINATE 125 MG/2ML
125 INJECTION, POWDER, LYOPHILIZED, FOR SOLUTION INTRAMUSCULAR; INTRAVENOUS
Status: DISCONTINUED | OUTPATIENT
Start: 2023-03-10 | End: 2023-03-10 | Stop reason: HOSPADM

## 2023-03-10 RX ORDER — DIPHENHYDRAMINE HYDROCHLORIDE 50 MG/ML
50 INJECTION INTRAMUSCULAR; INTRAVENOUS
Status: CANCELLED
Start: 2023-04-21

## 2023-03-10 RX ORDER — EPINEPHRINE 1 MG/ML
0.3 INJECTION, SOLUTION INTRAMUSCULAR; SUBCUTANEOUS EVERY 5 MIN PRN
Status: CANCELLED | OUTPATIENT
Start: 2023-04-21

## 2023-03-10 RX ORDER — HEPARIN SODIUM (PORCINE) LOCK FLUSH IV SOLN 100 UNIT/ML 100 UNIT/ML
5 SOLUTION INTRAVENOUS
Status: CANCELLED | OUTPATIENT
Start: 2023-04-21

## 2023-03-10 RX ORDER — METHYLPREDNISOLONE SODIUM SUCCINATE 125 MG/2ML
125 INJECTION, POWDER, LYOPHILIZED, FOR SOLUTION INTRAMUSCULAR; INTRAVENOUS
Status: CANCELLED
Start: 2023-04-21

## 2023-03-10 RX ORDER — ALBUTEROL SULFATE 90 UG/1
1-2 AEROSOL, METERED RESPIRATORY (INHALATION)
Status: DISCONTINUED | OUTPATIENT
Start: 2023-03-10 | End: 2023-03-10 | Stop reason: HOSPADM

## 2023-03-10 RX ORDER — METHYLPREDNISOLONE SODIUM SUCCINATE 125 MG/2ML
125 INJECTION, POWDER, LYOPHILIZED, FOR SOLUTION INTRAMUSCULAR; INTRAVENOUS ONCE
Status: CANCELLED | OUTPATIENT
Start: 2023-04-21 | End: 2023-04-21

## 2023-03-10 RX ORDER — ACETAMINOPHEN 325 MG/1
650 TABLET ORAL ONCE
Status: CANCELLED
Start: 2023-04-21 | End: 2023-04-21

## 2023-03-10 RX ORDER — DIPHENHYDRAMINE HCL 25 MG
25 CAPSULE ORAL ONCE
Status: COMPLETED | OUTPATIENT
Start: 2023-03-10 | End: 2023-03-10

## 2023-03-10 RX ORDER — EPINEPHRINE 1 MG/ML
0.3 INJECTION, SOLUTION INTRAMUSCULAR; SUBCUTANEOUS EVERY 5 MIN PRN
Status: DISCONTINUED | OUTPATIENT
Start: 2023-03-10 | End: 2023-03-10 | Stop reason: HOSPADM

## 2023-03-10 RX ORDER — MEPERIDINE HYDROCHLORIDE 50 MG/ML
25 INJECTION INTRAMUSCULAR; INTRAVENOUS; SUBCUTANEOUS EVERY 30 MIN PRN
Status: DISCONTINUED | OUTPATIENT
Start: 2023-03-10 | End: 2023-03-10 | Stop reason: HOSPADM

## 2023-03-10 RX ORDER — ALBUTEROL SULFATE 90 UG/1
1-2 AEROSOL, METERED RESPIRATORY (INHALATION)
Status: CANCELLED
Start: 2023-04-21

## 2023-03-10 RX ORDER — ALBUTEROL SULFATE 0.83 MG/ML
2.5 SOLUTION RESPIRATORY (INHALATION)
Status: CANCELLED | OUTPATIENT
Start: 2023-04-21

## 2023-03-10 RX ORDER — DIPHENHYDRAMINE HCL 25 MG
25 CAPSULE ORAL ONCE
Status: CANCELLED
Start: 2023-04-21 | End: 2023-04-21

## 2023-03-10 RX ORDER — ACETAMINOPHEN 325 MG/1
650 TABLET ORAL ONCE
Status: COMPLETED | OUTPATIENT
Start: 2023-03-10 | End: 2023-03-10

## 2023-03-10 RX ORDER — ALBUTEROL SULFATE 0.83 MG/ML
2.5 SOLUTION RESPIRATORY (INHALATION)
Status: DISCONTINUED | OUTPATIENT
Start: 2023-03-10 | End: 2023-03-10 | Stop reason: HOSPADM

## 2023-03-10 RX ADMIN — METHYLPREDNISOLONE SODIUM SUCCINATE 125 MG: 125 INJECTION, POWDER, FOR SOLUTION INTRAMUSCULAR; INTRAVENOUS at 11:41

## 2023-03-10 RX ADMIN — DIPHENHYDRAMINE HYDROCHLORIDE 25 MG: 25 CAPSULE ORAL at 11:40

## 2023-03-10 RX ADMIN — INFLIXIMAB 500 MG: 100 INJECTION, POWDER, LYOPHILIZED, FOR SOLUTION INTRAVENOUS at 12:01

## 2023-03-10 RX ADMIN — ACETAMINOPHEN 650 MG: 325 TABLET ORAL at 11:40

## 2023-03-10 NOTE — PROGRESS NOTES
~~~ NOTE: If the patient answers yes to any of the questions below, hold the infusion and contact ordering provider or on-call provider.    1. Have you recently had an elevated temperature, fever, chills, productive cough, coughing for 3 weeks or longer or hemoptysis,  abnormal vital signs, night sweats,  chest pain or have you noticed a decrease in your appetite, unexplained weight loss or fatigue? No  2. Do you have any open wounds or new incisions? No  3. Do you have any upcoming hospitalizations or surgeries? Does not include esophagogastroduodenoscopy, colonoscopy, endoscopic retrograde cholangiopancreatography (ERCP), endoscopic ultrasound (EUS), dental procedures or joint aspiration/steroid injections No  4. Do you currently have any signs of illness or infection or are you on any antibiotics? No  5. Have you had any new, sudden or worsening abdominal pain? No  6. Have you or anyone in your household received a live vaccination in the past 4 weeks? Please note: No live vaccines while on biologic/chemotherapy until 6 months after the last treatment. Patient can receive the flu vaccine (shot only), pneumovax and the Covid vaccine. It is optimal for the patient to get these vaccines mid cycle, but they can be given at any time as long as it is not on the day of the infusion. No  7. Have you recently been diagnosed with any new nervous system diseases (ie. Multiple sclerosis, Guillain [place], seizures, neurological changes) or cancer diagnosis? Are you on any form of radiation or chemotherapy? No  8. Are you pregnant or breast feeding or do you have plans of pregnancy in the future? No  9. Have you been having any signs of worsening depression or suicidal ideations?  (benlysta only) No  10. Have there been any other new onset medical symptoms? No  11. Have you had any new blood clots? (IVIG only) No       Infusion Nursing Note:  Frank Obando presents today for Remicade.    Patient seen by provider today:  No   present during visit today: Not Applicable.    Note: N/A.    Intravenous Access:  Peripheral IV placed.    Treatment Conditions:  Not Applicable.    Post Infusion Assessment:  Patient tolerated infusion without incident.  Blood return noted pre and post infusion.  Access discontinued per protocol.     Discharge Plan:   Patient discharged in stable condition accompanied by: self.  Departure Mode: Ambulatory.      Zaire Viveros RN

## 2023-03-14 ENCOUNTER — OFFICE VISIT (OUTPATIENT)
Dept: RHEUMATOLOGY | Facility: CLINIC | Age: 78
End: 2023-03-14
Payer: MEDICARE

## 2023-03-14 VITALS
WEIGHT: 201.6 LBS | SYSTOLIC BLOOD PRESSURE: 130 MMHG | HEART RATE: 78 BPM | DIASTOLIC BLOOD PRESSURE: 82 MMHG | BODY MASS INDEX: 28.52 KG/M2

## 2023-03-14 DIAGNOSIS — H44.112 PANUVEITIS OF LEFT EYE: Primary | ICD-10-CM

## 2023-03-14 DIAGNOSIS — M17.0 PRIMARY OSTEOARTHRITIS OF BOTH KNEES: ICD-10-CM

## 2023-03-14 DIAGNOSIS — Z79.899 HIGH RISK MEDICATION USE: ICD-10-CM

## 2023-03-14 DIAGNOSIS — N18.30 CRF (CHRONIC RENAL FAILURE), STAGE 3 (MODERATE) (H): ICD-10-CM

## 2023-03-14 DIAGNOSIS — R76.8 ANTINEUTROPHIL CYTOPLASMIC ANTIBODY (ANCA) POSITIVE: ICD-10-CM

## 2023-03-14 PROCEDURE — 99214 OFFICE O/P EST MOD 30 MIN: CPT | Performed by: INTERNAL MEDICINE

## 2023-03-14 NOTE — PROGRESS NOTES
Rheumatology follow-up visit note     Frank is a 77 year old male presents today for follow-up.    Frank was seen today for recheck.    Diagnoses and all orders for this visit:    Panuveitis of left eye  -     ALT; Standing  -     Albumin level; Standing  -     CBC with platelets; Standing  -     Creatinine; Standing    Antineutrophil cytoplasmic antibody (ANCA) positive    High risk medication use  -     ALT; Standing  -     Albumin level; Standing  -     CBC with platelets; Standing  -     Creatinine; Standing    Primary osteoarthritis of both knees    CRF (chronic renal failure), stage 3 (moderate) (H)  -     ALT; Standing  -     Albumin level; Standing  -     CBC with platelets; Standing  -     Creatinine; Standing        He has done well with Remicade infusions, he has renal impairment aware not to take nonsteroidals, recent liver function studies are normal.  He has discomfort in his right knee, triggering of the right index finger management principles of OA and trigger finger were discussed.    Follow up in 6 months.    HPI    Frank Obando is a 77 year old male is here for follow-up of rheumatology for the immunosuppression management, osteoarthritis.  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  Since his previous relapse the Remicade is every 6 weeks.  This is reviewed today.  He had recently had cataract surgery on the right .    He saw ophthalmology a month ago was reassured left eye cataract was discussed. He has noted discomfort in his right knee with activity he takes acetaminophen he is aware not to take nonsteroidals over-the-counter given the renal impairment.   . ROS enquiry held for fever, ocular symptoms, rash, headache,  GI issues.  He has not had sinus symptoms, hemoptysis, hematuria, ear nose inflammation signals.  He had triggering of the right index finger that is beginning to resolve.      DETAILED EXAMINATION  03/14/23  :    Vitals:    03/14/23 1026   BP: 130/82   Pulse: 78   Weight: 91.4 kg (201 lb 9.6 oz)     Alert oriented. Head including the face is examined for malar rash, heliotropes, scarring, lupus pernio. Eyes examined for redness such as in episcleritis/scleritis, periorbital lesions.   Neck/ Face examined for parotid gland swelling, range of motion of neck.  Left upper and lower and right upper and lower extremities examined for tenderness, swelling, warmth of the appendicular joints, range of motion, edema, rash.  Some of the important findings included: he does not have evidence of synovitis in the palpable joints of the upper extremities.  He has mild tenderness at the right index finger flexor tendon A1 level.  No significant deformities of the digits.  + [place] nodes.  Range of motion of the shoulders  show full abduction.  No JLT effusion or warmth of the knees.  he does not have dactylitis of the digits.     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  Created by Conversion       Iron deficiency anemia due to chronic blood loss 11/02/2021     Priority: Medium     Angiodysplasia of stomach 09/27/2021     Priority: Medium     Esophageal varices without bleeding (H) 09/27/2021     Priority: Medium     CRF (chronic renal failure), stage 3 (moderate) (H) 02/10/2020     Priority: Medium     Panuveitis, unspecified eye 03/19/2019     Priority: Medium     Essential hypertension, benign      Priority: Medium     Created by Conversion  Replacement Utility updated for latest IMO load      Formatting of this note might be different from the original.  Created by Conversion    Replacement Utility updated for latest IMO load       Type 2 diabetes mellitus with complication, without  long-term current use of insulin (H)      Priority: Medium     Created by Conversion      Formatting of this note might be different from the original.  Created by Conversion       Cholecystitis      Priority: Medium     Dyslipidemia      Priority: Medium     Thrombocytopenia (H)      Priority: Medium     Cirrhosis of liver with ascites, unspecified hepatic cirrhosis type (H)      Priority: Medium     Hyponatremia      Priority: Medium     Abdominal pain 12/20/2018     Priority: Medium     High risk medication use 09/26/2018     Priority: Medium     Polyp of colon 05/07/2018     Priority: Medium     Hypercholesterolemia      Priority: Medium     Created by Conversion      Formatting of this note might be different from the original.  Created by Conversion       Antineutrophil cytoplasmic antibody (ANCA) positive 03/23/2017     Priority: Medium     Urinary hesitancy 01/21/2017     Priority: Medium     Peripheral Neuropathy      Priority: Medium     Created by Conversion  Replacement Utility updated for latest IMO load      Formatting of this note might be different from the original.  Created by Conversion    Replacement Utility updated for latest IMO load       ALT (SGPT) level raised 03/17/2016     Priority: Medium     Scleritis of left eye 03/17/2016     Priority: Medium     Primary osteoarthritis of both knees 11/16/2015     Priority: Medium     Carpal tunnel syndrome 11/24/2014     Priority: Medium     Right bundle branch block 11/24/2014     Priority: Medium     Nephrolithiasis      Priority: Medium     Created by Conversion  [facility] Annotation: Oct 14 2009  4:44PM - Pernell Chance: KITA stent      Formatting of this note might be different from the original.  Created by Conversion  [facility] Annotation: Oct 14 2009  4:44PM Carlyle Winters stent       Cerebrovascular accident (CVA) due to thrombosis of left anterior cerebral artery (H)      Priority: Medium     Created by Conversion         Past Surgical  History:   Procedure Laterality Date     COLONOSCOPY N/A 11/16/2021    Procedure: COLONOSCOPY with polypectomy;  Surgeon: Dex Finch MD;  Location: [facility]     CYSTOSCOPY TUMOR / CONDYLOMATA W/ LASER Left 7/18/2014     ESOPHAGOSCOPY, GASTROSCOPY, DUODENOSCOPY (EGD), COMBINED N/A 11/16/2021    Procedure: ESOPHAGOGASTRODUODENOSCOPY (EGD) with biopsies and argon plasma coagulation;  Surgeon: Dex Finch MD;  Location: [facility]     HC CYSTOSCOPY,INSERT URETERAL STENT      Description: Cystoscopy With Insertion Of Ureteral Stent Right;  Recorded: 10/14/2009;  Comments: stone      Past Medical History:   Diagnosis Date     Anemia      Arthritis     osteoarthritis     Calculus of kidney      Diabetes mellitus (H)      Episcleritis of left eye      Hyperlipidemia      Hypertension      Iron deficiency anemia due to chronic blood loss 11/2/2021     Peripheral neuropathy      Stroke (H)      Allergies   Allergen Reactions     Morphine Nausea and Vomiting     Other Allergy (See Comments) [External Allergen Needs Reconciliation - See Comment] Unknown     Scopolamine HBr CHANG, 08/27/2013.       Scopolamine Hbr [Scopolamine] Unknown     Current Outpatient Medications   Medication Sig Dispense Refill     atorvastatin (LIPITOR) 40 MG tablet TAKE 1 TABLET BY MOUTH DAILY 90 tablet 2     blood glucose meter (GLUCOMETER) [BLOOD GLUCOSE METER (GLUCOMETER)] Use 1 each As Directed 2 (two) times a day. Dispense glucometer brand per patient's insurance at pharmacy discretion. 1 each 0     blood glucose test (ACCU-CHEK AURORA PLUS TEST STRP) strips [BLOOD GLUCOSE TEST (ACCU-CHEK AURORA PLUS TEST STRP) STRIPS] Use 1 each As Directed 2 (two) times a day. Accu-chek Aurora Plus 200 strip 11     cyanocobalamin, vitamin B-12, 2,500 mcg Tab [CYANOCOBALAMIN, VITAMIN B-12, 2,500 MCG TAB] Take 2,500 mcg by mouth daily.       ferrous sulfate (FEROSUL) 325 (65 Fe) MG tablet Take 1 tablet (325 mg) by mouth daily (with  breakfast) 90 tablet 3     generic lancets (ACCU-CHEK SOFTCLIX LANCETS) [GENERIC LANCETS (ACCU-CHEK SOFTCLIX LANCETS)] Use 1 each As Directed 2 (two) times a day. Dispense brand per patient's insurance at pharmacy discretion. 100 each 3     inFLIXimab (REMICADE IV) Every 8 weeks       lisinopril (ZESTRIL) 10 MG tablet Take 1 tablet (10 mg) by mouth daily 90 tablet 3     magnesium oxide 250 mg Tab [MAGNESIUM OXIDE 250 MG TAB] Take 250 mg by mouth 2 (two) times a day.       metFORMIN (GLUCOPHAGE) 500 MG tablet TAKE 2 TABLETS BY MOUTH TWICE DAILY AT 6 AM AND AT 4  tablet 3     omeprazole (PRILOSEC) 40 MG DR capsule TAKE 1 CAPSULE BY MOUTH DAILY 90 capsule 3     pioglitazone (ACTOS) 30 MG tablet TAKE 1 TABLET BY MOUTH EVERY DAY 90 tablet 3     propranolol (INDERAL) 20 MG tablet TAKE 1 TABLET BY MOUTH TWICE DAILY 180 tablet 3     tamsulosin (FLOMAX) 0.4 MG capsule TAKE 1 CAPSULE BY MOUTH DAILY AFTER AND SUPPER 90 capsule 3     family history includes Coronary Artery Disease in his mother; Diabetes in his mother; Lung Cancer in his father.  Social Connections: Not on file          WBC Count   Date Value Ref Range Status   03/09/2023 4.6 4.0 - 11.0 10e3/uL Final     RBC Count   Date Value Ref Range Status   03/09/2023 3.69 (L) 4.40 - 5.90 10e6/uL Final     Hemoglobin   Date Value Ref Range Status   03/09/2023 11.2 (L) 13.3 - 17.7 g/dL Final     Hematocrit   Date Value Ref Range Status   03/09/2023 34.3 (L) 40.0 - 53.0 % Final     MCV   Date Value Ref Range Status   03/09/2023 93 78 - 100 fL Final     MCH   Date Value Ref Range Status   03/09/2023 30.4 26.5 - 33.0 pg Final     Platelet Count   Date Value Ref Range Status   03/09/2023 119 (L) 150 - 450 10e3/uL Final     % Lymphocytes   Date Value Ref Range Status   10/05/2021 29 % Final     AST   Date Value Ref Range Status   01/21/2022 30 0 - 40 U/L Final     ALT   Date Value Ref Range Status   03/09/2023 34 10 - 50 U/L Final     Albumin   Date Value Ref Range Status    03/09/2023 4.0 3.5 - 5.2 g/dL Final   11/01/2022 3.4 (L) 3.5 - 5.0 g/dL Final     Alkaline Phosphatase   Date Value Ref Range Status   01/21/2022 87 45 - 120 U/L Final     Creatinine   Date Value Ref Range Status   03/09/2023 1.28 (H) 0.67 - 1.17 mg/dL Final     GFR Estimate   Date Value Ref Range Status   03/09/2023 58 (L) >60 mL/min/1.73m2 Final     Comment:     eGFR calculated using 2021 CKD-EPI equation.   02/08/2021 48 (L) >60 mL/min/1.73m2 Final     GFR Estimate If Black   Date Value Ref Range Status   02/08/2021 59 (L) >60 mL/min/1.73m2 Final     Creatinine Urine mg/dL   Date Value Ref Range Status   01/03/2023 116.0 mg/dL Final     Comment:     The reference ranges have not been established in urine creatinine. The results should be integrated into the clinical context for interpretation.   09/20/2021 116 mg/dL Final

## 2023-03-19 NOTE — TELEPHONE ENCOUNTER
New rx sent in.   
Reason for Call:  Other returning call      Detailed comments:     Patient stated that pharmacy called two weeks ago to refill test strips, not yet filled.       [facility] Plus- 50 ct x 2     Phone Number Patient can be reached at: Home number on file [phone] (home)    Best Time: anytime    Can we leave a detailed message on this number?: Yes    Call taken on 6/7/2021 at 10:50 AM by Haley Jimenez    
full weight-bearing

## 2023-03-24 ENCOUNTER — APPOINTMENT (OUTPATIENT)
Dept: CT IMAGING | Facility: CLINIC | Age: 78
DRG: 872 | End: 2023-03-24
Attending: EMERGENCY MEDICINE
Payer: MEDICARE

## 2023-03-24 ENCOUNTER — APPOINTMENT (OUTPATIENT)
Dept: OCCUPATIONAL THERAPY | Facility: CLINIC | Age: 78
DRG: 872 | End: 2023-03-24
Attending: INTERNAL MEDICINE
Payer: MEDICARE

## 2023-03-24 ENCOUNTER — APPOINTMENT (OUTPATIENT)
Dept: RADIOLOGY | Facility: CLINIC | Age: 78
DRG: 872 | End: 2023-03-24
Attending: EMERGENCY MEDICINE
Payer: MEDICARE

## 2023-03-24 ENCOUNTER — HOSPITAL ENCOUNTER (INPATIENT)
Facility: CLINIC | Age: 78
LOS: 3 days | Discharge: HOME | DRG: 872 | End: 2023-03-27
Attending: EMERGENCY MEDICINE | Admitting: INTERNAL MEDICINE
Payer: MEDICARE

## 2023-03-24 DIAGNOSIS — A41.9 SEPSIS, DUE TO UNSPECIFIED ORGANISM, UNSPECIFIED WHETHER ACUTE ORGAN DYSFUNCTION PRESENT (H): ICD-10-CM

## 2023-03-24 DIAGNOSIS — N39.0 URINARY TRACT INFECTION WITH HEMATURIA, SITE UNSPECIFIED: ICD-10-CM

## 2023-03-24 DIAGNOSIS — R31.9 URINARY TRACT INFECTION WITH HEMATURIA, SITE UNSPECIFIED: ICD-10-CM

## 2023-03-24 DIAGNOSIS — K80.20 GALLSTONES: ICD-10-CM

## 2023-03-24 DIAGNOSIS — R78.81 BACTEREMIA: Primary | ICD-10-CM

## 2023-03-24 DIAGNOSIS — R79.89 ELEVATED SERUM CREATININE: ICD-10-CM

## 2023-03-24 PROBLEM — R39.11 URINARY HESITANCY: Status: ACTIVE | Noted: 2017-01-21

## 2023-03-24 PROBLEM — R76.8 ANTINEUTROPHIL CYTOPLASMIC ANTIBODY (ANCA) POSITIVE: Status: ACTIVE | Noted: 2017-03-23

## 2023-03-24 PROBLEM — N18.32 STAGE 3B CHRONIC KIDNEY DISEASE (H): Status: ACTIVE | Noted: 2023-03-24

## 2023-03-24 PROBLEM — D72.829 LEUKOCYTOSIS, UNSPECIFIED TYPE: Status: ACTIVE | Noted: 2023-03-24

## 2023-03-24 LAB
ALBUMIN SERPL-MCNC: 3.9 G/DL (ref 3.5–5)
ALBUMIN UR-MCNC: 30 MG/DL
ALP SERPL-CCNC: 87 U/L (ref 45–120)
ALT SERPL W P-5'-P-CCNC: 25 U/L (ref 0–45)
ANION GAP SERPL CALCULATED.3IONS-SCNC: 8 MMOL/L (ref 5–18)
APPEARANCE UR: CLEAR
AST SERPL W P-5'-P-CCNC: 27 U/L (ref 0–40)
BASE EXCESS BLDV CALC-SCNC: 0.6 MMOL/L
BASOPHILS # BLD AUTO: 0 10E3/UL (ref 0–0.2)
BASOPHILS NFR BLD AUTO: 0 %
BILIRUB SERPL-MCNC: 0.7 MG/DL (ref 0–1)
BILIRUB UR QL STRIP: NEGATIVE
BUN SERPL-MCNC: 18 MG/DL (ref 8–28)
CALCIUM SERPL-MCNC: 9.2 MG/DL (ref 8.5–10.5)
CHLORIDE BLD-SCNC: 103 MMOL/L (ref 98–107)
CO2 SERPL-SCNC: 25 MMOL/L (ref 22–31)
COLOR UR AUTO: YELLOW
CREAT SERPL-MCNC: 1.45 MG/DL (ref 0.7–1.3)
EOSINOPHIL # BLD AUTO: 0 10E3/UL (ref 0–0.7)
EOSINOPHIL NFR BLD AUTO: 0 %
ERYTHROCYTE [DISTWIDTH] IN BLOOD BY AUTOMATED COUNT: 13.2 % (ref 10–15)
FLUAV RNA SPEC QL NAA+PROBE: NEGATIVE
FLUBV RNA RESP QL NAA+PROBE: NEGATIVE
GFR SERPL CREATININE-BSD FRML MDRD: 50 ML/MIN/1.73M2
GLUCOSE BLD-MCNC: 198 MG/DL (ref 70–125)
GLUCOSE BLDC GLUCOMTR-MCNC: 148 MG/DL (ref 70–99)
GLUCOSE BLDC GLUCOMTR-MCNC: 151 MG/DL (ref 70–99)
GLUCOSE BLDC GLUCOMTR-MCNC: 203 MG/DL (ref 70–99)
GLUCOSE UR STRIP-MCNC: NEGATIVE MG/DL
HCO3 BLDV-SCNC: 25 MMOL/L (ref 24–30)
HCT VFR BLD AUTO: 34.6 % (ref 40–53)
HGB BLD-MCNC: 11.7 G/DL (ref 13.3–17.7)
HGB UR QL STRIP: NEGATIVE
IMM GRANULOCYTES # BLD: 0.1 10E3/UL
IMM GRANULOCYTES NFR BLD: 1 %
KETONES BLD-SCNC: 0.15 MMOL/L
KETONES UR STRIP-MCNC: NEGATIVE MG/DL
LACTATE SERPL-SCNC: 1.8 MMOL/L (ref 0.7–2)
LEUKOCYTE ESTERASE UR QL STRIP: ABNORMAL
LYMPHOCYTES # BLD AUTO: 1.3 10E3/UL (ref 0.8–5.3)
LYMPHOCYTES NFR BLD AUTO: 8 %
MAGNESIUM SERPL-MCNC: 1.5 MG/DL (ref 1.8–2.6)
MCH RBC QN AUTO: 30.5 PG (ref 26.5–33)
MCHC RBC AUTO-ENTMCNC: 33.8 G/DL (ref 31.5–36.5)
MCV RBC AUTO: 90 FL (ref 78–100)
MONOCYTES # BLD AUTO: 1.3 10E3/UL (ref 0–1.3)
MONOCYTES NFR BLD AUTO: 9 %
NEUTROPHILS # BLD AUTO: 12.7 10E3/UL (ref 1.6–8.3)
NEUTROPHILS NFR BLD AUTO: 82 %
NITRATE UR QL: NEGATIVE
NRBC # BLD AUTO: 0 10E3/UL
NRBC BLD AUTO-RTO: 0 /100
OXYHGB MFR BLDV: 52.2 % (ref 70–75)
PCO2 BLDV: 41 MM HG (ref 35–50)
PH BLDV: 7.4 [PH] (ref 7.35–7.45)
PH UR STRIP: 7.5 [PH] (ref 5–7)
PLATELET # BLD AUTO: 120 10E3/UL (ref 150–450)
PO2 BLDV: 29 MM HG (ref 25–47)
POTASSIUM BLD-SCNC: 4.5 MMOL/L (ref 3.5–5)
PROCALCITONIN SERPL-MCNC: 0.24 NG/ML (ref 0–0.49)
PROT SERPL-MCNC: 8.3 G/DL (ref 6–8)
RBC # BLD AUTO: 3.83 10E6/UL (ref 4.4–5.9)
RBC URINE: 5 /HPF
RSV RNA SPEC NAA+PROBE: NEGATIVE
SAO2 % BLDV: 53.3 % (ref 70–75)
SARS-COV-2 RNA RESP QL NAA+PROBE: NEGATIVE
SODIUM SERPL-SCNC: 136 MMOL/L (ref 136–145)
SP GR UR STRIP: 1.02 (ref 1–1.03)
SQUAMOUS EPITHELIAL: <1 /HPF
UROBILINOGEN UR STRIP-MCNC: <2 MG/DL
WBC # BLD AUTO: 15.5 10E3/UL (ref 4–11)
WBC URINE: 101 /HPF

## 2023-03-24 PROCEDURE — 82010 KETONE BODYS QUAN: CPT | Performed by: EMERGENCY MEDICINE

## 2023-03-24 PROCEDURE — C9803 HOPD COVID-19 SPEC COLLECT: HCPCS

## 2023-03-24 PROCEDURE — 97165 OT EVAL LOW COMPLEX 30 MIN: CPT | Mod: GO

## 2023-03-24 PROCEDURE — 99223 1ST HOSP IP/OBS HIGH 75: CPT | Mod: AI | Performed by: INTERNAL MEDICINE

## 2023-03-24 PROCEDURE — 82374 ASSAY BLOOD CARBON DIOXIDE: CPT | Performed by: EMERGENCY MEDICINE

## 2023-03-24 PROCEDURE — 87149 DNA/RNA DIRECT PROBE: CPT | Performed by: EMERGENCY MEDICINE

## 2023-03-24 PROCEDURE — 83605 ASSAY OF LACTIC ACID: CPT | Performed by: EMERGENCY MEDICINE

## 2023-03-24 PROCEDURE — 99285 EMERGENCY DEPT VISIT HI MDM: CPT | Mod: 25,CS

## 2023-03-24 PROCEDURE — G1010 CDSM STANSON: HCPCS

## 2023-03-24 PROCEDURE — 84145 PROCALCITONIN (PCT): CPT | Performed by: EMERGENCY MEDICINE

## 2023-03-24 PROCEDURE — 258N000002 HC RX IP 258 OP 250: Performed by: INTERNAL MEDICINE

## 2023-03-24 PROCEDURE — 250N000012 HC RX MED GY IP 250 OP 636 PS 637: Performed by: INTERNAL MEDICINE

## 2023-03-24 PROCEDURE — 82962 GLUCOSE BLOOD TEST: CPT

## 2023-03-24 PROCEDURE — 96365 THER/PROPH/DIAG IV INF INIT: CPT

## 2023-03-24 PROCEDURE — 250N000013 HC RX MED GY IP 250 OP 250 PS 637: Performed by: INTERNAL MEDICINE

## 2023-03-24 PROCEDURE — 250N000011 HC RX IP 250 OP 636: Performed by: EMERGENCY MEDICINE

## 2023-03-24 PROCEDURE — 120N000001 HC R&B MED SURG/OB

## 2023-03-24 PROCEDURE — 87147 CULTURE TYPE IMMUNOLOGIC: CPT | Performed by: EMERGENCY MEDICINE

## 2023-03-24 PROCEDURE — 250N000011 HC RX IP 250 OP 636: Performed by: INTERNAL MEDICINE

## 2023-03-24 PROCEDURE — 96361 HYDRATE IV INFUSION ADD-ON: CPT

## 2023-03-24 PROCEDURE — 83735 ASSAY OF MAGNESIUM: CPT | Performed by: INTERNAL MEDICINE

## 2023-03-24 PROCEDURE — 87637 SARSCOV2&INF A&B&RSV AMP PRB: CPT | Performed by: EMERGENCY MEDICINE

## 2023-03-24 PROCEDURE — 81001 URINALYSIS AUTO W/SCOPE: CPT | Performed by: EMERGENCY MEDICINE

## 2023-03-24 PROCEDURE — 71046 X-RAY EXAM CHEST 2 VIEWS: CPT

## 2023-03-24 PROCEDURE — 82805 BLOOD GASES W/O2 SATURATION: CPT | Performed by: EMERGENCY MEDICINE

## 2023-03-24 PROCEDURE — 36415 COLL VENOUS BLD VENIPUNCTURE: CPT | Performed by: EMERGENCY MEDICINE

## 2023-03-24 PROCEDURE — 258N000003 HC RX IP 258 OP 636: Performed by: EMERGENCY MEDICINE

## 2023-03-24 PROCEDURE — 85025 COMPLETE CBC W/AUTO DIFF WBC: CPT | Performed by: EMERGENCY MEDICINE

## 2023-03-24 PROCEDURE — 87077 CULTURE AEROBIC IDENTIFY: CPT | Performed by: EMERGENCY MEDICINE

## 2023-03-24 PROCEDURE — 74177 CT ABD & PELVIS W/CONTRAST: CPT | Mod: MG

## 2023-03-24 RX ORDER — DEXTROSE MONOHYDRATE 25 G/50ML
25-50 INJECTION, SOLUTION INTRAVENOUS
Status: DISCONTINUED | OUTPATIENT
Start: 2023-03-24 | End: 2023-03-27 | Stop reason: HOSPADM

## 2023-03-24 RX ORDER — MAGNESIUM SULFATE HEPTAHYDRATE 40 MG/ML
2 INJECTION, SOLUTION INTRAVENOUS ONCE
Status: COMPLETED | OUTPATIENT
Start: 2023-03-24 | End: 2023-03-24

## 2023-03-24 RX ORDER — NICOTINE POLACRILEX 4 MG
15-30 LOZENGE BUCCAL
Status: DISCONTINUED | OUTPATIENT
Start: 2023-03-24 | End: 2023-03-27 | Stop reason: HOSPADM

## 2023-03-24 RX ORDER — TAMSULOSIN HYDROCHLORIDE 0.4 MG/1
0.4 CAPSULE ORAL DAILY
Status: DISCONTINUED | OUTPATIENT
Start: 2023-03-24 | End: 2023-03-27 | Stop reason: HOSPADM

## 2023-03-24 RX ORDER — BISACODYL 10 MG
10 SUPPOSITORY, RECTAL RECTAL DAILY PRN
Status: DISCONTINUED | OUTPATIENT
Start: 2023-03-24 | End: 2023-03-27 | Stop reason: HOSPADM

## 2023-03-24 RX ORDER — CEFTRIAXONE 2 G/1
2 INJECTION, POWDER, FOR SOLUTION INTRAMUSCULAR; INTRAVENOUS ONCE
Status: COMPLETED | OUTPATIENT
Start: 2023-03-24 | End: 2023-03-24

## 2023-03-24 RX ORDER — CEFTRIAXONE 1 G/1
1 INJECTION, POWDER, FOR SOLUTION INTRAMUSCULAR; INTRAVENOUS EVERY 24 HOURS
Status: DISCONTINUED | OUTPATIENT
Start: 2023-03-25 | End: 2023-03-26

## 2023-03-24 RX ORDER — ACETAMINOPHEN 325 MG/1
650 TABLET ORAL EVERY 4 HOURS PRN
Status: DISCONTINUED | OUTPATIENT
Start: 2023-03-24 | End: 2023-03-27 | Stop reason: HOSPADM

## 2023-03-24 RX ORDER — SODIUM CHLORIDE 450 MG/100ML
INJECTION, SOLUTION INTRAVENOUS CONTINUOUS
Status: DISCONTINUED | OUTPATIENT
Start: 2023-03-24 | End: 2023-03-27 | Stop reason: HOSPADM

## 2023-03-24 RX ORDER — ENOXAPARIN SODIUM 100 MG/ML
40 INJECTION SUBCUTANEOUS EVERY 24 HOURS
Status: DISCONTINUED | OUTPATIENT
Start: 2023-03-24 | End: 2023-03-27 | Stop reason: HOSPADM

## 2023-03-24 RX ORDER — ATORVASTATIN CALCIUM 40 MG/1
40 TABLET, FILM COATED ORAL DAILY
Status: DISCONTINUED | OUTPATIENT
Start: 2023-03-24 | End: 2023-03-27 | Stop reason: HOSPADM

## 2023-03-24 RX ORDER — BISACODYL 5 MG
5 TABLET, DELAYED RELEASE (ENTERIC COATED) ORAL DAILY PRN
Status: DISCONTINUED | OUTPATIENT
Start: 2023-03-24 | End: 2023-03-27 | Stop reason: HOSPADM

## 2023-03-24 RX ORDER — PANTOPRAZOLE SODIUM 40 MG/1
40 TABLET, DELAYED RELEASE ORAL
Status: DISCONTINUED | OUTPATIENT
Start: 2023-03-25 | End: 2023-03-27 | Stop reason: HOSPADM

## 2023-03-24 RX ORDER — AMOXICILLIN 250 MG
1 CAPSULE ORAL 2 TIMES DAILY PRN
Status: DISCONTINUED | OUTPATIENT
Start: 2023-03-24 | End: 2023-03-27 | Stop reason: HOSPADM

## 2023-03-24 RX ORDER — PROPRANOLOL HYDROCHLORIDE 20 MG/1
20 TABLET ORAL 2 TIMES DAILY
Status: DISCONTINUED | OUTPATIENT
Start: 2023-03-24 | End: 2023-03-27 | Stop reason: HOSPADM

## 2023-03-24 RX ORDER — IOPAMIDOL 755 MG/ML
90 INJECTION, SOLUTION INTRAVASCULAR ONCE
Status: COMPLETED | OUTPATIENT
Start: 2023-03-24 | End: 2023-03-24

## 2023-03-24 RX ORDER — LANOLIN ALCOHOL/MO/W.PET/CERES
3 CREAM (GRAM) TOPICAL
Status: DISCONTINUED | OUTPATIENT
Start: 2023-03-24 | End: 2023-03-27 | Stop reason: HOSPADM

## 2023-03-24 RX ADMIN — CEFTRIAXONE SODIUM 2 G: 2 INJECTION, POWDER, FOR SOLUTION INTRAMUSCULAR; INTRAVENOUS at 10:10

## 2023-03-24 RX ADMIN — SODIUM CHLORIDE: 4.5 INJECTION, SOLUTION INTRAVENOUS at 21:32

## 2023-03-24 RX ADMIN — ATORVASTATIN CALCIUM 40 MG: 40 TABLET, FILM COATED ORAL at 14:07

## 2023-03-24 RX ADMIN — SODIUM CHLORIDE 1000 ML: 9 INJECTION, SOLUTION INTRAVENOUS at 08:04

## 2023-03-24 RX ADMIN — PROPRANOLOL HYDROCHLORIDE 20 MG: 20 TABLET ORAL at 21:00

## 2023-03-24 RX ADMIN — ENOXAPARIN SODIUM 40 MG: 100 INJECTION SUBCUTANEOUS at 14:07

## 2023-03-24 RX ADMIN — IOPAMIDOL 90 ML: 755 INJECTION, SOLUTION INTRAVENOUS at 09:56

## 2023-03-24 RX ADMIN — MAGNESIUM SULFATE HEPTAHYDRATE 2 G: 40 INJECTION, SOLUTION INTRAVENOUS at 14:36

## 2023-03-24 RX ADMIN — TAMSULOSIN HYDROCHLORIDE 0.4 MG: 0.4 CAPSULE ORAL at 14:07

## 2023-03-24 RX ADMIN — INSULIN ASPART 1 UNITS: 100 INJECTION, SOLUTION INTRAVENOUS; SUBCUTANEOUS at 14:08

## 2023-03-24 ASSESSMENT — ENCOUNTER SYMPTOMS
CONSTIPATION: 0
COUGH: 0
JOINT SWELLING: 0
WEAKNESS: 1
DIARRHEA: 0
HEADACHES: 0
COLOR CHANGE: 0
WOUND: 0
RECTAL PAIN: 0
RHINORRHEA: 0
ARTHRALGIAS: 1
ABDOMINAL PAIN: 0
NAUSEA: 0
SORE THROAT: 0
FREQUENCY: 1
CHILLS: 1
DYSURIA: 0

## 2023-03-24 ASSESSMENT — ACTIVITIES OF DAILY LIVING (ADL)
ADLS_ACUITY_SCORE: 38
IADL_COMMENTS: FAMILY WILL DO UNTIL PT IS RECOVERED
ADLS_ACUITY_SCORE: 38

## 2023-03-24 NOTE — PROGRESS NOTES
Occupational Therapy Discharge Summary    Reason for therapy discharge:    Pt seen for OT eval only-has no skilled OT needs at this time.  Anticipate he will discharge to home when medically ready.

## 2023-03-24 NOTE — H&P
Admission History and Physical   Frank Obando, 1945, [id number]  [facility]  Urinary tract infection with hematuria, site unspecified [N39.0, R31.9]  PCP:Fei Lopez, None   Admitting provider: Claudette Feliciano MD.    Code status:  Full Code          Extended Emergency Contact Information  Primary Emergency Contact: Valerie Obando  Address: [address] [place] [address]  Home Phone: [phone]  Mobile Phone: [phone]  Relation: Spouse       Assessment and Plan  Principal Problem:    Urinary tract infection with hematuria, site unspecified  Active Problems:    Essential hypertension, benign    Type 2 diabetes mellitus with complication, without long-term current use of insulin (H)    Urinary hesitancy    Antineutrophil cytoplasmic antibody (ANCA) positive    Dyslipidemia    Thrombocytopenia (H)    Sepsis, due to unspecified organism, unspecified whether acute organ dysfunction present (H)    Leukocytosis, unspecified type    Stage 3b chronic kidney disease (H)    Frank Obando is a 77 year old male presenting with h/o DM II, CVA, anemia, stage 3 CRF, hyperlipidemia, hypertension, and liver cirrhosis who presents to this ED by walk in for evaluation of generalized weakness and urinary frequency.    Sepsis secondary to UTI   - Ua looks dirty Ucx pending  - continue IV ceftriaxone for now   - IVF  - blood cx x2 pending  - lactic normal, procal pending  - vitals stale no telemetry needed at this time, was tachy in ED prior to admission but resolved after fluid bolus  - symptom management     Leukocytosis from above  - IVF and Abx  - trend CBC    DM  - holding metformin and actos for now  - DM diet  - novolog low sliding scale adjust as needed  - hypoglycemia protocol     HTN Bps stable  - monitor for now  - holding lisinopril reassess in am if to resume  - continue propranolol     CKD3b at baseline  - IVF  - trend labs  - holding ACE for now  -  avoid nephrotoxic drugs and renal dose meds.     Thrombocytopenia chronic  - stable around baseline  - monitor while on lovenox     H/o urinary retention  - continue flomax  - bladder protocol  - bladder scan for PVR    Dyslipidemia  - continue home statin     RA  - no other oral meds, getting inflixamab every 6 weeks     COVID-19 PCR Results    COVID-19 PCR Results 11/12/21 3/24/23   SARS CoV2 PCR Negative Negative      Comments are available for some flowsheets but are not being displayed.         COVID-19 Antibody Results, Testing for Immunity    COVID-19 Antibody Results, Testing for Immunity   No data to display.           VTE prophylaxis:  Enoxaparin (Lovenox) SQ  DIET: Orders Placed This Encounter      Combination Diet Moderate Consistent Carb (60 g CHO per Meal) Diet    Drains/Lines: none  Weight bearing status: WBAT  Disposition/Barriers to discharge: pending culture results and improvement at least 2 midnights   Code Status:Full Code discussed with patient     HPI: Frank Obando is a 77 year old male with h/o DM II, CVA, anemia, stage 3 CRF, hyperlipidemia, hypertension, and liver cirrhosis who presents to this ED by walk in for evaluation of generalized weakness and urinary frequency.     Patient reports waking up around 4 AM today and feeling generally weak as he tried to walk to the bathroom. He also began experiencing urinary frequency and chills this morning. Patient otherwise felt okay last night (3/23/23) before going to bed. He has not taken any Tylenol today. He denies any dysuria, testicular pain, rectal pain, scrotal pain, scrotal swelling, or scrotal redness. Patient denies having any open wounds or sores. He denies any cough, abdominal pain, nausea, headache, ear pain, rhinorrhea, sore throat, or bowel changes. Patient denies any recent medication changes. His wife notes the patient had an infusion 2 days ago for his rheumatoid arthritis. He receives infusions every 6 weeks. Patient has  chronic bilateral knee pain from his arthritis, but states that his knees are not more swollen than normal currently. His wife notes the patient has a history of a UTI but otherwise is not prone to infections. No other complaints or concerns expressed at this time.     Past Medical History:   Diagnosis Date     Anemia      Arthritis     osteoarthritis     Calculus of kidney      Diabetes mellitus (H)      Episcleritis of left eye      Hyperlipidemia      Hypertension      Iron deficiency anemia due to chronic blood loss 11/2/2021     Peripheral neuropathy      Stroke (H)      Past Surgical History:   Procedure Laterality Date     COLONOSCOPY N/A 11/16/2021    Procedure: COLONOSCOPY with polypectomy;  Surgeon: Dex Finch MD;  Location: [facility]     CYSTOSCOPY TUMOR / CONDYLOMATA W/ LASER Left 7/18/2014     ESOPHAGOSCOPY, GASTROSCOPY, DUODENOSCOPY (EGD), COMBINED N/A 11/16/2021    Procedure: ESOPHAGOGASTRODUODENOSCOPY (EGD) with biopsies and argon plasma coagulation;  Surgeon: Dex Finch MD;  Location: [facility]     HC CYSTOSCOPY,INSERT URETERAL STENT      Description: Cystoscopy With Insertion Of Ureteral Stent Right;  Recorded: 10/14/2009;  Comments: stone     Family History   Problem Relation Age of Onset     Lung Cancer Father      Coronary Artery Disease Mother      Diabetes Mother      Social History     Socioeconomic History     Marital status:      Spouse name: Not on file     Number of children: Not on file     Years of education: Not on file     Highest education level: Not on file   Occupational History     Not on file   Tobacco Use     Smoking status: Never     Smokeless tobacco: Never   Substance and Sexual Activity     Alcohol use: No     Drug use: No     Sexual activity: Not on file   Other Topics Concern     Parent/sibling w/ CABG, MI or angioplasty before 65F 55M? Not Asked   Social History Narrative     Not on file     Social Determinants of Health      Financial Resource Strain: Not on file   Food Insecurity: Not on file   Transportation Needs: Not on file   Physical Activity: Not on file   Stress: Not on file   Social Connections: Not on file   Intimate Partner Violence: Not on file   Housing Stability: Not on file     Allergies   Allergen Reactions     Morphine Nausea and Vomiting     Other Allergy (See Comments) [External Allergen Needs Reconciliation - See Comment] Unknown     Scopolamine HBr CHANG, 08/27/2013.       Scopolamine Hbr [Scopolamine] Unknown       PRIOR TO ADMISSION MEDICATIONS   Prior to Admission medications    Medication Sig Last Dose Taking? Auth Provider Long Term End Date   atorvastatin (LIPITOR) 40 MG tablet TAKE 1 TABLET BY MOUTH DAILY 3/23/2023 Yes Fei Lopez MD Yes    cyanocobalamin, vitamin B-12, 2,500 mcg Tab [CYANOCOBALAMIN, VITAMIN B-12, 2,500 MCG TAB] Take 2,500 mcg by mouth daily. 3/23/2023 Yes Provider, Historical     ferrous sulfate (FEROSUL) 325 (65 Fe) MG tablet Take 1 tablet (325 mg) by mouth daily (with breakfast)  Patient taking differently: Take 325-650 mg by mouth 2 tablets in morning 1 tablet in evening 3/23/2023 Yes Fei Lopez MD     inFLIXimab (REMICADE IV) Every 6weeks 3/17/2023 Yes Reported, Patient     lisinopril (ZESTRIL) 10 MG tablet Take 1 tablet (10 mg) by mouth daily 3/23/2023 Yes Fei Lopez MD Yes    magnesium oxide 250 mg Tab [MAGNESIUM OXIDE 250 MG TAB] Take 250 mg by mouth 2 (two) times a day. 3/23/2023 Yes Provider, Historical     metFORMIN (GLUCOPHAGE) 500 MG tablet TAKE 2 TABLETS BY MOUTH TWICE DAILY AT 6 AM AND AT 4 PM 3/23/2023 Yes Fei Lopez MD Yes    omeprazole (PRILOSEC) 40 MG DR capsule TAKE 1 CAPSULE BY MOUTH DAILY 3/23/2023 Yes Fei Lopez MD Yes    pioglitazone (ACTOS) 30 MG tablet TAKE 1 TABLET BY MOUTH EVERY DAY 3/23/2023 Yes Fei Lopez MD Yes    propranolol (INDERAL) 20 MG tablet TAKE 1 TABLET BY MOUTH TWICE DAILY 3/23/2023 Yes Fei Lopez MD Yes     tamsulosin (FLOMAX) 0.4 MG capsule TAKE 1 CAPSULE BY MOUTH DAILY AFTER AND SUPPER 3/23/2023 Yes Fei Lopez MD Yes    blood glucose meter (GLUCOMETER) [BLOOD GLUCOSE METER (GLUCOMETER)] Use 1 each As Directed 2 (two) times a day. Dispense glucometer brand per patient's insurance at pharmacy discretion.   Fei Lopez MD     blood glucose test (ACCU-CHEK JACOB PLUS TEST STRP) strips [BLOOD GLUCOSE TEST (ACCU-CHEK JACOB PLUS TEST STRP) STRIPS] Use 1 each As Directed 2 (two) times a day. Accu-chek Jacob Plus   Fei Lopez MD     generic lancets (ACCU-CHEK SOFTCLIX LANCETS) [GENERIC LANCETS (ACCU-CHEK SOFTCLIX LANCETS)] Use 1 each As Directed 2 (two) times a day. Dispense brand per patient's insurance at pharmacy discretion.   Fei Lopez MD          REVIEW OF SYSTEMS:  12 point reviewed pertinent negatives and positives in HPI all others negative     PHYSICAL EXAM  Temp:  [100.3  F (37.9  C)] 100.3  F (37.9  C)  Pulse:  [] 76  Resp:  [18] 18  BP: (113-155)/(56-82) 123/61  SpO2:  [94 %-96 %] 96 %  Wt Readings from Last 1 Encounters:   03/24/23 90.7 kg (200 lb)     Body mass index is 28.7 kg/m .  Physical Exam    PERTINENT LABS and RADIOLOGY   Results for orders placed or performed during the hospital encounter of 03/24/23   Chest XR,  PA & LAT    Impression    IMPRESSION: Heart and mediastinal size are normal. Minimal amount of streaky nodularity involves the mid right lung, unchanged and likely reflecting scarring. Left lung is clear. No definite acute infiltrate. No pleural effusion or pneumothorax.   CT Abdomen Pelvis w Contrast    Impression    IMPRESSION:   1.  No urinary tract stones or hydronephrosis.  2.  Cholelithiasis.  3.  No abscess or obvious intra-abdominal inflammation.       Recent Labs   Lab 03/24/23  0745   WBC 15.5*   HGB 11.7*   MCV 90   *      POTASSIUM 4.5   CHLORIDE 103   CO2 25   BUN 18   CR 1.45*   ANIONGAP 8   CINDY 9.2   *   ALBUMIN 3.9   PROTTOTAL  8.3*   BILITOTAL 0.7   ALKPHOS 87   ALT 25   AST 27         Claudette Feliciano MD  [facility]  [phone]

## 2023-03-24 NOTE — ED PROVIDER NOTES
EMERGENCY DEPARTMENT ENCOUNTER      NAME: Frank Obando  AGE: 77 year old male  YOB: 1945  MRN: [id number]  EVALUATION DATE & TIME: No admission date for patient encounter.    PCP: Fei Lopez    ED PROVIDER: Samson Coy MD        Chief Complaint   Patient presents with     Generalized Weakness     Chills         FINAL IMPRESSION:  1. Sepsis, due to unspecified organism, unspecified whether acute organ dysfunction present (H)    2. Urinary tract infection with hematuria, site unspecified    3. Gallstones    4. Elevated serum creatinine          ED COURSE & MEDICAL DECISION MAKING:    Pertinent Labs & Imaging studies reviewed. (See chart for details)  77 year old male presents to the Emergency Department for evaluation of generalized weakness, chills, dysuria and urinary frequency    ED Course as of 03/24/23 1210   Fri Mar 24, 2023   0834 77-year-old immunosuppressive patient who presents with dysuria and urinary frequency today and chills and body aches   0835 History of diabetes   0835 Differential includes sepsis, urinary tract infection, SBP, diverticulitis, pneumonia, COVID, DKA, influenza, cellulitis with open wound or sore   0835 Overall well-appearing but does have tachycardia, and elevated temperature   0836 Will obtain COVID-19, influenza, ketones, VBG, CMP, CBC, UA, lactic acid, will give 1 L IV fluids and obtain blood cultures   0836 WBC(!): 15.5   0836 UA is suggestive of UTI and does have some red blood cells   0836 Leukocyte Esterase Urine(!): 250 Scott/uL   0836 RBC Urine(!): 5   0836 WBC Urine(!): 101   0836 Ketone Quantitative: 0.15   0837 I did review rheumatology office visit from March 14 of this year   0837 Patient's care is complicated by his immunosuppression   0837 Septic joint unlikely based on history and exam   0837 Patient denies any abscess or open wounds besides a scrape to his lower extremity that does not look infected   0946 Glucose(!): 198   0946 Ketone  Quantitative: 0.15   0946 Ph Venous: 7.40  DKA unlikley   0946 Creatinine(!): 1.45  History of CKD   0946 Stone urine we will give ceftriaxone and patient does have red blood cells in urine therefore will obtain CT abdomen to evaluate for obstructing kidney stone that required urgent intervention   1208 Urinary tract infection is likely the cause of his sepsis.  Tachycardia is resolved after IV fluids.  IV ceftriaxone ordered.  The patient being immunosuppressed and having diabetes and also being 77 years old with evidence of sepsis do recommend admission for ongoing management.  For the hospitalist who agrees to admit for inpatient [facility]         Medical Decision Making    History:    Supplemental history from: Documented in chart, if applicable and Family Member/Significant Other    External Record(s) reviewed: Documented in chart, if applicable.    Work Up:    Chart documentation includes differential considered and any EKGs or imaging independently interpreted by provider, where specified.    In additional to work up documented, I considered the following work up: Documented in chart, if applicable.    External consultation:    Discussion of management with another provider: Documented in chart, if applicable and Hospitalist    Complicating factors:    Care impacted by chronic illness: Cerebrovascular Disease, Diabetes, Hyperlipidemia and Hypertension    Care affected by social determinants of health: N/A    Disposition considerations: Admit.    Voice recognition software was used in the creation of this note. Any grammatical or nonsensical errors are due to inherent errors with the software and are not the intention of the writer.      7:02 AM I met with the patient to gather history and to perform my initial exam. We discussed plans for the ED course, including diagnostic testing and treatment. Patient seen in the lobby due to critical capacity in the ER and lack of available ER beds. PPE: N95 mask and  "gloves  11:16 AM I rechecked and updated the patient on his results and the plan.   12:07 PM I discussed the case with hospitalist, Dr Evi Schaffer, who accepts the patient.       At the conclusion of the encounter I discussed the results of all of the tests and the disposition. The questions were answered. The patient or family acknowledged understanding and was agreeable with the care plan.           MEDICATIONS GIVEN IN THE EMERGENCY:  Medications   sodium chloride (PF) 0.9% PF flush 3 mL (has no administration in time range)   sodium chloride (PF) 0.9% PF flush 3 mL (3 mLs Intracatheter Not Given 3/24/23 0901)   0.9% sodium chloride BOLUS (0 mLs Intravenous Stopped 3/24/23 0907)   cefTRIAXone (ROCEPHIN) 2 g vial to attach to  ml bag for ADULTS or NS 50 ml bag for PEDS (0 g Intravenous Stopped 3/24/23 1114)   iopamidol (ISOVUE-370) solution 90 mL (90 mLs Intravenous $Given 3/24/23 0956)       NEW PRESCRIPTIONS STARTED AT TODAY'S ER VISIT  New Prescriptions    No medications on file          =================================================================    HPI    Triage note  \"  Patient reports around 4:00am he woke up with chills. Patient reports generalized weakness and urinary frequency.      Triage Assessment     Row Name 03/24/23 0655       Triage Assessment (Adult)    Airway WDL WDL       Respiratory WDL    Respiratory WDL X  shortness of breath       Skin Circulation/Temperature WDL    Skin Circulation/Temperature WDL WDL       Cardiac WDL    Cardiac WDL rhythm;WDL    Pulse Rate & Regularity tachycardic       Peripheral/Neurovascular WDL    Peripheral Neurovascular WDL WDL       Cognitive/Neuro/Behavioral WDL    Cognitive/Neuro/Behavioral WDL WDL              \"      Patient information was obtained from: patient and his wife Audrey    Use of : N/A         Frank Obando is a 77 year old male with a pertinent history of DM II, CVA, anemia, stage 3 CRF, hyperlipidemia, hypertension, and " liver cirrhosis who presents to this ED by walk in for evaluation of generalized weakness and urinary frequency.    Patient reports waking up around 4 AM today and feeling generally weak as he tried to walk to the bathroom. He also began experiencing urinary frequency and chills this morning. Patient otherwise felt okay last night (3/23/23) before going to bed. He has not taken any Tylenol today. He denies any dysuria, testicular pain, rectal pain, scrotal pain, scrotal swelling, or scrotal redness. Patient denies having any open wounds or sores. He denies any cough, abdominal pain, nausea, headache, ear pain, rhinorrhea, sore throat, or bowel changes. Patient denies any recent medication changes. His wife notes the patient had an infusion 2 days ago for his rheumatoid arthritis. He receives infusions every 6 weeks. Patient has chronic bilateral knee pain from his arthritis, but states that his knees are not more swollen than normal currently. His wife notes the patient has a history of a UTI but otherwise is not prone to infections. No other complaints or concerns expressed at this time.       REVIEW OF SYSTEMS   Review of Systems   Constitutional: Positive for chills.   HENT: Negative for ear pain, rhinorrhea and sore throat.    Respiratory: Negative for cough.    Gastrointestinal: Negative for abdominal pain, constipation, diarrhea, nausea and rectal pain.   Genitourinary: Positive for frequency. Negative for dysuria, scrotal swelling and testicular pain.        Negative for scrotal pain   Musculoskeletal: Positive for arthralgias (chronic bilateral knees). Negative for joint swelling (bilateral knees).   Skin: Negative for color change (scrotal redness) and wound.   Neurological: Positive for weakness (generalized). Negative for headaches.        PAST MEDICAL HISTORY:  Past Medical History:   Diagnosis Date     Anemia      Arthritis     osteoarthritis     Calculus of kidney      Diabetes mellitus (H)       Episcleritis of left eye      Hyperlipidemia      Hypertension      Iron deficiency anemia due to chronic blood loss 11/2/2021     Peripheral neuropathy      Stroke (H)        PAST SURGICAL HISTORY:  Past Surgical History:   Procedure Laterality Date     COLONOSCOPY N/A 11/16/2021    Procedure: COLONOSCOPY with polypectomy;  Surgeon: Dex Finch MD;  Location: [facility]     CYSTOSCOPY TUMOR / CONDYLOMATA W/ LASER Left 7/18/2014     ESOPHAGOSCOPY, GASTROSCOPY, DUODENOSCOPY (EGD), COMBINED N/A 11/16/2021    Procedure: ESOPHAGOGASTRODUODENOSCOPY (EGD) with biopsies and argon plasma coagulation;  Surgeon: Dex Finch MD;  Location: [facility]     HC CYSTOSCOPY,INSERT URETERAL STENT      Description: Cystoscopy With Insertion Of Ureteral Stent Right;  Recorded: 10/14/2009;  Comments: stone           CURRENT MEDICATIONS:    atorvastatin (LIPITOR) 40 MG tablet  cyanocobalamin, vitamin B-12, 2,500 mcg Tab  ferrous sulfate (FEROSUL) 325 (65 Fe) MG tablet  inFLIXimab (REMICADE IV)  lisinopril (ZESTRIL) 10 MG tablet  magnesium oxide 250 mg Tab  metFORMIN (GLUCOPHAGE) 500 MG tablet  omeprazole (PRILOSEC) 40 MG DR capsule  pioglitazone (ACTOS) 30 MG tablet  propranolol (INDERAL) 20 MG tablet  tamsulosin (FLOMAX) 0.4 MG capsule  blood glucose meter (GLUCOMETER)  blood glucose test (ACCU-CHEK JACOB PLUS TEST STRP) strips  generic lancets (ACCU-CHEK SOFTCLIX LANCETS)        ALLERGIES:  Allergies   Allergen Reactions     Morphine Nausea and Vomiting     Other Allergy (See Comments) [External Allergen Needs Reconciliation - See Comment] Unknown     Scopolamine HBr CHANG, 08/27/2013.       Scopolamine Hbr [Scopolamine] Unknown       FAMILY HISTORY:  Family History   Problem Relation Age of Onset     Lung Cancer Father      Coronary Artery Disease Mother      Diabetes Mother        SOCIAL HISTORY:   Social History     Socioeconomic History     Marital status:    Tobacco Use     Smoking status: Never  "    Smokeless tobacco: Never   Substance and Sexual Activity     Alcohol use: No     Drug use: No       VITALS:  /61   Pulse 76   Temp 100.3  F (37.9  C) (Oral)   Resp 18   Ht 1.778 m (5' 10\")   Wt 90.7 kg (200 lb)   SpO2 96%   BMI 28.70 kg/m      PHYSICAL EXAM      Vitals: /61   Pulse 76   Temp 100.3  F (37.9  C) (Oral)   Resp 18   Ht 1.778 m (5' 10\")   Wt 90.7 kg (200 lb)   SpO2 96%   BMI 28.70 kg/m    General: Appears in no acute distress, awake, alert, interactive. Hunched over. Overall well appearing .  Eyes: Conjunctivae non-injected. Sclera anicteric.  HENT: Atraumatic.  Neck: Supple.  Respiratory/Chest: Respiration unlabored. Lungs clear, no wheezing or rhonchi.   Heart: no murmurs  Abdomen: non distended, soft, nontender.   Musculoskeletal: Normal extremities. No edema or erythema.  Skin: Normal color. No rash or diaphoresis. Scrape to right lower leg.   Neurologic: Face symmetric, moves all extremities spontaneously. Speech clear.  Psychiatric: Oriented to person, place, and time. Affect appropriate.       LAB:  All pertinent labs reviewed and interpreted.  Results for orders placed or performed during the hospital encounter of 03/24/23   Chest XR,  PA & LAT    Impression    IMPRESSION: Heart and mediastinal size are normal. Minimal amount of streaky nodularity involves the mid right lung, unchanged and likely reflecting scarring. Left lung is clear. No definite acute infiltrate. No pleural effusion or pneumothorax.   CT Abdomen Pelvis w Contrast    Impression    IMPRESSION:   1.  No urinary tract stones or hydronephrosis.  2.  Cholelithiasis.  3.  No abscess or obvious intra-abdominal inflammation.   Comprehensive metabolic panel   Result Value Ref Range    Sodium 136 136 - 145 mmol/L    Potassium 4.5 3.5 - 5.0 mmol/L    Chloride 103 98 - 107 mmol/L    Carbon Dioxide (CO2) 25 22 - 31 mmol/L    Anion Gap 8 5 - 18 mmol/L    Urea Nitrogen 18 8 - 28 mg/dL    Creatinine 1.45 (H) 0.70 " - 1.30 mg/dL    Calcium 9.2 8.5 - 10.5 mg/dL    Glucose 198 (H) 70 - 125 mg/dL    Alkaline Phosphatase 87 45 - 120 U/L    AST 27 0 - 40 U/L    ALT 25 0 - 45 U/L    Protein Total 8.3 (H) 6.0 - 8.0 g/dL    Albumin 3.9 3.5 - 5.0 g/dL    Bilirubin Total 0.7 0.0 - 1.0 mg/dL    GFR Estimate 50 (L) >60 mL/min/1.73m2   Lactic acid whole blood   Result Value Ref Range    Lactic Acid 1.8 0.7 - 2.0 mmol/L   UA with Microscopic reflex to Culture    Specimen: Urine, Midstream   Result Value Ref Range    Color Urine Yellow Colorless, Straw, Light Yellow, Yellow    Appearance Urine Clear Clear    Glucose Urine Negative Negative mg/dL    Bilirubin Urine Negative Negative    Ketones Urine Negative Negative mg/dL    Specific Gravity Urine 1.023 1.001 - 1.030    Blood Urine Negative Negative    pH Urine 7.5 (H) 5.0 - 7.0    Protein Albumin Urine 30 (A) Negative mg/dL    Urobilinogen Urine <2.0 <2.0 mg/dL    Nitrite Urine Negative Negative    Leukocyte Esterase Urine 250 Scott/uL (A) Negative    RBC Urine 5 (H) <=2 /HPF    WBC Urine 101 (H) <=5 /HPF    Squamous Epithelials Urine <1 <=1 /HPF   Symptomatic Influenza A/B, RSV, & SARS-CoV2 PCR (COVID-19) Nasopharyngeal    Specimen: Nasopharyngeal; Swab   Result Value Ref Range    Influenza A PCR Negative Negative    Influenza B PCR Negative Negative    RSV PCR Negative Negative    SARS CoV2 PCR Negative Negative   Blood gas venous   Result Value Ref Range    pH Venous 7.40 7.35 - 7.45    pCO2 Venous 41 35 - 50 mm Hg    pO2 Venous 29 25 - 47 mm Hg    Bicarbonate Venous 25 24 - 30 mmol/L    Base Excess/Deficit (+/-) 0.6   mmol/L    Oxyhemoglobin Venous 52.2 (L) 70.0 - 75.0 %    O2 Sat, Venous 53.3 (L) 70.0 - 75.0 %   Ketone Beta-Hydroxybutyrate Quantitative   Result Value Ref Range    Ketone (Beta-Hydroxybutyrate) Quantitative 0.15 <=0.3 mmol/L   CBC with platelets and differential   Result Value Ref Range    WBC Count 15.5 (H) 4.0 - 11.0 10e3/uL    RBC Count 3.83 (L) 4.40 - 5.90 10e6/uL     Hemoglobin 11.7 (L) 13.3 - 17.7 g/dL    Hematocrit 34.6 (L) 40.0 - 53.0 %    MCV 90 78 - 100 fL    MCH 30.5 26.5 - 33.0 pg    MCHC 33.8 31.5 - 36.5 g/dL    RDW 13.2 10.0 - 15.0 %    Platelet Count 120 (L) 150 - 450 10e3/uL    % Neutrophils 82 %    % Lymphocytes 8 %    % Monocytes 9 %    % Eosinophils 0 %    % Basophils 0 %    % Immature Granulocytes 1 %    NRBCs per 100 WBC 0 <1 /100    Absolute Neutrophils 12.7 (H) 1.6 - 8.3 10e3/uL    Absolute Lymphocytes 1.3 0.8 - 5.3 10e3/uL    Absolute Monocytes 1.3 0.0 - 1.3 10e3/uL    Absolute Eosinophils 0.0 0.0 - 0.7 10e3/uL    Absolute Basophils 0.0 0.0 - 0.2 10e3/uL    Absolute Immature Granulocytes 0.1 <=0.4 10e3/uL    Absolute NRBCs 0.0 10e3/uL       RADIOLOGY:  Reviewed all pertinent imaging. Please see official radiology report.  CT Abdomen Pelvis w Contrast   Final Result   IMPRESSION:    1.  No urinary tract stones or hydronephrosis.   2.  Cholelithiasis.   3.  No abscess or obvious intra-abdominal inflammation.      Chest XR,  PA & LAT   Final Result   IMPRESSION: Heart and mediastinal size are normal. Minimal amount of streaky nodularity involves the mid right lung, unchanged and likely reflecting scarring. Left lung is clear. No definite acute infiltrate. No pleural effusion or pneumothorax.          I, Kerrie Lew, am serving as a scribe to document services personally performed by Jim Coy MD based on my observation and the provider's statements to me. I, Dr. Jim Coy, attest that Kerrie Lew is acting in a scribe capacity, has observed my performance of the services and has documented them in accordance with my direction.    Jim Coy MD  Emergency Medicine  [facility] EMERGENCY ROOM  [address] [id number] [phone]     Jim Coy MD  03/24/23 1210       Jim Coy MD  03/24/23 1212

## 2023-03-24 NOTE — PROGRESS NOTES
03/24/23 1400   Appointment Info   Signing Clinician's Name / Credentials (OT) Nusrat Pereira, OTR/L   Living Environment   People in Home spouse   Current Living Arrangements house   Living Environment Comments has standard toilet and walk in shower   Self-Care   Usual Activity Tolerance good   Current Activity Tolerance moderate   Activity/Exercise/Self-Care Comment Pt was independent with ADLS and IADLS prior to admit   Instrumental Activities of Daily Living (IADL)   IADL Comments Family will do until pt is recovered   General Information   Onset of Illness/Injury or Date of Surgery 03/24/23   Referring Physician Claudette Schaffer   Patient/Family Therapy Goal Statement (OT) get back to work on Monday.  I volunteer at [employer]   Additional Occupational Profile Info/Pertinent History of Current Problem pt admitted with UTI with hematuria/sepsis, h/o DM2, CVA, anemia, RA with infusions every 6 weeks   Existing Precautions/Restrictions fall   Cognitive Status Examination   Affect/Mental Status (Cognitive) WNL   Range of Motion Comprehensive   General Range of Motion no range of motion deficits identified   Strength Comprehensive (MMT)   General Manual Muscle Testing (MMT) Assessment no strength deficits identified   Bed Mobility   Comment (Bed Mobility) mod I on and off ED gurney   Transfers   Transfer Comments mod i to and from bed and chair   Activities of Daily Living   BADL Assessment/Intervention lower body dressing   Lower Body Dressing Assessment/Training   Narka Level (Lower Body Dressing) modified independence   Clinical Impression   Criteria for Skilled Therapeutic Interventions Met (OT) Evaluation only;No problems identified which require skilled intervention   OT Total Evaluation Time   OT Eval, Low Complexity Minutes (57742) 15   OT Discharge Planning   OT Plan dc OT   OT Discharge Recommendation (DC Rec) (S)  home  (with wife as before)   OT Rationale for DC Rec Pt has good support at home    OT Brief overview of current status pt mod I with basic adls  and cogntively intact   Total Session Time   Total Session Time (sum of timed and untimed services) 15

## 2023-03-24 NOTE — ED TRIAGE NOTES
Patient reports around 4:00am he woke up with chills. Patient reports generalized weakness and urinary frequency.      Triage Assessment     Row Name 03/24/23 0659       Triage Assessment (Adult)    Airway WDL WDL       Respiratory WDL    Respiratory WDL X  shortness of breath       Skin Circulation/Temperature WDL    Skin Circulation/Temperature WDL WDL       Cardiac WDL    Cardiac WDL rhythm;WDL    Pulse Rate & Regularity tachycardic       Peripheral/Neurovascular WDL    Peripheral Neurovascular WDL WDL       Cognitive/Neuro/Behavioral WDL    Cognitive/Neuro/Behavioral WDL WDL

## 2023-03-24 NOTE — PHARMACY-ADMISSION MEDICATION HISTORY
Pharmacy Note - Admission Medication History    Pertinent Provider Information: none     ______________________________________________________________________    Prior To Admission (PTA) med list completed and updated in EMR.       PTA Med List   Medication Sig Last Dose     atorvastatin (LIPITOR) 40 MG tablet TAKE 1 TABLET BY MOUTH DAILY 3/23/2023     cyanocobalamin, vitamin B-12, 2,500 mcg Tab [CYANOCOBALAMIN, VITAMIN B-12, 2,500 MCG TAB] Take 2,500 mcg by mouth daily. 3/23/2023     ferrous sulfate (FEROSUL) 325 (65 Fe) MG tablet Take 1 tablet (325 mg) by mouth daily (with breakfast) (Patient taking differently: Take 325-650 mg by mouth 2 tablets in morning 1 tablet in evening) 3/23/2023     inFLIXimab (REMICADE IV) Every 6weeks 3/17/2023     lisinopril (ZESTRIL) 10 MG tablet Take 1 tablet (10 mg) by mouth daily 3/23/2023     magnesium oxide 250 mg Tab [MAGNESIUM OXIDE 250 MG TAB] Take 250 mg by mouth 2 (two) times a day. 3/23/2023     metFORMIN (GLUCOPHAGE) 500 MG tablet TAKE 2 TABLETS BY MOUTH TWICE DAILY AT 6 AM AND AT 4 PM 3/23/2023     omeprazole (PRILOSEC) 40 MG DR capsule TAKE 1 CAPSULE BY MOUTH DAILY 3/23/2023     pioglitazone (ACTOS) 30 MG tablet TAKE 1 TABLET BY MOUTH EVERY DAY 3/23/2023     propranolol (INDERAL) 20 MG tablet TAKE 1 TABLET BY MOUTH TWICE DAILY 3/23/2023     tamsulosin (FLOMAX) 0.4 MG capsule TAKE 1 CAPSULE BY MOUTH DAILY AFTER AND SUPPER 3/23/2023       Information source(s): Patient and CareEverywhere/SureScripts  Method of interview communication: in-person    Summary of Changes to PTA Med List  New: none      Patient was asked about OTC/herbal products specifically.  PTA med list reflects this.        Medication Affordability:       Allergies were reviewed, assessed, and updated with the patient.          The information provided in this note is only as accurate as the sources available at the time of the update(s).    Thank you for the opportunity to participate in the care of this  patient.    Hemant Martínez, [facility]  3/24/2023 9:13 AM

## 2023-03-25 ENCOUNTER — APPOINTMENT (OUTPATIENT)
Dept: PHYSICAL THERAPY | Facility: CLINIC | Age: 78
DRG: 872 | End: 2023-03-25
Attending: INTERNAL MEDICINE
Payer: MEDICARE

## 2023-03-25 LAB
ACINETOBACTER SPECIES: DETECTED
ANION GAP SERPL CALCULATED.3IONS-SCNC: 7 MMOL/L (ref 5–18)
BUN SERPL-MCNC: 12 MG/DL (ref 8–28)
CALCIUM SERPL-MCNC: 8.9 MG/DL (ref 8.5–10.5)
CHLORIDE BLD-SCNC: 105 MMOL/L (ref 98–107)
CITROBACTER SPECIES: NOT DETECTED
CO2 SERPL-SCNC: 24 MMOL/L (ref 22–31)
CREAT SERPL-MCNC: 1.11 MG/DL (ref 0.7–1.3)
CTX-M: NOT DETECTED
ENTEROBACTER SPECIES: NOT DETECTED
ERYTHROCYTE [DISTWIDTH] IN BLOOD BY AUTOMATED COUNT: 13.2 % (ref 10–15)
ESCHERICHIA COLI: NOT DETECTED
GFR SERPL CREATININE-BSD FRML MDRD: 69 ML/MIN/1.73M2
GLUCOSE BLD-MCNC: 153 MG/DL (ref 70–125)
GLUCOSE BLDC GLUCOMTR-MCNC: 154 MG/DL (ref 70–99)
GLUCOSE BLDC GLUCOMTR-MCNC: 168 MG/DL (ref 70–99)
GLUCOSE BLDC GLUCOMTR-MCNC: 171 MG/DL (ref 70–99)
GLUCOSE BLDC GLUCOMTR-MCNC: 185 MG/DL (ref 70–99)
HCT VFR BLD AUTO: 32.5 % (ref 40–53)
HGB BLD-MCNC: 10.6 G/DL (ref 13.3–17.7)
IMP: NOT DETECTED
KLEBSIELLA OXYTOCA: NOT DETECTED
KLEBSIELLA PNEUMONIAE: NOT DETECTED
KPC: NOT DETECTED
MAGNESIUM SERPL-MCNC: 2 MG/DL (ref 1.8–2.6)
MCH RBC QN AUTO: 30.3 PG (ref 26.5–33)
MCHC RBC AUTO-ENTMCNC: 32.6 G/DL (ref 31.5–36.5)
MCV RBC AUTO: 93 FL (ref 78–100)
NDM: NOT DETECTED
OXA (DETECTED/NOT DETECTED): NOT DETECTED
PLATELET # BLD AUTO: 84 10E3/UL (ref 150–450)
POTASSIUM BLD-SCNC: 4 MMOL/L (ref 3.5–5)
PROTEUS SPECIES: NOT DETECTED
PSEUDOMONAS AERUGINOSA: NOT DETECTED
RBC # BLD AUTO: 3.5 10E6/UL (ref 4.4–5.9)
SODIUM SERPL-SCNC: 136 MMOL/L (ref 136–145)
VIM: NOT DETECTED
WBC # BLD AUTO: 10.2 10E3/UL (ref 4–11)

## 2023-03-25 PROCEDURE — 80048 BASIC METABOLIC PNL TOTAL CA: CPT | Performed by: INTERNAL MEDICINE

## 2023-03-25 PROCEDURE — 83735 ASSAY OF MAGNESIUM: CPT | Performed by: INTERNAL MEDICINE

## 2023-03-25 PROCEDURE — 36415 COLL VENOUS BLD VENIPUNCTURE: CPT | Performed by: INTERNAL MEDICINE

## 2023-03-25 PROCEDURE — 250N000013 HC RX MED GY IP 250 OP 250 PS 637: Performed by: INTERNAL MEDICINE

## 2023-03-25 PROCEDURE — 82962 GLUCOSE BLOOD TEST: CPT

## 2023-03-25 PROCEDURE — 250N000012 HC RX MED GY IP 250 OP 636 PS 637: Performed by: INTERNAL MEDICINE

## 2023-03-25 PROCEDURE — 250N000013 HC RX MED GY IP 250 OP 250 PS 637: Performed by: EMERGENCY MEDICINE

## 2023-03-25 PROCEDURE — 250N000011 HC RX IP 250 OP 636: Performed by: INTERNAL MEDICINE

## 2023-03-25 PROCEDURE — 85027 COMPLETE CBC AUTOMATED: CPT | Performed by: INTERNAL MEDICINE

## 2023-03-25 PROCEDURE — 97161 PT EVAL LOW COMPLEX 20 MIN: CPT | Mod: GP

## 2023-03-25 PROCEDURE — 120N000001 HC R&B MED SURG/OB

## 2023-03-25 PROCEDURE — 99233 SBSQ HOSP IP/OBS HIGH 50: CPT | Performed by: INTERNAL MEDICINE

## 2023-03-25 RX ORDER — LISINOPRIL 10 MG/1
10 TABLET ORAL DAILY
Status: DISCONTINUED | OUTPATIENT
Start: 2023-03-25 | End: 2023-03-27 | Stop reason: HOSPADM

## 2023-03-25 RX ADMIN — PROPRANOLOL HYDROCHLORIDE 20 MG: 20 TABLET ORAL at 09:23

## 2023-03-25 RX ADMIN — INSULIN ASPART 1 UNITS: 100 INJECTION, SOLUTION INTRAVENOUS; SUBCUTANEOUS at 17:34

## 2023-03-25 RX ADMIN — TAMSULOSIN HYDROCHLORIDE 0.4 MG: 0.4 CAPSULE ORAL at 08:10

## 2023-03-25 RX ADMIN — LISINOPRIL 10 MG: 10 TABLET ORAL at 14:44

## 2023-03-25 RX ADMIN — INSULIN ASPART 1 UNITS: 100 INJECTION, SOLUTION INTRAVENOUS; SUBCUTANEOUS at 09:23

## 2023-03-25 RX ADMIN — ATORVASTATIN CALCIUM 40 MG: 40 TABLET, FILM COATED ORAL at 08:10

## 2023-03-25 RX ADMIN — ENOXAPARIN SODIUM 40 MG: 100 INJECTION SUBCUTANEOUS at 13:33

## 2023-03-25 RX ADMIN — PROPRANOLOL HYDROCHLORIDE 20 MG: 20 TABLET ORAL at 21:01

## 2023-03-25 RX ADMIN — CYANOCOBALAMIN TAB 1000 MCG 2500 MCG: 1000 TAB at 08:10

## 2023-03-25 RX ADMIN — INSULIN ASPART 1 UNITS: 100 INJECTION, SOLUTION INTRAVENOUS; SUBCUTANEOUS at 11:45

## 2023-03-25 RX ADMIN — PANTOPRAZOLE SODIUM 40 MG: 40 TABLET, DELAYED RELEASE ORAL at 08:10

## 2023-03-25 RX ADMIN — CEFTRIAXONE 1 G: 1 INJECTION, POWDER, FOR SOLUTION INTRAMUSCULAR; INTRAVENOUS at 09:35

## 2023-03-25 ASSESSMENT — ACTIVITIES OF DAILY LIVING (ADL)
WALKING_OR_CLIMBING_STAIRS_DIFFICULTY: NO
CONCENTRATING,_REMEMBERING_OR_MAKING_DECISIONS_DIFFICULTY: NO
DOING_ERRANDS_INDEPENDENTLY_DIFFICULTY: NO
ADLS_ACUITY_SCORE: 22
ADLS_ACUITY_SCORE: 38
ADLS_ACUITY_SCORE: 22
ADLS_ACUITY_SCORE: 38
TOILETING_ISSUES: NO
ADLS_ACUITY_SCORE: 37
DIFFICULTY_EATING/SWALLOWING: NO
ADLS_ACUITY_SCORE: 38
DRESSING/BATHING_DIFFICULTY: NO
WEAR_GLASSES_OR_BLIND: NO
ADLS_ACUITY_SCORE: 22
CHANGE_IN_FUNCTIONAL_STATUS_SINCE_ONSET_OF_CURRENT_ILLNESS/INJURY: NO
FALL_HISTORY_WITHIN_LAST_SIX_MONTHS: NO
ADLS_ACUITY_SCORE: 22
ADLS_ACUITY_SCORE: 38
ADLS_ACUITY_SCORE: 38
ADLS_ACUITY_SCORE: 22
ADLS_ACUITY_SCORE: 37

## 2023-03-25 NOTE — PLAN OF CARE
Problem: Infection  Goal: Absence of Infection Signs and Symptoms  Outcome: Progressing   Goal Outcome Evaluation:    Pt was compliant with all meds and cooperative with all cares. Pt conts on IV abx for UTI. Pt denies pain and had adequate fluid and nutritional intake. Pt voiding in adequate amounts and bladder scanned for low residual. POC and falls risk discussed with pt who verbalized understanding/agreement and call light within reach.

## 2023-03-25 NOTE — PROVIDER NOTIFICATION
Provider paged @ 0097: Received call from lab w/critical result: blood culture from 3/24/23 @ 2468 showing gram negative bacilli.    Will continue ceftriaxone IV pending ID and sensitivity per MD.

## 2023-03-25 NOTE — PROGRESS NOTES
03/25/23 1000   Appointment Info   Signing Clinician's Name / Credentials (PT) Susan Kemp PT   Living Environment   People in Home spouse   Current Living Arrangements house   Home Accessibility stairs within home   Number of Stairs, Within Home, Primary greater than 10 stairs   Stair Railings, Within Home, Primary railings safe and in good condition   Self-Care   Usual Activity Tolerance good   Current Activity Tolerance good   Regular Exercise Yes   Activity/Exercise Type walking   Exercise Amount/Frequency 3-5 times/wk   Equipment Currently Used at Home none   Fall history within last six months no   Activity/Exercise/Self-Care Comment takes care of income property, works one day/week at [employer]   General Information   Onset of Illness/Injury or Date of Surgery 03/24/23   Referring Physician Claudette Feliciano   Patient/Family Therapy Goals Statement (PT) go home, return to work Monday   Pertinent History of Current Problem (include personal factors and/or comorbidities that impact the POC) chronic anemia, limited activity tolerance, acute UTI, generalized weakness, CKD, peripheral neuropathy   Cognition   Affect/Mental Status (Cognition) WNL   Pain Assessment   Patient Currently in Pain No   Integumentary/Edema   Integumentary/Edema no deficits were identifed   Posture    Posture Forward head position   Range of Motion (ROM)   Range of Motion ROM is WFL   Strength (Manual Muscle Testing)   Strength (Manual Muscle Testing) strength is WFL  (with generalized weakness)   Bed Mobility   Bed Mobility no deficits identified   Transfers   Transfers no deficits identified   Gait/Stairs (Locomotion)   [place] Level (Gait) independent   Assistive Device (Gait)   (none)   Distance in Feet 200   Pattern (Gait) swing-through   Deviations/Abnormal Patterns (Gait) melissa decreased;gait speed decreased   Negotiation (Stairs) stairs independence;handrail location;number of steps;ascending  technique;descending technique   [place] Level (Stairs) modified independence   Handrail Location (Stairs) both sides   Number of Steps (Stairs) 5   Ascending Technique (Stairs) step-over-step   Descending Technique (Stairs) step-over-step   Comment, (Gait/Stairs) no concerns, pt reports knowing how to pace himself   Balance   Balance no deficits were identified   Sensory Examination   Sensory Perception patient reports no sensory changes   Sensory Perception Comments peripheral neuropathy   Coordination   Coordination no deficits were identified   Muscle Tone   Muscle Tone no deficits were identified   Clinical Impression   Criteria for Skilled Therapeutic Intervention Evaluation only   PT Diagnosis (PT) impaired functional mobility   Influenced by the following impairments gait endurance   Functional limitations due to impairments gait   Clinical Presentation (PT Evaluation Complexity) Stable/Uncomplicated   Clinical Presentation Rationale pt presents as medically diagnosed   Clinical Decision Making (Complexity) low complexity   Risk & Benefits of therapy have been explained evaluation/treatment results reviewed;participants included;patient   Clinical Impression Comments no further intervention indicated at this time   PT Total Evaluation Time   PT Eval, Low Complexity Minutes (58245) 20   PT Discharge Planning   PT Plan dc PT order   PT Discharge Recommendation (DC Rec) home   PT Rationale for DC Rec pt reports feeling at baseline, demonstrates functional mobility with low fall risk   PT Brief overview of current status Ind amb in henry 200 feet, navigates stairs with rail without need for assistive device   Total Session Time   Total Session Time (sum of timed and untimed services) 20     Physical Therapy Discharge Summary    Reason for therapy discharge:    All goals and outcomes met, no further needs identified. Anticipate discharge home when medically stable.    Progress towards therapy goal(s). See  goals on Care Plan in [facility] electronic health record for goal details.  Goals met with eval only    Therapy recommendation(s):    No further therapy is recommended.

## 2023-03-25 NOTE — PROGRESS NOTES
Hospitalist Progress Note    Assessment/Plan  Sepsis secondary to UTI   -continue IV ceftriaxone pending cultures.  -Continue IVF  -blood/urine cx pending    5:48 pm  -reported (+ GNB  blood culture x 1 bottle  -continue with IV ceftriaxone pending ID & Sensitivity.      Leukocytosis, POA.   -Resolved.   -Monitor    KIERA on CKD 3B.  Prerenal etiology from above-stated.  -Presented with creatinine of 1.5, however this resolved with current IV fluid therapy.  Creatinine noted at 1.1 today  -Avoid nephrotoxins/hypotension.    DM  -I agree with holding metformin and actos for now  -Carb controlled diet  -Doing well with sliding scale insulin.  Add basal/prandial insulin depending on trend.  Adjust dose to optimize control.   -Accu-Cheks AC/at bedtime  -Noted hemoglobin A1c of 11/2022 at 7.1%     HTN  -BP creeping up: Noted at 140/73  -Will restart patient's home lisinopril bps stable  - continue propranolol      Thrombocytopenia chronic  - stable around baseline  - monitor while on lovenox      H/o urinary retention  - continue flomax  - bladder protocol  - bladder scan for PVR     Dyslipidemia  - continue home statin      RA  - no other oral meds, getting inflixamab every 6 weeks     DVT: Subcutaneous Lovenox.  Disposition: Able to discharge tomorrow if cultures allow oral antibiotics.      Subjective  Denies any new complaints.  No acute events overnight.    Objective    Vital signs in last 24 hours  Temp:  [97.8  F (36.6  C)] 97.8  F (36.6  C)  Pulse:  [59-87] 65  Resp:  [12-27] 14  BP: (111-139)/(56-73) 139/69  SpO2:  [94 %-98 %] 95 % @LASTSAO2(12)@ O2 Device: None (Room air)    Weight:   Wt Readings from Last 3 Encounters:   03/24/23 90.7 kg (200 lb)   03/14/23 91.4 kg (201 lb 9.6 oz)   03/10/23 90.7 kg (200 lb)      Weight change:     Intake/Output last 3 shifts  I/O last 3 completed shifts:  In: 50 [IV Piggyback:50]  Out: 1200 [Urine:1200]  Body mass index is 28.7 kg/m .    Physical Exam    General Appearance:     Alert, cooperative, no distress, appears stated age   Lungs:     Clear bilaterally    Cardiovascular:    Regular rate ands rhythm.  Normal S1, S2.  No murmur, rub or gallop.  No edema   Abdomen:     Soft, non-tender, bowel sounds active all four quadrants,     no masses, no organomegaly   Neurologic:   Awake, alert, oriented x 3.  Grossly nonfocal      Pertinent Labs   Lab Results: personally reviewed.   Recent Labs   Lab 03/25/23  0634 03/24/23  0745    136   CO2 24 25   BUN 12 18   ALBUMIN  --  3.9   ALKPHOS  --  87   ALT  --  25   AST  --  27     Recent Labs   Lab 03/25/23  0634 03/24/23  0745   WBC 10.2 15.5*   HGB 10.6* 11.7*   HCT 32.5* 34.6*   PLT 84* 120*     No results for input(s): CKTOTAL, TROPONINI in the last 168 hours.    Invalid input(s): TROPONINT, CKMBINDEX  Invalid input(s): POCGLUFGR    Medications  Current Facility-Administered Medications   Medication     acetaminophen (TYLENOL) tablet 650 mg     atorvastatin (LIPITOR) tablet 40 mg     bisacodyl (DULCOLAX) EC tablet 5 mg     bisacodyl (DULCOLAX) suppository 10 mg     cefTRIAXone (ROCEPHIN) 1 g vial to attach to  mL bag for ADULTS or NS 50 mL bag for PEDS     cyanocobalamin (VITAMIN B-12) tablet 2,500 mcg     glucose gel 15-30 g    Or     dextrose 50 % injection 25-50 mL    Or     glucagon injection 1 mg     enoxaparin ANTICOAGULANT (LOVENOX) injection 40 mg     insulin aspart (NovoLOG) injection (RAPID ACTING)     melatonin tablet 3 mg     pantoprazole (PROTONIX) EC tablet 40 mg     prochlorperazine (COMPAZINE) injection 5 mg     propranolol (INDERAL) tablet 20 mg     senna-docusate (SENOKOT-S/PERICOLACE) 8.6-50 MG per tablet 1 tablet     sodium chloride (PF) 0.9% PF flush 3 mL     sodium chloride (PF) 0.9% PF flush 3 mL     sodium chloride 0.45% infusion     tamsulosin (FLOMAX) capsule 0.4 mg     Current Outpatient Medications   Medication     atorvastatin (LIPITOR) 40 MG tablet     cyanocobalamin, vitamin B-12, 2,500 mcg Tab      ferrous sulfate (FEROSUL) 325 (65 Fe) MG tablet     inFLIXimab (REMICADE IV)     lisinopril (ZESTRIL) 10 MG tablet     magnesium oxide 250 mg Tab     metFORMIN (GLUCOPHAGE) 500 MG tablet     omeprazole (PRILOSEC) 40 MG DR capsule     pioglitazone (ACTOS) 30 MG tablet     propranolol (INDERAL) 20 MG tablet     tamsulosin (FLOMAX) 0.4 MG capsule     blood glucose meter (GLUCOMETER)     blood glucose test (ACCU-CHEK JACOB PLUS TEST STRP) strips     generic lancets (ACCU-CHEK SOFTCLIX LANCETS)       Pertinent Radiology   Radiology Results: Personally reviewed   Results for orders placed or performed during the hospital encounter of 03/24/23   Chest XR,  PA & LAT    Impression    IMPRESSION: Heart and mediastinal size are normal. Minimal amount of streaky nodularity involves the mid right lung, unchanged and likely reflecting scarring. Left lung is clear. No definite acute infiltrate. No pleural effusion or pneumothorax.   CT Abdomen Pelvis w Contrast    Impression    IMPRESSION:   1.  No urinary tract stones or hydronephrosis.  2.  Cholelithiasis.  3.  No abscess or obvious intra-abdominal inflammation.       I spent a total of 38 minutes for this encounter with interval history, physical exam, in patient medication review and reconciliation.      India Merrill DO  Internal Medicine Hospitalist  3/25/2023

## 2023-03-26 LAB
ALBUMIN SERPL-MCNC: 3.2 G/DL (ref 3.5–5)
ALP SERPL-CCNC: 112 U/L (ref 45–120)
ALT SERPL W P-5'-P-CCNC: 21 U/L (ref 0–45)
ANION GAP SERPL CALCULATED.3IONS-SCNC: 10 MMOL/L (ref 5–18)
AST SERPL W P-5'-P-CCNC: 27 U/L (ref 0–40)
BACTERIA UR CULT: ABNORMAL
BILIRUB SERPL-MCNC: 0.8 MG/DL (ref 0–1)
BUN SERPL-MCNC: 11 MG/DL (ref 8–28)
CALCIUM SERPL-MCNC: 8.8 MG/DL (ref 8.5–10.5)
CHLORIDE BLD-SCNC: 105 MMOL/L (ref 98–107)
CO2 SERPL-SCNC: 18 MMOL/L (ref 22–31)
CREAT SERPL-MCNC: 1.23 MG/DL (ref 0.7–1.3)
ERYTHROCYTE [DISTWIDTH] IN BLOOD BY AUTOMATED COUNT: 13.1 % (ref 10–15)
GFR SERPL CREATININE-BSD FRML MDRD: 61 ML/MIN/1.73M2
GLUCOSE BLD-MCNC: 162 MG/DL (ref 70–125)
GLUCOSE BLDC GLUCOMTR-MCNC: 121 MG/DL (ref 70–99)
GLUCOSE BLDC GLUCOMTR-MCNC: 126 MG/DL (ref 70–99)
GLUCOSE BLDC GLUCOMTR-MCNC: 157 MG/DL (ref 70–99)
GLUCOSE BLDC GLUCOMTR-MCNC: 158 MG/DL (ref 70–99)
GLUCOSE BLDC GLUCOMTR-MCNC: 165 MG/DL (ref 70–99)
HCT VFR BLD AUTO: 32.3 % (ref 40–53)
HGB BLD-MCNC: 10.7 G/DL (ref 13.3–17.7)
INR PPP: 1.07 (ref 0.85–1.15)
MCH RBC QN AUTO: 30.2 PG (ref 26.5–33)
MCHC RBC AUTO-ENTMCNC: 33.1 G/DL (ref 31.5–36.5)
MCV RBC AUTO: 91 FL (ref 78–100)
PLATELET # BLD AUTO: 93 10E3/UL (ref 150–450)
POTASSIUM BLD-SCNC: 4.2 MMOL/L (ref 3.5–5)
PROT SERPL-MCNC: 7.7 G/DL (ref 6–8)
RBC # BLD AUTO: 3.54 10E6/UL (ref 4.4–5.9)
SODIUM SERPL-SCNC: 133 MMOL/L (ref 136–145)
WBC # BLD AUTO: 9 10E3/UL (ref 4–11)

## 2023-03-26 PROCEDURE — 36415 COLL VENOUS BLD VENIPUNCTURE: CPT | Performed by: INTERNAL MEDICINE

## 2023-03-26 PROCEDURE — 120N000001 HC R&B MED SURG/OB

## 2023-03-26 PROCEDURE — 80053 COMPREHEN METABOLIC PANEL: CPT | Performed by: INTERNAL MEDICINE

## 2023-03-26 PROCEDURE — 87040 BLOOD CULTURE FOR BACTERIA: CPT | Performed by: INTERNAL MEDICINE

## 2023-03-26 PROCEDURE — 250N000011 HC RX IP 250 OP 636: Performed by: INTERNAL MEDICINE

## 2023-03-26 PROCEDURE — 250N000013 HC RX MED GY IP 250 OP 250 PS 637: Performed by: EMERGENCY MEDICINE

## 2023-03-26 PROCEDURE — 250N000013 HC RX MED GY IP 250 OP 250 PS 637: Performed by: INTERNAL MEDICINE

## 2023-03-26 PROCEDURE — 85027 COMPLETE CBC AUTOMATED: CPT | Performed by: INTERNAL MEDICINE

## 2023-03-26 PROCEDURE — 85610 PROTHROMBIN TIME: CPT | Performed by: INTERNAL MEDICINE

## 2023-03-26 PROCEDURE — 258N000002 HC RX IP 258 OP 250: Performed by: INTERNAL MEDICINE

## 2023-03-26 RX ADMIN — SODIUM CHLORIDE: 4.5 INJECTION, SOLUTION INTRAVENOUS at 00:01

## 2023-03-26 RX ADMIN — SODIUM CHLORIDE: 4.5 INJECTION, SOLUTION INTRAVENOUS at 13:25

## 2023-03-26 RX ADMIN — PROPRANOLOL HYDROCHLORIDE 20 MG: 20 TABLET ORAL at 20:20

## 2023-03-26 RX ADMIN — ATORVASTATIN CALCIUM 40 MG: 40 TABLET, FILM COATED ORAL at 08:43

## 2023-03-26 RX ADMIN — CEFEPIME 2 G: 2 INJECTION, POWDER, FOR SOLUTION INTRAVENOUS at 15:44

## 2023-03-26 RX ADMIN — PROPRANOLOL HYDROCHLORIDE 20 MG: 20 TABLET ORAL at 08:43

## 2023-03-26 RX ADMIN — ACETAMINOPHEN 650 MG: 325 TABLET ORAL at 06:51

## 2023-03-26 RX ADMIN — INSULIN ASPART 1 UNITS: 100 INJECTION, SOLUTION INTRAVENOUS; SUBCUTANEOUS at 08:43

## 2023-03-26 RX ADMIN — ENOXAPARIN SODIUM 40 MG: 100 INJECTION SUBCUTANEOUS at 13:23

## 2023-03-26 RX ADMIN — PANTOPRAZOLE SODIUM 40 MG: 40 TABLET, DELAYED RELEASE ORAL at 06:42

## 2023-03-26 RX ADMIN — CYANOCOBALAMIN TAB 1000 MCG 2500 MCG: 1000 TAB at 08:43

## 2023-03-26 RX ADMIN — CEFTRIAXONE 1 G: 1 INJECTION, POWDER, FOR SOLUTION INTRAMUSCULAR; INTRAVENOUS at 08:45

## 2023-03-26 RX ADMIN — TAMSULOSIN HYDROCHLORIDE 0.4 MG: 0.4 CAPSULE ORAL at 08:43

## 2023-03-26 RX ADMIN — LISINOPRIL 10 MG: 10 TABLET ORAL at 08:43

## 2023-03-26 ASSESSMENT — ACTIVITIES OF DAILY LIVING (ADL)
ADLS_ACUITY_SCORE: 22

## 2023-03-26 NOTE — PROGRESS NOTES
Care Management Follow Up    Length of Stay (days): 2    Expected Discharge Date: 03/26/2023     Concerns to be Addressed:       Patient plan of care discussed at interdisciplinary rounds: Yes    Anticipated Discharge Disposition: Home       Additional Information:  Chart reviewed, pt lives in house with his spouse.  Pt independent at baseline.  Anticipate home at discharge.  Care Management available as needed.      YOVANI Pompa

## 2023-03-26 NOTE — PLAN OF CARE
Problem: Infection  Goal: Absence of Infection Signs and Symptoms  Outcome: Progressing     VSS. Pt denies pain, n/v, SOB this shift. Voiding frequently using urinal. IV fluids infusing at 75 mL/hr.

## 2023-03-26 NOTE — PLAN OF CARE
Problem: Plan of Care - These are the overarching goals to be used throughout the patient stay.    Goal: Absence of Hospital-Acquired Illness or Injury  Intervention: Identify and Manage Fall Risk  Recent Flowsheet Documentation  Taken 3/26/2023 0320 by Gena Capone, RN  Safety Promotion/Fall Prevention: safety round/check completed  Taken 3/26/2023 0025 by Gena Capone, RN  Safety Promotion/Fall Prevention:    clutter free environment maintained    fall prevention program maintained    lighting adjusted    nonskid shoes/slippers when out of bed    patient and family education    room door open     Pt A&Ox4. VSS on RA. Baseline neuropathy noted in BLE. Voiding via urinal overnight. 0.45% NS infusing at 75 mL/hr. Denies pain. Discharge home pending antibiotic plan.

## 2023-03-26 NOTE — PROVIDER NOTIFICATION
Provider notified - blood culture from 3/24 positive for Acinetobacter species as shown on Verigene panel results. Pt currently on ceftriaxone IV. Any changes to orders?    No change in Abx at this time per MD.

## 2023-03-26 NOTE — PROGRESS NOTES
Hospitalist Progress Note    Assessment/Plan  77M h/o DM, CVA, anemia, CKD III, HLD, HTN who presented with weakness found to have positive UA and now Acinetobacter bacteremia.     Sepsis secondary to UTI   Urine culture positive for staph aureus pan sensitive while BC 3/24 positive for Acinetobacter (dental source?).     - F/u on speciation and sensitivity.   - Repeat BC today  - Changed ceftriaxone to cefepime given risk of resistance in Acinetobacter.     KIERA on CKD 3B.  KIERA now resolved.   -Presented with creatinine of 1.5 then back to baseline 1.2-1.2  -Avoid nephrotoxins/hypotension.    DM  Noted hemoglobin A1c of 11/2022 at 7.1%    -Holding metformin and actos  -Carb controlled diet  -Doing well with sliding scale insulin.  Add basal/prandial insulin depending on trend.  Adjust dose to optimize control.   -Accu-Cheks AC/at bedtime    HTN  - continue propranolol, lisinopril     Thrombocytopenia chronic  - stable around baseline  - monitor while on lovenox      H/o urinary retention  - continue flomax  - bladder protocol  - bladder scan for PVR     Dyslipidemia  - continue home statin      RA  - no other oral meds, getting inflixamab every 6 weeks     DVT: Subcutaneous Lovenox.  Disposition: Pending final speciation and sensitivity.       Subjective  NAEO. Tearful that he has to be in the hospital with his dog being sick at home.     Objective    Vital signs in last 24 hours  Temp:  [98  F (36.7  C)-99.1  F (37.3  C)] 98  F (36.7  C)  Pulse:  [64-69] 64  Resp:  [16] 16  BP: (112-133)/(56-72) 133/72  SpO2:  [94 %-95 %] 94 % @LASTSAO2(12)@ O2 Device: None (Room air)    Weight:   Wt Readings from Last 3 Encounters:   03/24/23 90.7 kg (200 lb)   03/14/23 91.4 kg (201 lb 9.6 oz)   03/10/23 90.7 kg (200 lb)      Weight change:     Intake/Output last 3 shifts  I/O last 3 completed shifts:  In: 240 [P.O.:240]  Out: 1975 [Urine:1975]  Body mass index is 28.7 kg/m .    Eyes: Sclera  anicteric/injected  Ears/nose/mouth/throat: Normal oropharynx without ulcers or exudate, mucus membranes moist, hearing intact  Neck: supple, thyroid normal size  CV: No edema  Respiratory: Unlabored breathing  Lymph: No axillary, submandibular, supraclavicular or inguinal lymphadenopathy  Abd: Nondistended, +bs, no hepatosplenomegaly, nontender, no peritoneal signs  Skin: warm, perfused, no jaundice  Psych: Normal affect  MSK: Normal gait        Pertinent Labs   Lab Results: personally reviewed.   Recent Labs   Lab 03/26/23  1007 03/25/23  0634 03/24/23  0745   * 136 136   CO2 18* 24 25   BUN 11 12 18   ALBUMIN 3.2*  --  3.9   ALKPHOS 112  --  87   ALT 21  --  25   AST 27  --  27     Recent Labs   Lab 03/26/23  1007 03/25/23  0634 03/24/23  0745   WBC 9.0 10.2 15.5*   HGB 10.7* 10.6* 11.7*   HCT 32.3* 32.5* 34.6*   PLT 93* 84* 120*     No results for input(s): CKTOTAL, TROPONINI in the last 168 hours.    Invalid input(s): TROPONINT, CKMBINDEX  Invalid input(s): POCGLUFGR    Medications  Current Facility-Administered Medications   Medication     acetaminophen (TYLENOL) tablet 650 mg     atorvastatin (LIPITOR) tablet 40 mg     bisacodyl (DULCOLAX) EC tablet 5 mg     bisacodyl (DULCOLAX) suppository 10 mg     ceFEPIme (MAXIPIME) 2 g vial to attach to  ml bag for ADULTS or 50 ml bag for PEDS     cyanocobalamin (VITAMIN B-12) tablet 2,500 mcg     glucose gel 15-30 g    Or     dextrose 50 % injection 25-50 mL    Or     glucagon injection 1 mg     enoxaparin ANTICOAGULANT (LOVENOX) injection 40 mg     insulin aspart (NovoLOG) injection (RAPID ACTING)     lisinopril (ZESTRIL) tablet 10 mg     melatonin tablet 3 mg     pantoprazole (PROTONIX) EC tablet 40 mg     prochlorperazine (COMPAZINE) injection 5 mg     propranolol (INDERAL) tablet 20 mg     senna-docusate (SENOKOT-S/PERICOLACE) 8.6-50 MG per tablet 1 tablet     sodium chloride (PF) 0.9% PF flush 3 mL     sodium chloride (PF) 0.9% PF flush 3 mL     sodium  chloride 0.45% infusion     tamsulosin (FLOMAX) capsule 0.4 mg       Pertinent Radiology   Radiology Results: Personally reviewed   Results for orders placed or performed during the hospital encounter of 03/24/23   Chest XR,  PA & LAT    Impression    IMPRESSION: Heart and mediastinal size are normal. Minimal amount of streaky nodularity involves the mid right lung, unchanged and likely reflecting scarring. Left lung is clear. No definite acute infiltrate. No pleural effusion or pneumothorax.   CT Abdomen Pelvis w Contrast    Impression    IMPRESSION:   1.  No urinary tract stones or hydronephrosis.  2.  Cholelithiasis.  3.  No abscess or obvious intra-abdominal inflammation.       I spent a total of 30 minutes for this encounter with interval history, physical exam, in patient medication review and reconciliation.

## 2023-03-26 NOTE — CARE PLAN
Problem: Risk for Delirium  Goal: Optimal Coping  Outcome: Progressing  Goal: Improved Behavioral Control  Intervention: Minimize Safety Risk  Recent Flowsheet Documentation  Taken 3/26/2023 0800 by Brook House RN  Enhanced Safety Measures: chair alarm set  Goal: Improved Attention and Thought Clarity  Intervention: Maximize Cognitive Function  Recent Flowsheet Documentation  Taken 3/26/2023 0800 by Brook House, RN  Sensory Stimulation Regulation: television on  Reorientation Measures:    calendar in view    clock in view    familiar social contact encouraged    reorientation provided   Goal Outcome Evaluation:     Pt was compliant with all meds and cooperative with all cares. Pt conts on IV abx for UTI. Pt denies pain and had adequate fluid and nutritional intake. Pt voiding in adequate amounts. Pt got teary this AM as pt was informed he will not be discharging today. POC and falls risk discussed with pt who verbalized understanding/agreement and call light within reach.

## 2023-03-26 NOTE — PLAN OF CARE
Problem: Plan of Care - These are the overarching goals to be used throughout the patient stay.    Goal: Plan of Care Review  Description: The Plan of Care Review/Shift note should be completed every shift.  The Outcome Evaluation is a brief statement about your assessment that the patient is improving, declining, or no change.  This information will be displayed automatically on your shift note.  Outcome: Progressing   Goal Outcome Evaluation:  Denies any pain, N/V/D. Urinary frequency noted. Switched to IV Cefepime per MD and blood cultures pending.

## 2023-03-27 VITALS
DIASTOLIC BLOOD PRESSURE: 70 MMHG | RESPIRATION RATE: 17 BRPM | HEART RATE: 62 BPM | HEIGHT: 70 IN | WEIGHT: 200 LBS | TEMPERATURE: 97.8 F | OXYGEN SATURATION: 94 % | BODY MASS INDEX: 28.63 KG/M2 | SYSTOLIC BLOOD PRESSURE: 127 MMHG

## 2023-03-27 PROBLEM — R78.81 BACTEREMIA: Status: ACTIVE | Noted: 2023-03-27

## 2023-03-27 PROBLEM — K02.9 DENTAL CAVITY: Status: ACTIVE | Noted: 2023-03-27

## 2023-03-27 LAB
BACTERIA BLD CULT: ABNORMAL
BACTERIA BLD CULT: ABNORMAL
GLUCOSE BLDC GLUCOMTR-MCNC: 154 MG/DL (ref 70–99)
GLUCOSE BLDC GLUCOMTR-MCNC: 191 MG/DL (ref 70–99)

## 2023-03-27 PROCEDURE — 258N000002 HC RX IP 258 OP 250: Performed by: INTERNAL MEDICINE

## 2023-03-27 PROCEDURE — 99222 1ST HOSP IP/OBS MODERATE 55: CPT | Performed by: INTERNAL MEDICINE

## 2023-03-27 PROCEDURE — 250N000011 HC RX IP 250 OP 636: Performed by: INTERNAL MEDICINE

## 2023-03-27 PROCEDURE — 250N000013 HC RX MED GY IP 250 OP 250 PS 637: Performed by: INTERNAL MEDICINE

## 2023-03-27 PROCEDURE — 250N000013 HC RX MED GY IP 250 OP 250 PS 637: Performed by: EMERGENCY MEDICINE

## 2023-03-27 PROCEDURE — 250N000013 HC RX MED GY IP 250 OP 250 PS 637: Performed by: FAMILY MEDICINE

## 2023-03-27 PROCEDURE — 99239 HOSP IP/OBS DSCHRG MGMT >30: CPT | Performed by: FAMILY MEDICINE

## 2023-03-27 RX ORDER — SULFAMETHOXAZOLE/TRIMETHOPRIM 800-160 MG
1 TABLET ORAL 2 TIMES DAILY
Qty: 14 TABLET | Refills: 0 | Status: SHIPPED | OUTPATIENT
Start: 2023-03-27 | End: 2023-04-04

## 2023-03-27 RX ORDER — LACTOBACILLUS RHAMNOSUS GG 10B CELL
1 CAPSULE ORAL
COMMUNITY
Start: 2023-03-27 | End: 2024-07-02

## 2023-03-27 RX ORDER — PIOGLITAZONEHYDROCHLORIDE 15 MG/1
30 TABLET ORAL DAILY
Status: DISCONTINUED | OUTPATIENT
Start: 2023-03-27 | End: 2023-03-27 | Stop reason: HOSPADM

## 2023-03-27 RX ORDER — LACTOBACILLUS RHAMNOSUS GG 10B CELL
1 CAPSULE ORAL
Status: DISCONTINUED | OUTPATIENT
Start: 2023-03-27 | End: 2023-03-27 | Stop reason: HOSPADM

## 2023-03-27 RX ADMIN — Medication 1 CAPSULE: at 11:16

## 2023-03-27 RX ADMIN — PIOGLITAZONE HYDROCHLORIDE 30 MG: 15 TABLET ORAL at 12:54

## 2023-03-27 RX ADMIN — LISINOPRIL 10 MG: 10 TABLET ORAL at 08:07

## 2023-03-27 RX ADMIN — CYANOCOBALAMIN TAB 1000 MCG 2500 MCG: 1000 TAB at 08:07

## 2023-03-27 RX ADMIN — PANTOPRAZOLE SODIUM 40 MG: 40 TABLET, DELAYED RELEASE ORAL at 08:07

## 2023-03-27 RX ADMIN — INSULIN ASPART 1 UNITS: 100 INJECTION, SOLUTION INTRAVENOUS; SUBCUTANEOUS at 11:41

## 2023-03-27 RX ADMIN — CEFEPIME 2 G: 2 INJECTION, POWDER, FOR SOLUTION INTRAVENOUS at 14:22

## 2023-03-27 RX ADMIN — CEFEPIME 2 G: 2 INJECTION, POWDER, FOR SOLUTION INTRAVENOUS at 03:12

## 2023-03-27 RX ADMIN — ATORVASTATIN CALCIUM 40 MG: 40 TABLET, FILM COATED ORAL at 08:07

## 2023-03-27 RX ADMIN — ENOXAPARIN SODIUM 40 MG: 100 INJECTION SUBCUTANEOUS at 12:54

## 2023-03-27 RX ADMIN — SODIUM CHLORIDE: 4.5 INJECTION, SOLUTION INTRAVENOUS at 09:05

## 2023-03-27 RX ADMIN — PROPRANOLOL HYDROCHLORIDE 20 MG: 20 TABLET ORAL at 08:07

## 2023-03-27 RX ADMIN — INSULIN ASPART 1 UNITS: 100 INJECTION, SOLUTION INTRAVENOUS; SUBCUTANEOUS at 08:07

## 2023-03-27 RX ADMIN — TAMSULOSIN HYDROCHLORIDE 0.4 MG: 0.4 CAPSULE ORAL at 08:07

## 2023-03-27 ASSESSMENT — ACTIVITIES OF DAILY LIVING (ADL)
ADLS_ACUITY_SCORE: 22
ADLS_ACUITY_SCORE: 23
ADLS_ACUITY_SCORE: 22
ADLS_ACUITY_SCORE: 23

## 2023-03-27 NOTE — DISCHARGE SUMMARY
"[facility] MEDICINE  DISCHARGE SUMMARY     Primary Care Physician: Fei Lopez  Admission Date: 3/24/2023   Discharge Provider: Flaco Pérez MD Discharge Date: 3/27/2023   Diet:   Active Diet and Nourishment Order   Procedures     Combination Diet Moderate Consistent Carb (60 g CHO per Meal) Diet     Diet     Code Status: Full Code   Activity: DCACTIVITY: Activity as tolerated        Condition at Discharge: Stable     REASON FOR PRESENTATION(See Admission Note for Details)   Weakness    PRINCIPAL & ACTIVE DISCHARGE DIAGNOSES     Principal Problem:    Urinary tract infection with hematuria, site unspecified  Active Problems:    Essential hypertension, benign    Type 2 diabetes mellitus with complication, without long-term current use of insulin (H)    Urinary hesitancy    Antineutrophil cytoplasmic antibody (ANCA) positive    Dyslipidemia    Thrombocytopenia (H)    Sepsis, due to unspecified organism, unspecified whether acute organ dysfunction present (H)    Leukocytosis, unspecified type    Stage 3b chronic kidney disease (H)    Bacteremia    Dental cavity  Acute cystitis-Staph aureus  Bacteremia-Acinetobacter  Acute on chronic kidney disease stage IIIb  Rheumatoid arthritis    Clinically Significant Risk Factors              # Hypoalbuminemia: Lowest albumin = 3.2 g/dL at 3/26/2023 10:07 AM, will monitor as appropriate   # Thrombocytopenia: Lowest platelets = 93 in last 2 days, will monitor for bleeding        # DMII: A1C = N/A within past 6 months, PRESENT ON ADMISSION  # Overweight: Estimated body mass index is 28.7 kg/m  as calculated from the following:    Height as of this encounter: 1.778 m (5' 10\").    Weight as of this encounter: 90.7 kg (200 lb)., PRESENT ON ADMISSION         PENDING LABS     Unresulted Labs Ordered in the Past 30 Days of this Admission     Date and Time Order Name Status Description    3/26/2023 10:04 AM Blood Culture Arm, Right Preliminary     " 3/26/2023 10:04 AM Blood Culture Arm, Right Preliminary     3/24/2023  7:13 AM Blood Culture Peripheral Blood Preliminary         PROCEDURES ( this hospitalization only)      None    RECOMMENDATIONS TO OUTPATIENT PROVIDER FOR F/U VISIT   Follow-up Appointments     Follow-up and recommended labs and tests      1.  Follow-up with primary care provider in 1 week.  Recheck basic   metabolic panel.  2.  Follow-up with your dentist to address the dental cavity/abscess.               DISPOSITION     Home    SUMMARY OF HOSPITAL COURSE:      Frank Obando is a 77 year old male with a PMH of DM 2, CVA, CKD 3, HTN, anemia, RA..  3/24/2023 admitted for weakness, UTI and KIERA.      1.  Infectious disease.  Initially on ceftriaxone.  Urine culture shows 10-50 K staph aureus with pan sensitivities.    However 1 of 2 blood cultures came back positive for Acinetobacter with pan sensitivities.  Antibiotics adjusted to cefepime.  Subsequent blood cultures no growth.  Complains of history of left upper dental caries x6 months.  Infectious disease consulted for antibiotic guidance and recommended Bactrim DS twice daily for 7 days.  Cleared for discharge.  Should follow-up with his dentist regarding the dental carry.    2.  Renal.  Patient did have KIERA at admission.  Responded well to intravenous hydration.    Medically otherwise patient did well.  Should follow sugars at home and follow-up closely with his primary.      Discharge Medications with Med changes:     Current Discharge Medication List      START taking these medications    Details   lactobacillus rhamnosus, GG, (CULTURELL) capsule Take 1 capsule by mouth 3 times daily (before meals)    Associated Diagnoses: Bacteremia      sulfamethoxazole-trimethoprim (BACTRIM DS) 800-160 MG tablet Take 1 tablet by mouth 2 times daily for 7 days  Qty: 14 tablet, Refills: 0    Associated Diagnoses: Urinary tract infection with hematuria, site unspecified; Bacteremia         CONTINUE  these medications which have NOT CHANGED    Details   atorvastatin (LIPITOR) 40 MG tablet TAKE 1 TABLET BY MOUTH DAILY  Qty: 90 tablet, Refills: 2    Associated Diagnoses: Hypercholesterolemia      cyanocobalamin, vitamin B-12, 2,500 mcg Tab [CYANOCOBALAMIN, VITAMIN B-12, 2,500 MCG TAB] Take 2,500 mcg by mouth daily.      ferrous sulfate (FEROSUL) 325 (65 Fe) MG tablet Take 1 tablet (325 mg) by mouth daily (with breakfast)  Qty: 90 tablet, Refills: 3    Associated Diagnoses: Iron deficiency anemia due to chronic blood loss      inFLIXimab (REMICADE IV) Every 6weeks      lisinopril (ZESTRIL) 10 MG tablet Take 1 tablet (10 mg) by mouth daily  Qty: 90 tablet, Refills: 3    Associated Diagnoses: Essential hypertension, malignant      magnesium oxide 250 mg Tab [MAGNESIUM OXIDE 250 MG TAB] Take 250 mg by mouth 2 (two) times a day.      metFORMIN (GLUCOPHAGE) 500 MG tablet TAKE 2 TABLETS BY MOUTH TWICE DAILY AT 6 AM AND AT 4 PM  Qty: 360 tablet, Refills: 3    Associated Diagnoses: Type 2 diabetes mellitus (H)      omeprazole (PRILOSEC) 40 MG DR capsule TAKE 1 CAPSULE BY MOUTH DAILY  Qty: 90 capsule, Refills: 3    Associated Diagnoses: Gastroesophageal reflux disease with esophagitis without hemorrhage      pioglitazone (ACTOS) 30 MG tablet TAKE 1 TABLET BY MOUTH EVERY DAY  Qty: 90 tablet, Refills: 3    Associated Diagnoses: Type 2 diabetes mellitus with hyperglycemia, without long-term current use of insulin (H)      propranolol (INDERAL) 20 MG tablet TAKE 1 TABLET BY MOUTH TWICE DAILY  Qty: 180 tablet, Refills: 3    Associated Diagnoses: Essential hypertension, benign      tamsulosin (FLOMAX) 0.4 MG capsule TAKE 1 CAPSULE BY MOUTH DAILY AFTER AND SUPPER  Qty: 90 capsule, Refills: 3    Associated Diagnoses: Urinary hesitancy      blood glucose meter (GLUCOMETER) [BLOOD GLUCOSE METER (GLUCOMETER)] Use 1 each As Directed 2 (two) times a day. Dispense glucometer brand per patient's insurance at pharmacy discretion.  Qty: 1  each, Refills: 0    Associated Diagnoses: Type 2 diabetes mellitus with hyperglycemia, without long-term current use of insulin (H)      blood glucose test (ACCU-CHEK JACOB PLUS TEST STRP) strips [BLOOD GLUCOSE TEST (ACCU-CHEK JACOB PLUS TEST STRP) STRIPS] Use 1 each As Directed 2 (two) times a day. Accu-chek Jacob Plus  Qty: 200 strip, Refills: 11    Associated Diagnoses: Type 2 diabetes mellitus with complication, without long-term current use of insulin (H)      generic lancets (ACCU-CHEK SOFTCLIX LANCETS) [GENERIC LANCETS (ACCU-CHEK SOFTCLIX LANCETS)] Use 1 each As Directed 2 (two) times a day. Dispense brand per patient's insurance at pharmacy discretion.  Qty: 100 each, Refills: 3    Associated Diagnoses: Type 2 diabetes mellitus with hyperglycemia, without long-term current use of insulin (H)               Rationale for medication changes:      Lactobacillus and Bactrim for UTI and bacteremia.      Consults     OCCUPATIONAL THERAPY ADULT IP CONSULT  PHYSICAL THERAPY ADULT IP CONSULT  INFECTIOUS DISEASES IP CONSULT      Immunizations given this encounter     Most Recent Immunizations   Administered Date(s) Administered     COVID-19 Vaccine 18+ (Moderna) 11/01/2021     COVID-19 Vaccine Bivalent Booster 18+ (Moderna) 11/18/2022     COVID-19,PF,Moderna Booster 04/08/2022     DT (PEDS <7y) 01/01/2004     FLU 6-35 months 10/10/2018     Flu, Unspecified 09/29/2011     Influenza (High Dose) 3 valent vaccine 10/11/2019     Influenza (IIV3) PF 10/16/2014     Influenza Vaccine 65+ (Fluzone HD) 11/18/2022     Influenza Vaccine, 6+MO IM (QUADRIVALENT W/PRESERVATIVES) 10/16/2014     Pneumo Conj 13-V (2010&after) 01/23/2017     Pneumococcal 23 valent 10/22/2018     TDAP (Adacel,Boostrix) 12/06/2013     Td (Adult), Adsorbed 01/01/2004     Td,adult,historic,unspecified 01/01/2004     Zoster recombinant adjuvanted (SHINGRIX) 12/02/2020           Anticoagulation Information      Recent INR results:   Recent Labs   Lab  03/26/23  1007   INR 1.07     Warfarin doses (if applicable) or name of other anticoagulant: na      SIGNIFICANT IMAGING FINDINGS     Results for orders placed or performed during the hospital encounter of 03/24/23   Chest XR,  PA & LAT    Impression    IMPRESSION: Heart and mediastinal size are normal. Minimal amount of streaky nodularity involves the mid right lung, unchanged and likely reflecting scarring. Left lung is clear. No definite acute infiltrate. No pleural effusion or pneumothorax.   CT Abdomen Pelvis w Contrast    Impression    IMPRESSION:   1.  No urinary tract stones or hydronephrosis.  2.  Cholelithiasis.  3.  No abscess or obvious intra-abdominal inflammation.       SIGNIFICANT LABORATORY FINDINGS     Most Recent 3 CBC's:Recent Labs   Lab Test 03/26/23 1007 03/25/23  0634 03/24/23  0745   WBC 9.0 10.2 15.5*   HGB 10.7* 10.6* 11.7*   MCV 91 93 90   PLT 93* 84* 120*     Most Recent 3 BMP's:Recent Labs   Lab Test 03/27/23  1135 03/27/23  0748 03/26/23 2015 03/26/23  1322 03/26/23 1007 03/25/23  0759 03/25/23  0634 03/24/23  1403 03/24/23  0745   NA  --   --   --   --  133*  --  136  --  136   POTASSIUM  --   --   --   --  4.2  --  4.0  --  4.5   CHLORIDE  --   --   --   --  105  --  105  --  103   CO2  --   --   --   --  18*  --  24  --  25   BUN  --   --   --   --  11  --  12  --  18   CR  --   --   --   --  1.23  --  1.11  --  1.45*   ANIONGAP  --   --   --   --  10  --  7  --  8   CINDY  --   --   --   --  8.8  --  8.9  --  9.2   * 154* 157*   < > 162*   < > 153*   < > 198*    < > = values in this interval not displayed.     Most Recent 2 LFT's:Recent Labs   Lab Test 03/26/23 1007 03/24/23  0745   AST 27 27   ALT 21 25   ALKPHOS 112 87   BILITOTAL 0.8 0.7     Most Recent 3 INR's:Recent Labs   Lab Test 03/26/23  1007 10/05/21  1437   INR 1.07 1.11     Most Recent TSH and T4:Recent Labs   Lab Test 11/18/22  0845   TSH 2.37     Most Recent Hemoglobin A1c:Recent Labs   Lab Test 11/18/22  0845    A1C 7.1*     Most Recent 6 glucoses:Recent Labs   Lab Test 03/27/23  1135 03/27/23  0748 03/26/23  2015 03/26/23  1557 03/26/23  1322 03/26/23  1007   * 154* 157* 121* 126* 162*        7-Day Micro Results     Collected Updated Procedure Result Status      03/26/2023 1033 03/27/2023 0401 Blood Culture Arm, Right [[id number]]   Blood from Arm, Right    Preliminary result Component Value   Culture No growth after 12 hours  [P]                03/26/2023 1033 03/27/2023 0401 Blood Culture Arm, Right [[id number]]   Blood from Arm, Right    Preliminary result Component Value   Culture No growth after 12 hours  [P]                03/24/2023 0812 03/27/2023 1054 Blood Culture Peripheral Blood [[id number]]    (Abnormal)   Peripheral Blood    Final result Component Value   Culture Positive on the 2nd day of incubation    Acinetobacter lwoffii    1 of 2 bottles          Susceptibility      Acinetobacter lwoffii      JASON      Ampicillin/ Sulbactam 2.0 ug/mL Susceptible      Cefepime <=2 ug/mL Susceptible      Ceftazidime 4.0 ug/mL Susceptible      Ceftriaxone 4.0 ug/mL Susceptible      Ciprofloxacin <=1 ug/mL Susceptible      Gentamicin <=1 ug/mL Susceptible      Levofloxacin <=0.25 ug/mL Susceptible      Meropenem <=1 ug/mL Susceptible      Piperacillin/Tazobactam <=4 ug/mL Susceptible      Tobramycin <=1 ug/mL Susceptible      Trimethoprim/Sulfamethoxazole <=2/38 ug/mL Susceptible                           03/24/2023 0812 03/24/2023 0856 Symptomatic Influenza A/B, RSV, & SARS-CoV2 PCR (COVID-19) Nasopharyngeal [[id number]]    Swab from Nasopharyngeal    Final result Component Value   Influenza A PCR Negative   Influenza B PCR Negative   RSV PCR Negative   SARS CoV2 PCR Negative   NEGATIVE: SARS-CoV-2 (COVID-19) RNA not detected, presumed negative.            03/24/2023 0812 03/25/2023 1939 Verigene GN Panel [[id number]]    (Abnormal)   Peripheral Blood    Final result Component Value   Acinetobacter species  Detected   Positive for Acinetobacter species by [employer] multiplex nucleic acid test. Final identification and antimicrobial susceptibility testing will be verified by standard methods.   Citrobacter species Not Detected   Enterobacter species Not Detected   Proteus species Not Detected   Escherichia coli Not Detected   Klebsiella pneumoniae Not Detected   Klebsiella oxytoca Not Detected   Pseudomonas aeruginosa Not Detected   CTX-M Not Detected   KPC Not Detected   NDM Not Detected   VIM Not Detected   IMP Not Detected   OXA Not Detected            03/24/2023 0750 03/26/2023 0846 Urine Culture [[id number]]    (Abnormal)   Urine, Midstream    Final result Component Value   Culture 10,000-50,000 CFU/mL Staphylococcus aureus        Susceptibility      Staphylococcus aureus      JASON      Gentamicin <=0.5 ug/mL Susceptible      Nitrofurantoin <=16 ug/mL Susceptible      Oxacillin 1.0 ug/mL Susceptible  [1]       Tetracycline <=1 ug/mL Susceptible      Trimethoprim/Sulfamethoxazole <=0.5/9.5 ug/mL Susceptible      Vancomycin <=0.5 ug/mL Susceptible                   [1]  Oxacillin susceptible isolates are susceptible to cephalosporins (example: cefazolin and cephalexin) and beta lactam combination agents. Oxacillin resistant isolates are resistant to these agents.                 03/24/2023 0745 03/27/2023 1205 Blood Culture Peripheral Blood [[id number]]   Peripheral Blood    Preliminary result Component Value   Culture No growth after 3 days  [P]                        Discharge Orders        Reason for your hospital stay    Weakness, bladder infection, bacteria in blood and dehydration.     Follow-up and recommended labs and tests    1.  Follow-up with primary care provider in 1 week.  Recheck basic metabolic panel.  2.  Follow-up with your dentist to address the dental cavity/abscess.     Activity    Your activity upon discharge: activity as tolerated     Monitor and record    blood glucose 4 times a day, before  meals and at bedtime.  Bring readings to follow-up appointment with primary.     Diet    Follow this diet upon discharge: Orders Placed This Encounter      Combination Diet Moderate Consistent Carb (60 g CHO per Meal) Diet       Examination   Physical Exam   Temp:  [97.8  F (36.6  C)-98.4  F (36.9  C)] 97.8  F (36.6  C)  Pulse:  [62-65] 62  Resp:  [16-17] 17  BP: (123-130)/(70-76) 127/70  SpO2:  [94 %-96 %] 94 %  Wt Readings from Last 4 Encounters:   03/24/23 90.7 kg (200 lb)   03/14/23 91.4 kg (201 lb 9.6 oz)   03/10/23 90.7 kg (200 lb)   01/03/23 92.6 kg (204 lb 3.2 oz)         Please see EMR for more detailed significant labs, imaging, consultant notes etc.    IFlaco MD, personally saw the patient today and spent greater than 30 minutes discharging this patient.    Flaco Pérez MD  [facility]    CC:Fei Lopez

## 2023-03-27 NOTE — PLAN OF CARE
Problem: Plan of Care - These are the overarching goals to be used throughout the patient stay.    Goal: Plan of Care Review  Description: The Plan of Care Review/Shift note should be completed every shift.  The Outcome Evaluation is a brief statement about your assessment that the patient is improving, declining, or no change.  This information will be displayed automatically on your shift note.  Outcome: Progressing  Goal: Absence of Hospital-Acquired Illness or Injury  Intervention: Identify and Manage Fall Risk  Recent Flowsheet Documentation  Taken 3/27/2023 0830 by Brook House, RN  Safety Promotion/Fall Prevention:    bed alarm on    toileting scheduled    safety round/check completed    room organization consistent    room near nurse's station    room door open    patient and family education    lighting adjusted    fall prevention program maintained  Intervention: Prevent Skin Injury  Recent Flowsheet Documentation  Taken 3/27/2023 0830 by Brook House, RN  Body Position: position changed independently     Problem: Risk for Delirium  Goal: Optimal Coping  Outcome: Not Progressing  Goal: Improved Behavioral Control  Intervention: Minimize Safety Risk  Recent Flowsheet Documentation  Taken 3/27/2023 0830 by Brook House, RN  Enhanced Safety Measures: bed alarm set  Goal: Improved Attention and Thought Clarity  Intervention: Maximize Cognitive Function  Recent Flowsheet Documentation  Taken 3/27/2023 0830 by Brook House, RN  Sensory Stimulation Regulation: television on  Reorientation Measures: clock in view     Pt conts on IV abx and IFV, awaiting ID recommendations regarding discharge. Pt is hoping to discharge today. Pt was compliant with all meds and cooperative with all cares. Pt voiding in adequate amounts and had 1 loose BM during shift.

## 2023-03-27 NOTE — PROGRESS NOTES
[facility] MEDICINE  PROGRESS NOTE     Code Status: Full Code       Identification/Summary:   Frank Obando is a 77 year old male with a PMH of DM 2, CVA, CKD 3, HTN, anemia..  3/24/2023 admitted for weakness, UTI and KIERA.  Initially on ceftriaxone.  Urine culture shows 10-50 K staph aureus.  1 of 2 blood cultures came back positive for Acinetobacter.  Antibiotics adjusted to cefepime.  Subsequent blood cultures no growth.  Complains of history of left upper dental caries x6 months.  Infectious disease consulted for antibiotic guidance.    Assessment and Plan:  Sepsis secondary to UTI   Acute cystitis-Staph aureus  Bacteremia- Acinetobacter  Positive dental caries under bridge  Sepsis resolved after IV ceftriaxone and intravenous fluids.  Urine culture positive for 10-50 K staph aureus pan sensitive.   Subsequent 1 of 2 blood cultures 3/24 positive for Acinetobacter.  Pansensitive.  Subsequent blood cultures NGTD.  Antibiotics changed to IV cefepime.  Patient reports history of dental cavity left upper side x6 months.  Site is under a bridge and would require major dental work done so he has not had this addressed.  Does not cause him pain.  Etiology?  3/27 consulted infectious disease for antibiotic guidance.  KIERA on CKD 3B.  KIERA now resolved.   -Presented with creatinine of 1.5 then back to baseline 1.2-1.2  -Avoid nephrotoxins/hypotension.  DM type II  Noted hemoglobin A1c of 11/2022 at 7.1%  Initially held metformin and actos.  Carb controlled diet  Doing well with sliding scale insulin.    3/27 okay to resume Actos at this time.  HTN  Continue propranolol, lisinopril  Thrombocytopenia chronic  - stable around baseline  - monitor while on lovenox   H/o urinary retention  - continue flomax  - bladder protocol  - bladder scan for PVR  Dyslipidemia  - continue home statin   RA  - no other oral meds, getting inflixamab every 6 weeks     Anticoagulation   Enoxaparin  "(Lovenox) SQ    COVID-19 PCR influenza A/B/RSV negative from 3/24/2023  Noted.  Fluids: Saline lock  Pain meds: Tylenol as needed  Therapy: na  [place]:Not present  Lines: None       Current Diet  Orders Placed This Encounter      Combination Diet Moderate Consistent Carb (60 g CHO per Meal) Diet    Supplements  None    Barriers to Discharge: ID consultation, intravenous antibiotics    Disposition: Discharge soon.  Exact date TBD.    Clinically Significant Risk Factors              # Hypoalbuminemia: Lowest albumin = 3.2 g/dL at 3/26/2023 10:07 AM, will monitor as appropriate   # Thrombocytopenia: Lowest platelets = 93 in last 2 days, will monitor for bleeding        # DMII: A1C = N/A within past 6 months, PRESENT ON ADMISSION  # Overweight: Estimated body mass index is 28.7 kg/m  as calculated from the following:    Height as of this encounter: 1.778 m (5' 10\").    Weight as of this encounter: 90.7 kg (200 lb)., PRESENT ON ADMISSION         Interval History/Subjective:  Patient feeling well.  Denies any chest pain, shortness of breath, nausea vomiting lightheadedness or dizziness.  Has been up and ambulating to and from the bathroom without difficulty.  When asked patient does have a history of a dental cavity underneath the bridge left upper side of his mouth.  Has known about this for 6 months.  Unfortunately would require major dental surgery to address so he has been waiting.  Hoping to discharge soon.  Blood sugars little elevated but otherwise under good control.  Questions answered to verbalized satisfaction.    Physical Exam/Objective:  Temp:  [97.8  F (36.6  C)-98.4  F (36.9  C)] 97.8  F (36.6  C)  Pulse:  [62-65] 62  Resp:  [16-17] 17  BP: (123-130)/(70-76) 127/70  SpO2:  [94 %-96 %] 94 %  Wt Readings from Last 4 Encounters:   03/24/23 90.7 kg (200 lb)   03/14/23 91.4 kg (201 lb 9.6 oz)   03/10/23 90.7 kg (200 lb)   01/03/23 92.6 kg (204 lb 3.2 oz)     Body mass index is 28.7 kg/m .    Constitutional: " awake, alert, cooperative, no apparent distress, and appears stated age  ENT: No tenderness to palpation, Normocephalic, without obvious abnormality, atraumatic, external ears without lesions, oral pharynx with moist mucous membranes, tonsils without erythema or exudates, gums normal and good dentition.  Respiratory: No increased work of breathing, good air exchange, clear to auscultation bilaterally, no crackles or wheezing  Cardiovascular: Normal apical impulse, regular rate and rhythm, normal S1 and S2, no S3 or S4, and no murmur noted  GI: No scars, normal bowel sounds, soft, non-distended, non-tender, no masses palpated, no hepatosplenomegally  Skin: normal skin color, texture, turgor, no redness, warmth, or swelling, and no rashes  Musculoskeletal: There is no redness, warmth, or swelling of the joints.  Motor strength is 5 out of 5 all extremities bilaterally.  Tone is normal. no lower extremity pitting edema present  Neurologic: Cranial nerves II-XII are grossly intact. Sensory:  Sensory intact  Neuropsychiatric: General: normal, calm and normal eye contact Level of consciousness: alert / normal Affect: normal Orientation: oriented to self, place, time and situation Memory and insight: normal, memory for past and recent events intact and thought process normal      Medications:   Personally Reviewed.  Medications     NaCl 75 mL/hr at 03/27/23 0905       atorvastatin  40 mg Oral Daily     ceFEPIme  2 g Intravenous Q12H     cyanocobalamin  2,500 mcg Oral Daily     enoxaparin ANTICOAGULANT  40 mg Subcutaneous Q24H     insulin aspart  1-3 Units Subcutaneous TID AC     lactobacillus rhamnosus (GG)  1 capsule Oral TID AC     lisinopril  10 mg Oral Daily     pantoprazole  40 mg Oral QAM AC     propranolol  20 mg Oral BID     sodium chloride (PF)  3 mL Intracatheter Q8H     tamsulosin  0.4 mg Oral Daily       Data reviewed today: I personally reviewed all new medications, labs, imaging/diagnostics reports over the  past 24 hours. Pertinent findings include:    Imaging:   No results found for this or any previous visit (from the past 24 hour(s)).    Labs:  CT Abdomen Pelvis w Contrast   Final Result   IMPRESSION:    1.  No urinary tract stones or hydronephrosis.   2.  Cholelithiasis.   3.  No abscess or obvious intra-abdominal inflammation.      Chest XR,  PA & LAT   Final Result   IMPRESSION: Heart and mediastinal size are normal. Minimal amount of streaky nodularity involves the mid right lung, unchanged and likely reflecting scarring. Left lung is clear. No definite acute infiltrate. No pleural effusion or pneumothorax.        Recent Results (from the past 24 hour(s))   Glucose by meter    Collection Time: 03/26/23  1:22 PM   Result Value Ref Range    GLUCOSE BY METER POCT 126 (H) 70 - 99 mg/dL   Glucose by meter    Collection Time: 03/26/23  3:57 PM   Result Value Ref Range    GLUCOSE BY METER POCT 121 (H) 70 - 99 mg/dL   Glucose by meter    Collection Time: 03/26/23  8:15 PM   Result Value Ref Range    GLUCOSE BY METER POCT 157 (H) 70 - 99 mg/dL   Glucose by meter    Collection Time: 03/27/23  7:48 AM   Result Value Ref Range    GLUCOSE BY METER POCT 154 (H) 70 - 99 mg/dL       Pending Labs:  Unresulted Labs Ordered in the Past 30 Days of this Admission     Date and Time Order Name Status Description    3/26/2023 10:04 AM Blood Culture Arm, Right Preliminary     3/26/2023 10:04 AM Blood Culture Arm, Right Preliminary     3/24/2023  7:13 AM Blood Culture Peripheral Blood Preliminary           Flaco Pérez MD  [facility]  Phone: #[phone]

## 2023-03-27 NOTE — CONSULTS
Consultation - Infectious Disease  [facility]  Frank Obando,  1945, MRN [id number]    Admitting Dx: Gallstones [K80.20]  Elevated serum creatinine [R79.89]  Urinary tract infection with hematuria, site unspecified [N39.0, R31.9]  Sepsis, due to unspecified organism, unspecified whether acute organ dysfunction present (H) [A41.9]    PCP: Fei Lopez, None       ASSESSMENT   77-year-old man with a history of diabetes, hyperlipidemia, hypertension, rheumatoid arthritis admitted with weakness and unsteady gait.    1. Urinary tract infection.  Acute onset of weakness and increased urinary frequency.  UA with leuk esterases and white cells.  Urine culture growing 10-50,000 colonies of MSSA.  No recent instrumentation or procedures.  Blood cultures negative for Staph aureus.  2. Gram-negative bacteremia.  1 of 2 blood cultures on admission with Acinetobacter Iwoffii.  Unclear significance, as this could be a contaminant.  However, the patient is immunosuppressed and therefore high risk for atypical infections.  Isolate is pansensitive.  Repeat cultures have been negative to date.  Has clinically improved since admission.  3. Rheumatoid arthritis.  Treated with infliximab for the past several years.  Of note, this was started to treat episcleritis (?)  From RA, as opposed to arthritis  4. 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  Cirrhosis is listed in his medical history, but no evidence of this on recent imaging.    Principal Problem:    Urinary tract infection with hematuria, site unspecified  Active Problems:    Essential hypertension, benign    Type 2 diabetes mellitus with complication, without long-term current use of insulin (H)    Urinary hesitancy    Antineutrophil cytoplasmic antibody (ANCA) positive    Dyslipidemia    Thrombocytopenia (H)    Sepsis, due to unspecified organism, unspecified whether acute organ dysfunction present (H)    Leukocytosis, unspecified type    Stage 3b chronic  kidney disease (H)    Bacteremia    Dental cavity       PLAN   -Okay to discharge with Bactrim double strength p.o. twice daily x7 days      Thank you for this consult. ID will sign-off    Jaylon Peterson MD  [facility]  Direct messaging: [employer] Paging  On-Call ID provider: [phone], option: 9      ===========================================      Chief Complaint   Urinary tract infection with hematuria, site unspecified       HPI     We have been requested by Flaco Pérez MD to evaluate Frank Obando for the above.    History obtained by patient    Frank Obando is a 77 year old man with a history of diabetes, hyperlipidemia, hypertension, rheumatoid arthritis who is admitted with acute onset of weakness, and increased frequency of urination.  Patient was feeling well prior to this.  He denies recent history of urinary tract infections.  He is started on antibiotics for possible urinary tract infection due to a positive urinalysis.  His culture grew MSSA.  Blood cultures were also collected and are growing Acinetobacter species in 1 of 2 samples.  On admission the patient was febrile with leukocytosis.  Fevers have resolved and white count is normal.  He no longer feels weak, and is ambulating well with physical therapy.  He is open to go home soon.    Of note, the patient has a known cavity under a bridge.  He was told by his dentist that this was not an urgent issue, therefore he has not addressed it yet.  He denies any significant pain in this area.      Review of Systems   Ten systems reviewed and negative except for what is noted in the HPI         Medical History  Past Medical History:   Diagnosis Date     Anemia      Arthritis     osteoarthritis     Calculus of kidney      Diabetes mellitus (H)      Episcleritis of left eye      Hyperlipidemia      Hypertension      Iron deficiency anemia due to chronic blood loss 11/2/2021     Peripheral neuropathy       "Stroke (H)     Surgical History  He  has a past surgical history that includes CYSTOSCOPY,INSERT URETERAL STENT; Cystoscopy Tumor / Condylomata W/ Laser (Left, 7/18/2014); Colonoscopy (N/A, 11/16/2021); and Esophagoscopy, gastroscopy, duodenoscopy (EGD), combined (N/A, 11/16/2021).     Social History  Reviewed, and he  reports that he has never smoked. He has never used smokeless tobacco. He reports that he does not drink alcohol and does not use drugs.  Social History     Social History Narrative     Not on file     Family History  family history includes Coronary Artery Disease in his mother; Diabetes in his mother; Lung Cancer in his father.  family history reviewed and is not pertinent to the presenting problem.            Allergies     Allergies   Allergen Reactions     Morphine Nausea and Vomiting     Other Allergy (See Comments) [External Allergen Needs Reconciliation - See Comment] Unknown     Scopolamine HBr CHANG, 08/27/2013.       Scopolamine Hbr [Scopolamine] Unknown         Antibiotics   Cefepime 3/26-    Previous:  Ceftriaxone 3/24 - 3/26      Physical Exam     Temp:  [97.8  F (36.6  C)-98.4  F (36.9  C)] 97.8  F (36.6  C)  Pulse:  [62-65] 62  Resp:  [16-17] 17  BP: (123-130)/(70-76) 127/70  SpO2:  [94 %-96 %] 94 %    /70 (BP Location: Right arm)   Pulse 62   Temp 97.8  F (36.6  C) (Oral)   Resp 17   Ht 1.778 m (5' 10\")   Wt 90.7 kg (200 lb)   SpO2 94%   BMI 28.70 kg/m      GENERAL:  well-developed, well-nourished, lying in bed in no acute distress.   HENT:  Head is normocephalic, atraumatic. Oropharynx is moist without exudates or ulcers.  EYES:  Eyes have anicteric sclerae without conjunctival injection or stigmata of endocarditis.   NECK:  Supple.  LUNGS:  Clear to auscultation.  CARDIOVASCULAR:  Regular rate and rhythm with no murmurs, gallops or rubs.  ABDOMEN:  Normal bowel sounds, soft, nontender. No appreciable hepatosplenomegaly  EXT: Extremities warm and without edema.  No joint " swelling  SKIN:  No acute rashes. No stigmata of endocarditis.  NEUROLOGIC:  Grossly nonfocal.      Cultures   3/24 urine culture: 10-50,000 colonies MSSA  3/24 blood culture: 1 of 2 with Acinetobacter Iwoffi  3/26 blood cultures x2: No growth to date      Laboratory results     Recent Labs   Lab 03/26/23  1007 03/25/23  0634 03/24/23  0745   WBC 9.0 10.2 15.5*   HGB 10.7* 10.6* 11.7*   PLT 93* 84* 120*       Recent Labs   Lab 03/26/23  1007 03/25/23  0634 03/24/23  0745   * 136 136   CO2 18* 24 25   BUN 11 12 18   ALBUMIN 3.2*  --  3.9   ALKPHOS 112  --  87   ALT 21  --  25   AST 27  --  27       No results for input(s): CRP in the last 168 hours.    Invalid input(s): SEDRATE        Imaging   Radiology results reviewed    Chest XR,  PA & LAT    Result Date: 3/24/2023  EXAM: XR CHEST 2 VIEWS LOCATION: [facility] DATE/TIME: 3/24/2023 8:47 AM INDICATION: Weakness, fever, immunosuppressed. COMPARISON: 12/21/2018.     IMPRESSION: Heart and mediastinal size are normal. Minimal amount of streaky nodularity involves the mid right lung, unchanged and likely reflecting scarring. Left lung is clear. No definite acute infiltrate. No pleural effusion or pneumothorax.    CT Abdomen Pelvis w Contrast    Result Date: 3/24/2023  EXAM: CT ABDOMEN PELVIS W CONTRAST LOCATION: [facility] DATE/TIME: 3/24/2023 10:12 AM INDICATION: Hematuria, sepsis. COMPARISON: CT abdomen pelvis, 04/08/2022. TECHNIQUE: CT scan of the abdomen and pelvis was performed following injection of IV contrast. Multiplanar reformats were obtained. Dose reduction techniques were used. CONTRAST: Isovue  370 90mL FINDINGS: LOWER CHEST: There is bronchiectasis involving the bilateral lung bases with some mild subpleural pulmonary reticulation. This is unchanged. HEPATOBILIARY: Stones are present within the gallbladder. PANCREAS: Normal. SPLEEN: 1 cm low-dense lesion within the inferior aspect of the spleen is  unchanged and likely benign. ADRENAL GLANDS: Normal. KIDNEYS/BLADDER: Normal. BOWEL: There is diverticulosis of the colon without acute diverticulitis. No bowel obstruction or bowel inflammation. No abscess is seen. LYMPH NODES: Normal. VASCULATURE: There are upper abdominal and periesophageal varices along with pelvic varices about the urinary bladder. This is unchanged. PELVIC ORGANS: Normal. MUSCULOSKELETAL: The bones are demineralized.     IMPRESSION: 1.  No urinary tract stones or hydronephrosis. 2.  Cholelithiasis. 3.  No abscess or obvious intra-abdominal inflammation.      Data reviewed today: I reviewed all medications, new labs and imaging results over the last 24 hours. I personally reviewed the abdominal CT image(s) showing no occult infection. Cultures and previous notes were reviewed and summarized above.   The patient's care was discussed with the Bedside Nurse and Patient.

## 2023-03-27 NOTE — PLAN OF CARE
Problem: Plan of Care - These are the overarching goals to be used throughout the patient stay.    Goal: Absence of Hospital-Acquired Illness or Injury  Intervention: Identify and Manage Fall Risk  Recent Flowsheet Documentation  Taken 3/26/2023 2791 by Reyes, Dora, RN  Safety Promotion/Fall Prevention:   safety round/check completed   activity supervised   assistive device/personal items within reach   bed alarm on   clutter free environment maintained   fall prevention program maintained   increased rounding and observation   increase visualization of patient   lighting adjusted   mobility aid in reach   nonskid shoes/slippers when out of bed   patient and family education   room door open   room near nurse's station   room organization consistent   supervised activity   Goal Outcome Evaluation:    A&Ox4. VSS on RA. Baseline neuropathy noted in BLE. Voiding via urinal overnight. NS running at 75 mL/hr. Denies pain. Discharge home pending antibiotic plan.

## 2023-03-27 NOTE — PLAN OF CARE
Patient discharged home with spouse. Addressed all questions and concerns and reviewed AVS paperwork with patient.

## 2023-03-28 ENCOUNTER — PATIENT OUTREACH (OUTPATIENT)
Dept: CARE COORDINATION | Facility: CLINIC | Age: 78
End: 2023-03-28
Payer: MEDICARE

## 2023-03-28 NOTE — PROGRESS NOTES
Clinic Care Coordination Contact  [facility]: Post-Discharge Note  SITUATION                                                      Admission:    Admission Date: 03/24/23   Reason for Admission: 1. Sepsis, due to unspecified organism, unspecified whether acute organ dysfunction present (H)   2. Urinary tract infection with hematuria, site unspecified   3. Gallstones   4. Elevated serum creatinine  Discharge:   Discharge Date: 03/27/23  Discharge Diagnosis: Principal Problem:    Urinary tract infection with hematuria, site unspecified  Active Problems:    Essential hypertension, benign    Type 2 diabetes mellitus with complication, without long-term current use of insulin (H)    Urinary hesitancy    Antineutrophil cytoplasmic antibody (ANCA) positive    Dyslipidemia    Thrombocytopenia (H)    Sepsis, due to unspecified organism, unspecified whether acute organ dysfunction present (H)    Leukocytosis, unspecified type    Stage 3b chronic kidney disease (H)    Bacteremia    Dental cavity  Acute cystitis-Staph aureus  Bacteremia-Acinetobacter  Acute on chronic kidney disease stage IIIb  Rheumatoid arthritis    BACKGROUND                                                      Per hospital discharge summary and inpatient provider notes:    Frank Obando is a 77 year old male with a pertinent history of DM II, CVA, anemia, stage 3 CRF, hyperlipidemia, hypertension, and liver cirrhosis who presents to this ED by walk in for evaluation of generalized weakness and urinary frequency.     Patient reports waking up around 4 AM today and feeling generally weak as he tried to walk to the bathroom. He also began experiencing urinary frequency and chills this morning. Patient otherwise felt okay last night (3/23/23) before going to bed. He has not taken any Tylenol today. He denies any dysuria, testicular pain, rectal pain, scrotal pain, scrotal swelling, or scrotal redness. Patient denies having any open wounds or sores.  He denies any cough, abdominal pain, nausea, headache, ear pain, rhinorrhea, sore throat, or bowel changes. Patient denies any recent medication changes. His wife notes the patient had an infusion 2 days ago for his rheumatoid arthritis. He receives infusions every 6 weeks. Patient has chronic bilateral knee pain from his arthritis, but states that his knees are not more swollen than normal currently. His wife notes the patient has a history of a UTI but otherwise is not prone to infections. No other complaints or concerns expressed at this time.        ASSESSMENT           Discharge Assessment  How are you doing now that you are home?: Pt states he seems to be alright. Pt states his PCP just retired, has not established care with a PCP. States he is getting around alright.  How are your symptoms? (Red Flag symptoms escalate to triage hotline per guidelines): Improved  Do you feel your condition is stable enough to be safe at home until your provider visit?: Yes  Does the patient have their discharge instructions? : Yes  Does the patient have questions regarding their discharge instructions? : No  Were you started on any new medications or were there changes to any of your previous medications? : Yes  Does the patient have all of their medications?: Yes  Do you have questions regarding any of your medications? : Yes (see comment) (They changed his iron dosage in the hospital, pt states he will discuss this with the provider when he is seen by primary care.)  Do you have all of your needed medical supplies or equipment (DME)?  (i.e. oxygen tank, CPAP, cane, etc.): Yes (Blood glucose monitor)  Discharge follow-up appointment scheduled within 14 calendar days? : No  Is patient agreeable to assistance with scheduling? : No (Pt does not have a PCP and is unsure who he wants to follow with. Pt will call back to schedule.)         Post-op (Clinicians Only)  Did the patient have surgery or a procedure: No        PLAN                                                       Outpatient Plan:  1. Follow-up with primary care provider in 1 week. Recheck basic  metabolic panel.  2. Follow-up with your dentist to address the dental cavity/abscess.      Future Appointments   Date Time Provider Department Center   4/20/2023 11:30 AM [facility] INFUSION CHAIR 6 [facility]   6/14/2023 10:15 AM [facility] LAB [facility]   9/14/2023 10:15 AM [facility]   9/19/2023 10:00 AM [facility]         For any urgent concerns, please contact our 24 hour nurse triage line: [phone] ([phone])         Tamiko Barrientos RN

## 2023-03-29 LAB — BACTERIA BLD CULT: NO GROWTH

## 2023-03-31 LAB
BACTERIA BLD CULT: NO GROWTH
BACTERIA BLD CULT: NO GROWTH

## 2023-04-04 ENCOUNTER — OFFICE VISIT (OUTPATIENT)
Dept: INTERNAL MEDICINE | Facility: CLINIC | Age: 78
End: 2023-04-04
Payer: MEDICARE

## 2023-04-04 VITALS
RESPIRATION RATE: 19 BRPM | HEART RATE: 82 BPM | OXYGEN SATURATION: 95 % | HEIGHT: 70 IN | TEMPERATURE: 97.9 F | DIASTOLIC BLOOD PRESSURE: 58 MMHG | WEIGHT: 196.3 LBS | SYSTOLIC BLOOD PRESSURE: 98 MMHG | BODY MASS INDEX: 28.1 KG/M2

## 2023-04-04 DIAGNOSIS — Z86.0100 HISTORY OF COLONIC POLYPS: ICD-10-CM

## 2023-04-04 DIAGNOSIS — K74.60 CIRRHOSIS OF LIVER WITH ASCITES, UNSPECIFIED HEPATIC CIRRHOSIS TYPE (H): ICD-10-CM

## 2023-04-04 DIAGNOSIS — N18.32 STAGE 3B CHRONIC KIDNEY DISEASE (H): ICD-10-CM

## 2023-04-04 DIAGNOSIS — E78.5 DYSLIPIDEMIA: ICD-10-CM

## 2023-04-04 DIAGNOSIS — E11.8 TYPE 2 DIABETES MELLITUS WITH COMPLICATION, WITHOUT LONG-TERM CURRENT USE OF INSULIN (H): ICD-10-CM

## 2023-04-04 DIAGNOSIS — K21.00 GASTROESOPHAGEAL REFLUX DISEASE WITH ESOPHAGITIS WITHOUT HEMORRHAGE: ICD-10-CM

## 2023-04-04 DIAGNOSIS — N40.1 BENIGN PROSTATIC HYPERPLASIA WITH URINARY FREQUENCY: ICD-10-CM

## 2023-04-04 DIAGNOSIS — I85.10 SECONDARY ESOPHAGEAL VARICES WITHOUT BLEEDING (H): ICD-10-CM

## 2023-04-04 DIAGNOSIS — D69.6 THROMBOCYTOPENIA (H): ICD-10-CM

## 2023-04-04 DIAGNOSIS — I63.322 CEREBROVASCULAR ACCIDENT (CVA) DUE TO THROMBOSIS OF LEFT ANTERIOR CEREBRAL ARTERY (H): ICD-10-CM

## 2023-04-04 DIAGNOSIS — K31.819 ANGIODYSPLASIA OF STOMACH: ICD-10-CM

## 2023-04-04 DIAGNOSIS — R76.8 ANTINEUTROPHIL CYTOPLASMIC ANTIBODY (ANCA) POSITIVE: ICD-10-CM

## 2023-04-04 DIAGNOSIS — D50.0 IRON DEFICIENCY ANEMIA DUE TO CHRONIC BLOOD LOSS: ICD-10-CM

## 2023-04-04 DIAGNOSIS — R18.8 CIRRHOSIS OF LIVER WITH ASCITES, UNSPECIFIED HEPATIC CIRRHOSIS TYPE (H): ICD-10-CM

## 2023-04-04 DIAGNOSIS — H44.112 PANUVEITIS OF LEFT EYE: ICD-10-CM

## 2023-04-04 DIAGNOSIS — E11.42 DIABETIC POLYNEUROPATHY ASSOCIATED WITH TYPE 2 DIABETES MELLITUS (H): ICD-10-CM

## 2023-04-04 DIAGNOSIS — R35.0 BENIGN PROSTATIC HYPERPLASIA WITH URINARY FREQUENCY: ICD-10-CM

## 2023-04-04 DIAGNOSIS — I10 ESSENTIAL HYPERTENSION, BENIGN: ICD-10-CM

## 2023-04-04 DIAGNOSIS — Z00.00 ANNUAL PHYSICAL EXAM: Primary | ICD-10-CM

## 2023-04-04 PROBLEM — N39.0 URINARY TRACT INFECTION WITH HEMATURIA, SITE UNSPECIFIED: Status: RESOLVED | Noted: 2023-03-24 | Resolved: 2023-04-04

## 2023-04-04 PROBLEM — R10.9 ABDOMINAL PAIN: Status: RESOLVED | Noted: 2018-12-20 | Resolved: 2023-04-04

## 2023-04-04 PROBLEM — D72.829 LEUKOCYTOSIS, UNSPECIFIED TYPE: Status: RESOLVED | Noted: 2023-03-24 | Resolved: 2023-04-04

## 2023-04-04 PROBLEM — A41.9 SEPSIS, DUE TO UNSPECIFIED ORGANISM, UNSPECIFIED WHETHER ACUTE ORGAN DYSFUNCTION PRESENT (H): Status: RESOLVED | Noted: 2023-03-24 | Resolved: 2023-04-04

## 2023-04-04 PROBLEM — N18.30 CRF (CHRONIC RENAL FAILURE), STAGE 3 (MODERATE) (H): Chronic | Status: RESOLVED | Noted: 2020-02-10 | Resolved: 2023-04-04

## 2023-04-04 PROBLEM — I65.8 OCCLUSION AND STENOSIS OF OTHER SPECIFIED PRECEREBRAL ARTERY WITHOUT MENTION OF CEREBRAL INFARCTION: Status: RESOLVED | Noted: 2022-01-21 | Resolved: 2023-04-04

## 2023-04-04 PROBLEM — K02.9 DENTAL CAVITY: Status: RESOLVED | Noted: 2023-03-27 | Resolved: 2023-04-04

## 2023-04-04 PROBLEM — R39.11 URINARY HESITANCY: Status: RESOLVED | Noted: 2017-01-21 | Resolved: 2023-04-04

## 2023-04-04 PROBLEM — R78.81 BACTEREMIA: Status: RESOLVED | Noted: 2023-03-27 | Resolved: 2023-04-04

## 2023-04-04 PROBLEM — R31.9 URINARY TRACT INFECTION WITH HEMATURIA, SITE UNSPECIFIED: Status: RESOLVED | Noted: 2023-03-24 | Resolved: 2023-04-04

## 2023-04-04 LAB
CHOLEST SERPL-MCNC: 116 MG/DL
HBA1C MFR BLD: 7.2 % (ref 0–5.6)
HDLC SERPL-MCNC: 46 MG/DL
LDLC SERPL CALC-MCNC: 46 MG/DL
NONHDLC SERPL-MCNC: 70 MG/DL
PHOSPHATE SERPL-MCNC: 2.9 MG/DL (ref 2.5–4.5)
PTH-INTACT SERPL-MCNC: 23 PG/ML (ref 15–65)
TRIGL SERPL-MCNC: 122 MG/DL

## 2023-04-04 PROCEDURE — 83970 ASSAY OF PARATHORMONE: CPT | Performed by: INTERNAL MEDICINE

## 2023-04-04 PROCEDURE — G0439 PPPS, SUBSEQ VISIT: HCPCS | Performed by: INTERNAL MEDICINE

## 2023-04-04 PROCEDURE — 36415 COLL VENOUS BLD VENIPUNCTURE: CPT | Performed by: INTERNAL MEDICINE

## 2023-04-04 PROCEDURE — 83036 HEMOGLOBIN GLYCOSYLATED A1C: CPT | Performed by: INTERNAL MEDICINE

## 2023-04-04 PROCEDURE — 99495 TRANSJ CARE MGMT MOD F2F 14D: CPT | Mod: 25 | Performed by: INTERNAL MEDICINE

## 2023-04-04 PROCEDURE — 80061 LIPID PANEL: CPT | Performed by: INTERNAL MEDICINE

## 2023-04-04 PROCEDURE — 84100 ASSAY OF PHOSPHORUS: CPT | Performed by: INTERNAL MEDICINE

## 2023-04-04 RX ORDER — TAMSULOSIN HYDROCHLORIDE 0.4 MG/1
CAPSULE ORAL
Qty: 90 CAPSULE | Refills: 4 | Status: SHIPPED | OUTPATIENT
Start: 2023-04-04 | End: 2024-04-09

## 2023-04-04 RX ORDER — FERROUS SULFATE 325(65) MG
325 TABLET ORAL
Qty: 90 TABLET | Refills: 3
Start: 2023-04-04 | End: 2023-08-24

## 2023-04-04 RX ORDER — PREDNISONE 5 MG/1
1 TABLET ORAL
COMMUNITY
Start: 2022-08-17 | End: 2023-04-04

## 2023-04-04 RX ORDER — PREDNISOLONE ACETATE 10 MG/ML
SUSPENSION/ DROPS OPHTHALMIC
COMMUNITY
Start: 2022-07-26 | End: 2023-06-30

## 2023-04-04 RX ORDER — OMEPRAZOLE 40 MG/1
40 CAPSULE, DELAYED RELEASE ORAL DAILY
Qty: 90 CAPSULE | Refills: 4 | Status: SHIPPED | OUTPATIENT
Start: 2023-04-04 | End: 2024-05-09

## 2023-04-04 NOTE — PROGRESS NOTES
Jesse Alvarez is a 77 year old, presenting for the following health issues:  Recheck Medication and Hospital F/U        1/3/2023     3:57 PM   Additional Questions   Roomed by vidhi   Accompanied by debra     HPI         3/28/2023     3:46 PM   Post Discharge Outreach   Admission Date 3/24/2023   Reason for Admission 1. Sepsis, due to unspecified organism, unspecified whether acute organ dysfunction present (H)   2. Urinary tract infection with hematuria, site unspecified   3. Gallstones   4. Elevated serum creatinine   Discharge Date 3/27/2023   Discharge Diagnosis Principal Problem:    Urinary tract infection with hematuria, site unspecified  Active Problems:    Essential hypertension, benign    Type 2 diabetes mellitus with complication, without long-term current use of insulin (H)    Urinary hesitancy    Antineutrophil cytoplasmic antibody (ANCA) positive    Dyslipidemia    Thrombocytopenia (H)    Sepsis, due to unspecified organism, unspecified whether acute organ dysfunction present (H)    Leukocytosis, unspecified type    Stage 3b chronic kidney disease (H)    Bacteremia    Dental cavity  Acute cystitis-Staph aureus  Bacteremia-Acinetobacter  Acute on chronic kidney disease stage IIIb  Rheumatoid arthritis   How are you doing now that you are home? Pt states he seems to be alright. Pt states his PCP just retired, has not established care with a PCP. States he is getting around alright.   How are your symptoms? (Red Flag symptoms escalate to triage hotline per guidelines) Improved   Do you feel your condition is stable enough to be safe at home until your provider visit? Yes   Does the patient have their discharge instructions?  Yes   Does the patient have questions regarding their discharge instructions?  No   Were you started on any new medications or were there changes to any of your previous medications?  Yes   Does the patient have all of their medications? Yes   Do you have questions regarding any  of your medications?  Yes (see comment)   Do you have all of your needed medical supplies or equipment (DME)?  (i.e. oxygen tank, CPAP, cane, etc.) Yes   Discharge follow-up appointment scheduled within 14 calendar days?  No     Hospital Follow-up Visit:    Hospital/Nursing Home/IP Rehab Facility: [facility]  Date of Admission: 3/24/23  Date of Discharge: 3/27/23  Reason(s) for Admission: BACTERENIA    Was your hospitalization related to COVID-19? No   Problems taking medications regularly:  None  Medication changes since discharge: None  Problems adhering to non-medication therapy:  None    Summary of hospitalization:  Discharge summary unavailable  Diagnostic Tests/Treatments reviewed.  Follow up needed: none  Other Healthcare Providers Involved in Patient s Care:         None  Update since discharge: stable.   Plan of care communicated with patient     Review of Systems         Objective    There were no vitals taken for this visit.  There is no height or weight on file to calculate BMI.  Physical Exam

## 2023-04-04 NOTE — PROGRESS NOTES
SUBJECTIVE:   Antonio is a 77 year old who presents for Preventive Visit.      4/4/2023     9:12 AM   Additional Questions   Roomed by AW   Accompanied by ALONE   Are you in the first 12 months of your Medicare coverage?  No    HPI    Have you ever done Advance Care Planning? (For example, a Health Directive, POLST, or a discussion with a medical provider or your loved ones about your wishes): No, advance care planning information given to patient to review.  Patient plans to discuss their wishes with loved ones or provider.         Fall risk  Fallen 2 or more times in the past year?: No  Any fall with injury in the past year?: No    Cognitive Screening   1) Repeat 3 items (Leader, Season, Table)    2) Clock draw: NORMAL  3) 3 item recall: Recalls 3 objects  Results: 3 items recalled: COGNITIVE IMPAIRMENT LESS LIKELY    Mini-CogTM Copyright S Vikas. Licensed by the author for use in [facility]; reprinted with permission (lucille@[facility]). All rights reserved.      Reviewed and updated as needed this visit by clinical staff   Tobacco  Allergies  Meds              Reviewed and updated as needed this visit by Provider                 Social History     Tobacco Use     Smoking status: Never     Smokeless tobacco: Never   Vaping Use     Vaping status: Not on file   Substance Use Topics     Alcohol use: No           9/20/2021     8:27 AM   Alcohol Use   Prescreen: >3 drinks/day or >7 drinks/week? Not Applicable     Do you have a current opioid prescription? No  Do you use any other controlled substances or medications that are not prescribed by a provider? None    Current providers sharing in care for this patient include:   Patient Care Team:  Flaco Lopez MD as PCP - General (Internal Medicine)  Prerna Chong MBBS as Assigned Rheumatology Provider  Nelsy Freitas MD as Assigned Surgical Provider  Flaco Lopze MD as Assigned PCP    The following health maintenance items are reviewed in Epic  "and correct as of today:  Health Maintenance   Topic Date Due     PARATHYROID  Never done     PHOSPHORUS  Never done     HEPATITIS B IMMUNIZATION (1 of 3 - Risk 3-dose series) Never done     LIPID  04/29/2023     MICROALBUMIN  07/03/2023     EYE EXAM  08/23/2023     A1C  10/04/2023     FALL RISK ASSESSMENT  11/18/2023     DTAP/TDAP/TD IMMUNIZATION (2 - Td or Tdap) 12/06/2023     ANNUAL REVIEW OF HM ORDERS  01/03/2024     BMP  03/26/2024     HEMOGLOBIN  03/26/2024     MEDICARE ANNUAL WELLNESS VISIT  04/04/2024     DIABETIC FOOT EXAM  04/04/2024     ADVANCE CARE PLANNING  09/20/2026     HEPATITIS C SCREENING  Completed     PHQ-2 (once per calendar year)  Completed     INFLUENZA VACCINE  Completed     Pneumococcal Vaccine: 65+ Years  Completed     URINALYSIS  Completed     ALK PHOS  Completed     ZOSTER IMMUNIZATION  Completed     COVID-19 Vaccine  Completed     IPV IMMUNIZATION  Aged Out     MENINGITIS IMMUNIZATION  Aged Out     COLORECTAL CANCER SCREENING  Discontinued       Review of Systems  Constitutional, HEENT, cardiovascular, pulmonary, GI, , musculoskeletal, neuro, skin, endocrine and psych systems are negative, except as otherwise noted.    OBJECTIVE:   BP 98/58 (BP Location: Left arm, Patient Position: Sitting, Cuff Size: Adult Large)   Pulse 82   Temp 97.9  F (36.6  C) (Tympanic)   Resp 19   Ht 1.784 m (5' 10.25\")   Wt 89 kg (196 lb 4.8 oz)   SpO2 95%   BMI 27.97 kg/m   Estimated body mass index is 27.97 kg/m  as calculated from the following:    Height as of this encounter: 1.784 m (5' 10.25\").    Weight as of this encounter: 89 kg (196 lb 4.8 oz).  Physical Exam  EYES: Eyelids, conjunctiva, and sclera were normal. Pupils were normal. Cornea, iris, and lens were normal bilaterally.  HEAD, EARS, NOSE, MOUTH, AND THROAT: Head and face were normal. Hearing was normal to voice and the ears were normal to external exam.   NECK: Neck appearance was normal. There were no neck masses and the thyroid was " not enlarged.  RESPIRATORY: Breathing pattern was normal and the chest moved symmetrically.  Percussion/auscultatory percussion was normal.  Lung sounds were normal and there were no abnormal sounds.  CARDIOVASCULAR: Heart rate and rhythm were normal.  S1 and S2 were normal and there were no extra sounds or murmurs. Peripheral pulses in arms and legs were normal.  Jugular venous pressure was normal.  There was no peripheral edema.  GASTROINTESTINAL: The abdomen was normal in contour.  Bowel sounds were present.  Percussion detected no organ enlargement or tenderness.  Palpation detected no tenderness, mass, or enlarged organs.   NEUROLOGIC: The patient was alert and oriented to person, place, time, and circumstance. Speech was normal. Cranial nerves were normal. Motor strength was normal for age. The patient was normally coordinated.  PSYCHIATRIC:  Mood and affect were normal and the patient had normal recent and remote memory. The patient's judgment and insight were normal.      ASSESSMENT / PLAN:   1. Annual physical exam  This is a 77-year-old man with issues as discussed below    2. History of colonic polyps  Most recent colonoscopy 2021, likely can be done with screening colonoscopy    3. Panuveitis of left eye, Dr. Abdi / Castillo  4. Antineutrophil cytoplasmic antibody (ANCA) positive  He follows with both ophthalmology and rheumatology and is on Remicade    5. Stage 3b chronic kidney disease (H)  - Parathyroid Hormone Intact; Future  - Phosphorus; Future  - Parathyroid Hormone Intact  - Phosphorus    6. Cirrhosis of liver with ascites, unspecified hepatic cirrhosis type (H)  It is unclear to me how a diagnosis of cirrhosis was made.  I believe this is made on his upper endoscopy when he had esophageal varices.  He did have an ultrasound in 2021 that showed some coarsened hepatic echotexture.  I am not sure if he has ever had elastography or biopsy.  Presume secondary to methotrexate    7. 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  He has required fairly significant iron supplementation to maintain a normal ferritin.  Anemia could be multifactorial beyond iron deficiency but clearly seems to have some iron deficiency.  I do not believe he has ever had a PillCam study and this could be considered as his upper endoscopy and colonoscopy if otherwise looked okay    8. Thrombocytopenia (H)  Likely secondary to liver disease    9. Angiodysplasia of stomach  As above    10. Iron deficiency anemia due to chronic blood loss  As above  - ferrous sulfate (FEROSUL) 325 (65 Fe) MG tablet; Take 1 tablet (325 mg) by mouth 3 times daily (with meals)  Dispense: 90 tablet; Refill: 3    11. Type 2 diabetes mellitus with complication, without long-term current use of insulin (H)  Has been well controlled, add Jardiance for further renal protection and better diabetes care  - Lipid panel reflex to direct LDL Non-fasting; Future  - empagliflozin (JARDIANCE) 10 MG TABS tablet; Take 1 tablet (10 mg) by mouth daily  Dispense: 90 tablet; Refill: 4  - Hemoglobin A1c; Future  - Lipid panel reflex to direct LDL Non-fasting  - Hemoglobin A1c    12. Diabetic polyneuropathy associated with type 2 diabetes mellitus (H)  Stable    13. Essential hypertension, benign  Controlled    14. Dyslipidemia  On statin    15. H/o CVA ~2013  Continue secondary prevention    16. Gastroesophageal reflux disease with esophagitis without hemorrhage  - omeprazole (PRILOSEC) 40 MG DR capsule; Take 1 capsule (40 mg) by mouth daily  Dispense: 90 capsule; Refill: 4    17. Benign prostatic hyperplasia with urinary frequency  - tamsulosin (FLOMAX) 0.4 MG capsule; TAKE 1 CAPSULE BY MOUTH DAILY AFTER AND SUPPER  Dispense: 90 capsule; Refill: 4        MED REC REQUIRED  Post Medication  "Reconciliation Status:  Discharge medications reconciled, continue medications without change      COUNSELING:  Reviewed preventive health counseling, as reflected in patient instructions      BMI:   Estimated body mass index is 27.97 kg/m  as calculated from the following:    Height as of this encounter: 1.784 m (5' 10.25\").    Weight as of this encounter: 89 kg (196 lb 4.8 oz).     He reports that he has never smoked. He has never used smokeless tobacco.      Appropriate preventive services were discussed with this patient, including applicable screening as appropriate for cardiovascular disease, diabetes, osteopenia/osteoporosis, and glaucoma.  As appropriate for age/gender, discussed screening for colorectal cancer, prostate cancer, breast cancer, and cervical cancer. Checklist reviewing preventive services available has been given to the patient.    Reviewed patients plan of care and provided an AVS. The Basic Care Plan (routine screening as documented in Health Maintenance) for Frank meets the Care Plan requirement. This Care Plan has been established and reviewed with the Patient.    Flaco Lopez MD  [facility]    Identified Health Risks:    I have reviewed Opioid Use Disorder and Substance Use Disorder risk factors and made any needed referrals.     "

## 2023-04-17 DIAGNOSIS — I10 ESSENTIAL HYPERTENSION, BENIGN: ICD-10-CM

## 2023-04-17 RX ORDER — PROPRANOLOL HYDROCHLORIDE 20 MG/1
20 TABLET ORAL 2 TIMES DAILY
Qty: 180 TABLET | Refills: 3 | Status: SHIPPED | OUTPATIENT
Start: 2023-04-17 | End: 2024-02-16

## 2023-04-17 NOTE — TELEPHONE ENCOUNTER
"Prescription approved per [facility] Refill Protocol.    Last Written Prescription Date:  3/18/22 - Dr. Lopez   Last Fill Quantity: 180,  # refills: 3   Last office visit provider:  4/4/23 - Dr. Lopez     Requested Prescriptions   Pending Prescriptions Disp Refills     propranolol (INDERAL) 20 MG tablet 180 tablet 3     Sig: Take 1 tablet (20 mg) by mouth 2 times daily       Beta-Blockers Protocol Passed - 4/17/2023  3:56 PM        Passed - Blood pressure under 140/90 in past 12 months     BP Readings from Last 3 Encounters:   04/04/23 98/58   03/27/23 127/70   03/14/23 130/82                 Passed - Patient is age 6 or older        Passed - Recent (12 mo) or future (30 days) visit within the authorizing provider's specialty     Patient has had an office visit with the authorizing provider or a provider within the authorizing providers department within the previous 12 mos or has a future within next 30 days. See \"Patient Info\" tab in inbasket, or \"Choose Columns\" in Meds & Orders section of the refill encounter.              Passed - Medication is active on med list             Zandra Ness RN 04/17/23 4:18 PM  "

## 2023-04-20 ENCOUNTER — INFUSION THERAPY VISIT (OUTPATIENT)
Dept: INFUSION THERAPY | Facility: CLINIC | Age: 78
End: 2023-04-20
Attending: INTERNAL MEDICINE
Payer: MEDICARE

## 2023-04-20 VITALS
DIASTOLIC BLOOD PRESSURE: 74 MMHG | BODY MASS INDEX: 27.78 KG/M2 | RESPIRATION RATE: 16 BRPM | TEMPERATURE: 97.8 F | OXYGEN SATURATION: 98 % | HEART RATE: 64 BPM | SYSTOLIC BLOOD PRESSURE: 117 MMHG | WEIGHT: 195 LBS

## 2023-04-20 DIAGNOSIS — H44.112 PANUVEITIS OF LEFT EYE: ICD-10-CM

## 2023-04-20 DIAGNOSIS — H44.112 PANUVEITIS OF LEFT EYE: Primary | ICD-10-CM

## 2023-04-20 PROCEDURE — 96413 CHEMO IV INFUSION 1 HR: CPT

## 2023-04-20 PROCEDURE — 258N000003 HC RX IP 258 OP 636: Performed by: INTERNAL MEDICINE

## 2023-04-20 PROCEDURE — 250N000013 HC RX MED GY IP 250 OP 250 PS 637: Performed by: INTERNAL MEDICINE

## 2023-04-20 PROCEDURE — 250N000011 HC RX IP 250 OP 636: Performed by: INTERNAL MEDICINE

## 2023-04-20 PROCEDURE — 96375 TX/PRO/DX INJ NEW DRUG ADDON: CPT

## 2023-04-20 RX ORDER — DIPHENHYDRAMINE HYDROCHLORIDE 50 MG/ML
50 INJECTION INTRAMUSCULAR; INTRAVENOUS
Status: CANCELLED
Start: 2023-04-21

## 2023-04-20 RX ORDER — ALBUTEROL SULFATE 90 UG/1
1-2 AEROSOL, METERED RESPIRATORY (INHALATION)
Status: CANCELLED
Start: 2023-04-21

## 2023-04-20 RX ORDER — METHYLPREDNISOLONE SODIUM SUCCINATE 125 MG/2ML
125 INJECTION, POWDER, LYOPHILIZED, FOR SOLUTION INTRAMUSCULAR; INTRAVENOUS
Status: DISCONTINUED | OUTPATIENT
Start: 2023-04-20 | End: 2023-04-20 | Stop reason: HOSPADM

## 2023-04-20 RX ORDER — DIPHENHYDRAMINE HCL 25 MG
25 CAPSULE ORAL ONCE
Status: COMPLETED | OUTPATIENT
Start: 2023-04-20 | End: 2023-04-20

## 2023-04-20 RX ORDER — METHYLPREDNISOLONE SODIUM SUCCINATE 125 MG/2ML
125 INJECTION, POWDER, LYOPHILIZED, FOR SOLUTION INTRAMUSCULAR; INTRAVENOUS ONCE
Status: CANCELLED | OUTPATIENT
Start: 2023-04-21 | End: 2023-04-21

## 2023-04-20 RX ORDER — DIPHENHYDRAMINE HCL 25 MG
25 CAPSULE ORAL ONCE
Status: CANCELLED
Start: 2023-04-21 | End: 2023-04-21

## 2023-04-20 RX ORDER — HEPARIN SODIUM,PORCINE 10 UNIT/ML
5 VIAL (ML) INTRAVENOUS
Status: CANCELLED | OUTPATIENT
Start: 2023-04-21

## 2023-04-20 RX ORDER — EPINEPHRINE 1 MG/ML
0.3 INJECTION, SOLUTION INTRAMUSCULAR; SUBCUTANEOUS EVERY 5 MIN PRN
Status: CANCELLED | OUTPATIENT
Start: 2023-04-21

## 2023-04-20 RX ORDER — ALBUTEROL SULFATE 0.83 MG/ML
2.5 SOLUTION RESPIRATORY (INHALATION)
Status: CANCELLED | OUTPATIENT
Start: 2023-04-21

## 2023-04-20 RX ORDER — METHYLPREDNISOLONE SODIUM SUCCINATE 125 MG/2ML
125 INJECTION, POWDER, LYOPHILIZED, FOR SOLUTION INTRAMUSCULAR; INTRAVENOUS
Status: CANCELLED
Start: 2023-04-21

## 2023-04-20 RX ORDER — ACETAMINOPHEN 325 MG/1
650 TABLET ORAL ONCE
Status: COMPLETED | OUTPATIENT
Start: 2023-04-20 | End: 2023-04-20

## 2023-04-20 RX ORDER — ACETAMINOPHEN 325 MG/1
650 TABLET ORAL ONCE
Status: CANCELLED
Start: 2023-04-21 | End: 2023-04-21

## 2023-04-20 RX ORDER — HEPARIN SODIUM (PORCINE) LOCK FLUSH IV SOLN 100 UNIT/ML 100 UNIT/ML
5 SOLUTION INTRAVENOUS
Status: CANCELLED | OUTPATIENT
Start: 2023-04-21

## 2023-04-20 RX ORDER — MEPERIDINE HYDROCHLORIDE 50 MG/ML
25 INJECTION INTRAMUSCULAR; INTRAVENOUS; SUBCUTANEOUS EVERY 30 MIN PRN
Status: DISCONTINUED | OUTPATIENT
Start: 2023-04-20 | End: 2023-04-20 | Stop reason: HOSPADM

## 2023-04-20 RX ORDER — METHYLPREDNISOLONE SODIUM SUCCINATE 125 MG/2ML
125 INJECTION, POWDER, LYOPHILIZED, FOR SOLUTION INTRAMUSCULAR; INTRAVENOUS ONCE
Status: COMPLETED | OUTPATIENT
Start: 2023-04-20 | End: 2023-04-20

## 2023-04-20 RX ORDER — ALBUTEROL SULFATE 0.83 MG/ML
2.5 SOLUTION RESPIRATORY (INHALATION)
Status: DISCONTINUED | OUTPATIENT
Start: 2023-04-20 | End: 2023-04-20 | Stop reason: HOSPADM

## 2023-04-20 RX ORDER — DIPHENHYDRAMINE HYDROCHLORIDE 50 MG/ML
50 INJECTION INTRAMUSCULAR; INTRAVENOUS
Status: DISCONTINUED | OUTPATIENT
Start: 2023-04-20 | End: 2023-04-20 | Stop reason: HOSPADM

## 2023-04-20 RX ORDER — EPINEPHRINE 1 MG/ML
0.3 INJECTION, SOLUTION INTRAMUSCULAR; SUBCUTANEOUS EVERY 5 MIN PRN
Status: DISCONTINUED | OUTPATIENT
Start: 2023-04-20 | End: 2023-04-20 | Stop reason: HOSPADM

## 2023-04-20 RX ORDER — MEPERIDINE HYDROCHLORIDE 50 MG/ML
25 INJECTION INTRAMUSCULAR; INTRAVENOUS; SUBCUTANEOUS EVERY 30 MIN PRN
Status: CANCELLED | OUTPATIENT
Start: 2023-04-21

## 2023-04-20 RX ORDER — ALBUTEROL SULFATE 90 UG/1
1-2 AEROSOL, METERED RESPIRATORY (INHALATION)
Status: DISCONTINUED | OUTPATIENT
Start: 2023-04-20 | End: 2023-04-20 | Stop reason: HOSPADM

## 2023-04-20 RX ADMIN — DIPHENHYDRAMINE HYDROCHLORIDE 25 MG: 25 CAPSULE ORAL at 11:45

## 2023-04-20 RX ADMIN — METHYLPREDNISOLONE SODIUM SUCCINATE 125 MG: 125 INJECTION, POWDER, FOR SOLUTION INTRAMUSCULAR; INTRAVENOUS at 13:03

## 2023-04-20 RX ADMIN — SODIUM CHLORIDE 250 ML: 9 INJECTION, SOLUTION INTRAVENOUS at 13:03

## 2023-04-20 RX ADMIN — INFLIXIMAB 500 MG: 100 INJECTION, POWDER, LYOPHILIZED, FOR SOLUTION INTRAVENOUS at 13:09

## 2023-04-20 RX ADMIN — ACETAMINOPHEN 650 MG: 325 TABLET ORAL at 11:45

## 2023-04-20 NOTE — PROGRESS NOTES
Infusion Nursing Note:  Frank Obando presents today for Remicade [id number].    Patient seen by provider today: No    Note: Pt arrives ambulatory to [facility]. Pt reports no changes in current baseline.     + pt premedicated with tylenol, benadryl and IV solu-medrol. Pt reports in previous infusions he has noted itchiness during infusion, with resolving at end of infusion. Pt denies any previous dyspnea or itchiness in throat.    Intravenous Access:  Peripheral IV placed by PICC.    Treatment Conditions:  Biological Infusion Checklist:  ~~~ NOTE: If the patient answers yes to any of the questions below, hold the infusion and contact ordering provider or on-call provider.    1. Have you recently had an elevated temperature, fever, chills, productive cough, coughing for 3 weeks or longer or hemoptysis, abnormal vital signs, night sweats,  chest pain or have you noticed a decrease in your appetite, unexplained weight loss or fatigue? No  2. Do you have any open wounds or new incisions? No  3. Do you have any recent or upcoming hospitalizations, surgeries or dental procedures? No  4. Do you currently have or recently have had any signs of illness or infection or are you on any antibiotics? No  5. Have you had any new, sudden or worsening abdominal pain? No  6. Have you or anyone in your household received a live vaccination in the past 4 weeks? Please note:  No live vaccines while on biologic/chemotherapy until 6 months after the last treatment.  Patient can receive the flu vaccine (shot only) and the pneumovax.  It is optimal for the patient to get these vaccines mid cycle, but they can be given at any time as long as it is not on the day of the infusion. No  7. Have you recently been diagnosed with any new nervous system diseases (ie. Multiple sclerosis, Guillain [place], seizures, neurological changes) or cancer diagnosis? No  8. Are you on any form of radiation or chemotherapy? No  9. Are you pregnant or  breast feeding or do you have plans of pregnancy in the future? N/A  10. Have you been having any signs of worsening depression or suicidal ideations?  (benlysta only) No  11. Have there been any other new onset medical symptoms? No  Post Infusion Assessment:  Patient tolerated infusion without incident. Pt was able to sleep during infusion. VSS.    Blood return noted pre and post infusion.  Site patent and intact, free from redness, edema or discomfort.  No evidence of extravasations.  Access discontinued per protocol.     Discharge Plan:   Patient discharged in stable condition accompanied by: self.  Departure Mode: Ambulatory.      Carolyn Campoverde RN     gdm

## 2023-04-25 ENCOUNTER — TELEPHONE (OUTPATIENT)
Dept: INTERNAL MEDICINE | Facility: CLINIC | Age: 78
End: 2023-04-25
Payer: MEDICARE

## 2023-04-25 DIAGNOSIS — E11.65 TYPE 2 DIABETES MELLITUS WITH HYPERGLYCEMIA, WITHOUT LONG-TERM CURRENT USE OF INSULIN (H): ICD-10-CM

## 2023-04-25 DIAGNOSIS — E11.8 TYPE 2 DIABETES MELLITUS WITH COMPLICATION, WITHOUT LONG-TERM CURRENT USE OF INSULIN (H): ICD-10-CM

## 2023-04-25 RX ORDER — LANCETS
EACH MISCELLANEOUS
Qty: 100 EACH | Refills: 3 | Status: SHIPPED | OUTPATIENT
Start: 2023-04-25 | End: 2024-07-01

## 2023-04-25 RX ORDER — BLOOD SUGAR DIAGNOSTIC
1 STRIP MISCELLANEOUS 2 TIMES DAILY
Qty: 200 STRIP | Refills: 11 | Status: SHIPPED | OUTPATIENT
Start: 2023-04-25 | End: 2024-01-30

## 2023-04-25 NOTE — TELEPHONE ENCOUNTER
General Call    Contacts       Type Contact Phone/Fax    04/25/2023 11:10 AM CDT Phone (Incoming) Antonio Obando (Self) [phone] (M)        Reason for Call:  Jardiance    What are your questions or concerns:  Patient states the medication is expensive, however would like to know if he should be discontinuing other diabetic meds, or continue taking them as well?    Patient would also like to discuss medication side effects that he didn't feel comfortable with    Date of last appointment with provider: n/a     would you prefer to receive a phone call?:   Patient would prefer a phone call     Okay to leave a detailed message?: Yes at Cell number on file:    Telephone Information:   Mobile [phone]         Disp Refills Start End DONITA   empagliflozin (JARDIANCE) 10 MG TABS tablet 90 tablet 4 4/4/2023  --   Sig - Route: Take 1 tablet (10 mg) by mouth daily - Oral   Patient not taking: Reported on 4/20/2023        Sent to pharmacy as: Empagliflozin 10 MG Oral Tablet (JARDIANCE)   Class: E-Prescribe   Order: [id number]   E-Prescribing Status: Receipt confirmed by pharmacy (4/4/2023  9:49 AM CDT)     Printout Tracking    External Result Report     Pharmacy    [id number] - [place], [place] - [address] AT [address] OF [address]

## 2023-04-25 NOTE — TELEPHONE ENCOUNTER
Relayed provider recommendation to patient. Patient verbalized understanding.  No further questions at this time.

## 2023-04-25 NOTE — TELEPHONE ENCOUNTER
Spoke with patient regarding medication questions.     Patient was not aware that he was going to start taking Jardiance and stated that Dr. Lopez was going to check insurance. Patient interested in cheaper alternative and stated that Jardiance is very expensive for him.     Patient concerned about side effects of Jardiance and would like reassurance from PCP that this is a good fit for him.     Patient would also like to know if he should be taking this medication with his other diabetic medications. Per visit on 04/04/23, this should just be an add on, but would like PCP confirmation.

## 2023-04-25 NOTE — TELEPHONE ENCOUNTER
If Jardiance is too expensive, I would not recommend any additional changes to his diabetes medications.  If you were to take Jardiance he would add this and not need to change any of his additional medications.

## 2023-05-09 ENCOUNTER — TRANSFERRED RECORDS (OUTPATIENT)
Dept: HEALTH INFORMATION MANAGEMENT | Facility: CLINIC | Age: 78
End: 2023-05-09
Payer: MEDICARE

## 2023-05-09 LAB — RETINOPATHY: NEGATIVE

## 2023-06-01 ENCOUNTER — INFUSION THERAPY VISIT (OUTPATIENT)
Dept: INFUSION THERAPY | Facility: CLINIC | Age: 78
End: 2023-06-01
Attending: INTERNAL MEDICINE
Payer: MEDICARE

## 2023-06-01 VITALS
DIASTOLIC BLOOD PRESSURE: 77 MMHG | OXYGEN SATURATION: 96 % | SYSTOLIC BLOOD PRESSURE: 128 MMHG | BODY MASS INDEX: 28.49 KG/M2 | WEIGHT: 200 LBS | RESPIRATION RATE: 16 BRPM | TEMPERATURE: 98.1 F | HEART RATE: 58 BPM

## 2023-06-01 DIAGNOSIS — H44.112 PANUVEITIS OF LEFT EYE: ICD-10-CM

## 2023-06-01 DIAGNOSIS — H44.112 PANUVEITIS OF LEFT EYE: Primary | ICD-10-CM

## 2023-06-01 PROCEDURE — 250N000013 HC RX MED GY IP 250 OP 250 PS 637: Performed by: INTERNAL MEDICINE

## 2023-06-01 PROCEDURE — 96375 TX/PRO/DX INJ NEW DRUG ADDON: CPT

## 2023-06-01 PROCEDURE — 258N000003 HC RX IP 258 OP 636: Performed by: INTERNAL MEDICINE

## 2023-06-01 PROCEDURE — 96413 CHEMO IV INFUSION 1 HR: CPT

## 2023-06-01 PROCEDURE — 250N000011 HC RX IP 250 OP 636: Performed by: INTERNAL MEDICINE

## 2023-06-01 RX ORDER — ACETAMINOPHEN 325 MG/1
650 TABLET ORAL ONCE
Status: COMPLETED | OUTPATIENT
Start: 2023-06-01 | End: 2023-06-01

## 2023-06-01 RX ORDER — ALBUTEROL SULFATE 90 UG/1
1-2 AEROSOL, METERED RESPIRATORY (INHALATION)
Status: DISCONTINUED | OUTPATIENT
Start: 2023-06-01 | End: 2023-06-01 | Stop reason: HOSPADM

## 2023-06-01 RX ORDER — ACETAMINOPHEN 325 MG/1
650 TABLET ORAL ONCE
Status: CANCELLED
Start: 2023-06-02 | End: 2023-06-02

## 2023-06-01 RX ORDER — DIPHENHYDRAMINE HYDROCHLORIDE 50 MG/ML
50 INJECTION INTRAMUSCULAR; INTRAVENOUS
Status: DISCONTINUED | OUTPATIENT
Start: 2023-06-01 | End: 2023-06-01 | Stop reason: HOSPADM

## 2023-06-01 RX ORDER — MEPERIDINE HYDROCHLORIDE 50 MG/ML
25 INJECTION INTRAMUSCULAR; INTRAVENOUS; SUBCUTANEOUS EVERY 30 MIN PRN
Status: DISCONTINUED | OUTPATIENT
Start: 2023-06-01 | End: 2023-06-01 | Stop reason: HOSPADM

## 2023-06-01 RX ORDER — HEPARIN SODIUM (PORCINE) LOCK FLUSH IV SOLN 100 UNIT/ML 100 UNIT/ML
5 SOLUTION INTRAVENOUS
Status: CANCELLED | OUTPATIENT
Start: 2023-06-02

## 2023-06-01 RX ORDER — METHYLPREDNISOLONE SODIUM SUCCINATE 125 MG/2ML
125 INJECTION, POWDER, LYOPHILIZED, FOR SOLUTION INTRAMUSCULAR; INTRAVENOUS ONCE
Status: COMPLETED | OUTPATIENT
Start: 2023-06-01 | End: 2023-06-01

## 2023-06-01 RX ORDER — DIPHENHYDRAMINE HYDROCHLORIDE 50 MG/ML
50 INJECTION INTRAMUSCULAR; INTRAVENOUS
Status: CANCELLED
Start: 2023-06-02

## 2023-06-01 RX ORDER — EPINEPHRINE 1 MG/ML
0.3 INJECTION, SOLUTION INTRAMUSCULAR; SUBCUTANEOUS EVERY 5 MIN PRN
Status: CANCELLED | OUTPATIENT
Start: 2023-06-02

## 2023-06-01 RX ORDER — DIPHENHYDRAMINE HCL 25 MG
25 CAPSULE ORAL ONCE
Status: CANCELLED
Start: 2023-06-02 | End: 2023-06-02

## 2023-06-01 RX ORDER — EPINEPHRINE 1 MG/ML
0.3 INJECTION, SOLUTION INTRAMUSCULAR; SUBCUTANEOUS EVERY 5 MIN PRN
Status: DISCONTINUED | OUTPATIENT
Start: 2023-06-01 | End: 2023-06-01 | Stop reason: HOSPADM

## 2023-06-01 RX ORDER — HEPARIN SODIUM,PORCINE 10 UNIT/ML
5 VIAL (ML) INTRAVENOUS
Status: CANCELLED | OUTPATIENT
Start: 2023-06-02

## 2023-06-01 RX ORDER — ALBUTEROL SULFATE 0.83 MG/ML
2.5 SOLUTION RESPIRATORY (INHALATION)
Status: CANCELLED | OUTPATIENT
Start: 2023-06-02

## 2023-06-01 RX ORDER — METHYLPREDNISOLONE SODIUM SUCCINATE 125 MG/2ML
125 INJECTION, POWDER, LYOPHILIZED, FOR SOLUTION INTRAMUSCULAR; INTRAVENOUS
Status: CANCELLED
Start: 2023-06-02

## 2023-06-01 RX ORDER — ALBUTEROL SULFATE 90 UG/1
1-2 AEROSOL, METERED RESPIRATORY (INHALATION)
Status: CANCELLED
Start: 2023-06-02

## 2023-06-01 RX ORDER — ALBUTEROL SULFATE 0.83 MG/ML
2.5 SOLUTION RESPIRATORY (INHALATION)
Status: DISCONTINUED | OUTPATIENT
Start: 2023-06-01 | End: 2023-06-01 | Stop reason: HOSPADM

## 2023-06-01 RX ORDER — METHYLPREDNISOLONE SODIUM SUCCINATE 125 MG/2ML
125 INJECTION, POWDER, LYOPHILIZED, FOR SOLUTION INTRAMUSCULAR; INTRAVENOUS ONCE
Status: CANCELLED | OUTPATIENT
Start: 2023-06-02 | End: 2023-06-02

## 2023-06-01 RX ORDER — MEPERIDINE HYDROCHLORIDE 50 MG/ML
25 INJECTION INTRAMUSCULAR; INTRAVENOUS; SUBCUTANEOUS EVERY 30 MIN PRN
Status: CANCELLED | OUTPATIENT
Start: 2023-06-02

## 2023-06-01 RX ORDER — METHYLPREDNISOLONE SODIUM SUCCINATE 125 MG/2ML
125 INJECTION, POWDER, LYOPHILIZED, FOR SOLUTION INTRAMUSCULAR; INTRAVENOUS
Status: DISCONTINUED | OUTPATIENT
Start: 2023-06-01 | End: 2023-06-01 | Stop reason: HOSPADM

## 2023-06-01 RX ORDER — DIPHENHYDRAMINE HCL 25 MG
25 CAPSULE ORAL ONCE
Status: COMPLETED | OUTPATIENT
Start: 2023-06-01 | End: 2023-06-01

## 2023-06-01 RX ADMIN — INFLIXIMAB 500 MG: 100 INJECTION, POWDER, LYOPHILIZED, FOR SOLUTION INTRAVENOUS at 11:55

## 2023-06-01 RX ADMIN — DIPHENHYDRAMINE HYDROCHLORIDE 25 MG: 25 CAPSULE ORAL at 11:21

## 2023-06-01 RX ADMIN — ACETAMINOPHEN 650 MG: 325 TABLET ORAL at 11:21

## 2023-06-01 RX ADMIN — METHYLPREDNISOLONE SODIUM SUCCINATE 125 MG: 125 INJECTION, POWDER, FOR SOLUTION INTRAMUSCULAR; INTRAVENOUS at 11:21

## 2023-06-01 NOTE — PROGRESS NOTES
~~~ NOTE: If the patient answers yes to any of the questions below, hold the infusion and contact ordering provider or on-call provider.    1. Have you recently had an elevated temperature, fever, chills, productive cough, coughing for 3 weeks or longer or hemoptysis,  abnormal vital signs, night sweats,  chest pain or have you noticed a decrease in your appetite, unexplained weight loss or fatigue? No  2. Do you have any open wounds or new incisions? No  3. Do you have any upcoming hospitalizations or surgeries? Does not include esophagogastroduodenoscopy, colonoscopy, endoscopic retrograde cholangiopancreatography (ERCP), endoscopic ultrasound (EUS), dental procedures or joint aspiration/steroid injections No  4. Do you currently have any signs of illness or infection or are you on any antibiotics? No  5. Have you had any new, sudden or worsening abdominal pain? No  6. Have you or anyone in your household received a live vaccination in the past 4 weeks? Please note: No live vaccines while on biologic/chemotherapy until 6 months after the last treatment. Patient can receive the flu vaccine (shot only), pneumovax and the Covid vaccine. It is optimal for the patient to get these vaccines mid cycle, but they can be given at any time as long as it is not on the day of the infusion. No  7. Have you recently been diagnosed with any new nervous system diseases (ie. Multiple sclerosis, Guillain [place], seizures, neurological changes) or cancer diagnosis? Are you on any form of radiation or chemotherapy? No  8. Are you pregnant or breast feeding or do you have plans of pregnancy in the future? No  9. Have you been having any signs of worsening depression or suicidal ideations?  (benlysta only) No  10. Have there been any other new onset medical symptoms? No  11. Have you had any new blood clots? (IVIG only) No     EDUCATION POST BIOLOGICAL/CHEMOTHERAPY INFUSION  Call the triage nurse at your clinic or seek medical attention  if you have chills and/or temperature greater than or equal to 100.5, uncontrolled nausea/vomiting, diarrhea, constipation, dizziness, shortness of breath, chest pain, heart palpitations, weakness or any other new or concerning symptoms, questions or concerns.  You can not have any live virus vaccines prior to or during treatment or up to 6 months post infusion.  If you have an upcoming surgery, medical procedure or dental procedure during treatment, this should be discussed with your ordering physician and your surgeon/dentist.  If you are having any concerning symptom, if you are unsure if you should get your next infusion or wish to speak to a provider before your next infusion, please call your care coordinator or triage nurse at your clinic to notify them so we can adequately serve you.    Infusion Nursing Note:  Frank Obando presents today for routine infusion.    Patient seen by provider today: No   present during visit today: Not Applicable.    Note: Patient presented for routine infusion and due to a prior reaction involving itchiness, patient utilized pre-meds of /solu-medrol and oral benedryl & tylenol.      Intravenous Access:  Peripheral IV placed.    Treatment Conditions:  12. Biological Infusion Checklist: Completed above.      Post Infusion Assessment:  Access discontinued per protocol.       Discharge Plan:   Patient discharged in stable condition accompanied by: srinivas.      Santhosh Trammell RN

## 2023-06-05 DIAGNOSIS — E11.9 TYPE 2 DIABETES MELLITUS (H): ICD-10-CM

## 2023-06-05 DIAGNOSIS — E78.00 HYPERCHOLESTEROLEMIA: ICD-10-CM

## 2023-06-06 RX ORDER — ATORVASTATIN CALCIUM 40 MG/1
TABLET, FILM COATED ORAL
Qty: 90 TABLET | Refills: 2 | Status: SHIPPED | OUTPATIENT
Start: 2023-06-06 | End: 2024-01-26

## 2023-06-06 NOTE — TELEPHONE ENCOUNTER
"atorvastatin (LIPITOR) 40 MG tablet  Last Written Prescription Date:  9/12/22  Last Fill Quantity: 90,  # refills: 2   Last office visit provider:  4/04/23 w/ Dr Lopez     metFORMIN (GLUCOPHAGE) 500 MG tablet  Last Written Prescription Date:  6/17/22  Last Fill Quantity: 360,  # refills: 3     Requested Prescriptions   Pending Prescriptions Disp Refills     atorvastatin (LIPITOR) 40 MG tablet 90 tablet 2     Sig: TAKE 1 TABLET BY MOUTH DAILY       Statins Protocol Passed - 6/5/2023  4:12 PM        Passed - LDL on file in past 12 months     Recent Labs   Lab Test 04/04/23  1001   LDL 46             Passed - No abnormal creatine kinase in past 12 months     No lab results found.             Passed - Recent (12 mo) or future (30 days) visit within the authorizing provider's specialty     Patient has had an office visit with the authorizing provider or a provider within the authorizing providers department within the previous 12 mos or has a future within next 30 days. See \"Patient Info\" tab in inbasket, or \"Choose Columns\" in Meds & Orders section of the refill encounter.              Passed - Medication is active on med list        Passed - Patient is age 18 or older           metFORMIN (GLUCOPHAGE) 500 MG tablet 360 tablet 3       Biguanide Agents Passed - 6/5/2023  4:12 PM        Passed - Patient is age 10 or older        Passed - Patient has documented A1c within the specified period of time.     If HgbA1C is 8 or greater, it needs to be on file within the past 3 months.  If less than 8, must be on file within the past 6 months.     Recent Labs   Lab Test 04/04/23  1001   A1C 7.2*             Passed - Patient's CR is NOT>1.4 OR Patient's EGFR is NOT<45 within past 12 mos.     Recent Labs   Lab Test 03/26/23  1007 09/20/21  0928 02/08/21  0809   GFRESTIMATED 61   < > 48*   GFRESTBLACK  --   --  59*    < > = values in this interval not displayed.       Recent Labs   Lab Test 03/26/23  1007   CR 1.23             " "Passed - Patient does NOT have a diagnosis of CHF.        Passed - Medication is active on med list        Passed - Recent (6 mo) or future (30 days) visit within the authorizing provider's specialty     Patient had office visit in the last 6 months or has a visit in the next 30 days with authorizing provider or within the authorizing provider's specialty.  See \"Patient Info\" tab in inbasket, or \"Choose Columns\" in Meds & Orders section of the refill encounter.                 Karissa Fu RN 06/06/23 1:05 PM  "

## 2023-06-14 ENCOUNTER — LAB (OUTPATIENT)
Dept: LAB | Facility: CLINIC | Age: 78
End: 2023-06-14
Payer: MEDICARE

## 2023-06-14 DIAGNOSIS — H44.112 PANUVEITIS OF LEFT EYE: ICD-10-CM

## 2023-06-14 DIAGNOSIS — N18.32 STAGE 3B CHRONIC KIDNEY DISEASE (H): Primary | ICD-10-CM

## 2023-06-14 DIAGNOSIS — Z79.899 HIGH RISK MEDICATION USE: ICD-10-CM

## 2023-06-14 DIAGNOSIS — N18.30 CRF (CHRONIC RENAL FAILURE), STAGE 3 (MODERATE) (H): ICD-10-CM

## 2023-06-14 LAB
ALBUMIN SERPL BCG-MCNC: 4.2 G/DL (ref 3.5–5.2)
ALT SERPL W P-5'-P-CCNC: 29 U/L (ref 0–70)
CREAT SERPL-MCNC: 1.43 MG/DL (ref 0.67–1.17)
CREAT UR-MCNC: 121 MG/DL
ERYTHROCYTE [DISTWIDTH] IN BLOOD BY AUTOMATED COUNT: 13.3 % (ref 10–15)
GFR SERPL CREATININE-BSD FRML MDRD: 51 ML/MIN/1.73M2
HCT VFR BLD AUTO: 33.5 % (ref 40–53)
HGB BLD-MCNC: 11.2 G/DL (ref 13.3–17.7)
MCH RBC QN AUTO: 30.9 PG (ref 26.5–33)
MCHC RBC AUTO-ENTMCNC: 33.4 G/DL (ref 31.5–36.5)
MCV RBC AUTO: 92 FL (ref 78–100)
MICROALBUMIN UR-MCNC: <12 MG/L
MICROALBUMIN/CREAT UR: NORMAL MG/G{CREAT}
PLATELET # BLD AUTO: 120 10E3/UL (ref 150–450)
RBC # BLD AUTO: 3.63 10E6/UL (ref 4.4–5.9)
WBC # BLD AUTO: 5 10E3/UL (ref 4–11)

## 2023-06-14 PROCEDURE — 84460 ALANINE AMINO (ALT) (SGPT): CPT

## 2023-06-14 PROCEDURE — 82043 UR ALBUMIN QUANTITATIVE: CPT

## 2023-06-14 PROCEDURE — 82565 ASSAY OF CREATININE: CPT

## 2023-06-14 PROCEDURE — 82570 ASSAY OF URINE CREATININE: CPT

## 2023-06-14 PROCEDURE — 82040 ASSAY OF SERUM ALBUMIN: CPT

## 2023-06-14 PROCEDURE — 36415 COLL VENOUS BLD VENIPUNCTURE: CPT

## 2023-06-14 PROCEDURE — 85027 COMPLETE CBC AUTOMATED: CPT

## 2023-06-16 ENCOUNTER — APPOINTMENT (OUTPATIENT)
Dept: CT IMAGING | Facility: CLINIC | Age: 78
End: 2023-06-16
Attending: EMERGENCY MEDICINE
Payer: MEDICARE

## 2023-06-16 ENCOUNTER — TELEPHONE (OUTPATIENT)
Dept: EMERGENCY MEDICINE | Facility: CLINIC | Age: 78
End: 2023-06-16

## 2023-06-16 ENCOUNTER — HOSPITAL ENCOUNTER (EMERGENCY)
Facility: CLINIC | Age: 78
Discharge: HOME | End: 2023-06-16
Attending: EMERGENCY MEDICINE | Admitting: EMERGENCY MEDICINE
Payer: MEDICARE

## 2023-06-16 VITALS
OXYGEN SATURATION: 95 % | DIASTOLIC BLOOD PRESSURE: 65 MMHG | HEART RATE: 56 BPM | WEIGHT: 190 LBS | HEIGHT: 71 IN | RESPIRATION RATE: 20 BRPM | TEMPERATURE: 97.1 F | BODY MASS INDEX: 26.6 KG/M2 | SYSTOLIC BLOOD PRESSURE: 125 MMHG

## 2023-06-16 DIAGNOSIS — K29.00 ACUTE GASTRITIS WITHOUT HEMORRHAGE, UNSPECIFIED GASTRITIS TYPE: ICD-10-CM

## 2023-06-16 DIAGNOSIS — K21.00 GASTROESOPHAGEAL REFLUX DISEASE WITH ESOPHAGITIS WITHOUT HEMORRHAGE: ICD-10-CM

## 2023-06-16 LAB
ALBUMIN SERPL-MCNC: 3.7 G/DL (ref 3.5–5)
ALP SERPL-CCNC: 89 U/L (ref 45–120)
ALT SERPL W P-5'-P-CCNC: 25 U/L (ref 0–45)
ANION GAP SERPL CALCULATED.3IONS-SCNC: 13 MMOL/L (ref 5–18)
AST SERPL W P-5'-P-CCNC: 27 U/L (ref 0–40)
BASOPHILS # BLD AUTO: 0 10E3/UL (ref 0–0.2)
BASOPHILS NFR BLD AUTO: 0 %
BILIRUB SERPL-MCNC: 0.7 MG/DL (ref 0–1)
BUN SERPL-MCNC: 17 MG/DL (ref 8–28)
CALCIUM SERPL-MCNC: 9 MG/DL (ref 8.5–10.5)
CHLORIDE BLD-SCNC: 105 MMOL/L (ref 98–107)
CO2 SERPL-SCNC: 20 MMOL/L (ref 22–31)
CREAT SERPL-MCNC: 1.41 MG/DL (ref 0.7–1.3)
EOSINOPHIL # BLD AUTO: 0.1 10E3/UL (ref 0–0.7)
EOSINOPHIL NFR BLD AUTO: 2 %
ERYTHROCYTE [DISTWIDTH] IN BLOOD BY AUTOMATED COUNT: 13.3 % (ref 10–15)
GFR SERPL CREATININE-BSD FRML MDRD: 52 ML/MIN/1.73M2
GLUCOSE BLD-MCNC: 168 MG/DL (ref 70–125)
HCT VFR BLD AUTO: 31.5 % (ref 40–53)
HGB BLD-MCNC: 10.7 G/DL (ref 13.3–17.7)
IMM GRANULOCYTES # BLD: 0 10E3/UL
IMM GRANULOCYTES NFR BLD: 0 %
LACTATE SERPL-SCNC: 1.8 MMOL/L (ref 0.7–2)
LACTATE SERPL-SCNC: 2.4 MMOL/L (ref 0.7–2)
LIPASE SERPL-CCNC: 34 U/L (ref 0–52)
LYMPHOCYTES # BLD AUTO: 1.2 10E3/UL (ref 0.8–5.3)
LYMPHOCYTES NFR BLD AUTO: 19 %
MCH RBC QN AUTO: 30.7 PG (ref 26.5–33)
MCHC RBC AUTO-ENTMCNC: 34 G/DL (ref 31.5–36.5)
MCV RBC AUTO: 90 FL (ref 78–100)
MONOCYTES # BLD AUTO: 0.4 10E3/UL (ref 0–1.3)
MONOCYTES NFR BLD AUTO: 7 %
NEUTROPHILS # BLD AUTO: 4.6 10E3/UL (ref 1.6–8.3)
NEUTROPHILS NFR BLD AUTO: 72 %
NRBC # BLD AUTO: 0 10E3/UL
NRBC BLD AUTO-RTO: 0 /100
PLATELET # BLD AUTO: 101 10E3/UL (ref 150–450)
POTASSIUM BLD-SCNC: 4.5 MMOL/L (ref 3.5–5)
PROT SERPL-MCNC: 7.5 G/DL (ref 6–8)
RBC # BLD AUTO: 3.49 10E6/UL (ref 4.4–5.9)
SODIUM SERPL-SCNC: 138 MMOL/L (ref 136–145)
WBC # BLD AUTO: 6.4 10E3/UL (ref 4–11)

## 2023-06-16 PROCEDURE — 74177 CT ABD & PELVIS W/CONTRAST: CPT | Mod: MG

## 2023-06-16 PROCEDURE — 250N000011 HC RX IP 250 OP 636: Performed by: EMERGENCY MEDICINE

## 2023-06-16 PROCEDURE — 36415 COLL VENOUS BLD VENIPUNCTURE: CPT | Performed by: EMERGENCY MEDICINE

## 2023-06-16 PROCEDURE — 83690 ASSAY OF LIPASE: CPT | Performed by: EMERGENCY MEDICINE

## 2023-06-16 PROCEDURE — G1010 CDSM STANSON: HCPCS

## 2023-06-16 PROCEDURE — 99285 EMERGENCY DEPT VISIT HI MDM: CPT | Mod: 25

## 2023-06-16 PROCEDURE — 96375 TX/PRO/DX INJ NEW DRUG ADDON: CPT

## 2023-06-16 PROCEDURE — 96374 THER/PROPH/DIAG INJ IV PUSH: CPT | Mod: 59

## 2023-06-16 PROCEDURE — 83605 ASSAY OF LACTIC ACID: CPT | Performed by: EMERGENCY MEDICINE

## 2023-06-16 PROCEDURE — 96361 HYDRATE IV INFUSION ADD-ON: CPT

## 2023-06-16 PROCEDURE — 85025 COMPLETE CBC W/AUTO DIFF WBC: CPT | Performed by: EMERGENCY MEDICINE

## 2023-06-16 PROCEDURE — 258N000003 HC RX IP 258 OP 636: Performed by: EMERGENCY MEDICINE

## 2023-06-16 PROCEDURE — 80053 COMPREHEN METABOLIC PANEL: CPT | Performed by: EMERGENCY MEDICINE

## 2023-06-16 RX ORDER — IOPAMIDOL 755 MG/ML
90 INJECTION, SOLUTION INTRAVASCULAR ONCE
Status: COMPLETED | OUTPATIENT
Start: 2023-06-16 | End: 2023-06-16

## 2023-06-16 RX ORDER — OMEPRAZOLE 40 MG/1
40 CAPSULE, DELAYED RELEASE ORAL DAILY
Qty: 90 CAPSULE | Refills: 0 | Status: SHIPPED | OUTPATIENT
Start: 2023-06-16 | End: 2023-06-30

## 2023-06-16 RX ORDER — SODIUM CHLORIDE 9 MG/ML
INJECTION, SOLUTION INTRAVENOUS CONTINUOUS
Status: DISCONTINUED | OUTPATIENT
Start: 2023-06-16 | End: 2023-06-16 | Stop reason: HOSPADM

## 2023-06-16 RX ORDER — ONDANSETRON 2 MG/ML
4 INJECTION INTRAMUSCULAR; INTRAVENOUS EVERY 30 MIN PRN
Status: DISCONTINUED | OUTPATIENT
Start: 2023-06-16 | End: 2023-06-16 | Stop reason: HOSPADM

## 2023-06-16 RX ORDER — OMEPRAZOLE 40 MG/1
40 CAPSULE, DELAYED RELEASE ORAL DAILY
Qty: 30 CAPSULE | Refills: 0 | Status: SHIPPED | OUTPATIENT
Start: 2023-06-16 | End: 2023-06-16

## 2023-06-16 RX ORDER — HYDROMORPHONE HYDROCHLORIDE 1 MG/ML
0.5 INJECTION, SOLUTION INTRAMUSCULAR; INTRAVENOUS; SUBCUTANEOUS
Status: DISCONTINUED | OUTPATIENT
Start: 2023-06-16 | End: 2023-06-16 | Stop reason: HOSPADM

## 2023-06-16 RX ADMIN — HYDROMORPHONE HYDROCHLORIDE 0.5 MG: 1 INJECTION, SOLUTION INTRAMUSCULAR; INTRAVENOUS; SUBCUTANEOUS at 02:25

## 2023-06-16 RX ADMIN — ONDANSETRON 4 MG: 2 INJECTION INTRAMUSCULAR; INTRAVENOUS at 02:25

## 2023-06-16 RX ADMIN — IOPAMIDOL 75 ML: 755 INJECTION, SOLUTION INTRAVENOUS at 03:43

## 2023-06-16 RX ADMIN — SODIUM CHLORIDE 1000 ML: 9 INJECTION, SOLUTION INTRAVENOUS at 02:26

## 2023-06-16 ASSESSMENT — ENCOUNTER SYMPTOMS
VOMITING: 0
SHORTNESS OF BREATH: 0
ABDOMINAL PAIN: 1
NAUSEA: 0
DIARRHEA: 0
CONSTIPATION: 0

## 2023-06-16 ASSESSMENT — ACTIVITIES OF DAILY LIVING (ADL)
ADLS_ACUITY_SCORE: 35
ADLS_ACUITY_SCORE: 35

## 2023-06-16 NOTE — ED PROVIDER NOTES
EMERGENCY DEPARTMENT ENCOUNTER      NAME: Frank Obando  AGE: 77 year old male  YOB: 1945  MRN: [id number]  EVALUATION DATE & TIME: 6/16/2023  2:01 AM    PCP: Flaco Lopez    ED PROVIDER: Foreign Cuenca M.D.      Chief Complaint   Patient presents with     Abdominal Pain         FINAL IMPRESSION:  1. Acute gastritis without hemorrhage, unspecified gastritis type    2. Gastroesophageal reflux disease with esophagitis without hemorrhage          ED COURSE & MEDICAL DECISION MAKING:    Pertinent Labs & Imaging studies reviewed. (See chart for details)  77 year old male presents to the Emergency Department for evaluation of abdominal pain.  Has epigastric abdominal pain.  Did review his electronic medical record.  Patient has a history of cirrhosis of unclear etiology.  Is also had esophageal varices.  No signs of bleeding currently.  No hematemesis.  No dark stool.  Does have some tenderness in his upper abdomen.  Given this we will get a CT scan.  Would be concern for possible aortic emergency versus obstruction.  Neither of these are found.  Does have some chronic findings of varices and cirrhosis.  These are not changed from previous.  Also has some mild chronic pancreatic changes of pancreatitis.  His lipase is normal.  This does not seem to be pancreatitis.  Initial lactic acid was slightly elevated but improved with IV fluids.  Patient feeling better here after IV fluids and IV pain medicine.  Will discharge home with omeprazole.  He is listed as a medication but is not sure if he is taking it so I will have him take this.  He will follow-up with his primary next week for recheck.  Will return immediately for any worsening symptoms.  Discussed with patient and wife.  Did offer admission however he states he is feeling better and would like to go home.  I think that is reasonable.    2:04 AM I met with the patient to gather history and to perform my initial exam. I discussed the plan for  care while in the Emergency Department. PPE: Gloves  4:50 AM Reassessed and updated patient on his results. I discussed the plan for discharge with the patient, and patient is agreeable. We discussed supportive cares at home and reasons for return to the ER including new or worsening symptoms - all questions and concerns addressed. Patient to be discharged by RN.    At the conclusion of the encounter I discussed the results of all of the tests and the disposition. The questions were answered. The patient or family acknowledged understanding and was agreeable with the care plan.     Medical Decision Making    History:    Supplemental history from: Documented in chart, if applicable and Family Member/Significant Other    External Record(s) reviewed: Documented in chart, if applicable.    Work Up:    Chart documentation includes differential considered and any EKGs or imaging independently interpreted by provider, where specified.    In additional to work up documented, I considered the following work up: Documented in chart, if applicable.    External consultation:    Discussion of management with another provider: Documented in chart, if applicable    Complicating factors:    Care impacted by chronic illness: Cerebrovascular Disease, Diabetes, Hyperlipidemia and Hypertension    Care affected by social determinants of health: N/A    Disposition considerations: Discharge. I prescribed additional prescription strength medication(s) as charted. I considered admission, but discharged the patient after share decision making conversation.        MEDICATIONS GIVEN IN THE EMERGENCY:  Medications   0.9% sodium chloride BOLUS (0 mLs Intravenous Stopped 6/16/23 0429)     Followed by   sodium chloride 0.9% infusion (has no administration in time range)   ondansetron (ZOFRAN) injection 4 mg (4 mg Intravenous $Given 6/16/23 0225)   HYDROmorphone (PF) (DILAUDID) injection 0.5 mg (0.5 mg Intravenous $Given 6/16/23 0225)   iopamidol  "(ISOVUE-370) solution 90 mL (75 mLs Intravenous $Given 6/16/23 3593)       NEW PRESCRIPTIONS STARTED AT TODAY'S ER VISIT  New Prescriptions    OMEPRAZOLE (PRILOSEC) 40 MG DR CAPSULE    Take 1 capsule (40 mg) by mouth daily for 30 days          =================================================================    HPI    Patient information was obtained from: Patient and Wife    Use of : N/A    Per chart review, patient was admitted to [facility] from 03/24/2023-03/27/2023 for UTI with hematuria, bacteremia, and sepsis. \" Infectious disease.  Initially on ceftriaxone.  Urine culture shows 10-50 K staph aureus with pan sensitivities. However 1 of 2 blood cultures came back positive for Acinetobacter with pan sensitivities. Antibiotics adjusted to cefepime.  Subsequent blood cultures no growth.\"      Frank Obando is a 77 year old male with a pertinent history of DM2, CVA, CKD3, HTN, anemia, RA who presents to this ED by car for evaluation of abdominal pain. Patient presents with LUQ abdominal pain onset 2100 this evening. Patient describes abdominal pain to be \"a really bad stomach ache\". Denies nausea, vomiting, or diarrhea. Patient last bowel movement was this morning and was normal. Patient notes that he has not urinated since before supper, which he stats is atypical. Denies history of abdominal surgeries. Patient notes history of diabetes and low hemoglobin, both of which he takes daily medication for. Patient denies chest pain, shortness of breath, or any additional complaints at this time.     REVIEW OF SYSTEMS   Review of Systems   Respiratory: Negative for shortness of breath.    Cardiovascular: Negative for chest pain.   Gastrointestinal: Positive for abdominal pain. Negative for constipation, diarrhea, nausea and vomiting.   All other systems reviewed and are negative.       PAST MEDICAL HISTORY:  Past Medical History:   Diagnosis Date     Anemia      Arthritis     osteoarthritis     " Calculus of kidney      Diabetes mellitus (H)      Episcleritis of left eye      Hyperlipidemia      Hypertension      Iron deficiency anemia due to chronic blood loss 11/2/2021     Peripheral neuropathy      Stroke (H)        PAST SURGICAL HISTORY:  Past Surgical History:   Procedure Laterality Date     COLONOSCOPY N/A 11/16/2021    Procedure: COLONOSCOPY with polypectomy;  Surgeon: Dex Finch MD;  Location: [facility]     CYSTOSCOPY TUMOR / CONDYLOMATA W/ LASER Left 7/18/2014     ESOPHAGOSCOPY, GASTROSCOPY, DUODENOSCOPY (EGD), COMBINED N/A 11/16/2021    Procedure: ESOPHAGOGASTRODUODENOSCOPY (EGD) with biopsies and argon plasma coagulation;  Surgeon: Dex Finch MD;  Location: [facility]     HC CYSTOSCOPY,INSERT URETERAL STENT      Description: Cystoscopy With Insertion Of Ureteral Stent Right;  Recorded: 10/14/2009;  Comments: stone           CURRENT MEDICATIONS:    Current Facility-Administered Medications   Medication     HYDROmorphone (PF) (DILAUDID) injection 0.5 mg     ondansetron (ZOFRAN) injection 4 mg     sodium chloride 0.9% infusion     Current Outpatient Medications   Medication     omeprazole (PRILOSEC) 40 MG DR capsule     atorvastatin (LIPITOR) 40 MG tablet     blood glucose (ACCU-CHEK JACOB PLUS) test strip     blood glucose meter (GLUCOMETER)     blood glucose monitoring (SOFTCLIX) lancets     cyanocobalamin, vitamin B-12, 2,500 mcg Tab     empagliflozin (JARDIANCE) 10 MG TABS tablet     ferrous sulfate (FEROSUL) 325 (65 Fe) MG tablet     generic lancets (ACCU-CHEK SOFTCLIX LANCETS)     inFLIXimab (REMICADE IV)     lactobacillus rhamnosus, GG, (CULTURELL) capsule     lisinopril (ZESTRIL) 10 MG tablet     magnesium oxide 250 mg Tab     metFORMIN (GLUCOPHAGE) 500 MG tablet     omeprazole (PRILOSEC) 40 MG DR capsule     pioglitazone (ACTOS) 30 MG tablet     prednisoLONE acetate (PRED FORTE) 1 % ophthalmic suspension     propranolol (INDERAL) 20 MG tablet     tamsulosin  "(FLOMAX) 0.4 MG capsule         ALLERGIES:  Allergies   Allergen Reactions     Morphine Nausea and Vomiting     Scopolamine Hbr [Scopolamine] Unknown       FAMILY HISTORY:  Family History   Problem Relation Age of Onset     Coronary Artery Disease Mother      Diabetes Mother      Lung Cancer Father      No Known Problems Son        SOCIAL HISTORY:   Social History     Socioeconomic History     Marital status:    Tobacco Use     Smoking status: Never     Smokeless tobacco: Never   Substance and Sexual Activity     Alcohol use: No     Drug use: No   Social History Narrative    Lives with his wife, Valerie.  Volunteers at [employer].  Retired  of [employer].  One son, Jaison.        VITALS:  /65   Pulse 55   Temp 97.1  F (36.2  C) (Tympanic)   Resp 20   Ht 1.803 m (5' 11\")   Wt 86.2 kg (190 lb)   SpO2 94%   BMI 26.50 kg/m      PHYSICAL EXAM    Physical Exam  Vitals and nursing note reviewed.   Constitutional:       General: He is not in acute distress.     Appearance: He is not diaphoretic.   HENT:      Head: Atraumatic.   Eyes:      General: No scleral icterus.     Pupils: Pupils are equal, round, and reactive to light.   Cardiovascular:      Rate and Rhythm: Normal rate and regular rhythm.      Heart sounds: Normal heart sounds.   Pulmonary:      Effort: No respiratory distress.      Breath sounds: Normal breath sounds.   Abdominal:      Palpations: Abdomen is soft.      Tenderness: There is abdominal tenderness in the epigastric area.   Musculoskeletal:         General: No tenderness.   Lymphadenopathy:      Cervical: No cervical adenopathy.   Skin:     General: Skin is warm.      Findings: No rash.           LAB:  All pertinent labs reviewed and interpreted.  Labs Ordered and Resulted from Time of ED Arrival to Time of ED Departure   COMPREHENSIVE METABOLIC PANEL - Abnormal       Result Value    Sodium 138      Potassium 4.5      Chloride 105      Carbon Dioxide (CO2) 20 (*) "     Anion Gap 13      Urea Nitrogen 17      Creatinine 1.41 (*)     Calcium 9.0      Glucose 168 (*)     Alkaline Phosphatase 89      AST 27      ALT 25      Protein Total 7.5      Albumin 3.7      Bilirubin Total 0.7      GFR Estimate 52 (*)    LACTIC ACID WHOLE BLOOD - Abnormal    Lactic Acid 2.4 (*)    CBC WITH PLATELETS AND DIFFERENTIAL - Abnormal    WBC Count 6.4      RBC Count 3.49 (*)     Hemoglobin 10.7 (*)     Hematocrit 31.5 (*)     MCV 90      MCH 30.7      MCHC 34.0      RDW 13.3      Platelet Count 101 (*)     % Neutrophils 72      % Lymphocytes 19      % Monocytes 7      % Eosinophils 2      % Basophils 0      % Immature Granulocytes 0      NRBCs per 100 WBC 0      Absolute Neutrophils 4.6      Absolute Lymphocytes 1.2      Absolute Monocytes 0.4      Absolute Eosinophils 0.1      Absolute Basophils 0.0      Absolute Immature Granulocytes 0.0      Absolute NRBCs 0.0     LIPASE - Normal    Lipase 34     LACTIC ACID WHOLE BLOOD - Normal    Lactic Acid 1.8     LACTIC ACID WHOLE BLOOD       RADIOLOGY:  Reviewed all pertinent imaging. Please see official radiology report.  CT Abdomen Pelvis w Contrast   Final Result   IMPRESSION:    1.  No acute process in the abdomen or pelvis.   2.  Cholelithiasis.   3.  Chronic pancreatitis.   4.  Nonobstructive nephrolithiasis on the right.   5.  Colonic diverticulosis.   6.  Upper abdominal varices including paraesophageal varices and varices about the bladder are similar to 03/24/2023.                I, Maulik Baker, am serving as a scribe to document services personally performed by Dr. Foreign Cuenca, based on my observation and the provider's statements to me. I, Foreign Cuenca MD attest that Maulik Baker is acting in a scribe capacity, has observed my performance of the services and has documented them in accordance with my direction.    Foreign Cuenca M.D.  Emergency Medicine  [facility] EMERGENCY ROOM  [address]  [facility] [address]  [phone]  Dept: [phone]     Foreign Cuenca MD  06/16/23 0454

## 2023-06-16 NOTE — ED TRIAGE NOTES
Patient presents via walk-in for evaluation of abdominal pain. Pt states the pain began around 9 pm and has been consistent since. Pt tried peptol bismol at home for pain relief but that did not work. Pain 9/10. VSS.     Triage Assessment     Row Name 06/16/23 0125       Triage Assessment (Adult)    Airway WDL WDL       Respiratory WDL    Respiratory WDL WDL       Skin Circulation/Temperature WDL    Skin Circulation/Temperature WDL WDL       Cardiac WDL    Cardiac WDL WDL       Peripheral/Neurovascular WDL    Peripheral Neurovascular WDL WDL       Cognitive/Neuro/Behavioral WDL    Cognitive/Neuro/Behavioral WDL WDL

## 2023-06-28 ENCOUNTER — HOSPITAL ENCOUNTER (EMERGENCY)
Facility: CLINIC | Age: 78
Discharge: HOME | DRG: 418 | End: 2023-06-29
Attending: EMERGENCY MEDICINE | Admitting: EMERGENCY MEDICINE
Payer: MEDICARE

## 2023-06-28 DIAGNOSIS — R10.13 EPIGASTRIC PAIN: ICD-10-CM

## 2023-06-28 LAB
ABO/RH(D): NORMAL
ALBUMIN SERPL-MCNC: 3.8 G/DL (ref 3.5–5)
ALP SERPL-CCNC: 85 U/L (ref 45–120)
ALT SERPL W P-5'-P-CCNC: 26 U/L (ref 0–45)
ANION GAP SERPL CALCULATED.3IONS-SCNC: 9 MMOL/L (ref 5–18)
ANTIBODY SCREEN: NEGATIVE
AST SERPL W P-5'-P-CCNC: 27 U/L (ref 0–40)
BASOPHILS # BLD AUTO: 0 10E3/UL (ref 0–0.2)
BASOPHILS NFR BLD AUTO: 1 %
BILIRUB SERPL-MCNC: 0.9 MG/DL (ref 0–1)
BUN SERPL-MCNC: 14 MG/DL (ref 8–28)
CALCIUM SERPL-MCNC: 8.7 MG/DL (ref 8.5–10.5)
CHLORIDE BLD-SCNC: 104 MMOL/L (ref 98–107)
CO2 SERPL-SCNC: 23 MMOL/L (ref 22–31)
CREAT SERPL-MCNC: 1.35 MG/DL (ref 0.7–1.3)
EOSINOPHIL # BLD AUTO: 0.2 10E3/UL (ref 0–0.7)
EOSINOPHIL NFR BLD AUTO: 3 %
ERYTHROCYTE [DISTWIDTH] IN BLOOD BY AUTOMATED COUNT: 13.2 % (ref 10–15)
GFR SERPL CREATININE-BSD FRML MDRD: 54 ML/MIN/1.73M2
GLUCOSE BLD-MCNC: 134 MG/DL (ref 70–125)
HCT VFR BLD AUTO: 32.6 % (ref 40–53)
HGB BLD-MCNC: 11.2 G/DL (ref 13.3–17.7)
IMM GRANULOCYTES # BLD: 0 10E3/UL
IMM GRANULOCYTES NFR BLD: 0 %
LACTATE SERPL-SCNC: 1.8 MMOL/L (ref 0.7–2)
LIPASE SERPL-CCNC: 24 U/L (ref 0–52)
LYMPHOCYTES # BLD AUTO: 1.2 10E3/UL (ref 0.8–5.3)
LYMPHOCYTES NFR BLD AUTO: 19 %
MCH RBC QN AUTO: 30.8 PG (ref 26.5–33)
MCHC RBC AUTO-ENTMCNC: 34.4 G/DL (ref 31.5–36.5)
MCV RBC AUTO: 90 FL (ref 78–100)
MONOCYTES # BLD AUTO: 0.6 10E3/UL (ref 0–1.3)
MONOCYTES NFR BLD AUTO: 10 %
NEUTROPHILS # BLD AUTO: 4.2 10E3/UL (ref 1.6–8.3)
NEUTROPHILS NFR BLD AUTO: 67 %
NRBC # BLD AUTO: 0 10E3/UL
NRBC BLD AUTO-RTO: 0 /100
PLATELET # BLD AUTO: 114 10E3/UL (ref 150–450)
POTASSIUM BLD-SCNC: 4.2 MMOL/L (ref 3.5–5)
PROT SERPL-MCNC: 7.5 G/DL (ref 6–8)
RBC # BLD AUTO: 3.64 10E6/UL (ref 4.4–5.9)
SODIUM SERPL-SCNC: 136 MMOL/L (ref 136–145)
SPECIMEN EXPIRATION DATE: NORMAL
WBC # BLD AUTO: 6.3 10E3/UL (ref 4–11)

## 2023-06-28 PROCEDURE — 80053 COMPREHEN METABOLIC PANEL: CPT | Performed by: EMERGENCY MEDICINE

## 2023-06-28 PROCEDURE — 83690 ASSAY OF LIPASE: CPT | Performed by: EMERGENCY MEDICINE

## 2023-06-28 PROCEDURE — 36415 COLL VENOUS BLD VENIPUNCTURE: CPT | Performed by: EMERGENCY MEDICINE

## 2023-06-28 PROCEDURE — 83605 ASSAY OF LACTIC ACID: CPT | Performed by: EMERGENCY MEDICINE

## 2023-06-28 PROCEDURE — 85025 COMPLETE CBC W/AUTO DIFF WBC: CPT | Performed by: EMERGENCY MEDICINE

## 2023-06-28 PROCEDURE — 99285 EMERGENCY DEPT VISIT HI MDM: CPT | Mod: 25

## 2023-06-28 PROCEDURE — 86901 BLOOD TYPING SEROLOGIC RH(D): CPT | Performed by: EMERGENCY MEDICINE

## 2023-06-28 PROCEDURE — 86850 RBC ANTIBODY SCREEN: CPT | Performed by: EMERGENCY MEDICINE

## 2023-06-28 PROCEDURE — 93005 ELECTROCARDIOGRAM TRACING: CPT | Performed by: EMERGENCY MEDICINE

## 2023-06-28 PROCEDURE — 84484 ASSAY OF TROPONIN QUANT: CPT | Performed by: EMERGENCY MEDICINE

## 2023-06-28 RX ORDER — SODIUM CHLORIDE 9 MG/ML
INJECTION, SOLUTION INTRAVENOUS CONTINUOUS
Status: DISCONTINUED | OUTPATIENT
Start: 2023-06-28 | End: 2023-06-29 | Stop reason: HOSPADM

## 2023-06-29 ENCOUNTER — APPOINTMENT (OUTPATIENT)
Dept: ULTRASOUND IMAGING | Facility: CLINIC | Age: 78
DRG: 418 | End: 2023-06-29
Attending: EMERGENCY MEDICINE
Payer: MEDICARE

## 2023-06-29 VITALS
SYSTOLIC BLOOD PRESSURE: 103 MMHG | HEART RATE: 57 BPM | TEMPERATURE: 97.6 F | WEIGHT: 195 LBS | HEIGHT: 70 IN | OXYGEN SATURATION: 95 % | BODY MASS INDEX: 27.92 KG/M2 | DIASTOLIC BLOOD PRESSURE: 58 MMHG | RESPIRATION RATE: 17 BRPM

## 2023-06-29 LAB
ATRIAL RATE - MUSE: 55 BPM
ATRIAL RATE - MUSE: 56 BPM
DIASTOLIC BLOOD PRESSURE - MUSE: 50 MMHG
DIASTOLIC BLOOD PRESSURE - MUSE: NORMAL MMHG
INTERPRETATION ECG - MUSE: NORMAL
INTERPRETATION ECG - MUSE: NORMAL
P AXIS - MUSE: -7 DEGREES
P AXIS - MUSE: 5 DEGREES
PR INTERVAL - MUSE: 154 MS
PR INTERVAL - MUSE: 158 MS
QRS DURATION - MUSE: 148 MS
QRS DURATION - MUSE: 148 MS
QT - MUSE: 528 MS
QT - MUSE: 530 MS
QTC - MUSE: 507 MS
QTC - MUSE: 509 MS
R AXIS - MUSE: 106 DEGREES
R AXIS - MUSE: 44 DEGREES
SYSTOLIC BLOOD PRESSURE - MUSE: 98 MMHG
SYSTOLIC BLOOD PRESSURE - MUSE: NORMAL MMHG
T AXIS - MUSE: -5 DEGREES
T AXIS - MUSE: 2 DEGREES
TROPONIN I SERPL-MCNC: <0.01 NG/ML (ref 0–0.29)
TROPONIN I SERPL-MCNC: <0.01 NG/ML (ref 0–0.29)
VENTRICULAR RATE- MUSE: 55 BPM
VENTRICULAR RATE- MUSE: 56 BPM

## 2023-06-29 PROCEDURE — 36415 COLL VENOUS BLD VENIPUNCTURE: CPT | Performed by: EMERGENCY MEDICINE

## 2023-06-29 PROCEDURE — 96375 TX/PRO/DX INJ NEW DRUG ADDON: CPT

## 2023-06-29 PROCEDURE — 96361 HYDRATE IV INFUSION ADD-ON: CPT

## 2023-06-29 PROCEDURE — 96374 THER/PROPH/DIAG INJ IV PUSH: CPT

## 2023-06-29 PROCEDURE — C9113 INJ PANTOPRAZOLE SODIUM, VIA: HCPCS | Mod: JZ | Performed by: EMERGENCY MEDICINE

## 2023-06-29 PROCEDURE — 250N000013 HC RX MED GY IP 250 OP 250 PS 637: Performed by: EMERGENCY MEDICINE

## 2023-06-29 PROCEDURE — 93005 ELECTROCARDIOGRAM TRACING: CPT | Performed by: EMERGENCY MEDICINE

## 2023-06-29 PROCEDURE — 258N000003 HC RX IP 258 OP 636: Performed by: EMERGENCY MEDICINE

## 2023-06-29 PROCEDURE — 250N000011 HC RX IP 250 OP 636: Mod: JZ | Performed by: EMERGENCY MEDICINE

## 2023-06-29 PROCEDURE — 84484 ASSAY OF TROPONIN QUANT: CPT | Performed by: EMERGENCY MEDICINE

## 2023-06-29 PROCEDURE — 76705 ECHO EXAM OF ABDOMEN: CPT

## 2023-06-29 RX ORDER — MORPHINE SULFATE 4 MG/ML
4 INJECTION, SOLUTION INTRAMUSCULAR; INTRAVENOUS ONCE
Status: COMPLETED | OUTPATIENT
Start: 2023-06-29 | End: 2023-06-29

## 2023-06-29 RX ORDER — ONDANSETRON 2 MG/ML
4 INJECTION INTRAMUSCULAR; INTRAVENOUS EVERY 30 MIN PRN
Status: DISCONTINUED | OUTPATIENT
Start: 2023-06-29 | End: 2023-06-29 | Stop reason: HOSPADM

## 2023-06-29 RX ORDER — OXYCODONE HYDROCHLORIDE 5 MG/1
5 TABLET ORAL EVERY 6 HOURS PRN
Qty: 8 TABLET | Refills: 0 | Status: ON HOLD | OUTPATIENT
Start: 2023-06-29 | End: 2023-07-03

## 2023-06-29 RX ORDER — OXYCODONE HYDROCHLORIDE 5 MG/1
5 TABLET ORAL ONCE
Status: COMPLETED | OUTPATIENT
Start: 2023-06-29 | End: 2023-06-29

## 2023-06-29 RX ORDER — MAGNESIUM HYDROXIDE/ALUMINUM HYDROXICE/SIMETHICONE 120; 1200; 1200 MG/30ML; MG/30ML; MG/30ML
15 SUSPENSION ORAL ONCE
Status: COMPLETED | OUTPATIENT
Start: 2023-06-29 | End: 2023-06-29

## 2023-06-29 RX ORDER — SUCRALFATE ORAL 1 G/10ML
1 SUSPENSION ORAL 4 TIMES DAILY
Qty: 414 ML | Refills: 0 | Status: SHIPPED | OUTPATIENT
Start: 2023-06-29 | End: 2024-07-02

## 2023-06-29 RX ADMIN — SODIUM CHLORIDE: 9 INJECTION, SOLUTION INTRAVENOUS at 00:37

## 2023-06-29 RX ADMIN — ALUMINUM HYDROXIDE, MAGNESIUM HYDROXIDE, AND DIMETHICONE 15 ML: 200; 20; 200 SUSPENSION ORAL at 00:37

## 2023-06-29 RX ADMIN — MORPHINE SULFATE 4 MG: 4 INJECTION, SOLUTION INTRAMUSCULAR; INTRAVENOUS at 01:57

## 2023-06-29 RX ADMIN — OXYCODONE HYDROCHLORIDE 5 MG: 5 TABLET ORAL at 00:54

## 2023-06-29 RX ADMIN — PANTOPRAZOLE SODIUM 80 MG: 40 INJECTION, POWDER, FOR SOLUTION INTRAVENOUS at 00:37

## 2023-06-29 ASSESSMENT — ACTIVITIES OF DAILY LIVING (ADL)
ADLS_ACUITY_SCORE: 35

## 2023-06-29 NOTE — DISCHARGE INSTRUCTIONS
You are actually already on the double dose of your omeprazole.  So, you do not need to double it. Continue taking the 40 mg of omeprazole daily.    Start using the carafate to coat your stomach.  Take it separate from your other medications as instructed by the pharmacist as since it coats your stomach it can interfere with how your other medications are absorbed.    Keep well hydrated and avoid acetaminophen or ibuprofen.    Use the narcotic oxycodone if needed for pain, but it can make you constipated.  You should not drive if you are taking it.

## 2023-06-29 NOTE — ED PROVIDER NOTES
Emergency Department Encounter     Evaluation Date & Time:   6/28/2023 11:21 PM    CHIEF COMPLAINT:  Abdominal Pain      Triage Note:Pt presents to the ED with c/o worsening abdominal pain since 1130 today. Pt had same pain a week and a half ago and seen here for it. Denies any fevers, emesis, diarrhea. Last BM was today - seemed normal.          FINAL IMPRESSION:    ICD-10-CM    1. Epigastric pain  R10.13 Adult GI  Referral - Consult Only     Adult GI  Referral - Procedure Only          Impression and Plan     ED COURSE & MEDICAL DECISION MAKING:       ED Course as of 06/29/23 0457   Thu Jun 29, 2023   0024 Certainly and Antonio, I would be concern for developing ulcer.  His CAT scan did not show any evidence of inflammation around the stomach to suggest a significantly deep ulcer in the area, and he does not endorse any symptoms of bleeding such as increased frequency of stools or significant change in the color of his stools.  His hemoglobin is stable compared to a few days ago as well.  However, cannot rule out a developing ulcer that is not bleeding yet so I would still want to advise if no other source of his pain is found to have him increase his omeprazole to twice a day and start taking Carafate.  Here, I am going to give him a GI cocktail to see if this helps with his discomfort and after initially triaged with his history of varices he was given a double dose of antacids here already.  He does appear little bit dehydrated with some hemoconcentration compared to a few days ago so I am also giving a liter of fluid.  Clinically he also appears a bit dehydrated.  His kidney function is staying stable, however, and since he does not typically struggle with pain but he has had this recent worsening of his pain, and gallstones seen on his previous CAT scan I think we will do an ultrasound today to see if there is any developing signs of gallbladder inflammation.  Certainly based on his blood work  no signs of obstructive biliary disease is seen, but will evaluate closer with an ultrasound due to ongoing pain.  Otherwise there is no evidence of diverticulitis previously and again his pain is very much in his upper abdomen so does not seem to be likely related to his sigmoid diverticulitis or to his bladder.  Additionally there was no abnormality on his aorta on previous scans to suggest that as a cause.   0152 His ultrasound shows no evidence for his ultrasound does show the gallstones and questionably thickened gallbladder wall, but there is no ultrasonic Avelar sign with it and no pericholecystic fluid.  This is a nonspecific finding.  In the setting of no increasing white blood cell count, no elevated lactic acid, and no signs of sepsis it is very unlikely that this would then be related to something like gangrenous gallbladder.  His pain does continue to be present, however and so I am concerned for developing ulcer.  His hemoglobin as mentioned is stable so I think outpatient care could be continued.   0158 He still appears fairly pale and is in rather significant pain.  Consider observation admission.  Will repeat EKG and troponin is coming up on 3 hours so we will repeat that as well.   0217 I had informed pt I would give him morphine since his pain was worsening, and spoke to the rn in front of him about if they had the morphine yet for him.  Felt risk outweighed benefit of possible n/v as he needed adidional pain control.  dorie Adame as rn gave it, he was upset stating loudly he was allergic to it with n/v.  I went back and re-discussed with him why we discussed giving it, and that we can treat if he develops n/v if it develops.  He should let the rn know right away if he does have any nausea/vomiting.   0326 Ekg is unchanged from the first.  Bp is a bit low, will give iv fluids.   0328 He notes his pain seems to be a bit better after the morphine and he has not gotten nauseous.  But, he still appears  uncomfortable and is still experiencing pain.  He declines anything further for it at this time.  His EKG has not changed from his first and his second troponin is pending.  His blood pressure was a bit low here.  He has been getting IV fluids but I am going to give him a bolus.  No bowel movement since here to suggest GI bleeding.  Timing would suggest that it may be in part due to the morphine dose that was given to him.   0447 He feels strong enough that he would like to go home as opposed to boarding here in the emergency department as an observation patient.  We discussed that sometimes early on we are not able to diagnose what the problem is a but sometimes if symptoms worsen the problem can become more apparent in the next couple of days.  We discussed an observation admission for his significant pain and his slightly low blood pressure to observe him for the next day, but he feels that he would like to try to go home and start the outpatient management and work-up for a possible ulcer and or upper abdominal pain that may be related to his underlying cirrhosis and history of pancreatitis.  We will give a very short course of a narcotic pain medication as if he is requiring this for more than 1 to 2 days I would want him to return for recheck.  We will also start him on Carafate and have him increase his omeprazole to twice a day.  I am putting in a referral for gastroenterology and advised the patient that he should see his primary care physician within the next week as well while waiting for that referral to happen.  We discussed reasons to return including but not limited to frequent and or dark tarry stools, uncontrolled pain, fever, or if feeling increasingly weak       At the conclusion of the encounter I discussed the results of all the tests and the disposition. The questions were answered. The patient or family acknowledged understanding and was agreeable with the care plan.          0 minutes of  critical care time        MEDICATIONS GIVEN IN THE EMERGENCY DEPARTMENT:  Medications   sodium chloride 0.9% infusion ( Intravenous Rate/Dose Change 6/29/23 0330)   ondansetron (ZOFRAN) injection 4 mg (has no administration in time range)   pantoprazole (PROTONIX) IV push injection 80 mg (80 mg Intravenous $Given 6/29/23 0037)   alum & mag hydroxide-simethicone (MAALOX) suspension 15 mL (15 mLs Oral $Given 6/29/23 0037)   oxyCODONE (ROXICODONE) tablet 5 mg (5 mg Oral $Given 6/29/23 0054)   morphine (PF) injection 4 mg (4 mg Intravenous $Given 6/29/23 0157)   0.9% sodium chloride BOLUS (1,000 mLs Intravenous Not Given 6/29/23 0330)       NEW PRESCRIPTIONS STARTED AT TODAY'S ED VISIT:  New Prescriptions    OXYCODONE (ROXICODONE) 5 MG TABLET    Take 1 tablet (5 mg) by mouth every 6 hours as needed for pain    SUCRALFATE (CARAFATE) 1 GM/10ML SUSPENSION    Take 10 mLs (1 g) by mouth 4 times daily       HPI     HPI     Frank Obando is a 77 year old male with a pertinent history of cirrhosis 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  Although he does have a history of cirrhosis and pancreatitis with varices related to his previous methotrexate use, this was years ago and he does not generally struggle with abdominal pain related to it.  He is on iron therapy from his physician and notes that his stools are generally dark brown but they have not changed in color recently nor has he had any increase in his frequency of bowel movements which are 1 every couple of days.  He is not short of breath but his abdominal pain is in his upper abdomen.  He denies any recent cough or cold.  He states he previously was hospitalized for sepsis related to UTI but those symptoms were distinctly different more so with suprapubic discomfort and this is very much in his upper abdomen.    He was seen here 3 days prior to presentation for  similar symptoms.  I have reviewed his chart and at that time his blood work was done and unremarkable and CAT scan was done with contrast which showed no etiology of his current pain but did show the presence of the varices, the cirrhosis, and the diverticuli without signs of inflammation.  He is on omeprazole and the thought was he may be having gastritis and he should continue his omeprazole therapy.  He takes it once a day.  He was better for a couple of days and then yesterday his pain began worsening again so presents today for evaluation.  He has not been vomiting at all with this.    REVIEW OF SYSTEMS:  Review of Systems  remainder of systems are all otherwise negative.        Medical History     Past Medical History:   Diagnosis Date     Anemia      Arthritis      Calculus of kidney      Diabetes mellitus (H)      Episcleritis of left eye      Hyperlipidemia      Hypertension      Iron deficiency anemia due to chronic blood loss 11/2/2021     Peripheral neuropathy      Stroke (H)        Past Surgical History:   Procedure Laterality Date     COLONOSCOPY N/A 11/16/2021    Procedure: COLONOSCOPY with polypectomy;  Surgeon: Dex Finch MD;  Location: [facility]     CYSTOSCOPY TUMOR / CONDYLOMATA W/ LASER Left 7/18/2014     ESOPHAGOSCOPY, GASTROSCOPY, DUODENOSCOPY (EGD), COMBINED N/A 11/16/2021    Procedure: ESOPHAGOGASTRODUODENOSCOPY (EGD) with biopsies and argon plasma coagulation;  Surgeon: Dex Finch MD;  Location: [facility] CYSTOSCOPY,INSERT URETERAL STENT      Description: Cystoscopy With Insertion Of Ureteral Stent Right;  Recorded: 10/14/2009;  Comments: stone       Family History   Problem Relation Age of Onset     Coronary Artery Disease Mother      Diabetes Mother      Lung Cancer Father      No Known Problems Son        Social History     Tobacco Use     Smoking status: Never     Smokeless tobacco: Never   Substance Use Topics     Alcohol use: No     Drug use:  No       oxyCODONE (ROXICODONE) 5 MG tablet  sucralfate (CARAFATE) 1 GM/10ML suspension  atorvastatin (LIPITOR) 40 MG tablet  blood glucose (ACCU-CHEK JACOB PLUS) test strip  blood glucose meter (GLUCOMETER)  blood glucose monitoring (SOFTCLIX) lancets  cyanocobalamin, vitamin B-12, 2,500 mcg Tab  empagliflozin (JARDIANCE) 10 MG TABS tablet  ferrous sulfate (FEROSUL) 325 (65 Fe) MG tablet  generic lancets (ACCU-CHEK SOFTCLIX LANCETS)  inFLIXimab (REMICADE IV)  lactobacillus rhamnosus, GG, (CULTURELL) capsule  lisinopril (ZESTRIL) 10 MG tablet  magnesium oxide 250 mg Tab  metFORMIN (GLUCOPHAGE) 500 MG tablet  omeprazole (PRILOSEC) 40 MG DR capsule  omeprazole (PRILOSEC) 40 MG DR capsule  omeprazole (PRILOSEC) 40 MG DR capsule  pioglitazone (ACTOS) 30 MG tablet  prednisoLONE acetate (PRED FORTE) 1 % ophthalmic suspension  propranolol (INDERAL) 20 MG tablet  tamsulosin (FLOMAX) 0.4 MG capsule        Physical Exam     First Vitals:  Patient Vitals for the past 24 hrs:   BP Temp Temp src Pulse Resp SpO2 Height Weight   06/29/23 0435 112/57 -- -- -- -- -- -- --   06/29/23 0430 108/59 -- -- 60 16 94 % -- --   06/29/23 0405 98/54 -- -- 55 21 92 % -- --   06/29/23 0400 94/50 -- -- 55 18 92 % -- --   06/29/23 0355 100/57 -- -- 54 20 91 % -- --   06/29/23 0350 98/54 -- -- 55 17 91 % -- --   06/29/23 0300 -- -- -- 54 11 91 % -- --   06/29/23 0245 (!) 89/48 -- -- 56 12 90 % -- --   06/29/23 0230 90/55 -- -- 54 14 90 % -- --   06/29/23 0215 -- -- -- 57 13 93 % -- --   06/29/23 0210 98/50 -- -- 58 -- 94 % -- --   06/29/23 0200 -- -- -- 59 -- 95 % -- --   06/29/23 0155 129/59 -- -- 60 -- 97 % -- --   06/29/23 0145 120/57 -- -- 53 -- 97 % -- --   06/29/23 0140 124/58 -- -- -- -- -- -- --   06/29/23 0105 116/69 -- -- 53 18 93 % -- --   06/29/23 0050 108/56 -- -- 57 15 94 % -- --   06/29/23 0035 118/65 -- -- 55 15 94 % -- --   06/29/23 0020 -- -- -- 56 17 96 % -- --   06/29/23 0015 106/57 -- -- 54 -- 95 % -- --   06/29/23 0005 116/58  "-- -- 56 16 96 % -- --   06/28/23 2350 90/52 -- -- 53 15 95 % -- --   06/28/23 2239 136/80 97.6  F (36.4  C) Temporal 60 18 97 % 1.778 m (5' 10\") 88.5 kg (195 lb)       PHYSICAL EXAM:   Constitutional:  Sitting up in chair, then in bed later.  Appears pale.  Not diaphoretic, no distress but appears to have some discomfort  HENT:  Normocephalic, posterior pharynx wnl, mucous membranes moist and moderately pink   Eyes:  PERRL, EOMI, Conjunctiva normal, No discharge, no scleral icterus.  Respiratory:  Breathing easily,  cta  Cardiovascular:  rrr nl s1s2 0 murmurs, rubs, or gallops.  Peripheral pulses dp, pt, and radial are wnl.  no peripheral edema   GI:  Bowel sounds normal, Soft, tenderness and mild distention in his upper abd. No flank tenderness, nondistended.  :No CVA tenderness.   Musculoskeletal:  Moves all extremities.  No erythematous or swollen major joints,   Integument:  Pale.  Lymphatic:  No cervical lymphadenopathy  Neurologic:  Alert & oriented x 3, Normal motor function, Normal sensory function, No focal deficits noted. Normal speech.  Psychiatric:  Affect normal, Judgment normal, Mood normal.     Results     LAB AND RADIOLOGY:  All pertinent labs reviewed and interpreted  Results for orders placed or performed during the hospital encounter of 06/28/23   Abdomen US, limited (RUQ only)     Status: None    Narrative    EXAM: US ABDOMEN LIMITED  LOCATION: [facility]  DATE: 6/29/2023    INDICATION: cirrhosis hepatitis varices from methotrexate in the past  COMPARISON: CT from 06/16/2023.  TECHNIQUE: Limited abdominal ultrasound.    FINDINGS:    GALLBLADDER: There is an echogenic, shadowing gallstone within the gallbladder. Slight wall thickening at 4 mm, but no pericholecystic fluid and negative sonographic Avelar's sign.    BILE DUCTS: No biliary dilatation. The common duct measures 3 mm.    LIVER: Normal parenchyma with smooth contour. No focal mass.    RIGHT KIDNEY: No " hydronephrosis.    PANCREAS: The visualized portions are normal.    No ascites.      Impression    IMPRESSION:  1.  Cholelithiasis. Slight gallbladder wall thickening, but no focal tenderness noted over the gallbladder during scanning.       Comprehensive metabolic panel     Status: Abnormal   Result Value Ref Range    Sodium 136 136 - 145 mmol/L    Potassium 4.2 3.5 - 5.0 mmol/L    Chloride 104 98 - 107 mmol/L    Carbon Dioxide (CO2) 23 22 - 31 mmol/L    Anion Gap 9 5 - 18 mmol/L    Urea Nitrogen 14 8 - 28 mg/dL    Creatinine 1.35 (H) 0.70 - 1.30 mg/dL    Calcium 8.7 8.5 - 10.5 mg/dL    Glucose 134 (H) 70 - 125 mg/dL    Alkaline Phosphatase 85 45 - 120 U/L    AST 27 0 - 40 U/L    ALT 26 0 - 45 U/L    Protein Total 7.5 6.0 - 8.0 g/dL    Albumin 3.8 3.5 - 5.0 g/dL    Bilirubin Total 0.9 0.0 - 1.0 mg/dL    GFR Estimate 54 (L) >60 mL/min/1.73m2   CBC with platelets and differential     Status: Abnormal   Result Value Ref Range    WBC Count 6.3 4.0 - 11.0 10e3/uL    RBC Count 3.64 (L) 4.40 - 5.90 10e6/uL    Hemoglobin 11.2 (L) 13.3 - 17.7 g/dL    Hematocrit 32.6 (L) 40.0 - 53.0 %    MCV 90 78 - 100 fL    MCH 30.8 26.5 - 33.0 pg    MCHC 34.4 31.5 - 36.5 g/dL    RDW 13.2 10.0 - 15.0 %    Platelet Count 114 (L) 150 - 450 10e3/uL    % Neutrophils 67 %    % Lymphocytes 19 %    % Monocytes 10 %    % Eosinophils 3 %    % Basophils 1 %    % Immature Granulocytes 0 %    NRBCs per 100 WBC 0 <1 /100    Absolute Neutrophils 4.2 1.6 - 8.3 10e3/uL    Absolute Lymphocytes 1.2 0.8 - 5.3 10e3/uL    Absolute Monocytes 0.6 0.0 - 1.3 10e3/uL    Absolute Eosinophils 0.2 0.0 - 0.7 10e3/uL    Absolute Basophils 0.0 0.0 - 0.2 10e3/uL    Absolute Immature Granulocytes 0.0 <=0.4 10e3/uL    Absolute NRBCs 0.0 10e3/uL   Lactic acid whole blood     Status: Normal   Result Value Ref Range    Lactic Acid 1.8 0.7 - 2.0 mmol/L   Lipase     Status: Normal   Result Value Ref Range    Lipase 24 0 - 52 U/L   Troponin I     Status: Normal   Result Value Ref  Range    Troponin I <0.01 0.00 - 0.29 ng/mL   Troponin I     Status: Normal   Result Value Ref Range    Troponin I <0.01 0.00 - 0.29 ng/mL   Adult Type and Screen     Status: None   Result Value Ref Range    ABO/RH(D) A POS     Antibody Screen Negative Negative    SPECIMEN EXPIRATION DATE [id number]    CBC with Platelets & Differential     Status: Abnormal    Narrative    The following orders were created for panel order CBC with Platelets & Differential.  Procedure                               Abnormality         Status                     ---------                               -----------         ------                     CBC with platelets and d...[[id number]]  Abnormal            Final result                 Please view results for these tests on the individual orders.   ABO/Rh type and screen     Status: None    Narrative    The following orders were created for panel order ABO/Rh type and screen.  Procedure                               Abnormality         Status                     ---------                               -----------         ------                     Adult Type and Screen[[id number]]                            Final result                 Please view results for these tests on the individual orders.         ECG:    Performed at: 2319    Impression: nsb rate of 55, rbbb with repolarization changes.  Compared to previous ekg dated 7/26/22 qrs axis shifted right but no significant changes.  Pr 154ms, qrs 148ms, qtc 507ms, prt axes -7 106 -5    2nd ekg performed at: 0216    Impression:  nsb rate of 56, rbbb with repolarization changes.  Compared to earlier this evening, no significant changes.  Pr 158ms, qrs 148ms, qtc 509ms, prt axes 5 44 2    I have independently reviewed and interpreted the EKS(s) documented above    PROCEDURES:  Procedures:      [facility] Documentation     Medical Decision Making    History:    Supplemental history from: Documented in chart, if applicable and  Family Member/Significant Other    External Record(s) reviewed: Outpatient Record: ED visit from 6/16/2023    Work Up:    Chart documentation includes differential considered and any EKGs or imaging independently interpreted by provider, where specified.    In additional to work up documented, I considered the following work up: Documented in chart, if applicable.    External consultation:    Discussion of management with another provider: Documented in chart, if applicable    Complicating factors:    Care impacted by chronic illness: Diabetes, Hyperlipidemia and Hypertension    Care affected by social determinants of health: N/A    Disposition considerations: admission considered for pain control as he is not clinically improving here and appears pale.             The creation of this record is based on the scribe s observations of the work being performed by Ethel Caballero MD and the provider s statements to them. This document has been checked and approved by MD Ethel Pacheco MD  Emergency Medicine  [facility] EMERGENCY ROOM         Ethel Caballero MD  06/29/23 0458

## 2023-06-29 NOTE — ED TRIAGE NOTES
Pt presents to the ED with c/o worsening abdominal pain since 1130 today. Pt had same pain a week and a half ago and seen here for it. Denies any fevers, emesis, diarrhea. Last BM was today - seemed normal.

## 2023-06-30 ENCOUNTER — HOSPITAL ENCOUNTER (INPATIENT)
Facility: CLINIC | Age: 78
LOS: 3 days | Discharge: HOME | DRG: 418 | End: 2023-07-03
Attending: EMERGENCY MEDICINE | Admitting: FAMILY MEDICINE
Payer: MEDICARE

## 2023-06-30 ENCOUNTER — APPOINTMENT (OUTPATIENT)
Dept: CT IMAGING | Facility: CLINIC | Age: 78
DRG: 418 | End: 2023-06-30
Attending: EMERGENCY MEDICINE
Payer: MEDICARE

## 2023-06-30 DIAGNOSIS — Z90.49 S/P LAPAROSCOPIC CHOLECYSTECTOMY: Primary | Chronic | ICD-10-CM

## 2023-06-30 DIAGNOSIS — K81.0 ACUTE CHOLECYSTITIS: ICD-10-CM

## 2023-06-30 LAB
ALBUMIN SERPL-MCNC: 3.7 G/DL (ref 3.5–5)
ALP SERPL-CCNC: 83 U/L (ref 45–120)
ALT SERPL W P-5'-P-CCNC: 21 U/L (ref 0–45)
ANION GAP SERPL CALCULATED.3IONS-SCNC: 8 MMOL/L (ref 5–18)
AST SERPL W P-5'-P-CCNC: 24 U/L (ref 0–40)
BASOPHILS # BLD AUTO: 0 10E3/UL (ref 0–0.2)
BASOPHILS NFR BLD AUTO: 1 %
BILIRUB SERPL-MCNC: 0.9 MG/DL (ref 0–1)
BUN SERPL-MCNC: 17 MG/DL (ref 8–28)
CALCIUM SERPL-MCNC: 8.9 MG/DL (ref 8.5–10.5)
CHLORIDE BLD-SCNC: 100 MMOL/L (ref 98–107)
CO2 SERPL-SCNC: 25 MMOL/L (ref 22–31)
CREAT SERPL-MCNC: 1.44 MG/DL (ref 0.7–1.3)
EOSINOPHIL # BLD AUTO: 0.1 10E3/UL (ref 0–0.7)
EOSINOPHIL NFR BLD AUTO: 1 %
ERYTHROCYTE [DISTWIDTH] IN BLOOD BY AUTOMATED COUNT: 13.2 % (ref 10–15)
GFR SERPL CREATININE-BSD FRML MDRD: 50 ML/MIN/1.73M2
GLUCOSE BLD-MCNC: 162 MG/DL (ref 70–125)
GLUCOSE BLDC GLUCOMTR-MCNC: 122 MG/DL (ref 70–99)
GLUCOSE BLDC GLUCOMTR-MCNC: 140 MG/DL (ref 70–99)
GLUCOSE BLDC GLUCOMTR-MCNC: 154 MG/DL (ref 70–99)
GLUCOSE BLDC GLUCOMTR-MCNC: 155 MG/DL (ref 70–99)
HCT VFR BLD AUTO: 33.3 % (ref 40–53)
HGB BLD-MCNC: 11.2 G/DL (ref 13.3–17.7)
HOLD SPECIMEN: NORMAL
IMM GRANULOCYTES # BLD: 0 10E3/UL
IMM GRANULOCYTES NFR BLD: 0 %
LACTATE SERPL-SCNC: 1.1 MMOL/L (ref 0.7–2)
LACTATE SERPL-SCNC: 1.3 MMOL/L (ref 0.7–2)
LIPASE SERPL-CCNC: 18 U/L (ref 0–52)
LYMPHOCYTES # BLD AUTO: 1.5 10E3/UL (ref 0.8–5.3)
LYMPHOCYTES NFR BLD AUTO: 23 %
MCH RBC QN AUTO: 31 PG (ref 26.5–33)
MCHC RBC AUTO-ENTMCNC: 33.6 G/DL (ref 31.5–36.5)
MCV RBC AUTO: 92 FL (ref 78–100)
MONOCYTES # BLD AUTO: 0.7 10E3/UL (ref 0–1.3)
MONOCYTES NFR BLD AUTO: 12 %
NEUTROPHILS # BLD AUTO: 4 10E3/UL (ref 1.6–8.3)
NEUTROPHILS NFR BLD AUTO: 63 %
NRBC # BLD AUTO: 0 10E3/UL
NRBC BLD AUTO-RTO: 0 /100
PLATELET # BLD AUTO: 110 10E3/UL (ref 150–450)
POTASSIUM BLD-SCNC: 4.3 MMOL/L (ref 3.5–5)
PROT SERPL-MCNC: 7.5 G/DL (ref 6–8)
RBC # BLD AUTO: 3.61 10E6/UL (ref 4.4–5.9)
SODIUM SERPL-SCNC: 133 MMOL/L (ref 136–145)
WBC # BLD AUTO: 6.3 10E3/UL (ref 4–11)

## 2023-06-30 PROCEDURE — G1010 CDSM STANSON: HCPCS

## 2023-06-30 PROCEDURE — 258N000003 HC RX IP 258 OP 636: Performed by: SPECIALIST

## 2023-06-30 PROCEDURE — 250N000012 HC RX MED GY IP 250 OP 636 PS 637: Performed by: SPECIALIST

## 2023-06-30 PROCEDURE — 85025 COMPLETE CBC W/AUTO DIFF WBC: CPT | Performed by: EMERGENCY MEDICINE

## 2023-06-30 PROCEDURE — 99285 EMERGENCY DEPT VISIT HI MDM: CPT | Mod: 25

## 2023-06-30 PROCEDURE — 250N000011 HC RX IP 250 OP 636: Mod: JZ | Performed by: EMERGENCY MEDICINE

## 2023-06-30 PROCEDURE — 83690 ASSAY OF LIPASE: CPT | Performed by: EMERGENCY MEDICINE

## 2023-06-30 PROCEDURE — 250N000012 HC RX MED GY IP 250 OP 636 PS 637: Performed by: FAMILY MEDICINE

## 2023-06-30 PROCEDURE — 83605 ASSAY OF LACTIC ACID: CPT | Performed by: EMERGENCY MEDICINE

## 2023-06-30 PROCEDURE — 96374 THER/PROPH/DIAG INJ IV PUSH: CPT | Mod: 59

## 2023-06-30 PROCEDURE — 96375 TX/PRO/DX INJ NEW DRUG ADDON: CPT

## 2023-06-30 PROCEDURE — 250N000013 HC RX MED GY IP 250 OP 250 PS 637: Performed by: ANESTHESIOLOGY

## 2023-06-30 PROCEDURE — 258N000003 HC RX IP 258 OP 636: Performed by: FAMILY MEDICINE

## 2023-06-30 PROCEDURE — 74177 CT ABD & PELVIS W/CONTRAST: CPT | Mod: MG

## 2023-06-30 PROCEDURE — G0378 HOSPITAL OBSERVATION PER HR: HCPCS

## 2023-06-30 PROCEDURE — 250N000013 HC RX MED GY IP 250 OP 250 PS 637: Performed by: SPECIALIST

## 2023-06-30 PROCEDURE — 36415 COLL VENOUS BLD VENIPUNCTURE: CPT | Performed by: FAMILY MEDICINE

## 2023-06-30 PROCEDURE — 83605 ASSAY OF LACTIC ACID: CPT | Performed by: FAMILY MEDICINE

## 2023-06-30 PROCEDURE — 250N000013 HC RX MED GY IP 250 OP 250 PS 637: Performed by: EMERGENCY MEDICINE

## 2023-06-30 PROCEDURE — 250N000013 HC RX MED GY IP 250 OP 250 PS 637: Performed by: FAMILY MEDICINE

## 2023-06-30 PROCEDURE — 99204 OFFICE O/P NEW MOD 45 MIN: CPT | Mod: FS | Performed by: SPECIALIST

## 2023-06-30 PROCEDURE — 82962 GLUCOSE BLOOD TEST: CPT

## 2023-06-30 PROCEDURE — 120N000001 HC R&B MED SURG/OB

## 2023-06-30 PROCEDURE — 80053 COMPREHEN METABOLIC PANEL: CPT | Performed by: EMERGENCY MEDICINE

## 2023-06-30 PROCEDURE — 99223 1ST HOSP IP/OBS HIGH 75: CPT | Mod: AI | Performed by: FAMILY MEDICINE

## 2023-06-30 PROCEDURE — 250N000011 HC RX IP 250 OP 636: Mod: JZ | Performed by: SPECIALIST

## 2023-06-30 PROCEDURE — 36415 COLL VENOUS BLD VENIPUNCTURE: CPT | Performed by: EMERGENCY MEDICINE

## 2023-06-30 PROCEDURE — 250N000011 HC RX IP 250 OP 636: Mod: JZ | Performed by: FAMILY MEDICINE

## 2023-06-30 RX ORDER — NALOXONE HYDROCHLORIDE 0.4 MG/ML
0.2 INJECTION, SOLUTION INTRAMUSCULAR; INTRAVENOUS; SUBCUTANEOUS
Status: DISCONTINUED | OUTPATIENT
Start: 2023-06-30 | End: 2023-07-03 | Stop reason: HOSPADM

## 2023-06-30 RX ORDER — PIPERACILLIN SODIUM, TAZOBACTAM SODIUM 3; .375 G/15ML; G/15ML
3.38 INJECTION, POWDER, LYOPHILIZED, FOR SOLUTION INTRAVENOUS EVERY 8 HOURS
Status: DISCONTINUED | OUTPATIENT
Start: 2023-06-30 | End: 2023-07-03 | Stop reason: HOSPADM

## 2023-06-30 RX ORDER — HYDROMORPHONE HCL IN WATER/PF 6 MG/30 ML
0.2 PATIENT CONTROLLED ANALGESIA SYRINGE INTRAVENOUS
Status: DISCONTINUED | OUTPATIENT
Start: 2023-06-30 | End: 2023-07-01

## 2023-06-30 RX ORDER — ONDANSETRON 4 MG/1
4 TABLET, ORALLY DISINTEGRATING ORAL EVERY 6 HOURS PRN
Status: DISCONTINUED | OUTPATIENT
Start: 2023-06-30 | End: 2023-07-01

## 2023-06-30 RX ORDER — PROCHLORPERAZINE MALEATE 5 MG
5 TABLET ORAL EVERY 6 HOURS PRN
Status: DISCONTINUED | OUTPATIENT
Start: 2023-06-30 | End: 2023-07-03 | Stop reason: HOSPADM

## 2023-06-30 RX ORDER — NICOTINE POLACRILEX 4 MG
15-30 LOZENGE BUCCAL
Status: DISCONTINUED | OUTPATIENT
Start: 2023-06-30 | End: 2023-07-03 | Stop reason: HOSPADM

## 2023-06-30 RX ORDER — PROPRANOLOL HYDROCHLORIDE 20 MG/1
20 TABLET ORAL 2 TIMES DAILY
Status: DISCONTINUED | OUTPATIENT
Start: 2023-06-30 | End: 2023-07-03 | Stop reason: HOSPADM

## 2023-06-30 RX ORDER — LISINOPRIL 10 MG/1
10 TABLET ORAL DAILY
Status: DISCONTINUED | OUTPATIENT
Start: 2023-07-01 | End: 2023-07-03 | Stop reason: HOSPADM

## 2023-06-30 RX ORDER — PANTOPRAZOLE SODIUM 40 MG/1
40 TABLET, DELAYED RELEASE ORAL 2 TIMES DAILY
Status: DISCONTINUED | OUTPATIENT
Start: 2023-06-30 | End: 2023-07-03 | Stop reason: HOSPADM

## 2023-06-30 RX ORDER — DEXTROSE MONOHYDRATE 25 G/50ML
25-50 INJECTION, SOLUTION INTRAVENOUS
Status: DISCONTINUED | OUTPATIENT
Start: 2023-06-30 | End: 2023-07-03 | Stop reason: HOSPADM

## 2023-06-30 RX ORDER — ACETAMINOPHEN 325 MG/1
975 TABLET ORAL ONCE
Status: COMPLETED | OUTPATIENT
Start: 2023-06-30 | End: 2023-06-30

## 2023-06-30 RX ORDER — HYDROMORPHONE HYDROCHLORIDE 1 MG/ML
0.5 INJECTION, SOLUTION INTRAMUSCULAR; INTRAVENOUS; SUBCUTANEOUS EVERY 30 MIN PRN
Status: DISCONTINUED | OUTPATIENT
Start: 2023-06-30 | End: 2023-07-01

## 2023-06-30 RX ORDER — SODIUM CHLORIDE 9 MG/ML
INJECTION, SOLUTION INTRAVENOUS CONTINUOUS
Status: DISCONTINUED | OUTPATIENT
Start: 2023-06-30 | End: 2023-07-03 | Stop reason: HOSPADM

## 2023-06-30 RX ORDER — INDOCYANINE GREEN AND WATER 25 MG
2.5 KIT INJECTION ONCE
Status: COMPLETED | OUTPATIENT
Start: 2023-06-30 | End: 2023-06-30

## 2023-06-30 RX ORDER — MAGNESIUM HYDROXIDE/ALUMINUM HYDROXICE/SIMETHICONE 120; 1200; 1200 MG/30ML; MG/30ML; MG/30ML
15 SUSPENSION ORAL ONCE
Status: COMPLETED | OUTPATIENT
Start: 2023-06-30 | End: 2023-06-30

## 2023-06-30 RX ORDER — PIPERACILLIN SODIUM, TAZOBACTAM SODIUM 3; .375 G/15ML; G/15ML
3.38 INJECTION, POWDER, LYOPHILIZED, FOR SOLUTION INTRAVENOUS ONCE
Status: COMPLETED | OUTPATIENT
Start: 2023-06-30 | End: 2023-06-30

## 2023-06-30 RX ORDER — NALOXONE HYDROCHLORIDE 0.4 MG/ML
0.4 INJECTION, SOLUTION INTRAMUSCULAR; INTRAVENOUS; SUBCUTANEOUS
Status: DISCONTINUED | OUTPATIENT
Start: 2023-06-30 | End: 2023-07-03 | Stop reason: HOSPADM

## 2023-06-30 RX ORDER — LIDOCAINE 40 MG/G
CREAM TOPICAL
Status: DISCONTINUED | OUTPATIENT
Start: 2023-06-30 | End: 2023-07-01 | Stop reason: HOSPADM

## 2023-06-30 RX ORDER — CEFAZOLIN SODIUM/WATER 2 G/20 ML
2 SYRINGE (ML) INTRAVENOUS SEE ADMIN INSTRUCTIONS
Status: DISCONTINUED | OUTPATIENT
Start: 2023-06-30 | End: 2023-07-01

## 2023-06-30 RX ORDER — PROCHLORPERAZINE 25 MG
12.5 SUPPOSITORY, RECTAL RECTAL EVERY 12 HOURS PRN
Status: DISCONTINUED | OUTPATIENT
Start: 2023-06-30 | End: 2023-07-03 | Stop reason: HOSPADM

## 2023-06-30 RX ORDER — SUCRALFATE 1 G/1
1 TABLET ORAL ONCE
Status: COMPLETED | OUTPATIENT
Start: 2023-06-30 | End: 2023-06-30

## 2023-06-30 RX ORDER — TAMSULOSIN HYDROCHLORIDE 0.4 MG/1
0.4 CAPSULE ORAL
Status: DISCONTINUED | OUTPATIENT
Start: 2023-06-30 | End: 2023-07-03 | Stop reason: HOSPADM

## 2023-06-30 RX ORDER — ATORVASTATIN CALCIUM 40 MG/1
40 TABLET, FILM COATED ORAL DAILY
Status: DISCONTINUED | OUTPATIENT
Start: 2023-07-01 | End: 2023-07-03 | Stop reason: HOSPADM

## 2023-06-30 RX ORDER — ONDANSETRON 2 MG/ML
4 INJECTION INTRAMUSCULAR; INTRAVENOUS EVERY 6 HOURS PRN
Status: DISCONTINUED | OUTPATIENT
Start: 2023-06-30 | End: 2023-07-01

## 2023-06-30 RX ORDER — CEFAZOLIN SODIUM/WATER 2 G/20 ML
2 SYRINGE (ML) INTRAVENOUS
Status: COMPLETED | OUTPATIENT
Start: 2023-06-30 | End: 2023-06-30

## 2023-06-30 RX ORDER — OXYCODONE HYDROCHLORIDE 5 MG/1
5 TABLET ORAL EVERY 6 HOURS PRN
Status: DISCONTINUED | OUTPATIENT
Start: 2023-06-30 | End: 2023-07-01

## 2023-06-30 RX ORDER — IOPAMIDOL 755 MG/ML
75 INJECTION, SOLUTION INTRAVASCULAR ONCE
Status: COMPLETED | OUTPATIENT
Start: 2023-06-30 | End: 2023-06-30

## 2023-06-30 RX ORDER — MAGNESIUM SULFATE 4 G/50ML
4 INJECTION INTRAVENOUS ONCE
Status: DISCONTINUED | OUTPATIENT
Start: 2023-06-30 | End: 2023-06-30

## 2023-06-30 RX ORDER — SODIUM CHLORIDE, SODIUM LACTATE, POTASSIUM CHLORIDE, CALCIUM CHLORIDE 600; 310; 30; 20 MG/100ML; MG/100ML; MG/100ML; MG/100ML
INJECTION, SOLUTION INTRAVENOUS CONTINUOUS
Status: DISCONTINUED | OUTPATIENT
Start: 2023-06-30 | End: 2023-07-01 | Stop reason: HOSPADM

## 2023-06-30 RX ORDER — HYDROMORPHONE HCL IN WATER/PF 6 MG/30 ML
0.4 PATIENT CONTROLLED ANALGESIA SYRINGE INTRAVENOUS
Status: DISCONTINUED | OUTPATIENT
Start: 2023-06-30 | End: 2023-07-01

## 2023-06-30 RX ADMIN — ACETAMINOPHEN 975 MG: 325 TABLET ORAL at 14:07

## 2023-06-30 RX ADMIN — PANTOPRAZOLE SODIUM 40 MG: 40 TABLET, DELAYED RELEASE ORAL at 21:13

## 2023-06-30 RX ADMIN — SODIUM CHLORIDE: 9 INJECTION, SOLUTION INTRAVENOUS at 21:14

## 2023-06-30 RX ADMIN — SUCRALFATE 1 G: 1 TABLET ORAL at 04:01

## 2023-06-30 RX ADMIN — PIPERACILLIN AND TAZOBACTAM 3.38 G: 3; .375 INJECTION, POWDER, LYOPHILIZED, FOR SOLUTION INTRAVENOUS at 06:21

## 2023-06-30 RX ADMIN — HYDROMORPHONE HYDROCHLORIDE 0.5 MG: 1 INJECTION, SOLUTION INTRAMUSCULAR; INTRAVENOUS; SUBCUTANEOUS at 07:19

## 2023-06-30 RX ADMIN — IOPAMIDOL 75 ML: 755 INJECTION, SOLUTION INTRAVENOUS at 05:25

## 2023-06-30 RX ADMIN — Medication 200 MG: at 21:13

## 2023-06-30 RX ADMIN — FAMOTIDINE 20 MG: 10 INJECTION, SOLUTION INTRAVENOUS at 04:01

## 2023-06-30 RX ADMIN — ALUMINUM HYDROXIDE, MAGNESIUM HYDROXIDE, AND DIMETHICONE 15 ML: 200; 20; 200 SUSPENSION ORAL at 04:00

## 2023-06-30 RX ADMIN — PROPRANOLOL HYDROCHLORIDE 20 MG: 20 TABLET ORAL at 21:13

## 2023-06-30 RX ADMIN — HYDROMORPHONE HYDROCHLORIDE 0.5 MG: 1 INJECTION, SOLUTION INTRAMUSCULAR; INTRAVENOUS; SUBCUTANEOUS at 05:03

## 2023-06-30 RX ADMIN — TAMSULOSIN HYDROCHLORIDE 0.4 MG: 0.4 CAPSULE ORAL at 21:13

## 2023-06-30 RX ADMIN — PIPERACILLIN AND TAZOBACTAM 3.38 G: 3; .375 INJECTION, POWDER, LYOPHILIZED, FOR SOLUTION INTRAVENOUS at 13:22

## 2023-06-30 RX ADMIN — INSULIN ASPART 1 UNITS: 100 INJECTION, SOLUTION INTRAVENOUS; SUBCUTANEOUS at 18:42

## 2023-06-30 RX ADMIN — SODIUM CHLORIDE: 9 INJECTION, SOLUTION INTRAVENOUS at 08:48

## 2023-06-30 RX ADMIN — PIPERACILLIN AND TAZOBACTAM 3.38 G: 3; .375 INJECTION, POWDER, LYOPHILIZED, FOR SOLUTION INTRAVENOUS at 21:13

## 2023-06-30 RX ADMIN — ONDANSETRON 4 MG: 2 INJECTION INTRAMUSCULAR; INTRAVENOUS at 10:27

## 2023-06-30 ASSESSMENT — ACTIVITIES OF DAILY LIVING (ADL)
ADLS_ACUITY_SCORE: 41
ADLS_ACUITY_SCORE: 35
ADLS_ACUITY_SCORE: 37
ADLS_ACUITY_SCORE: 41
ADLS_ACUITY_SCORE: 35
ADLS_ACUITY_SCORE: 41
ADLS_ACUITY_SCORE: 35
ADLS_ACUITY_SCORE: 41
DEPENDENT_IADLS:: INDEPENDENT

## 2023-06-30 ASSESSMENT — ENCOUNTER SYMPTOMS
DIARRHEA: 0
CONSTIPATION: 1
ABDOMINAL PAIN: 1
NAUSEA: 0
VOMITING: 0

## 2023-06-30 NOTE — H&P
[facility]    History and Physical - Hospitalist Service       Date of Admission:  6/30/2023    Assessment & Plan      Frank Obando is a 77 year old male admitted on 6/30/2023.  Frank presented to the ER again complaining of epigastric and right upper quadrant pain.  He had been seen on 6/28 for right upper quadrant pain and found to have cholelithiasis with possible early cholecystitis.  Upon presentation today he was found to have normal LFTs and lipase but CT scan consistent with acute on chronic pancreatitis and acute cholecystitis.  Patient has received Zosyn and general surgery consultation placed.      Right upper quadrant pain with positive Avelar sign  Consistent with cholecystitis  N.p.o.  General surgery consultation  IV Zosyn  IV fluids  IV pain meds    Imaging consistent with acute on chronic pancreatitis  Surprisingly lipase normal  Minimal epigastric discomfort  Will need follow-up  Repeat lipase in a.m.    Anemia/thrombocytopenia  Suspect from chronic disease  No signs of losses  No indication for transfusion  Trend    Type 2 diabetes  Correction insulin scheduled  Check blood sugars every 4 hours prior to surgery  Change to before every meal and at bedtime post procedure    Immunocompromise state/ANCA positive  Chronically on Remicade  Follows with ophthalmology and rheumatology    Chronic kidney disease stage IIIb  At risk for acute kidney injury  Renal function stable currently  Fluids  Trend    History of cirrhosis with esophageal varices  Surprisingly liver parenchyma appears normal on imaging  Should follow-up with GI as an outpatient in liver clinic    Diabetic polyneuropathy    Essential hypertension  We will order home meds with hold parameters once med rec complete    Dyslipidemia    Remote history of CVA    GERD  PPI    BPH  Watch for retention       Diet:  N.p.o. for procedure  DVT Prophylaxis: Pneumatic Compression Devices  [place] Catheter: Not  "present  Lines: None     Cardiac Monitoring: None  Code Status:  Full code                # Thrombocytopenia: Lowest platelets = 110 in last 2 days, will monitor for bleeding   # Hypertension: Noted on problem list     # DMII: A1C = 7.2 % (Ref range: 0.0 - 5.6 %) within past 6 months    # Overweight: Estimated body mass index is 27.98 kg/m  as calculated from the following:    Height as of this encounter: 1.778 m (5' 10\").    Weight as of this encounter: 88.5 kg (195 lb).            Disposition Plan      Expected Discharge Date: 07/02/2023                  Kirk Marti MD  Hospitalist Service  [facility]  Securely message with [employer] (more info)  Text page via [facility] Paging/Directory     ______________________________________________________________________    Chief Complaint   Epigastric and right upper quadrant pain    History is obtained from the patient, review of the EHR and direct discussion with ER provider    History of Present Illness   Frank Obando is a 77 year old male who presented to the ER for second visit in 3 days complaining of epigastric and right upper quadrant pain.  Patient had been seen 2 days ago and had imaging that revealed gallstones and may be early cholecystitis.  LFTs were normal and he elected to go home.  Upon going home he had 2 more episodes of pain and ultimately came back.  CT scan of the abdomen and pelvis revealed findings consistent with cholecystitis.  LFTs and lipase continue to be normal.  Also signs of potential acute on chronic pancreatitis.  General surgery is consulted.  Patient is received Zosyn.  Has had no fevers or chills.  He is not sure if the pain is worse after eating but thinks it may have some association with food.  Said no diarrhea.  He does tend to be constipated.  He has chronic iron deficiency anemia and takes iron so his stools are always dark.  Blood sugars have been well controlled.      Past Medical History    Past " Medical History:   Diagnosis Date     Anemia      Arthritis     osteoarthritis     Calculus of kidney      Diabetes mellitus (H)      Episcleritis of left eye      Hyperlipidemia      Hypertension      Iron deficiency anemia due to chronic blood loss 2021     Peripheral neuropathy      Stroke (H)        Past Surgical History   Past Surgical History:   Procedure Laterality Date     COLONOSCOPY N/A 2021    Procedure: COLONOSCOPY with polypectomy;  Surgeon: Dex Finch MD;  Location: [facility]     CYSTOSCOPY TUMOR / CONDYLOMATA W/ LASER Left 2014     ESOPHAGOSCOPY, GASTROSCOPY, DUODENOSCOPY (EGD), COMBINED N/A 2021    Procedure: ESOPHAGOGASTRODUODENOSCOPY (EGD) with biopsies and argon plasma coagulation;  Surgeon: Dex Finch MD;  Location: [facility]     HC CYSTOSCOPY,INSERT URETERAL STENT      Description: Cystoscopy With Insertion Of Ureteral Stent Right;  Recorded: 10/14/2009;  Comments: stone       Prior to Admission Medications   Prior to Admission Medications   Prescriptions Last Dose Informant Patient Reported? Taking?   atorvastatin (LIPITOR) 40 MG tablet   No No   Sig: TAKE 1 TABLET BY MOUTH DAILY   blood glucose (ACCU-CHEK JACOB PLUS) test strip   No No   Si strip by In Vitro route 2 times daily   blood glucose meter (GLUCOMETER)   No No   Sig: [BLOOD GLUCOSE METER (GLUCOMETER)] Use 1 each As Directed 2 (two) times a day. Dispense glucometer brand per patient's insurance at pharmacy discretion.   blood glucose monitoring (SOFTCLIX) lancets   No No   Sig: Use 1 each As Directed 2 (two) times a day. Dispense brand per patient's insurance at pharmacy discretion. - Does not apply   cyanocobalamin, vitamin B-12, 2,500 mcg Tab   Yes No   Sig: [CYANOCOBALAMIN, VITAMIN B-12, 2,500 MCG TAB] Take 2,500 mcg by mouth daily.   empagliflozin (JARDIANCE) 10 MG TABS tablet   No No   Sig: Take 1 tablet (10 mg) by mouth daily   Patient not taking: Reported on 2023    ferrous sulfate (FEROSUL) 325 (65 Fe) MG tablet   No No   Sig: Take 1 tablet (325 mg) by mouth 3 times daily (with meals)   generic lancets (ACCU-CHEK SOFTCLIX LANCETS)   No No   Sig: [GENERIC LANCETS (ACCU-CHEK SOFTCLIX LANCETS)] Use 1 each As Directed 2 (two) times a day. Dispense brand per patient's insurance at pharmacy discretion.   inFLIXimab (REMICADE IV)   Yes No   Sig: Every 6weeks   lactobacillus rhamnosus, GG, (CULTURELL) capsule   No No   Sig: Take 1 capsule by mouth 3 times daily (before meals)   lisinopril (ZESTRIL) 10 MG tablet   No No   Sig: Take 1 tablet (10 mg) by mouth daily   magnesium oxide 250 mg Tab   Yes No   Sig: [MAGNESIUM OXIDE 250 MG TAB] Take 250 mg by mouth 2 (two) times a day.   metFORMIN (GLUCOPHAGE) 500 MG tablet   No No   Sig: TAKE 2 TABLETS BY MOUTH TWICE DAILY AT 6 AM AND AT 4 PM   omeprazole (PRILOSEC) 40 MG DR capsule   No No   Sig: Take 1 capsule (40 mg) by mouth daily   omeprazole (PRILOSEC) 40 MG DR capsule   No No   Sig: Take 1 capsule (40 mg) by mouth daily   omeprazole (PRILOSEC) 40 MG DR capsule   No No   Sig: Take 1 capsule (40 mg) by mouth daily for 90 days   oxyCODONE (ROXICODONE) 5 MG tablet   No No   Sig: Take 1 tablet (5 mg) by mouth every 6 hours as needed for pain   pioglitazone (ACTOS) 30 MG tablet   No No   Sig: TAKE 1 TABLET BY MOUTH EVERY DAY   prednisoLONE acetate (PRED FORTE) 1 % ophthalmic suspension   Yes No   Patient not taking: Reported on 4/20/2023   propranolol (INDERAL) 20 MG tablet   No No   Sig: Take 1 tablet (20 mg) by mouth 2 times daily   sucralfate (CARAFATE) 1 GM/10ML suspension   No No   Sig: Take 10 mLs (1 g) by mouth 4 times daily   tamsulosin (FLOMAX) 0.4 MG capsule   No No   Sig: TAKE 1 CAPSULE BY MOUTH DAILY AFTER AND SUPPER      Facility-Administered Medications: None        Review of Systems    The 10 point Review of Systems is negative other than noted in the HPI or here.     Social History   I have reviewed this patient's social  history and updated it with pertinent information if needed.  Social History     Tobacco Use     Smoking status: Never     Smokeless tobacco: Never   Substance Use Topics     Alcohol use: No     Drug use: No       Family History   I have reviewed this patient's family history and updated it with pertinent information if needed.  Family History   Problem Relation Age of Onset     Coronary Artery Disease Mother      Diabetes Mother      Lung Cancer Father      No Known Problems Son        Allergies   Allergies   Allergen Reactions     Morphine Nausea and Vomiting     Scopolamine Hbr [Scopolamine] Unknown        Physical Exam   Vital Signs: Temp: 98.6  F (37  C) Temp src: Oral BP: 102/59 Pulse: 70   Resp: 16 SpO2: 93 % O2 Device: None (Room air)    Weight: 195 lbs 0 oz    GENERAL:  Alert, appears comfortable, in no acute distress, appears stated age   HEAD:  Normocephalic, without obvious abnormality, atraumatic   EYES:  PERRL, conjunctiva/corneas clear, no scleral icterus, EOM's intact   NOSE: Nares normal, septum midline, mucosa normal, no drainage   LUNGS:   Clear to auscultation bilaterally, no rales, rhonchi, or wheezing, symmetric chest rise on inhalation, respirations unlabored   CHEST WALL:  No tenderness or deformity   HEART:  Regular rate and rhythm, S1 and S2 normal, no murmur, rub, or gallop    ABDOMEN:   Soft, mildly tender in the right upper quadrant with positive Avelar sign, bowel sounds active all four quadrants, no masses, no organomegaly, no rebound or guarding   EXTREMITIES: Extremities normal, atraumatic, no cyanosis or edema    SKIN: Dry to touch, no exanthems in the visualized areas   NEURO: Alert, oriented x 4, moves all four extremities freely/spontaneously   PSYCH: Cooperative, behavior is appropriate          Data     I have personally reviewed the following data over the past 24 hrs:    6.3  \   11.2 (L)   / 110 (L)     133 (L) 100 17 /  162 (H)   4.3 25 1.44 (H) \       ALT: 21 AST: 24 AP:  83 TBILI: 0.9   ALB: 3.7 TOT PROTEIN: 7.5 LIPASE: 18       Procal: N/A CRP: N/A Lactic Acid: 1.1         Imaging results reviewed over the past 24 hrs:   Recent Results (from the past 24 hour(s))   CT Abdomen Pelvis w Contrast    Narrative    EXAM: CT ABDOMEN PELVIS W CONTRAST  LOCATION: [facility]  DATE: 6/30/2023    INDICATION: abd pain  COMPARISON: US 06/29/2023 CT 06/16/2023  TECHNIQUE: CT scan of the abdomen and pelvis was performed following injection of IV contrast. Multiplanar reformats were obtained. Dose reduction techniques were used.  CONTRAST: [id number] 75ml    FINDINGS:   LOWER CHEST: Infrahilar traction bronchiectasis.    HEPATOBILIARY: Gallstone. Wall thickening. Fat stranding.    PANCREAS: Pancreatic calcifications head neck. Peripancreatic fat stranding head neck. Trace free fluid tracking along right paracolic gutter.    SPLEEN: Normal.    ADRENAL GLANDS: Normal.    KIDNEYS/BLADDER: Right renal stone. No hydronephrosis.    BOWEL: Diverticulosis distal descending to proximal sigmoid colon.    LYMPH NODES: Normal.    VASCULATURE: Atherosclerosis. Gastroesophageal varices.    PELVIC ORGANS: Prostate calcifications.    MUSCULOSKELETAL: Demineralization. Degenerative changes.      Impression    IMPRESSION:   1.  Suspect acute on chronic pancreatitis.  2.  Acute cholecystitis.     Recent Labs   Lab 06/30/23  0334 06/28/23  2304   WBC 6.3 6.3   HGB 11.2* 11.2*   MCV 92 90   * 114*   * 136   POTASSIUM 4.3 4.2   CHLORIDE 100 104   CO2 25 23   BUN 17 14   CR 1.44* 1.35*   ANIONGAP 8 9   CINDY 8.9 8.7   * 134*   ALBUMIN 3.7 3.8   PROTTOTAL 7.5 7.5   BILITOTAL 0.9 0.9   ALKPHOS 83 85   ALT 21 26   AST 24 27   LIPASE 18 24     tt 78 min

## 2023-06-30 NOTE — PHARMACY-ADMISSION MEDICATION HISTORY
Pharmacist Admission Medication History    Admission medication history is complete. The information provided in this note is only as accurate as the sources available at the time of the update.    Medication reconciliation/reorder completed by provider prior to medication history? No    Information Source(s): Patient and CareEverywhere/SureScripts via in-person    Pertinent Information: sucralfate ordered by ED provider yesterday    Changes made to PTA medication list:    Added: none    Deleted: Jardiance,duplicate omeprazole entries, pred eye gtts    Changed: none    Medication Affordability:  Not including over the counter (OTC) medications, was there a time in the past 3 months when you did not take your medications as prescribed because of cost?: No    Allergies reviewed with patient and updates made in EHR: yes    Medications available for use during hospital stay: NONE.     Medication History Completed By: Eddie Field RPH 6/30/2023 8:40 AM    PTA Med List   Medication Sig Last Dose     atorvastatin (LIPITOR) 40 MG tablet TAKE 1 TABLET BY MOUTH DAILY 6/29/2023 at am     cyanocobalamin, vitamin B-12, 2,500 mcg Tab [CYANOCOBALAMIN, VITAMIN B-12, 2,500 MCG TAB] Take 2,500 mcg by mouth daily. 6/29/2023     ferrous sulfate (FEROSUL) 325 (65 Fe) MG tablet Take 1 tablet (325 mg) by mouth 3 times daily (with meals) 6/29/2023     inFLIXimab (REMICADE IV) Every 6weeks 6/1/2023     lactobacillus rhamnosus, GG, (CULTURELL) capsule Take 1 capsule by mouth 3 times daily (before meals) 6/29/2023     lisinopril (ZESTRIL) 10 MG tablet Take 1 tablet (10 mg) by mouth daily 6/29/2023     magnesium oxide 250 mg Tab [MAGNESIUM OXIDE 250 MG TAB] Take 250 mg by mouth 2 (two) times a day. 6/29/2023     metFORMIN (GLUCOPHAGE) 500 MG tablet TAKE 2 TABLETS BY MOUTH TWICE DAILY AT 6 AM AND AT 4 PM 6/29/2023     omeprazole (PRILOSEC) 40 MG DR capsule Take 1 capsule (40 mg) by mouth daily 6/29/2023     oxyCODONE (ROXICODONE) 5 MG  tablet Take 1 tablet (5 mg) by mouth every 6 hours as needed for pain 6/29/2023     pioglitazone (ACTOS) 30 MG tablet TAKE 1 TABLET BY MOUTH EVERY DAY 6/29/2023     propranolol (INDERAL) 20 MG tablet Take 1 tablet (20 mg) by mouth 2 times daily 6/29/2023     sucralfate (CARAFATE) 1 GM/10ML suspension Take 10 mLs (1 g) by mouth 4 times daily not started yet     tamsulosin (FLOMAX) 0.4 MG capsule TAKE 1 CAPSULE BY MOUTH DAILY AFTER AND SUPPER 6/29/2023

## 2023-06-30 NOTE — PLAN OF CARE
Problem: Plan of Care - These are the overarching goals to be used throughout the patient stay.    Goal: Optimal Comfort and Wellbeing  Outcome: Progressing  Intervention: Monitor Pain and Promote Comfort  Recent Flowsheet Documentation  Taken 6/30/2023 1650 by Mishel Pablo RN  Pain Management Interventions: rest   Goal Outcome Evaluation: Pt rates minimal pain. Surgeon to talk with pt and spouse this evening about why surgery was canceled and the plan moving forward.

## 2023-06-30 NOTE — PLAN OF CARE
Problem: Risk for Delirium  Goal: Optimal Coping  Outcome: Progressing   Goal Outcome Evaluation:  Pt alert with forgetfullness at times. Fevers 102.1/101.8  that are trending down after tylenol admin. Intermittently needing oxygen to maintain sats when asleep. Cont fluids infusing. NPO maintaining. surg called to notify of cancellation of procedure. Pt has effective relief with scheduled meds. With wife at bedside, resting comfortably. Transferring to 3E unit, report called to Mishel CHAVARRIA

## 2023-06-30 NOTE — ED TRIAGE NOTES
"Pt c/o abdominal pain starting \"a couple\" weeks ago. Pt has been seen in the ED several times for this pain, but pt states \"they haven't found anything.\" Pt states pain woke him from sleep tonight, worst it has been. Pt last took oxycodone and omeprazole at 5pm last night. Pt denies N/V/D, SOB, and CP.     Triage Assessment     Row Name 06/30/23 0326       Triage Assessment (Adult)    Airway WDL WDL       Respiratory WDL    Respiratory WDL WDL       Skin Circulation/Temperature WDL    Skin Circulation/Temperature WDL WDL       Cardiac WDL    Cardiac WDL WDL       Peripheral/Neurovascular WDL    Peripheral Neurovascular WDL WDL       Cognitive/Neuro/Behavioral WDL    Cognitive/Neuro/Behavioral WDL WDL              "

## 2023-06-30 NOTE — ED NOTES
Bed: [facility]-01  Expected date: 6/30/23  Expected time: 3:15 AM  Means of arrival: Ambulance  Comments:

## 2023-06-30 NOTE — ED PROVIDER NOTES
EMERGENCY DEPARTMENT ENCOUNTER      NAME: Frank Obando  AGE: 77 year old male  YOB: 1945  MRN: [id number]  EVALUATION DATE & TIME: 6/30/2023  3:21 AM    PCP: Ehsan [facility]    ED PROVIDER: Foreign Cuenca M.D.      Chief Complaint   Patient presents with     Abdominal Pain         FINAL IMPRESSION:  1. Acute cholecystitis          ED COURSE & MEDICAL DECISION MAKING:    Pertinent Labs & Imaging studies reviewed. (See chart for details)  77 year old male presents to the Emergency Department for evaluation of abdominal pain.  This is his third visit for the same.  Had been seen by myself about 2 weeks ago.  Had a negative work-up.  Thought to be GERD.  Came back again yesterday.  An ultrasound showed gallstones but no signs of cholecystitis.  Did repeat the CT scan given he is back.  He does have some tenderness in his epigastrium and right upper quadrant.  This does show likely acute cholecystitis in addition to acute on chronic pancreatitis.  His lipase is normal.  LFTs are normal.  White count is normal.  Given dose of IV Zosyn here.  Given recurrent symptoms will admit the patient as this is likely early cholecystitis.  Discussed with Dr. Hoff from surgery they will see the patient later today.  Patient excepted by Dr. Luciano the hospitalist.    3:34 AM I met with the patient to gather history and to perform my initial exam. I discussed the plan for care while in the Emergency Department.   4:47 AM I updated the patient and his family with lab results and discussed the plan for CT imaging     At the conclusion of the encounter I discussed the results of all of the tests and the disposition. The questions were answered. The patient or family acknowledged understanding and was agreeable with the care plan.     Medical Decision Making    History:    Supplemental history from: Documented in chart, if applicable    External Record(s) reviewed: Documented in chart, if  "applicable.    Work Up:    Chart documentation includes differential considered and any EKGs or imaging independently interpreted by provider, where specified.    In additional to work up documented, I considered the following work up: Documented in chart, if applicable.    External consultation:    Discussion of management with another provider: Documented in chart, if applicable    Complicating factors:    Care impacted by chronic illness: Cerebrovascular Disease, Chronic Kidney Disease, Diabetes and Hypertension    Care affected by social determinants of health: N/A    Disposition considerations: Admit.            MEDICATIONS GIVEN IN THE EMERGENCY:  Medications   HYDROmorphone (PF) (DILAUDID) injection 0.5 mg (0.5 mg Intravenous $Given 6/30/23 0503)   piperacillin-tazobactam (ZOSYN) 3.375 g vial to attach to  mL bag (3.375 g Intravenous $New Bag 6/30/23 0621)   sucralfate (CARAFATE) tablet 1 g (1 g Oral $Given 6/30/23 0401)   alum & mag hydroxide-simethicone (MAALOX) suspension 15 mL (15 mLs Oral $Given 6/30/23 0400)   famotidine (PEPCID) injection 20 mg (20 mg Intravenous $Given 6/30/23 0401)   iopamidol (ISOVUE-370) solution 75 mL (75 mLs Intravenous $Given 6/30/23 0525)       NEW PRESCRIPTIONS STARTED AT TODAY'S ER VISIT  New Prescriptions    No medications on file          =================================================================    HPI    Patient information was obtained from: Patient     Use of : N/A       Frank Obando is a 77 year old male with a pertinent history of Cirrhosis of liver, Angiodysplasia of stomach, esophageal varices without bleeding, CVA, DM type II, CKD 3, and HTN who presents to this ED via ambulance for evaluation of abdominal pain     Per chart review: The patient was seen at [facility] ED on 6/16/23 for epigastric abdominal pain. Patient had no signs of GI bleeding. Patient had a CT abdomen/Pelvis with contrast that showed: \"chronic findings of varices " "and cirrhosis\" and \"mild chronic pancreatic changes of pancreatitis\". The patient's lipase was normal and initial lactic acid was elevated, but improved with IV fluids. The patient was feeling better following pain medication and fluids, so patient discharged home with prescription for omeprazole. Patient was offered admission for pain control, but patient was feeling better and declined.      Per chart review: The patient was seen at [facility] ED on 6/28/23 for epigastric abdominal pain. The patient showed no signs of GI bleeding. The patient had an abdominal ultrasound that showed: \"Cholelithiasis. Slight gallbladder wall thickening\", but patient had \"no focal tenderness noted over the gallbladder during scanning\". The patient was given a GI cocktail and antacids. Option for admission for observation discussed, but patient declined and requested to go home after symptom improvement. The patient was prescribed Carafate and oxycodone for pain.     The patient reports his abdominal pain \"kind of went away\" after being discharged from the ED (See chart review), but he ate some food yesterday and the pain returned. The patient notes the pain improved throughout the day, but he awoke at 2 AM this morning with \"severe\" epigastric abdominal pain. The patient notes he has not had a bowel movement since being discharged from the ED. The patient notes he has been taking his prescribed medications, but was unsure of what Carafate was when asked if he took it. The patient denies nausea, vomiting, diarrhea, or any other symptoms or complaints.     The patient reports a history of an appendectomy a long time ago.     REVIEW OF SYSTEMS   Review of Systems   Gastrointestinal: Positive for abdominal pain (epigastric) and constipation. Negative for diarrhea, nausea and vomiting.   All other systems reviewed and are negative.       PAST MEDICAL HISTORY:  Past Medical History:   Diagnosis Date     Anemia      Arthritis     " osteoarthritis     Calculus of kidney      Diabetes mellitus (H)      Episcleritis of left eye      Hyperlipidemia      Hypertension      Iron deficiency anemia due to chronic blood loss 11/2/2021     Peripheral neuropathy      Stroke (H)        PAST SURGICAL HISTORY:  Past Surgical History:   Procedure Laterality Date     COLONOSCOPY N/A 11/16/2021    Procedure: COLONOSCOPY with polypectomy;  Surgeon: Dex Finch MD;  Location: [facility]     CYSTOSCOPY TUMOR / CONDYLOMATA W/ LASER Left 7/18/2014     ESOPHAGOSCOPY, GASTROSCOPY, DUODENOSCOPY (EGD), COMBINED N/A 11/16/2021    Procedure: ESOPHAGOGASTRODUODENOSCOPY (EGD) with biopsies and argon plasma coagulation;  Surgeon: Dex Finch MD;  Location: [facility]     HC CYSTOSCOPY,INSERT URETERAL STENT      Description: Cystoscopy With Insertion Of Ureteral Stent Right;  Recorded: 10/14/2009;  Comments: stone           CURRENT MEDICATIONS:    Current Facility-Administered Medications   Medication     HYDROmorphone (PF) (DILAUDID) injection 0.5 mg     piperacillin-tazobactam (ZOSYN) 3.375 g vial to attach to  mL bag     Current Outpatient Medications   Medication     atorvastatin (LIPITOR) 40 MG tablet     blood glucose (ACCU-CHEK JACOB PLUS) test strip     blood glucose meter (GLUCOMETER)     blood glucose monitoring (SOFTCLIX) lancets     cyanocobalamin, vitamin B-12, 2,500 mcg Tab     empagliflozin (JARDIANCE) 10 MG TABS tablet     ferrous sulfate (FEROSUL) 325 (65 Fe) MG tablet     generic lancets (ACCU-CHEK SOFTCLIX LANCETS)     inFLIXimab (REMICADE IV)     lactobacillus rhamnosus, GG, (CULTURELL) capsule     lisinopril (ZESTRIL) 10 MG tablet     magnesium oxide 250 mg Tab     metFORMIN (GLUCOPHAGE) 500 MG tablet     omeprazole (PRILOSEC) 40 MG DR capsule     omeprazole (PRILOSEC) 40 MG DR capsule     omeprazole (PRILOSEC) 40 MG DR capsule     oxyCODONE (ROXICODONE) 5 MG tablet     pioglitazone (ACTOS) 30 MG tablet     prednisoLONE  "acetate (PRED FORTE) 1 % ophthalmic suspension     propranolol (INDERAL) 20 MG tablet     sucralfate (CARAFATE) 1 GM/10ML suspension     tamsulosin (FLOMAX) 0.4 MG capsule         ALLERGIES:  Allergies   Allergen Reactions     Morphine Nausea and Vomiting     Scopolamine Hbr [Scopolamine] Unknown       FAMILY HISTORY:  Family History   Problem Relation Age of Onset     Coronary Artery Disease Mother      Diabetes Mother      Lung Cancer Father      No Known Problems Son        SOCIAL HISTORY:   Social History     Socioeconomic History     Marital status:    Tobacco Use     Smoking status: Never     Smokeless tobacco: Never   Substance and Sexual Activity     Alcohol use: No     Drug use: No   Social History Narrative    Lives with his wife, Valerie.  Volunteers at [employer].  Retired  of [employer].  One son, Jaison.        VITALS:  /58   Pulse 65   Temp 97.7  F (36.5  C) (Oral)   Resp 18   Ht 1.778 m (5' 10\")   Wt 88.5 kg (195 lb)   SpO2 95%   BMI 27.98 kg/m      PHYSICAL EXAM    Physical Exam  Vitals and nursing note reviewed.   Constitutional:       General: He is not in acute distress.     Appearance: He is not diaphoretic.   HENT:      Head: Atraumatic.   Eyes:      General: No scleral icterus.     Pupils: Pupils are equal, round, and reactive to light.   Cardiovascular:      Rate and Rhythm: Normal rate and regular rhythm.      Heart sounds: Normal heart sounds.   Pulmonary:      Effort: No respiratory distress.      Breath sounds: Normal breath sounds.   Abdominal:      Palpations: Abdomen is soft.      Tenderness: There is abdominal tenderness in the right upper quadrant and epigastric area.   Musculoskeletal:         General: No tenderness.   Lymphadenopathy:      Cervical: No cervical adenopathy.   Skin:     General: Skin is warm.      Findings: No rash.           LAB:  All pertinent labs reviewed and interpreted.  Labs Ordered and Resulted from Time of ED Arrival " to Time of ED Departure   COMPREHENSIVE METABOLIC PANEL - Abnormal       Result Value    Sodium 133 (*)     Potassium 4.3      Chloride 100      Carbon Dioxide (CO2) 25      Anion Gap 8      Urea Nitrogen 17      Creatinine 1.44 (*)     Calcium 8.9      Glucose 162 (*)     Alkaline Phosphatase 83      AST 24      ALT 21      Protein Total 7.5      Albumin 3.7      Bilirubin Total 0.9      GFR Estimate 50 (*)    CBC WITH PLATELETS AND DIFFERENTIAL - Abnormal    WBC Count 6.3      RBC Count 3.61 (*)     Hemoglobin 11.2 (*)     Hematocrit 33.3 (*)     MCV 92      MCH 31.0      MCHC 33.6      RDW 13.2      Platelet Count 110 (*)     % Neutrophils 63      % Lymphocytes 23      % Monocytes 12      % Eosinophils 1      % Basophils 1      % Immature Granulocytes 0      NRBCs per 100 WBC 0      Absolute Neutrophils 4.0      Absolute Lymphocytes 1.5      Absolute Monocytes 0.7      Absolute Eosinophils 0.1      Absolute Basophils 0.0      Absolute Immature Granulocytes 0.0      Absolute NRBCs 0.0     LIPASE - Normal    Lipase 18     LACTIC ACID WHOLE BLOOD - Normal    Lactic Acid 1.1         RADIOLOGY:  Reviewed all pertinent imaging. Please see official radiology report.  CT Abdomen Pelvis w Contrast   Final Result   IMPRESSION:    1.  Suspect acute on chronic pancreatitis.   2.  Acute cholecystitis.          I, Cheri Gasca, am serving as a scribe to document services personally performed by Dr. Foreign Cuecna, based on my observation and the provider's statements to me. I, Foreign Cuenca MD attest that Cheri Gasca is acting in a scribe capacity, has observed my performance of the services and has documented them in accordance with my direction.    Foreign Cuenca M.D.  Emergency Medicine  [facility] EMERGENCY ROOM  [address] [id number] [phone]  Dept: [phone]     Foreign Cuenca MD  06/30/23 5934

## 2023-06-30 NOTE — CONSULTS
General Surgery Consultation  Frank Obando MRN# [id number]   Age/Sex: 77 year old male YOB: 1945     Reason for consult: 1. Acute cholecystitis            Requesting physician: Dr. Marti                   Assessment and Plan:   Assessment:  Acute cholecystitis with cholelithiasis indication for biliary obstruction.     Plan:  Laparoscopic cholecystectomy this afternoon with Dr. Kavya Aguillon          Chief Complaint:     Chief Complaint   Patient presents with     Abdominal Pain        History is obtained from the patient    HPI:   Frank Obando is a 77 year old male who presents with abdominal pain that has worsened over the last few days.  Patient first presented to the emergency department 3 days ago was told that he had cholelithiasis and early cholecystitis and chose to go home at that time.  Patient returns to the ED with increased pain, nausea and dry heaving when seen by provider.  Patient reports that the pain is all across his abdomen but is most intense in his upper abdomen up to the right.  Patient denies any radiation of pain to his shoulder or his back.  Patient believes he has had a fever and states he has been very chilled.  Patient denies shortness of breath, chest pain, palpitations, hematemesis, hematochezia, melena/acholic stool, urinary changes, rashes or lesions.          Past Medical History:     Past Medical History:   Diagnosis Date     Anemia      Arthritis     osteoarthritis     Calculus of kidney      Diabetes mellitus (H)      Episcleritis of left eye      Hyperlipidemia      Hypertension      Iron deficiency anemia due to chronic blood loss 11/2/2021     Peripheral neuropathy      Stroke (H)               Past Surgical History:     Past Surgical History:   Procedure Laterality Date     COLONOSCOPY N/A 11/16/2021    Procedure: COLONOSCOPY with polypectomy;  Surgeon: Dex Finch MD;  Location: [facility]     CYSTOSCOPY TUMOR / CONDYLOMATA W/  LASER Left 7/18/2014     ESOPHAGOSCOPY, GASTROSCOPY, DUODENOSCOPY (EGD), COMBINED N/A 11/16/2021    Procedure: ESOPHAGOGASTRODUODENOSCOPY (EGD) with biopsies and argon plasma coagulation;  Surgeon: Dex Finch MD;  Location: [facility]     HC CYSTOSCOPY,INSERT URETERAL STENT      Description: Cystoscopy With Insertion Of Ureteral Stent Right;  Recorded: 10/14/2009;  Comments: stone             Social History:    reports that he has never smoked. He has never used smokeless tobacco. He reports that he does not drink alcohol and does not use drugs.           Family History:     Family History   Problem Relation Age of Onset     Coronary Artery Disease Mother      Diabetes Mother      Lung Cancer Father      No Known Problems Son               Allergies:     Allergies   Allergen Reactions     Morphine Nausea and Vomiting     Scopolamine Hbr [Scopolamine] Unknown              Medications:     Prior to Admission medications    Medication Sig Start Date End Date Taking? Authorizing Provider   atorvastatin (LIPITOR) 40 MG tablet TAKE 1 TABLET BY MOUTH DAILY 6/6/23  Yes Natalie Pitt MD   cyanocobalamin, vitamin B-12, 2,500 mcg Tab [CYANOCOBALAMIN, VITAMIN B-12, 2,500 MCG TAB] Take 2,500 mcg by mouth daily. 12/20/18  Yes Provider, Historical   ferrous sulfate (FEROSUL) 325 (65 Fe) MG tablet Take 1 tablet (325 mg) by mouth 3 times daily (with meals) 4/4/23  Yes Flaco Lopez MD   inFLIXimab (REMICADE IV) Every 6weeks   Yes Reported, Patient   lactobacillus rhamnosus, GG, (CULTURELL) capsule Take 1 capsule by mouth 3 times daily (before meals) 3/27/23  Yes Flaco Pérez MD   lisinopril (ZESTRIL) 10 MG tablet Take 1 tablet (10 mg) by mouth daily 10/12/22  Yes Fei Lopez MD   magnesium oxide 250 mg Tab [MAGNESIUM OXIDE 250 MG TAB] Take 250 mg by mouth 2 (two) times a day. 12/20/18  Yes Provider, Historical   metFORMIN (GLUCOPHAGE) 500 MG tablet TAKE 2 TABLETS BY MOUTH TWICE DAILY AT 6 AM AND AT  4 PM 6/6/23  Yes Natalie Pitt MD   omeprazole (PRILOSEC) 40 MG DR capsule Take 1 capsule (40 mg) by mouth daily 4/4/23  Yes Flaco Lopez MD   oxyCODONE (ROXICODONE) 5 MG tablet Take 1 tablet (5 mg) by mouth every 6 hours as needed for pain 6/29/23 7/2/23 Yes Ethel Caballero MD   pioglitazone (ACTOS) 30 MG tablet TAKE 1 TABLET BY MOUTH EVERY DAY 9/12/22  Yes Fei Lopez MD   propranolol (INDERAL) 20 MG tablet Take 1 tablet (20 mg) by mouth 2 times daily 4/17/23  Yes Flaco Lopez MD   sucralfate (CARAFATE) 1 GM/10ML suspension Take 10 mLs (1 g) by mouth 4 times daily 6/29/23  Yes Ethel Caballero MD   tamsulosin (FLOMAX) 0.4 MG capsule TAKE 1 CAPSULE BY MOUTH DAILY AFTER AND SUPPER 4/4/23  Yes Flaco Lopez MD   blood glucose (ACCU-CHEK JACOB PLUS) test strip 1 strip by In Vitro route 2 times daily 4/25/23   Flaco Lopez MD   blood glucose meter (GLUCOMETER) [BLOOD GLUCOSE METER (GLUCOMETER)] Use 1 each As Directed 2 (two) times a day. Dispense glucometer brand per patient's insurance at pharmacy discretion. 7/2/19   Fei Lopez MD   blood glucose monitoring (SOFTCLIX) lancets Use 1 each As Directed 2 (two) times a day. Dispense brand per patient's insurance at pharmacy discretion. - Does not apply 4/25/23   Flaco Lopez MD   generic lancets (ACCU-CHEK SOFTCLIX LANCETS) [GENERIC LANCETS (ACCU-CHEK SOFTCLIX LANCETS)] Use 1 each As Directed 2 (two) times a day. Dispense brand per patient's insurance at pharmacy discretion. 7/2/19   Fei Lopez MD              Review of Systems:   The Review of Systems is negative other than noted in the HPI            Physical Exam:     Patient Vitals for the past 24 hrs:   BP Temp Temp src Pulse Resp SpO2 Height Weight   06/30/23 0725 102/59 98.6  F (37  C) Oral 70 16 93 % -- --   06/30/23 0500 114/58 -- -- 65 -- 95 % -- --   06/30/23 0445 124/65 -- -- 59 -- 95 % -- --   06/30/23 0430 128/64 -- -- 59 -- 94 % -- --   06/30/23 0415 130/65 --  "-- 63 -- 96 % -- --   06/30/23 0400 123/62 -- -- 60 -- 95 % -- --   06/30/23 0345 120/61 -- -- 60 -- 94 % -- --   06/30/23 0330 131/66 -- -- 59 -- 94 % -- --   06/30/23 0323 (!) 147/64 97.7  F (36.5  C) Oral 58 18 93 % 1.778 m (5' 10\") 88.5 kg (195 lb)        No intake or output data in the 24 hours ending 06/30/23 1058   Constitutional:   awake, alert, cooperative, no apparent distress, and appears stated age       Eyes:   PERRL, conjunctiva/corneas clear, EOM's intact; no scleral edema or icterus noted        ENT:   Normocephalic, without obvious abnormality, atraumatic, Lips, mucosa, and tongue normal        Lungs:   Normal respiratory effort, no accessory muscle use       Cardiovascular:   Regular rate and rhythm       Abdomen:   Soft, not distended, exquisitely tender in the right upper quadrant and epigastric regions; positive Avelar sign       Musculoskeletal:   No obvious swelling, bruising or deformity       Skin:   Skin color and texture normal for patient, no rashes or lesions              Data:         All imaging studies reviewed by me.    Results for orders placed or performed during the hospital encounter of 06/30/23 (from the past 24 hour(s))   [place] Draw    Narrative    The following orders were created for panel order [place] [facility].  Procedure                               Abnormality         Status                     ---------                               -----------         ------                     Extra Green Top (Lithium...[[id number]]                      Final result               Extra Purple Top Tube[[id number]]                            Final result               Extra Green Top (Lithium...[[id number]]                      Final result                 Please view results for these tests on the individual orders.   Extra Green Top (Lithium Heparin) Tube   Result Value Ref Range    Hold Specimen JIC    Extra Purple Top Tube   Result Value Ref Range    Hold Specimen JIC    Extra Green Top " (Lithium Heparin) ON ICE   Result Value Ref Range    Hold Specimen [facility]    CBC with platelets differential    Narrative    The following orders were created for panel order CBC with platelets differential.  Procedure                               Abnormality         Status                     ---------                               -----------         ------                     CBC with platelets and d...[[id number]]  Abnormal            Final result                 Please view results for these tests on the individual orders.   Comprehensive metabolic panel   Result Value Ref Range    Sodium 133 (L) 136 - 145 mmol/L    Potassium 4.3 3.5 - 5.0 mmol/L    Chloride 100 98 - 107 mmol/L    Carbon Dioxide (CO2) 25 22 - 31 mmol/L    Anion Gap 8 5 - 18 mmol/L    Urea Nitrogen 17 8 - 28 mg/dL    Creatinine 1.44 (H) 0.70 - 1.30 mg/dL    Calcium 8.9 8.5 - 10.5 mg/dL    Glucose 162 (H) 70 - 125 mg/dL    Alkaline Phosphatase 83 45 - 120 U/L    AST 24 0 - 40 U/L    ALT 21 0 - 45 U/L    Protein Total 7.5 6.0 - 8.0 g/dL    Albumin 3.7 3.5 - 5.0 g/dL    Bilirubin Total 0.9 0.0 - 1.0 mg/dL    GFR Estimate 50 (L) >60 mL/min/1.73m2   Lipase   Result Value Ref Range    Lipase 18 0 - 52 U/L   Lactic acid whole blood   Result Value Ref Range    Lactic Acid 1.1 0.7 - 2.0 mmol/L   CBC with platelets and differential   Result Value Ref Range    WBC Count 6.3 4.0 - 11.0 10e3/uL    RBC Count 3.61 (L) 4.40 - 5.90 10e6/uL    Hemoglobin 11.2 (L) 13.3 - 17.7 g/dL    Hematocrit 33.3 (L) 40.0 - 53.0 %    MCV 92 78 - 100 fL    MCH 31.0 26.5 - 33.0 pg    MCHC 33.6 31.5 - 36.5 g/dL    RDW 13.2 10.0 - 15.0 %    Platelet Count 110 (L) 150 - 450 10e3/uL    % Neutrophils 63 %    % Lymphocytes 23 %    % Monocytes 12 %    % Eosinophils 1 %    % Basophils 1 %    % Immature Granulocytes 0 %    NRBCs per 100 WBC 0 <1 /100    Absolute Neutrophils 4.0 1.6 - 8.3 10e3/uL    Absolute Lymphocytes 1.5 0.8 - 5.3 10e3/uL    Absolute Monocytes 0.7 0.0 - 1.3 10e3/uL     Absolute Eosinophils 0.1 0.0 - 0.7 10e3/uL    Absolute Basophils 0.0 0.0 - 0.2 10e3/uL    Absolute Immature Granulocytes 0.0 <=0.4 10e3/uL    Absolute NRBCs 0.0 10e3/uL   CT Abdomen Pelvis w Contrast    Narrative    EXAM: CT ABDOMEN PELVIS W CONTRAST  LOCATION: [facility]  DATE: 6/30/2023    INDICATION: abd pain  COMPARISON: US 06/29/2023 CT 06/16/2023  TECHNIQUE: CT scan of the abdomen and pelvis was performed following injection of IV contrast. Multiplanar reformats were obtained. Dose reduction techniques were used.  CONTRAST: [id number] 75ml    FINDINGS:   LOWER CHEST: Infrahilar traction bronchiectasis.    HEPATOBILIARY: Gallstone. Wall thickening. Fat stranding.    PANCREAS: Pancreatic calcifications head neck. Peripancreatic fat stranding head neck. Trace free fluid tracking along right paracolic gutter.    SPLEEN: Normal.    ADRENAL GLANDS: Normal.    KIDNEYS/BLADDER: Right renal stone. No hydronephrosis.    BOWEL: Diverticulosis distal descending to proximal sigmoid colon.    LYMPH NODES: Normal.    VASCULATURE: Atherosclerosis. Gastroesophageal varices.    PELVIC ORGANS: Prostate calcifications.    MUSCULOSKELETAL: Demineralization. Degenerative changes.      Impression    IMPRESSION:   1.  Suspect acute on chronic pancreatitis.  2.  Acute cholecystitis.        Mague Kinney, APRN CNP

## 2023-07-01 ENCOUNTER — ANESTHESIA EVENT (OUTPATIENT)
Dept: SURGERY | Facility: CLINIC | Age: 78
DRG: 418 | End: 2023-07-01
Payer: MEDICARE

## 2023-07-01 ENCOUNTER — ANESTHESIA (OUTPATIENT)
Dept: SURGERY | Facility: CLINIC | Age: 78
DRG: 418 | End: 2023-07-01
Payer: MEDICARE

## 2023-07-01 ENCOUNTER — APPOINTMENT (OUTPATIENT)
Dept: NUCLEAR MEDICINE | Facility: CLINIC | Age: 78
DRG: 418 | End: 2023-07-01
Attending: SPECIALIST
Payer: MEDICARE

## 2023-07-01 LAB
AFP SERPL-MCNC: <1.8 NG/ML
ALBUMIN SERPL-MCNC: 3 G/DL (ref 3.5–5)
ALP SERPL-CCNC: 72 U/L (ref 45–120)
ALT SERPL W P-5'-P-CCNC: 17 U/L (ref 0–45)
ANION GAP SERPL CALCULATED.3IONS-SCNC: 10 MMOL/L (ref 5–18)
AST SERPL W P-5'-P-CCNC: 19 U/L (ref 0–40)
BILIRUB SERPL-MCNC: 1.4 MG/DL (ref 0–1)
BUN SERPL-MCNC: 17 MG/DL (ref 8–28)
CALCIUM SERPL-MCNC: 8.2 MG/DL (ref 8.5–10.5)
CHLORIDE BLD-SCNC: 103 MMOL/L (ref 98–107)
CO2 SERPL-SCNC: 22 MMOL/L (ref 22–31)
CREAT SERPL-MCNC: 1.46 MG/DL (ref 0.7–1.3)
ERYTHROCYTE [DISTWIDTH] IN BLOOD BY AUTOMATED COUNT: 13.4 % (ref 10–15)
GFR SERPL CREATININE-BSD FRML MDRD: 50 ML/MIN/1.73M2
GLUCOSE BLD-MCNC: 133 MG/DL (ref 70–125)
GLUCOSE BLDC GLUCOMTR-MCNC: 113 MG/DL (ref 70–99)
GLUCOSE BLDC GLUCOMTR-MCNC: 122 MG/DL (ref 70–99)
GLUCOSE BLDC GLUCOMTR-MCNC: 124 MG/DL (ref 70–99)
GLUCOSE BLDC GLUCOMTR-MCNC: 130 MG/DL (ref 70–99)
GLUCOSE BLDC GLUCOMTR-MCNC: 134 MG/DL (ref 70–99)
GLUCOSE BLDC GLUCOMTR-MCNC: 140 MG/DL (ref 70–99)
GLUCOSE BLDC GLUCOMTR-MCNC: 171 MG/DL (ref 70–99)
HCT VFR BLD AUTO: 30.3 % (ref 40–53)
HGB BLD-MCNC: 10.3 G/DL (ref 13.3–17.7)
INR PPP: 1.34 (ref 0.85–1.15)
LIPASE SERPL-CCNC: 14 U/L (ref 0–52)
MCH RBC QN AUTO: 30.8 PG (ref 26.5–33)
MCHC RBC AUTO-ENTMCNC: 34 G/DL (ref 31.5–36.5)
MCV RBC AUTO: 91 FL (ref 78–100)
PLATELET # BLD AUTO: 84 10E3/UL (ref 150–450)
POTASSIUM BLD-SCNC: 4.1 MMOL/L (ref 3.5–5)
PROT SERPL-MCNC: 6.8 G/DL (ref 6–8)
RBC # BLD AUTO: 3.34 10E6/UL (ref 4.4–5.9)
SODIUM SERPL-SCNC: 135 MMOL/L (ref 136–145)
WBC # BLD AUTO: 12.4 10E3/UL (ref 4–11)

## 2023-07-01 PROCEDURE — 83690 ASSAY OF LIPASE: CPT | Performed by: FAMILY MEDICINE

## 2023-07-01 PROCEDURE — 0FT44ZZ RESECTION OF GALLBLADDER, PERCUTANEOUS ENDOSCOPIC APPROACH: ICD-10-PCS | Performed by: SPECIALIST

## 2023-07-01 PROCEDURE — 250N000011 HC RX IP 250 OP 636: Mod: JZ | Performed by: FAMILY MEDICINE

## 2023-07-01 PROCEDURE — 36415 COLL VENOUS BLD VENIPUNCTURE: CPT | Performed by: PHYSICIAN ASSISTANT

## 2023-07-01 PROCEDURE — 343N000001 HC RX 343: Performed by: FAMILY MEDICINE

## 2023-07-01 PROCEDURE — 250N000025 HC SEVOFLURANE, PER MIN: Performed by: SPECIALIST

## 2023-07-01 PROCEDURE — 370N000017 HC ANESTHESIA TECHNICAL FEE, PER MIN: Performed by: SPECIALIST

## 2023-07-01 PROCEDURE — 250N000011 HC RX IP 250 OP 636: Performed by: SPECIALIST

## 2023-07-01 PROCEDURE — 250N000011 HC RX IP 250 OP 636: Mod: JZ | Performed by: SPECIALIST

## 2023-07-01 PROCEDURE — 78226 HEPATOBILIARY SYSTEM IMAGING: CPT | Mod: MG

## 2023-07-01 PROCEDURE — A9537 TC99M MEBROFENIN: HCPCS | Performed by: FAMILY MEDICINE

## 2023-07-01 PROCEDURE — 120N000001 HC R&B MED SURG/OB

## 2023-07-01 PROCEDURE — 250N000011 HC RX IP 250 OP 636: Performed by: NURSE ANESTHETIST, CERTIFIED REGISTERED

## 2023-07-01 PROCEDURE — 88304 TISSUE EXAM BY PATHOLOGIST: CPT | Mod: TC | Performed by: SPECIALIST

## 2023-07-01 PROCEDURE — 47562 LAPAROSCOPIC CHOLECYSTECTOMY: CPT | Performed by: SPECIALIST

## 2023-07-01 PROCEDURE — 250N000011 HC RX IP 250 OP 636: Mod: JZ | Performed by: ANESTHESIOLOGY

## 2023-07-01 PROCEDURE — 360N000076 HC SURGERY LEVEL 3, PER MIN: Performed by: SPECIALIST

## 2023-07-01 PROCEDURE — 250N000009 HC RX 250: Performed by: NURSE ANESTHETIST, CERTIFIED REGISTERED

## 2023-07-01 PROCEDURE — 85027 COMPLETE CBC AUTOMATED: CPT | Performed by: FAMILY MEDICINE

## 2023-07-01 PROCEDURE — 99232 SBSQ HOSP IP/OBS MODERATE 35: CPT | Performed by: FAMILY MEDICINE

## 2023-07-01 PROCEDURE — 82105 ALPHA-FETOPROTEIN SERUM: CPT | Performed by: PHYSICIAN ASSISTANT

## 2023-07-01 PROCEDURE — G0378 HOSPITAL OBSERVATION PER HR: HCPCS

## 2023-07-01 PROCEDURE — 80053 COMPREHEN METABOLIC PANEL: CPT | Performed by: FAMILY MEDICINE

## 2023-07-01 PROCEDURE — 710N000010 HC RECOVERY PHASE 1, LEVEL 2, PER MIN: Performed by: SPECIALIST

## 2023-07-01 PROCEDURE — 36415 COLL VENOUS BLD VENIPUNCTURE: CPT | Performed by: FAMILY MEDICINE

## 2023-07-01 PROCEDURE — 272N000001 HC OR GENERAL SUPPLY STERILE: Performed by: SPECIALIST

## 2023-07-01 PROCEDURE — 250N000013 HC RX MED GY IP 250 OP 250 PS 637: Performed by: SPECIALIST

## 2023-07-01 PROCEDURE — 258N000003 HC RX IP 258 OP 636: Performed by: SPECIALIST

## 2023-07-01 PROCEDURE — 250N000013 HC RX MED GY IP 250 OP 250 PS 637: Performed by: FAMILY MEDICINE

## 2023-07-01 PROCEDURE — 82962 GLUCOSE BLOOD TEST: CPT

## 2023-07-01 PROCEDURE — 85610 PROTHROMBIN TIME: CPT | Performed by: PHYSICIAN ASSISTANT

## 2023-07-01 RX ORDER — FENTANYL CITRATE 50 UG/ML
INJECTION, SOLUTION INTRAMUSCULAR; INTRAVENOUS PRN
Status: DISCONTINUED | OUTPATIENT
Start: 2023-07-01 | End: 2023-07-01

## 2023-07-01 RX ORDER — ACETAMINOPHEN 325 MG/1
975 TABLET ORAL EVERY 8 HOURS
Status: DISCONTINUED | OUTPATIENT
Start: 2023-07-01 | End: 2023-07-03 | Stop reason: HOSPADM

## 2023-07-01 RX ORDER — ACETAMINOPHEN 325 MG/1
650 TABLET ORAL EVERY 4 HOURS PRN
Status: DISCONTINUED | OUTPATIENT
Start: 2023-07-04 | End: 2023-07-03 | Stop reason: HOSPADM

## 2023-07-01 RX ORDER — SODIUM CHLORIDE, SODIUM LACTATE, POTASSIUM CHLORIDE, CALCIUM CHLORIDE 600; 310; 30; 20 MG/100ML; MG/100ML; MG/100ML; MG/100ML
INJECTION, SOLUTION INTRAVENOUS CONTINUOUS
Status: CANCELLED | OUTPATIENT
Start: 2023-07-01

## 2023-07-01 RX ORDER — AMOXICILLIN 250 MG
1 CAPSULE ORAL 2 TIMES DAILY
Status: DISCONTINUED | OUTPATIENT
Start: 2023-07-01 | End: 2023-07-03 | Stop reason: HOSPADM

## 2023-07-01 RX ORDER — ONDANSETRON 2 MG/ML
4 INJECTION INTRAMUSCULAR; INTRAVENOUS EVERY 6 HOURS PRN
Status: DISCONTINUED | OUTPATIENT
Start: 2023-07-01 | End: 2023-07-03 | Stop reason: HOSPADM

## 2023-07-01 RX ORDER — DEXAMETHASONE SODIUM PHOSPHATE 10 MG/ML
INJECTION, SOLUTION INTRAMUSCULAR; INTRAVENOUS PRN
Status: DISCONTINUED | OUTPATIENT
Start: 2023-07-01 | End: 2023-07-01

## 2023-07-01 RX ORDER — HYDROMORPHONE HCL IN WATER/PF 6 MG/30 ML
0.4 PATIENT CONTROLLED ANALGESIA SYRINGE INTRAVENOUS
Status: DISCONTINUED | OUTPATIENT
Start: 2023-07-01 | End: 2023-07-03 | Stop reason: HOSPADM

## 2023-07-01 RX ORDER — OXYCODONE HYDROCHLORIDE 5 MG/1
10 TABLET ORAL EVERY 4 HOURS PRN
Status: DISCONTINUED | OUTPATIENT
Start: 2023-07-01 | End: 2023-07-03 | Stop reason: HOSPADM

## 2023-07-01 RX ORDER — FAMOTIDINE 20 MG/1
20 TABLET, FILM COATED ORAL 2 TIMES DAILY
Status: DISCONTINUED | OUTPATIENT
Start: 2023-07-01 | End: 2023-07-02

## 2023-07-01 RX ORDER — SODIUM CHLORIDE, SODIUM LACTATE, POTASSIUM CHLORIDE, AND CALCIUM CHLORIDE .6; .31; .03; .02 G/100ML; G/100ML; G/100ML; G/100ML
IRRIGANT IRRIGATION PRN
Status: DISCONTINUED | OUTPATIENT
Start: 2023-07-01 | End: 2023-07-01 | Stop reason: HOSPADM

## 2023-07-01 RX ORDER — LIDOCAINE 40 MG/G
CREAM TOPICAL
Status: DISCONTINUED | OUTPATIENT
Start: 2023-07-01 | End: 2023-07-03 | Stop reason: HOSPADM

## 2023-07-01 RX ORDER — FENTANYL CITRATE 50 UG/ML
50 INJECTION, SOLUTION INTRAMUSCULAR; INTRAVENOUS EVERY 5 MIN PRN
Status: DISCONTINUED | OUTPATIENT
Start: 2023-07-01 | End: 2023-07-01 | Stop reason: HOSPADM

## 2023-07-01 RX ORDER — FENTANYL CITRATE 50 UG/ML
25 INJECTION, SOLUTION INTRAMUSCULAR; INTRAVENOUS EVERY 5 MIN PRN
Status: DISCONTINUED | OUTPATIENT
Start: 2023-07-01 | End: 2023-07-01 | Stop reason: HOSPADM

## 2023-07-01 RX ORDER — DIPHENHYDRAMINE HCL 12.5 MG/5ML
12.5 SOLUTION ORAL EVERY 6 HOURS PRN
Status: DISCONTINUED | OUTPATIENT
Start: 2023-07-01 | End: 2023-07-03 | Stop reason: HOSPADM

## 2023-07-01 RX ORDER — OXYCODONE HYDROCHLORIDE 5 MG/1
10 TABLET ORAL
Status: CANCELLED | OUTPATIENT
Start: 2023-07-01

## 2023-07-01 RX ORDER — MORPHINE SULFATE 1 MG/ML
INJECTION, SOLUTION EPIDURAL; INTRATHECAL; INTRAVENOUS PRN
Status: DISCONTINUED | OUTPATIENT
Start: 2023-07-01 | End: 2023-07-01

## 2023-07-01 RX ORDER — KIT FOR THE PREPARATION OF TECHNETIUM TC 99M MEBROFENIN 45 MG/10ML
7-9 INJECTION, POWDER, LYOPHILIZED, FOR SOLUTION INTRAVENOUS ONCE
Status: COMPLETED | OUTPATIENT
Start: 2023-07-01 | End: 2023-07-01

## 2023-07-01 RX ORDER — ONDANSETRON 4 MG/1
4 TABLET, ORALLY DISINTEGRATING ORAL EVERY 6 HOURS PRN
Status: DISCONTINUED | OUTPATIENT
Start: 2023-07-01 | End: 2023-07-03 | Stop reason: HOSPADM

## 2023-07-01 RX ORDER — MAGNESIUM SULFATE 4 G/50ML
4 INJECTION INTRAVENOUS ONCE
Status: COMPLETED | OUTPATIENT
Start: 2023-07-01 | End: 2023-07-01

## 2023-07-01 RX ORDER — CEFAZOLIN SODIUM 2 G/100ML
2 INJECTION, SOLUTION INTRAVENOUS SEE ADMIN INSTRUCTIONS
Status: CANCELLED | OUTPATIENT
Start: 2023-07-01

## 2023-07-01 RX ORDER — HYDROMORPHONE HCL IN WATER/PF 6 MG/30 ML
0.2 PATIENT CONTROLLED ANALGESIA SYRINGE INTRAVENOUS
Status: DISCONTINUED | OUTPATIENT
Start: 2023-07-01 | End: 2023-07-03 | Stop reason: HOSPADM

## 2023-07-01 RX ORDER — PROPOFOL 10 MG/ML
INJECTION, EMULSION INTRAVENOUS PRN
Status: DISCONTINUED | OUTPATIENT
Start: 2023-07-01 | End: 2023-07-01

## 2023-07-01 RX ORDER — HYDROMORPHONE HCL IN WATER/PF 6 MG/30 ML
0.4 PATIENT CONTROLLED ANALGESIA SYRINGE INTRAVENOUS EVERY 5 MIN PRN
Status: DISCONTINUED | OUTPATIENT
Start: 2023-07-01 | End: 2023-07-01 | Stop reason: HOSPADM

## 2023-07-01 RX ORDER — LIDOCAINE HYDROCHLORIDE 10 MG/ML
INJECTION, SOLUTION INFILTRATION; PERINEURAL PRN
Status: DISCONTINUED | OUTPATIENT
Start: 2023-07-01 | End: 2023-07-01

## 2023-07-01 RX ORDER — BISACODYL 10 MG
10 SUPPOSITORY, RECTAL RECTAL DAILY PRN
Status: DISCONTINUED | OUTPATIENT
Start: 2023-07-01 | End: 2023-07-03 | Stop reason: HOSPADM

## 2023-07-01 RX ORDER — DIPHENHYDRAMINE HYDROCHLORIDE 50 MG/ML
12.5 INJECTION INTRAMUSCULAR; INTRAVENOUS EVERY 6 HOURS PRN
Status: DISCONTINUED | OUTPATIENT
Start: 2023-07-01 | End: 2023-07-03 | Stop reason: HOSPADM

## 2023-07-01 RX ORDER — SODIUM CHLORIDE, SODIUM LACTATE, POTASSIUM CHLORIDE, CALCIUM CHLORIDE 600; 310; 30; 20 MG/100ML; MG/100ML; MG/100ML; MG/100ML
INJECTION, SOLUTION INTRAVENOUS CONTINUOUS
Status: DISCONTINUED | OUTPATIENT
Start: 2023-07-01 | End: 2023-07-01 | Stop reason: HOSPADM

## 2023-07-01 RX ORDER — OXYCODONE HYDROCHLORIDE 5 MG/1
5 TABLET ORAL
Status: CANCELLED | OUTPATIENT
Start: 2023-07-01

## 2023-07-01 RX ORDER — BUPIVACAINE HYDROCHLORIDE 2.5 MG/ML
INJECTION, SOLUTION INFILTRATION; PERINEURAL PRN
Status: DISCONTINUED | OUTPATIENT
Start: 2023-07-01 | End: 2023-07-01 | Stop reason: HOSPADM

## 2023-07-01 RX ORDER — LIDOCAINE 40 MG/G
CREAM TOPICAL
Status: CANCELLED | OUTPATIENT
Start: 2023-07-01

## 2023-07-01 RX ORDER — ONDANSETRON 4 MG/1
4 TABLET, ORALLY DISINTEGRATING ORAL EVERY 30 MIN PRN
Status: CANCELLED | OUTPATIENT
Start: 2023-07-01

## 2023-07-01 RX ORDER — MAGNESIUM SULFATE 4 G/50ML
INJECTION INTRAVENOUS
Status: DISCONTINUED
Start: 2023-07-01 | End: 2023-07-01 | Stop reason: HOSPADM

## 2023-07-01 RX ORDER — HYDROMORPHONE HCL IN WATER/PF 6 MG/30 ML
0.2 PATIENT CONTROLLED ANALGESIA SYRINGE INTRAVENOUS EVERY 5 MIN PRN
Status: DISCONTINUED | OUTPATIENT
Start: 2023-07-01 | End: 2023-07-01 | Stop reason: HOSPADM

## 2023-07-01 RX ORDER — ACETAMINOPHEN 325 MG/1
975 TABLET ORAL ONCE
Status: CANCELLED | OUTPATIENT
Start: 2023-07-01 | End: 2023-07-01

## 2023-07-01 RX ORDER — OXYCODONE HYDROCHLORIDE 5 MG/1
5 TABLET ORAL EVERY 4 HOURS PRN
Status: DISCONTINUED | OUTPATIENT
Start: 2023-07-01 | End: 2023-07-03 | Stop reason: HOSPADM

## 2023-07-01 RX ORDER — POLYETHYLENE GLYCOL 3350 17 G/17G
17 POWDER, FOR SOLUTION ORAL DAILY
Status: DISCONTINUED | OUTPATIENT
Start: 2023-07-02 | End: 2023-07-03 | Stop reason: HOSPADM

## 2023-07-01 RX ORDER — ONDANSETRON 2 MG/ML
4 INJECTION INTRAMUSCULAR; INTRAVENOUS EVERY 30 MIN PRN
Status: CANCELLED | OUTPATIENT
Start: 2023-07-01

## 2023-07-01 RX ORDER — PROCHLORPERAZINE MALEATE 5 MG
5 TABLET ORAL EVERY 6 HOURS PRN
Status: DISCONTINUED | OUTPATIENT
Start: 2023-07-01 | End: 2023-07-01

## 2023-07-01 RX ORDER — SODIUM CHLORIDE, SODIUM LACTATE, POTASSIUM CHLORIDE, CALCIUM CHLORIDE 600; 310; 30; 20 MG/100ML; MG/100ML; MG/100ML; MG/100ML
INJECTION, SOLUTION INTRAVENOUS CONTINUOUS
Status: DISCONTINUED | OUTPATIENT
Start: 2023-07-01 | End: 2023-07-02

## 2023-07-01 RX ORDER — ONDANSETRON 2 MG/ML
INJECTION INTRAMUSCULAR; INTRAVENOUS PRN
Status: DISCONTINUED | OUTPATIENT
Start: 2023-07-01 | End: 2023-07-01

## 2023-07-01 RX ORDER — ONDANSETRON 2 MG/ML
4 INJECTION INTRAMUSCULAR; INTRAVENOUS EVERY 30 MIN PRN
Status: DISCONTINUED | OUTPATIENT
Start: 2023-07-01 | End: 2023-07-01 | Stop reason: HOSPADM

## 2023-07-01 RX ORDER — ONDANSETRON 4 MG/1
4 TABLET, ORALLY DISINTEGRATING ORAL EVERY 30 MIN PRN
Status: DISCONTINUED | OUTPATIENT
Start: 2023-07-01 | End: 2023-07-01 | Stop reason: HOSPADM

## 2023-07-01 RX ORDER — CEFAZOLIN SODIUM 2 G/100ML
2 INJECTION, SOLUTION INTRAVENOUS
Status: CANCELLED | OUTPATIENT
Start: 2023-07-01

## 2023-07-01 RX ADMIN — FENTANYL CITRATE 25 MCG: 50 INJECTION, SOLUTION INTRAMUSCULAR; INTRAVENOUS at 17:11

## 2023-07-01 RX ADMIN — FENTANYL CITRATE 75 MCG: 50 INJECTION, SOLUTION INTRAMUSCULAR; INTRAVENOUS at 17:33

## 2023-07-01 RX ADMIN — PANTOPRAZOLE SODIUM 40 MG: 40 TABLET, DELAYED RELEASE ORAL at 08:20

## 2023-07-01 RX ADMIN — SUGAMMADEX 200 MG: 100 INJECTION, SOLUTION INTRAVENOUS at 18:24

## 2023-07-01 RX ADMIN — ATORVASTATIN CALCIUM 40 MG: 40 TABLET, FILM COATED ORAL at 08:20

## 2023-07-01 RX ADMIN — ACETAMINOPHEN 975 MG: 325 TABLET ORAL at 20:20

## 2023-07-01 RX ADMIN — SODIUM CHLORIDE, POTASSIUM CHLORIDE, SODIUM LACTATE AND CALCIUM CHLORIDE: 600; 310; 30; 20 INJECTION, SOLUTION INTRAVENOUS at 20:13

## 2023-07-01 RX ADMIN — LISINOPRIL 10 MG: 10 TABLET ORAL at 08:20

## 2023-07-01 RX ADMIN — ROCURONIUM BROMIDE 40 MG: 10 INJECTION, SOLUTION INTRAVENOUS at 17:11

## 2023-07-01 RX ADMIN — PIPERACILLIN AND TAZOBACTAM 3.38 G: 3; .375 INJECTION, POWDER, LYOPHILIZED, FOR SOLUTION INTRAVENOUS at 13:51

## 2023-07-01 RX ADMIN — PIPERACILLIN AND TAZOBACTAM 3.38 G: 3; .375 INJECTION, POWDER, LYOPHILIZED, FOR SOLUTION INTRAVENOUS at 20:16

## 2023-07-01 RX ADMIN — Medication 200 MG: at 08:20

## 2023-07-01 RX ADMIN — ONDANSETRON 4 MG: 2 INJECTION INTRAMUSCULAR; INTRAVENOUS at 17:13

## 2023-07-01 RX ADMIN — LIDOCAINE HYDROCHLORIDE 20 MG: 10 INJECTION, SOLUTION INFILTRATION; PERINEURAL at 17:11

## 2023-07-01 RX ADMIN — MEBROFENIN 8.3 MILLICURIE: 45 INJECTION, POWDER, LYOPHILIZED, FOR SOLUTION INTRAVENOUS at 12:32

## 2023-07-01 RX ADMIN — DEXAMETHASONE SODIUM PHOSPHATE 10 MG: 10 INJECTION, SOLUTION INTRAMUSCULAR; INTRAVENOUS at 17:13

## 2023-07-01 RX ADMIN — PIPERACILLIN AND TAZOBACTAM 3.38 G: 3; .375 INJECTION, POWDER, LYOPHILIZED, FOR SOLUTION INTRAVENOUS at 05:43

## 2023-07-01 RX ADMIN — HYDROMORPHONE HYDROCHLORIDE 1 MG: 1 INJECTION, SOLUTION INTRAMUSCULAR; INTRAVENOUS; SUBCUTANEOUS at 17:11

## 2023-07-01 RX ADMIN — Medication 200 MG: at 20:21

## 2023-07-01 RX ADMIN — TAMSULOSIN HYDROCHLORIDE 0.4 MG: 0.4 CAPSULE ORAL at 20:21

## 2023-07-01 RX ADMIN — SENNOSIDES AND DOCUSATE SODIUM 1 TABLET: 50; 8.6 TABLET ORAL at 20:20

## 2023-07-01 RX ADMIN — PROPRANOLOL HYDROCHLORIDE 20 MG: 20 TABLET ORAL at 20:21

## 2023-07-01 RX ADMIN — PROPRANOLOL HYDROCHLORIDE 20 MG: 20 TABLET ORAL at 08:20

## 2023-07-01 RX ADMIN — FAMOTIDINE 20 MG: 20 TABLET ORAL at 20:21

## 2023-07-01 RX ADMIN — PROPOFOL 120 MG: 10 INJECTION, EMULSION INTRAVENOUS at 17:11

## 2023-07-01 RX ADMIN — PANTOPRAZOLE SODIUM 40 MG: 40 TABLET, DELAYED RELEASE ORAL at 20:20

## 2023-07-01 RX ADMIN — MAGNESIUM SULFATE HEPTAHYDRATE 4 G: 80 INJECTION, SOLUTION INTRAVENOUS at 17:28

## 2023-07-01 ASSESSMENT — ACTIVITIES OF DAILY LIVING (ADL)
ADLS_ACUITY_SCORE: 35
ADLS_ACUITY_SCORE: 41
ADLS_ACUITY_SCORE: 41
ADLS_ACUITY_SCORE: 37
ADLS_ACUITY_SCORE: 35
ADLS_ACUITY_SCORE: 37
ADLS_ACUITY_SCORE: 35
ADLS_ACUITY_SCORE: 37

## 2023-07-01 NOTE — CONSULTS
I have examined this patient and the medical care has been evaluated and discussed with the note from Mague Kinney. The documentation has been reviewed. I agree with the medical care provided and confirm the findings.     After reviewing the patient's chart he has severe cirrhosis he has significant amount of varices and significant amount of varices or esophageal issue area with a recent issues with GI bleed.  So with those findings normal LFTs today normal white count today I definitely do not want to jump to surgery which would be high risk considering the cirrhosis and the varices and risk of bleeding and complications.  Therefore he stopped most of the day I came to examine him now tonight and he is having minimal to no pain and his belly is nice and soft.  My plan would be to continue his IV antibiotics we will check his labs in the morning he wants to eat so given clear liquids tonight make him n.p.o. after midnight and will get a HIDA scan tomorrow.  If he has a question and is willing to take the risks we can deftly get his gallbladder out though I think he is at a higher risk is not insignificant.    Jadiel Argueta MD  [facility] Surgery Dept.

## 2023-07-01 NOTE — ANESTHESIA PREPROCEDURE EVALUATION
Anesthesia Pre-Procedure Evaluation    Patient: Frank Obando   MRN: [id number] : 1945        Procedure : Procedure(s):  CHOLECYSTECTOMY, LAPAROSCOPIC  CHOLANGIOGRAMS          Past Medical History:   Diagnosis Date     Anemia      Arthritis     osteoarthritis     Calculus of kidney      Diabetes mellitus (H)      Episcleritis of left eye      Hyperlipidemia      Hypertension      Iron deficiency anemia due to chronic blood loss 2021     Peripheral neuropathy      Stroke (H)       Past Surgical History:   Procedure Laterality Date     COLONOSCOPY N/A 2021    Procedure: COLONOSCOPY with polypectomy;  Surgeon: Dex Finch MD;  Location: [facility]     CYSTOSCOPY TUMOR / CONDYLOMATA W/ LASER Left 2014     ESOPHAGOSCOPY, GASTROSCOPY, DUODENOSCOPY (EGD), COMBINED N/A 2021    Procedure: ESOPHAGOGASTRODUODENOSCOPY (EGD) with biopsies and argon plasma coagulation;  Surgeon: Dex Finch MD;  Location: [facility] CYSTOSCOPY,INSERT URETERAL STENT      Description: Cystoscopy With Insertion Of Ureteral Stent Right;  Recorded: 10/14/2009;  Comments: stone      Allergies   Allergen Reactions     Morphine Nausea and Vomiting     Scopolamine Hbr [Scopolamine] Unknown      Social History     Tobacco Use     Smoking status: Never     Smokeless tobacco: Never   Substance Use Topics     Alcohol use: No      Wt Readings from Last 1 Encounters:   23 86.9 kg (191 lb 8 oz)        Anesthesia Evaluation   Pt has had prior anesthetic.     No history of anesthetic complications       ROS/MED HX  ENT/Pulmonary:       Neurologic:     (+) peripheral neuropathy, CVA,     Cardiovascular:     (+) Dyslipidemia hypertension----- Dysrhythmias: RBBB.   METS/Exercise Tolerance:     Hematologic:     (+) anemia,     Musculoskeletal:   (+) arthritis,     GI/Hepatic:     (+) GERD, cholecystitis/cholelithiasis, liver disease,     Renal/Genitourinary:     (+) renal disease, type:  CRI,     Endo:     (+) type II DM,     Psychiatric/Substance Use:       Infectious Disease:       Malignancy:       Other:            Physical Exam    Airway        Mallampati: III    Neck ROM: full     Respiratory Devices and Support         Dental       (+) Multiple visibly decayed, broken teeth      Cardiovascular          Rhythm and rate: regular     Pulmonary           breath sounds clear to auscultation           OUTSIDE LABS:  CBC:   Lab Results   Component Value Date    WBC 12.4 (H) 07/01/2023    WBC 6.3 06/30/2023    HGB 10.3 (L) 07/01/2023    HGB 11.2 (L) 06/30/2023    HCT 30.3 (L) 07/01/2023    HCT 33.3 (L) 06/30/2023    PLT 84 (L) 07/01/2023     (L) 06/30/2023     BMP:   Lab Results   Component Value Date     (L) 07/01/2023     (L) 06/30/2023    POTASSIUM 4.1 07/01/2023    POTASSIUM 4.3 06/30/2023    CHLORIDE 103 07/01/2023    CHLORIDE 100 06/30/2023    CO2 22 07/01/2023    CO2 25 06/30/2023    BUN 17 07/01/2023    BUN 17 06/30/2023    CR 1.46 (H) 07/01/2023    CR 1.44 (H) 06/30/2023     (H) 07/01/2023     (H) 07/01/2023     COAGS:   Lab Results   Component Value Date    PTT 29 10/05/2021    INR 1.34 (H) 07/01/2023     POC: No results found for: BGM, HCG, HCGS  HEPATIC:   Lab Results   Component Value Date    ALBUMIN 3.0 (L) 07/01/2023    PROTTOTAL 6.8 07/01/2023    ALT 17 07/01/2023    AST 19 07/01/2023    ALKPHOS 72 07/01/2023    BILITOTAL 1.4 (H) 07/01/2023     OTHER:   Lab Results   Component Value Date    LACT 1.3 06/30/2023    A1C 7.2 (H) 04/04/2023    CINDY 8.2 (L) 07/01/2023    PHOS 2.9 04/04/2023    MAG 2.0 03/25/2023    LIPASE 14 07/01/2023    TSH 2.37 11/18/2022       Anesthesia Plan    ASA Status:  3, emergent    NPO Status:  NPO Appropriate    Anesthesia Type: General.     - Airway: ETT   Induction: Intravenous, Propofol.   Maintenance: Balanced.        Consents    Anesthesia Plan(s) and associated risks, benefits, and realistic alternatives discussed.  Questions answered and patient/representative(s) expressed understanding.    - Discussed:     - Discussed with:  Patient         Postoperative Care    Pain management: Multi-modal analgesia.   PONV prophylaxis: Dexamethasone or Solumedrol, Ondansetron (or other 5HT-3)     Comments:    Other Comments:     Dilaudid on induction              Nicoletet Solorzano MD

## 2023-07-01 NOTE — PROGRESS NOTES
Surgery    Hida scan, gallbladder doesn't fill but goes easily to the duodenum.     Will plan laparoscopic cholecystectomy  Discussed risks and benefits with patient and his wife.   Will proceed.         Jadiel Argueta MD  General Surgery [phone]  Vascular Surgery [phone]

## 2023-07-01 NOTE — ANESTHESIA PROCEDURE NOTES
Airway       Patient location during procedure: OR       Procedure Start/Stop Times: 7/1/2023 5:14 PM  Staff -        CRNA: Kirill Tripp APRN CRNA       Performed By: CRNA  Consent for Airway        Urgency: elective  Indications and Patient Condition       Indications for airway management: annabel-procedural       Induction type:intravenous       Mask difficulty assessment: 1 - vent by mask    Final Airway Details       Final airway type: endotracheal airway       Successful airway: ETT - single  Endotracheal Airway Details        ETT size (mm): 8.0       Cuffed: yes       Successful intubation technique: direct laryngoscopy       DL Blade Type: Fu 2       Grade View of Cords: 1       Adjucts: stylet       Position: Right       Measured from: lips       Secured at (cm): 23       Bite block used: None    Post intubation assessment        Placement verified by: capnometry, equal breath sounds and chest rise        Number of attempts at approach: 1       Number of other approaches attempted: 0       Secured with: silk tape       Ease of procedure: easy       Dentition: Intact and Unchanged    Medication(s) Administered   Medication Administration Time: 7/1/2023 5:14 PM

## 2023-07-01 NOTE — CONSULTS
Care Management Initial Consult    General Information  Assessment completed with: Patient,    Type of CM/SW Visit: Initial Assessment (MOON/observation notice)    Primary Care Provider verified and updated as needed: Yes (CM updated PCP in Epic)   Readmission within the last 30 days: No. Was in [facility] on 6/28          Advance Care Planning: Advance Care Planning Reviewed:  (no HCD on file, states has HCD at home in the safe, states wife would make decisions for him if he were not able to)        Communication Assessment  Patient's communication style: spoken language (English or Bilingual)    Hearing Difficulty or Deaf: yes      Cognitive  Cognitive/Neuro/Behavioral: WDL, hard of hearing     Living Environment:   People in home: spouse     Current living Arrangements: house      Able to return to prior arrangements: yes     Family/Social Support:  Care provided by: self  Provides care for: no one  Marital Status:   Wife, Children (1 son)  Valerie       Description of Support System: Supportive, Involved       Current Resources:   Patient receiving home care services: No   Community Resources:  (OP Infusions for arthritis)  Equipment currently used at home:    Supplies currently used at home: Diabetic Supplies, Hearing Aid Batteries (hearing aides, just got a walker for long distance outside the home.)    Employment/Financial:  Employment Status: retired     Employment/ Comments: 12 years , 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  Yes   Will the waiver be used for post-acute placement? No- patient plans to return home at hospital discharge. He is in observation status but is Medicare ACO REACH eligible if in need of TCU placement.     Lifestyle & Psychosocial Needs:  Social Determinants of Health     Tobacco Use: Low Risk  (6/30/2023)  "   Patient History      Smoking Tobacco Use: Never      Smokeless Tobacco Use: Never      Passive Exposure: Not on file   Alcohol Use: Not on file   Financial Resource Strain: Not on file   Food Insecurity: Not on file   Transportation Needs: Not on file   Physical Activity: Not on file   Stress: Not on file   Social Connections: Not on file   Intimate Partner Violence: Not on file   Depression: Not at risk (4/4/2023)    PHQ-2      PHQ-2 Score: 2   Housing Stability: Not on file       Functional Status:  Prior to admission patient needed assistance:   Dependent ADLs:: Independent (just got a walker that he plans to start using for longer distance only- leans on shopping cart when grocery shopping)  Dependent IADLs:: Independent     Mental Health Status:  Mental Health Status: No Current Concerns       Chemical Dependency Status:  Chemical Dependency Status: No Current Concerns             Values/Beliefs:  Spiritual, Cultural Beliefs, Mormon Practices, Values that affect care: no          Values/Beliefs Comment: \"Temple\"    Additional Information:  Chart reviewed. NPO for Surgery consult and again at midnight for HIDA scan tomorrow. IV antibiotics.     CM provided MOON/observation notice and education. Patient verbalized understanding and signed. Original in chart and copy given to patient. Patient declined for CM to update his wife.    Patient lives with wife in private home. He is independent with all cares, including driving. 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 He is diabetic. Has hearing aides. Just got a walker to start using for longer distances. PCP confirmed. No private duty or skilled HC services. He gets OP Infusion every 6 weeks for his arthritis.     At this time patient states plan to return home with wife. He denies need for added HC services or TCU placement. He did not want CM to call his wife to discuss observation notice or discharge plans- \"it will just upset her\" and " CM did not feel it was necessary at this time (he shared it has been a long day for both of them). Wife to transport home.     Arely Garcia RN

## 2023-07-01 NOTE — PROGRESS NOTES
"Surgery    S: improved but still pain    O: /56 (BP Location: Left arm)   Pulse 79   Temp 97.8  F (36.6  C) (Oral)   Resp 18   Ht 1.778 m (5' 10\")   Wt 86.9 kg (191 lb 8 oz)   SpO2 94%   BMI 27.48 kg/m        ABD tender right upper abdomen  Ext warm, some jaundice noted    Results for orders placed or performed during the hospital encounter of 06/30/23 (from the past 24 hour(s))   Glucose by meter   Result Value Ref Range    GLUCOSE BY METER POCT 154 (H) 70 - 99 mg/dL   Glucose by meter   Result Value Ref Range    GLUCOSE BY METER POCT 155 (H) 70 - 99 mg/dL   Glucose by meter   Result Value Ref Range    GLUCOSE BY METER POCT 140 (H) 70 - 99 mg/dL   Glucose by meter   Result Value Ref Range    GLUCOSE BY METER POCT 122 (H) 70 - 99 mg/dL   Lactic Acid STAT   Result Value Ref Range    Lactic Acid 1.3 0.7 - 2.0 mmol/L   Glucose by meter   Result Value Ref Range    GLUCOSE BY METER POCT 130 (H) 70 - 99 mg/dL   Glucose by meter   Result Value Ref Range    GLUCOSE BY METER POCT 124 (H) 70 - 99 mg/dL   CBC with platelets   Result Value Ref Range    WBC Count 12.4 (H) 4.0 - 11.0 10e3/uL    RBC Count 3.34 (L) 4.40 - 5.90 10e6/uL    Hemoglobin 10.3 (L) 13.3 - 17.7 g/dL    Hematocrit 30.3 (L) 40.0 - 53.0 %    MCV 91 78 - 100 fL    MCH 30.8 26.5 - 33.0 pg    MCHC 34.0 31.5 - 36.5 g/dL    RDW 13.4 10.0 - 15.0 %    Platelet Count 84 (L) 150 - 450 10e3/uL   Comprehensive metabolic panel   Result Value Ref Range    Sodium 135 (L) 136 - 145 mmol/L    Potassium 4.1 3.5 - 5.0 mmol/L    Chloride 103 98 - 107 mmol/L    Carbon Dioxide (CO2) 22 22 - 31 mmol/L    Anion Gap 10 5 - 18 mmol/L    Urea Nitrogen 17 8 - 28 mg/dL    Creatinine 1.46 (H) 0.70 - 1.30 mg/dL    Calcium 8.2 (L) 8.5 - 10.5 mg/dL    Glucose 133 (H) 70 - 125 mg/dL    Alkaline Phosphatase 72 45 - 120 U/L    AST 19 0 - 40 U/L    ALT 17 0 - 45 U/L    Protein Total 6.8 6.0 - 8.0 g/dL    Albumin 3.0 (L) 3.5 - 5.0 g/dL    Bilirubin Total 1.4 (H) 0.0 - 1.0 mg/dL    " GFR Estimate 50 (L) >60 mL/min/1.73m2   Lipase   Result Value Ref Range    Lipase 14 0 - 52 U/L   Glucose by meter   Result Value Ref Range    GLUCOSE BY METER POCT 134 (H) 70 - 99 mg/dL     hida pending    A/P: Abdominal pain  Cholecystitis versus cholelithiasis versus common bile duct stone or all the above  Liver cirrhosis with quite significant varices noted    At this time point LFTs today are actually pretty good except his total bili is up so the question is this related to his common bile duct with an obstruction versus hepatitis versus just cirrhosis issues and stress versus Maritizi syndrome.  I think he has not had a CAT scan which was ordered last yesterday which has not been done yet.     I ordered a GI consult to get their opinion if he is going to have surgery we may want to just transfer him to [facility] where he can have an ERCP lap lauren combination versus if they would like further work-up before surgery.  I could take him to the OR and just try to do a lap lauren and cholangiogram but unfortunately may not be able to do the cholangiogram.    Await the HIDA and await the GI consult.        Jadiel Argueta MD  General Surgery [phone]  Vascular Surgery [phone]

## 2023-07-01 NOTE — PLAN OF CARE
"PRIMARY DIAGNOSIS: \"GENERIC\" NURSING  OUTPATIENT/OBSERVATION GOALS TO BE MET BEFORE DISCHARGE:  1. ADLs back to baseline: Yes    2. Activity and level of assistance: Up with standby assistance.    3. Pain status: Improved-controlled with oral pain medications.    4. Return to near baseline physical activity: Yes     Discharge Planner Nurse   Safe discharge environment identified: Yes  Barriers to discharge: Yes       Entered by: Lorraine Reza RN 06/30/2023 11:00 PM     Please review provider order for any additional goals.   Nurse to notify provider when observation goals have been met and patient is ready for discharge.Goal Outcome Evaluation:      Plan of Care Reviewed With: patient    Overall Patient Progress: improvingOverall Patient Progress: improving   pt alert and oriented, has got soft blood pressures, on room air, pt has got iv fluids in place, on iv abx, pt is NPO  form midnight, for hepatobiliary  scan tomorrow. Pt is on Q4  blood sugars.           "

## 2023-07-01 NOTE — PROGRESS NOTES
[facility]    Medicine Progress Note - Hospitalist Service    Date of Admission:  6/30/2023    Assessment & Plan   Frank Obando is a 77 year old male admitted on 6/30/2023.  Frank presented to the ER again complaining of epigastric and right upper quadrant pain.  He had been seen on 6/28 for right upper quadrant pain and found to have cholelithiasis with possible early cholecystitis.  Upon presentation he was found to have normal LFTs and lipase but CT scan consistent with acute on chronic pancreatitis and acute cholecystitis.    Patient was to undergo cholecystectomy 6/30 but this was postponed.  Per surgery HIDA scan 7/1 and GI consultation.        Right upper quadrant pain with positive Avelar sign  Consistent with cholecystitis  N.p.o.  General surgery consultation appreciated  IV Zosyn  IV fluids  IV pain meds  HIDA scan ordered  GI consultation  Further plan pending results of HIDA and GI evaluation  Now with leukocytosis  Bili up slightly     Imaging consistent with acute on chronic pancreatitis  Surprisingly lipase normal  Minimal epigastric discomfort  Will need follow-up    Anemia/thrombocytopenia  Suspect from chronic disease  No signs of losses  No indication for transfusion  Trend     Type 2 diabetes  Correction insulin scheduled  Check blood sugars every 4 hours prior to surgery  Change to before every meal and at bedtime post procedure  Blood sugars are at goal     Immunocompromise state/ANCA positive  Chronically on Remicade  Follows with ophthalmology and rheumatology     Chronic kidney disease stage IIIb  At risk for acute kidney injury  Renal function stable currently     History of cirrhosis with esophageal varices  Surprisingly liver parenchyma appears normal on imaging  Should follow-up with GI as an outpatient in liver clinic  Now concerns from surgery about risk  Appreciate GI input     Diabetic polyneuropathy     Essential hypertension  We will order home  "meds with hold parameters once med rec complete     Dyslipidemia     Remote history of CVA     GERD  PPI     BPH  Watch for retention       Diet: NPO per Anesthesia Guidelines for Procedure/Surgery Except for: Meds    DVT Prophylaxis: Pneumatic Compression Devices  [place] Catheter: Not present  Lines: None     Cardiac Monitoring: None  Code Status: Full Code      Clinically Significant Risk Factors Present on Admission              # Hypoalbuminemia: Lowest albumin = 3 g/dL at 7/1/2023  8:33 AM, will monitor as appropriate  # Coagulation Defect: INR = 1.34 (Ref range: 0.85 - 1.15) and/or PTT = N/A, will monitor for bleeding  # Thrombocytopenia: Lowest platelets = 84 in last 2 days, will monitor for bleeding   # Hypertension: Noted on problem list     # DMII: A1C = 7.2 % (Ref range: 0.0 - 5.6 %) within past 6 months    # Overweight: Estimated body mass index is 27.48 kg/m  as calculated from the following:    Height as of this encounter: 1.778 m (5' 10\").    Weight as of this encounter: 86.9 kg (191 lb 8 oz).            Disposition Plan      Expected Discharge Date: 07/02/2023      Destination: home with family  Discharge Comments: hepatobiliary scan 7/1          Kirk Marti MD  Hospitalist Service  [facility]  Securely message with "[employer]" (more info)  Text page via [employer] Paging/Directory   ______________________________________________________________________    Interval History   Patient is doing okay.  Frustrated by the situation.  He has been n.p.o. essentially since he came in.  He is wondering if he is going to have surgery.  Explained the plan for HIDA and GI to see him.  He verbalizes understanding.  Further planning pending results of testing and GIs input.    Physical Exam   Vital Signs: Temp: 98.3  F (36.8  C) Temp src: Oral BP: 99/55 Pulse: 73   Resp: 18 SpO2: 93 % O2 Device: None (Room air) Oxygen Delivery: 2 LPM  Weight: 191 lbs 8 oz    GENERAL:  Alert, appears " comfortable, in no acute distress, appears stated age   HEAD:  Normocephalic, without obvious abnormality, atraumatic   EYES:  PERRL, conjunctiva/corneas clear, no scleral icterus, EOM's intact   BACK:   Symmetric, no curvature, ROM normal   LUNGS:   Clear to auscultation bilaterally, no rales, rhonchi, or wheezing, symmetric chest rise on inhalation, respirations unlabored   HEART:  Regular rate and rhythm, S1 and S2 normal, no murmur, rub, or gallop    ABDOMEN:   Soft, positive Avelar sign bowel sounds active all four quadrants, no masses, no organomegaly, no rebound or guarding   SKIN: Dry to touch, no exanthems in the visualized areas   NEURO: Alert, oriented x 4, moves all four extremities freely/spontaneously   PSYCH: Cooperative, behavior is appropriate          Data     I have personally reviewed the following data over the past 24 hrs:    12.4 (H)  \   10.3 (L)   / 84 (L)     135 (L) 103 17 /  134 (H)   4.1 22 1.46 (H) \       ALT: 17 AST: 19 AP: 72 TBILI: 1.4 (H)   ALB: 3.0 (L) TOT PROTEIN: 6.8 LIPASE: 14       Procal: N/A CRP: N/A Lactic Acid: 1.3       INR:  1.34 (H) PTT:  N/A   D-dimer:  N/A Fibrinogen:  N/A       Imaging results reviewed over the past 24 hrs:   No results found for this or any previous visit (from the past 24 hour(s)).  Recent Labs   Lab 07/01/23  1215 07/01/23  0855 07/01/23  0833 07/01/23  0511 06/30/23  1153 06/30/23  0334 06/28/23  2304   WBC  --   --  12.4*  --   --  6.3 6.3   HGB  --   --  10.3*  --   --  11.2* 11.2*   MCV  --   --  91  --   --  92 90   PLT  --   --  84*  --   --  110* 114*   INR 1.34*  --   --   --   --   --   --    NA  --   --  135*  --   --  133* 136   POTASSIUM  --   --  4.1  --   --  4.3 4.2   CHLORIDE  --   --  103  --   --  100 104   CO2  --   --  22  --   --  25 23   BUN  --   --  17  --   --  17 14   CR  --   --  1.46*  --   --  1.44* 1.35*   ANIONGAP  --   --  10  --   --  8 9   CINDY  --   --  8.2*  --   --  8.9 8.7   GLC  --  134* 133* 124*   < > 162*  134*   ALBUMIN  --   --  3.0*  --   --  3.7 3.8   PROTTOTAL  --   --  6.8  --   --  7.5 7.5   BILITOTAL  --   --  1.4*  --   --  0.9 0.9   ALKPHOS  --   --  72  --   --  83 85   ALT  --   --  17  --   --  21 26   AST  --   --  19  --   --  24 27   LIPASE  --   --  14  --   --  18 24    < > = values in this interval not displayed.

## 2023-07-01 NOTE — PROVIDER NOTIFICATION
MD notified: VICTORIA: I spoke with radiology and Nuc. Med doesn't go oncall until 10am.  Tony in x-ray stated he will cll them at 10am to let them know we have an order for a HIDA scan.

## 2023-07-01 NOTE — OP NOTE
Operative Note    Name:  Frank Obando  PCP:  Flaco Lopez  Procedure Date:  6/30/2023 - 7/1/2023      Procedure(s):  CHOLECYSTECTOMY, LAPAROSCOPIC   D22    Pre-Procedure Diagnosis:  Acute cholecystitis [K81.0]     Post-Procedure Diagnosis:    acute cholecystitis and cholelithiasis    OR staff:  Surgeon(s):  Jadiel Argueta MD  Circulator: Margarita Paez RN  Scrub Person: Chante Eckert      Anesthesia Type:  General    Past Medical History:   Diagnosis Date     Anemia      Arthritis     osteoarthritis     Calculus of kidney      Diabetes mellitus (H)      Episcleritis of left eye      Hyperlipidemia      Hypertension      Iron deficiency anemia due to chronic blood loss 11/2/2021     Peripheral neuropathy      Stroke (H)        Patient Active Problem List    Diagnosis Date Noted     Acute cholecystitis 06/30/2023     Priority: Medium     Stage 3b chronic kidney disease (H) 03/24/2023     Priority: Medium     Iron deficiency anemia due to chronic blood loss 11/02/2021     Priority: Medium     Angiodysplasia of stomach 09/27/2021     Priority: Medium     Secondary esophageal varices without bleeding (H) 09/27/2021     Priority: Medium     Panuveitis of left eye, 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  Created by Conversion    Replacement Utility updated for latest IMO load       Type 2 diabetes mellitus with complication, without long-term current use of insulin (H)      Priority: Medium     Created by Conversion      Formatting of this note might be different from the original.  Created by Conversion       Dyslipidemia      Priority: Medium     Thrombocytopenia (H)      Priority: Medium     Cirrhosis of liver with ascites, unspecified hepatic cirrhosis type (H)      Priority: Medium     High  risk medication use 09/26/2018     Priority: Medium     History of colonic polyps 05/07/2018     Priority: Medium     Antineutrophil cytoplasmic antibody (ANCA) positive 03/23/2017     Priority: Medium     Diabetic polyneuropathy associated with type 2 diabetes mellitus (H)      Priority: Medium     Created by Conversion  Replacement Utility updated for latest IMO load      Formatting of this note might be different from the original.  Created by Conversion    Replacement Utility updated for latest IMO load       Right bundle branch block 11/24/2014     Priority: Medium     H/o CVA ~2013      Priority: Medium     Created by Conversion           Findings:  Sever inflammation, liver cirrhosis changes.    Operative Report:    Consent was obtained.  The patient was taken to the operating room and placed in a supine position.  General anesthesia was administered by the anesthesia staff.  The briefing and timeout were performed in the usual fashion.  The patient was prepped and draped in a sterile manner.  An incision was made at the umbilicus and a Visiport was used to obtain access to the peritoneal cavity.  This was done under direct visualization.  The peritoneal cavity was insufflated to a pressure of 15 mmHg of CO2.  Under direct visualization 2 5 mm ports were placed in the subcostal right margin.  One 1.2 cm port was placed in the epigastric under direct visualization.  The gallbladder was retracted superiorly, adhesions were taken down to visualize the infundibular area.  This was quite difficult secondary to the severe amount of inflammation hydrops of the gallbladder gallbladder and the cirrhosis and rule in poor mobility of the liver.  This all culminated with a lot of oozing and bleeding from any structure to his touch or dissected.  Here the cystic duct and artery were isolated out with sharp and blunt dissection.  The cystic duct and artery were clipped and ligated.  The gallbladder was then removed from  the liver bed using electrocautery.  Hemostasis was obtained with electrocautery.  The gallbladder fossa was washed out with normal saline.  19 Tajik Mike drain was left in the liver bed area.  No bleeding was appreciated.  The trochars and CO2 were removed.  The incisions were then closed with a 4-0 Monocryl suture in a subcuticular fashion.  Steri-Strips and sterile dressings were applied.  I sutured the drain in with a 3-0 silk suture.  20 mL of Marcaine were injected in the skin before leaving the OR.   Patient was extubated and transferred to the PACU in stable condition.  All sponge and needle counts were correct.      Estimated Blood Loss:   40 ml    Specimens:    ID Type Source Tests Collected by Time Destination   1 :  Tissue Gallbladder SURGICAL PATHOLOGY EXAM Jadiel Argueta MD 7/1/2023  5:50 PM           Drains:   Closed/Suction Drain 1 Superior Abdomen Bulb 19 Tajik (Active)   Site Description WDL 07/01/23 1831   Dressing Status Normal: Clean, Dry & Intact 07/01/23 1831   Drainage Appearance Bloody/Bright Red 07/01/23 1831   Status To bulb suction 07/01/23 1831       Complications:    None    Jadiel Argueta MD     Date: 7/1/2023  Time: 6:46 PM

## 2023-07-01 NOTE — CONSULTS
GASTROENTEROLOGY CONSULTATION      Frank Obando  [address]  [facility] [id number]  77 year old male     Admission Date/Time: 6/30/2023  Primary Care Provider: Flaco Lopez  Referring / Attending Physician: Dr. Marti     We were asked to see the patient in consultation by Dr. Marti for evaluation of cirrhosis.        HPI:  Frank Obando is a 77 year old male with medical history of rheumatoid arthritis on Remicade, liver fibrosis and cirrhosis, type 2 diabetes mellitus, CKD, and chronic anemia who presented to the emergency room with right upper quadrant pain found to have cholelithiasis and possible early cholecystitis.  Gastroenterology was consulted given history of cirrhosis with plans for possible cholecystectomy.    Patient reports that 3 years ago he was seen by hepatology and underwent work-up that did reveal liver cirrhosis.  MRI of the liver with elastography indicated stage IV liver fibrosis with cirrhosis likely secondary to steatohepatitis and possibly drug-induced liver injury from methotrexate.  Initial upper endoscopy in September 2021 did reveal 1 small esophageal varix.  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  Follow-up EGD in November 2021 did not reveal any esophageal varices.  His GAVE was treated with APC given ongoing issues with anemia.  He did also have a colonoscopy which was largely unremarkable as to a cause for his anemia.    The patient has not followed up with hepatology since 2019.  He does not drink any alcohol.  He was taken off methotrexate for years ago.  There is no family history of liver disease.  He does use Remicade for rheumatoid arthritis.  LFTs are checked every 6 months.  Per his report these have been normal.  He does not use any diuretics.  He is not on a beta-blocker.  He has never required paracentesis.    CT scan of the abdomen and pelvis indicating gastroesophageal varices.  An abdominal ultrasound did not reveal any mass in the  liver and liver parenchyma was noted to be normal.  CBC indicating platelet count of 84 K.  Will need to check INR.       PAST MEDICAL HISTORY:  Patient Active Problem List    Diagnosis Date Noted     Acute cholecystitis 06/30/2023     Priority: Medium     Stage 3b chronic kidney disease (H) 03/24/2023     Priority: Medium     Iron deficiency anemia due to chronic blood loss 11/02/2021     Priority: Medium     Angiodysplasia of stomach 09/27/2021     Priority: Medium     Secondary esophageal varices without bleeding (H) 09/27/2021     Priority: Medium     Panuveitis of left eye, 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  Created by Conversion    Replacement Utility updated for latest IMO load       Type 2 diabetes mellitus with complication, without long-term current use of insulin (H)      Priority: Medium     Created by Conversion      Formatting of this note might be different from the original.  Created by Conversion       Dyslipidemia      Priority: Medium     Thrombocytopenia (H)      Priority: Medium     Cirrhosis of liver with ascites, unspecified hepatic cirrhosis type (H)      Priority: Medium     High risk medication use 09/26/2018     Priority: Medium     History of colonic polyps 05/07/2018     Priority: Medium     Antineutrophil cytoplasmic antibody (ANCA) positive 03/23/2017     Priority: Medium     Diabetic polyneuropathy associated with type 2 diabetes mellitus (H)      Priority: Medium     Created by Conversion  Replacement Utility updated for latest IMO load      Formatting of this note might be different from the original.  Created by Conversion    Replacement Utility updated for latest IMO load       Right bundle branch block 11/24/2014     Priority: Medium     H/o CVA ~2013      Priority:  Medium     Created by Conversion              ROS: A comprehensive ten point review of systems was negative aside from those in mentioned in the HPI.       MEDICATIONS:   Prior to Admission medications    Medication Sig Start Date End Date Taking? Authorizing Provider   atorvastatin (LIPITOR) 40 MG tablet TAKE 1 TABLET BY MOUTH DAILY 6/6/23  Yes Natalie Pitt MD   cyanocobalamin, vitamin B-12, 2,500 mcg Tab [CYANOCOBALAMIN, VITAMIN B-12, 2,500 MCG TAB] Take 2,500 mcg by mouth daily. 12/20/18  Yes Provider, Historical   ferrous sulfate (FEROSUL) 325 (65 Fe) MG tablet Take 1 tablet (325 mg) by mouth 3 times daily (with meals) 4/4/23  Yes Flaco Lopez MD   inFLIXimab (REMICADE IV) Every 6weeks   Yes Reported, Patient   lactobacillus rhamnosus, GG, (CULTURELL) capsule Take 1 capsule by mouth 3 times daily (before meals) 3/27/23  Yes Flaco Pérez MD   lisinopril (ZESTRIL) 10 MG tablet Take 1 tablet (10 mg) by mouth daily 10/12/22  Yes Fei Lopez MD   magnesium oxide 250 mg Tab [MAGNESIUM OXIDE 250 MG TAB] Take 250 mg by mouth 2 (two) times a day. 12/20/18  Yes Provider, Historical   metFORMIN (GLUCOPHAGE) 500 MG tablet TAKE 2 TABLETS BY MOUTH TWICE DAILY AT 6 AM AND AT 4 PM 6/6/23  Yes Natalie Pitt MD   omeprazole (PRILOSEC) 40 MG DR capsule Take 1 capsule (40 mg) by mouth daily 4/4/23  Yes Flaco Lopez MD   oxyCODONE (ROXICODONE) 5 MG tablet Take 1 tablet (5 mg) by mouth every 6 hours as needed for pain 6/29/23 7/2/23 Yes Ethel Caballero MD   pioglitazone (ACTOS) 30 MG tablet TAKE 1 TABLET BY MOUTH EVERY DAY 9/12/22  Yes Fei Lopez MD   propranolol (INDERAL) 20 MG tablet Take 1 tablet (20 mg) by mouth 2 times daily 4/17/23  Yes Flaco Lopez MD   sucralfate (CARAFATE) 1 GM/10ML suspension Take 10 mLs (1 g) by mouth 4 times daily 6/29/23  Yes Ethel Caballero MD   tamsulosin (FLOMAX) 0.4 MG capsule TAKE 1 CAPSULE BY MOUTH DAILY AFTER AND SUPPER 4/4/23  Yes Flaco Lopez MD  "  blood glucose (ACCU-CHEK JACOB PLUS) test strip 1 strip by In Vitro route 2 times daily 4/25/23   Flaco Lopez MD   blood glucose meter (GLUCOMETER) [BLOOD GLUCOSE METER (GLUCOMETER)] Use 1 each As Directed 2 (two) times a day. Dispense glucometer brand per patient's insurance at pharmacy discretion. 7/2/19   Fei Lopez MD   blood glucose monitoring (SOFTCLIX) lancets Use 1 each As Directed 2 (two) times a day. Dispense brand per patient's insurance at pharmacy discretion. - Does not apply 4/25/23   Flaco Lopez MD   generic lancets (ACCU-CHEK SOFTCLIX LANCETS) [GENERIC LANCETS (ACCU-CHEK SOFTCLIX LANCETS)] Use 1 each As Directed 2 (two) times a day. Dispense brand per patient's insurance at pharmacy discretion. 7/2/19   Fei Lopez MD        ALLERGIES:   Allergies   Allergen Reactions     Morphine Nausea and Vomiting     Scopolamine Hbr [Scopolamine] Unknown        SOCIAL HISTORY:  Social History     Tobacco Use     Smoking status: Never     Smokeless tobacco: Never   Substance Use Topics     Alcohol use: No     Drug use: No        FAMILY HISTORY:  Family History   Problem Relation Age of Onset     Coronary Artery Disease Mother      Diabetes Mother      Lung Cancer Father      No Known Problems Son         PHYSICAL EXAM:     BP (P) 99/55 (BP Location: Left arm, Patient Position: Supine)   Pulse (P) 73   Temp (P) 98.3  F (36.8  C) (Oral)   Resp (P) 18   Ht 1.778 m (5' 10\")   Wt 86.9 kg (191 lb 8 oz)   SpO2 (P) 93%   BMI 27.48 kg/m       PHYSICAL EXAM:  GENERAL: NAD  SKIN: no suspicious lesions, rashes, jaundice  HEAD: Normocephalic. Atraumatic.  NECK: Neck supple. No adenopathy.   EYES: No scleral icterus  GASTROINTESTINAL: soft, RUQ tenderness, non distended, no guarding/rebound  JOINT/EXTREMITIES:  no gross deformities noted, normal muscle tone, no LE edema  NEURO: CN 2-12 grossly intact, no focal deficits  PSYCH: Normal affect       ADDITIONAL COMMENTS:   I reviewed the patient's new " clinical lab test results.     Recent Labs   Lab 07/01/23 0833 06/30/23 0334 06/28/23  2304   WBC 12.4* 6.3 6.3   RBC 3.34* 3.61* 3.64*   HGB 10.3* 11.2* 11.2*   HCT 30.3* 33.3* 32.6*   MCV 91 92 90   MCH 30.8 31.0 30.8   MCHC 34.0 33.6 34.4   RDW 13.4 13.2 13.2   PLT 84* 110* 114*     Recent Labs   Lab Test 07/01/23 0833 06/30/23 0334 06/28/23 2304   POTASSIUM 4.1 4.3 4.2   CHLORIDE 103 100 104   CO2 22 25 23   BUN 17 17 14   ANIONGAP 10 8 9     Recent Labs   Lab Test 07/01/23 0833 06/30/23 0334 06/28/23  2304 03/26/23  1007 03/24/23  0750 06/15/22  1043 04/08/22  1055 10/05/21  1437 09/20/21  1119   ALBUMIN 3.0* 3.7 3.8   < >  --    < >  --    < >  --    BILITOTAL 1.4* 0.9 0.9   < >  --    < >  --    < >  --    ALT 17 21 26   < >  --    < >  --    < >  --    AST 19 24 27   < >  --    < >  --    < >  --    PROTEIN  --   --   --   --  30*  --  Negative  --  Negative   LIPASE 14 18 24   < >  --   --   --   --   --     < > = values in this interval not displayed.         Recent Labs   Lab 07/01/23 0833 06/30/23 0334 06/28/23 2304   LIPASE 14 18 24     MELD-Na: 9 at 3/26/2023 10:07 AM  MELD: 9 at 3/26/2023 10:07 AM  Calculated from:  Serum Creatinine: 1.23 mg/dL at 3/26/2023 10:07 AM  Serum Sodium: 133 mmol/L at 3/26/2023 10:07 AM  Total Bilirubin: 0.8 mg/dL (Using min of 1 mg/dL) at 3/26/2023 10:07 AM  INR(ratio): 1.07 at 3/26/2023 10:07 AM       IMAGING / ENDOSCOPY    EXAM: CT ABDOMEN PELVIS W CONTRAST  LOCATION: [facility]  DATE: 6/30/2023     INDICATION: abd pain  COMPARISON: US 06/29/2023 CT 06/16/2023  TECHNIQUE: CT scan of the abdomen and pelvis was performed following injection of IV contrast. Multiplanar reformats were obtained. Dose reduction techniques were used.  CONTRAST: [id number] 75ml     FINDINGS:   LOWER CHEST: Infrahilar traction bronchiectasis.     HEPATOBILIARY: Gallstone. Wall thickening. Fat stranding.     PANCREAS: Pancreatic calcifications head neck.  Peripancreatic fat stranding head neck. Trace free fluid tracking along right paracolic gutter.     SPLEEN: Normal.     ADRENAL GLANDS: Normal.     KIDNEYS/BLADDER: Right renal stone. No hydronephrosis.     BOWEL: Diverticulosis distal descending to proximal sigmoid colon.     LYMPH NODES: Normal.     VASCULATURE: Atherosclerosis. Gastroesophageal varices.     PELVIC ORGANS: Prostate calcifications.     MUSCULOSKELETAL: Demineralization. Degenerative changes.                                                                      IMPRESSION:   1.  Suspect acute on chronic pancreatitis.  2.  Acute cholecystitis.     CONSULTATION ASSESSMENT AND PLAN:    Frank Obando is a 77 year old with medical history of rheumatoid arthritis on Remicade, liver fibrosis and cirrhosis, type 2 diabetes mellitus, CKD, and chronic anemia who presented to the emergency room with right upper quadrant pain found to have cholelithiasis and possible early cholecystitis.  Gastroenterology was consulted given history of cirrhosis with plans for possible cholecystectomy.    1.  Cirrhosis /stage IV liver fibrosis: On evaluation in 2019 cirrhosis thought secondary to [place] and possible DILI (methotrexate).  Initial EGD in 2021 with 1 small esophageal varix.  This was not seen on follow-up EGD 2 months later.  CT scan however does comment on the presence of esophageal varices.     -- Patient does appear well compensated.  There is no evidence of ascites.  Need to check INR before calculating MELD score.  In March 2023 his MELD score was 9.  -- We will add on AFP, liver ultrasound without focal mass.   -- LFTs are normal.  Platelets are decreased at 84 K.  -- Ongoing management per general surgery regarding cholecystectomy.  Awaiting HIDA scan.      I discussed the patient plan with Dr. Real, GI staff physician. Thank you for asking us to participate in the care of this patient.     90 min of total time was spent providing patient care, including  patient evaluation, reviewing documentation/ test results, and .     Gina Davies PA-C  [facility] ( [facility])     GI attending addedum:      Seen and examined personally by me on July 1, 2023.  Agree with assessment and plan per PA.    In summary, patient admitted for abdominal pain and imaging consistent with cholecystitis.  Patient has a history of  cirrhosis likely secondary to steatohepatitis and possibly drug-induced liver injury from methotrexate.  Previous EGD/colonoscopy in 2021 did not show any evidence of esophageal varices.  He does not have upper GI bleed/ascites/lower extremity edema or focal liver lesions. CT on admission consistent with periesophageal varices.  His liver cirrhosis is well-l compensated.  Patient still have 4 out of 10 pain at the time of consultation.  Agree with HIDA scan for further evaluation.  Patient is at increased risk for complication due to liver cirrhosis, but not prohibitive.     He did not follow-up with [facility] for liver cirrhosis in the last few years.  Every 6 months clinical follow-up was recommended liver cirrhosis.      I discussed plan with the patient and surgical service of Dr. Argueta.     Dr Nusrat Real

## 2023-07-01 NOTE — PLAN OF CARE
Problem: Plan of Care - These are the overarching goals to be used throughout the patient stay.    Goal: Optimal Comfort and Wellbeing  Outcome: Progressing   Goal Outcome Evaluation: Pt has minimal pain. To OR @ 1700 for padma aguilar.

## 2023-07-01 NOTE — ANESTHESIA CARE TRANSFER NOTE
Patient: Frank Obando    Procedure: Procedure(s):  CHOLECYSTECTOMY, LAPAROSCOPIC       Diagnosis: Acute cholecystitis [K81.0]  Diagnosis Additional Information: No value filed.    Anesthesia Type:   General     Note:    Oropharynx: oropharynx clear of all foreign objects  Level of Consciousness: awake  Oxygen Supplementation: face mask  Level of Supplemental Oxygen (L/min / FiO2): 6  Independent Airway: airway patency satisfactory and stable  Dentition: dentition unchanged  Vital Signs Stable: post-procedure vital signs reviewed and stable  Report to RN Given: handoff report given  Patient transferred to: PACU    Handoff Report: Identifed the Patient, Identified the Reponsible Provider, Reviewed the pertinent medical history, Discussed the surgical course, Reviewed Intra-OP anesthesia mangement and issues during anesthesia, Set expectations for post-procedure period and Allowed opportunity for questions and acknowledgement of understanding      Vitals:  Vitals Value Taken Time   BP 92/53 07/01/23 1833   Temp 36.3  C (97.4  F) 07/01/23 1831   Pulse 79 07/01/23 1834   Resp 19 07/01/23 1834   SpO2 93 % 07/01/23 1834   Vitals shown include unvalidated device data.    Electronically Signed By: NARESH Nicole CRNA  July 1, 2023  6:35 PM   20

## 2023-07-02 PROBLEM — K86.1 CHRONIC PANCREATITIS (H): Status: ACTIVE | Noted: 2023-07-02

## 2023-07-02 PROBLEM — K86.1 CHRONIC PANCREATITIS (H): Chronic | Status: ACTIVE | Noted: 2023-07-02

## 2023-07-02 PROBLEM — K81.0 ACUTE CHOLECYSTITIS: Chronic | Status: ACTIVE | Noted: 2023-06-30

## 2023-07-02 PROBLEM — Z90.49 S/P LAPAROSCOPIC CHOLECYSTECTOMY: Status: ACTIVE | Noted: 2023-07-02

## 2023-07-02 PROBLEM — Z92.25 PERSONAL HISTORY OF IMMUNOSUPPRESSIVE THERAPY: Status: ACTIVE | Noted: 2023-07-02

## 2023-07-02 PROBLEM — Z90.49 S/P LAPAROSCOPIC CHOLECYSTECTOMY: Chronic | Status: ACTIVE | Noted: 2023-07-02

## 2023-07-02 PROBLEM — M06.9 RA (RHEUMATOID ARTHRITIS) (H): Status: ACTIVE | Noted: 2023-07-02

## 2023-07-02 LAB
ALBUMIN SERPL-MCNC: 2.8 G/DL (ref 3.5–5)
ALP SERPL-CCNC: 78 U/L (ref 45–120)
ALT SERPL W P-5'-P-CCNC: 22 U/L (ref 0–45)
ANION GAP SERPL CALCULATED.3IONS-SCNC: 9 MMOL/L (ref 5–18)
AST SERPL W P-5'-P-CCNC: 32 U/L (ref 0–40)
BASOPHILS # BLD AUTO: 0 10E3/UL (ref 0–0.2)
BASOPHILS NFR BLD AUTO: 0 %
BILIRUB SERPL-MCNC: 1.1 MG/DL (ref 0–1)
BUN SERPL-MCNC: 22 MG/DL (ref 8–28)
CALCIUM SERPL-MCNC: 8.5 MG/DL (ref 8.5–10.5)
CHLORIDE BLD-SCNC: 103 MMOL/L (ref 98–107)
CO2 SERPL-SCNC: 23 MMOL/L (ref 22–31)
CREAT SERPL-MCNC: 1.52 MG/DL (ref 0.7–1.3)
EOSINOPHIL # BLD AUTO: 0 10E3/UL (ref 0–0.7)
EOSINOPHIL NFR BLD AUTO: 0 %
ERYTHROCYTE [DISTWIDTH] IN BLOOD BY AUTOMATED COUNT: 13.2 % (ref 10–15)
GFR SERPL CREATININE-BSD FRML MDRD: 47 ML/MIN/1.73M2
GLUCOSE BLD-MCNC: 176 MG/DL (ref 70–125)
GLUCOSE BLDC GLUCOMTR-MCNC: 147 MG/DL (ref 70–99)
GLUCOSE BLDC GLUCOMTR-MCNC: 166 MG/DL (ref 70–99)
GLUCOSE BLDC GLUCOMTR-MCNC: 173 MG/DL (ref 70–99)
GLUCOSE BLDC GLUCOMTR-MCNC: 180 MG/DL (ref 70–99)
GLUCOSE BLDC GLUCOMTR-MCNC: 186 MG/DL (ref 70–99)
GLUCOSE BLDC GLUCOMTR-MCNC: 209 MG/DL (ref 70–99)
HCT VFR BLD AUTO: 30.9 % (ref 40–53)
HGB BLD-MCNC: 10 G/DL (ref 13.3–17.7)
IMM GRANULOCYTES # BLD: 0.1 10E3/UL
IMM GRANULOCYTES NFR BLD: 1 %
LYMPHOCYTES # BLD AUTO: 0.6 10E3/UL (ref 0.8–5.3)
LYMPHOCYTES NFR BLD AUTO: 7 %
MCH RBC QN AUTO: 30.4 PG (ref 26.5–33)
MCHC RBC AUTO-ENTMCNC: 32.4 G/DL (ref 31.5–36.5)
MCV RBC AUTO: 94 FL (ref 78–100)
MONOCYTES # BLD AUTO: 0.4 10E3/UL (ref 0–1.3)
MONOCYTES NFR BLD AUTO: 4 %
NEUTROPHILS # BLD AUTO: 8.4 10E3/UL (ref 1.6–8.3)
NEUTROPHILS NFR BLD AUTO: 88 %
NRBC # BLD AUTO: 0 10E3/UL
NRBC BLD AUTO-RTO: 0 /100
PLATELET # BLD AUTO: 77 10E3/UL (ref 150–450)
POTASSIUM BLD-SCNC: 5 MMOL/L (ref 3.5–5)
PROT SERPL-MCNC: 7 G/DL (ref 6–8)
RBC # BLD AUTO: 3.29 10E6/UL (ref 4.4–5.9)
SODIUM SERPL-SCNC: 135 MMOL/L (ref 136–145)
WBC # BLD AUTO: 9.5 10E3/UL (ref 4–11)

## 2023-07-02 PROCEDURE — 80053 COMPREHEN METABOLIC PANEL: CPT | Performed by: SPECIALIST

## 2023-07-02 PROCEDURE — 250N000012 HC RX MED GY IP 250 OP 636 PS 637: Performed by: SPECIALIST

## 2023-07-02 PROCEDURE — 85014 HEMATOCRIT: CPT | Performed by: SPECIALIST

## 2023-07-02 PROCEDURE — 36415 COLL VENOUS BLD VENIPUNCTURE: CPT | Performed by: SPECIALIST

## 2023-07-02 PROCEDURE — 99024 POSTOP FOLLOW-UP VISIT: CPT | Performed by: SPECIALIST

## 2023-07-02 PROCEDURE — 99232 SBSQ HOSP IP/OBS MODERATE 35: CPT | Performed by: INTERNAL MEDICINE

## 2023-07-02 PROCEDURE — 120N000001 HC R&B MED SURG/OB

## 2023-07-02 PROCEDURE — 250N000011 HC RX IP 250 OP 636: Mod: JZ | Performed by: SPECIALIST

## 2023-07-02 PROCEDURE — 250N000013 HC RX MED GY IP 250 OP 250 PS 637: Performed by: SPECIALIST

## 2023-07-02 RX ORDER — FAMOTIDINE 20 MG/1
20 TABLET, FILM COATED ORAL DAILY
Status: DISCONTINUED | OUTPATIENT
Start: 2023-07-03 | End: 2023-07-03 | Stop reason: HOSPADM

## 2023-07-02 RX ADMIN — PANTOPRAZOLE SODIUM 40 MG: 40 TABLET, DELAYED RELEASE ORAL at 08:50

## 2023-07-02 RX ADMIN — PANTOPRAZOLE SODIUM 40 MG: 40 TABLET, DELAYED RELEASE ORAL at 20:28

## 2023-07-02 RX ADMIN — INSULIN ASPART 1 UNITS: 100 INJECTION, SOLUTION INTRAVENOUS; SUBCUTANEOUS at 20:26

## 2023-07-02 RX ADMIN — PIPERACILLIN AND TAZOBACTAM 3.38 G: 3; .375 INJECTION, POWDER, LYOPHILIZED, FOR SOLUTION INTRAVENOUS at 11:25

## 2023-07-02 RX ADMIN — PROPRANOLOL HYDROCHLORIDE 20 MG: 20 TABLET ORAL at 20:28

## 2023-07-02 RX ADMIN — ACETAMINOPHEN 975 MG: 325 TABLET ORAL at 03:20

## 2023-07-02 RX ADMIN — INSULIN ASPART 1 UNITS: 100 INJECTION, SOLUTION INTRAVENOUS; SUBCUTANEOUS at 17:14

## 2023-07-02 RX ADMIN — PIPERACILLIN AND TAZOBACTAM 3.38 G: 3; .375 INJECTION, POWDER, LYOPHILIZED, FOR SOLUTION INTRAVENOUS at 20:28

## 2023-07-02 RX ADMIN — Medication 200 MG: at 08:50

## 2023-07-02 RX ADMIN — ACETAMINOPHEN 975 MG: 325 TABLET ORAL at 11:24

## 2023-07-02 RX ADMIN — INSULIN ASPART 1 UNITS: 100 INJECTION, SOLUTION INTRAVENOUS; SUBCUTANEOUS at 05:05

## 2023-07-02 RX ADMIN — ACETAMINOPHEN 975 MG: 325 TABLET ORAL at 20:27

## 2023-07-02 RX ADMIN — PIPERACILLIN AND TAZOBACTAM 3.38 G: 3; .375 INJECTION, POWDER, LYOPHILIZED, FOR SOLUTION INTRAVENOUS at 04:24

## 2023-07-02 RX ADMIN — Medication 200 MG: at 20:28

## 2023-07-02 RX ADMIN — INSULIN ASPART 2 UNITS: 100 INJECTION, SOLUTION INTRAVENOUS; SUBCUTANEOUS at 12:13

## 2023-07-02 RX ADMIN — INSULIN ASPART 1 UNITS: 100 INJECTION, SOLUTION INTRAVENOUS; SUBCUTANEOUS at 00:38

## 2023-07-02 RX ADMIN — SENNOSIDES AND DOCUSATE SODIUM 1 TABLET: 50; 8.6 TABLET ORAL at 20:28

## 2023-07-02 RX ADMIN — PROPRANOLOL HYDROCHLORIDE 20 MG: 20 TABLET ORAL at 08:49

## 2023-07-02 RX ADMIN — FAMOTIDINE 20 MG: 20 TABLET ORAL at 08:50

## 2023-07-02 RX ADMIN — TAMSULOSIN HYDROCHLORIDE 0.4 MG: 0.4 CAPSULE ORAL at 17:14

## 2023-07-02 RX ADMIN — LISINOPRIL 10 MG: 10 TABLET ORAL at 08:50

## 2023-07-02 RX ADMIN — ATORVASTATIN CALCIUM 40 MG: 40 TABLET, FILM COATED ORAL at 08:50

## 2023-07-02 ASSESSMENT — ACTIVITIES OF DAILY LIVING (ADL)
ADLS_ACUITY_SCORE: 37

## 2023-07-02 NOTE — PLAN OF CARE
Problem: Cholecystectomy  Goal: Absence of Bleeding  Outcome: Progressing     Problem: Cholecystectomy  Goal: Effective Bowel Elimination  Outcome: Progressing     A/O x 4 but confused at times after returning from surgery, able to reorient. VSS on 2 L NC. R PIV infusing LR at 100 ml/hr. LULY drain in place with bright red output. LULY dressing and lap lauren dressings CDI. C/o R sided abdominal pain at LULY site, managed with scheduled Acetaminophen and rest. Clear liquid diet order, pt refused to eat this evening.

## 2023-07-02 NOTE — CARE PLAN
RN messaged Dr Luciano to discharge LR IV continuous, provider discharged LR order and pt has NS running 125 ml/hr.

## 2023-07-02 NOTE — PROGRESS NOTES
[facility] General Surgery Post-Op / Progress Note         Assessment and Plan:    Assessment:   Post-operative day #1  Cholecystectomy, laproscopic     Doing well.  Clean wound without signs of infection.  Normal healing wound.  No immediate surgical complications identified.  candace drain turning to serous output      Plan:   Ambulate  Advance activity as tolerated  Pain control measures  Advance diet as tolerated  candace drain  Home tomorrow with po antibiotics           Interval History:   Doing well.  Continues to improve.  Pain is well-controlled.  No fevers.            Significant Problems:      Patient Active Problem List   Diagnosis     Diabetic polyneuropathy associated with type 2 diabetes mellitus (H)     H/o CVA ~2013     Essential hypertension, benign     Type 2 diabetes mellitus with complication, without long-term current use of insulin (H)     Right bundle branch block     Antineutrophil cytoplasmic antibody (ANCA) positive     High risk medication use     Dyslipidemia     Thrombocytopenia (H)     Cirrhosis of liver with ascites, unspecified hepatic cirrhosis type (H)     Panuveitis of left eye, Dr. Abdi / Castillo     Iron deficiency anemia due to chronic blood loss     Angiodysplasia of stomach     History of colonic polyps     Secondary esophageal varices without bleeding (H)     Stage 3b chronic kidney disease (H)     Acute cholecystitis     S/P laparoscopic cholecystectomy     Chronic pancreatitis (H)             Review of Systems:    The patient denies any chest pain, shortness of breath, excessive pain, fever, chills, purulent drainage from the wound, nausea or vomiting.          Medications:   All medications related to the patient's surgery have been reviewed          Physical Exam:     All vitals stable  Patient Vitals for the past 8 hrs:   BP Temp Temp src Pulse Resp SpO2   07/02/23 1154 115/62 97.6  F (36.4  C) Oral 52 16 98 %   07/02/23 0851 -- 97.1  F (36.2  C) Oral -- -- --    07/02/23 0742 127/68 (!) 94.4  F (34.7  C) Oral 50 16 96 %   07/02/23 0600 138/74 97.5  F (36.4  C) Oral 53 16 96 %     Wt Readings from Last 4 Encounters:   07/02/23 89.9 kg (198 lb 3.1 oz)   06/28/23 88.5 kg (195 lb)   06/16/23 86.2 kg (190 lb)   06/01/23 90.7 kg (200 lb)     I/O last 3 completed shifts:  In: 900 [P.O.:600; I.V.:300]  Out: 380 [Urine:300; Drains:80]  Dressing dry and intact.  Abdomen: wound clean and dry with minimal or no drainage.  Surrounding skin with minimal erythema.          Data:   All laboratory data related to this surgery reviewed  Results for orders placed or performed during the hospital encounter of 06/30/23 (from the past 24 hour(s))   INR   Result Value Ref Range    INR 1.34 (H) 0.85 - 1.15   Glucose by meter   Result Value Ref Range    GLUCOSE BY METER POCT 113 (H) 70 - 99 mg/dL   NM HepatOBiliary Scan    Narrative    EXAM: NM HEPATOBILIARY SCAN  LOCATION: [facility]  DATE: 7/1/2023    INDICATION: Question cholecystitis, liver cirrhosis.  COMPARISON: CT 06/30/2023.  TECHNIQUE: 8.3 mCi of Tc-99m mebrofenin, IV. Anterior planar imaging of the abdomen.     FINDINGS: There is prompt radiotracer uptake by the liver and excretion into the biliary system. This flows freely into the second portion of the duodenum and the small bowel. No evidence for common bile duct obstruction. The cystic duct and gallbladder   do not opacify despite imaging to 4 hours. Delayed images show hyperemia and radiotracer localization adjacent to the gallbladder fossa. Constellation of findings would suggest acute cholecystitis.      Impression    IMPRESSION:   Acute cholecystitis. Findings reviewed with Dr. Jadiel Argueta.       Glucose by meter   Result Value Ref Range    GLUCOSE BY METER POCT 122 (H) 70 - 99 mg/dL   Glucose by meter   Result Value Ref Range    GLUCOSE BY METER POCT 140 (H) 70 - 99 mg/dL   Glucose by meter   Result Value Ref Range    GLUCOSE BY METER POCT 171 (H)  70 - 99 mg/dL   Glucose by meter   Result Value Ref Range    GLUCOSE BY METER POCT 173 (H) 70 - 99 mg/dL   Glucose by meter   Result Value Ref Range    GLUCOSE BY METER POCT 186 (H) 70 - 99 mg/dL   CBC with platelets differential    Narrative    The following orders were created for panel order CBC with platelets differential.  Procedure                               Abnormality         Status                     ---------                               -----------         ------                     CBC with platelets and d...[[id number]]  Abnormal            Final result                 Please view results for these tests on the individual orders.   Comprehensive metabolic panel   Result Value Ref Range    Sodium 135 (L) 136 - 145 mmol/L    Potassium 5.0 3.5 - 5.0 mmol/L    Chloride 103 98 - 107 mmol/L    Carbon Dioxide (CO2) 23 22 - 31 mmol/L    Anion Gap 9 5 - 18 mmol/L    Urea Nitrogen 22 8 - 28 mg/dL    Creatinine 1.52 (H) 0.70 - 1.30 mg/dL    Calcium 8.5 8.5 - 10.5 mg/dL    Glucose 176 (H) 70 - 125 mg/dL    Alkaline Phosphatase 78 45 - 120 U/L    AST 32 0 - 40 U/L    ALT 22 0 - 45 U/L    Protein Total 7.0 6.0 - 8.0 g/dL    Albumin 2.8 (L) 3.5 - 5.0 g/dL    Bilirubin Total 1.1 (H) 0.0 - 1.0 mg/dL    GFR Estimate 47 (L) >60 mL/min/1.73m2   CBC with platelets and differential   Result Value Ref Range    WBC Count 9.5 4.0 - 11.0 10e3/uL    RBC Count 3.29 (L) 4.40 - 5.90 10e6/uL    Hemoglobin 10.0 (L) 13.3 - 17.7 g/dL    Hematocrit 30.9 (L) 40.0 - 53.0 %    MCV 94 78 - 100 fL    MCH 30.4 26.5 - 33.0 pg    MCHC 32.4 31.5 - 36.5 g/dL    RDW 13.2 10.0 - 15.0 %    Platelet Count 77 (L) 150 - 450 10e3/uL    % Neutrophils 88 %    % Lymphocytes 7 %    % Monocytes 4 %    % Eosinophils 0 %    % Basophils 0 %    % Immature Granulocytes 1 %    NRBCs per 100 WBC 0 <1 /100    Absolute Neutrophils 8.4 (H) 1.6 - 8.3 10e3/uL    Absolute Lymphocytes 0.6 (L) 0.8 - 5.3 10e3/uL    Absolute Monocytes 0.4 0.0 - 1.3 10e3/uL    Absolute  Eosinophils 0.0 0.0 - 0.7 10e3/uL    Absolute Basophils 0.0 0.0 - 0.2 10e3/uL    Absolute Immature Granulocytes 0.1 <=0.4 10e3/uL    Absolute NRBCs 0.0 10e3/uL   Glucose by meter   Result Value Ref Range    GLUCOSE BY METER POCT 147 (H) 70 - 99 mg/dL     No imaging studies have been ordered    Jadiel Argueta MD

## 2023-07-02 NOTE — PROGRESS NOTES
Care Management Follow Up    Length of Stay (days): 2    Expected Discharge Date: 07/03/2023     Concerns to be Addressed:       Patient plan of care discussed at interdisciplinary rounds: Yes    Anticipated Discharge Disposition: Home  Disposition Comments: Anticipate return home with wife. Wife to transport.  Anticipated Discharge Services: None  Anticipated Discharge DME: None    Patient/family educated on Medicare website which has current facility and service quality ratings:  (None indicated at this time)  Education Provided on the Discharge Plan: Yes (AVS will be per bedside RN)  Patient/Family in Agreement with the Plan: yes      Additional Information:  Reviewed. Pt to discharge home pending drain and change to PO ABX.       YOVANI Moran

## 2023-07-02 NOTE — PROGRESS NOTES
[facility]    Medicine Progress Note - Hospitalist Service    Date of Admission:  6/30/2023    Identification  77-year-old man who has been suffering with acute cholecystitis and is now status post laparoscopic cholecystectomy  History of nonalcoholic cirrhosis possibly related to methotrexate history of rheumatoid arthritis inflammatory arthritis treated with Remicade currently evidence of pancreatic calcifications but lipase normal  Nondrinker        Assessment & Plan   Principal Problem:    Acute cholecystitis (6/30/2023)  Active Problems:    S/P laparoscopic cholecystectomy (7/2/2023)    Diabetic polyneuropathy associated with type 2 diabetes mellitus (H) ()    Essential hypertension, benign ()    Type 2 diabetes mellitus with complication, without long-term current use of insulin (H) ()    Cirrhosis of liver with ascites, unspecified hepatic cirrhosis type (H) ()    Secondary esophageal varices without bleeding (H) (9/27/2021)    Chronic pancreatitis (H) (7/2/2023)    RA (rheumatoid arthritis) (H) (7/2/2023)    Personal history of immunosuppressive therapy (7/2/2023)    Plan probable discharge tomorrow-Monday  he still has the drain in  Probably will be removed tomorrow and then home tomorrow he is somewhat frail and is probably going to take longer than usual to recover.  We will see how his diet intake goes over the next 24 hours see surgical note regarding postop details    Outpatient meds continued with metformin on hold insulin coverage for diabetes    DVT Prophylaxis: Low Risk/Ambulatory with no VTE prophylaxis indicated  [place] Catheter: Not present  Lines: None     Cardiac Monitoring: None  Code Status: Full Code      Clinically Significant Risk Factors Present on Admission              # Hypoalbuminemia: Lowest albumin = 2.8 g/dL at 7/2/2023  6:54 AM, will monitor as appropriate  # Coagulation Defect: INR = 1.34 (Ref range: 0.85 - 1.15) and/or PTT = N/A, will monitor for  "bleeding  # Thrombocytopenia: Lowest platelets = 77 in last 2 days, will monitor for bleeding   # Hypertension: Noted on problem list     # DMII: A1C = 7.2 % (Ref range: 0.0 - 5.6 %) within past 6 months    # Overweight: Estimated body mass index is 28.44 kg/m  as calculated from the following:    Height as of this encounter: 1.778 m (5' 10\").    Weight as of this encounter: 89.9 kg (198 lb 3.1 oz).            Disposition Plan      Expected Discharge Date: 07/03/2023      Destination: home with family  Discharge Comments: hepatobiliary scan 7/1          Casey Mayberry MD  Hospitalist Service  [facility]  Securely message with [employer] (more info)  Text page via [employer] Paging/Directory   ______________________________________________________________________    Interval History   He is recovering postop from a cholecystectomy he is somewhat frail multiple diagnoses see problem list he thinks he should be able to go home tomorrow he still has the drain in from surgery    Physical Exam   Vital Signs: Temp: 97.6  F (36.4  C) Temp src: Oral BP: 115/62 Pulse: 52   Resp: 16 SpO2: 98 % O2 Device: None (Room air) Oxygen Delivery: 2 LPM  Weight: 198 lbs 3.1 oz        Medical Decision Making     Data     I have personally reviewed the following data over the past 24 hrs:    9.5  \   10.0 (L)   / 77 (L)     135 (L) 103 22 /  209 (H)   5.0 23 1.52 (H) \       ALT: 22 AST: 32 AP: 78 TBILI: 1.1 (H)   ALB: 2.8 (L) TOT PROTEIN: 7.0 LIPASE: N/A       Imaging results reviewed over the past 24 hrs:   Recent Results (from the past 24 hour(s))   NM HepatOBiliary Scan    Narrative    EXAM: NM HEPATOBILIARY SCAN  LOCATION: [facility]  DATE: 7/1/2023    INDICATION: Question cholecystitis, liver cirrhosis.  COMPARISON: CT 06/30/2023.  TECHNIQUE: 8.3 mCi of Tc-99m mebrofenin, IV. Anterior planar imaging of the abdomen.     FINDINGS: There is prompt radiotracer uptake by the liver and excretion " into the biliary system. This flows freely into the second portion of the duodenum and the small bowel. No evidence for common bile duct obstruction. The cystic duct and gallbladder   do not opacify despite imaging to 4 hours. Delayed images show hyperemia and radiotracer localization adjacent to the gallbladder fossa. Constellation of findings would suggest acute cholecystitis.      Impression    IMPRESSION:   Acute cholecystitis. Findings reviewed with Dr. Jadiel Argueta.

## 2023-07-02 NOTE — PLAN OF CARE
Problem: Cholecystectomy  Goal: Anesthesia/Sedation Recovery  Intervention: Optimize Anesthesia Recovery  Recent Flowsheet Documentation  Taken 7/2/2023 0400 by Reyes, Dora, RN  Safety Promotion/Fall Prevention:    activity supervised    clutter free environment maintained    nonskid shoes/slippers when out of bed    room door open    room near nurse's station    safety round/check completed  Taken 7/2/2023 0300 by Reyes, Dora, DEON  Safety Promotion/Fall Prevention: safety round/check completed  Taken 7/2/2023 0039 by Reyes, Dora, RN  Safety Promotion/Fall Prevention:    activity supervised    clutter free environment maintained    nonskid shoes/slippers when out of bed    room door open    room near nurse's station    safety round/check completed   Goal Outcome Evaluation:    Pt alert to self, w/intermittent confusion when woken up, but able to reorient. No acute changes this shift. Pt was resting comfortably in between cares. Pt IV LR at 100mL, was paused for IV zosyn administration at 0420 d/t incompatibility. LULY drain patent and intact, lap lauren dressing cdi. Pt on Q4 bg checks. BG-186 at 0400. Pt ambulating and voiding w/no issues. Pt reports 2/10 pain throughout shift, controlled w/scheduled tylenol. Pt up w/sba. Pt calls appropriately and is able to make needs known. Will continue to monitor. Alarms in place for pt safety.

## 2023-07-02 NOTE — PROGRESS NOTES
"GASTROENTEROLOGY PROGRESS NOTE        SUBJECTIVE:  Underwent cholecystectomy last evening.  No current complications.  No nausea, vomiting, fever or chills.  Reports some incisional pain.  No confusion, melena, or lower extremity edema.     OBJECTIVE:    /68 (BP Location: Left arm)   Pulse 50   Temp 97.1  F (36.2  C) (Oral)   Resp 16   Ht 1.778 m (5' 10\")   Wt 89.9 kg (198 lb 3.1 oz)   SpO2 96%   BMI 28.44 kg/m    Temp (24hrs), Av.5  F (36.4  C), Min:94.4  F (34.7  C), Max:98.2  F (36.8  C)    Patient Vitals for the past 72 hrs:   Weight   23 0313 89.9 kg (198 lb 3.1 oz)   23 0515 86.9 kg (191 lb 8 oz)   23 1650 87.1 kg (192 lb 0.3 oz)   23 0323 88.5 kg (195 lb)       Intake/Output Summary (Last 24 hours) at 2023 1137  Last data filed at 2023 1015  Gross per 24 hour   Intake 960 ml   Output 630 ml   Net 330 ml        PHYSICAL EXAM     Constitutional: No distress, resting comfortably    Abdomen: Soft, incisional tenderness, tenderness near drain site, no rebound or guarding.         Additional Comments:  ROS, FH, SH: See initial GI consult for details.     I have reviewed the patient's new clinical lab results:     Recent Labs   Lab Test 23  0654 23  1215 23  0833 23  0334 23  1011 23  1007 21  1327 10/05/21  1437   WBC 9.5  --  12.4* 6.3   < > 9.0   < > 4.0   HGB 10.0*  --  10.3* 11.2*   < > 10.7*   < > 6.3*   MCV 94  --  91 92   < > 91   < > 89   PLT 77*  --  84* 110*   < > 93*   < > 139*   INR  --  1.34*  --   --   --  1.07  --  1.11    < > = values in this interval not displayed.     Recent Labs   Lab Test 23  0654 23  0833 23  0334   POTASSIUM 5.0 4.1 4.3   CHLORIDE 103 103 100   CO2 23 22 25   BUN 22 17 17   ANIONGAP 9 10 8     Recent Labs   Lab Test 23  0654 23  0833 23  0334 23  2304 23  1007 23  0750 06/15/22  1043 22  1055 10/05/21  1437 21  1119 "   ALBUMIN 2.8* 3.0* 3.7 3.8   < >  --    < >  --    < >  --    BILITOTAL 1.1* 1.4* 0.9 0.9   < >  --    < >  --    < >  --    ALT 22 17 21 26   < >  --    < >  --    < >  --    AST 32 19 24 27   < >  --    < >  --    < >  --    PROTEIN  --   --   --   --   --  30*  --  Negative  --  Negative   LIPASE  --  14 18 24   < >  --   --   --   --   --     < > = values in this interval not displayed.     MELD-Na: 16 at 7/2/2023  6:54 AM  MELD: 14 at 7/2/2023  6:54 AM  Calculated from:  Serum Creatinine: 1.52 mg/dL at 7/2/2023  6:54 AM  Serum Sodium: 135 mmol/L at 7/2/2023  6:54 AM  Total Bilirubin: 1.1 mg/dL at 7/2/2023  6:54 AM  INR(ratio): 1.34 at 7/1/2023 12:15 PM    ASSESSMENT/ PLAN    Frank Obando is a 77 year old with medical history of rheumatoid arthritis on Remicade, liver fibrosis and cirrhosis, type 2 diabetes mellitus, CKD, and chronic anemia who presented to the emergency room with right upper quadrant pain found to have cholelithiasis and possible early cholecystitis.  Gastroenterology was consulted given history of cirrhosis with plans for possible cholecystectomy.     1.  Cirrhosis /stage IV liver fibrosis: On evaluation in 2019 cirrhosis thought secondary to [place] and possible DILI (methotrexate).  Initial EGD in 2021 with 1 small esophageal varix.  This was not seen on follow-up EGD 2 months later.  CT scan however does comment on the presence of periesophageal varices.     -- Patient does appear well compensated.  There is no evidence of ascites. In March 2023 his MELD score was 9. MELD (7/2) 14.  --  AFP negative, liver ultrasound without focal mass.   -- LFTs are normal.  Platelets are decreased at 84 K.  -- HIDA scan was positive.  -- S/p cholecystectomy without complication (7/1)  -- Will need [facility] hepatology clinic follow up. My office will call to arrange.     Discussed with Dr. Real. Will no longer follow. Please call with questions or change in condition.    Gina Davies PA-C  [place]  [facility] [place])

## 2023-07-02 NOTE — PLAN OF CARE
Pt tolerated a full liquid diet.  Pain minamal given scheduled acetaminophen.  He has been voiding and has audable bowel sounds.     LULY drain in place. Incisions c/d/I.     Problem: Plan of Care - These are the overarching goals to be used throughout the patient stay.    Goal: Plan of Care Review  Description: The Plan of Care Review/Shift note should be completed every shift.  The Outcome Evaluation is a brief statement about your assessment that the patient is improving, declining, or no change.  This information will be displayed automatically on your shift note.  Outcome: Progressing  Goal: Optimal Comfort and Wellbeing  Outcome: Progressing  Intervention: Monitor Pain and Promote Comfort  Recent Flowsheet Documentation  Taken 7/2/2023 7986 by Kathy Kay, RN  Pain Management Interventions: declines     Continue to monitor VS, labs, pain level, incision sites and activity tolerance.

## 2023-07-03 VITALS
WEIGHT: 197.97 LBS | TEMPERATURE: 98.2 F | BODY MASS INDEX: 28.34 KG/M2 | OXYGEN SATURATION: 94 % | SYSTOLIC BLOOD PRESSURE: 120 MMHG | HEIGHT: 70 IN | HEART RATE: 58 BPM | RESPIRATION RATE: 16 BRPM | DIASTOLIC BLOOD PRESSURE: 61 MMHG

## 2023-07-03 LAB
GLUCOSE BLDC GLUCOMTR-MCNC: 130 MG/DL (ref 70–99)
GLUCOSE BLDC GLUCOMTR-MCNC: 142 MG/DL (ref 70–99)
GLUCOSE BLDC GLUCOMTR-MCNC: 148 MG/DL (ref 70–99)

## 2023-07-03 PROCEDURE — 250N000013 HC RX MED GY IP 250 OP 250 PS 637: Performed by: INTERNAL MEDICINE

## 2023-07-03 PROCEDURE — 250N000013 HC RX MED GY IP 250 OP 250 PS 637: Performed by: SPECIALIST

## 2023-07-03 PROCEDURE — 99239 HOSP IP/OBS DSCHRG MGMT >30: CPT | Performed by: INTERNAL MEDICINE

## 2023-07-03 PROCEDURE — 250N000011 HC RX IP 250 OP 636: Mod: JZ | Performed by: SPECIALIST

## 2023-07-03 RX ORDER — ACETAMINOPHEN 325 MG/1
650 TABLET ORAL EVERY 6 HOURS PRN
COMMUNITY
Start: 2023-07-03 | End: 2023-07-03

## 2023-07-03 RX ORDER — ACETAMINOPHEN 500 MG
500 TABLET ORAL EVERY 6 HOURS PRN
COMMUNITY
Start: 2023-07-03

## 2023-07-03 RX ADMIN — FAMOTIDINE 20 MG: 20 TABLET ORAL at 08:33

## 2023-07-03 RX ADMIN — ACETAMINOPHEN 975 MG: 325 TABLET ORAL at 13:01

## 2023-07-03 RX ADMIN — Medication 200 MG: at 08:32

## 2023-07-03 RX ADMIN — ATORVASTATIN CALCIUM 40 MG: 40 TABLET, FILM COATED ORAL at 08:32

## 2023-07-03 RX ADMIN — PROPRANOLOL HYDROCHLORIDE 20 MG: 20 TABLET ORAL at 08:33

## 2023-07-03 RX ADMIN — ACETAMINOPHEN 975 MG: 325 TABLET ORAL at 04:39

## 2023-07-03 RX ADMIN — PANTOPRAZOLE SODIUM 40 MG: 40 TABLET, DELAYED RELEASE ORAL at 08:32

## 2023-07-03 RX ADMIN — PIPERACILLIN AND TAZOBACTAM 3.38 G: 3; .375 INJECTION, POWDER, LYOPHILIZED, FOR SOLUTION INTRAVENOUS at 04:39

## 2023-07-03 RX ADMIN — LISINOPRIL 10 MG: 10 TABLET ORAL at 08:32

## 2023-07-03 RX ADMIN — INSULIN ASPART 1 UNITS: 100 INJECTION, SOLUTION INTRAVENOUS; SUBCUTANEOUS at 13:01

## 2023-07-03 RX ADMIN — INSULIN ASPART 1 UNITS: 100 INJECTION, SOLUTION INTRAVENOUS; SUBCUTANEOUS at 08:33

## 2023-07-03 ASSESSMENT — ACTIVITIES OF DAILY LIVING (ADL)
ADLS_ACUITY_SCORE: 37
ADLS_ACUITY_SCORE: 37
ADLS_ACUITY_SCORE: 36
ADLS_ACUITY_SCORE: 37
ADLS_ACUITY_SCORE: 36
ADLS_ACUITY_SCORE: 37
ADLS_ACUITY_SCORE: 36

## 2023-07-03 NOTE — PROGRESS NOTES
Pt discharging home via wife. After visit summary reviewed and questions answered. Belongings returned.

## 2023-07-03 NOTE — DISCHARGE INSTRUCTIONS
From your Surgeon.  I      Follow up- For laparoscopic cholecystectomies(gallbladder surgery) our nurses will reach out to you in approximately 3 business days to see how you are doing post-operatively.  If you are wanting to be seen in clinic or you have questions/concerns please reach out and let us know.  Most post op appointments are scheduled in the GI Clinic which is run by our Physician Assistants or Nurse Practitioner.  During office hours our nurses will triage and speak to you about concerns. If you call after hours you will reach our answering service who will page the doctor or GI on call.     Contact  -[facility] General Surgery & [facility]ariatric [facility]                   [address] [id number]                   Phone- [phone]                   Fax- [phone]                       Diet: Regular diet. Patients can have difficulty with constipation following surgery,due in part to the administration of narcotic medications.  If you are suffering with constipation, you should avoid foods such as hard cheeses or red meat.  Things to help avoid constipation are eating foods high in fiber,drink plenty of fluids, and be active as much as you can. If needed you can take over the counter medications for constipation such as laxatives or bulking psyllium products.     Activity: You should continue to be active at home, including ambulating frequently.  If possible try to limit the amount of time spent in bed.    Restrictions: You should not lift greater than 15 pounds for 2 weeks, and will want toavoid strenuous physical activity for 1-2 weeks.  You should limit your physical activity if it causes you discomfort; however, this should resolve within 1-2 weeks.   Walking does not count as strenuous physical activity.You are safe to walk up and down stairs.  Following 2 weeks you may resume all normal physical activity.    Wound / drain care: You  may remove your Band-aids after a period of 24 hours.  The small white strips on the incisions act like artificial scabs, and will begin to peel at the edges at around 5-7 days.  These can then be removed.     It is normal to have a small rim of red present around the incisions. This should not, however,extend beyond 1/4 inch from the incision.  If your incisions become increasingly tender, red, or draining, please contact us.       Bathing: You may shower after 24 hours from surgery.  It is ok to get your incisionswet, but avoid rubbing them.  Avoid soaking in bath tubs, or swimming in lakes, pools, or streams for 2 weeks following surgery.

## 2023-07-03 NOTE — PLAN OF CARE
Pt A&Ox4. VSS on RA. Dressing abdomen c/d/i. Voiding adequately. Passing gas, bowel sounds active. Up with sba to the bathroom. Pain managed with scheduled tylenol. LULY drain patent, serosanguineous drainage noted. Zosyn infusing. Needs encouragement with oral intake. Discharge home today pending.

## 2023-07-03 NOTE — PROGRESS NOTES
Care Management Discharge Note    Discharge Date: 07/03/2023       Discharge Disposition: Home    Discharge Services: None    Discharge DME:  (per treatment team)    Discharge Transportation: family or friend will provide    Private pay costs discussed: Not applicable    Does the patient's insurance plan have a 3 day qualifying hospital stay waiver?  No    PAS Confirmation Code: per treatment team  Patient/family educated on Medicare website which has current facility and service quality ratings:  (not applicable)    Education Provided on the Discharge Plan: Yes  Persons Notified of Discharge Plans: patient   Patient/Family in Agreement with the Plan: yes    Handoff Referral Completed: not applicable    Additional Information:  Patient discharging home to prior living environment.  No CM needs identified. Family to transport.         YOVANI Javier

## 2023-07-03 NOTE — DISCHARGE SUMMARY
"t  [facility]  Hospitalist Discharge Summary      Date of Admission:  6/30/2023  Date of Discharge:  7/3/2023  Discharging Provider: Rogelio Jimenez DO  Discharge Service: Hospitalist Service    Discharge Diagnoses   Acute cholecystitis  Status post laparoscopic cholecystectomy  Essential hypertension  Diabetes mellitus type 2 without long-term insulin use  Dyslipidemia  Cirrhosis of liver with ascites  Iron deficiency anemia  Chronic kidney disease stage IIIb    Clinically Significant Risk Factors     # DMII: A1C = N/A within past 6 months  # Overweight: Estimated body mass index is 28.41 kg/m  as calculated from the following:    Height as of this encounter: 1.778 m (5' 10\").    Weight as of this encounter: 89.8 kg (197 lb 15.6 oz).       Follow-ups Needed After Discharge   Follow-up Appointments     Follow-up and recommended labs and tests       Follow up with Dr. Argueta or NP/PA , at [facility] surgery   clinic, within 10-14 days  to evaluate after surgery. No follow up labs or   test are needed.            Discharge Disposition   Discharged to home  Condition at discharge: Stable    Hospital Course   77-year-old male with history of liver cirrhosis secondary to [place] with ascites, presenting with acute cholecystitis.  Underwent laparoscopic cholecystectomy without complication.  Patient tolerated postoperative course well.  Was tolerating diet.  Pain controlled with Tylenol.  To follow-up with general surgery at discharge.  LULY drain was removed prior to discharge.    Consultations This Hospital Stay   SURGERY GENERAL IP CONSULT  CARE MANAGEMENT / SOCIAL WORK IP CONSULT  GASTROENTEROLOGY IP CONSULT    Code Status   Full Code    Time Spent on this Encounter   I, Rogelio Jimenez DO, personally saw the patient today and spent greater than 30 minutes discharging this patient.       Rogelio Jimenez DO  [facility] [address]  [address] " [place]  [facility] [address]  Phone: [phone]  Fax: [phone]  ______________________________________________________________________    Physical Exam   Vital Signs: Temp: 98.2  F (36.8  C) Temp src: Oral BP: 120/61 Pulse: 58   Resp: 16 SpO2: 94 % O2 Device: None (Room air)    Weight: 197 lbs 15.57 oz  Dressing dry and intact.  Abdomen: wound clean and dry with minimal or no drainage.  Surrounding skin with minimal erythema.       Primary Care Physician   Flaco Lopez    Discharge Orders      Reason for your hospital stay    Acute cholecystitis status post laparoscopic cholecystectomy.     Follow-up and recommended labs and tests     Follow up with Dr. Argueta or NP/PA , at [facility] surgery clinic, within 10-14 days  to evaluate after surgery. No follow up labs or test are needed.     Activity    Your activity upon discharge: no lifting, driving, or strenuous exercise for 2 weeks     When to contact your care team    Call your primary doctor if you have any of the following: temperature greater than 100.4 degrees, increased drainage, increased swelling, or increased pain.     Diet    Follow this diet upon discharge: Orders Placed This Encounter      Regular diet.       Significant Results and Procedures   Most Recent 3 CBC's:Recent Labs   Lab Test 07/02/23  0654 07/01/23  0833 06/30/23  0334   WBC 9.5 12.4* 6.3   HGB 10.0* 10.3* 11.2*   MCV 94 91 92   PLT 77* 84* 110*     Most Recent 3 BMP's:Recent Labs   Lab Test 07/03/23  1202 07/03/23  0719 07/03/23  0304 07/02/23  0856 07/02/23  0654 07/01/23  0855 07/01/23  0833 06/30/23  1153 06/30/23  0334   NA  --   --   --   --  135*  --  135*  --  133*   POTASSIUM  --   --   --   --  5.0  --  4.1  --  4.3   CHLORIDE  --   --   --   --  103  --  103  --  100   CO2  --   --   --   --  23  --  22  --  25   BUN  --   --   --   --  22  --  17  --  17   CR  --   --   --   --  1.52*  --  1.46*  --  1.44*   ANIONGAP  --   --   --   --  9  --  10  --  8   CINDY   --   --   --   --  8.5  --  8.2*  --  8.9   * 142* 130*   < > 176*   < > 133*   < > 162*    < > = values in this interval not displayed.     Most Recent 2 LFT's:Recent Labs   Lab Test 07/02/23  0654 07/01/23  0833   AST 32 19   ALT 22 17   ALKPHOS 78 72   BILITOTAL 1.1* 1.4*   ,   Results for orders placed or performed during the hospital encounter of 06/30/23   CT Abdomen Pelvis w Contrast    Narrative    EXAM: CT ABDOMEN PELVIS W CONTRAST  LOCATION: [facility]  DATE: 6/30/2023    INDICATION: abd pain  COMPARISON: US 06/29/2023 CT 06/16/2023  TECHNIQUE: CT scan of the abdomen and pelvis was performed following injection of IV contrast. Multiplanar reformats were obtained. Dose reduction techniques were used.  CONTRAST: [id number] 75ml    FINDINGS:   LOWER CHEST: Infrahilar traction bronchiectasis.    HEPATOBILIARY: Gallstone. Wall thickening. Fat stranding.    PANCREAS: Pancreatic calcifications head neck. Peripancreatic fat stranding head neck. Trace free fluid tracking along right paracolic gutter.    SPLEEN: Normal.    ADRENAL GLANDS: Normal.    KIDNEYS/BLADDER: Right renal stone. No hydronephrosis.    BOWEL: Diverticulosis distal descending to proximal sigmoid colon.    LYMPH NODES: Normal.    VASCULATURE: Atherosclerosis. Gastroesophageal varices.    PELVIC ORGANS: Prostate calcifications.    MUSCULOSKELETAL: Demineralization. Degenerative changes.      Impression    IMPRESSION:   1.  Suspect acute on chronic pancreatitis.  2.  Acute cholecystitis.   NM HepatOBiliary Scan    Narrative    EXAM: NM HEPATOBILIARY SCAN  LOCATION: [facility]  DATE: 7/1/2023    INDICATION: Question cholecystitis, liver cirrhosis.  COMPARISON: CT 06/30/2023.  TECHNIQUE: 8.3 mCi of Tc-99m mebrofenin, IV. Anterior planar imaging of the abdomen.     FINDINGS: There is prompt radiotracer uptake by the liver and excretion into the biliary system. This flows freely into the second  portion of the duodenum and the small bowel. No evidence for common bile duct obstruction. The cystic duct and gallbladder   do not opacify despite imaging to 4 hours. Delayed images show hyperemia and radiotracer localization adjacent to the gallbladder fossa. Constellation of findings would suggest acute cholecystitis.      Impression    IMPRESSION:   Acute cholecystitis. Findings reviewed with Dr. Jadiel Argueta.             Discharge Medications   Current Discharge Medication List      START taking these medications    Details   acetaminophen (TYLENOL) 325 MG tablet Take 2 tablets (650 mg) by mouth every 6 hours as needed for mild pain    Associated Diagnoses: S/P laparoscopic cholecystectomy      amoxicillin-clavulanate (AUGMENTIN) 875-125 MG tablet Take 1 tablet by mouth 2 times daily  Qty: 10 tablet, Refills: 0    Associated Diagnoses: Acute cholecystitis; S/P laparoscopic cholecystectomy         CONTINUE these medications which have NOT CHANGED    Details   atorvastatin (LIPITOR) 40 MG tablet TAKE 1 TABLET BY MOUTH DAILY  Qty: 90 tablet, Refills: 2    Associated Diagnoses: Hypercholesterolemia      cyanocobalamin, vitamin B-12, 2,500 mcg Tab [CYANOCOBALAMIN, VITAMIN B-12, 2,500 MCG TAB] Take 2,500 mcg by mouth daily.      ferrous sulfate (FEROSUL) 325 (65 Fe) MG tablet Take 1 tablet (325 mg) by mouth 3 times daily (with meals)  Qty: 90 tablet, Refills: 3    Associated Diagnoses: Iron deficiency anemia due to chronic blood loss      inFLIXimab (REMICADE IV) Every 6weeks      lactobacillus rhamnosus, GG, (CULTURELL) capsule Take 1 capsule by mouth 3 times daily (before meals)    Associated Diagnoses: Bacteremia      lisinopril (ZESTRIL) 10 MG tablet Take 1 tablet (10 mg) by mouth daily  Qty: 90 tablet, Refills: 3    Associated Diagnoses: Essential hypertension, malignant      magnesium oxide 250 mg Tab [MAGNESIUM OXIDE 250 MG TAB] Take 250 mg by mouth 2 (two) times a day.      metFORMIN (GLUCOPHAGE) 500 MG  tablet TAKE 2 TABLETS BY MOUTH TWICE DAILY AT 6 AM AND AT 4 PM  Qty: 360 tablet, Refills: 3    Associated Diagnoses: Type 2 diabetes mellitus (H)      omeprazole (PRILOSEC) 40 MG DR capsule Take 1 capsule (40 mg) by mouth daily  Qty: 90 capsule, Refills: 4    Associated Diagnoses: Gastroesophageal reflux disease with esophagitis without hemorrhage      pioglitazone (ACTOS) 30 MG tablet TAKE 1 TABLET BY MOUTH EVERY DAY  Qty: 90 tablet, Refills: 3    Associated Diagnoses: Type 2 diabetes mellitus with hyperglycemia, without long-term current use of insulin (H)      propranolol (INDERAL) 20 MG tablet Take 1 tablet (20 mg) by mouth 2 times daily  Qty: 180 tablet, Refills: 3    Associated Diagnoses: Essential hypertension, benign      sucralfate (CARAFATE) 1 GM/10ML suspension Take 10 mLs (1 g) by mouth 4 times daily  Qty: 414 mL, Refills: 0      tamsulosin (FLOMAX) 0.4 MG capsule TAKE 1 CAPSULE BY MOUTH DAILY AFTER AND SUPPER  Qty: 90 capsule, Refills: 4    Associated Diagnoses: Benign prostatic hyperplasia with urinary frequency      blood glucose (ACCU-CHEK JACOB PLUS) test strip 1 strip by In Vitro route 2 times daily  Qty: 200 strip, Refills: 11    Associated Diagnoses: Type 2 diabetes mellitus with complication, without long-term current use of insulin (H)      blood glucose meter (GLUCOMETER) [BLOOD GLUCOSE METER (GLUCOMETER)] Use 1 each As Directed 2 (two) times a day. Dispense glucometer brand per patient's insurance at pharmacy discretion.  Qty: 1 each, Refills: 0    Associated Diagnoses: Type 2 diabetes mellitus with hyperglycemia, without long-term current use of insulin (H)      blood glucose monitoring (SOFTCLIX) lancets Use 1 each As Directed 2 (two) times a day. Dispense brand per patient's insurance at pharmacy discretion. - Does not apply  Qty: 100 each, Refills: 3    Comments: Ok to swap for appropriate brand  Associated Diagnoses: Type 2 diabetes mellitus with hyperglycemia, without long-term current  use of insulin (H)      generic lancets (ACCU-CHEK SOFTCLIX LANCETS) [GENERIC LANCETS (ACCU-CHEK SOFTCLIX LANCETS)] Use 1 each As Directed 2 (two) times a day. Dispense brand per patient's insurance at pharmacy discretion.  Qty: 100 each, Refills: 3    Associated Diagnoses: Type 2 diabetes mellitus with hyperglycemia, without long-term current use of insulin (H)         STOP taking these medications       oxyCODONE (ROXICODONE) 5 MG tablet Comments:   Reason for Stopping:             Allergies   Allergies   Allergen Reactions     Morphine Nausea and Vomiting     Scopolamine Hbr [Scopolamine] Unknown

## 2023-07-03 NOTE — PROGRESS NOTES
ASSESSMENT:  1. Acute cholecystitis        Frank Obando is a 77 year old male who is s/p laparoscopic cholecystectomy with Dr. Argueta on 7/1 POD #2.  Doing well with serous output in LULY.    PLAN:  -Okay from our standpoint to discharge to home when medically ready.  -LULY can be removed prior to discharge.  -Home on antibiotics 5 days.   -We will place his discharge recommendations in.    SUBJECTIVE:   He is laying okay without any complaints.  Little sore on the right side where his drain is.      Patient Vitals for the past 24 hrs:   BP Temp Temp src Pulse Resp SpO2 Weight   07/03/23 0717 121/67 98.3  F (36.8  C) Oral 53 16 94 % --   07/03/23 0500 -- -- -- -- -- -- 89.8 kg (197 lb 15.6 oz)   07/03/23 0420 126/66 98.2  F (36.8  C) Oral 57 16 95 % --   07/03/23 0002 130/74 97.9  F (36.6  C) Oral 56 16 95 % --   07/02/23 2028 -- -- -- 64 -- 96 % --   07/02/23 1928 117/69 97.7  F (36.5  C) Oral 66 18 96 % --   07/02/23 1536 (!) 146/65 97.3  F (36.3  C) Oral 54 18 97 % --   07/02/23 1154 115/62 97.6  F (36.4  C) Oral 52 16 98 % --         PHYSICAL EXAM:  GEN: No acute distress, comfortable    ABD: Soft and expected TTP  Drains: Serous  Output by Drain (mL) 07/01/23 0700 - 07/01/23 1459 07/01/23 1500 - 07/01/23 2259 07/01/23 2300 - 07/02/23 0659 07/02/23 0700 - 07/02/23 1459 07/02/23 1500 - 07/02/23 2259 07/02/23 2300 - 07/03/23 0659 07/03/23 0700 - 07/03/23 1123   Closed/Suction Drain 1 Superior Abdomen Bulb 19 [place]  80  80 50 20       EXT:No cyanosis, edema or obvious abnormalities    07/02 0700 - 07/03 0659  In: 240 [P.O.:240]  Out: 350 [Urine:200; Drains:150]    Admission on 06/30/2023   Component Date Value     Hold Specimen 06/30/2023 JIC      Hold Specimen 06/30/2023 JIC      Hold Specimen 06/30/2023 JI      Sodium 06/30/2023 133 (L)      Potassium 06/30/2023 4.3      Chloride 06/30/2023 100      Carbon Dioxide (CO2) 06/30/2023 25      Anion Gap 06/30/2023 8      Urea Nitrogen 06/30/2023 17       Creatinine 06/30/2023 1.44 (H)      Calcium 06/30/2023 8.9      Glucose 06/30/2023 162 (H)      Alkaline Phosphatase 06/30/2023 83      AST 06/30/2023 24      ALT 06/30/2023 21      Protein Total 06/30/2023 7.5      Albumin 06/30/2023 3.7      Bilirubin Total 06/30/2023 0.9      GFR Estimate 06/30/2023 50 (L)      Lipase 06/30/2023 18      Lactic Acid 06/30/2023 1.1      WBC Count 06/30/2023 6.3      RBC Count 06/30/2023 3.61 (L)      Hemoglobin 06/30/2023 11.2 (L)      Hematocrit 06/30/2023 33.3 (L)      MCV 06/30/2023 92      MCH 06/30/2023 31.0      MCHC 06/30/2023 33.6      RDW 06/30/2023 13.2      Platelet Count 06/30/2023 110 (L)      % Neutrophils 06/30/2023 63      % Lymphocytes 06/30/2023 23      % Monocytes 06/30/2023 12      % Eosinophils 06/30/2023 1      % Basophils 06/30/2023 1      % Immature Granulocytes 06/30/2023 0      NRBCs per 100 WBC 06/30/2023 0      Absolute Neutrophils 06/30/2023 4.0      Absolute Lymphocytes 06/30/2023 1.5      Absolute Monocytes 06/30/2023 0.7      Absolute Eosinophils 06/30/2023 0.1      Absolute Basophils 06/30/2023 0.0      Absolute Immature Granul* 06/30/2023 0.0      Absolute NRBCs 06/30/2023 0.0      GLUCOSE BY METER POCT 06/30/2023 154 (H)      GLUCOSE BY METER POCT 06/30/2023 155 (H)      GLUCOSE BY METER POCT 06/30/2023 140 (H)      GLUCOSE BY METER POCT 06/30/2023 122 (H)      Lactic Acid 06/30/2023 1.3      WBC Count 07/01/2023 12.4 (H)      RBC Count 07/01/2023 3.34 (L)      Hemoglobin 07/01/2023 10.3 (L)      Hematocrit 07/01/2023 30.3 (L)      MCV 07/01/2023 91      MCH 07/01/2023 30.8      MCHC 07/01/2023 34.0      RDW 07/01/2023 13.4      Platelet Count 07/01/2023 84 (L)      Sodium 07/01/2023 135 (L)      Potassium 07/01/2023 4.1      Chloride 07/01/2023 103      Carbon Dioxide (CO2) 07/01/2023 22      Anion Gap 07/01/2023 10      Urea Nitrogen 07/01/2023 17      Creatinine 07/01/2023 1.46 (H)      Calcium 07/01/2023 8.2 (L)      Glucose 07/01/2023 133 (H)       Alkaline Phosphatase 07/01/2023 72      AST 07/01/2023 19      ALT 07/01/2023 17      Protein Total 07/01/2023 6.8      Albumin 07/01/2023 3.0 (L)      Bilirubin Total 07/01/2023 1.4 (H)      GFR Estimate 07/01/2023 50 (L)      Lipase 07/01/2023 14      GLUCOSE BY METER POCT 07/01/2023 130 (H)      GLUCOSE BY METER POCT 07/01/2023 124 (H)      GLUCOSE BY METER POCT 07/01/2023 134 (H)      INR 07/01/2023 1.34 (H)      AFP tumor marker 07/01/2023 <1.8      GLUCOSE BY METER POCT 07/01/2023 113 (H)      GLUCOSE BY METER POCT 07/01/2023 122 (H)      GLUCOSE BY METER POCT 07/01/2023 140 (H)      GLUCOSE BY METER POCT 07/01/2023 171 (H)      Sodium 07/02/2023 135 (L)      Potassium 07/02/2023 5.0      Chloride 07/02/2023 103      Carbon Dioxide (CO2) 07/02/2023 23      Anion Gap 07/02/2023 9      Urea Nitrogen 07/02/2023 22      Creatinine 07/02/2023 1.52 (H)      Calcium 07/02/2023 8.5      Glucose 07/02/2023 176 (H)      Alkaline Phosphatase 07/02/2023 78      AST 07/02/2023 32      ALT 07/02/2023 22      Protein Total 07/02/2023 7.0      Albumin 07/02/2023 2.8 (L)      Bilirubin Total 07/02/2023 1.1 (H)      GFR Estimate 07/02/2023 47 (L)      GLUCOSE BY METER POCT 07/02/2023 173 (H)      GLUCOSE BY METER POCT 07/02/2023 186 (H)      WBC Count 07/02/2023 9.5      RBC Count 07/02/2023 3.29 (L)      Hemoglobin 07/02/2023 10.0 (L)      Hematocrit 07/02/2023 30.9 (L)      MCV 07/02/2023 94      MCH 07/02/2023 30.4      MCHC 07/02/2023 32.4      RDW 07/02/2023 13.2      Platelet Count 07/02/2023 77 (L)      % Neutrophils 07/02/2023 88      % Lymphocytes 07/02/2023 7      % Monocytes 07/02/2023 4      % Eosinophils 07/02/2023 0      % Basophils 07/02/2023 0      % Immature Granulocytes 07/02/2023 1      NRBCs per 100 WBC 07/02/2023 0      Absolute Neutrophils 07/02/2023 8.4 (H)      Absolute Lymphocytes 07/02/2023 0.6 (L)      Absolute Monocytes 07/02/2023 0.4      Absolute Eosinophils 07/02/2023 0.0      Absolute  Basophils 07/02/2023 0.0      Absolute Immature Granul* 07/02/2023 0.1      Absolute NRBCs 07/02/2023 0.0      GLUCOSE BY METER POCT 07/02/2023 147 (H)      GLUCOSE BY METER POCT 07/02/2023 209 (H)      GLUCOSE BY METER POCT 07/02/2023 180 (H)      GLUCOSE BY METER POCT 07/02/2023 166 (H)      GLUCOSE BY METER POCT 07/03/2023 130 (H)      GLUCOSE BY METER POCT 07/03/2023 142 (H)           CINDA Brooke  [facility]  [address] [place] [address]  Phone- [phone]  Fax- [phone]

## 2023-07-05 ENCOUNTER — TELEPHONE (OUTPATIENT)
Dept: SURGERY | Facility: CLINIC | Age: 78
End: 2023-07-05
Payer: MEDICARE

## 2023-07-05 ENCOUNTER — PATIENT OUTREACH (OUTPATIENT)
Dept: CARE COORDINATION | Facility: CLINIC | Age: 78
End: 2023-07-05
Payer: MEDICARE

## 2023-07-05 NOTE — PROGRESS NOTES
Clinic Care Coordination Contact  [facility]: Post-Discharge Note  SITUATION                                                      Admission:    Admission Date: 06/30/23   Reason for Admission: with acute cholecystitis  Discharge:   Discharge Date: 07/04/23  Discharge Diagnosis: Acute cholecystitis    BACKGROUND                                                      Per hospital discharge summary and inpatient provider notes:  Frank Obando is a 77 year old male who presented to the ER for second visit in 3 days complaining of epigastric and right upper quadrant pain.  Patient had been seen 2 days ago and had imaging that revealed gallstones and may be early cholecystitis.  LFTs were normal and he elected to go home.  Upon going home he had 2 more episodes of pain and ultimately came back.  CT scan of the abdomen and pelvis revealed findings consistent with cholecystitis.  LFTs and lipase continue to be normal.  Also signs of potential acute on chronic pancreatitis.  General surgery is consulted.  Patient is received Zosyn.  Has had no fevers or chills.  He is not sure if the pain is worse after eating but thinks it may have some association with food.  Said no diarrhea.  He does tend to be constipated.  He has chronic iron deficiency anemia and takes iron so his stools are always dark.  Blood sugars have been well controlled.       ASSESSMENT           Discharge Assessment  How are you doing now that you are home?: fine  How are your symptoms? (Red Flag symptoms escalate to triage hotline per guidelines): Improved  Do you feel your condition is stable enough to be safe at home until your provider visit?: Yes  Does the patient have their discharge instructions? : Yes  Does the patient have questions regarding their discharge instructions? : No  Were you started on any new medications or were there changes to any of your previous medications? : Yes  Does the patient have all of their medications?: Yes  Do  you have questions regarding any of your medications? : No  Do you have all of your needed medical supplies or equipment (DME)?  (i.e. oxygen tank, CPAP, cane, etc.): Yes  Discharge follow-up appointment scheduled within 14 calendar days? : Yes  Discharge Follow Up Appointment Scheduled with?: Primary Care Provider                  PLAN                                                      Outpatient Plan:  Follow up with Dr. Argueta or NP/PA , at [facility] surgery  clinic, within 10-14 days to evaluate after surgery. No follow up labs or  test are needed.    Future Appointments   Date Time Provider Department Center   7/6/2023  9:20 AM Flaco Lopez MD [facility]   7/13/2023 10:30 AM WW INFUSION CHAIR 6 [facility]   9/14/2023 10:15 AM [facility] LAB [facility]IL[facility]   9/19/2023 10:00 AM Prerna Chong MBBS [facility]U [facility]         For any urgent concerns, please contact our 24 hour nurse triage line: [phone])         Hermelinda Treadwell MA

## 2023-07-05 NOTE — TELEPHONE ENCOUNTER
Patient had a lap lauren on 7/1 with Dr Argueta and has an appointment with Infusion on 7/13. He would like to know if it's ok to keep that appt. He can be reached at [phone]

## 2023-07-06 ENCOUNTER — OFFICE VISIT (OUTPATIENT)
Dept: INTERNAL MEDICINE | Facility: CLINIC | Age: 78
End: 2023-07-06
Payer: MEDICARE

## 2023-07-06 VITALS
BODY MASS INDEX: 21.92 KG/M2 | HEIGHT: 70 IN | OXYGEN SATURATION: 97 % | TEMPERATURE: 97.1 F | HEART RATE: 72 BPM | SYSTOLIC BLOOD PRESSURE: 122 MMHG | RESPIRATION RATE: 19 BRPM | WEIGHT: 153.1 LBS | DIASTOLIC BLOOD PRESSURE: 77 MMHG

## 2023-07-06 DIAGNOSIS — K74.60 CIRRHOSIS OF LIVER WITH ASCITES, UNSPECIFIED HEPATIC CIRRHOSIS TYPE (H): ICD-10-CM

## 2023-07-06 DIAGNOSIS — E11.42 DIABETIC POLYNEUROPATHY ASSOCIATED WITH TYPE 2 DIABETES MELLITUS (H): ICD-10-CM

## 2023-07-06 DIAGNOSIS — N18.32 STAGE 3B CHRONIC KIDNEY DISEASE (H): ICD-10-CM

## 2023-07-06 DIAGNOSIS — R18.8 CIRRHOSIS OF LIVER WITH ASCITES, UNSPECIFIED HEPATIC CIRRHOSIS TYPE (H): ICD-10-CM

## 2023-07-06 DIAGNOSIS — I10 ESSENTIAL HYPERTENSION, BENIGN: ICD-10-CM

## 2023-07-06 DIAGNOSIS — K81.0 ACUTE CHOLECYSTITIS: Primary | Chronic | ICD-10-CM

## 2023-07-06 DIAGNOSIS — M06.9 RHEUMATOID ARTHRITIS, INVOLVING UNSPECIFIED SITE, UNSPECIFIED WHETHER RHEUMATOID FACTOR PRESENT (H): ICD-10-CM

## 2023-07-06 DIAGNOSIS — E11.8 TYPE 2 DIABETES MELLITUS WITH COMPLICATION, WITHOUT LONG-TERM CURRENT USE OF INSULIN (H): Chronic | ICD-10-CM

## 2023-07-06 PROBLEM — K86.1 CHRONIC PANCREATITIS (H): Chronic | Status: RESOLVED | Noted: 2023-07-02 | Resolved: 2023-07-06

## 2023-07-06 LAB
ERYTHROCYTE [DISTWIDTH] IN BLOOD BY AUTOMATED COUNT: 13.2 % (ref 10–15)
HBA1C MFR BLD: 6.7 % (ref 0–5.6)
HCT VFR BLD AUTO: 33.1 % (ref 40–53)
HGB BLD-MCNC: 11 G/DL (ref 13.3–17.7)
MCH RBC QN AUTO: 30.4 PG (ref 26.5–33)
MCHC RBC AUTO-ENTMCNC: 33.2 G/DL (ref 31.5–36.5)
MCV RBC AUTO: 91 FL (ref 78–100)
PATH REPORT.COMMENTS IMP SPEC: NORMAL
PATH REPORT.COMMENTS IMP SPEC: NORMAL
PATH REPORT.FINAL DX SPEC: NORMAL
PATH REPORT.GROSS SPEC: NORMAL
PATH REPORT.MICROSCOPIC SPEC OTHER STN: NORMAL
PATH REPORT.RELEVANT HX SPEC: NORMAL
PHOTO IMAGE: NORMAL
PLATELET # BLD AUTO: 145 10E3/UL (ref 150–450)
RBC # BLD AUTO: 3.62 10E6/UL (ref 4.4–5.9)
WBC # BLD AUTO: 8 10E3/UL (ref 4–11)

## 2023-07-06 PROCEDURE — 36415 COLL VENOUS BLD VENIPUNCTURE: CPT | Performed by: INTERNAL MEDICINE

## 2023-07-06 PROCEDURE — 83036 HEMOGLOBIN GLYCOSYLATED A1C: CPT | Performed by: INTERNAL MEDICINE

## 2023-07-06 PROCEDURE — 88304 TISSUE EXAM BY PATHOLOGIST: CPT | Mod: 26 | Performed by: PATHOLOGY

## 2023-07-06 PROCEDURE — 99214 OFFICE O/P EST MOD 30 MIN: CPT | Performed by: INTERNAL MEDICINE

## 2023-07-06 PROCEDURE — 85027 COMPLETE CBC AUTOMATED: CPT | Performed by: INTERNAL MEDICINE

## 2023-07-06 ASSESSMENT — PAIN SCALES - GENERAL: PAINLEVEL: MILD PAIN (2)

## 2023-07-06 ASSESSMENT — PATIENT HEALTH QUESTIONNAIRE - PHQ9: SUM OF ALL RESPONSES TO PHQ QUESTIONS 1-9: 7

## 2023-07-06 NOTE — PROGRESS NOTES
1. Acute cholecystitis  Status post laparoscopic cholecystectomy, pathology pending, finishing Augmentin  - CBC with platelets; Future  - CBC with platelets    2. Cirrhosis of liver with ascites, unspecified hepatic cirrhosis type (H)  Likely secondary to methotrexate    3. Diabetic polyneuropathy associated with type 2 diabetes mellitus (H)  Stable    4. Essential hypertension, benign  Well-controlled    5. Type 2 diabetes mellitus with complication, without long-term current use of insulin (H)  Has been well controlled continue same  - Hemoglobin A1c; Future  - Hemoglobin A1c    6. Stage 3b chronic kidney disease (H)  Has been stable    7. Rheumatoid arthritis, involving unspecified site, unspecified whether rheumatoid factor present (H)  Per rheumatology, will check on Remicade dosing postsurgery    Jesse Alvarez is a 77 year old, presenting for the following health issues:  Follow Up (Follow up for diabetes issues. Patient released from hospital on 7/3 stomach issues.)        7/6/2023     9:01 AM   Additional Questions   Roomed by Alexandra DAVIDSON     History of Present Illness       Diabetes:   He presents for follow up of diabetes.  He is checking home blood glucose a few times a month. He checks blood glucose at bedtime.  Blood glucose is never over 200 and never under 70. He is aware of hypoglycemia symptoms including weakness. He has no concerns regarding his diabetes at this time.  He is having numbness in feet, excessive thirst and blurry vision.         Hypertension: He presents for follow up of hypertension.  He does not check blood pressure  regularly outside of the clinic. Outpatient blood pressures have not been over 140/90. He does not follow a low salt diet.     He eats 0-1 servings of fruits and vegetables daily.He consumes 2 sweetened beverage(s) daily.He exercises with enough effort to increase his heart rate 9 or less minutes per day.  He exercises with enough effort to increase his heart rate 3  "or less days per week. He is missing 1 dose(s) of medications per week.  He is not taking prescribed medications regularly due to remembering to take.           7/5/2023     2:48 PM   Post Discharge Outreach   Admission Date 6/30/2023   Reason for Admission with acute cholecystitis   Discharge Date 7/4/2023   Discharge Diagnosis Acute cholecystitis   How are you doing now that you are home? fine   How are your symptoms? (Red Flag symptoms escalate to triage hotline per guidelines) Improved   Do you feel your condition is stable enough to be safe at home until your provider visit? Yes   Does the patient have their discharge instructions?  Yes   Does the patient have questions regarding their discharge instructions?  No   Were you started on any new medications or were there changes to any of your previous medications?  Yes   Does the patient have all of their medications? Yes   Do you have questions regarding any of your medications?  No   Do you have all of your needed medical supplies or equipment (DME)?  (i.e. oxygen tank, CPAP, cane, etc.) Yes   Discharge follow-up appointment scheduled within 14 calendar days?  Yes   Discharge Follow Up Appointment Scheduled with? Primary Care Provider     Hospital Follow-up Visit:    Hospital/Nursing Home/IP Rehab Facility: [facility]  Date of Admission: 6/30/23  Date of Discharge: 7/3/23  Reason(s) for Admission: Gallbladder    Was your hospitalization related to COVID-19? No   Problems taking medications regularly:  None  Medication changes since discharge:  Yes  DEFAULTED:104662::\"None\"}  Problems adhering to non-medication therapy:  None    Summary of hospitalization:  [facility] discharge summary reviewed  Diagnostic Tests/Treatments reviewed.  Follow up needed: Pathology on gallbladder  Other Healthcare Providers Involved in Patient s Care:         None  Update since discharge: stable.         Plan of care communicated with " "patient and family               Review of Systems       Objective    /77   Pulse 72   Temp 97.1  F (36.2  C) (Tympanic)   Resp 19   Ht 1.778 m (5' 10\")   Wt 69.4 kg (153 lb 1.6 oz)   SpO2 97%   BMI 21.97 kg/m    Body mass index is 21.97 kg/m .  Physical Exam   His incisions are clean dry and intact                    "

## 2023-07-06 NOTE — TELEPHONE ENCOUNTER
I spoke with Antonio and he has decided to postpone his Remicade infusion for a week to allow more time for healing from his cholecystectomy. Overall he is doing very well and has no concerns for infection. Encouraged him to call back if he has any additional questions or concerns.

## 2023-07-11 ENCOUNTER — TRANSFERRED RECORDS (OUTPATIENT)
Dept: HEALTH INFORMATION MANAGEMENT | Facility: CLINIC | Age: 78
End: 2023-07-11
Payer: MEDICARE

## 2023-07-20 ENCOUNTER — INFUSION THERAPY VISIT (OUTPATIENT)
Dept: INFUSION THERAPY | Facility: CLINIC | Age: 78
End: 2023-07-20
Attending: INTERNAL MEDICINE
Payer: MEDICARE

## 2023-07-20 VITALS
DIASTOLIC BLOOD PRESSURE: 60 MMHG | HEART RATE: 68 BPM | OXYGEN SATURATION: 96 % | RESPIRATION RATE: 16 BRPM | SYSTOLIC BLOOD PRESSURE: 107 MMHG | TEMPERATURE: 97.9 F

## 2023-07-20 DIAGNOSIS — M06.9 RA (RHEUMATOID ARTHRITIS) (H): Primary | ICD-10-CM

## 2023-07-20 DIAGNOSIS — H44.112 PANUVEITIS OF LEFT EYE: ICD-10-CM

## 2023-07-20 PROCEDURE — 258N000003 HC RX IP 258 OP 636: Performed by: INTERNAL MEDICINE

## 2023-07-20 PROCEDURE — 250N000011 HC RX IP 250 OP 636: Mod: JZ | Performed by: INTERNAL MEDICINE

## 2023-07-20 PROCEDURE — 96375 TX/PRO/DX INJ NEW DRUG ADDON: CPT

## 2023-07-20 PROCEDURE — 250N000013 HC RX MED GY IP 250 OP 250 PS 637: Performed by: INTERNAL MEDICINE

## 2023-07-20 PROCEDURE — 96413 CHEMO IV INFUSION 1 HR: CPT

## 2023-07-20 RX ORDER — ALBUTEROL SULFATE 0.83 MG/ML
2.5 SOLUTION RESPIRATORY (INHALATION)
Status: CANCELLED | OUTPATIENT
Start: 2023-08-25

## 2023-07-20 RX ORDER — MEPERIDINE HYDROCHLORIDE 50 MG/ML
25 INJECTION INTRAMUSCULAR; INTRAVENOUS; SUBCUTANEOUS EVERY 30 MIN PRN
Status: DISCONTINUED | OUTPATIENT
Start: 2023-07-20 | End: 2023-07-20 | Stop reason: HOSPADM

## 2023-07-20 RX ORDER — MEPERIDINE HYDROCHLORIDE 50 MG/ML
25 INJECTION INTRAMUSCULAR; INTRAVENOUS; SUBCUTANEOUS EVERY 30 MIN PRN
Status: CANCELLED | OUTPATIENT
Start: 2023-08-25

## 2023-07-20 RX ORDER — HEPARIN SODIUM (PORCINE) LOCK FLUSH IV SOLN 100 UNIT/ML 100 UNIT/ML
5 SOLUTION INTRAVENOUS
Status: CANCELLED | OUTPATIENT
Start: 2023-08-25

## 2023-07-20 RX ORDER — ALBUTEROL SULFATE 0.83 MG/ML
2.5 SOLUTION RESPIRATORY (INHALATION)
Status: DISCONTINUED | OUTPATIENT
Start: 2023-07-20 | End: 2023-07-20 | Stop reason: HOSPADM

## 2023-07-20 RX ORDER — ALBUTEROL SULFATE 90 UG/1
1-2 AEROSOL, METERED RESPIRATORY (INHALATION)
Status: CANCELLED
Start: 2023-08-25

## 2023-07-20 RX ORDER — METHYLPREDNISOLONE SODIUM SUCCINATE 125 MG/2ML
125 INJECTION, POWDER, LYOPHILIZED, FOR SOLUTION INTRAMUSCULAR; INTRAVENOUS
Status: DISCONTINUED | OUTPATIENT
Start: 2023-07-20 | End: 2023-07-20 | Stop reason: HOSPADM

## 2023-07-20 RX ORDER — DIPHENHYDRAMINE HYDROCHLORIDE 50 MG/ML
50 INJECTION INTRAMUSCULAR; INTRAVENOUS
Status: CANCELLED
Start: 2023-08-25

## 2023-07-20 RX ORDER — ALBUTEROL SULFATE 90 UG/1
1-2 AEROSOL, METERED RESPIRATORY (INHALATION)
Status: DISCONTINUED | OUTPATIENT
Start: 2023-07-20 | End: 2023-07-20 | Stop reason: HOSPADM

## 2023-07-20 RX ORDER — METHYLPREDNISOLONE SODIUM SUCCINATE 125 MG/2ML
125 INJECTION, POWDER, LYOPHILIZED, FOR SOLUTION INTRAMUSCULAR; INTRAVENOUS ONCE
Status: CANCELLED | OUTPATIENT
Start: 2023-08-25 | End: 2023-08-25

## 2023-07-20 RX ORDER — DIPHENHYDRAMINE HCL 25 MG
25 CAPSULE ORAL ONCE
Status: CANCELLED
Start: 2023-08-25 | End: 2023-08-25

## 2023-07-20 RX ORDER — METHYLPREDNISOLONE SODIUM SUCCINATE 125 MG/2ML
125 INJECTION, POWDER, LYOPHILIZED, FOR SOLUTION INTRAMUSCULAR; INTRAVENOUS
Status: CANCELLED
Start: 2023-08-25

## 2023-07-20 RX ORDER — DIPHENHYDRAMINE HYDROCHLORIDE 50 MG/ML
50 INJECTION INTRAMUSCULAR; INTRAVENOUS
Status: DISCONTINUED | OUTPATIENT
Start: 2023-07-20 | End: 2023-07-20 | Stop reason: HOSPADM

## 2023-07-20 RX ORDER — EPINEPHRINE 1 MG/ML
0.3 INJECTION, SOLUTION INTRAMUSCULAR; SUBCUTANEOUS EVERY 5 MIN PRN
Status: CANCELLED | OUTPATIENT
Start: 2023-08-25

## 2023-07-20 RX ORDER — METHYLPREDNISOLONE SODIUM SUCCINATE 125 MG/2ML
125 INJECTION, POWDER, LYOPHILIZED, FOR SOLUTION INTRAMUSCULAR; INTRAVENOUS ONCE
Status: COMPLETED | OUTPATIENT
Start: 2023-07-20 | End: 2023-07-20

## 2023-07-20 RX ORDER — ACETAMINOPHEN 325 MG/1
650 TABLET ORAL ONCE
Status: CANCELLED
Start: 2023-08-25 | End: 2023-08-25

## 2023-07-20 RX ORDER — DIPHENHYDRAMINE HCL 25 MG
25 CAPSULE ORAL ONCE
Status: COMPLETED | OUTPATIENT
Start: 2023-07-20 | End: 2023-07-20

## 2023-07-20 RX ORDER — EPINEPHRINE 1 MG/ML
0.3 INJECTION, SOLUTION INTRAMUSCULAR; SUBCUTANEOUS EVERY 5 MIN PRN
Status: DISCONTINUED | OUTPATIENT
Start: 2023-07-20 | End: 2023-07-20 | Stop reason: HOSPADM

## 2023-07-20 RX ORDER — HEPARIN SODIUM,PORCINE 10 UNIT/ML
5 VIAL (ML) INTRAVENOUS
Status: CANCELLED | OUTPATIENT
Start: 2023-08-25

## 2023-07-20 RX ORDER — ACETAMINOPHEN 325 MG/1
650 TABLET ORAL ONCE
Status: COMPLETED | OUTPATIENT
Start: 2023-07-20 | End: 2023-07-20

## 2023-07-20 RX ADMIN — ACETAMINOPHEN 650 MG: 325 TABLET ORAL at 11:17

## 2023-07-20 RX ADMIN — INFLIXIMAB 500 MG: 100 INJECTION, POWDER, LYOPHILIZED, FOR SOLUTION INTRAVENOUS at 11:49

## 2023-07-20 RX ADMIN — DIPHENHYDRAMINE HYDROCHLORIDE 25 MG: 25 CAPSULE ORAL at 11:17

## 2023-07-20 RX ADMIN — METHYLPREDNISOLONE SODIUM SUCCINATE 125 MG: 125 INJECTION, POWDER, FOR SOLUTION INTRAMUSCULAR; INTRAVENOUS at 11:23

## 2023-07-20 RX ADMIN — SODIUM CHLORIDE 250 ML: 9 INJECTION, SOLUTION INTRAVENOUS at 11:22

## 2023-07-20 NOTE — PROGRESS NOTES
Infusion Nursing Note:  Frank Obando presents today for Remicade with premedications.    Patient seen by provider today: No   present during visit today: Not Applicable.    Note: Pt. Denies any new concerns..      Intravenous Access:  Peripheral IV placed.    Treatment Conditions:  Biological Infusion Checklist:  ~~~ NOTE: If the patient answers yes to any of the questions below, hold the infusion and contact ordering provider or on-call provider.    1. Have you recently had an elevated temperature, fever, chills, productive cough, coughing for 3 weeks or longer or hemoptysis,  abnormal vital signs, night sweats,  chest pain or have you noticed a decrease in your appetite, unexplained weight loss or fatigue? No  2. Do you have any open wounds or new incisions? No  3. Do you have any upcoming hospitalizations or surgeries? Does not include esophagogastroduodenoscopy, colonoscopy, endoscopic retrograde cholangiopancreatography (ERCP), endoscopic ultrasound (EUS), dental procedures or joint aspiration/steroid injections No  4. Do you currently have any signs of illness or infection or are you on any antibiotics? No  5. Have you had any new, sudden or worsening abdominal pain? No  6. Have you or anyone in your household received a live vaccination in the past 4 weeks? Please note: No live vaccines while on biologic/chemotherapy until 6 months after the last treatment. Patient can receive the flu vaccine (shot only), pneumovax and the Covid vaccine. It is optimal for the patient to get these vaccines mid cycle, but they can be given at any time as long as it is not on the day of the infusion. No  7. Have you recently been diagnosed with any new nervous system diseases (ie. Multiple sclerosis, Guillain [place], seizures, neurological changes) or cancer diagnosis? Are you on any form of radiation or chemotherapy? No  8. Are you pregnant or breast feeding or do you have plans of pregnancy in the future?  No  9. Have you been having any signs of worsening depression or suicidal ideations?  (benlysta only) No  10. Have there been any other new onset medical symptoms? No  11. Have you had any new blood clots? (IVIG only) No        Post Infusion Assessment:  Patient tolerated infusion without incident.  Site patent and intact, free from redness, edema or discomfort.  No evidence of extravasations.  Access discontinued per protocol.       Discharge Plan:   Patient and/or family verbalized understanding of discharge instructions and all questions answered.      Pascale Samuels RN

## 2023-08-24 DIAGNOSIS — D50.0 IRON DEFICIENCY ANEMIA DUE TO CHRONIC BLOOD LOSS: ICD-10-CM

## 2023-08-24 RX ORDER — FERROUS SULFATE 325(65) MG
325 TABLET ORAL
Qty: 90 TABLET | Refills: 3 | Status: SHIPPED | OUTPATIENT
Start: 2023-08-24 | End: 2024-06-27

## 2023-08-24 NOTE — TELEPHONE ENCOUNTER
"Routing refill request to provider for review/approval because:  No print out      Last Written Prescription Date:  4/4/23  Last Fill Quantity:   90,  # refills: 3   Last office visit provider:   7/6/23    Requested Prescriptions   Pending Prescriptions Disp Refills    ferrous sulfate (FEROSUL) 325 (65 Fe) MG tablet 90 tablet 3     Sig: Take 1 tablet (325 mg) by mouth 3 times daily (with meals)       Iron Supplements Passed - 8/24/2023 12:02 PM        Passed - Patient is 12 years of age or older        Passed - Recent (12 mo) or future (30 days) visit within the authorizing provider's specialty     Patient has had an office visit with the authorizing provider or a provider within the authorizing providers department within the previous 12 mos or has a future within next 30 days. See \"Patient Info\" tab in inbasket, or \"Choose Columns\" in Meds & Orders section of the refill encounter.              Passed - Hgb OR Hct on record within the past 12 mos.     Patient need only have had a HGB or HCT on file in the past 12 mos. That result does not need to be normal.    Recent Labs   Lab Test 07/06/23  0958 07/02/23  0654 07/01/23  0833   HGB 11.0* 10.0* 10.3*       Recent Labs   Lab Test 07/06/23  0958 07/02/23  0654 07/01/23  0833   HCT 33.1* 30.9* 30.3*       Please verify a HGB or HCT has been checked SINCE THE LAST DOSE CHANGE.            Passed - Medication is active on med list             Polly Beatty RN 08/24/23 2:53 PM  "

## 2023-08-31 ENCOUNTER — INFUSION THERAPY VISIT (OUTPATIENT)
Dept: INFUSION THERAPY | Facility: CLINIC | Age: 78
End: 2023-08-31
Attending: INTERNAL MEDICINE
Payer: MEDICARE

## 2023-08-31 VITALS
HEART RATE: 74 BPM | TEMPERATURE: 98 F | WEIGHT: 190 LBS | RESPIRATION RATE: 16 BRPM | OXYGEN SATURATION: 96 % | SYSTOLIC BLOOD PRESSURE: 104 MMHG | BODY MASS INDEX: 27.26 KG/M2 | DIASTOLIC BLOOD PRESSURE: 67 MMHG

## 2023-08-31 DIAGNOSIS — H44.112 PANUVEITIS OF LEFT EYE: Primary | ICD-10-CM

## 2023-08-31 PROCEDURE — 258N000003 HC RX IP 258 OP 636: Performed by: INTERNAL MEDICINE

## 2023-08-31 PROCEDURE — 250N000013 HC RX MED GY IP 250 OP 250 PS 637: Performed by: INTERNAL MEDICINE

## 2023-08-31 PROCEDURE — 96413 CHEMO IV INFUSION 1 HR: CPT

## 2023-08-31 PROCEDURE — 250N000011 HC RX IP 250 OP 636: Mod: JZ | Performed by: INTERNAL MEDICINE

## 2023-08-31 PROCEDURE — 96375 TX/PRO/DX INJ NEW DRUG ADDON: CPT

## 2023-08-31 RX ORDER — ACETAMINOPHEN 325 MG/1
650 TABLET ORAL ONCE
Status: CANCELLED
Start: 2023-10-12 | End: 2023-10-12

## 2023-08-31 RX ORDER — HEPARIN SODIUM,PORCINE 10 UNIT/ML
5 VIAL (ML) INTRAVENOUS
Status: CANCELLED | OUTPATIENT
Start: 2023-10-12

## 2023-08-31 RX ORDER — EPINEPHRINE 1 MG/ML
0.3 INJECTION, SOLUTION INTRAMUSCULAR; SUBCUTANEOUS EVERY 5 MIN PRN
Status: DISCONTINUED | OUTPATIENT
Start: 2023-08-31 | End: 2023-08-31 | Stop reason: HOSPADM

## 2023-08-31 RX ORDER — EPINEPHRINE 1 MG/ML
0.3 INJECTION, SOLUTION INTRAMUSCULAR; SUBCUTANEOUS EVERY 5 MIN PRN
Status: CANCELLED | OUTPATIENT
Start: 2023-10-12

## 2023-08-31 RX ORDER — METHYLPREDNISOLONE SODIUM SUCCINATE 125 MG/2ML
125 INJECTION, POWDER, LYOPHILIZED, FOR SOLUTION INTRAMUSCULAR; INTRAVENOUS
Status: CANCELLED
Start: 2023-10-12

## 2023-08-31 RX ORDER — DIPHENHYDRAMINE HCL 25 MG
25 CAPSULE ORAL ONCE
Status: CANCELLED
Start: 2023-10-12 | End: 2023-10-12

## 2023-08-31 RX ORDER — ALBUTEROL SULFATE 90 UG/1
1-2 AEROSOL, METERED RESPIRATORY (INHALATION)
Status: CANCELLED
Start: 2023-10-12

## 2023-08-31 RX ORDER — DIPHENHYDRAMINE HCL 25 MG
25 CAPSULE ORAL ONCE
Status: COMPLETED | OUTPATIENT
Start: 2023-08-31 | End: 2023-08-31

## 2023-08-31 RX ORDER — METHYLPREDNISOLONE SODIUM SUCCINATE 125 MG/2ML
125 INJECTION, POWDER, LYOPHILIZED, FOR SOLUTION INTRAMUSCULAR; INTRAVENOUS ONCE
Status: CANCELLED | OUTPATIENT
Start: 2023-10-12 | End: 2023-10-12

## 2023-08-31 RX ORDER — ALBUTEROL SULFATE 0.83 MG/ML
2.5 SOLUTION RESPIRATORY (INHALATION)
Status: DISCONTINUED | OUTPATIENT
Start: 2023-08-31 | End: 2023-08-31 | Stop reason: HOSPADM

## 2023-08-31 RX ORDER — ACETAMINOPHEN 325 MG/1
650 TABLET ORAL ONCE
Status: COMPLETED | OUTPATIENT
Start: 2023-08-31 | End: 2023-08-31

## 2023-08-31 RX ORDER — MEPERIDINE HYDROCHLORIDE 50 MG/ML
25 INJECTION INTRAMUSCULAR; INTRAVENOUS; SUBCUTANEOUS EVERY 30 MIN PRN
Status: CANCELLED | OUTPATIENT
Start: 2023-10-12

## 2023-08-31 RX ORDER — DIPHENHYDRAMINE HYDROCHLORIDE 50 MG/ML
50 INJECTION INTRAMUSCULAR; INTRAVENOUS
Status: DISCONTINUED | OUTPATIENT
Start: 2023-08-31 | End: 2023-08-31 | Stop reason: HOSPADM

## 2023-08-31 RX ORDER — METHYLPREDNISOLONE SODIUM SUCCINATE 125 MG/2ML
125 INJECTION, POWDER, LYOPHILIZED, FOR SOLUTION INTRAMUSCULAR; INTRAVENOUS
Status: DISCONTINUED | OUTPATIENT
Start: 2023-08-31 | End: 2023-08-31 | Stop reason: HOSPADM

## 2023-08-31 RX ORDER — ALBUTEROL SULFATE 0.83 MG/ML
2.5 SOLUTION RESPIRATORY (INHALATION)
Status: CANCELLED | OUTPATIENT
Start: 2023-10-12

## 2023-08-31 RX ORDER — ALBUTEROL SULFATE 90 UG/1
1-2 AEROSOL, METERED RESPIRATORY (INHALATION)
Status: DISCONTINUED | OUTPATIENT
Start: 2023-08-31 | End: 2023-08-31 | Stop reason: HOSPADM

## 2023-08-31 RX ORDER — DIPHENHYDRAMINE HYDROCHLORIDE 50 MG/ML
50 INJECTION INTRAMUSCULAR; INTRAVENOUS
Status: CANCELLED
Start: 2023-10-12

## 2023-08-31 RX ORDER — HEPARIN SODIUM (PORCINE) LOCK FLUSH IV SOLN 100 UNIT/ML 100 UNIT/ML
5 SOLUTION INTRAVENOUS
Status: CANCELLED | OUTPATIENT
Start: 2023-10-12

## 2023-08-31 RX ORDER — MEPERIDINE HYDROCHLORIDE 50 MG/ML
25 INJECTION INTRAMUSCULAR; INTRAVENOUS; SUBCUTANEOUS EVERY 30 MIN PRN
Status: DISCONTINUED | OUTPATIENT
Start: 2023-08-31 | End: 2023-08-31 | Stop reason: HOSPADM

## 2023-08-31 RX ORDER — METHYLPREDNISOLONE SODIUM SUCCINATE 125 MG/2ML
125 INJECTION, POWDER, LYOPHILIZED, FOR SOLUTION INTRAMUSCULAR; INTRAVENOUS ONCE
Status: COMPLETED | OUTPATIENT
Start: 2023-08-31 | End: 2023-08-31

## 2023-08-31 RX ADMIN — METHYLPREDNISOLONE SODIUM SUCCINATE 125 MG: 125 INJECTION, POWDER, FOR SOLUTION INTRAMUSCULAR; INTRAVENOUS at 11:12

## 2023-08-31 RX ADMIN — ACETAMINOPHEN 650 MG: 325 TABLET ORAL at 11:12

## 2023-08-31 RX ADMIN — INFLIXIMAB 500 MG: 100 INJECTION, POWDER, LYOPHILIZED, FOR SOLUTION INTRAVENOUS at 11:18

## 2023-08-31 RX ADMIN — DIPHENHYDRAMINE HYDROCHLORIDE 25 MG: 25 CAPSULE ORAL at 11:12

## 2023-08-31 RX ADMIN — SODIUM CHLORIDE 250 ML: 9 INJECTION, SOLUTION INTRAVENOUS at 11:04

## 2023-08-31 NOTE — PROGRESS NOTES
Infusion Nursing Note:  Frank Obando presents today for Remicade.    Patient seen by provider today: No   present during visit today: Not Applicable.    Note: Premedicated with oral benadryl and tylenol, and solu-medrol.      Intravenous Access:  Peripheral IV placed.    Treatment Conditions:  Biological Infusion Checklist:  ~~~ NOTE: If the patient answers yes to any of the questions below, hold the infusion and contact ordering provider or on-call provider.    Have you recently had an elevated temperature, fever, chills, productive cough, coughing for 3 weeks or longer or hemoptysis,  abnormal vital signs, night sweats,  chest pain or have you noticed a decrease in your appetite, unexplained weight loss or fatigue? No  Do you have any open wounds or new incisions? No  Do you have any upcoming hospitalizations or surgeries? Does not include esophagogastroduodenoscopy, colonoscopy, endoscopic retrograde cholangiopancreatography (ERCP), endoscopic ultrasound (EUS), dental procedures or joint aspiration/steroid injections No  Do you currently have any signs of illness or infection or are you on any antibiotics? No  Have you had any new, sudden or worsening abdominal pain? No  Have you or anyone in your household received a live vaccination in the past 4 weeks? Please note: No live vaccines while on biologic/chemotherapy until 6 months after the last treatment. Patient can receive the flu vaccine (shot only), pneumovax and the Covid vaccine. It is optimal for the patient to get these vaccines mid cycle, but they can be given at any time as long as it is not on the day of the infusion. No  Have you recently been diagnosed with any new nervous system diseases (ie. Multiple sclerosis, Guillain [place], seizures, neurological changes) or cancer diagnosis? Are you on any form of radiation or chemotherapy? No  Are you pregnant or breast feeding or do you have plans of pregnancy in the future? No  Have you  been having any signs of worsening depression or suicidal ideations?  (benlysta only) No  Have there been any other new onset medical symptoms? No  Have you had any new blood clots? (IVIG only) No      Post Infusion Assessment:  Patient tolerated infusion without incident.  Site patent and intact, free from redness, edema or discomfort.  No evidence of extravasations.  Access discontinued per protocol.       Discharge Plan:   Patient and/or family verbalized understanding of discharge instructions and all questions answered.      Pascale Samuels RN

## 2023-09-05 ENCOUNTER — TRANSFERRED RECORDS (OUTPATIENT)
Dept: HEALTH INFORMATION MANAGEMENT | Facility: CLINIC | Age: 78
End: 2023-09-05
Payer: MEDICARE

## 2023-09-05 LAB — RETINOPATHY: NEGATIVE

## 2023-09-14 ENCOUNTER — LAB (OUTPATIENT)
Dept: LAB | Facility: CLINIC | Age: 78
End: 2023-09-14
Payer: MEDICARE

## 2023-09-14 DIAGNOSIS — Z79.899 HIGH RISK MEDICATION USE: ICD-10-CM

## 2023-09-14 DIAGNOSIS — N18.30 CRF (CHRONIC RENAL FAILURE), STAGE 3 (MODERATE) (H): ICD-10-CM

## 2023-09-14 DIAGNOSIS — H44.112 PANUVEITIS OF LEFT EYE: ICD-10-CM

## 2023-09-14 LAB
ALBUMIN SERPL BCG-MCNC: 4.1 G/DL (ref 3.5–5.2)
ALT SERPL W P-5'-P-CCNC: 28 U/L (ref 0–70)
CREAT SERPL-MCNC: 1.19 MG/DL (ref 0.67–1.17)
EGFRCR SERPLBLD CKD-EPI 2021: 63 ML/MIN/1.73M2
ERYTHROCYTE [DISTWIDTH] IN BLOOD BY AUTOMATED COUNT: 13.2 % (ref 10–15)
HCT VFR BLD AUTO: 31.7 % (ref 40–53)
HGB BLD-MCNC: 10.4 G/DL (ref 13.3–17.7)
MCH RBC QN AUTO: 30.2 PG (ref 26.5–33)
MCHC RBC AUTO-ENTMCNC: 32.8 G/DL (ref 31.5–36.5)
MCV RBC AUTO: 92 FL (ref 78–100)
PLATELET # BLD AUTO: 94 10E3/UL (ref 150–450)
RBC # BLD AUTO: 3.44 10E6/UL (ref 4.4–5.9)
WBC # BLD AUTO: 3.9 10E3/UL (ref 4–11)

## 2023-09-14 PROCEDURE — 85027 COMPLETE CBC AUTOMATED: CPT

## 2023-09-14 PROCEDURE — 36415 COLL VENOUS BLD VENIPUNCTURE: CPT

## 2023-09-14 PROCEDURE — 82565 ASSAY OF CREATININE: CPT

## 2023-09-14 PROCEDURE — 82040 ASSAY OF SERUM ALBUMIN: CPT

## 2023-09-14 PROCEDURE — 84460 ALANINE AMINO (ALT) (SGPT): CPT

## 2023-09-15 ENCOUNTER — OFFICE VISIT (OUTPATIENT)
Dept: INTERNAL MEDICINE | Facility: CLINIC | Age: 78
End: 2023-09-15
Payer: MEDICARE

## 2023-09-15 VITALS
HEART RATE: 76 BPM | TEMPERATURE: 97.6 F | SYSTOLIC BLOOD PRESSURE: 129 MMHG | RESPIRATION RATE: 15 BRPM | WEIGHT: 192 LBS | DIASTOLIC BLOOD PRESSURE: 72 MMHG | BODY MASS INDEX: 27.49 KG/M2 | OXYGEN SATURATION: 97 % | HEIGHT: 70 IN

## 2023-09-15 DIAGNOSIS — R18.8 CIRRHOSIS OF LIVER WITH ASCITES, UNSPECIFIED HEPATIC CIRRHOSIS TYPE (H): ICD-10-CM

## 2023-09-15 DIAGNOSIS — E11.8 TYPE 2 DIABETES MELLITUS WITH COMPLICATION, WITHOUT LONG-TERM CURRENT USE OF INSULIN (H): Chronic | ICD-10-CM

## 2023-09-15 DIAGNOSIS — D50.0 IRON DEFICIENCY ANEMIA DUE TO CHRONIC BLOOD LOSS: ICD-10-CM

## 2023-09-15 DIAGNOSIS — K74.60 CIRRHOSIS OF LIVER WITH ASCITES, UNSPECIFIED HEPATIC CIRRHOSIS TYPE (H): ICD-10-CM

## 2023-09-15 DIAGNOSIS — I85.10 SECONDARY ESOPHAGEAL VARICES WITHOUT BLEEDING (H): ICD-10-CM

## 2023-09-15 DIAGNOSIS — E11.42 DIABETIC POLYNEUROPATHY ASSOCIATED WITH TYPE 2 DIABETES MELLITUS (H): ICD-10-CM

## 2023-09-15 DIAGNOSIS — I10 ESSENTIAL HYPERTENSION, BENIGN: ICD-10-CM

## 2023-09-15 DIAGNOSIS — N18.32 STAGE 3B CHRONIC KIDNEY DISEASE (H): ICD-10-CM

## 2023-09-15 DIAGNOSIS — E78.5 DYSLIPIDEMIA: ICD-10-CM

## 2023-09-15 DIAGNOSIS — H44.112 PANUVEITIS OF LEFT EYE: ICD-10-CM

## 2023-09-15 DIAGNOSIS — M05.79 RHEUMATOID ARTHRITIS INVOLVING MULTIPLE SITES WITH POSITIVE RHEUMATOID FACTOR (H): ICD-10-CM

## 2023-09-15 DIAGNOSIS — I63.322 CEREBROVASCULAR ACCIDENT (CVA) DUE TO THROMBOSIS OF LEFT ANTERIOR CEREBRAL ARTERY (H): ICD-10-CM

## 2023-09-15 DIAGNOSIS — Z01.818 PREOPERATIVE EXAMINATION: Primary | ICD-10-CM

## 2023-09-15 DIAGNOSIS — D64.9 NORMOCYTIC ANEMIA: ICD-10-CM

## 2023-09-15 DIAGNOSIS — Z92.25 PERSONAL HISTORY OF IMMUNOSUPPRESSIVE THERAPY: ICD-10-CM

## 2023-09-15 DIAGNOSIS — D69.6 THROMBOCYTOPENIA (H): ICD-10-CM

## 2023-09-15 PROBLEM — K81.0 ACUTE CHOLECYSTITIS: Chronic | Status: RESOLVED | Noted: 2023-06-30 | Resolved: 2023-09-15

## 2023-09-15 PROBLEM — Z90.49 S/P LAPAROSCOPIC CHOLECYSTECTOMY: Chronic | Status: RESOLVED | Noted: 2023-07-02 | Resolved: 2023-09-15

## 2023-09-15 PROCEDURE — 90662 IIV NO PRSV INCREASED AG IM: CPT | Performed by: INTERNAL MEDICINE

## 2023-09-15 PROCEDURE — G0008 ADMIN INFLUENZA VIRUS VAC: HCPCS | Performed by: INTERNAL MEDICINE

## 2023-09-15 PROCEDURE — 99215 OFFICE O/P EST HI 40 MIN: CPT | Mod: 25 | Performed by: INTERNAL MEDICINE

## 2023-09-15 ASSESSMENT — PAIN SCALES - GENERAL: PAINLEVEL: NO PAIN (0)

## 2023-09-15 NOTE — H&P (VIEW-ONLY)
Preoperative Consultation   Frank Obando   78 year old  male    Date of visit: 9/15/2023  Physician: Flaco Lopez MD    This is a preoperative consultation requested by Dr. Lozada in preparation for upper endoscopy on 9/29/2023 at [facility], fax [phone].       Assessment and Plan   Frank Obando was seen in preoperative consultation in preparation for upper endoscopy.  This is a low risk surgery and the patient has increased risk for major cardiac complications based on a history of cerebrovascular disease.  Please note he has no cardiopulmonary contraindication to the proposed procedure may proceed without further cardiopulmonary testing.  I have asked him to hold his medications on the morning of the procedure.    1. Preoperative examination    2. Cirrhosis of liver with ascites, unspecified hepatic cirrhosis type (H)    3. Secondary esophageal varices without bleeding (H)    4. Thrombocytopenia (H)    5. Normocytic anemia    6. Type 2 diabetes mellitus with complication, without long-term current use of insulin (H)    7. Diabetic polyneuropathy associated with type 2 diabetes mellitus (H)    8. Stage 3b chronic kidney disease (H)    9. 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 Personal history of immunosuppressive therapy    11. Iron deficiency anemia due to chronic blood loss    12. H/o CVA ~2013    13. Essential hypertension, benign    14. Dyslipidemia    15. Rheumatoid arthritis involving multiple sites with positive rheumatoid factor (H)         Patient Profile   Social History     Social History Narrative    Lives with his wife, Valerie.  Volunteers at [facility].  Retired  of [employer].  One son, Jaison.         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    Iron deficiency anemia due to chronic blood loss    Angiodysplasia of stomach    History of colonic polyps    Secondary esophageal varices without bleeding (H)    Stage 3b chronic kidney disease (H)    Rheumatoid arthritis involving multiple sites with positive rheumatoid factor (H)    Personal history of immunosuppressive therapy       Past Surgical History  He has a past surgical history that includes CYSTOSCOPY,INSERT URETERAL STENT; Cystoscopy Tumor / Condylomata W/ Laser (Left, 7/18/2014); Colonoscopy (N/A, 11/16/2021); Esophagoscopy, gastroscopy, duodenoscopy (EGD), combined (N/A, 11/16/2021); and Laparoscopic cholecystectomy (N/A, 7/1/2023).     History of Present Illness   This 78 year old man comes in for preoperative evaluation in preparation for upper endoscopy for variceal surveillance.  He is overall feeling okay.  He denies any chest pain or shortness of breath.  He does have some leg pain when he walks better when he bends over.  No infectious symptoms.  He continues on Remicade.    Recent antiplatelet use: no  Personal or family history of bleeding or clotting disorders: no  Steroid use in the past year: no  Personal or family history of difficulty with anesthesia: no  Current cardiopulmonary symptoms: no  No known obstructive sleep apnea    Review of Systems: A comprehensive review of systems was negative except as noted.     Medications and Allergies   Current Outpatient Medications   Medication Sig Dispense Refill    acetaminophen (TYLENOL) 500 MG tablet Take 1 tablet (500 mg) by mouth every 6 hours as needed for mild pain      atorvastatin (LIPITOR) 40 MG tablet TAKE 1 TABLET BY  MOUTH DAILY 90 tablet 2    blood glucose (ACCU-CHEK JACOB PLUS) test strip 1 strip by In Vitro route 2 times daily 200 strip 11    blood glucose meter (GLUCOMETER) [BLOOD GLUCOSE METER (GLUCOMETER)] Use 1 each As Directed 2 (two) times a day. Dispense glucometer brand per patient's insurance at pharmacy discretion. 1 each 0    blood glucose monitoring (SOFTCLIX) lancets Use 1 each As Directed 2 (two) times a day. Dispense brand per patient's insurance at pharmacy discretion. - Does not apply 100 each 3    cyanocobalamin, vitamin B-12, 2,500 mcg Tab [CYANOCOBALAMIN, VITAMIN B-12, 2,500 MCG TAB] Take 2,500 mcg by mouth daily.      ferrous sulfate (FEROSUL) 325 (65 Fe) MG tablet Take 1 tablet (325 mg) by mouth 3 times daily (with meals) 90 tablet 3    generic lancets (ACCU-CHEK SOFTCLIX LANCETS) [GENERIC LANCETS (ACCU-CHEK SOFTCLIX LANCETS)] Use 1 each As Directed 2 (two) times a day. Dispense brand per patient's insurance at pharmacy discretion. 100 each 3    inFLIXimab (REMICADE IV) Every 6weeks      lactobacillus rhamnosus, GG, (CULTURELL) capsule Take 1 capsule by mouth 3 times daily (before meals)      lisinopril (ZESTRIL) 10 MG tablet Take 1 tablet (10 mg) by mouth daily 90 tablet 3    magnesium oxide 250 mg Tab [MAGNESIUM OXIDE 250 MG TAB] Take 250 mg by mouth 2 (two) times a day.      metFORMIN (GLUCOPHAGE) 500 MG tablet TAKE 2 TABLETS BY MOUTH TWICE DAILY AT 6 AM AND AT 4  tablet 3    omeprazole (PRILOSEC) 40 MG DR capsule Take 1 capsule (40 mg) by mouth daily 90 capsule 4    pioglitazone (ACTOS) 30 MG tablet TAKE 1 TABLET BY MOUTH EVERY DAY 90 tablet 3    propranolol (INDERAL) 20 MG tablet Take 1 tablet (20 mg) by mouth 2 times daily 180 tablet 3    sucralfate (CARAFATE) 1 GM/10ML suspension Take 10 mLs (1 g) by mouth 4 times daily 414 mL 0    tamsulosin (FLOMAX) 0.4 MG capsule TAKE 1 CAPSULE BY MOUTH DAILY AFTER AND SUPPER 90 capsule 4     Allergies   Allergen Reactions    Morphine Nausea and Vomiting  "   Scopolamine Hbr [Scopolamine] Unknown        Family and Social History   Family History   Problem Relation Age of Onset    Coronary Artery Disease Mother     Diabetes Mother     Lung Cancer Father     No Known Problems Son         Social History     Tobacco Use    Smoking status: Never    Smokeless tobacco: Never   Substance Use Topics    Alcohol use: No    Drug use: No        Physical Exam   General Appearance:   No acute distress    /72 (BP Location: Left arm, Patient Position: Sitting, Cuff Size: Adult Regular)   Pulse 76   Temp 97.6  F (36.4  C) (Tympanic)   Resp 15   Ht 1.778 m (5' 10\")   Wt 87.1 kg (192 lb)   SpO2 97%   BMI 27.55 kg/m      EYES: Eyelids, conjunctiva, and sclera were normal. Pupils were normal. Cornea, iris, and lens were normal bilaterally.  HEAD, EARS, NOSE, MOUTH, AND THROAT: Head and face were normal. Hearing was normal to voice and the ears were normal to external exam. Nose appearance was normal and there was no discharge. Oropharynx was normal.  NECK: Neck appearance was normal. There were no neck masses and the thyroid was not enlarged.  RESPIRATORY: Breathing pattern was normal and the chest moved symmetrically.  Percussion/auscultatory percussion was normal.  Lung sounds were normal and there were no abnormal sounds.  CARDIOVASCULAR: Heart rate and rhythm were normal.  S1 and S2 were normal and there were no extra sounds or murmurs. Peripheral pulses in arms and legs were normal.  Jugular venous pressure was normal.  There was no peripheral edema.  GASTROINTESTINAL: The abdomen was normal in contour.   NEUROLOGIC: The patient was alert and oriented to person, place, time, and circumstance. Speech was normal. Cranial nerves were normal. Motor strength was normal for age. The patient was normally coordinated.  PSYCHIATRIC:  Mood and affect were normal and the patient had normal recent and remote memory. The patient's judgment and insight were normal.       Additional " Tests   Laboratory: Creatinine was 1.19 9/14/2023, potassium 5.0, hemoglobin 10.4 on same day    Radiology:     Electrocardiogram: 6/29/2023, sinus bradycardia with right bundle branch block, personally reviewed    Total time 40 minutes including chart review, history, examination, counseling and documentation     Flaco Lopez MD  Internal Medicine  Contact me at [phone]

## 2023-09-15 NOTE — PROGRESS NOTES
Preoperative Consultation   Frank Obando   78 year old  male    Date of visit: 9/15/2023  Physician: Flaco Lopez MD    This is a preoperative consultation requested by Dr. Lozada in preparation for upper endoscopy on 9/29/2023 at [facility], fax [phone].       Assessment and Plan   Frank Obando was seen in preoperative consultation in preparation for upper endoscopy.  This is a low risk surgery and the patient has increased risk for major cardiac complications based on a history of cerebrovascular disease.  Please note he has no cardiopulmonary contraindication to the proposed procedure may proceed without further cardiopulmonary testing.  I have asked him to hold his medications on the morning of the procedure.    1. Preoperative examination    2. Cirrhosis of liver with ascites, unspecified hepatic cirrhosis type (H)    3. Secondary esophageal varices without bleeding (H)    4. Thrombocytopenia (H)    5. Normocytic anemia    6. Type 2 diabetes mellitus with complication, without long-term current use of insulin (H)    7. Diabetic polyneuropathy associated with type 2 diabetes mellitus (H)    8. Stage 3b chronic kidney disease (H)    9. 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 Personal history of immunosuppressive therapy    11. Iron deficiency anemia due to chronic blood loss    12. H/o CVA ~2013    13. Essential hypertension, benign    14. Dyslipidemia    15. Rheumatoid arthritis involving multiple sites with positive rheumatoid factor (H)         Patient Profile   Social History     Social History Narrative    Lives with his wife, Valerie.  Volunteers at [facility].  Retired  of "[employer]  One son, Jaison.         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    Iron deficiency anemia due to chronic blood loss    Angiodysplasia of stomach    History of colonic polyps    Secondary esophageal varices without bleeding (H)    Stage 3b chronic kidney disease (H)    Rheumatoid arthritis involving multiple sites with positive rheumatoid factor (H)    Personal history of immunosuppressive therapy       Past Surgical History  He has a past surgical history that includes CYSTOSCOPY,INSERT URETERAL STENT; Cystoscopy Tumor / Condylomata W/ Laser (Left, 7/18/2014); Colonoscopy (N/A, 11/16/2021); Esophagoscopy, gastroscopy, duodenoscopy (EGD), combined (N/A, 11/16/2021); and Laparoscopic cholecystectomy (N/A, 7/1/2023).     History of Present Illness   This 78 year old man comes in for preoperative evaluation in preparation for upper endoscopy for variceal surveillance.  He is overall feeling okay.  He denies any chest pain or shortness of breath.  He does have some leg pain when he walks better when he bends over.  No infectious symptoms.  He continues on Remicade.    Recent antiplatelet use: no  Personal or family history of bleeding or clotting disorders: no  Steroid use in the past year: no  Personal or family history of difficulty with anesthesia: no  Current cardiopulmonary symptoms: no  No known obstructive sleep apnea    Review of Systems: A comprehensive review of systems was negative except as noted.     Medications and Allergies   Current Outpatient Medications   Medication Sig Dispense Refill    acetaminophen (TYLENOL) 500 MG tablet Take 1 tablet (500 mg) by mouth every 6 hours as needed for mild pain      atorvastatin (LIPITOR) 40 MG tablet TAKE 1 TABLET BY  MOUTH DAILY 90 tablet 2    blood glucose (ACCU-CHEK JACOB PLUS) test strip 1 strip by In Vitro route 2 times daily 200 strip 11    blood glucose meter (GLUCOMETER) [BLOOD GLUCOSE METER (GLUCOMETER)] Use 1 each As Directed 2 (two) times a day. Dispense glucometer brand per patient's insurance at pharmacy discretion. 1 each 0    blood glucose monitoring (SOFTCLIX) lancets Use 1 each As Directed 2 (two) times a day. Dispense brand per patient's insurance at pharmacy discretion. - Does not apply 100 each 3    cyanocobalamin, vitamin B-12, 2,500 mcg Tab [CYANOCOBALAMIN, VITAMIN B-12, 2,500 MCG TAB] Take 2,500 mcg by mouth daily.      ferrous sulfate (FEROSUL) 325 (65 Fe) MG tablet Take 1 tablet (325 mg) by mouth 3 times daily (with meals) 90 tablet 3    generic lancets (ACCU-CHEK SOFTCLIX LANCETS) [GENERIC LANCETS (ACCU-CHEK SOFTCLIX LANCETS)] Use 1 each As Directed 2 (two) times a day. Dispense brand per patient's insurance at pharmacy discretion. 100 each 3    inFLIXimab (REMICADE IV) Every 6weeks      lactobacillus rhamnosus, GG, (CULTURELL) capsule Take 1 capsule by mouth 3 times daily (before meals)      lisinopril (ZESTRIL) 10 MG tablet Take 1 tablet (10 mg) by mouth daily 90 tablet 3    magnesium oxide 250 mg Tab [MAGNESIUM OXIDE 250 MG TAB] Take 250 mg by mouth 2 (two) times a day.      metFORMIN (GLUCOPHAGE) 500 MG tablet TAKE 2 TABLETS BY MOUTH TWICE DAILY AT 6 AM AND AT 4  tablet 3    omeprazole (PRILOSEC) 40 MG DR capsule Take 1 capsule (40 mg) by mouth daily 90 capsule 4    pioglitazone (ACTOS) 30 MG tablet TAKE 1 TABLET BY MOUTH EVERY DAY 90 tablet 3    propranolol (INDERAL) 20 MG tablet Take 1 tablet (20 mg) by mouth 2 times daily 180 tablet 3    sucralfate (CARAFATE) 1 GM/10ML suspension Take 10 mLs (1 g) by mouth 4 times daily 414 mL 0    tamsulosin (FLOMAX) 0.4 MG capsule TAKE 1 CAPSULE BY MOUTH DAILY AFTER AND SUPPER 90 capsule 4     Allergies   Allergen Reactions    Morphine Nausea and Vomiting  "   Scopolamine Hbr [Scopolamine] Unknown        Family and Social History   Family History   Problem Relation Age of Onset    Coronary Artery Disease Mother     Diabetes Mother     Lung Cancer Father     No Known Problems Son         Social History     Tobacco Use    Smoking status: Never    Smokeless tobacco: Never   Substance Use Topics    Alcohol use: No    Drug use: No        Physical Exam   General Appearance:   No acute distress    /72 (BP Location: Left arm, Patient Position: Sitting, Cuff Size: Adult Regular)   Pulse 76   Temp 97.6  F (36.4  C) (Tympanic)   Resp 15   Ht 1.778 m (5' 10\")   Wt 87.1 kg (192 lb)   SpO2 97%   BMI 27.55 kg/m      EYES: Eyelids, conjunctiva, and sclera were normal. Pupils were normal. Cornea, iris, and lens were normal bilaterally.  HEAD, EARS, NOSE, MOUTH, AND THROAT: Head and face were normal. Hearing was normal to voice and the ears were normal to external exam. Nose appearance was normal and there was no discharge. Oropharynx was normal.  NECK: Neck appearance was normal. There were no neck masses and the thyroid was not enlarged.  RESPIRATORY: Breathing pattern was normal and the chest moved symmetrically.  Percussion/auscultatory percussion was normal.  Lung sounds were normal and there were no abnormal sounds.  CARDIOVASCULAR: Heart rate and rhythm were normal.  S1 and S2 were normal and there were no extra sounds or murmurs. Peripheral pulses in arms and legs were normal.  Jugular venous pressure was normal.  There was no peripheral edema.  GASTROINTESTINAL: The abdomen was normal in contour.   NEUROLOGIC: The patient was alert and oriented to person, place, time, and circumstance. Speech was normal. Cranial nerves were normal. Motor strength was normal for age. The patient was normally coordinated.  PSYCHIATRIC:  Mood and affect were normal and the patient had normal recent and remote memory. The patient's judgment and insight were normal.       Additional " Tests   Laboratory: Creatinine was 1.19 9/14/2023, potassium 5.0, hemoglobin 10.4 on same day    Radiology:     Electrocardiogram: 6/29/2023, sinus bradycardia with right bundle branch block, personally reviewed    Total time 40 minutes including chart review, history, examination, counseling and documentation     Flaco Lopez MD  Internal Medicine  Contact me at [phone]

## 2023-09-19 ENCOUNTER — OFFICE VISIT (OUTPATIENT)
Dept: RHEUMATOLOGY | Facility: CLINIC | Age: 78
End: 2023-09-19
Payer: MEDICARE

## 2023-09-19 VITALS
BODY MASS INDEX: 28.01 KG/M2 | DIASTOLIC BLOOD PRESSURE: 64 MMHG | WEIGHT: 195.2 LBS | SYSTOLIC BLOOD PRESSURE: 118 MMHG | HEART RATE: 68 BPM

## 2023-09-19 DIAGNOSIS — R76.8 ANTINEUTROPHIL CYTOPLASMIC ANTIBODY (ANCA) POSITIVE: ICD-10-CM

## 2023-09-19 DIAGNOSIS — Z79.899 HIGH RISK MEDICATION USE: ICD-10-CM

## 2023-09-19 DIAGNOSIS — H44.112 PANUVEITIS OF LEFT EYE: Primary | ICD-10-CM

## 2023-09-19 DIAGNOSIS — M65.321 TRIGGER FINGER, RIGHT INDEX FINGER: ICD-10-CM

## 2023-09-19 DIAGNOSIS — M17.0 PRIMARY OSTEOARTHRITIS OF BOTH KNEES: ICD-10-CM

## 2023-09-19 PROCEDURE — 99214 OFFICE O/P EST MOD 30 MIN: CPT | Mod: 25 | Performed by: INTERNAL MEDICINE

## 2023-09-19 PROCEDURE — 20550 NJX 1 TENDON SHEATH/LIGAMENT: CPT | Mod: F6 | Performed by: INTERNAL MEDICINE

## 2023-09-19 RX ORDER — TRIAMCINOLONE ACETONIDE 40 MG/ML
20 INJECTION, SUSPENSION INTRA-ARTICULAR; INTRAMUSCULAR ONCE
Status: COMPLETED | OUTPATIENT
Start: 2023-09-19 | End: 2023-09-19

## 2023-09-19 RX ADMIN — TRIAMCINOLONE ACETONIDE 20 MG: 40 INJECTION, SUSPENSION INTRA-ARTICULAR; INTRAMUSCULAR at 10:42

## 2023-09-19 NOTE — PROGRESS NOTES
Rheumatology follow-up visit note     Frank is a 78 year old male presents today for follow-up.    Frank was seen today for recheck.    Diagnoses and all orders for this visit:    Panuveitis of left eye    Trigger finger, right index finger  -     triamcinolone (KENALOG-40) injection 20 mg  -     INJECTION SINGLE TENDON SHEATH/LIGAMENT    Antineutrophil cytoplasmic antibody (ANCA) positive    High risk medication use    Primary osteoarthritis of both knees        This patient's panuveitis reportedly is under good control with Remicade infusions every 6 weeks.  He has OA and he has triggering of the right index finger going over the past couple of months.  We discussed management principles corticosteroid injections, surgical intervention were outlined.  After pros and cons were outlined he would like to proceed with local injection for the triggering of the right index finger done with 20 mg of Kenalog into the flexor tendon sheath at the A1 level.  Will meet here at this time in 6 months or sooner with labs in 6 weeks, in view of the leukopenia.  .    Follow up in 6 months.    HPI    Frank Obando is a 78 year old male is here for follow-up.  This is for management of immunosuppression for left panuveitis.  He has osteoarthritis.  He noted that he was seen in ophthalmology recently and was reassured.  He was told that he has developed cataract but surgery is to be deferred for now.  His recent labs are reviewed.  Mild leukopenia noted.  To be done again in the next 6 weeks.  He gets Remicade at [facility] every 6 weeks.  He has noted pain in his right index finger which to begin with was clicking, now it is not but he has difficult time fully bending it or straightening it out.  This is painful process.  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  There is no history of trauma.   . ROS enquiry held for fever, ocular symptoms, rash, headache,  GI issues.  He has not had sinus symptoms, hemoptysis, hematuria,  ear nose inflammation signals.  He had triggering of the right index finger that is beginning to resolve.        DETAILED EXAMINATION  09/19/23  :    Vitals:    09/19/23 0957   BP: 118/64   Pulse: 68   Weight: 88.5 kg (195 lb 3.2 oz)     Alert oriented. Head including the face is examined for malar rash, heliotropes, scarring, lupus pernio. Eyes examined for redness such as in episcleritis/scleritis, periorbital lesions.   Neck/ Face examined for parotid gland swelling, range of motion of neck.  Left upper and lower and right upper and lower extremities examined for tenderness, swelling, warmth of the appendicular joints, range of motion, edema, rash.  Some of the important findings included: he does not have evidence of synovitis in the palpable joints of the upper extremities however he has triggering of his right index finger he has tenderness along the flexor tendon and has A1 nodularity in the right side not on the left..  No significant deformities of the digits.  + Heberden nodes.  Range of motion of the shoulders   show full abduction.  No JLT effusion or warmth of the knees.  he does not have dactylitis of the digits.     Patient Active Problem List    Diagnosis Date Noted    Rheumatoid arthritis involving multiple sites with positive rheumatoid factor (H) 07/02/2023     Priority: Medium     Sees Dr Casas for inflammatory arthritis. Had uveitis also; on Remicade (July 2023)       Personal history of immunosuppressive therapy 07/02/2023     Priority: Medium     On Remicade for inflammatory arthritis described as rheumatoid.  Previously had been exposed to methotrexate and apparently it may have induced hepatic cirrhosis.      Stage 3b chronic kidney disease (H) 03/24/2023     Priority: Medium    Iron deficiency anemia due to chronic blood loss 11/02/2021     Priority: Medium    Angiodysplasia of stomach 09/27/2021     Priority: Medium    Secondary esophageal varices without bleeding (H) 09/27/2021      Priority: Medium    Panuveitis of left eye, Dr. bAdi / Castillo 03/19/2019     Priority: Medium    Essential hypertension, benign      Priority: Medium     Created by Conversion  Replacement Utility updated for latest IMO load      Formatting of this note might be different from the original.  Created by Conversion    Replacement Utility updated for latest IMO load      Type 2 diabetes mellitus with complication, without long-term current use of insulin (H)      Priority: Medium     Created by Conversion      Formatting of this note might be different from the original.  Created by Conversion      Dyslipidemia      Priority: Medium    Thrombocytopenia (H)      Priority: Medium    Cirrhosis of liver with ascites, unspecified hepatic cirrhosis type (H)      Priority: Medium     Non ETOH drinker; possibly from Methotrexate for RA RX       High risk medication use 09/26/2018     Priority: Medium    History of colonic polyps 05/07/2018     Priority: Medium    Antineutrophil cytoplasmic antibody (ANCA) positive 03/23/2017     Priority: Medium    Diabetic polyneuropathy associated with type 2 diabetes mellitus (H)      Priority: Medium     Created by Conversion  Replacement Utility updated for latest IMO load      Formatting of this note might be different from the original.  Created by Conversion    Replacement Utility updated for latest IMO load      Right bundle branch block 11/24/2014     Priority: Medium    H/o CVA ~2013      Priority: Medium     Created by Conversion         Past Surgical History:   Procedure Laterality Date    COLONOSCOPY N/A 11/16/2021    Procedure: COLONOSCOPY with polypectomy;  Surgeon: Dex Finch MD;  Location: [facility] GI    CYSTOSCOPY TUMOR / CONDYLOMATA W/ LASER Left 7/18/2014    ESOPHAGOSCOPY, GASTROSCOPY, DUODENOSCOPY (EGD), COMBINED N/A 11/16/2021    Procedure: ESOPHAGOGASTRODUODENOSCOPY (EGD) with biopsies and argon plasma coagulation;  Surgeon: Dex Finch MD;   Location: [facility]    HC CYSTOSCOPY,INSERT URETERAL STENT      Description: Cystoscopy With Insertion Of Ureteral Stent Right;  Recorded: 10/14/2009;  Comments: stone    LAPAROSCOPIC CHOLECYSTECTOMY N/A 7/1/2023    Procedure: CHOLECYSTECTOMY, LAPAROSCOPIC;  Surgeon: Jadiel Argueta MD;  Location: [facility] Main OR      Past Medical History:   Diagnosis Date    Anemia     Arthritis     osteoarthritis    Calculus of kidney     Diabetes mellitus (H)     Episcleritis of left eye     Hyperlipidemia     Hypertension     Iron deficiency anemia due to chronic blood loss 11/2/2021    Peripheral neuropathy     Stroke (H)      Allergies   Allergen Reactions    Morphine Nausea and Vomiting    Scopolamine Hbr [Scopolamine] Unknown     Current Outpatient Medications   Medication Sig Dispense Refill    acetaminophen (TYLENOL) 500 MG tablet Take 1 tablet (500 mg) by mouth every 6 hours as needed for mild pain      atorvastatin (LIPITOR) 40 MG tablet TAKE 1 TABLET BY MOUTH DAILY 90 tablet 2    blood glucose (ACCU-CHEK JACOB PLUS) test strip 1 strip by In Vitro route 2 times daily 200 strip 11    blood glucose meter (GLUCOMETER) [BLOOD GLUCOSE METER (GLUCOMETER)] Use 1 each As Directed 2 (two) times a day. Dispense glucometer brand per patient's insurance at pharmacy discretion. 1 each 0    blood glucose monitoring (SOFTCLIX) lancets Use 1 each As Directed 2 (two) times a day. Dispense brand per patient's insurance at pharmacy discretion. - Does not apply 100 each 3    cyanocobalamin, vitamin B-12, 2,500 mcg Tab [CYANOCOBALAMIN, VITAMIN B-12, 2,500 MCG TAB] Take 2,500 mcg by mouth daily.      ferrous sulfate (FEROSUL) 325 (65 Fe) MG tablet Take 1 tablet (325 mg) by mouth 3 times daily (with meals) 90 tablet 3    generic lancets (ACCU-CHEK SOFTCLIX LANCETS) [GENERIC LANCETS (ACCU-CHEK SOFTCLIX LANCETS)] Use 1 each As Directed 2 (two) times a day. Dispense brand per patient's insurance at pharmacy discretion. 100 each 3     inFLIXimab (REMICADE IV) Every 6weeks      lactobacillus rhamnosus, GG, (CULTURELL) capsule Take 1 capsule by mouth 3 times daily (before meals)      lisinopril (ZESTRIL) 10 MG tablet Take 1 tablet (10 mg) by mouth daily 90 tablet 3    magnesium oxide 250 mg Tab [MAGNESIUM OXIDE 250 MG TAB] Take 250 mg by mouth 2 (two) times a day.      metFORMIN (GLUCOPHAGE) 500 MG tablet TAKE 2 TABLETS BY MOUTH TWICE DAILY AT 6 AM AND AT 4  tablet 3    omeprazole (PRILOSEC) 40 MG DR capsule Take 1 capsule (40 mg) by mouth daily 90 capsule 4    pioglitazone (ACTOS) 30 MG tablet TAKE 1 TABLET BY MOUTH EVERY DAY 90 tablet 3    propranolol (INDERAL) 20 MG tablet Take 1 tablet (20 mg) by mouth 2 times daily 180 tablet 3    sucralfate (CARAFATE) 1 GM/10ML suspension Take 10 mLs (1 g) by mouth 4 times daily 414 mL 0    tamsulosin (FLOMAX) 0.4 MG capsule TAKE 1 CAPSULE BY MOUTH DAILY AFTER AND SUPPER 90 capsule 4     family history includes Coronary Artery Disease in his mother; Diabetes in his mother; Lung Cancer in his father; No Known Problems in his son.  Social Connections: Not on file          WBC Count   Date Value Ref Range Status   09/14/2023 3.9 (L) 4.0 - 11.0 10e3/uL Final     RBC Count   Date Value Ref Range Status   09/14/2023 3.44 (L) 4.40 - 5.90 10e6/uL Final     Hemoglobin   Date Value Ref Range Status   09/14/2023 10.4 (L) 13.3 - 17.7 g/dL Final     Hematocrit   Date Value Ref Range Status   09/14/2023 31.7 (L) 40.0 - 53.0 % Final     MCV   Date Value Ref Range Status   09/14/2023 92 78 - 100 fL Final     MCH   Date Value Ref Range Status   09/14/2023 30.2 26.5 - 33.0 pg Final     Platelet Count   Date Value Ref Range Status   09/14/2023 94 (L) 150 - 450 10e3/uL Final     % Lymphocytes   Date Value Ref Range Status   07/02/2023 7 % Final     AST   Date Value Ref Range Status   07/02/2023 32 0 - 40 U/L Final     ALT   Date Value Ref Range Status   09/14/2023 28 0 - 70 U/L Final     Comment:     Reference intervals  for this test were updated on 6/12/2023 to more accurately reflect our healthy population. There may be differences in the flagging of prior results with similar values performed with this method. Interpretation of those prior results can be made in the context of the updated reference intervals.       Albumin   Date Value Ref Range Status   09/14/2023 4.1 3.5 - 5.2 g/dL Final   07/02/2023 2.8 (L) 3.5 - 5.0 g/dL Final     Alkaline Phosphatase   Date Value Ref Range Status   07/02/2023 78 45 - 120 U/L Final     Creatinine   Date Value Ref Range Status   09/14/2023 1.19 (H) 0.67 - 1.17 mg/dL Final     GFR Estimate   Date Value Ref Range Status   09/14/2023 63 >60 mL/min/1.73m2 Final   02/08/2021 48 (L) >60 mL/min/1.73m2 Final     GFR Estimate If Black   Date Value Ref Range Status   02/08/2021 59 (L) >60 mL/min/1.73m2 Final     Creatinine Urine mg/dL   Date Value Ref Range Status   06/14/2023 121.0 mg/dL Final     Comment:     The reference ranges have not been established in urine creatinine. The results should be integrated into the clinical context for interpretation.   09/20/2021 116 mg/dL Final

## 2023-09-29 ENCOUNTER — HOSPITAL ENCOUNTER (OUTPATIENT)
Facility: CLINIC | Age: 78
Discharge: HOME | End: 2023-09-29
Attending: INTERNAL MEDICINE | Admitting: INTERNAL MEDICINE
Payer: MEDICARE

## 2023-09-29 ENCOUNTER — ANESTHESIA EVENT (OUTPATIENT)
Dept: SURGERY | Facility: CLINIC | Age: 78
End: 2023-09-29
Payer: MEDICARE

## 2023-09-29 ENCOUNTER — ANESTHESIA (OUTPATIENT)
Dept: SURGERY | Facility: CLINIC | Age: 78
End: 2023-09-29
Payer: MEDICARE

## 2023-09-29 VITALS
TEMPERATURE: 96.9 F | OXYGEN SATURATION: 97 % | BODY MASS INDEX: 27.2 KG/M2 | WEIGHT: 190 LBS | HEART RATE: 51 BPM | SYSTOLIC BLOOD PRESSURE: 104 MMHG | RESPIRATION RATE: 16 BRPM | HEIGHT: 70 IN | DIASTOLIC BLOOD PRESSURE: 56 MMHG

## 2023-09-29 LAB
GLUCOSE BLDC GLUCOMTR-MCNC: 142 MG/DL (ref 70–99)
GLUCOSE BLDC GLUCOMTR-MCNC: 148 MG/DL (ref 70–99)
UPPER GI ENDOSCOPY: NORMAL

## 2023-09-29 PROCEDURE — 88305 TISSUE EXAM BY PATHOLOGIST: CPT | Mod: TC | Performed by: INTERNAL MEDICINE

## 2023-09-29 PROCEDURE — 999N000141 HC STATISTIC PRE-PROCEDURE NURSING ASSESSMENT: Performed by: INTERNAL MEDICINE

## 2023-09-29 PROCEDURE — 272N000001 HC OR GENERAL SUPPLY STERILE: Performed by: INTERNAL MEDICINE

## 2023-09-29 PROCEDURE — 360N000075 HC SURGERY LEVEL 2, PER MIN: Performed by: INTERNAL MEDICINE

## 2023-09-29 PROCEDURE — 258N000003 HC RX IP 258 OP 636: Performed by: STUDENT IN AN ORGANIZED HEALTH CARE EDUCATION/TRAINING PROGRAM

## 2023-09-29 PROCEDURE — 710N000012 HC RECOVERY PHASE 2, PER MINUTE: Performed by: INTERNAL MEDICINE

## 2023-09-29 PROCEDURE — 82962 GLUCOSE BLOOD TEST: CPT

## 2023-09-29 PROCEDURE — 370N000017 HC ANESTHESIA TECHNICAL FEE, PER MIN: Performed by: INTERNAL MEDICINE

## 2023-09-29 PROCEDURE — 250N000009 HC RX 250: Performed by: STUDENT IN AN ORGANIZED HEALTH CARE EDUCATION/TRAINING PROGRAM

## 2023-09-29 PROCEDURE — 250N000011 HC RX IP 250 OP 636: Performed by: STUDENT IN AN ORGANIZED HEALTH CARE EDUCATION/TRAINING PROGRAM

## 2023-09-29 RX ORDER — PROCHLORPERAZINE MALEATE 5 MG
5 TABLET ORAL EVERY 6 HOURS PRN
Status: CANCELLED | OUTPATIENT
Start: 2023-09-29

## 2023-09-29 RX ORDER — OXYCODONE HYDROCHLORIDE 5 MG/1
10 TABLET ORAL
Status: DISCONTINUED | OUTPATIENT
Start: 2023-09-29 | End: 2023-09-29 | Stop reason: HOSPADM

## 2023-09-29 RX ORDER — NALOXONE HYDROCHLORIDE 0.4 MG/ML
0.4 INJECTION, SOLUTION INTRAMUSCULAR; INTRAVENOUS; SUBCUTANEOUS
Status: CANCELLED | OUTPATIENT
Start: 2023-09-29

## 2023-09-29 RX ORDER — ONDANSETRON 4 MG/1
4 TABLET, ORALLY DISINTEGRATING ORAL EVERY 6 HOURS PRN
Status: CANCELLED | OUTPATIENT
Start: 2023-09-29

## 2023-09-29 RX ORDER — NALOXONE HYDROCHLORIDE 0.4 MG/ML
0.2 INJECTION, SOLUTION INTRAMUSCULAR; INTRAVENOUS; SUBCUTANEOUS
Status: CANCELLED | OUTPATIENT
Start: 2023-09-29

## 2023-09-29 RX ORDER — LIDOCAINE 40 MG/G
CREAM TOPICAL
Status: DISCONTINUED | OUTPATIENT
Start: 2023-09-29 | End: 2023-09-29 | Stop reason: HOSPADM

## 2023-09-29 RX ORDER — SODIUM CHLORIDE, SODIUM LACTATE, POTASSIUM CHLORIDE, CALCIUM CHLORIDE 600; 310; 30; 20 MG/100ML; MG/100ML; MG/100ML; MG/100ML
INJECTION, SOLUTION INTRAVENOUS CONTINUOUS
Status: DISCONTINUED | OUTPATIENT
Start: 2023-09-29 | End: 2023-09-29 | Stop reason: HOSPADM

## 2023-09-29 RX ORDER — FLUMAZENIL 0.1 MG/ML
0.2 INJECTION, SOLUTION INTRAVENOUS
Status: CANCELLED | OUTPATIENT
Start: 2023-09-29 | End: 2023-09-29

## 2023-09-29 RX ORDER — OXYCODONE HYDROCHLORIDE 5 MG/1
5 TABLET ORAL
Status: DISCONTINUED | OUTPATIENT
Start: 2023-09-29 | End: 2023-09-29 | Stop reason: HOSPADM

## 2023-09-29 RX ORDER — ONDANSETRON 2 MG/ML
4 INJECTION INTRAMUSCULAR; INTRAVENOUS EVERY 30 MIN PRN
Status: DISCONTINUED | OUTPATIENT
Start: 2023-09-29 | End: 2023-09-29 | Stop reason: HOSPADM

## 2023-09-29 RX ORDER — LIDOCAINE HYDROCHLORIDE 10 MG/ML
INJECTION, SOLUTION INFILTRATION; PERINEURAL PRN
Status: DISCONTINUED | OUTPATIENT
Start: 2023-09-29 | End: 2023-09-29

## 2023-09-29 RX ORDER — ONDANSETRON 4 MG/1
4 TABLET, ORALLY DISINTEGRATING ORAL EVERY 30 MIN PRN
Status: DISCONTINUED | OUTPATIENT
Start: 2023-09-29 | End: 2023-09-29 | Stop reason: HOSPADM

## 2023-09-29 RX ORDER — ONDANSETRON 2 MG/ML
INJECTION INTRAMUSCULAR; INTRAVENOUS PRN
Status: DISCONTINUED | OUTPATIENT
Start: 2023-09-29 | End: 2023-09-29

## 2023-09-29 RX ORDER — ONDANSETRON 2 MG/ML
4 INJECTION INTRAMUSCULAR; INTRAVENOUS EVERY 6 HOURS PRN
Status: CANCELLED | OUTPATIENT
Start: 2023-09-29

## 2023-09-29 RX ORDER — PROPOFOL 10 MG/ML
INJECTION, EMULSION INTRAVENOUS PRN
Status: DISCONTINUED | OUTPATIENT
Start: 2023-09-29 | End: 2023-09-29

## 2023-09-29 RX ORDER — PROPOFOL 10 MG/ML
INJECTION, EMULSION INTRAVENOUS CONTINUOUS PRN
Status: DISCONTINUED | OUTPATIENT
Start: 2023-09-29 | End: 2023-09-29

## 2023-09-29 RX ADMIN — ONDANSETRON 4 MG: 2 INJECTION INTRAMUSCULAR; INTRAVENOUS at 10:10

## 2023-09-29 RX ADMIN — PROPOFOL 200 MCG/KG/MIN: 10 INJECTION, EMULSION INTRAVENOUS at 10:10

## 2023-09-29 RX ADMIN — LIDOCAINE HYDROCHLORIDE 2 ML: 10 INJECTION, SOLUTION INFILTRATION; PERINEURAL at 10:10

## 2023-09-29 RX ADMIN — SODIUM CHLORIDE, POTASSIUM CHLORIDE, SODIUM LACTATE AND CALCIUM CHLORIDE: 600; 310; 30; 20 INJECTION, SOLUTION INTRAVENOUS at 09:49

## 2023-09-29 RX ADMIN — PROPOFOL 40 MG: 10 INJECTION, EMULSION INTRAVENOUS at 10:10

## 2023-09-29 ASSESSMENT — ACTIVITIES OF DAILY LIVING (ADL)
ADLS_ACUITY_SCORE: 35
ADLS_ACUITY_SCORE: 35
ADLS_ACUITY_SCORE: 33

## 2023-09-29 NOTE — ANESTHESIA PREPROCEDURE EVALUATION
Anesthesia Pre-Procedure Evaluation    Patient: Frank Obando   MRN: [id number] : 1945        Procedure : Procedure(s):  ESOPHAGOGASTRODUODENOSCOPY          Past Medical History:   Diagnosis Date     Anemia      Arthritis     osteoarthritis     Calculus of kidney      Diabetes mellitus (H)      Episcleritis of left eye      Hyperlipidemia      Hypertension      Iron deficiency anemia due to chronic blood loss 2021     Peripheral neuropathy      Stroke (H)       Past Surgical History:   Procedure Laterality Date     COLONOSCOPY N/A 2021    Procedure: COLONOSCOPY with polypectomy;  Surgeon: Dex Finch MD;  Location: [facility]     CYSTOSCOPY TUMOR / CONDYLOMATA W/ LASER Left 2014     ESOPHAGOSCOPY, GASTROSCOPY, DUODENOSCOPY (EGD), COMBINED N/A 2021    Procedure: ESOPHAGOGASTRODUODENOSCOPY (EGD) with biopsies and argon plasma coagulation;  Surgeon: Dex Finch MD;  Location: [facility] CYSTOSCOPY,INSERT URETERAL STENT      Description: Cystoscopy With Insertion Of Ureteral Stent Right;  Recorded: 10/14/2009;  Comments: stone     LAPAROSCOPIC CHOLECYSTECTOMY N/A 2023    Procedure: CHOLECYSTECTOMY, LAPAROSCOPIC;  Surgeon: Jadiel Argueta MD;  Location: [facility] Main OR      Allergies   Allergen Reactions     Morphine Nausea and Vomiting     Scopolamine Hbr [Scopolamine] Unknown      Social History     Tobacco Use     Smoking status: Never     Smokeless tobacco: Never   Substance Use Topics     Alcohol use: No      Wt Readings from Last 1 Encounters:   23 86.2 kg (190 lb)        Anesthesia Evaluation            ROS/MED HX  ENT/Pulmonary:       Neurologic:       Cardiovascular:     (+)  hypertension- -   -  - -                                      METS/Exercise Tolerance:     Hematologic:       Musculoskeletal:       GI/Hepatic:       Renal/Genitourinary:     (+) renal disease, type: CRI,            Endo:     (+)  type II DM,   Not  using insulin,                 Psychiatric/Substance Use:       Infectious Disease:       Malignancy:       Other:          Physical Exam    Airway        Mallampati: III   TM distance: > 3 FB   Neck ROM: full     Respiratory Devices and Support         Dental           Cardiovascular   cardiovascular exam normal          Pulmonary   pulmonary exam normal            OUTSIDE LABS:  CBC:   Lab Results   Component Value Date    WBC 3.9 (L) 09/14/2023    WBC 8.0 07/06/2023    HGB 10.4 (L) 09/14/2023    HGB 11.0 (L) 07/06/2023    HCT 31.7 (L) 09/14/2023    HCT 33.1 (L) 07/06/2023    PLT 94 (L) 09/14/2023     (L) 07/06/2023     BMP:   Lab Results   Component Value Date     (L) 07/02/2023     (L) 07/01/2023    POTASSIUM 5.0 07/02/2023    POTASSIUM 4.1 07/01/2023    CHLORIDE 103 07/02/2023    CHLORIDE 103 07/01/2023    CO2 23 07/02/2023    CO2 22 07/01/2023    BUN 22 07/02/2023    BUN 17 07/01/2023    CR 1.19 (H) 09/14/2023    CR 1.52 (H) 07/02/2023     (H) 07/03/2023     (H) 07/03/2023     COAGS:   Lab Results   Component Value Date    PTT 29 10/05/2021    INR 1.34 (H) 07/01/2023     POC: No results found for: BGM, HCG, HCGS  HEPATIC:   Lab Results   Component Value Date    ALBUMIN 4.1 09/14/2023    PROTTOTAL 7.0 07/02/2023    ALT 28 09/14/2023    AST 32 07/02/2023    ALKPHOS 78 07/02/2023    BILITOTAL 1.1 (H) 07/02/2023     OTHER:   Lab Results   Component Value Date    LACT 1.3 06/30/2023    A1C 6.7 (H) 07/06/2023    CINDY 8.5 07/02/2023    PHOS 2.9 04/04/2023    MAG 2.0 03/25/2023    LIPASE 14 07/01/2023    TSH 2.37 11/18/2022       Anesthesia Plan    ASA Status:  3       Anesthesia Type: MAC.              Consents    Anesthesia Plan(s) and associated risks, benefits, and realistic alternatives discussed. Questions answered and patient/representative(s) expressed understanding.     - Discussed:     - Discussed with:  Patient            Postoperative Care    Pain management: IV analgesics,  Oral pain medications.   PONV prophylaxis: Ondansetron (or other 5HT-3)     Comments:              Deion Viveros DO

## 2023-09-29 NOTE — INTERVAL H&P NOTE
"I have reviewed the surgical (or preoperative) H&P that is linked to this encounter, and examined the patient. There are no significant changes  Physical Exam:  CV- Reg rate and rhythm  Chest- Clear bilaterally  Abd- soft, nontender  Mallampati- II    ASA: 3      Clinical Conditions Present on Arrival:  Clinically Significant Risk Factors Present on Admission                 # Thrombocytopenia: Lowest platelets = 94 in last 30 days, will monitor for bleeding  # DMII: A1C = 6.7 % (Ref range: 0.0 - 5.6 %) within past 6 months  # Overweight: Estimated body mass index is 27.26 kg/m  as calculated from the following:    Height as of this encounter: 1.778 m (5' 10\").    Weight as of this encounter: 86.2 kg (190 lb).       "

## 2023-09-29 NOTE — ANESTHESIA POSTPROCEDURE EVALUATION
Patient: Frank Obando    Procedure: Procedure(s):  ESOPHAGOGASTRODUODENOSCOPY with BIOPSY       Anesthesia Type:  MAC    Note:  Disposition: Outpatient   Postop Pain Control: Uneventful            Sign Out: Well controlled pain   PONV: No   Neuro/Psych: Uneventful            Sign Out: Acceptable/Baseline neuro status   Airway/Respiratory: Uneventful            Sign Out: Acceptable/Baseline resp. status   CV/Hemodynamics: Uneventful            Sign Out: Acceptable CV status; No obvious hypovolemia; No obvious fluid overload   Other NRE: NONE   DID A NON-ROUTINE EVENT OCCUR? No    Event details/Postop Comments:  Patient recovering comfortably.        Last vitals:  Vitals Value Taken Time   /60 09/29/23 1050   Temp 36.1  C (96.9  F) 09/29/23 1030   Pulse 55 09/29/23 1052   Resp 16 09/29/23 1040   SpO2 93 % 09/29/23 1052   Vitals shown include unvalidated device data.    Electronically Signed By: Deion Viveros DO  September 29, 2023  3:41 PM

## 2023-09-29 NOTE — ANESTHESIA CARE TRANSFER NOTE
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    Anesthesia Type:   MAC     Note:    Oropharynx: oropharynx clear of all foreign objects and spontaneously breathing  Level of Consciousness: drowsy  Oxygen Supplementation: face mask  Level of Supplemental Oxygen (L/min / FiO2): 10  Independent Airway: airway patency satisfactory and stable  Dentition: dentition unchanged  Vital Signs Stable: post-procedure vital signs reviewed and stable  Report to RN Given: handoff report given  Patient transferred to: Phase II    Handoff Report: Identifed the Patient, Identified the Reponsible Provider, Reviewed the pertinent medical history, Discussed the surgical course, Reviewed Intra-OP anesthesia mangement and issues during anesthesia, Set expectations for post-procedure period and Allowed opportunity for questions and acknowledgement of understanding      Vitals:  Vitals Value Taken Time   BP 91/53 09/29/23 1030   Temp 36.1  C (96.9  F) 09/29/23 1030   Pulse 55 09/29/23 1030   Resp 16 09/29/23 1030   SpO2 97 % 09/29/23 1030   Vitals shown include unvalidated device data.    Electronically Signed By: NARESH Forbes CRNA  September 29, 2023  10:32 AM

## 2023-10-04 LAB
PATH REPORT.COMMENTS IMP SPEC: NORMAL
PATH REPORT.FINAL DX SPEC: NORMAL
PATH REPORT.GROSS SPEC: NORMAL
PATH REPORT.MICROSCOPIC SPEC OTHER STN: NORMAL
PATH REPORT.RELEVANT HX SPEC: NORMAL
PHOTO IMAGE: NORMAL

## 2023-10-04 PROCEDURE — 88305 TISSUE EXAM BY PATHOLOGIST: CPT | Mod: 26 | Performed by: PATHOLOGY

## 2023-10-04 PROCEDURE — 88342 IMHCHEM/IMCYTCHM 1ST ANTB: CPT | Mod: 26 | Performed by: PATHOLOGY

## 2023-10-04 PROCEDURE — 88341 IMHCHEM/IMCYTCHM EA ADD ANTB: CPT | Mod: 26 | Performed by: PATHOLOGY

## 2023-10-06 ENCOUNTER — OFFICE VISIT (OUTPATIENT)
Dept: INTERNAL MEDICINE | Facility: CLINIC | Age: 78
End: 2023-10-06
Payer: MEDICARE

## 2023-10-06 VITALS
RESPIRATION RATE: 16 BRPM | DIASTOLIC BLOOD PRESSURE: 60 MMHG | HEIGHT: 70 IN | SYSTOLIC BLOOD PRESSURE: 103 MMHG | OXYGEN SATURATION: 95 % | TEMPERATURE: 97.7 F | WEIGHT: 192.6 LBS | HEART RATE: 69 BPM | BODY MASS INDEX: 27.57 KG/M2

## 2023-10-06 DIAGNOSIS — E11.8 TYPE 2 DIABETES MELLITUS WITH COMPLICATION, WITHOUT LONG-TERM CURRENT USE OF INSULIN (H): Chronic | ICD-10-CM

## 2023-10-06 DIAGNOSIS — R29.818 NEUROGENIC CLAUDICATION: Primary | ICD-10-CM

## 2023-10-06 DIAGNOSIS — I10 ESSENTIAL HYPERTENSION, BENIGN: ICD-10-CM

## 2023-10-06 DIAGNOSIS — M05.79 RHEUMATOID ARTHRITIS INVOLVING MULTIPLE SITES WITH POSITIVE RHEUMATOID FACTOR (H): ICD-10-CM

## 2023-10-06 DIAGNOSIS — R18.8 CIRRHOSIS OF LIVER WITH ASCITES, UNSPECIFIED HEPATIC CIRRHOSIS TYPE (H): ICD-10-CM

## 2023-10-06 DIAGNOSIS — H44.112 PANUVEITIS OF LEFT EYE: ICD-10-CM

## 2023-10-06 DIAGNOSIS — N18.32 STAGE 3B CHRONIC KIDNEY DISEASE (H): ICD-10-CM

## 2023-10-06 DIAGNOSIS — K74.60 CIRRHOSIS OF LIVER WITH ASCITES, UNSPECIFIED HEPATIC CIRRHOSIS TYPE (H): ICD-10-CM

## 2023-10-06 LAB
ANION GAP SERPL CALCULATED.3IONS-SCNC: 10 MMOL/L (ref 7–15)
BUN SERPL-MCNC: 15.6 MG/DL (ref 8–23)
CALCIUM SERPL-MCNC: 9.3 MG/DL (ref 8.8–10.2)
CHLORIDE SERPL-SCNC: 104 MMOL/L (ref 98–107)
CREAT SERPL-MCNC: 1.23 MG/DL (ref 0.67–1.17)
CREAT UR-MCNC: 156 MG/DL
DEPRECATED HCO3 PLAS-SCNC: 25 MMOL/L (ref 22–29)
EGFRCR SERPLBLD CKD-EPI 2021: 60 ML/MIN/1.73M2
GLUCOSE SERPL-MCNC: 151 MG/DL (ref 70–99)
HBA1C MFR BLD: 6.7 % (ref 0–5.6)
MICROALBUMIN UR-MCNC: 12.3 MG/L
MICROALBUMIN/CREAT UR: 7.88 MG/G CR (ref 0–17)
POTASSIUM SERPL-SCNC: 4.6 MMOL/L (ref 3.4–5.3)
SODIUM SERPL-SCNC: 139 MMOL/L (ref 135–145)

## 2023-10-06 PROCEDURE — 99214 OFFICE O/P EST MOD 30 MIN: CPT | Mod: 25 | Performed by: INTERNAL MEDICINE

## 2023-10-06 PROCEDURE — 80048 BASIC METABOLIC PNL TOTAL CA: CPT | Performed by: INTERNAL MEDICINE

## 2023-10-06 PROCEDURE — 82570 ASSAY OF URINE CREATININE: CPT | Performed by: INTERNAL MEDICINE

## 2023-10-06 PROCEDURE — 82043 UR ALBUMIN QUANTITATIVE: CPT | Performed by: INTERNAL MEDICINE

## 2023-10-06 PROCEDURE — 91320 SARSCV2 VAC 30MCG TRS-SUC IM: CPT | Performed by: INTERNAL MEDICINE

## 2023-10-06 PROCEDURE — 36415 COLL VENOUS BLD VENIPUNCTURE: CPT | Performed by: INTERNAL MEDICINE

## 2023-10-06 PROCEDURE — 83036 HEMOGLOBIN GLYCOSYLATED A1C: CPT | Performed by: INTERNAL MEDICINE

## 2023-10-06 PROCEDURE — 90480 ADMN SARSCOV2 VAC 1/ONLY CMP: CPT | Performed by: INTERNAL MEDICINE

## 2023-10-06 NOTE — PROGRESS NOTES
Office Visit - Follow Up   Frank Obando   78 year old male    Date of Visit: 10/6/2023    Chief Complaint   Patient presents with    office visit    Recheck Medication     Pt reports that he is here for his 3 month follow up.        Assessment and Plan   1. Neurogenic claudication  Worried about spinal stenosis and would like to proceed with an MRI  - MR Lumbar Spine w/o Contrast; Future    2. Type 2 diabetes mellitus with complication, without long-term current use of insulin (H)  Has been well controlled  - Hemoglobin A1c; Future  - Albumin Random Urine Quantitative with Creat Ratio; Future  - Basic metabolic panel  (Ca, Cl, CO2, Creat, Gluc, K, Na, BUN); Future  - Hemoglobin A1c  - Albumin Random Urine Quantitative with Creat Ratio  - Basic metabolic panel  (Ca, Cl, CO2, Creat, Gluc, K, Na, BUN)    3. Stage 3b chronic kidney disease (H)  Stable, continue lisinopril for renal protection    4. Rheumatoid arthritis involving multiple sites with positive rheumatoid factor (H)  Management per rheumatology, on Remicade    5. Panuveitis of left eye, Dr. Abdi / Castillo    6. Essential hypertension, benign  Blood pressure okay continue same    7. Cirrhosis of liver with ascites, unspecified hepatic cirrhosis type (H)  Management per hepatology, recently had endoscopy, every 6 month hepatoma surveillance    Return in about 3 months (around 1/6/2024) for Follow up.     History of Present Illness   This 78 year old man comes in for follow-up.  Recently had an upper endoscopy which were reviewed and looked okay.  Some ongoing inflammation but he is on a PPI.  He has been having difficulty walking unless he is bent over using a cart.  Hard for him to describe this in more detail but it sounds like he gets some achiness in his legs.       Physical Exam   General Appearance:   No acute distress    /60 (BP Location: Left arm, Patient Position: Sitting, Cuff Size: Adult Regular)   Pulse 69   Temp 97.7  F (36.5  " C) (Tympanic)   Resp 16   Ht 1.778 m (5' 10\")   Wt 87.4 kg (192 lb 9.6 oz)   SpO2 95%   BMI 27.64 kg/m      His feet are warm bilaterally with palpable dorsal pedal pulses.  He has normal strength sensation and reflexes in the lower extremities.     Additional Information   Current Outpatient Medications   Medication Sig Dispense Refill    acetaminophen (TYLENOL) 500 MG tablet Take 1 tablet (500 mg) by mouth every 6 hours as needed for mild pain      atorvastatin (LIPITOR) 40 MG tablet TAKE 1 TABLET BY MOUTH DAILY 90 tablet 2    blood glucose (ACCU-CHEK JACOB PLUS) test strip 1 strip by In Vitro route 2 times daily 200 strip 11    blood glucose meter (GLUCOMETER) [BLOOD GLUCOSE METER (GLUCOMETER)] Use 1 each As Directed 2 (two) times a day. Dispense glucometer brand per patient's insurance at pharmacy discretion. 1 each 0    blood glucose monitoring (SOFTCLIX) lancets Use 1 each As Directed 2 (two) times a day. Dispense brand per patient's insurance at pharmacy discretion. - Does not apply 100 each 3    cyanocobalamin, vitamin B-12, 2,500 mcg Tab [CYANOCOBALAMIN, VITAMIN B-12, 2,500 MCG TAB] Take 2,500 mcg by mouth daily.      ferrous sulfate (FEROSUL) 325 (65 Fe) MG tablet Take 1 tablet (325 mg) by mouth 3 times daily (with meals) 90 tablet 3    generic lancets (ACCU-CHEK SOFTCLIX LANCETS) [GENERIC LANCETS (ACCU-CHEK SOFTCLIX LANCETS)] Use 1 each As Directed 2 (two) times a day. Dispense brand per patient's insurance at pharmacy discretion. 100 each 3    inFLIXimab (REMICADE IV) Every 6weeks      lactobacillus rhamnosus, GG, (CULTURELL) capsule Take 1 capsule by mouth 3 times daily (before meals)      lisinopril (ZESTRIL) 10 MG tablet Take 1 tablet (10 mg) by mouth daily 90 tablet 3    magnesium oxide 250 mg Tab [MAGNESIUM OXIDE 250 MG TAB] Take 250 mg by mouth 2 (two) times a day.      metFORMIN (GLUCOPHAGE) 500 MG tablet TAKE 2 TABLETS BY MOUTH TWICE DAILY AT 6 AM AND AT 4  tablet 3    omeprazole " (PRILOSEC) 40 MG DR capsule Take 1 capsule (40 mg) by mouth daily 90 capsule 4    pioglitazone (ACTOS) 30 MG tablet TAKE 1 TABLET BY MOUTH EVERY DAY 90 tablet 3    propranolol (INDERAL) 20 MG tablet Take 1 tablet (20 mg) by mouth 2 times daily 180 tablet 3    sucralfate (CARAFATE) 1 GM/10ML suspension Take 10 mLs (1 g) by mouth 4 times daily 414 mL 0    tamsulosin (FLOMAX) 0.4 MG capsule TAKE 1 CAPSULE BY MOUTH DAILY AFTER AND SUPPER 90 capsule 4       Time:      Greg Hudson submitted by the patient for this visit:  General Questionnaire (Submitted on 10/6/2023)  Chief Complaint: Chronic problems general questions HPI Form  What is the reason for your visit today? : 3 month follow up  How many servings of fruits and vegetables do you eat daily?: 0-1  On average, how many sweetened beverages do you drink each day (Examples: soda, juice, sweet tea, etc.  Do NOT count diet or artificially sweetened beverages)?: 0  How many minutes a day do you exercise enough to make your heart beat faster?: 9 or less  How many days a week do you exercise enough to make your heart beat faster?: 3 or less  How many days per week do you miss taking your medication?: 1  What makes it hard for you to take your medication every day?: remembering to take

## 2023-10-12 ENCOUNTER — INFUSION THERAPY VISIT (OUTPATIENT)
Dept: INFUSION THERAPY | Facility: CLINIC | Age: 78
End: 2023-10-12
Attending: INTERNAL MEDICINE
Payer: MEDICARE

## 2023-10-12 VITALS
TEMPERATURE: 97.9 F | RESPIRATION RATE: 16 BRPM | OXYGEN SATURATION: 96 % | HEART RATE: 66 BPM | DIASTOLIC BLOOD PRESSURE: 64 MMHG | SYSTOLIC BLOOD PRESSURE: 106 MMHG

## 2023-10-12 DIAGNOSIS — H44.112 PANUVEITIS OF LEFT EYE: Primary | ICD-10-CM

## 2023-10-12 PROCEDURE — 96375 TX/PRO/DX INJ NEW DRUG ADDON: CPT

## 2023-10-12 PROCEDURE — 250N000013 HC RX MED GY IP 250 OP 250 PS 637: Performed by: INTERNAL MEDICINE

## 2023-10-12 PROCEDURE — 258N000003 HC RX IP 258 OP 636: Performed by: INTERNAL MEDICINE

## 2023-10-12 PROCEDURE — 96413 CHEMO IV INFUSION 1 HR: CPT

## 2023-10-12 PROCEDURE — 250N000011 HC RX IP 250 OP 636: Mod: JZ | Performed by: INTERNAL MEDICINE

## 2023-10-12 RX ORDER — HEPARIN SODIUM (PORCINE) LOCK FLUSH IV SOLN 100 UNIT/ML 100 UNIT/ML
5 SOLUTION INTRAVENOUS
Status: CANCELLED | OUTPATIENT
Start: 2023-11-23

## 2023-10-12 RX ORDER — HEPARIN SODIUM,PORCINE 10 UNIT/ML
5 VIAL (ML) INTRAVENOUS
Status: CANCELLED | OUTPATIENT
Start: 2023-11-23

## 2023-10-12 RX ORDER — EPINEPHRINE 1 MG/ML
0.3 INJECTION, SOLUTION INTRAMUSCULAR; SUBCUTANEOUS EVERY 5 MIN PRN
Status: CANCELLED | OUTPATIENT
Start: 2023-11-23

## 2023-10-12 RX ORDER — MEPERIDINE HYDROCHLORIDE 50 MG/ML
25 INJECTION INTRAMUSCULAR; INTRAVENOUS; SUBCUTANEOUS EVERY 30 MIN PRN
Status: DISCONTINUED | OUTPATIENT
Start: 2023-10-12 | End: 2023-10-12 | Stop reason: HOSPADM

## 2023-10-12 RX ORDER — ACETAMINOPHEN 325 MG/1
650 TABLET ORAL ONCE
Qty: 2 TABLET | Refills: 0 | Status: COMPLETED | OUTPATIENT
Start: 2023-10-12 | End: 2023-10-12

## 2023-10-12 RX ORDER — DIPHENHYDRAMINE HCL 25 MG
25 CAPSULE ORAL ONCE
Status: CANCELLED
Start: 2023-11-23 | End: 2023-11-23

## 2023-10-12 RX ORDER — METHYLPREDNISOLONE SODIUM SUCCINATE 125 MG/2ML
125 INJECTION, POWDER, LYOPHILIZED, FOR SOLUTION INTRAMUSCULAR; INTRAVENOUS
Status: DISCONTINUED | OUTPATIENT
Start: 2023-10-12 | End: 2023-10-12 | Stop reason: HOSPADM

## 2023-10-12 RX ORDER — ALBUTEROL SULFATE 0.83 MG/ML
2.5 SOLUTION RESPIRATORY (INHALATION)
Status: CANCELLED | OUTPATIENT
Start: 2023-11-23

## 2023-10-12 RX ORDER — METHYLPREDNISOLONE SODIUM SUCCINATE 125 MG/2ML
125 INJECTION, POWDER, LYOPHILIZED, FOR SOLUTION INTRAMUSCULAR; INTRAVENOUS
Status: CANCELLED
Start: 2023-11-23

## 2023-10-12 RX ORDER — HEPARIN SODIUM,PORCINE 10 UNIT/ML
5 VIAL (ML) INTRAVENOUS
Status: DISCONTINUED | OUTPATIENT
Start: 2023-10-12 | End: 2023-10-12 | Stop reason: HOSPADM

## 2023-10-12 RX ORDER — MEPERIDINE HYDROCHLORIDE 50 MG/ML
25 INJECTION INTRAMUSCULAR; INTRAVENOUS; SUBCUTANEOUS EVERY 30 MIN PRN
Status: CANCELLED | OUTPATIENT
Start: 2023-11-23

## 2023-10-12 RX ORDER — HEPARIN SODIUM (PORCINE) LOCK FLUSH IV SOLN 100 UNIT/ML 100 UNIT/ML
5 SOLUTION INTRAVENOUS
Status: DISCONTINUED | OUTPATIENT
Start: 2023-10-12 | End: 2023-10-12 | Stop reason: HOSPADM

## 2023-10-12 RX ORDER — EPINEPHRINE 1 MG/ML
0.3 INJECTION, SOLUTION INTRAMUSCULAR; SUBCUTANEOUS EVERY 5 MIN PRN
Status: DISCONTINUED | OUTPATIENT
Start: 2023-10-12 | End: 2023-10-12 | Stop reason: HOSPADM

## 2023-10-12 RX ORDER — DIPHENHYDRAMINE HCL 25 MG
25 CAPSULE ORAL ONCE
Qty: 1 CAPSULE | Refills: 0 | Status: COMPLETED | OUTPATIENT
Start: 2023-10-12 | End: 2023-10-12

## 2023-10-12 RX ORDER — METHYLPREDNISOLONE SODIUM SUCCINATE 125 MG/2ML
125 INJECTION, POWDER, LYOPHILIZED, FOR SOLUTION INTRAMUSCULAR; INTRAVENOUS ONCE
Qty: 2 ML | Refills: 0 | Status: COMPLETED | OUTPATIENT
Start: 2023-10-12 | End: 2023-10-12

## 2023-10-12 RX ORDER — DIPHENHYDRAMINE HYDROCHLORIDE 50 MG/ML
50 INJECTION INTRAMUSCULAR; INTRAVENOUS
Status: DISCONTINUED | OUTPATIENT
Start: 2023-10-12 | End: 2023-10-12 | Stop reason: HOSPADM

## 2023-10-12 RX ORDER — METHYLPREDNISOLONE SODIUM SUCCINATE 125 MG/2ML
125 INJECTION, POWDER, LYOPHILIZED, FOR SOLUTION INTRAMUSCULAR; INTRAVENOUS ONCE
Status: CANCELLED | OUTPATIENT
Start: 2023-11-23 | End: 2023-11-23

## 2023-10-12 RX ORDER — ALBUTEROL SULFATE 0.83 MG/ML
2.5 SOLUTION RESPIRATORY (INHALATION)
Status: DISCONTINUED | OUTPATIENT
Start: 2023-10-12 | End: 2023-10-12 | Stop reason: HOSPADM

## 2023-10-12 RX ORDER — DIPHENHYDRAMINE HYDROCHLORIDE 50 MG/ML
50 INJECTION INTRAMUSCULAR; INTRAVENOUS
Status: CANCELLED
Start: 2023-11-23

## 2023-10-12 RX ORDER — ALBUTEROL SULFATE 90 UG/1
1-2 AEROSOL, METERED RESPIRATORY (INHALATION)
Status: CANCELLED
Start: 2023-11-23

## 2023-10-12 RX ORDER — ALBUTEROL SULFATE 90 UG/1
1-2 AEROSOL, METERED RESPIRATORY (INHALATION)
Status: DISCONTINUED | OUTPATIENT
Start: 2023-10-12 | End: 2023-10-12 | Stop reason: HOSPADM

## 2023-10-12 RX ORDER — ACETAMINOPHEN 325 MG/1
650 TABLET ORAL ONCE
Status: CANCELLED
Start: 2023-11-23 | End: 2023-11-23

## 2023-10-12 RX ADMIN — METHYLPREDNISOLONE SODIUM SUCCINATE 125 MG: 125 INJECTION, POWDER, FOR SOLUTION INTRAMUSCULAR; INTRAVENOUS at 11:23

## 2023-10-12 RX ADMIN — DIPHENHYDRAMINE HYDROCHLORIDE 25 MG: 25 CAPSULE ORAL at 11:10

## 2023-10-12 RX ADMIN — SODIUM CHLORIDE 250 ML: 9 INJECTION, SOLUTION INTRAVENOUS at 11:22

## 2023-10-12 RX ADMIN — ACETAMINOPHEN 650 MG: 325 TABLET ORAL at 11:09

## 2023-10-12 RX ADMIN — INFLIXIMAB 400 MG: 100 INJECTION, POWDER, LYOPHILIZED, FOR SOLUTION INTRAVENOUS at 11:50

## 2023-10-12 NOTE — PROGRESS NOTES
Infusion Nursing Note:  Frank CRAIG Yazminomarmasandra presents today for infliximab.    Patient seen by provider today: No   present during visit today: Not Applicable.    Note: Pt reports no labs needed today. Pt premedicated with po acetaminophen and diphenhydramine, IV Solu-medrol.      Intravenous Access:  Peripheral IV placed by Santhosh FLEMING    Treatment Conditions:  Biological Infusion Checklist:  ~~~ NOTE: If the patient answers yes to any of the questions below, hold the infusion and contact ordering provider or on-call provider.    Have you recently had an elevated temperature, fever, chills, productive cough, coughing for 3 weeks or longer or hemoptysis,  abnormal vital signs, night sweats,  chest pain or have you noticed a decrease in your appetite, unexplained weight loss or fatigue? No  Do you have any open wounds or new incisions? No  Do you have any upcoming hospitalizations or surgeries? Does not include esophagogastroduodenoscopy, colonoscopy, endoscopic retrograde cholangiopancreatography (ERCP), endoscopic ultrasound (EUS), dental procedures or joint aspiration/steroid injections No  Do you currently have any signs of illness or infection or are you on any antibiotics? No  Have you had any new, sudden or worsening abdominal pain? No  Have you or anyone in your household received a live vaccination in the past 4 weeks? Please note: No live vaccines while on biologic/chemotherapy until 6 months after the last treatment. Patient can receive the flu vaccine (shot only), pneumovax and the Covid vaccine. It is optimal for the patient to get these vaccines mid cycle, but they can be given at any time as long as it is not on the day of the infusion. No  Have you recently been diagnosed with any new nervous system diseases (ie. Multiple sclerosis, Guillain Laurier, seizures, neurological changes) or cancer diagnosis? Are you on any form of radiation or chemotherapy? No  Are you pregnant or breast feeding or  do you have plans of pregnancy in the future? No  Have you been having any signs of worsening depression or suicidal ideations?  (benlysta only) No  Have there been any other new onset medical symptoms? No  Have you had any new blood clots? (IVIG only) No      Post Infusion Assessment:  Patient tolerated infusion without incident.  Access discontinued per protocol by Santhosh CUMMINGS.       Discharge Plan:   Patient discharged in stable condition by Santhosh CUMMINGS, accompanied by: self.  Departure Mode: Ambulatory.      Kristin Hatfield RN

## 2023-10-17 ENCOUNTER — TRANSFERRED RECORDS (OUTPATIENT)
Dept: HEALTH INFORMATION MANAGEMENT | Facility: CLINIC | Age: 78
End: 2023-10-17
Payer: MEDICARE

## 2023-10-18 ENCOUNTER — TRANSFERRED RECORDS (OUTPATIENT)
Dept: HEALTH INFORMATION MANAGEMENT | Facility: CLINIC | Age: 78
End: 2023-10-18
Payer: MEDICARE

## 2023-10-18 LAB
ALT SERPL-CCNC: 37 IU/L (ref 0–44)
AST SERPL-CCNC: 32 IU/L (ref 0–40)
CREATININE (EXTERNAL): 1.13 MG/DL (ref 0.76–1.27)
GFR ESTIMATED (EXTERNAL): 67 ML/MIN/1.73
GLUCOSE (EXTERNAL): 150 MG/DL (ref 70–99)
INR (EXTERNAL): 1 (ref 0.9–1.2)
POTASSIUM (EXTERNAL): 4.6 MMOL/L (ref 3.5–5.2)

## 2023-10-27 ENCOUNTER — TELEPHONE (OUTPATIENT)
Dept: INTERNAL MEDICINE | Facility: CLINIC | Age: 78
End: 2023-10-27
Payer: MEDICARE

## 2023-10-27 DIAGNOSIS — I10 ESSENTIAL HYPERTENSION, MALIGNANT: ICD-10-CM

## 2023-10-27 DIAGNOSIS — E11.65 TYPE 2 DIABETES MELLITUS WITH HYPERGLYCEMIA, WITHOUT LONG-TERM CURRENT USE OF INSULIN (H): ICD-10-CM

## 2023-10-27 NOTE — TELEPHONE ENCOUNTER
Medication Question or Refill    What medication are you calling about (include dose and sig)?:    Disp Refills Start End DONITA   pioglitazone (ACTOS) 30 MG tablet (Discontinued) 90 tablet 1 3/18/2022 9/12/2022 No   Sig: TAKE 1 TABLET BY MOUTH EVERY DAY   Sent to pharmacy as: Pioglitazone HCl 30 MG Oral Tablet (ACTOS)   Class: E-Prescribe   Order: [id number]       Preferred Pharmacy:   [id number] - [place] [address] AT [facility] [address]  [address]  [facility] [address]  Phone: [phone] Fax: [phone]    Who prescribed the medication?: Dr. Cruz    Do you need a refill? Yes    When did you use the medication last? 10/27/23      Do you have any questions or concerns?  Yes: Pt wants to know if he should continue to take Pioglitazone or not.      [facility] or would you prefer to receive a phone call?:   Patient would prefer a phone call   Okay to leave a detailed message?: Yes at Cell number on file:    Telephone Information:   Mobile [phone]

## 2023-10-30 RX ORDER — LISINOPRIL 10 MG/1
10 TABLET ORAL DAILY
Qty: 90 TABLET | Refills: 3 | Status: SHIPPED | OUTPATIENT
Start: 2023-10-30 | End: 2024-09-18

## 2023-10-30 RX ORDER — PIOGLITAZONEHYDROCHLORIDE 30 MG/1
TABLET ORAL
Qty: 90 TABLET | Refills: 3 | Status: SHIPPED | OUTPATIENT
Start: 2023-10-30 | End: 2024-07-26

## 2023-10-31 ENCOUNTER — LAB (OUTPATIENT)
Dept: LAB | Facility: CLINIC | Age: 78
End: 2023-10-31
Payer: MEDICARE

## 2023-10-31 DIAGNOSIS — N18.30 CRF (CHRONIC RENAL FAILURE), STAGE 3 (MODERATE) (H): ICD-10-CM

## 2023-10-31 DIAGNOSIS — H44.112 PANUVEITIS OF LEFT EYE: ICD-10-CM

## 2023-10-31 DIAGNOSIS — Z79.899 HIGH RISK MEDICATION USE: ICD-10-CM

## 2023-10-31 LAB
ALBUMIN SERPL BCG-MCNC: 4.2 G/DL (ref 3.5–5.2)
ALT SERPL W P-5'-P-CCNC: 44 U/L (ref 0–70)
CREAT SERPL-MCNC: 1.39 MG/DL (ref 0.67–1.17)
EGFRCR SERPLBLD CKD-EPI 2021: 52 ML/MIN/1.73M2
ERYTHROCYTE [DISTWIDTH] IN BLOOD BY AUTOMATED COUNT: 13.1 % (ref 10–15)
HCT VFR BLD AUTO: 33 % (ref 40–53)
HGB BLD-MCNC: 11.3 G/DL (ref 13.3–17.7)
MCH RBC QN AUTO: 30.8 PG (ref 26.5–33)
MCHC RBC AUTO-ENTMCNC: 34.2 G/DL (ref 31.5–36.5)
MCV RBC AUTO: 90 FL (ref 78–100)
PLATELET # BLD AUTO: 103 10E3/UL (ref 150–450)
RBC # BLD AUTO: 3.67 10E6/UL (ref 4.4–5.9)
WBC # BLD AUTO: 4.5 10E3/UL (ref 4–11)

## 2023-10-31 PROCEDURE — 82565 ASSAY OF CREATININE: CPT

## 2023-10-31 PROCEDURE — 36415 COLL VENOUS BLD VENIPUNCTURE: CPT

## 2023-10-31 PROCEDURE — 82040 ASSAY OF SERUM ALBUMIN: CPT

## 2023-10-31 PROCEDURE — 85027 COMPLETE CBC AUTOMATED: CPT

## 2023-10-31 PROCEDURE — 84460 ALANINE AMINO (ALT) (SGPT): CPT

## 2023-11-07 ENCOUNTER — HOSPITAL ENCOUNTER (OUTPATIENT)
Dept: ULTRASOUND IMAGING | Facility: CLINIC | Age: 78
Discharge: HOME | End: 2023-11-07
Attending: PHYSICIAN ASSISTANT | Admitting: PHYSICIAN ASSISTANT
Payer: MEDICARE

## 2023-11-07 DIAGNOSIS — K76.9 CHRONIC LIVER DISEASE: ICD-10-CM

## 2023-11-07 DIAGNOSIS — K29.40 AUTOIMMUNE GASTRITIS: ICD-10-CM

## 2023-11-07 PROCEDURE — 76705 ECHO EXAM OF ABDOMEN: CPT

## 2023-11-14 ENCOUNTER — TELEPHONE (OUTPATIENT)
Dept: INTERNAL MEDICINE | Facility: CLINIC | Age: 78
End: 2023-11-14
Payer: MEDICARE

## 2023-11-14 ENCOUNTER — HOSPITAL ENCOUNTER (OUTPATIENT)
Dept: MRI IMAGING | Facility: CLINIC | Age: 78
Discharge: HOME | End: 2023-11-14
Attending: INTERNAL MEDICINE | Admitting: INTERNAL MEDICINE
Payer: MEDICARE

## 2023-11-14 DIAGNOSIS — R29.818 NEUROGENIC CLAUDICATION: ICD-10-CM

## 2023-11-14 PROCEDURE — 72148 MRI LUMBAR SPINE W/O DYE: CPT | Mod: MG

## 2023-11-14 NOTE — TELEPHONE ENCOUNTER
Forms/Letter Request    Type of form/letter: Work    Have you been seen for this request: Yes pt needs a letter that said he got the flu shot on 9/15/23 and please send through [employer]    Do we have the form/letter: No      When is form/letter needed by: deja    How would you like the form/letter returned: [facility]    Patient Notified form requests are processed in 3-5 business days:Yes    Could we send this information to you in [employer] or would you prefer to receive a phone call?:   Patient would prefer a phone call   Okay to leave a detailed message?: Yes at Cell number on file:    Telephone Information:   Mobile [phone]

## 2023-11-14 NOTE — LETTER
11/14/2023        RE: Frank Obando  [address] [place] [address]        To Whom This May Concern,    Frank Obando received a flu shot from our clinic on 09/15/2023.

## 2023-11-15 NOTE — TELEPHONE ENCOUNTER
Letter was sent to patient's mychart as requested. Called patient to inform him of this and he had no further questions or concerns.

## 2023-11-22 ENCOUNTER — INFUSION THERAPY VISIT (OUTPATIENT)
Dept: INFUSION THERAPY | Facility: CLINIC | Age: 78
End: 2023-11-22
Attending: INTERNAL MEDICINE
Payer: MEDICARE

## 2023-11-22 VITALS
TEMPERATURE: 98.3 F | DIASTOLIC BLOOD PRESSURE: 70 MMHG | HEART RATE: 66 BPM | SYSTOLIC BLOOD PRESSURE: 104 MMHG | OXYGEN SATURATION: 96 % | RESPIRATION RATE: 16 BRPM

## 2023-11-22 DIAGNOSIS — H44.112 PANUVEITIS OF LEFT EYE: Primary | ICD-10-CM

## 2023-11-22 PROCEDURE — 96413 CHEMO IV INFUSION 1 HR: CPT

## 2023-11-22 PROCEDURE — 258N000003 HC RX IP 258 OP 636: Performed by: INTERNAL MEDICINE

## 2023-11-22 PROCEDURE — 96375 TX/PRO/DX INJ NEW DRUG ADDON: CPT

## 2023-11-22 PROCEDURE — 250N000013 HC RX MED GY IP 250 OP 250 PS 637: Performed by: INTERNAL MEDICINE

## 2023-11-22 PROCEDURE — 250N000011 HC RX IP 250 OP 636: Mod: JZ | Performed by: INTERNAL MEDICINE

## 2023-11-22 RX ORDER — EPINEPHRINE 1 MG/ML
0.3 INJECTION, SOLUTION INTRAMUSCULAR; SUBCUTANEOUS EVERY 5 MIN PRN
Status: CANCELLED | OUTPATIENT
Start: 2023-11-23

## 2023-11-22 RX ORDER — METHYLPREDNISOLONE SODIUM SUCCINATE 125 MG/2ML
125 INJECTION, POWDER, LYOPHILIZED, FOR SOLUTION INTRAMUSCULAR; INTRAVENOUS ONCE
Status: COMPLETED | OUTPATIENT
Start: 2023-11-22 | End: 2023-11-22

## 2023-11-22 RX ORDER — MEPERIDINE HYDROCHLORIDE 50 MG/ML
25 INJECTION INTRAMUSCULAR; INTRAVENOUS; SUBCUTANEOUS EVERY 30 MIN PRN
Status: DISCONTINUED | OUTPATIENT
Start: 2023-11-22 | End: 2023-11-22 | Stop reason: HOSPADM

## 2023-11-22 RX ORDER — METHYLPREDNISOLONE SODIUM SUCCINATE 125 MG/2ML
125 INJECTION, POWDER, LYOPHILIZED, FOR SOLUTION INTRAMUSCULAR; INTRAVENOUS
Status: CANCELLED
Start: 2023-11-23

## 2023-11-22 RX ORDER — ALBUTEROL SULFATE 0.83 MG/ML
2.5 SOLUTION RESPIRATORY (INHALATION)
Status: CANCELLED | OUTPATIENT
Start: 2023-11-23

## 2023-11-22 RX ORDER — HEPARIN SODIUM (PORCINE) LOCK FLUSH IV SOLN 100 UNIT/ML 100 UNIT/ML
5 SOLUTION INTRAVENOUS
Status: CANCELLED | OUTPATIENT
Start: 2023-11-23

## 2023-11-22 RX ORDER — HEPARIN SODIUM,PORCINE 10 UNIT/ML
5 VIAL (ML) INTRAVENOUS
Status: CANCELLED | OUTPATIENT
Start: 2023-11-23

## 2023-11-22 RX ORDER — DIPHENHYDRAMINE HCL 25 MG
25 CAPSULE ORAL ONCE
Status: COMPLETED | OUTPATIENT
Start: 2023-11-22 | End: 2023-11-22

## 2023-11-22 RX ORDER — DIPHENHYDRAMINE HYDROCHLORIDE 50 MG/ML
50 INJECTION INTRAMUSCULAR; INTRAVENOUS
Status: DISCONTINUED | OUTPATIENT
Start: 2023-11-22 | End: 2023-11-22 | Stop reason: HOSPADM

## 2023-11-22 RX ORDER — EPINEPHRINE 1 MG/ML
0.3 INJECTION, SOLUTION INTRAMUSCULAR; SUBCUTANEOUS EVERY 5 MIN PRN
Status: DISCONTINUED | OUTPATIENT
Start: 2023-11-22 | End: 2023-11-22 | Stop reason: HOSPADM

## 2023-11-22 RX ORDER — DIPHENHYDRAMINE HYDROCHLORIDE 50 MG/ML
50 INJECTION INTRAMUSCULAR; INTRAVENOUS
Status: CANCELLED
Start: 2023-11-23

## 2023-11-22 RX ORDER — METHYLPREDNISOLONE SODIUM SUCCINATE 125 MG/2ML
125 INJECTION, POWDER, LYOPHILIZED, FOR SOLUTION INTRAMUSCULAR; INTRAVENOUS
Status: DISCONTINUED | OUTPATIENT
Start: 2023-11-22 | End: 2023-11-22 | Stop reason: HOSPADM

## 2023-11-22 RX ORDER — ACETAMINOPHEN 325 MG/1
650 TABLET ORAL ONCE
Status: COMPLETED | OUTPATIENT
Start: 2023-11-22 | End: 2023-11-22

## 2023-11-22 RX ORDER — ACETAMINOPHEN 325 MG/1
650 TABLET ORAL ONCE
Status: CANCELLED
Start: 2023-11-23 | End: 2023-11-23

## 2023-11-22 RX ORDER — ALBUTEROL SULFATE 0.83 MG/ML
2.5 SOLUTION RESPIRATORY (INHALATION)
Status: DISCONTINUED | OUTPATIENT
Start: 2023-11-22 | End: 2023-11-22 | Stop reason: HOSPADM

## 2023-11-22 RX ORDER — DIPHENHYDRAMINE HCL 25 MG
25 CAPSULE ORAL ONCE
Status: CANCELLED
Start: 2023-11-23 | End: 2023-11-23

## 2023-11-22 RX ORDER — MEPERIDINE HYDROCHLORIDE 50 MG/ML
25 INJECTION INTRAMUSCULAR; INTRAVENOUS; SUBCUTANEOUS EVERY 30 MIN PRN
Status: CANCELLED | OUTPATIENT
Start: 2023-11-23

## 2023-11-22 RX ORDER — ALBUTEROL SULFATE 90 UG/1
1-2 AEROSOL, METERED RESPIRATORY (INHALATION)
Status: CANCELLED
Start: 2023-11-23

## 2023-11-22 RX ORDER — ALBUTEROL SULFATE 90 UG/1
1-2 AEROSOL, METERED RESPIRATORY (INHALATION)
Status: DISCONTINUED | OUTPATIENT
Start: 2023-11-22 | End: 2023-11-22 | Stop reason: HOSPADM

## 2023-11-22 RX ORDER — METHYLPREDNISOLONE SODIUM SUCCINATE 125 MG/2ML
125 INJECTION, POWDER, LYOPHILIZED, FOR SOLUTION INTRAMUSCULAR; INTRAVENOUS ONCE
Status: CANCELLED | OUTPATIENT
Start: 2023-11-23 | End: 2023-11-23

## 2023-11-22 RX ADMIN — SODIUM CHLORIDE 250 ML: 9 INJECTION, SOLUTION INTRAVENOUS at 11:25

## 2023-11-22 RX ADMIN — METHYLPREDNISOLONE SODIUM SUCCINATE 125 MG: 125 INJECTION, POWDER, FOR SOLUTION INTRAMUSCULAR; INTRAVENOUS at 11:25

## 2023-11-22 RX ADMIN — DIPHENHYDRAMINE HYDROCHLORIDE 25 MG: 25 CAPSULE ORAL at 11:20

## 2023-11-22 RX ADMIN — INFLIXIMAB 400 MG: 100 INJECTION, POWDER, LYOPHILIZED, FOR SOLUTION INTRAVENOUS at 11:48

## 2023-11-22 RX ADMIN — ACETAMINOPHEN 650 MG: 325 TABLET ORAL at 11:20

## 2023-11-22 NOTE — PROGRESS NOTES
~~~ NOTE: If the patient answers yes to any of the questions below, hold the infusion and contact ordering provider or on-call provider.    Do you currently have any signs of illness or infection or are you on any antibiotics? No  Have you recently had an elevated temperature, fever, chills, productive cough, coughing for 3 weeks or longer or hemoptysis, abnormal vital signs, night sweats, chest pain or have you noticed a decrease in your appetite, unexplained weight loss or fatigue? No  Have you had any new, sudden, or worsening abdominal pain? No  Do you have any open wounds or new incisions? (exclude for patients with hidradenitis suppurativa) No  Have you recently been diagnosed with any new nervous system diseases (ie. Multiple sclerosis, Guillain [place]ckney, seizures, neurological changes) or cancer diagnosis? Are you on any form of radiation or chemotherapy? No  Have there been any other new onset medical symptoms? No  Are you pregnant or breast feeding or do you have plans of pregnancy in the future? No; N/A  Do you have any upcoming hospitalizations or surgeries? Does not include esophagogastroduodenoscopy, colonoscopy, endoscopic retrograde cholangiopancreatography (ERCP), endoscopic ultrasound (EUS), dental procedures (including cleanings, fillings, implants, extractions)  or joint aspiration/steroid injections No  Have you or anyone in your household received a live vaccination in the past 4 weeks? Please note: No live vaccines while on biologic/chemotherapy until 6 months after the last treatment. Patient can receive the flu vaccine (shot only).  It is optimal for the patient to get it mid cycle, but it can be given at any time as long as it is not on the day of the infusion. No  If applicable to prescribed medication, confirm negative PPD or quantiferon gold MTB. If positive, verify has negative chest x-ray or the patient is at least 4 weeks post initiation of INH/B6 therapy and have clearance from provider  before infusion (Y/N:[id number])  If applicable to prescribed medication, confirm negative hepatitis B surface antigen or hepatitis C. If positive, clearance from provider before infusion. (Y/N: [id number])  Rheumatology patients receiving tocilizumab (Actemra): If labs were drawn within the past week, hold dosing until cleared to infuse If AST/ALT > 2 X upper limit normal; ANC < 1.0. NO; N/A  Patients receiving belimumab (Benlysta): Have you been having any signs of worsening depression or suicidal ideations? No; N/A     Infusion Nursing Note:  Frank Obando presents today for remicade.    Patient seen by provider today: No   present during visit today: Not Applicable.    Note: pt given tylenol, benadryl, and solumedrol iv as pre meds.      Intravenous Access:  Peripheral IV placed.    Treatment Conditions:  Not Applicable.      Post Infusion Assessment:  Patient tolerated infusion without incident.  Site patent and intact, free from redness, edema or discomfort.       Discharge Plan:   Patient and/or family verbalized understanding of discharge instructions and all questions answered.      Jeny Epps RN

## 2023-12-19 ENCOUNTER — TRANSFERRED RECORDS (OUTPATIENT)
Dept: HEALTH INFORMATION MANAGEMENT | Facility: CLINIC | Age: 78
End: 2023-12-19
Payer: MEDICARE

## 2023-12-19 LAB
ALT SERPL-CCNC: 31 U/L (ref 0–44)
AST SERPL-CCNC: 35 IU/L (ref 0–40)
CREATININE (EXTERNAL): 1.21 MG/DL (ref 0.76–1.27)
GFR ESTIMATED (EXTERNAL): 62 ML/MIN/1.73
GFR ESTIMATED (IF AFRICAN AMERICAN) (EXTERNAL): NORMAL
GLUCOSE (EXTERNAL): 143 MG/DL (ref 70–99)
POTASSIUM (EXTERNAL): 4.3 MMOL/L (ref 3.5–5.2)

## 2023-12-20 ENCOUNTER — TRANSFERRED RECORDS (OUTPATIENT)
Dept: HEALTH INFORMATION MANAGEMENT | Facility: CLINIC | Age: 78
End: 2023-12-20
Payer: MEDICARE

## 2023-12-28 ENCOUNTER — LAB (OUTPATIENT)
Dept: LAB | Facility: CLINIC | Age: 78
End: 2023-12-28
Payer: MEDICARE

## 2023-12-28 DIAGNOSIS — Z79.899 HIGH RISK MEDICATION USE: ICD-10-CM

## 2023-12-28 DIAGNOSIS — N18.30 CRF (CHRONIC RENAL FAILURE), STAGE 3 (MODERATE) (H): ICD-10-CM

## 2023-12-28 DIAGNOSIS — H44.112 PANUVEITIS OF LEFT EYE: ICD-10-CM

## 2023-12-28 LAB
ALBUMIN SERPL BCG-MCNC: 4.1 G/DL (ref 3.5–5.2)
ALT SERPL W P-5'-P-CCNC: 40 U/L (ref 0–70)
CREAT SERPL-MCNC: 1.3 MG/DL (ref 0.67–1.17)
EGFRCR SERPLBLD CKD-EPI 2021: 57 ML/MIN/1.73M2
ERYTHROCYTE [DISTWIDTH] IN BLOOD BY AUTOMATED COUNT: 13 % (ref 10–15)
HCT VFR BLD AUTO: 34.6 % (ref 40–53)
HGB BLD-MCNC: 11.5 G/DL (ref 13.3–17.7)
MCH RBC QN AUTO: 30.4 PG (ref 26.5–33)
MCHC RBC AUTO-ENTMCNC: 33.2 G/DL (ref 31.5–36.5)
MCV RBC AUTO: 92 FL (ref 78–100)
PLATELET # BLD AUTO: 105 10E3/UL (ref 150–450)
RBC # BLD AUTO: 3.78 10E6/UL (ref 4.4–5.9)
WBC # BLD AUTO: 5.4 10E3/UL (ref 4–11)

## 2023-12-28 PROCEDURE — 82565 ASSAY OF CREATININE: CPT

## 2023-12-28 PROCEDURE — 36415 COLL VENOUS BLD VENIPUNCTURE: CPT

## 2023-12-28 PROCEDURE — 84460 ALANINE AMINO (ALT) (SGPT): CPT

## 2023-12-28 PROCEDURE — 85027 COMPLETE CBC AUTOMATED: CPT

## 2023-12-28 PROCEDURE — 82040 ASSAY OF SERUM ALBUMIN: CPT

## 2024-01-02 ENCOUNTER — OFFICE VISIT (OUTPATIENT)
Dept: RHEUMATOLOGY | Facility: CLINIC | Age: 79
End: 2024-01-02
Payer: MEDICARE

## 2024-01-02 ENCOUNTER — TRANSCRIBE ORDERS (OUTPATIENT)
Dept: RHEUMATOLOGY | Facility: CLINIC | Age: 79
End: 2024-01-02

## 2024-01-02 VITALS
BODY MASS INDEX: 28.48 KG/M2 | DIASTOLIC BLOOD PRESSURE: 64 MMHG | WEIGHT: 198.5 LBS | SYSTOLIC BLOOD PRESSURE: 102 MMHG | OXYGEN SATURATION: 96 % | HEART RATE: 63 BPM

## 2024-01-02 DIAGNOSIS — R76.8 ANTINEUTROPHIL CYTOPLASMIC ANTIBODY (ANCA) POSITIVE: ICD-10-CM

## 2024-01-02 DIAGNOSIS — M65.352 TRIGGER FINGER, LEFT LITTLE FINGER: ICD-10-CM

## 2024-01-02 DIAGNOSIS — H44.112 PANUVEITIS OF LEFT EYE: Primary | ICD-10-CM

## 2024-01-02 DIAGNOSIS — M65.332 TRIGGER FINGER, LEFT MIDDLE FINGER: ICD-10-CM

## 2024-01-02 PROCEDURE — 99214 OFFICE O/P EST MOD 30 MIN: CPT | Mod: 25 | Performed by: INTERNAL MEDICINE

## 2024-01-02 PROCEDURE — 20550 NJX 1 TENDON SHEATH/LIGAMENT: CPT | Mod: 59 | Performed by: INTERNAL MEDICINE

## 2024-01-02 PROCEDURE — 20550 NJX 1 TENDON SHEATH/LIGAMENT: CPT | Mod: F2 | Performed by: INTERNAL MEDICINE

## 2024-01-02 RX ORDER — TRIAMCINOLONE ACETONIDE 40 MG/ML
20 INJECTION, SUSPENSION INTRA-ARTICULAR; INTRAMUSCULAR ONCE
Status: COMPLETED | OUTPATIENT
Start: 2024-01-02 | End: 2024-01-02

## 2024-01-02 RX ADMIN — TRIAMCINOLONE ACETONIDE 20 MG: 40 INJECTION, SUSPENSION INTRA-ARTICULAR; INTRAMUSCULAR at 11:55

## 2024-01-02 RX ADMIN — TRIAMCINOLONE ACETONIDE 20 MG: 40 INJECTION, SUSPENSION INTRA-ARTICULAR; INTRAMUSCULAR at 11:54

## 2024-01-02 NOTE — PROGRESS NOTES
Rheumatology follow-up visit note     Frank is a 78 year old male presents today for follow-up.    Antonio was seen today for recheck.    Diagnoses and all orders for this visit:    Panuveitis of left eye    Antineutrophil cytoplasmic antibody (ANCA) positive    Trigger finger, left middle finger  -     triamcinolone (KENALOG-40) injection 20 mg  -     INJECTION SINGLE TENDON SHEATH/LIGAMENT    Trigger finger, left little finger  -     triamcinolone (KENALOG-40) injection 20 mg  -     INJECTION SINGLE TENDON SHEATH/LIGAMENT        This patient is here for follow-up of immunosuppression for panuveitis and he follows up with ophthalmology and was reassured.  He is to continue Remicade as now.  He is complaining of pain in his left hand this is a presented at the left middle and fifth digit where he has triggering he would like to go for corticosteroid injections pros and cons outlined 20 mg of Kenalog injected into the flexor tendon sheath of left middle and little finger.  He tolerated procedure well.  Will meet here in 6 months.    Follow up in 6 months.    HPI    Frank Obando is a 78 year old male is here for follow-up of management of immunosuppression for left panuveitis.  He has osteoarthritis.  He noted that he was seen in ophthalmology recently and was reassured.  He was told that he has developed cataract but surgery is to be deferred for now.  His recent labs are reviewed.  Mild leukopenia noted.  To be done again in the next 6 weeks.  He gets Remicade at [facility] every 6 weeks.  He has noted pain in his left hand, noted clicking and locking of the left middle finger and less so of the left fifth digit.  This is moderately severe pain.  Interfering with some of the day-to-day activities.  He had similar symptoms on one of the digits on the right side with corticosteroid injection have been helpful.   . ROS enquiry held for fever, ocular symptoms, rash, headache,  GI issues.  He has not had sinus  symptoms, hemoptysis, hematuria, ear nose inflammation signals.       DETAILED EXAMINATION  01/02/24  :    Vitals:    01/02/24 1117   BP: 102/64   Pulse: 63   SpO2: 96%   Weight: 90 kg (198 lb 8 oz)     Alert oriented. Head including the face is examined for malar rash, heliotropes, scarring, lupus pernio. Eyes examined for redness such as in episcleritis/scleritis, periorbital lesions.   Neck/ Face examined for parotid gland swelling, range of motion of neck.  Left upper and lower and right upper and lower extremities examined for tenderness, swelling, warmth of the appendicular joints, range of motion, edema, rash.  Some of the important findings included: he does not have evidence of synovitis in the palpable joints of the upper extremities.  No significant deformities of the digits.  no [place]ber[place] nodes.  Range of motion of the shoulders  show full abduction.  No JLT effusion or warmth of the knees.  he does not have dactylitis of the digits.     Patient Active Problem List    Diagnosis Date Noted    Rheumatoid arthritis involving multiple sites with positive rheumatoid factor (H) 07/02/2023     Priority: Medium     Sees Dr Casas for inflammatory arthritis. Had uveitis also; on Remicade (July 2023)       Personal history of immunosuppressive therapy 07/02/2023     Priority: Medium     On Remicade for inflammatory arthritis described as rheumatoid.  Previously had been exposed to methotrexate and apparently it may have induced hepatic cirrhosis.      Stage 3b chronic kidney disease (H) 03/24/2023     Priority: Medium    Iron deficiency anemia due to chronic blood loss 11/02/2021     Priority: Medium    Angiodysplasia of stomach 09/27/2021     Priority: Medium    Secondary esophageal varices without bleeding (H) 09/27/2021     Priority: Medium    Panuveitis of left eye, Dr. Abdi / Castillo 03/19/2019     Priority: Medium    Essential hypertension, benign      Priority: Medium     Created by Conversion  Replacement  Utility updated for latest IMO load      Formatting of this note might be different from the original.  Created by Conversion    Replacement Utility updated for latest IMO load      Type 2 diabetes mellitus with complication, without long-term current use of insulin (H)      Priority: Medium     Created by Conversion      Formatting of this note might be different from the original.  Created by Conversion      Dyslipidemia      Priority: Medium    Thrombocytopenia (H24)      Priority: Medium    Cirrhosis of liver with ascites, unspecified hepatic cirrhosis type (H)      Priority: Medium     Non ETOH drinker; possibly from Methotrexate for RA RX       High risk medication use 09/26/2018     Priority: Medium    History of colonic polyps 05/07/2018     Priority: Medium    Antineutrophil cytoplasmic antibody (ANCA) positive 03/23/2017     Priority: Medium    Diabetic polyneuropathy associated with type 2 diabetes mellitus (H)      Priority: Medium     Created by Conversion  Replacement Utility updated for latest IMO load      Formatting of this note might be different from the original.  Created by Conversion    Replacement Utility updated for latest IMO load      Right bundle branch block 11/24/2014     Priority: Medium    H/o CVA ~2013      Priority: Medium     Created by Conversion         Past Surgical History:   Procedure Laterality Date    COLONOSCOPY N/A 11/16/2021    Procedure: COLONOSCOPY with polypectomy;  Surgeon: Dex Finch MD;  Location: [facility]    CYSTOSCOPY TUMOR / CONDYLOMATA W/ LASER Left 7/18/2014    ESOPHAGOSCOPY, GASTROSCOPY, DUODENOSCOPY (EGD), COMBINED N/A 11/16/2021    Procedure: ESOPHAGOGASTRODUODENOSCOPY (EGD) with biopsies and argon plasma coagulation;  Surgeon: Dex Finch MD;  Location: [facility]    ESOPHAGOSCOPY, GASTROSCOPY, DUODENOSCOPY (EGD), COMBINED N/A 9/29/2023    Procedure: ESOPHAGOGASTRODUODENOSCOPY with BIOPSY;  Surgeon: Alli Lozada MD;   Location: [facility] Main OR    HC CYSTOSCOPY,INSERT URETERAL STENT      Description: Cystoscopy With Insertion Of Ureteral Stent Right;  Recorded: 10/14/2009;  Comments: stone    LAPAROSCOPIC CHOLECYSTECTOMY N/A 7/1/2023    Procedure: CHOLECYSTECTOMY, LAPAROSCOPIC;  Surgeon: Jadiel Argueta MD;  Location: [facility] Main OR      Past Medical History:   Diagnosis Date    Anemia     Arthritis     osteoarthritis    Calculus of kidney     Diabetes mellitus (H)     Episcleritis of left eye     Hyperlipidemia     Hypertension     Iron deficiency anemia due to chronic blood loss 11/2/2021    Peripheral neuropathy     Stroke (H)      Allergies   Allergen Reactions    Morphine Nausea and Vomiting    Scopolamine Hbr [Scopolamine] Unknown     Current Outpatient Medications   Medication Sig Dispense Refill    acetaminophen (TYLENOL) 500 MG tablet Take 1 tablet (500 mg) by mouth every 6 hours as needed for mild pain      atorvastatin (LIPITOR) 40 MG tablet TAKE 1 TABLET BY MOUTH DAILY 90 tablet 2    blood glucose (ACCU-CHEK JACOB PLUS) test strip 1 strip by In Vitro route 2 times daily 200 strip 11    blood glucose meter (GLUCOMETER) [BLOOD GLUCOSE METER (GLUCOMETER)] Use 1 each As Directed 2 (two) times a day. Dispense glucometer brand per patient's insurance at pharmacy discretion. 1 each 0    blood glucose monitoring (SOFTCLIX) lancets Use 1 each As Directed 2 (two) times a day. Dispense brand per patient's insurance at pharmacy discretion. - Does not apply 100 each 3    cyanocobalamin, vitamin B-12, 2,500 mcg Tab [CYANOCOBALAMIN, VITAMIN B-12, 2,500 MCG TAB] Take 2,500 mcg by mouth daily.      ferrous sulfate (FEROSUL) 325 (65 Fe) MG tablet Take 1 tablet (325 mg) by mouth 3 times daily (with meals) 90 tablet 3    generic lancets (ACCU-CHEK SOFTCLIX LANCETS) [GENERIC LANCETS (ACCU-CHEK SOFTCLIX LANCETS)] Use 1 each As Directed 2 (two) times a day. Dispense brand per patient's insurance at pharmacy discretion. 100 each 3     inFLIXimab (REMICADE IV) Every 6weeks      lactobacillus rhamnosus, GG, (CULTURELL) capsule Take 1 capsule by mouth 3 times daily (before meals)      lisinopril (ZESTRIL) 10 MG tablet Take 1 tablet (10 mg) by mouth daily 90 tablet 3    magnesium oxide 250 mg Tab [MAGNESIUM OXIDE 250 MG TAB] Take 250 mg by mouth 2 (two) times a day.      metFORMIN (GLUCOPHAGE) 500 MG tablet TAKE 2 TABLETS BY MOUTH TWICE DAILY AT 6 AM AND AT 4  tablet 3    omeprazole (PRILOSEC) 40 MG DR capsule Take 1 capsule (40 mg) by mouth daily 90 capsule 4    pioglitazone (ACTOS) 30 MG tablet TAKE 1 TABLET BY MOUTH EVERY DAY 90 tablet 3    propranolol (INDERAL) 20 MG tablet Take 1 tablet (20 mg) by mouth 2 times daily 180 tablet 3    sucralfate (CARAFATE) 1 GM/10ML suspension Take 10 mLs (1 g) by mouth 4 times daily 414 mL 0    tamsulosin (FLOMAX) 0.4 MG capsule TAKE 1 CAPSULE BY MOUTH DAILY AFTER AND SUPPER 90 capsule 4     family history includes Coronary Artery Disease in his mother; Diabetes in his mother; Lung Cancer in his father; No Known Problems in his son.  Social Connections: Not on file          WBC Count   Date Value Ref Range Status   12/28/2023 5.4 4.0 - 11.0 10e3/uL Final     RBC Count   Date Value Ref Range Status   12/28/2023 3.78 (L) 4.40 - 5.90 10e6/uL Final     Hemoglobin   Date Value Ref Range Status   12/28/2023 11.5 (L) 13.3 - 17.7 g/dL Final     Hematocrit   Date Value Ref Range Status   12/28/2023 34.6 (L) 40.0 - 53.0 % Final     MCV   Date Value Ref Range Status   12/28/2023 92 78 - 100 fL Final     MCH   Date Value Ref Range Status   12/28/2023 30.4 26.5 - 33.0 pg Final     Platelet Count   Date Value Ref Range Status   12/28/2023 105 (L) 150 - 450 10e3/uL Final     % Lymphocytes   Date Value Ref Range Status   07/02/2023 7 % Final     AST   Date Value Ref Range Status   07/02/2023 32 0 - 40 U/L Final     ALT   Date Value Ref Range Status   12/28/2023 40 0 - 70 U/L Final     Albumin   Date Value Ref Range Status    12/28/2023 4.1 3.5 - 5.2 g/dL Final   07/02/2023 2.8 (L) 3.5 - 5.0 g/dL Final     Alkaline Phosphatase   Date Value Ref Range Status   07/02/2023 78 45 - 120 U/L Final     Creatinine   Date Value Ref Range Status   12/28/2023 1.30 (H) 0.67 - 1.17 mg/dL Final     GFR Estimate   Date Value Ref Range Status   12/28/2023 57 (L) >60 mL/min/1.73m2 Final   02/08/2021 48 (L) >60 mL/min/1.73m2 Final     GFR Estimate If Black   Date Value Ref Range Status   02/08/2021 59 (L) >60 mL/min/1.73m2 Final     Creatinine Urine mg/dL   Date Value Ref Range Status   10/06/2023 156.0 mg/dL Final     Comment:     The reference ranges have not been established in urine creatinine. The results should be integrated into the clinical context for interpretation.   09/20/2021 116 mg/dL Final

## 2024-01-03 ENCOUNTER — INFUSION THERAPY VISIT (OUTPATIENT)
Dept: INFUSION THERAPY | Facility: HOSPITAL | Age: 79
End: 2024-01-03
Attending: INTERNAL MEDICINE
Payer: MEDICARE

## 2024-01-03 VITALS
SYSTOLIC BLOOD PRESSURE: 120 MMHG | WEIGHT: 198 LBS | DIASTOLIC BLOOD PRESSURE: 74 MMHG | OXYGEN SATURATION: 96 % | BODY MASS INDEX: 28.41 KG/M2 | RESPIRATION RATE: 16 BRPM | HEART RATE: 61 BPM | TEMPERATURE: 98 F

## 2024-01-03 DIAGNOSIS — H44.112 PANUVEITIS OF LEFT EYE: Primary | ICD-10-CM

## 2024-01-03 PROCEDURE — 258N000003 HC RX IP 258 OP 636: Performed by: INTERNAL MEDICINE

## 2024-01-03 PROCEDURE — 96413 CHEMO IV INFUSION 1 HR: CPT

## 2024-01-03 PROCEDURE — 96375 TX/PRO/DX INJ NEW DRUG ADDON: CPT

## 2024-01-03 PROCEDURE — 250N000011 HC RX IP 250 OP 636: Performed by: INTERNAL MEDICINE

## 2024-01-03 PROCEDURE — 250N000013 HC RX MED GY IP 250 OP 250 PS 637: Performed by: INTERNAL MEDICINE

## 2024-01-03 RX ORDER — ALBUTEROL SULFATE 90 UG/1
1-2 AEROSOL, METERED RESPIRATORY (INHALATION)
Status: DISCONTINUED | OUTPATIENT
Start: 2024-01-03 | End: 2024-01-03 | Stop reason: HOSPADM

## 2024-01-03 RX ORDER — HEPARIN SODIUM (PORCINE) LOCK FLUSH IV SOLN 100 UNIT/ML 100 UNIT/ML
5 SOLUTION INTRAVENOUS
Status: CANCELLED | OUTPATIENT
Start: 2024-01-04

## 2024-01-03 RX ORDER — EPINEPHRINE 1 MG/ML
0.3 INJECTION, SOLUTION INTRAMUSCULAR; SUBCUTANEOUS EVERY 5 MIN PRN
Status: CANCELLED | OUTPATIENT
Start: 2024-01-04

## 2024-01-03 RX ORDER — ACETAMINOPHEN 325 MG/1
650 TABLET ORAL ONCE
Status: CANCELLED
Start: 2024-01-04 | End: 2024-01-04

## 2024-01-03 RX ORDER — METHYLPREDNISOLONE SODIUM SUCCINATE 125 MG/2ML
125 INJECTION, POWDER, LYOPHILIZED, FOR SOLUTION INTRAMUSCULAR; INTRAVENOUS
Status: CANCELLED
Start: 2024-01-04

## 2024-01-03 RX ORDER — METHYLPREDNISOLONE SODIUM SUCCINATE 125 MG/2ML
125 INJECTION, POWDER, LYOPHILIZED, FOR SOLUTION INTRAMUSCULAR; INTRAVENOUS ONCE
Status: COMPLETED | OUTPATIENT
Start: 2024-01-03 | End: 2024-01-03

## 2024-01-03 RX ORDER — HEPARIN SODIUM,PORCINE 10 UNIT/ML
5 VIAL (ML) INTRAVENOUS
Status: CANCELLED | OUTPATIENT
Start: 2024-01-04

## 2024-01-03 RX ORDER — ALBUTEROL SULFATE 0.83 MG/ML
2.5 SOLUTION RESPIRATORY (INHALATION)
Status: CANCELLED | OUTPATIENT
Start: 2024-01-04

## 2024-01-03 RX ORDER — METHYLPREDNISOLONE SODIUM SUCCINATE 125 MG/2ML
125 INJECTION, POWDER, LYOPHILIZED, FOR SOLUTION INTRAMUSCULAR; INTRAVENOUS
Status: DISCONTINUED | OUTPATIENT
Start: 2024-01-03 | End: 2024-01-03 | Stop reason: HOSPADM

## 2024-01-03 RX ORDER — MEPERIDINE HYDROCHLORIDE 50 MG/ML
25 INJECTION INTRAMUSCULAR; INTRAVENOUS; SUBCUTANEOUS EVERY 30 MIN PRN
Status: CANCELLED | OUTPATIENT
Start: 2024-01-04

## 2024-01-03 RX ORDER — DIPHENHYDRAMINE HYDROCHLORIDE 50 MG/ML
50 INJECTION INTRAMUSCULAR; INTRAVENOUS
Status: CANCELLED
Start: 2024-01-04

## 2024-01-03 RX ORDER — DIPHENHYDRAMINE HYDROCHLORIDE 50 MG/ML
50 INJECTION INTRAMUSCULAR; INTRAVENOUS
Status: DISCONTINUED | OUTPATIENT
Start: 2024-01-03 | End: 2024-01-03 | Stop reason: HOSPADM

## 2024-01-03 RX ORDER — ALBUTEROL SULFATE 90 UG/1
1-2 AEROSOL, METERED RESPIRATORY (INHALATION)
Status: CANCELLED
Start: 2024-01-04

## 2024-01-03 RX ORDER — DIPHENHYDRAMINE HCL 25 MG
25 CAPSULE ORAL ONCE
Status: CANCELLED
Start: 2024-01-04 | End: 2024-01-04

## 2024-01-03 RX ORDER — ALBUTEROL SULFATE 0.83 MG/ML
2.5 SOLUTION RESPIRATORY (INHALATION)
Status: DISCONTINUED | OUTPATIENT
Start: 2024-01-03 | End: 2024-01-03 | Stop reason: HOSPADM

## 2024-01-03 RX ORDER — MEPERIDINE HYDROCHLORIDE 50 MG/ML
25 INJECTION INTRAMUSCULAR; INTRAVENOUS; SUBCUTANEOUS EVERY 30 MIN PRN
Status: DISCONTINUED | OUTPATIENT
Start: 2024-01-03 | End: 2024-01-03 | Stop reason: HOSPADM

## 2024-01-03 RX ORDER — ACETAMINOPHEN 325 MG/1
650 TABLET ORAL ONCE
Status: COMPLETED | OUTPATIENT
Start: 2024-01-03 | End: 2024-01-03

## 2024-01-03 RX ORDER — EPINEPHRINE 1 MG/ML
0.3 INJECTION, SOLUTION INTRAMUSCULAR; SUBCUTANEOUS EVERY 5 MIN PRN
Status: DISCONTINUED | OUTPATIENT
Start: 2024-01-03 | End: 2024-01-03 | Stop reason: HOSPADM

## 2024-01-03 RX ORDER — DIPHENHYDRAMINE HCL 25 MG
25 CAPSULE ORAL ONCE
Status: COMPLETED | OUTPATIENT
Start: 2024-01-03 | End: 2024-01-03

## 2024-01-03 RX ORDER — METHYLPREDNISOLONE SODIUM SUCCINATE 125 MG/2ML
125 INJECTION, POWDER, LYOPHILIZED, FOR SOLUTION INTRAMUSCULAR; INTRAVENOUS ONCE
Status: CANCELLED | OUTPATIENT
Start: 2024-01-04 | End: 2024-01-04

## 2024-01-03 RX ADMIN — DIPHENHYDRAMINE HYDROCHLORIDE 25 MG: 25 CAPSULE ORAL at 11:12

## 2024-01-03 RX ADMIN — INFLIXIMAB 450 MG: 100 INJECTION, POWDER, LYOPHILIZED, FOR SOLUTION INTRAVENOUS at 11:57

## 2024-01-03 RX ADMIN — METHYLPREDNISOLONE SODIUM SUCCINATE 125 MG: 125 INJECTION, POWDER, FOR SOLUTION INTRAMUSCULAR; INTRAVENOUS at 11:20

## 2024-01-03 RX ADMIN — ACETAMINOPHEN 650 MG: 325 TABLET ORAL at 11:11

## 2024-01-03 RX ADMIN — SODIUM CHLORIDE 250 ML: 9 INJECTION, SOLUTION INTRAVENOUS at 11:18

## 2024-01-03 NOTE — PROGRESS NOTES
Infusion Nursing Note:  Frank Paksandra presents today for Remicade.    Patient seen by provider today: No   present during visit today: Not Applicable.    Note: Premedicated with Tylenol, benadryl, and solu-medrol.      Intravenous Access:  Peripheral IV placed.    Treatment Conditions:  Not Applicable.      Post Infusion Assessment:  Patient tolerated infusion without incident.  Site patent and intact, free from redness, edema or discomfort.  No evidence of extravasations.  Access discontinued per protocol.       Discharge Plan:   Patient and/or family verbalized understanding of discharge instructions and all questions answered.      Pascale Samuels RN

## 2024-01-10 DIAGNOSIS — I10 ESSENTIAL HYPERTENSION, BENIGN: ICD-10-CM

## 2024-01-10 RX ORDER — PROPRANOLOL HYDROCHLORIDE 20 MG/1
20 TABLET ORAL 2 TIMES DAILY
Qty: 180 TABLET | Refills: 3 | OUTPATIENT
Start: 2024-01-10

## 2024-01-16 ENCOUNTER — TRANSFERRED RECORDS (OUTPATIENT)
Dept: HEALTH INFORMATION MANAGEMENT | Facility: CLINIC | Age: 79
End: 2024-01-16
Payer: MEDICARE

## 2024-01-19 ENCOUNTER — TRANSFERRED RECORDS (OUTPATIENT)
Dept: HEALTH INFORMATION MANAGEMENT | Facility: CLINIC | Age: 79
End: 2024-01-19
Payer: MEDICARE

## 2024-01-26 DIAGNOSIS — E78.00 HYPERCHOLESTEROLEMIA: ICD-10-CM

## 2024-01-26 RX ORDER — ATORVASTATIN CALCIUM 40 MG/1
TABLET, FILM COATED ORAL
Qty: 90 TABLET | Refills: 2 | Status: SHIPPED | OUTPATIENT
Start: 2024-01-26

## 2024-01-30 ENCOUNTER — TELEPHONE (OUTPATIENT)
Dept: INTERNAL MEDICINE | Facility: CLINIC | Age: 79
End: 2024-01-30
Payer: MEDICARE

## 2024-01-30 DIAGNOSIS — E11.8 TYPE 2 DIABETES MELLITUS WITH COMPLICATION, WITHOUT LONG-TERM CURRENT USE OF INSULIN (H): ICD-10-CM

## 2024-01-30 RX ORDER — BLOOD SUGAR DIAGNOSTIC
1 STRIP MISCELLANEOUS 2 TIMES DAILY
Qty: 200 STRIP | Refills: 1 | Status: SHIPPED | OUTPATIENT
Start: 2024-01-30

## 2024-01-30 NOTE — TELEPHONE ENCOUNTER
January 30, 2024    [facility] Refill for Glucose Test Strips was received via fax for Dr. Hill (covering for Dr Lopez) to sign.  Patient label was attached to paperwork and placed in provider's inbox to be signed.    Manisha Sylvester

## 2024-01-30 NOTE — TELEPHONE ENCOUNTER
January 30, 2024    [facility] Refill request was picked up from outbox of Dr. Hill.  Paperwork has been reviewed and is complete.  Per initial initial request, this was sent via fax to [phone].     Manisha Sylvester

## 2024-02-01 ENCOUNTER — TELEPHONE (OUTPATIENT)
Dept: INTERNAL MEDICINE | Facility: CLINIC | Age: 79
End: 2024-02-01
Payer: MEDICARE

## 2024-02-01 NOTE — TELEPHONE ENCOUNTER
February 1, 2024    [facility] [facility] Rx Glucose Test Strips and Lancets was received via fax for Dr. Lopez to sign.  Patient label was attached to paperwork and placed in provider's inbox to be signed.    Manisha Sylvester

## 2024-02-14 ENCOUNTER — INFUSION THERAPY VISIT (OUTPATIENT)
Dept: INFUSION THERAPY | Facility: HOSPITAL | Age: 79
End: 2024-02-14
Attending: INTERNAL MEDICINE
Payer: MEDICARE

## 2024-02-14 VITALS
SYSTOLIC BLOOD PRESSURE: 115 MMHG | OXYGEN SATURATION: 95 % | RESPIRATION RATE: 16 BRPM | BODY MASS INDEX: 27.69 KG/M2 | WEIGHT: 193 LBS | HEART RATE: 74 BPM | TEMPERATURE: 97.7 F | DIASTOLIC BLOOD PRESSURE: 62 MMHG

## 2024-02-14 DIAGNOSIS — H44.112 PANUVEITIS OF LEFT EYE: Primary | ICD-10-CM

## 2024-02-14 PROCEDURE — 250N000013 HC RX MED GY IP 250 OP 250 PS 637: Performed by: INTERNAL MEDICINE

## 2024-02-14 PROCEDURE — 258N000003 HC RX IP 258 OP 636: Performed by: INTERNAL MEDICINE

## 2024-02-14 PROCEDURE — 96375 TX/PRO/DX INJ NEW DRUG ADDON: CPT

## 2024-02-14 PROCEDURE — 250N000011 HC RX IP 250 OP 636: Performed by: INTERNAL MEDICINE

## 2024-02-14 PROCEDURE — 96413 CHEMO IV INFUSION 1 HR: CPT

## 2024-02-14 RX ORDER — HEPARIN SODIUM (PORCINE) LOCK FLUSH IV SOLN 100 UNIT/ML 100 UNIT/ML
5 SOLUTION INTRAVENOUS
Status: DISCONTINUED | OUTPATIENT
Start: 2024-02-14 | End: 2024-02-14 | Stop reason: HOSPADM

## 2024-02-14 RX ORDER — ALBUTEROL SULFATE 90 UG/1
1-2 AEROSOL, METERED RESPIRATORY (INHALATION)
Status: CANCELLED
Start: 2024-02-15

## 2024-02-14 RX ORDER — HEPARIN SODIUM (PORCINE) LOCK FLUSH IV SOLN 100 UNIT/ML 100 UNIT/ML
5 SOLUTION INTRAVENOUS
Status: CANCELLED | OUTPATIENT
Start: 2024-02-15

## 2024-02-14 RX ORDER — METHYLPREDNISOLONE SODIUM SUCCINATE 125 MG/2ML
125 INJECTION, POWDER, LYOPHILIZED, FOR SOLUTION INTRAMUSCULAR; INTRAVENOUS ONCE
Status: COMPLETED | OUTPATIENT
Start: 2024-02-14 | End: 2024-02-14

## 2024-02-14 RX ORDER — DIPHENHYDRAMINE HYDROCHLORIDE 50 MG/ML
50 INJECTION INTRAMUSCULAR; INTRAVENOUS
Status: CANCELLED
Start: 2024-02-15

## 2024-02-14 RX ORDER — ALBUTEROL SULFATE 0.83 MG/ML
2.5 SOLUTION RESPIRATORY (INHALATION)
Status: DISCONTINUED | OUTPATIENT
Start: 2024-02-14 | End: 2024-02-14 | Stop reason: HOSPADM

## 2024-02-14 RX ORDER — MEPERIDINE HYDROCHLORIDE 50 MG/ML
25 INJECTION INTRAMUSCULAR; INTRAVENOUS; SUBCUTANEOUS EVERY 30 MIN PRN
Status: CANCELLED | OUTPATIENT
Start: 2024-02-15

## 2024-02-14 RX ORDER — DIPHENHYDRAMINE HCL 25 MG
25 CAPSULE ORAL ONCE
Status: CANCELLED
Start: 2024-02-15 | End: 2024-02-15

## 2024-02-14 RX ORDER — METHYLPREDNISOLONE SODIUM SUCCINATE 125 MG/2ML
125 INJECTION, POWDER, LYOPHILIZED, FOR SOLUTION INTRAMUSCULAR; INTRAVENOUS ONCE
Status: CANCELLED | OUTPATIENT
Start: 2024-02-15 | End: 2024-02-15

## 2024-02-14 RX ORDER — METHYLPREDNISOLONE SODIUM SUCCINATE 125 MG/2ML
125 INJECTION, POWDER, LYOPHILIZED, FOR SOLUTION INTRAMUSCULAR; INTRAVENOUS
Status: CANCELLED
Start: 2024-02-15

## 2024-02-14 RX ORDER — METHYLPREDNISOLONE SODIUM SUCCINATE 125 MG/2ML
125 INJECTION, POWDER, LYOPHILIZED, FOR SOLUTION INTRAMUSCULAR; INTRAVENOUS
Status: DISCONTINUED | OUTPATIENT
Start: 2024-02-14 | End: 2024-02-14 | Stop reason: HOSPADM

## 2024-02-14 RX ORDER — ACETAMINOPHEN 325 MG/1
650 TABLET ORAL ONCE
Status: CANCELLED
Start: 2024-02-15 | End: 2024-02-15

## 2024-02-14 RX ORDER — EPINEPHRINE 1 MG/ML
0.3 INJECTION, SOLUTION INTRAMUSCULAR; SUBCUTANEOUS EVERY 5 MIN PRN
Status: DISCONTINUED | OUTPATIENT
Start: 2024-02-14 | End: 2024-02-14 | Stop reason: HOSPADM

## 2024-02-14 RX ORDER — ACETAMINOPHEN 325 MG/1
650 TABLET ORAL ONCE
Status: COMPLETED | OUTPATIENT
Start: 2024-02-14 | End: 2024-02-14

## 2024-02-14 RX ORDER — ALBUTEROL SULFATE 0.83 MG/ML
2.5 SOLUTION RESPIRATORY (INHALATION)
Status: CANCELLED | OUTPATIENT
Start: 2024-02-15

## 2024-02-14 RX ORDER — ALBUTEROL SULFATE 90 UG/1
1-2 AEROSOL, METERED RESPIRATORY (INHALATION)
Status: DISCONTINUED | OUTPATIENT
Start: 2024-02-14 | End: 2024-02-14 | Stop reason: HOSPADM

## 2024-02-14 RX ORDER — MEPERIDINE HYDROCHLORIDE 50 MG/ML
25 INJECTION INTRAMUSCULAR; INTRAVENOUS; SUBCUTANEOUS EVERY 30 MIN PRN
Status: DISCONTINUED | OUTPATIENT
Start: 2024-02-14 | End: 2024-02-14 | Stop reason: HOSPADM

## 2024-02-14 RX ORDER — EPINEPHRINE 1 MG/ML
0.3 INJECTION, SOLUTION INTRAMUSCULAR; SUBCUTANEOUS EVERY 5 MIN PRN
Status: CANCELLED | OUTPATIENT
Start: 2024-02-15

## 2024-02-14 RX ORDER — DIPHENHYDRAMINE HCL 25 MG
25 CAPSULE ORAL ONCE
Status: COMPLETED | OUTPATIENT
Start: 2024-02-14 | End: 2024-02-14

## 2024-02-14 RX ORDER — DIPHENHYDRAMINE HYDROCHLORIDE 50 MG/ML
50 INJECTION INTRAMUSCULAR; INTRAVENOUS
Status: DISCONTINUED | OUTPATIENT
Start: 2024-02-14 | End: 2024-02-14 | Stop reason: HOSPADM

## 2024-02-14 RX ORDER — HEPARIN SODIUM,PORCINE 10 UNIT/ML
5 VIAL (ML) INTRAVENOUS
Status: CANCELLED | OUTPATIENT
Start: 2024-02-15

## 2024-02-14 RX ADMIN — METHYLPREDNISOLONE SODIUM SUCCINATE 125 MG: 125 INJECTION, POWDER, FOR SOLUTION INTRAMUSCULAR; INTRAVENOUS at 11:28

## 2024-02-14 RX ADMIN — SODIUM CHLORIDE 250 ML: 9 INJECTION, SOLUTION INTRAVENOUS at 11:28

## 2024-02-14 RX ADMIN — INFLIXIMAB 400 MG: 100 INJECTION, POWDER, LYOPHILIZED, FOR SOLUTION INTRAVENOUS at 11:59

## 2024-02-14 RX ADMIN — ACETAMINOPHEN 650 MG: 325 TABLET ORAL at 11:27

## 2024-02-14 RX ADMIN — DIPHENHYDRAMINE HYDROCHLORIDE 25 MG: 25 CAPSULE ORAL at 11:27

## 2024-02-14 NOTE — PROGRESS NOTES
Infusion Nursing Note:  Frank CRAIG Yazminmariia presents today for Inflixamab.    Patient seen by provider today: No   present during visit today: Not Applicable.    Note: Patient was premedicated with oral tylenol, benadryl, and IVP Methylpred.      Intravenous Access:  Peripheral IV placed.    Treatment Conditions:  Biological Infusion Checklist:  ~~~ NOTE: If the patient answers yes to any of the questions below, hold the infusion and contact ordering provider or on-call provider.    Have you recently had an elevated temperature, fever, chills, productive cough, coughing for 3 weeks or longer or hemoptysis,  abnormal vital signs, night sweats,  chest pain or have you noticed a decrease in your appetite, unexplained weight loss or fatigue? No  Do you have any open wounds or new incisions? No  Do you have any upcoming hospitalizations or surgeries? Does not include esophagogastroduodenoscopy, colonoscopy, endoscopic retrograde cholangiopancreatography (ERCP), endoscopic ultrasound (EUS), dental procedures or joint aspiration/steroid injections No  Do you currently have any signs of illness or infection or are you on any antibiotics? No  Have you had any new, sudden or worsening abdominal pain? No  Have you or anyone in your household received a live vaccination in the past 4 weeks? Please note: No live vaccines while on biologic/chemotherapy until 6 months after the last treatment. Patient can receive the flu vaccine (shot only), pneumovax and the Covid vaccine. It is optimal for the patient to get these vaccines mid cycle, but they can be given at any time as long as it is not on the day of the infusion. No  Have you recently been diagnosed with any new nervous system diseases (ie. Multiple sclerosis, Guillain [place], seizures, neurological changes) or cancer diagnosis? Are you on any form of radiation or chemotherapy? No  Are you pregnant or breast feeding or do you have plans of pregnancy in the future?  No  Have you been having any signs of worsening depression or suicidal ideations?  (benlysta only) No  Have there been any other new onset medical symptoms? No  Have you had any new blood clots? (IVIG only) No      Post Infusion Assessment:  Patient tolerated infusion without incident.  Site patent and intact, free from redness, edema or discomfort.  No evidence of extravasations.  Access discontinued per protocol.       Discharge Plan:   Patient and/or family verbalized understanding of discharge instructions and all questions answered.      Pascale Samuels RN

## 2024-02-16 ENCOUNTER — OFFICE VISIT (OUTPATIENT)
Dept: INTERNAL MEDICINE | Facility: CLINIC | Age: 79
End: 2024-02-16
Payer: MEDICARE

## 2024-02-16 VITALS
SYSTOLIC BLOOD PRESSURE: 110 MMHG | DIASTOLIC BLOOD PRESSURE: 60 MMHG | TEMPERATURE: 96.9 F | OXYGEN SATURATION: 98 % | BODY MASS INDEX: 27.58 KG/M2 | HEART RATE: 63 BPM | HEIGHT: 71 IN | RESPIRATION RATE: 14 BRPM | WEIGHT: 197 LBS

## 2024-02-16 DIAGNOSIS — E78.5 DYSLIPIDEMIA: ICD-10-CM

## 2024-02-16 DIAGNOSIS — R06.09 EXERTIONAL DYSPNEA: ICD-10-CM

## 2024-02-16 DIAGNOSIS — M47.816 OSTEOARTHRITIS OF LUMBAR SPINE, UNSPECIFIED SPINAL OSTEOARTHRITIS COMPLICATION STATUS: Primary | ICD-10-CM

## 2024-02-16 DIAGNOSIS — I85.10 SECONDARY ESOPHAGEAL VARICES WITHOUT BLEEDING (H): ICD-10-CM

## 2024-02-16 DIAGNOSIS — R29.818 NEUROGENIC CLAUDICATION: ICD-10-CM

## 2024-02-16 DIAGNOSIS — I10 ESSENTIAL HYPERTENSION, BENIGN: ICD-10-CM

## 2024-02-16 DIAGNOSIS — N18.32 STAGE 3B CHRONIC KIDNEY DISEASE (H): ICD-10-CM

## 2024-02-16 DIAGNOSIS — R94.39 ABNORMAL STRESS TEST: ICD-10-CM

## 2024-02-16 DIAGNOSIS — M05.79 RHEUMATOID ARTHRITIS INVOLVING MULTIPLE SITES WITH POSITIVE RHEUMATOID FACTOR (H): ICD-10-CM

## 2024-02-16 DIAGNOSIS — D69.6 THROMBOCYTOPENIA (H): ICD-10-CM

## 2024-02-16 DIAGNOSIS — E11.42 DIABETIC POLYNEUROPATHY ASSOCIATED WITH TYPE 2 DIABETES MELLITUS (H): ICD-10-CM

## 2024-02-16 DIAGNOSIS — E11.8 TYPE 2 DIABETES MELLITUS WITH COMPLICATION, WITHOUT LONG-TERM CURRENT USE OF INSULIN (H): Chronic | ICD-10-CM

## 2024-02-16 DIAGNOSIS — I63.322 CEREBROVASCULAR ACCIDENT (CVA) DUE TO THROMBOSIS OF LEFT ANTERIOR CEREBRAL ARTERY (H): ICD-10-CM

## 2024-02-16 LAB — HBA1C MFR BLD: 7.2 % (ref 0–5.6)

## 2024-02-16 PROCEDURE — 83036 HEMOGLOBIN GLYCOSYLATED A1C: CPT | Performed by: INTERNAL MEDICINE

## 2024-02-16 PROCEDURE — 80048 BASIC METABOLIC PNL TOTAL CA: CPT | Performed by: INTERNAL MEDICINE

## 2024-02-16 PROCEDURE — 36415 COLL VENOUS BLD VENIPUNCTURE: CPT | Performed by: INTERNAL MEDICINE

## 2024-02-16 PROCEDURE — 99214 OFFICE O/P EST MOD 30 MIN: CPT | Performed by: INTERNAL MEDICINE

## 2024-02-16 PROCEDURE — 83880 ASSAY OF NATRIURETIC PEPTIDE: CPT | Performed by: INTERNAL MEDICINE

## 2024-02-16 RX ORDER — RESPIRATORY SYNCYTIAL VIRUS VACCINE 120MCG/0.5
0.5 KIT INTRAMUSCULAR ONCE
Qty: 1 EACH | Refills: 0 | Status: CANCELLED | OUTPATIENT
Start: 2024-02-16 | End: 2024-02-16

## 2024-02-16 RX ORDER — PROPRANOLOL HYDROCHLORIDE 20 MG/1
20 TABLET ORAL 2 TIMES DAILY
Qty: 180 TABLET | Refills: 3 | Status: SHIPPED | OUTPATIENT
Start: 2024-02-16 | End: 2024-06-26

## 2024-02-16 NOTE — PROGRESS NOTES
Office Visit - Follow Up   Frank Obando   78 year old male    Date of Visit: 2/16/2024    Chief Complaint   Patient presents with    Office Visit     Discussion regarding MRI results. Check A1C.    Recheck Medication        Assessment and Plan   1. Osteoarthritis of lumbar spine, unspecified spinal osteoarthritis complication status  Symptoms are fairly classic for neurogenic claudication although not a lot of central stenosis on his MRI, we will have him meet with the spine clinic  - Spine  Referral; Future    2. Neurogenic claudication  - Spine  Referral; Future    3. Exertional dyspnea  Risk factors for coronary heart disease, given his exertional dyspnea and fatigue with exertion advised to do a stress test.  Some suggestion of diastolic dysfunction past echocardiogram but appears fairly euvolemic and he is not hypertensive.  - NM Lexiscan stress test; Future  - BNP-N terminal pro; Future  - BNP-N terminal pro    4. Essential hypertension, benign  - NM Lexiscan stress test; Future    5. Dyslipidemia  - NM Lexiscan stress test; Future    6. Type 2 diabetes mellitus with complication, without long-term current use of insulin (H)  Has been well-controlled, discussed with him that Actos can be problematic in people with heart disease and certainly if he has any evidence of heart dysfunction on his stress test we would want to switch this.  He is not acting fluid overloaded and previously did not start Jardiance due to cost.  Therefore we will continue with this but we did again discuss Jardiance  - NM Lexiscan stress test; Future  - Hemoglobin A1c; Future  - Basic metabolic panel  (Ca, Cl, CO2, Creat, Gluc, K, Na, BUN); Future  - Hemoglobin A1c  - Basic metabolic panel  (Ca, Cl, CO2, Creat, Gluc, K, Na, BUN)    7. H/o CVA ~2013  Continue secondary prevention  - NM Lexiscan stress test; Future    8. Rheumatoid arthritis involving multiple sites with positive rheumatoid factor (H)  Per  "rheumatology    9. Thrombocytopenia (H24)  Stable    10. Diabetic polyneuropathy associated with type 2 diabetes mellitus (H)  Stable excellent diabetic footcare recommended    11. Stage 3b chronic kidney disease (H)  Stable check lab    12. Secondary esophageal varices without bleeding (H)  Per GI    Return in about 3 months (around 5/16/2024) for Follow up.     History of Present Illness   This 78 year old man comes in for follow-up.  He had an MRI of his lumbar spine which shows degenerative changes and some mild lumbar stenosis.  He continues to have feeling of fatigue when he exerts himself but does not have this symptom if he leans forward pushing a grocery cart.  Some exertional dyspnea, history of stroke and has numerous vascular risk factors.  No chest pain.       Physical Exam   General Appearance:   No acute distress    /60 (BP Location: Left arm, Patient Position: Sitting, Cuff Size: Adult Regular)   Pulse 63   Temp 96.9  F (36.1  C) (Oral)   Resp 14   Ht 1.791 m (5' 10.5\")   Wt 89.4 kg (197 lb)   SpO2 98%   BMI 27.87 kg/m           Additional Information   Current Outpatient Medications   Medication Sig Dispense Refill    acetaminophen (TYLENOL) 500 MG tablet Take 1 tablet (500 mg) by mouth every 6 hours as needed for mild pain      atorvastatin (LIPITOR) 40 MG tablet TAKE 1 TABLET BY MOUTH DAILY 90 tablet 2    blood glucose (ACCU-CHEK JACOB PLUS) test strip 1 strip by In Vitro route 2 times daily 200 strip 1    blood glucose meter (GLUCOMETER) [BLOOD GLUCOSE METER (GLUCOMETER)] Use 1 each As Directed 2 (two) times a day. Dispense glucometer brand per patient's insurance at pharmacy discretion. 1 each 0    blood glucose monitoring (SOFTCLIX) lancets Use 1 each As Directed 2 (two) times a day. Dispense brand per patient's insurance at pharmacy discretion. - Does not apply 100 each 3    cyanocobalamin, vitamin B-12, 2,500 mcg Tab [CYANOCOBALAMIN, VITAMIN B-12, 2,500 MCG TAB] Take 2,500 mcg by " mouth daily.      ferrous sulfate (FEROSUL) 325 (65 Fe) MG tablet Take 1 tablet (325 mg) by mouth 3 times daily (with meals) 90 tablet 3    generic lancets (ACCU-CHEK SOFTCLIX LANCETS) [GENERIC LANCETS (ACCU-CHEK SOFTCLIX LANCETS)] Use 1 each As Directed 2 (two) times a day. Dispense brand per patient's insurance at pharmacy discretion. 100 each 3    inFLIXimab (REMICADE IV) Every 6weeks      lactobacillus rhamnosus, GG, (CULTURELL) capsule Take 1 capsule by mouth 3 times daily (before meals)      lisinopril (ZESTRIL) 10 MG tablet Take 1 tablet (10 mg) by mouth daily 90 tablet 3    magnesium oxide 250 mg Tab [MAGNESIUM OXIDE 250 MG TAB] Take 250 mg by mouth 2 (two) times a day.      metFORMIN (GLUCOPHAGE) 500 MG tablet TAKE 2 TABLETS BY MOUTH TWICE DAILY AT 6 AM AND AT 4  tablet 3    omeprazole (PRILOSEC) 40 MG DR capsule Take 1 capsule (40 mg) by mouth daily 90 capsule 4    pioglitazone (ACTOS) 30 MG tablet TAKE 1 TABLET BY MOUTH EVERY DAY 90 tablet 3    propranolol (INDERAL) 20 MG tablet Take 1 tablet (20 mg) by mouth 2 times daily 180 tablet 3    sucralfate (CARAFATE) 1 GM/10ML suspension Take 10 mLs (1 g) by mouth 4 times daily 414 mL 0    tamsulosin (FLOMAX) 0.4 MG capsule TAKE 1 CAPSULE BY MOUTH DAILY AFTER AND SUPPER 90 capsule 4       Time:      Flaco Lopez MD  Answers submitted by the patient for this visit:  Lipid Visit (Submitted on 2/15/2024)  Chief Complaint: Chronic problems general questions HPI Form  Are you regularly taking any medication or supplement to lower your cholesterol?: Yes  Are you having muscle aches or other side effects that you think could be caused by your cholesterol lowering medication?: No  Hypertension Visit (Submitted on 2/15/2024)  Chief Complaint: Chronic problems general questions HPI Form  Do you check your blood pressure regularly outside of the clinic?: No  Are your blood pressures ever more than 140 on the top number (systolic) OR more than 90 on the bottom  number (diastolic)? (For example, greater than 140/90): No  Are you following a low salt diet?: No  Diabetes Visit (Submitted on 2/15/2024)  Chief Complaint: Chronic problems general questions HPI Form  Frequency of checking blood sugars:: a few times a week  What time of day are you checking your blood sugars : at bedtime  Have you had any blood sugars above 200?: No  Have you had any blood sugars below 70?: No  Hypoglycemia symptoms:: weakness  Diabetic concerns:: none  Paraesthesia present:: numbness in feet, excessive thirst, blurry vision  General Questionnaire (Submitted on 2/15/2024)  Chief Complaint: Chronic problems general questions HPI Form  On average, how many sweetened beverages do you drink each day (Examples: soda, juice, sweet tea, etc.  Do NOT count diet or artificially sweetened beverages)?: 1  How many minutes a day do you exercise enough to make your heart beat faster?: 9 or less  How many days a week do you exercise enough to make your heart beat faster?: 4  How many days per week do you miss taking your medication?: 1

## 2024-02-17 LAB
ANION GAP SERPL CALCULATED.3IONS-SCNC: 13 MMOL/L (ref 7–15)
BUN SERPL-MCNC: 21.6 MG/DL (ref 8–23)
CALCIUM SERPL-MCNC: 9.3 MG/DL (ref 8.8–10.2)
CHLORIDE SERPL-SCNC: 100 MMOL/L (ref 98–107)
CREAT SERPL-MCNC: 1.3 MG/DL (ref 0.67–1.17)
DEPRECATED HCO3 PLAS-SCNC: 25 MMOL/L (ref 22–29)
EGFRCR SERPLBLD CKD-EPI 2021: 57 ML/MIN/1.73M2
GLUCOSE SERPL-MCNC: 135 MG/DL (ref 70–99)
NT-PROBNP SERPL-MCNC: 164 PG/ML (ref 0–1800)
POTASSIUM SERPL-SCNC: 4.4 MMOL/L (ref 3.4–5.3)
SODIUM SERPL-SCNC: 138 MMOL/L (ref 135–145)

## 2024-03-07 ENCOUNTER — HOSPITAL ENCOUNTER (OUTPATIENT)
Dept: NUCLEAR MEDICINE | Facility: CLINIC | Age: 79
Discharge: HOME | End: 2024-03-07
Attending: INTERNAL MEDICINE
Payer: MEDICARE

## 2024-03-07 ENCOUNTER — HOSPITAL ENCOUNTER (OUTPATIENT)
Dept: CARDIOLOGY | Facility: CLINIC | Age: 79
Discharge: HOME | End: 2024-03-07
Attending: INTERNAL MEDICINE
Payer: MEDICARE

## 2024-03-07 DIAGNOSIS — E11.8 TYPE 2 DIABETES MELLITUS WITH COMPLICATION, WITHOUT LONG-TERM CURRENT USE OF INSULIN (H): Chronic | ICD-10-CM

## 2024-03-07 DIAGNOSIS — I10 ESSENTIAL HYPERTENSION, BENIGN: ICD-10-CM

## 2024-03-07 DIAGNOSIS — E78.5 DYSLIPIDEMIA: ICD-10-CM

## 2024-03-07 DIAGNOSIS — I63.322 CEREBROVASCULAR ACCIDENT (CVA) DUE TO THROMBOSIS OF LEFT ANTERIOR CEREBRAL ARTERY (H): ICD-10-CM

## 2024-03-07 DIAGNOSIS — R06.09 EXERTIONAL DYSPNEA: ICD-10-CM

## 2024-03-07 LAB
CV STRESS CURRENT BP HE: NORMAL
CV STRESS CURRENT HR HE: 59
CV STRESS CURRENT HR HE: 67
CV STRESS CURRENT HR HE: 72
CV STRESS CURRENT HR HE: 79
CV STRESS CURRENT HR HE: 80
CV STRESS CURRENT HR HE: 81
CV STRESS CURRENT HR HE: 83
CV STRESS CURRENT HR HE: 84
CV STRESS CURRENT HR HE: 86
CV STRESS CURRENT HR HE: 86
CV STRESS CURRENT HR HE: 87
CV STRESS CURRENT HR HE: 88
CV STRESS CURRENT HR HE: 92
CV STRESS CURRENT HR HE: 92
CV STRESS CURRENT HR HE: 93
CV STRESS CURRENT HR HE: 94
CV STRESS DEVIATION TIME HE: NORMAL
CV STRESS ECHO PERCENT HR HE: NORMAL
CV STRESS EXERCISE STAGE HE: NORMAL
CV STRESS FINAL RESTING BP HE: NORMAL
CV STRESS FINAL RESTING HR HE: 80
CV STRESS MAX HR HE: 94
CV STRESS MAX TREADMILL GRADE HE: 0
CV STRESS MAX TREADMILL SPEED HE: 0
CV STRESS PEAK DIA BP HE: NORMAL
CV STRESS PEAK SYS BP HE: NORMAL
CV STRESS PHASE HE: NORMAL
CV STRESS PROTOCOL HE: NORMAL
CV STRESS RESTING PT POSITION HE: NORMAL
CV STRESS ST DEVIATION AMOUNT HE: NORMAL
CV STRESS ST DEVIATION ELEVATION HE: NORMAL
CV STRESS ST EVELATION AMOUNT HE: NORMAL
CV STRESS TEST TYPE HE: NORMAL
CV STRESS TOTAL STAGE TIME MIN 1 HE: NORMAL
NUC STRESS EJECTION FRACTION: 66 %
RATE PRESSURE PRODUCT: NORMAL
STRESS ECHO BASELINE DIASTOLIC HE: 76
STRESS ECHO BASELINE HR: 59
STRESS ECHO BASELINE SYSTOLIC BP: 138
STRESS ECHO CALCULATED PERCENT HR: 66 %
STRESS ECHO LAST STRESS DIASTOLIC BP: 71
STRESS ECHO LAST STRESS HR: 88
STRESS ECHO LAST STRESS SYSTOLIC BP: 138
STRESS ECHO TARGET HR: 143

## 2024-03-07 PROCEDURE — G1010 CDSM STANSON: HCPCS | Performed by: INTERNAL MEDICINE

## 2024-03-07 PROCEDURE — 78452 HT MUSCLE IMAGE SPECT MULT: CPT | Mod: 26 | Performed by: INTERNAL MEDICINE

## 2024-03-07 PROCEDURE — 343N000001 HC RX 343: Performed by: INTERNAL MEDICINE

## 2024-03-07 PROCEDURE — 93018 CV STRESS TEST I&R ONLY: CPT | Performed by: INTERNAL MEDICINE

## 2024-03-07 PROCEDURE — A9500 TC99M SESTAMIBI: HCPCS | Performed by: INTERNAL MEDICINE

## 2024-03-07 PROCEDURE — 93017 CV STRESS TEST TRACING ONLY: CPT

## 2024-03-07 PROCEDURE — 78452 HT MUSCLE IMAGE SPECT MULT: CPT | Mod: ME

## 2024-03-07 PROCEDURE — 250N000011 HC RX IP 250 OP 636: Mod: JZ | Performed by: INTERNAL MEDICINE

## 2024-03-07 PROCEDURE — 93016 CV STRESS TEST SUPVJ ONLY: CPT | Performed by: GENERAL ACUTE CARE HOSPITAL

## 2024-03-07 RX ORDER — CAFFEINE CITRATE 20 MG/ML
60 SOLUTION INTRAVENOUS
Status: DISCONTINUED | OUTPATIENT
Start: 2024-03-07 | End: 2024-03-07 | Stop reason: HOSPADM

## 2024-03-07 RX ORDER — CAFFEINE 200 MG
200 TABLET ORAL
Status: DISCONTINUED | OUTPATIENT
Start: 2024-03-07 | End: 2024-03-07 | Stop reason: HOSPADM

## 2024-03-07 RX ORDER — ALBUTEROL SULFATE 0.83 MG/ML
2.5 SOLUTION RESPIRATORY (INHALATION)
Status: DISCONTINUED | OUTPATIENT
Start: 2024-03-07 | End: 2024-03-07 | Stop reason: HOSPADM

## 2024-03-07 RX ORDER — AMINOPHYLLINE 25 MG/ML
50 INJECTION, SOLUTION INTRAVENOUS
Status: DISCONTINUED | OUTPATIENT
Start: 2024-03-07 | End: 2024-03-07 | Stop reason: HOSPADM

## 2024-03-07 RX ORDER — REGADENOSON 0.08 MG/ML
0.4 INJECTION, SOLUTION INTRAVENOUS ONCE
Status: COMPLETED | OUTPATIENT
Start: 2024-03-07 | End: 2024-03-07

## 2024-03-07 RX ADMIN — Medication 8.7 MILLICURIE: at 08:47

## 2024-03-07 RX ADMIN — Medication 33 MILLICURIE: at 09:45

## 2024-03-07 RX ADMIN — REGADENOSON 0.4 MG: 0.08 INJECTION, SOLUTION INTRAVENOUS at 09:38

## 2024-03-08 ENCOUNTER — OFFICE VISIT (OUTPATIENT)
Dept: NEUROSURGERY | Facility: CLINIC | Age: 79
End: 2024-03-08
Attending: INTERNAL MEDICINE
Payer: MEDICARE

## 2024-03-08 VITALS
HEIGHT: 71 IN | HEART RATE: 75 BPM | SYSTOLIC BLOOD PRESSURE: 113 MMHG | BODY MASS INDEX: 27.86 KG/M2 | WEIGHT: 199 LBS | DIASTOLIC BLOOD PRESSURE: 67 MMHG | OXYGEN SATURATION: 96 %

## 2024-03-08 DIAGNOSIS — R29.818 NEUROGENIC CLAUDICATION: ICD-10-CM

## 2024-03-08 DIAGNOSIS — M47.816 OSTEOARTHRITIS OF LUMBAR SPINE, UNSPECIFIED SPINAL OSTEOARTHRITIS COMPLICATION STATUS: ICD-10-CM

## 2024-03-08 PROCEDURE — 99204 OFFICE O/P NEW MOD 45 MIN: CPT | Performed by: NURSE PRACTITIONER

## 2024-03-08 ASSESSMENT — PAIN SCALES - GENERAL: PAINLEVEL: NO PAIN (0)

## 2024-03-08 NOTE — LETTER
3/8/2024       RE: Frank Obando  [address]  [facility] [address]     Dear Colleague,    Thank you for referring your patient, Frank Obando, to the  [facility] at [facility]. Please see a copy of my visit note below.    ASSESSMENT: Frank Obando is a 78 year old male who presents for consultation at the request of PCP Flaco Lopez, who presents today for new patient evaluation of:    -neurogenic claudication    Patient is neurologically intact on exam. No myelopathic or red flag symptoms. Symptoms consistent with neurogenic claudication but only mild canal stenosis on lumbar MRI. He does have narrowing around the nerves lorie at L5-S1 which I explained could be contributing to his feet symptoms in combination with his diabetic neuropathy. he is not bothered by this at this time. I recommended imaging thoracic region to rule out severe stenosis there, but if negative would recommend continued workup via PCP of other causes of his symptoms.         No data to display                     Diagnoses and all orders for this visit:  Osteoarthritis of lumbar spine, unspecified spinal osteoarthritis complication status  -     Spine  Referral  Neurogenic claudication  -     Spine  Referral  -     MR Thoracic Spine w/o Contrast; Future      PLAN:  Reviewed spine anatomy and disease process. Discussed diagnosis and treatment options with the patient today. A shared decision making model was used.  The patient's values and choices were respected. The following represents what was discussed and decided upon by the provider and the patient.      -DIAGNOSTIC TESTS:  Images were personally reviewed and interpreted and explained to patient today using a spine model.   --I ordered a thoracic MRI without contrast    -PHYSICAL THERAPY:    --did not discuss for his atypical symptoms      -MEDICATIONS:    --  we did not  discuss medications today     -INTERVENTIONS:    -we did not discuss injections today     -PATIENT EDUCATION:  Total time of 40 minutes, on the day of service, spent with the patient, reviewing the chart, placing orders, and documenting.   -Today we also discussed the pros and cons of the current treatment plan.    -FOLLOW-UP:   we will call to review MRI results    Advised patient to call the Spine Center if symptoms worsen, new numbness or weakness develops in the legs, or if they develop new or worsening problems controlling bladder or bowel function.   ______________________________________________________________________    SUBJECTIVE:    HPI:  Frank Obando  Is a 78 year old male hx stage 3 CKD, kidney stones, type 2 dm, thrombocytopenia, RA, peripheral neuropathy, stomach ulcers, stroke, who presents today for new patient evaluation of low back pain     He has had chronic back pain on and off for years in the past, but never to a significant degree nor duration. He has 0/10 pain today and has been doing well lately. He describes a feeling of fatigue in the legs with walking, which goes away immediately when he is able to sit or bend forward over a shopping cart. He becomes short of breath with walking as well. If he elo forward, he states he can walk forever. Otherwise he is not able to walk further than to the mailbox. He states he has anemia which could be a factor, but he is now on medication for this and his anemia has been improving.     He denies pain in the legs, numbness, weakness, saddle anesthesia, chest pain, palpitations, dizziness, leg swelling, color changes in the legs, loss of bowel or bladder control. He has chronic tingling in both feet, which he attributes to his diabetes. He denies changes in bowel or bladder control.    He had a recent lumbar MRI done     He has not had any PT, injections or previous spine surgeries.    -Treatment to Date:     -Medications:    Current Outpatient  Medications   Medication    acetaminophen (TYLENOL) 500 MG tablet    atorvastatin (LIPITOR) 40 MG tablet    blood glucose (ACCU-CHEK JACOB PLUS) test strip    blood glucose meter (GLUCOMETER)    blood glucose monitoring (SOFTCLIX) lancets    cyanocobalamin, vitamin B-12, 2,500 mcg Tab    ferrous sulfate (FEROSUL) 325 (65 Fe) MG tablet    generic lancets (ACCU-CHEK SOFTCLIX LANCETS)    inFLIXimab (REMICADE IV)    lactobacillus rhamnosus, GG, (CULTURELL) capsule    lisinopril (ZESTRIL) 10 MG tablet    magnesium oxide 250 mg Tab    metFORMIN (GLUCOPHAGE) 500 MG tablet    omeprazole (PRILOSEC) 40 MG DR capsule    pioglitazone (ACTOS) 30 MG tablet    propranolol (INDERAL) 20 MG tablet    sucralfate (CARAFATE) 1 GM/10ML suspension    tamsulosin (FLOMAX) 0.4 MG capsule     No current facility-administered medications for this visit.     Allergies   Allergen Reactions    Morphine Nausea and Vomiting    Scopolamine Hbr [Scopolamine] Unknown     Past Medical History:   Diagnosis Date    Anemia     Arthritis     osteoarthritis    Calculus of kidney     Diabetes mellitus (H)     Episcleritis of left eye     Hyperlipidemia     Hypertension     Iron deficiency anemia due to chronic blood loss 11/2/2021    Peripheral neuropathy     Stroke (H)       Patient Active Problem List   Diagnosis    Diabetic polyneuropathy associated with type 2 diabetes mellitus (H)    H/o CVA ~2013    Essential hypertension, benign    Type 2 diabetes mellitus with complication, without long-term current use of insulin (H)    Right bundle branch block    Antineutrophil cytoplasmic antibody (ANCA) positive    High risk medication use    Dyslipidemia    Thrombocytopenia (H24)    Cirrhosis of liver with ascites, unspecified hepatic cirrhosis type (H)    Panuveitis of left eye, Dr. Abdi / Castillo    Iron deficiency anemia due to chronic blood loss    Angiodysplasia of stomach    History of colonic polyps    Secondary esophageal varices without bleeding (H)     Stage 3b chronic kidney disease (H)    Rheumatoid arthritis involving multiple sites with positive rheumatoid factor (H)    Personal history of immunosuppressive therapy     Past Surgical History:   Procedure Laterality Date    COLONOSCOPY N/A 11/16/2021    Procedure: COLONOSCOPY with polypectomy;  Surgeon: Dex Finch MD;  Location: [facility] GI    CYSTOSCOPY TUMOR / CONDYLOMATA W/ LASER Left 7/18/2014    ESOPHAGOSCOPY, GASTROSCOPY, DUODENOSCOPY (EGD), COMBINED N/A 11/16/2021    Procedure: ESOPHAGOGASTRODUODENOSCOPY (EGD) with biopsies and argon plasma coagulation;  Surgeon: Dex Finch MD;  Location: [facility]    ESOPHAGOSCOPY, GASTROSCOPY, DUODENOSCOPY (EGD), COMBINED N/A 9/29/2023    Procedure: ESOPHAGOGASTRODUODENOSCOPY with BIOPSY;  Surgeon: Alli Lozada MD;  Location: [facility] OR     CYSTOSCOPY,INSERT URETERAL STENT      Description: Cystoscopy With Insertion Of Ureteral Stent Right;  Recorded: 10/14/2009;  Comments: stone    LAPAROSCOPIC CHOLECYSTECTOMY N/A 7/1/2023    Procedure: CHOLECYSTECTOMY, LAPAROSCOPIC;  Surgeon: Jadiel Argueta MD;  Location: [facility] Main OR     Family History   Problem Relation Age of Onset    Coronary Artery Disease Mother     Diabetes Mother     Lung Cancer Father     No Known Problems Son      Reviewed past medical, surgical, and family history with patient found on new patient intake packet located in EMR Media tab.     SOCIAL HX: nonsmoker, no rec drug use, no heavy drinking, no alcohol use    ROS: positive for ringing in ears, shortness of breath, joint pain, itching.  Specifically negative for bowel/bladder dysfunction, balance changes, headache, dizziness, foot drop, fevers, chills, appetite changes, nausea/vomiting, unexplained weight loss. Otherwise 13 systems reviewed are negative. Please see the patient's intake questionnaire from today for details.    OBJECTIVE:  /67 (BP Location: Right arm, Patient Position:  "Sitting)   Pulse 75   Ht 5' 10.5\" (1.791 m)   Wt 199 lb (90.3 kg)   SpO2 96%   BMI 28.15 kg/m      PHYSICAL EXAMINATION:    --CONSTITUTIONAL:  Vital signs as above.  No acute distress.  The patient is well nourished and well groomed.  --PSYCHIATRIC:  Appropriate mood and affect. The patient is awake, alert, oriented to person, place, time and answering questions appropriately with clear speech.    --SKIN:  Skin over the face, bilateral lower extremities, and posterior torso is clean, dry, intact without rashes.    --RESPIRATORY: Normal rhythm and effort. No abnormal accessory muscle breathing patterns noted.   --ABDOMINAL:  Non-distended.    --GROSS MOTOR: Gait is non-antalgic. Easily arises from a seated position. Toe walking and heel walking are normal without significant difficulty.      --LOWER EXTREMITY MOTOR TESTING:  Hip flexion: right 5/5, left 5/5   Quads: right 5/5, left 5/5  Hamstrings: right 5/5, left 5/5  Dorsiflexion: right 5/5, left 5/5  Plantar flexion: right 5/5, left 5/5    Great toe MTP extension/EHL: right 5/5, left 5/5    --NEUROLOGICAL:  2/4 patellar and achilles reflexes bilaterally. Sensation to light touch is intact throughout both lower extremities. Babinski is negative. No clonus.    --MUSCULOSKELETAL: Lumbar spine inspection reveals no evidence of deformity. Range of motion is not limited in lumbar flexion, extension, lateral rotation. No point tenderness to palpation lumbar spine. No paraspinal musculature tenderness.     Straight leg raising is negative.    --HIPS: Negative HOLDEN and Negative FADIR bilaterally.    --SACROILIAC JOINT: One finger point test was negative. Negative SI joint tenderness to palpation bilaterally.    --VASCULAR:  Bilateral lower extremities are warm without any discoloration.  There is no edema of the bilateral lower extremities. Pp 1+ lorie    RESULTS:   Prior medical records from [facility]where were reviewed today.    Imaging: Spine " imaging was personally reviewed and interpreted today. The images were shown to the patient and the findings were explained using a spine model.    EXAM: MR LUMBAR SPINE W/O CONTRAST  LOCATION: [facility]  DATE: 11/14/2023     INDICATION:  Neurogenic claudication  COMPARISON: None.  TECHNIQUE: Routine Lumbar Spine MRI without IV contrast.     FINDINGS:   5 lumbar vertebrae. Straightening of lumbar lordosis. At L5-S1, anterolisthesis measures 3 mm. No additional listhesis. Vertebral body heights are maintained. Faint Modic type I marrow signal changes at the anterior L4-L5 endplates. No additional   abnormal marrow signal.  Normal distal spinal cord and cauda equina with conus medullaris at L1. No extraspinal abnormality. Unremarkable visualized bony pelvis.     T12-L1: Normal disc height and signal. No herniation. Normal facets. No spinal canal or neural foraminal stenosis.      L1-L2: Mild disc height loss. Disc desiccation. No herniation. Normal facets. No spinal canal or neural foraminal stenosis.     L2-L3: Mild disc height loss. Disc desiccation. Disc bulge. Normal facets. No spinal canal stenosis. Mild bilateral neural foraminal stenosis.      L3-L4: Mild disc height loss. Disc desiccation. Disc bulge. Superimposed right foraminal protrusion. No spinal canal stenosis. Mild right neural foraminal stenosis. No left neural foraminal stenosis per     L4-L5: Mild-to-moderate disc height loss. Disc bulge. Mild facet arthropathy. Mild facet arthropathy. Small synovial effusions. Small right dorsal synovial cyst. Ligamentum flavum thickening. Mild central spinal canal stenosis. Moderate lateral recess   narrowing bilaterally. Mild right neural foraminal stenosis. Mild to moderate left neural foraminal stenosis.     L5-S1: Moderate disc height loss. Disc bulge. Broad cephalad migration of the uncovered disc including at the bilateral foramina. Mild facet arthropathy. No spinal canal stenosis.  Severe neural foraminal stenosis bilaterally.                                                                   IMPRESSION:  1.  Multilevel lumbar spondylosis including Modic type I marrow signal changes at L4-L5.  2.  No high-grade spinal canal or neural foraminal stenosis.  3.  Severe neural foraminal stenosis bilaterally at L5-S1.        Again, thank you for allowing me to participate in the care of your patient.      Sincerely,    NARESH Arrieta CNP

## 2024-03-08 NOTE — PATIENT INSTRUCTIONS
Imaging (thoracic MRI) has been ordered today.   Radiology will call you to schedule. Please call below if you do not hear from them in the next couple of days.     [facility] Radiology Scheduling:  Please call [phone] to schedule your image(s) (select option #1).        ~Please call our [facility] Nurse Navigation line [phone] with any questions or concerns about your treatment plan, if symptoms worsen and you would like to be seen urgently, or if you have any new or worsening numbness, weakness, or problems controlling bladder and bowel function.  ~You are also welcome to contact Deysi Swenson via [employer], but please be aware that responses to [employer] message may take 2-3 days due to the high volume of patients seen in clinic.

## 2024-03-08 NOTE — PROGRESS NOTES
ASSESSMENT: Frank Obando is a 78 year old male who presents for consultation at the request of PCP Flaco Lopez, who presents today for new patient evaluation of:    -neurogenic claudication    Patient is neurologically intact on exam. No myelopathic or red flag symptoms. Symptoms consistent with neurogenic claudication but only mild canal stenosis on lumbar MRI. He does have narrowing around the nerves lorie at L5-S1 which I explained could be contributing to his feet symptoms in combination with his diabetic neuropathy. he is not bothered by this at this time. I recommended imaging thoracic region to rule out severe stenosis there, but if negative would recommend continued workup via PCP of other causes of his symptoms.         No data to display                     Diagnoses and all orders for this visit:  Osteoarthritis of lumbar spine, unspecified spinal osteoarthritis complication status  -     Spine  Referral  Neurogenic claudication  -     Spine  Referral  -     MR Thoracic Spine w/o Contrast; Future      PLAN:  Reviewed spine anatomy and disease process. Discussed diagnosis and treatment options with the patient today. A shared decision making model was used.  The patient's values and choices were respected. The following represents what was discussed and decided upon by the provider and the patient.      -DIAGNOSTIC TESTS:  Images were personally reviewed and interpreted and explained to patient today using a spine model.   --I ordered a thoracic MRI without contrast    -PHYSICAL THERAPY:    --did not discuss for his atypical symptoms      -MEDICATIONS:    --  we did not discuss medications today     -INTERVENTIONS:    -we did not discuss injections today     -PATIENT EDUCATION:  Total time of 40 minutes, on the day of service, spent with the patient, reviewing the chart, placing orders, and documenting.   -Today we also discussed the pros and cons of the current treatment  plan.    -FOLLOW-UP:   we will call to review MRI results    Advised patient to call the [facility] if symptoms worsen, new numbness or weakness develops in the legs, or if they develop new or worsening problems controlling bladder or bowel function.   ______________________________________________________________________    SUBJECTIVE:    HPI:  Frank Obando  Is a 78 year old male hx stage 3 CKD, kidney stones, type 2 dm, thrombocytopenia, RA, peripheral neuropathy, stomach ulcers, stroke, who presents today for new patient evaluation of low back pain     He has had chronic back pain on and off for years in the past, but never to a significant degree nor duration. He has 0/10 pain today and has been doing well lately. He describes a feeling of fatigue in the legs with walking, which goes away immediately when he is able to sit or bend forward over a shopping cart. He becomes short of breath with walking as well. If he elo forward, he states he can walk forever. Otherwise he is not able to walk further than to the mailbox. He states he has anemia which could be a factor, but he is now on medication for this and his anemia has been improving.     He denies pain in the legs, numbness, weakness, saddle anesthesia, chest pain, palpitations, dizziness, leg swelling, color changes in the legs, loss of bowel or bladder control. He has chronic tingling in both feet, which he attributes to his diabetes. He denies changes in bowel or bladder control.    He had a recent lumbar MRI done     He has not had any PT, injections or previous spine surgeries.    -Treatment to Date:     -Medications:    Current Outpatient Medications   Medication    acetaminophen (TYLENOL) 500 MG tablet    atorvastatin (LIPITOR) 40 MG tablet    blood glucose (ACCU-CHEK JACOB PLUS) test strip    blood glucose meter (GLUCOMETER)    blood glucose monitoring (SOFTCLIX) lancets    cyanocobalamin, vitamin B-12, 2,500 mcg Tab    ferrous sulfate  (FEROSUL) 325 (65 Fe) MG tablet    generic lancets (ACCU-CHEK SOFTCLIX LANCETS)    inFLIXimab (REMICADE IV)    lactobacillus rhamnosus, GG, (CULTURELL) capsule    lisinopril (ZESTRIL) 10 MG tablet    magnesium oxide 250 mg Tab    metFORMIN (GLUCOPHAGE) 500 MG tablet    omeprazole (PRILOSEC) 40 MG DR capsule    pioglitazone (ACTOS) 30 MG tablet    propranolol (INDERAL) 20 MG tablet    sucralfate (CARAFATE) 1 GM/10ML suspension    tamsulosin (FLOMAX) 0.4 MG capsule     No current facility-administered medications for this visit.       Allergies   Allergen Reactions    Morphine Nausea and Vomiting    Scopolamine Hbr [Scopolamine] Unknown       Past Medical History:   Diagnosis Date    Anemia     Arthritis     osteoarthritis    Calculus of kidney     Diabetes mellitus (H)     Episcleritis of left eye     Hyperlipidemia     Hypertension     Iron deficiency anemia due to chronic blood loss 11/2/2021    Peripheral neuropathy     Stroke (H)         Patient Active Problem List   Diagnosis    Diabetic polyneuropathy associated with type 2 diabetes mellitus (H)    H/o CVA ~2013    Essential hypertension, benign    Type 2 diabetes mellitus with complication, without long-term current use of insulin (H)    Right bundle branch block    Antineutrophil cytoplasmic antibody (ANCA) positive    High risk medication use    Dyslipidemia    Thrombocytopenia (H24)    Cirrhosis of liver with ascites, unspecified hepatic cirrhosis type (H)    Panuveitis of left eye, Dr. Abdi / Castillo    Iron deficiency anemia due to chronic blood loss    Angiodysplasia of stomach    History of colonic polyps    Secondary esophageal varices without bleeding (H)    Stage 3b chronic kidney disease (H)    Rheumatoid arthritis involving multiple sites with positive rheumatoid factor (H)    Personal history of immunosuppressive therapy       Past Surgical History:   Procedure Laterality Date    COLONOSCOPY N/A 11/16/2021    Procedure: COLONOSCOPY with  "polypectomy;  Surgeon: Dex Finch MD;  Location: [facility]    CYSTOSCOPY TUMOR / CONDYLOMATA W/ LASER Left 7/18/2014    ESOPHAGOSCOPY, GASTROSCOPY, DUODENOSCOPY (EGD), COMBINED N/A 11/16/2021    Procedure: ESOPHAGOGASTRODUODENOSCOPY (EGD) with biopsies and argon plasma coagulation;  Surgeon: Dex Finch MD;  Location: [facility]    ESOPHAGOSCOPY, GASTROSCOPY, DUODENOSCOPY (EGD), COMBINED N/A 9/29/2023    Procedure: ESOPHAGOGASTRODUODENOSCOPY with BIOPSY;  Surgeon: Alli Lozada MD;  Location: [facility] OR     CYSTOSCOPY,INSERT URETERAL STENT      Description: Cystoscopy With Insertion Of Ureteral Stent Right;  Recorded: 10/14/2009;  Comments: stone    LAPAROSCOPIC CHOLECYSTECTOMY N/A 7/1/2023    Procedure: CHOLECYSTECTOMY, LAPAROSCOPIC;  Surgeon: Jadiel Argueta MD;  Location: [facility] OR       Family History   Problem Relation Age of Onset    Coronary Artery Disease Mother     Diabetes Mother     Lung Cancer Father     No Known Problems Son        Reviewed past medical, surgical, and family history with patient found on new patient intake packet located in EMR Media tab.     SOCIAL HX: nonsmoker, no rec drug use, no heavy drinking, no alcohol use    ROS: positive for ringing in ears, shortness of breath, joint pain, itching.  Specifically negative for bowel/bladder dysfunction, balance changes, headache, dizziness, foot drop, fevers, chills, appetite changes, nausea/vomiting, unexplained weight loss. Otherwise 13 systems reviewed are negative. Please see the patient's intake questionnaire from today for details.    OBJECTIVE:  /67 (BP Location: Right arm, Patient Position: Sitting)   Pulse 75   Ht 5' 10.5\" (1.791 m)   Wt 199 lb (90.3 kg)   SpO2 96%   BMI 28.15 kg/m      PHYSICAL EXAMINATION:    --CONSTITUTIONAL:  Vital signs as above.  No acute distress.  The patient is well nourished and well groomed.  --PSYCHIATRIC:  Appropriate mood and affect. The " patient is awake, alert, oriented to person, place, time and answering questions appropriately with clear speech.    --SKIN:  Skin over the face, bilateral lower extremities, and posterior torso is clean, dry, intact without rashes.    --RESPIRATORY: Normal rhythm and effort. No abnormal accessory muscle breathing patterns noted.   --ABDOMINAL:  Non-distended.    --GROSS MOTOR: Gait is non-antalgic. Easily arises from a seated position. Toe walking and heel walking are normal without significant difficulty.      --LOWER EXTREMITY MOTOR TESTING:  Hip flexion: right 5/5, left 5/5   Quads: right 5/5, left 5/5  Hamstrings: right 5/5, left 5/5  Dorsiflexion: right 5/5, left 5/5  Plantar flexion: right 5/5, left 5/5    Great toe MTP extension/EHL: right 5/5, left 5/5    --NEUROLOGICAL:  2/4 patellar and achilles reflexes bilaterally. Sensation to light touch is intact throughout both lower extremities. Babinski is negative. No clonus.    --MUSCULOSKELETAL: Lumbar spine inspection reveals no evidence of deformity. Range of motion is not limited in lumbar flexion, extension, lateral rotation. No point tenderness to palpation lumbar spine. No paraspinal musculature tenderness.     Straight leg raising is negative.    --HIPS: Negative HOLDEN and Negative FADIR bilaterally.    --SACROILIAC JOINT: One finger point test was negative. Negative SI joint tenderness to palpation bilaterally.    --VASCULAR:  Bilateral lower extremities are warm without any discoloration.  There is no edema of the bilateral lower extremities. Pp 1+ lorie    RESULTS:   Prior medical records from [facility] Everywhere were reviewed today.    Imaging: Spine imaging was personally reviewed and interpreted today. The images were shown to the patient and the findings were explained using a spine model.    EXAM: MR LUMBAR SPINE W/O CONTRAST  LOCATION: [facility]  DATE: 11/14/2023     INDICATION:  Neurogenic  claudication  COMPARISON: None.  TECHNIQUE: Routine Lumbar Spine MRI without IV contrast.     FINDINGS:   5 lumbar vertebrae. Straightening of lumbar lordosis. At L5-S1, anterolisthesis measures 3 mm. No additional listhesis. Vertebral body heights are maintained. Faint Modic type I marrow signal changes at the anterior L4-L5 endplates. No additional   abnormal marrow signal.  Normal distal spinal cord and cauda equina with conus medullaris at L1. No extraspinal abnormality. Unremarkable visualized bony pelvis.     T12-L1: Normal disc height and signal. No herniation. Normal facets. No spinal canal or neural foraminal stenosis.      L1-L2: Mild disc height loss. Disc desiccation. No herniation. Normal facets. No spinal canal or neural foraminal stenosis.     L2-L3: Mild disc height loss. Disc desiccation. Disc bulge. Normal facets. No spinal canal stenosis. Mild bilateral neural foraminal stenosis.      L3-L4: Mild disc height loss. Disc desiccation. Disc bulge. Superimposed right foraminal protrusion. No spinal canal stenosis. Mild right neural foraminal stenosis. No left neural foraminal stenosis per     L4-L5: Mild-to-moderate disc height loss. Disc bulge. Mild facet arthropathy. Mild facet arthropathy. Small synovial effusions. Small right dorsal synovial cyst. Ligamentum flavum thickening. Mild central spinal canal stenosis. Moderate lateral recess   narrowing bilaterally. Mild right neural foraminal stenosis. Mild to moderate left neural foraminal stenosis.     L5-S1: Moderate disc height loss. Disc bulge. Broad cephalad migration of the uncovered disc including at the bilateral foramina. Mild facet arthropathy. No spinal canal stenosis. Severe neural foraminal stenosis bilaterally.                                                                      IMPRESSION:  1.  Multilevel lumbar spondylosis including Modic type I marrow signal changes at L4-L5.  2.  No high-grade spinal canal or neural foraminal  stenosis.  3.  Severe neural foraminal stenosis bilaterally at L5-S1.    Deysi NIEVESP-C  [facility]  O. [phone]

## 2024-03-20 ENCOUNTER — HOSPITAL ENCOUNTER (OUTPATIENT)
Dept: MRI IMAGING | Facility: CLINIC | Age: 79
Discharge: HOME | End: 2024-03-20
Attending: NURSE PRACTITIONER | Admitting: NURSE PRACTITIONER
Payer: MEDICARE

## 2024-03-20 DIAGNOSIS — R29.818 NEUROGENIC CLAUDICATION: ICD-10-CM

## 2024-03-20 PROCEDURE — 72146 MRI CHEST SPINE W/O DYE: CPT | Mod: MG

## 2024-03-21 ENCOUNTER — TELEPHONE (OUTPATIENT)
Dept: PHYSICAL MEDICINE AND REHAB | Facility: CLINIC | Age: 79
End: 2024-03-21
Payer: MEDICARE

## 2024-03-21 ENCOUNTER — TELEPHONE (OUTPATIENT)
Dept: VASCULAR SURGERY | Facility: CLINIC | Age: 79
End: 2024-03-21
Payer: MEDICARE

## 2024-03-21 DIAGNOSIS — R29.898 BILATERAL LEG WEAKNESS: Primary | ICD-10-CM

## 2024-03-21 DIAGNOSIS — E11.8 TYPE 2 DIABETES MELLITUS WITH COMPLICATION, WITHOUT LONG-TERM CURRENT USE OF INSULIN (H): Chronic | ICD-10-CM

## 2024-03-21 DIAGNOSIS — M62.89 EXERCISE-INDUCED FATIGUE OF LOWER EXTREMITY: ICD-10-CM

## 2024-03-21 DIAGNOSIS — I73.9 CLAUDICATION OF BOTH LOWER EXTREMITIES (H): Primary | ICD-10-CM

## 2024-03-21 NOTE — TELEPHONE ENCOUNTER
----- Message from NARESH Allen CNP sent at 3/21/2024 11:56 AM CDT -----  Please let Antonio know that his thoracic MRI shows some mild chronic wear and tear changes, but no narrowing around the nerves or spinal canal narrowing. I would recommend further workup of his leg symptoms with a vascular medicine referral.

## 2024-03-21 NOTE — TELEPHONE ENCOUNTER
Phone call to patient to review results and provider's recommendations. Results given and explained. Discussed PSP's recommendation for patient to see a provider through vascular medicine for further work up to his leg symptoms. Stated understanding.   He will await a call from  once order is placed.

## 2024-03-21 NOTE — TELEPHONE ENCOUNTER
Vascular Referral Intake    Referred by: Deysi Swenson CNP for claudicating bilateral leg fatigue     Specialty: Vascular Medicine    Specific Provider if Necessary:  MD Boby Parada    Visit Type: Vascular Medicine    Time Frame: Next Available    Testing/Imaging Needed Before Consult: LISSET w/ exercise    Appt Note: (to be pasted into appt comments, also add where additional information is located, ie, outside images pushed to PACS, in Epic, sent to HIMS, etc)  claudicating bilateral leg fatigue. Pt had spine MRI- but no narrowing around the nerves or spinal canal narrowing. I would recommend further workup of his leg symptoms with a vascular medicine referral.

## 2024-03-27 ENCOUNTER — INFUSION THERAPY VISIT (OUTPATIENT)
Dept: INFUSION THERAPY | Facility: HOSPITAL | Age: 79
End: 2024-03-27
Attending: INTERNAL MEDICINE
Payer: MEDICARE

## 2024-03-27 VITALS
OXYGEN SATURATION: 94 % | HEART RATE: 67 BPM | BODY MASS INDEX: 27.58 KG/M2 | DIASTOLIC BLOOD PRESSURE: 72 MMHG | RESPIRATION RATE: 16 BRPM | TEMPERATURE: 97.6 F | WEIGHT: 195 LBS | SYSTOLIC BLOOD PRESSURE: 123 MMHG

## 2024-03-27 DIAGNOSIS — H44.112 PANUVEITIS OF LEFT EYE: Primary | ICD-10-CM

## 2024-03-27 LAB
ALBUMIN SERPL BCG-MCNC: 3.9 G/DL (ref 3.5–5.2)
ALT SERPL W P-5'-P-CCNC: 30 U/L (ref 0–70)
CREAT SERPL-MCNC: 1.33 MG/DL (ref 0.67–1.17)
EGFRCR SERPLBLD CKD-EPI 2021: 55 ML/MIN/1.73M2
ERYTHROCYTE [DISTWIDTH] IN BLOOD BY AUTOMATED COUNT: 13.3 % (ref 10–15)
HCT VFR BLD AUTO: 30.3 % (ref 40–53)
HGB BLD-MCNC: 10.3 G/DL (ref 13.3–17.7)
MCH RBC QN AUTO: 30.9 PG (ref 26.5–33)
MCHC RBC AUTO-ENTMCNC: 34 G/DL (ref 31.5–36.5)
MCV RBC AUTO: 91 FL (ref 78–100)
PLATELET # BLD AUTO: 103 10E3/UL (ref 150–450)
RBC # BLD AUTO: 3.33 10E6/UL (ref 4.4–5.9)
WBC # BLD AUTO: 4.6 10E3/UL (ref 4–11)

## 2024-03-27 PROCEDURE — 96413 CHEMO IV INFUSION 1 HR: CPT

## 2024-03-27 PROCEDURE — 84460 ALANINE AMINO (ALT) (SGPT): CPT

## 2024-03-27 PROCEDURE — 250N000011 HC RX IP 250 OP 636: Mod: JZ | Performed by: INTERNAL MEDICINE

## 2024-03-27 PROCEDURE — 96375 TX/PRO/DX INJ NEW DRUG ADDON: CPT

## 2024-03-27 PROCEDURE — 258N000003 HC RX IP 258 OP 636: Performed by: INTERNAL MEDICINE

## 2024-03-27 PROCEDURE — 82565 ASSAY OF CREATININE: CPT

## 2024-03-27 PROCEDURE — 85014 HEMATOCRIT: CPT

## 2024-03-27 PROCEDURE — 250N000013 HC RX MED GY IP 250 OP 250 PS 637: Performed by: INTERNAL MEDICINE

## 2024-03-27 PROCEDURE — 36415 COLL VENOUS BLD VENIPUNCTURE: CPT

## 2024-03-27 PROCEDURE — 82040 ASSAY OF SERUM ALBUMIN: CPT

## 2024-03-27 RX ORDER — EPINEPHRINE 1 MG/ML
0.3 INJECTION, SOLUTION INTRAMUSCULAR; SUBCUTANEOUS EVERY 5 MIN PRN
Status: CANCELLED | OUTPATIENT
Start: 2024-03-28

## 2024-03-27 RX ORDER — HEPARIN SODIUM (PORCINE) LOCK FLUSH IV SOLN 100 UNIT/ML 100 UNIT/ML
5 SOLUTION INTRAVENOUS
Status: CANCELLED | OUTPATIENT
Start: 2024-03-28

## 2024-03-27 RX ORDER — ALBUTEROL SULFATE 0.83 MG/ML
2.5 SOLUTION RESPIRATORY (INHALATION)
Status: CANCELLED | OUTPATIENT
Start: 2024-03-28

## 2024-03-27 RX ORDER — ACETAMINOPHEN 325 MG/1
650 TABLET ORAL ONCE
Qty: 2 TABLET | Refills: 0 | Status: COMPLETED | OUTPATIENT
Start: 2024-03-27 | End: 2024-03-27

## 2024-03-27 RX ORDER — DIPHENHYDRAMINE HCL 25 MG
25 CAPSULE ORAL ONCE
Status: CANCELLED
Start: 2024-03-28 | End: 2024-03-28

## 2024-03-27 RX ORDER — HEPARIN SODIUM,PORCINE 10 UNIT/ML
5 VIAL (ML) INTRAVENOUS
Status: CANCELLED | OUTPATIENT
Start: 2024-03-28

## 2024-03-27 RX ORDER — DIPHENHYDRAMINE HCL 25 MG
25 CAPSULE ORAL ONCE
Qty: 1 CAPSULE | Refills: 0 | Status: COMPLETED | OUTPATIENT
Start: 2024-03-27 | End: 2024-03-27

## 2024-03-27 RX ORDER — ACETAMINOPHEN 325 MG/1
650 TABLET ORAL ONCE
Status: CANCELLED
Start: 2024-03-28 | End: 2024-03-28

## 2024-03-27 RX ORDER — MEPERIDINE HYDROCHLORIDE 50 MG/ML
25 INJECTION INTRAMUSCULAR; INTRAVENOUS; SUBCUTANEOUS EVERY 30 MIN PRN
Status: CANCELLED | OUTPATIENT
Start: 2024-03-28

## 2024-03-27 RX ORDER — METHYLPREDNISOLONE SODIUM SUCCINATE 125 MG/2ML
125 INJECTION, POWDER, LYOPHILIZED, FOR SOLUTION INTRAMUSCULAR; INTRAVENOUS
Status: CANCELLED
Start: 2024-03-28

## 2024-03-27 RX ORDER — DIPHENHYDRAMINE HYDROCHLORIDE 50 MG/ML
50 INJECTION INTRAMUSCULAR; INTRAVENOUS
Status: CANCELLED
Start: 2024-03-28

## 2024-03-27 RX ORDER — METHYLPREDNISOLONE SODIUM SUCCINATE 125 MG/2ML
125 INJECTION, POWDER, LYOPHILIZED, FOR SOLUTION INTRAMUSCULAR; INTRAVENOUS ONCE
Status: CANCELLED | OUTPATIENT
Start: 2024-03-28 | End: 2024-03-28

## 2024-03-27 RX ORDER — METHYLPREDNISOLONE SODIUM SUCCINATE 125 MG/2ML
125 INJECTION, POWDER, LYOPHILIZED, FOR SOLUTION INTRAMUSCULAR; INTRAVENOUS ONCE
Qty: 2 ML | Refills: 0 | Status: COMPLETED | OUTPATIENT
Start: 2024-03-27 | End: 2024-03-27

## 2024-03-27 RX ORDER — ALBUTEROL SULFATE 90 UG/1
1-2 AEROSOL, METERED RESPIRATORY (INHALATION)
Status: CANCELLED
Start: 2024-03-28

## 2024-03-27 RX ADMIN — METHYLPREDNISOLONE SODIUM SUCCINATE 125 MG: 125 INJECTION, POWDER, FOR SOLUTION INTRAMUSCULAR; INTRAVENOUS at 11:02

## 2024-03-27 RX ADMIN — ACETAMINOPHEN 650 MG: 325 TABLET ORAL at 11:02

## 2024-03-27 RX ADMIN — INFLIXIMAB 400 MG: 100 INJECTION, POWDER, LYOPHILIZED, FOR SOLUTION INTRAVENOUS at 11:41

## 2024-03-27 RX ADMIN — DIPHENHYDRAMINE HYDROCHLORIDE 25 MG: 25 CAPSULE ORAL at 11:02

## 2024-03-27 RX ADMIN — SODIUM CHLORIDE 250 ML: 9 INJECTION, SOLUTION INTRAVENOUS at 11:02

## 2024-03-27 NOTE — PROGRESS NOTES
Infusion Nursing Note:  Frank CRAIG Yazminmariia presents today for infliximab.    Patient seen by provider today: No   present during visit today: Not Applicable.    Note: /72 (Patient Position: Sitting)   Pulse 67   Temp 97.6  F (36.4  C) (Oral)   Resp 16   Wt 88.5 kg (195 lb)   SpO2 94%   BMI 27.58 kg/m    .      Intravenous Access:  Peripheral IV placed.    Treatment Conditions:  Biological Infusion Checklist:  ~~~ NOTE: If the patient answers yes to any of the questions below, hold the infusion and contact ordering provider or on-call provider.    Have you recently had an elevated temperature, fever, chills, productive cough, coughing for 3 weeks or longer or hemoptysis,  abnormal vital signs, night sweats,  chest pain or have you noticed a decrease in your appetite, unexplained weight loss or fatigue? No  Do you have any open wounds or new incisions? No  Do you have any upcoming hospitalizations or surgeries? Does not include esophagogastroduodenoscopy, colonoscopy, endoscopic retrograde cholangiopancreatography (ERCP), endoscopic ultrasound (EUS), dental procedures or joint aspiration/steroid injections No  Do you currently have any signs of illness or infection or are you on any antibiotics? No  Have you had any new, sudden or worsening abdominal pain? No  Have you or anyone in your household received a live vaccination in the past 4 weeks? Please note: No live vaccines while on biologic/chemotherapy until 6 months after the last treatment. Patient can receive the flu vaccine (shot only), pneumovax and the Covid vaccine. It is optimal for the patient to get these vaccines mid cycle, but they can be given at any time as long as it is not on the day of the infusion. No  Have you recently been diagnosed with any new nervous system diseases (ie. Multiple sclerosis, Guillain [place], seizures, neurological changes) or cancer diagnosis? Are you on any form of radiation or chemotherapy? No  Are you  pregnant or breast feeding or do you have plans of pregnancy in the future? No  Have you been having any signs of worsening depression or suicidal ideations?  (benlysta only) No  Have there been any other new onset medical symptoms? No  Have you had any new blood clots? (IVIG only) No      Post Infusion Assessment:  Patient tolerated infusion without incident.  Site patent and intact, free from redness, edema or discomfort.  No evidence of extravasations.  Access discontinued per protocol.       Discharge Plan:   Patient discharged in stable condition accompanied by: self.  Departure Mode: Ambulatory.      Tamiko Burnette RN

## 2024-04-09 DIAGNOSIS — N40.1 BENIGN PROSTATIC HYPERPLASIA WITH URINARY FREQUENCY: ICD-10-CM

## 2024-04-09 DIAGNOSIS — R35.0 BENIGN PROSTATIC HYPERPLASIA WITH URINARY FREQUENCY: ICD-10-CM

## 2024-04-09 RX ORDER — TAMSULOSIN HYDROCHLORIDE 0.4 MG/1
CAPSULE ORAL
Qty: 90 CAPSULE | Refills: 2 | Status: SHIPPED | OUTPATIENT
Start: 2024-04-09

## 2024-04-10 ENCOUNTER — PREP FOR PROCEDURE (OUTPATIENT)
Dept: CARDIOLOGY | Facility: CLINIC | Age: 79
End: 2024-04-10

## 2024-04-10 ENCOUNTER — OFFICE VISIT (OUTPATIENT)
Dept: CARDIOLOGY | Facility: CLINIC | Age: 79
End: 2024-04-10
Attending: INTERNAL MEDICINE
Payer: MEDICARE

## 2024-04-10 ENCOUNTER — TELEPHONE (OUTPATIENT)
Dept: CARDIOLOGY | Facility: CLINIC | Age: 79
End: 2024-04-10

## 2024-04-10 VITALS
HEART RATE: 72 BPM | BODY MASS INDEX: 28.43 KG/M2 | SYSTOLIC BLOOD PRESSURE: 102 MMHG | WEIGHT: 201 LBS | RESPIRATION RATE: 16 BRPM | OXYGEN SATURATION: 96 % | DIASTOLIC BLOOD PRESSURE: 60 MMHG

## 2024-04-10 DIAGNOSIS — I10 ESSENTIAL HYPERTENSION, BENIGN: ICD-10-CM

## 2024-04-10 DIAGNOSIS — R94.39 ABNORMAL STRESS TEST: ICD-10-CM

## 2024-04-10 DIAGNOSIS — E11.8 TYPE 2 DIABETES MELLITUS WITH COMPLICATION, WITHOUT LONG-TERM CURRENT USE OF INSULIN (H): Chronic | ICD-10-CM

## 2024-04-10 DIAGNOSIS — I63.322 CEREBROVASCULAR ACCIDENT (CVA) DUE TO THROMBOSIS OF LEFT ANTERIOR CEREBRAL ARTERY (H): ICD-10-CM

## 2024-04-10 DIAGNOSIS — R94.39 ABNORMAL STRESS TEST: Primary | ICD-10-CM

## 2024-04-10 DIAGNOSIS — R06.09 EXERTIONAL DYSPNEA: ICD-10-CM

## 2024-04-10 DIAGNOSIS — R06.09 EXERTIONAL DYSPNEA: Primary | ICD-10-CM

## 2024-04-10 DIAGNOSIS — E78.5 DYSLIPIDEMIA: ICD-10-CM

## 2024-04-10 PROCEDURE — 99205 OFFICE O/P NEW HI 60 MIN: CPT | Performed by: INTERNAL MEDICINE

## 2024-04-10 RX ORDER — ASPIRIN 81 MG/1
243 TABLET, CHEWABLE ORAL ONCE
Status: CANCELLED | OUTPATIENT
Start: 2024-04-23

## 2024-04-10 RX ORDER — LIDOCAINE 40 MG/G
CREAM TOPICAL
Status: CANCELLED | OUTPATIENT
Start: 2024-04-10

## 2024-04-10 RX ORDER — ASPIRIN 325 MG
325 TABLET ORAL ONCE
Status: CANCELLED | OUTPATIENT
Start: 2024-04-23 | End: 2024-04-10

## 2024-04-10 RX ORDER — DEXTROSE MONOHYDRATE 25 G/50ML
25-50 INJECTION, SOLUTION INTRAVENOUS
Status: CANCELLED | OUTPATIENT
Start: 2024-04-23

## 2024-04-10 RX ORDER — SODIUM CHLORIDE 9 MG/ML
INJECTION, SOLUTION INTRAVENOUS CONTINUOUS
Status: CANCELLED | OUTPATIENT
Start: 2024-04-23

## 2024-04-10 RX ORDER — NICOTINE POLACRILEX 4 MG
15-30 LOZENGE BUCCAL
Status: CANCELLED | OUTPATIENT
Start: 2024-04-23

## 2024-04-10 RX ORDER — FENTANYL CITRATE 50 UG/ML
25 INJECTION, SOLUTION INTRAMUSCULAR; INTRAVENOUS
Status: CANCELLED | OUTPATIENT
Start: 2024-04-23

## 2024-04-10 NOTE — LETTER
4/10/2024    Flaco Lopez MD  [address] [facility] [address]    RE: Frank Obando       Dear Colleague,     I had the pleasure of seeing Frank Obando in the [facility].    Thank you, Dr. Lopez, for asking the [facility] Care team to see Frank Obando to evaluate dyspnea.      Assessment/Recommendations   Assessment:    Exertional dyspnea, potentially anginal equivalent-no fluid overload or obvious pulmonary pathology  Mildly abnormal stress test with anterior ischemia  Diabetes mellitus  Rheumatoid arthritis  Chronic kidney disease    Plan:  We discussed results of the stress test.  I would favor invasive cardiac evaluation and possible PCI.  I explained to him risks and benefits.  He agrees to proceed.  Further recommendation including medication adjustment based on the results of the angiogram.       History of Present Illness/Subjective    Mr. Frank Obando is a 78 year old male who comes in for cardiac evaluation.  He reports that for about a year or so he has been experiencing pronounced exertional dyspnea.  Even the short distance will make him feel very breathless.  He denies any chest pains.  He has not had weight gain, PND, orthopnea.  He denies heart palpitations syncope.  He has no history of known coronary artery disease, valvular heart disease or cardiomyopathy.  He underwent outpatient nuclear stress test.  Results are abnormal  He has no history of DVT or PE.  He denies leg swelling or pain.    ECG: Personally reviewed.  Normal sinus rhythm right bundle branch block.  Stress test: March 2024    The nuclear stress test is abnormal.    There is a small area of mild ischemia in the distal anterior segment(s) of the left ventricle.    The left ventricular ejection fraction at stress is 66%.       Physical Examination Review of Systems   /60 (BP Location: Right arm, Patient Position: Sitting, Cuff Size: Adult Regular)   Pulse 72    Resp 16   Wt 91.2 kg (201 lb)   SpO2 96%   BMI 28.43 kg/m    Body mass index is 28.43 kg/m .  Wt Readings from Last 3 Encounters:   04/10/24 91.2 kg (201 lb)   03/27/24 88.5 kg (195 lb)   03/08/24 90.3 kg (199 lb)     [unfilled]  General Appearance:   Alert, cooperative, no distress, appears stated age   Head/ENT: Normocephalic, without obvious abnormality. Membranes moist.      EYES:  No scleral icterus   Neck: Supple, symmetrical, trachea midline, no adenopathy, thyroid: not enlarged, symmetric, no carotid bruit or JVD   Chest/Lungs:   lungs are clear to auscultation, respirations unlabored. No tenderness or deformity   Cardiovascular:   Regular rhythm, S1, S2 normal, no murmur, rub or gallop.   Abdomen:  Soft, non-tender, bowel sounds active all four quadrants,  no masses, no organomegaly   Extremities: no cyanosis or clubbing. No edema   Skin: Skin color, texture, turgor normal, no rashes or lesions.    Neurologic: Alert and oriented x 3, moving all four extremities.    Psychiatric: Normal affect.      10 system review of systems completed see history of present illness and inpatient H&P (reviewed) for further details.          Lab Results    Chemistry/lipid CBC Cardiac Enzymes/BNP/TSH/INR   Recent Labs   Lab Test 04/04/23  1001   CHOL 116   HDL 46   LDL 46   TRIG 122     Recent Labs   Lab Test 04/04/23  1001 04/29/22  1240 09/20/21  0928   LDL 46 65 54     Recent Labs   Lab Test 03/27/24  1058 02/16/24  1210   NA  --  138   POTASSIUM  --  4.4   CHLORIDE  --  100   CO2  --  25   GLC  --  135*   BUN  --  21.6   CR 1.33* 1.30*   GFRESTIMATED 55* 57*   CINDY  --  9.3     Recent Labs   Lab Test 03/27/24  1058 02/16/24  1210 12/28/23  0905   CR 1.33* 1.30* 1.30*     Recent Labs   Lab Test 02/16/24  1210 10/06/23  0942 07/06/23  0957   A1C 7.2* 6.7* 6.7*          Recent Labs   Lab Test 03/27/24  1058   WBC 4.6   HGB 10.3*   HCT 30.3*   MCV 91   *     Recent Labs   Lab Test 03/27/24  1058 12/28/23  0905  10/31/23  0909   HGB 10.3* 11.5* 11.3*    Recent Labs   Lab Test 06/29/23  0316 06/28/23  2304   TROPONINI <0.01 <0.01     Recent Labs   Lab Test 02/16/24  1210 12/21/18  0106 08/06/18  0830   BNP  --  35 25   NTBNP 164  --   --      Recent Labs   Lab Test 11/18/22  0845   TSH 2.37     Recent Labs   Lab Test 10/18/23  0942 07/01/23  1215 03/26/23  1007   INR 1.0 1.34* 1.07        Medical History  Surgical History Family History Social History   Past Medical History:   Diagnosis Date    Anemia     Arthritis     osteoarthritis    Calculus of kidney     Diabetes mellitus (H)     Episcleritis of left eye     Hyperlipidemia     Hypertension     Iron deficiency anemia due to chronic blood loss 11/2/2021    Peripheral neuropathy     Stroke (H)      Past Surgical History:   Procedure Laterality Date    COLONOSCOPY N/A 11/16/2021    Procedure: COLONOSCOPY with polypectomy;  Surgeon: Dex Finch MD;  Location: [facility]    CYSTOSCOPY TUMOR / CONDYLOMATA W/ LASER Left 7/18/2014    ESOPHAGOSCOPY, GASTROSCOPY, DUODENOSCOPY (EGD), COMBINED N/A 11/16/2021    Procedure: ESOPHAGOGASTRODUODENOSCOPY (EGD) with biopsies and argon plasma coagulation;  Surgeon: Dex Finch MD;  Location: [facility]    ESOPHAGOSCOPY, GASTROSCOPY, DUODENOSCOPY (EGD), COMBINED N/A 9/29/2023    Procedure: ESOPHAGOGASTRODUODENOSCOPY with BIOPSY;  Surgeon: Alli Lozada MD;  Location: [facility] OR    HC CYSTOSCOPY,INSERT URETERAL STENT      Description: Cystoscopy With Insertion Of Ureteral Stent Right;  Recorded: 10/14/2009;  Comments: stone    LAPAROSCOPIC CHOLECYSTECTOMY N/A 7/1/2023    Procedure: CHOLECYSTECTOMY, LAPAROSCOPIC;  Surgeon: Jadiel Argueta MD;  Location: [facility] OR     No premature CAD, SCD,cardiomyopathy   Social History     Socioeconomic History    Marital status:      Spouse name: Not on file    Number of children: Not on file    Years of education: Not on file    Highest education  level: Not on file   Occupational History    Not on file   Tobacco Use    Smoking status: Never    Smokeless tobacco: Never   Vaping Use    Vaping status: Never Used   Substance and Sexual Activity    Alcohol use: No    Drug use: No    Sexual activity: Not on file   Other Topics Concern    Parent/sibling w/ CABG, MI or angioplasty before 65F 55M? Not Asked   Social History Narrative    Lives with his wife, Valerie.  Volunteers at [employer].  Retired  of [employer].  One son, Jaison.      Social Determinants of Health     Financial Resource Strain: Low Risk  (10/6/2023)    Financial Resource Strain     Within the past 12 months, have you or your family members you live with been unable to get utilities (heat, electricity) when it was really needed?: No   Food Insecurity: Low Risk  (10/6/2023)    Food Insecurity     Within the past 12 months, did you worry that your food would run out before you got money to buy more?: No     Within the past 12 months, did the food you bought just not last and you didn t have money to get more?: No   Transportation Needs: Low Risk  (10/6/2023)    Transportation Needs     Within the past 12 months, has lack of transportation kept you from medical appointments, getting your medicines, non-medical meetings or appointments, work, or from getting things that you need?: No   Physical Activity: Not on file   Stress: Not on file   Social Connections: Not on file   Interpersonal Safety: Low Risk  (10/6/2023)    Interpersonal Safety     Do you feel physically and emotionally safe where you currently live?: Yes     Within the past 12 months, have you been hit, slapped, kicked or otherwise physically hurt by someone?: No     Within the past 12 months, have you been humiliated or emotionally abused in other ways by your partner or ex-partner?: No   Housing Stability: Low Risk  (10/6/2023)    Housing Stability     Do you have housing? : Yes     Are you worried about losing  your housing?: No         Medications  Allergies   Current Outpatient Medications Ordered in Epic   Medication Sig Dispense Refill    acetaminophen (TYLENOL) 500 MG tablet Take 1 tablet (500 mg) by mouth every 6 hours as needed for mild pain      atorvastatin (LIPITOR) 40 MG tablet TAKE 1 TABLET BY MOUTH DAILY 90 tablet 2    blood glucose (ACCU-CHEK JACOB PLUS) test strip 1 strip by In Vitro route 2 times daily 200 strip 1    blood glucose meter (GLUCOMETER) [BLOOD GLUCOSE METER (GLUCOMETER)] Use 1 each As Directed 2 (two) times a day. Dispense glucometer brand per patient's insurance at pharmacy discretion. 1 each 0    blood glucose monitoring (SOFTCLIX) lancets Use 1 each As Directed 2 (two) times a day. Dispense brand per patient's insurance at pharmacy discretion. - Does not apply 100 each 3    cyanocobalamin, vitamin B-12, 2,500 mcg Tab [CYANOCOBALAMIN, VITAMIN B-12, 2,500 MCG TAB] Take 2,500 mcg by mouth daily.      ferrous sulfate (FEROSUL) 325 (65 Fe) MG tablet Take 1 tablet (325 mg) by mouth 3 times daily (with meals) 90 tablet 3    generic lancets (ACCU-CHEK SOFTCLIX LANCETS) [GENERIC LANCETS (ACCU-CHEK SOFTCLIX LANCETS)] Use 1 each As Directed 2 (two) times a day. Dispense brand per patient's insurance at pharmacy discretion. 100 each 3    inFLIXimab (REMICADE IV) Every 6weeks      lisinopril (ZESTRIL) 10 MG tablet Take 1 tablet (10 mg) by mouth daily 90 tablet 3    magnesium oxide 250 mg Tab [MAGNESIUM OXIDE 250 MG TAB] Take 250 mg by mouth 2 (two) times a day.      metFORMIN (GLUCOPHAGE) 500 MG tablet TAKE 2 TABLETS BY MOUTH TWICE DAILY AT 6 AM AND AT 4  tablet 3    omeprazole (PRILOSEC) 40 MG DR capsule Take 1 capsule (40 mg) by mouth daily 90 capsule 4    pioglitazone (ACTOS) 30 MG tablet TAKE 1 TABLET BY MOUTH EVERY DAY 90 tablet 3    propranolol (INDERAL) 20 MG tablet TAKE 1 TABLET(20 MG) BY MOUTH TWICE DAILY 180 tablet 3    psyllium (METAMUCIL/KONSYL) capsule Take 3 capsules by mouth 2 times  daily      tamsulosin (FLOMAX) 0.4 MG capsule TAKE 1 CAPSULE BY MOUTH EVERY DAY AFTER SUPPER 90 capsule 2    lactobacillus rhamnosus, GG, (CULTURELL) capsule Take 1 capsule by mouth 3 times daily (before meals) (Patient not taking: Reported on 4/10/2024)      sucralfate (CARAFATE) 1 GM/10ML suspension Take 10 mLs (1 g) by mouth 4 times daily (Patient not taking: Reported on 4/10/2024) 414 mL 0     No current Epic-ordered facility-administered medications on file.       Allergies   Allergen Reactions    Morphine Nausea and Vomiting    Scopolamine Hbr [Scopolamine] Unknown               Thank you for allowing me to participate in the care of your patient.      Sincerely,     Ricco Oliveros MD     [facility]  cc:   Flaco Lopez MD  [address] [place] [address]

## 2024-04-10 NOTE — PROGRESS NOTES
Thank you, Dr. Lopez, for asking the [facility] Care team to see Frank Obando to evaluate dyspnea.      Assessment/Recommendations   Assessment:    Exertional dyspnea, potentially anginal equivalent-no fluid overload or obvious pulmonary pathology  Mildly abnormal stress test with anterior ischemia  Diabetes mellitus  Rheumatoid arthritis  Chronic kidney disease    Plan:  We discussed results of the stress test.  I would favor invasive cardiac evaluation and possible PCI.  I explained to him risks and benefits.  He agrees to proceed.  Further recommendation including medication adjustment based on the results of the angiogram.       History of Present Illness/Subjective    Mr. Frank Obando is a 78 year old male who comes in for cardiac evaluation.  He reports that for about a year or so he has been experiencing pronounced exertional dyspnea.  Even the short distance will make him feel very breathless.  He denies any chest pains.  He has not had weight gain, PND, orthopnea.  He denies heart palpitations syncope.  He has no history of known coronary artery disease, valvular heart disease or cardiomyopathy.  He underwent outpatient nuclear stress test.  Results are abnormal  He has no history of DVT or PE.  He denies leg swelling or pain.    ECG: Personally reviewed.  Normal sinus rhythm right bundle branch block.  Stress test: March 2024    The nuclear stress test is abnormal.    There is a small area of mild ischemia in the distal anterior segment(s) of the left ventricle.    The left ventricular ejection fraction at stress is 66%.       Physical Examination Review of Systems   /60 (BP Location: Right arm, Patient Position: Sitting, Cuff Size: Adult Regular)   Pulse 72   Resp 16   Wt 91.2 kg (201 lb)   SpO2 96%   BMI 28.43 kg/m    Body mass index is 28.43 kg/m .  Wt Readings from Last 3 Encounters:   04/10/24 91.2 kg (201 lb)   03/27/24 88.5 kg (195 lb)   03/08/24 90.3 kg (199 lb)      [unfilled]  General Appearance:   Alert, cooperative, no distress, appears stated age   Head/ENT: Normocephalic, without obvious abnormality. Membranes moist.      EYES:  No scleral icterus   Neck: Supple, symmetrical, trachea midline, no adenopathy, thyroid: not enlarged, symmetric, no carotid bruit or JVD   Chest/Lungs:   lungs are clear to auscultation, respirations unlabored. No tenderness or deformity   Cardiovascular:   Regular rhythm, S1, S2 normal, no murmur, rub or gallop.   Abdomen:  Soft, non-tender, bowel sounds active all four quadrants,  no masses, no organomegaly   Extremities: no cyanosis or clubbing. No edema   Skin: Skin color, texture, turgor normal, no rashes or lesions.    Neurologic: Alert and oriented x 3, moving all four extremities.    Psychiatric: Normal affect.      10 system review of systems completed see history of present illness and inpatient H&P (reviewed) for further details.          Lab Results    Chemistry/lipid CBC Cardiac Enzymes/BNP/TSH/INR   Recent Labs   Lab Test 04/04/23  1001   CHOL 116   HDL 46   LDL 46   TRIG 122     Recent Labs   Lab Test 04/04/23  1001 04/29/22  1240 09/20/21  0928   LDL 46 65 54     Recent Labs   Lab Test 03/27/24  1058 02/16/24  1210   NA  --  138   POTASSIUM  --  4.4   CHLORIDE  --  100   CO2  --  25   GLC  --  135*   BUN  --  21.6   CR 1.33* 1.30*   GFRESTIMATED 55* 57*   CINDY  --  9.3     Recent Labs   Lab Test 03/27/24  1058 02/16/24  1210 12/28/23  0905   CR 1.33* 1.30* 1.30*     Recent Labs   Lab Test 02/16/24  1210 10/06/23  0942 07/06/23  0957   A1C 7.2* 6.7* 6.7*          Recent Labs   Lab Test 03/27/24  1058   WBC 4.6   HGB 10.3*   HCT 30.3*   MCV 91   *     Recent Labs   Lab Test 03/27/24  1058 12/28/23  0905 10/31/23  0909   HGB 10.3* 11.5* 11.3*    Recent Labs   Lab Test 06/29/23  0316 06/28/23  2304   TROPONINI <0.01 <0.01     Recent Labs   Lab Test 02/16/24  1210 12/21/18  0106 08/06/18  0830   BNP  --  35 25   NTBNP 164  --    --      Recent Labs   Lab Test 11/18/22  0845   TSH 2.37     Recent Labs   Lab Test 10/18/23  0942 07/01/23  1215 03/26/23  1007   INR 1.0 1.34* 1.07        Medical History  Surgical History Family History Social History   Past Medical History:   Diagnosis Date    Anemia     Arthritis     osteoarthritis    Calculus of kidney     Diabetes mellitus (H)     Episcleritis of left eye     Hyperlipidemia     Hypertension     Iron deficiency anemia due to chronic blood loss 11/2/2021    Peripheral neuropathy     Stroke (H)      Past Surgical History:   Procedure Laterality Date    COLONOSCOPY N/A 11/16/2021    Procedure: COLONOSCOPY with polypectomy;  Surgeon: Dex Finch MD;  Location: [facility]    CYSTOSCOPY TUMOR / CONDYLOMATA W/ LASER Left 7/18/2014    ESOPHAGOSCOPY, GASTROSCOPY, DUODENOSCOPY (EGD), COMBINED N/A 11/16/2021    Procedure: ESOPHAGOGASTRODUODENOSCOPY (EGD) with biopsies and argon plasma coagulation;  Surgeon: Dex Finch MD;  Location: [facility]    ESOPHAGOSCOPY, GASTROSCOPY, DUODENOSCOPY (EGD), COMBINED N/A 9/29/2023    Procedure: ESOPHAGOGASTRODUODENOSCOPY with BIOPSY;  Surgeon: Alli Lozada MD;  Location: [facility] OR    HC CYSTOSCOPY,INSERT URETERAL STENT      Description: Cystoscopy With Insertion Of Ureteral Stent Right;  Recorded: 10/14/2009;  Comments: stone    LAPAROSCOPIC CHOLECYSTECTOMY N/A 7/1/2023    Procedure: CHOLECYSTECTOMY, LAPAROSCOPIC;  Surgeon: Jadiel Argueta MD;  Location: [facility] OR     No premature CAD, SCD,cardiomyopathy   Social History     Socioeconomic History    Marital status:      Spouse name: Not on file    Number of children: Not on file    Years of education: Not on file    Highest education level: Not on file   Occupational History    Not on file   Tobacco Use    Smoking status: Never    Smokeless tobacco: Never   Vaping Use    Vaping status: Never Used   Substance and Sexual Activity    Alcohol use: No    Drug  use: No    Sexual activity: Not on file   Other Topics Concern    Parent/sibling w/ CABG, MI or angioplasty before 65F 55M? Not Asked   Social History Narrative    Lives with his wife, Valerie.  Volunteers at [employer] [facility] [employer].  Retired  of [employer].  One son, Jaison.      Social Determinants of Health     Financial Resource Strain: Low Risk  (10/6/2023)    Financial Resource Strain     Within the past 12 months, have you or your family members you live with been unable to get utilities (heat, electricity) when it was really needed?: No   Food Insecurity: Low Risk  (10/6/2023)    Food Insecurity     Within the past 12 months, did you worry that your food would run out before you got money to buy more?: No     Within the past 12 months, did the food you bought just not last and you didn t have money to get more?: No   Transportation Needs: Low Risk  (10/6/2023)    Transportation Needs     Within the past 12 months, has lack of transportation kept you from medical appointments, getting your medicines, non-medical meetings or appointments, work, or from getting things that you need?: No   Physical Activity: Not on file   Stress: Not on file   Social Connections: Not on file   Interpersonal Safety: Low Risk  (10/6/2023)    Interpersonal Safety     Do you feel physically and emotionally safe where you currently live?: Yes     Within the past 12 months, have you been hit, slapped, kicked or otherwise physically hurt by someone?: No     Within the past 12 months, have you been humiliated or emotionally abused in other ways by your partner or ex-partner?: No   Housing Stability: Low Risk  (10/6/2023)    Housing Stability     Do you have housing? : Yes     Are you worried about losing your housing?: No         Medications  Allergies   Current Outpatient Medications Ordered in Epic   Medication Sig Dispense Refill    acetaminophen (TYLENOL) 500 MG tablet Take 1 tablet (500 mg) by mouth every 6 hours as needed  for mild pain      atorvastatin (LIPITOR) 40 MG tablet TAKE 1 TABLET BY MOUTH DAILY 90 tablet 2    blood glucose (ACCU-CHEK JACOB PLUS) test strip 1 strip by In Vitro route 2 times daily 200 strip 1    blood glucose meter (GLUCOMETER) [BLOOD GLUCOSE METER (GLUCOMETER)] Use 1 each As Directed 2 (two) times a day. Dispense glucometer brand per patient's insurance at pharmacy discretion. 1 each 0    blood glucose monitoring (SOFTCLIX) lancets Use 1 each As Directed 2 (two) times a day. Dispense brand per patient's insurance at pharmacy discretion. - Does not apply 100 each 3    cyanocobalamin, vitamin B-12, 2,500 mcg Tab [CYANOCOBALAMIN, VITAMIN B-12, 2,500 MCG TAB] Take 2,500 mcg by mouth daily.      ferrous sulfate (FEROSUL) 325 (65 Fe) MG tablet Take 1 tablet (325 mg) by mouth 3 times daily (with meals) 90 tablet 3    generic lancets (ACCU-CHEK SOFTCLIX LANCETS) [GENERIC LANCETS (ACCU-CHEK SOFTCLIX LANCETS)] Use 1 each As Directed 2 (two) times a day. Dispense brand per patient's insurance at pharmacy discretion. 100 each 3    inFLIXimab (REMICADE IV) Every 6weeks      lisinopril (ZESTRIL) 10 MG tablet Take 1 tablet (10 mg) by mouth daily 90 tablet 3    magnesium oxide 250 mg Tab [MAGNESIUM OXIDE 250 MG TAB] Take 250 mg by mouth 2 (two) times a day.      metFORMIN (GLUCOPHAGE) 500 MG tablet TAKE 2 TABLETS BY MOUTH TWICE DAILY AT 6 AM AND AT 4  tablet 3    omeprazole (PRILOSEC) 40 MG DR capsule Take 1 capsule (40 mg) by mouth daily 90 capsule 4    pioglitazone (ACTOS) 30 MG tablet TAKE 1 TABLET BY MOUTH EVERY DAY 90 tablet 3    propranolol (INDERAL) 20 MG tablet TAKE 1 TABLET(20 MG) BY MOUTH TWICE DAILY 180 tablet 3    psyllium (METAMUCIL/KONSYL) capsule Take 3 capsules by mouth 2 times daily      tamsulosin (FLOMAX) 0.4 MG capsule TAKE 1 CAPSULE BY MOUTH EVERY DAY AFTER SUPPER 90 capsule 2    lactobacillus rhamnosus, GG, (CULTURELL) capsule Take 1 capsule by mouth 3 times daily (before meals) (Patient not  taking: Reported on 4/10/2024)      sucralfate (CARAFATE) 1 GM/10ML suspension Take 10 mLs (1 g) by mouth 4 times daily (Patient not taking: Reported on 4/10/2024) 414 mL 0     No current Epic-ordered facility-administered medications on file.       Allergies   Allergen Reactions    Morphine Nausea and Vomiting    Scopolamine Hbr [Scopolamine] Unknown

## 2024-04-10 NOTE — H&P (VIEW-ONLY)
Thank you, Dr. Lopez, for asking the [facility] Care team to see Frank Obando to evaluate dyspnea.      Assessment/Recommendations   Assessment:    Exertional dyspnea, potentially anginal equivalent-no fluid overload or obvious pulmonary pathology  Mildly abnormal stress test with anterior ischemia  Diabetes mellitus  Rheumatoid arthritis  Chronic kidney disease    Plan:  We discussed results of the stress test.  I would favor invasive cardiac evaluation and possible PCI.  I explained to him risks and benefits.  He agrees to proceed.  Further recommendation including medication adjustment based on the results of the angiogram.       History of Present Illness/Subjective    Mr. Frank Obando is a 78 year old male who comes in for cardiac evaluation.  He reports that for about a year or so he has been experiencing pronounced exertional dyspnea.  Even the short distance will make him feel very breathless.  He denies any chest pains.  He has not had weight gain, PND, orthopnea.  He denies heart palpitations syncope.  He has no history of known coronary artery disease, valvular heart disease or cardiomyopathy.  He underwent outpatient nuclear stress test.  Results are abnormal  He has no history of DVT or PE.  He denies leg swelling or pain.    ECG: Personally reviewed.  Normal sinus rhythm right bundle branch block.  Stress test: March 2024    The nuclear stress test is abnormal.    There is a small area of mild ischemia in the distal anterior segment(s) of the left ventricle.    The left ventricular ejection fraction at stress is 66%.       Physical Examination Review of Systems   /60 (BP Location: Right arm, Patient Position: Sitting, Cuff Size: Adult Regular)   Pulse 72   Resp 16   Wt 91.2 kg (201 lb)   SpO2 96%   BMI 28.43 kg/m    Body mass index is 28.43 kg/m .  Wt Readings from Last 3 Encounters:   04/10/24 91.2 kg (201 lb)   03/27/24 88.5 kg (195 lb)   03/08/24 90.3 kg (199 lb)      [unfilled]  General Appearance:   Alert, cooperative, no distress, appears stated age   Head/ENT: Normocephalic, without obvious abnormality. Membranes moist.      EYES:  No scleral icterus   Neck: Supple, symmetrical, trachea midline, no adenopathy, thyroid: not enlarged, symmetric, no carotid bruit or JVD   Chest/Lungs:   lungs are clear to auscultation, respirations unlabored. No tenderness or deformity   Cardiovascular:   Regular rhythm, S1, S2 normal, no murmur, rub or gallop.   Abdomen:  Soft, non-tender, bowel sounds active all four quadrants,  no masses, no organomegaly   Extremities: no cyanosis or clubbing. No edema   Skin: Skin color, texture, turgor normal, no rashes or lesions.    Neurologic: Alert and oriented x 3, moving all four extremities.    Psychiatric: Normal affect.      10 system review of systems completed see history of present illness and inpatient H&P (reviewed) for further details.          Lab Results    Chemistry/lipid CBC Cardiac Enzymes/BNP/TSH/INR   Recent Labs   Lab Test 04/04/23  1001   CHOL 116   HDL 46   LDL 46   TRIG 122     Recent Labs   Lab Test 04/04/23  1001 04/29/22  1240 09/20/21  0928   LDL 46 65 54     Recent Labs   Lab Test 03/27/24  1058 02/16/24  1210   NA  --  138   POTASSIUM  --  4.4   CHLORIDE  --  100   CO2  --  25   GLC  --  135*   BUN  --  21.6   CR 1.33* 1.30*   GFRESTIMATED 55* 57*   CINDY  --  9.3     Recent Labs   Lab Test 03/27/24  1058 02/16/24  1210 12/28/23  0905   CR 1.33* 1.30* 1.30*     Recent Labs   Lab Test 02/16/24  1210 10/06/23  0942 07/06/23  0957   A1C 7.2* 6.7* 6.7*          Recent Labs   Lab Test 03/27/24  1058   WBC 4.6   HGB 10.3*   HCT 30.3*   MCV 91   *     Recent Labs   Lab Test 03/27/24  1058 12/28/23  0905 10/31/23  0909   HGB 10.3* 11.5* 11.3*    Recent Labs   Lab Test 06/29/23  0316 06/28/23  2304   TROPONINI <0.01 <0.01     Recent Labs   Lab Test 02/16/24  1210 12/21/18  0106 08/06/18  0830   BNP  --  35 25   NTBNP 164  --    --      Recent Labs   Lab Test 11/18/22  0845   TSH 2.37     Recent Labs   Lab Test 10/18/23  0942 07/01/23  1215 03/26/23  1007   INR 1.0 1.34* 1.07        Medical History  Surgical History Family History Social History   Past Medical History:   Diagnosis Date    Anemia     Arthritis     osteoarthritis    Calculus of kidney     Diabetes mellitus (H)     Episcleritis of left eye     Hyperlipidemia     Hypertension     Iron deficiency anemia due to chronic blood loss 11/2/2021    Peripheral neuropathy     Stroke (H)      Past Surgical History:   Procedure Laterality Date    COLONOSCOPY N/A 11/16/2021    Procedure: COLONOSCOPY with polypectomy;  Surgeon: Dex Finch MD;  Location: [facility]    CYSTOSCOPY TUMOR / CONDYLOMATA W/ LASER Left 7/18/2014    ESOPHAGOSCOPY, GASTROSCOPY, DUODENOSCOPY (EGD), COMBINED N/A 11/16/2021    Procedure: ESOPHAGOGASTRODUODENOSCOPY (EGD) with biopsies and argon plasma coagulation;  Surgeon: Dex Finch MD;  Location: [facility]    ESOPHAGOSCOPY, GASTROSCOPY, DUODENOSCOPY (EGD), COMBINED N/A 9/29/2023    Procedure: ESOPHAGOGASTRODUODENOSCOPY with BIOPSY;  Surgeon: Alli Lozada MD;  Location: [facility] Main OR    HC CYSTOSCOPY,INSERT URETERAL STENT      Description: Cystoscopy With Insertion Of Ureteral Stent Right;  Recorded: 10/14/2009;  Comments: stone    LAPAROSCOPIC CHOLECYSTECTOMY N/A 7/1/2023    Procedure: CHOLECYSTECTOMY, LAPAROSCOPIC;  Surgeon: Jadiel Argueta MD;  Location: [facility] Main OR     No premature CAD, SCD,cardiomyopathy   Social History     Socioeconomic History    Marital status:      Spouse name: Not on file    Number of children: Not on file    Years of education: Not on file    Highest education level: Not on file   Occupational History    Not on file   Tobacco Use    Smoking status: Never    Smokeless tobacco: Never   Vaping Use    Vaping status: Never Used   Substance and Sexual Activity    Alcohol use: No    Drug  use: No    Sexual activity: Not on file   Other Topics Concern    Parent/sibling w/ CABG, MI or angioplasty before 65F 55M? Not Asked   Social History Narrative    Lives with his wife, Valerie.  Volunteers at [employer] [facility] [employer].  Retired  of [employer].  One son, Jaison.      Social Determinants of Health     Financial Resource Strain: Low Risk  (10/6/2023)    Financial Resource Strain     Within the past 12 months, have you or your family members you live with been unable to get utilities (heat, electricity) when it was really needed?: No   Food Insecurity: Low Risk  (10/6/2023)    Food Insecurity     Within the past 12 months, did you worry that your food would run out before you got money to buy more?: No     Within the past 12 months, did the food you bought just not last and you didn t have money to get more?: No   Transportation Needs: Low Risk  (10/6/2023)    Transportation Needs     Within the past 12 months, has lack of transportation kept you from medical appointments, getting your medicines, non-medical meetings or appointments, work, or from getting things that you need?: No   Physical Activity: Not on file   Stress: Not on file   Social Connections: Not on file   Interpersonal Safety: Low Risk  (10/6/2023)    Interpersonal Safety     Do you feel physically and emotionally safe where you currently live?: Yes     Within the past 12 months, have you been hit, slapped, kicked or otherwise physically hurt by someone?: No     Within the past 12 months, have you been humiliated or emotionally abused in other ways by your partner or ex-partner?: No   Housing Stability: Low Risk  (10/6/2023)    Housing Stability     Do you have housing? : Yes     Are you worried about losing your housing?: No         Medications  Allergies   Current Outpatient Medications Ordered in Epic   Medication Sig Dispense Refill    acetaminophen (TYLENOL) 500 MG tablet Take 1 tablet (500 mg) by mouth every 6 hours as needed  for mild pain      atorvastatin (LIPITOR) 40 MG tablet TAKE 1 TABLET BY MOUTH DAILY 90 tablet 2    blood glucose (ACCU-CHEK JACOB PLUS) test strip 1 strip by In Vitro route 2 times daily 200 strip 1    blood glucose meter (GLUCOMETER) [BLOOD GLUCOSE METER (GLUCOMETER)] Use 1 each As Directed 2 (two) times a day. Dispense glucometer brand per patient's insurance at pharmacy discretion. 1 each 0    blood glucose monitoring (SOFTCLIX) lancets Use 1 each As Directed 2 (two) times a day. Dispense brand per patient's insurance at pharmacy discretion. - Does not apply 100 each 3    cyanocobalamin, vitamin B-12, 2,500 mcg Tab [CYANOCOBALAMIN, VITAMIN B-12, 2,500 MCG TAB] Take 2,500 mcg by mouth daily.      ferrous sulfate (FEROSUL) 325 (65 Fe) MG tablet Take 1 tablet (325 mg) by mouth 3 times daily (with meals) 90 tablet 3    generic lancets (ACCU-CHEK SOFTCLIX LANCETS) [GENERIC LANCETS (ACCU-CHEK SOFTCLIX LANCETS)] Use 1 each As Directed 2 (two) times a day. Dispense brand per patient's insurance at pharmacy discretion. 100 each 3    inFLIXimab (REMICADE IV) Every 6weeks      lisinopril (ZESTRIL) 10 MG tablet Take 1 tablet (10 mg) by mouth daily 90 tablet 3    magnesium oxide 250 mg Tab [MAGNESIUM OXIDE 250 MG TAB] Take 250 mg by mouth 2 (two) times a day.      metFORMIN (GLUCOPHAGE) 500 MG tablet TAKE 2 TABLETS BY MOUTH TWICE DAILY AT 6 AM AND AT 4  tablet 3    omeprazole (PRILOSEC) 40 MG DR capsule Take 1 capsule (40 mg) by mouth daily 90 capsule 4    pioglitazone (ACTOS) 30 MG tablet TAKE 1 TABLET BY MOUTH EVERY DAY 90 tablet 3    propranolol (INDERAL) 20 MG tablet TAKE 1 TABLET(20 MG) BY MOUTH TWICE DAILY 180 tablet 3    psyllium (METAMUCIL/KONSYL) capsule Take 3 capsules by mouth 2 times daily      tamsulosin (FLOMAX) 0.4 MG capsule TAKE 1 CAPSULE BY MOUTH EVERY DAY AFTER SUPPER 90 capsule 2    lactobacillus rhamnosus, GG, (CULTURELL) capsule Take 1 capsule by mouth 3 times daily (before meals) (Patient not  taking: Reported on 4/10/2024)      sucralfate (CARAFATE) 1 GM/10ML suspension Take 10 mLs (1 g) by mouth 4 times daily (Patient not taking: Reported on 4/10/2024) 414 mL 0     No current Epic-ordered facility-administered medications on file.       Allergies   Allergen Reactions    Morphine Nausea and Vomiting    Scopolamine Hbr [Scopolamine] Unknown

## 2024-04-10 NOTE — PATIENT INSTRUCTIONS
Frank Obando,    It was a pleasure to see you today at the [facility].     My recommendations after this visit include:    Coronary angiogram and possible coronary intervention    MECHELLE Oliveros MD, FACC, DES

## 2024-04-10 NOTE — TELEPHONE ENCOUNTER
Frank Obando  [address] [address]  [phone] (home)     PCP:  Flaco Lopez  H&P completed by:  4/10  Admit date 4/23 Arrival time:  0630  Anticoagulation:  NA  Previous PCI: No  Bypass Grafts: No  Renal Issues: No  Diabetic?: Yes  Device?: No  Type:  na   Ambulation status: ambulatory    Reason for Visit:  Patient seen for pre-procedure education in preparation for: Coronary Angiogram with Possible PCI    Procedure Prep:  EKG results obtained, dated: To be completed on day of cath lab procedure  Hemogram results obtained: To be completed on day of cath lab procedure  Basic Metabolic Panel results obtained: To be completed on day of cath lab procedure  Pertinent cardiac test results: in epic       Patient Education    Your arrival time is 0630.  [place] [address] - Main Entrance of the Hospital  Please plan on being at the hospital all day.  At any time, emergencies and/or urgent cases may come up which could delay the start of your procedure.    COVID Testing Instructions  *Mandatory COVID Testing: ALL Patients will need to complete a COVID test no sooner than 4 days prior to their procedure (regardless of vaccination status).    To schedule COVID testing Please call [phone]  If you want to complete this at an outside facility please call them to find out if they will have the results within the appropriate time frame and their fax number.  You will need to provide us with that information so we can send the order.  The facility completing the test will need to fax the results to [phone]  If you are running into and issues please call us.     Pre-procedure instructions  Take your temperature in the morning prior to coming in.  If your temperature is 100 F please call [facility] [phone] and notify them.  If you do not have access to a thermometer at home, please come in for testing.  If you are  running a temperature your procedure may be rescheduled.  Patient instructed to not Eat or Drink after midnight.  Patient instructed to shower the evening before or the morning of the procedure.  Patient instructed to arrange for transportation home following procedure from a responsible family member or friend. No driving for at least 24 hours.  Patient instructed to have a responsible adult with them for 24 hours post-procedure.  Post-procedure follow up process.  Conscious sedation discussed.      Pre-procedure medication instructions.  Continue medications as scheduled, with a small amount of water on the day of the procedure unless indicated. (NO Diabetic Medications or Blood Thinners)  Pt instructed not to consume Alcohol, Tobacco, Caffeine, or Carbonated beverages 12 hours prior to procedure.  Patient instructed to take 324 mg of Aspirin the night before and morning of procedure: Yes  Other medication: instructed to only take aspirin, lisinopril, omeprazole and propranolol  a.m. of the procedure.              Diabetic Medication Instructions  DO NOT take any oral diabetic medication, short-acting diabetes medications/insulin, humalog or regular insulin the morning of your test  Take   dose of long-acting insulin (Lantus, Levemir) the day of your test  Hold Oral Diabetic on the day of the procedure and for 48 hours after IV contrast given  Remember to  bring your glucometer and insulin with you to take after your test if needed                  Patient states understanding of procedure and agrees to proceed.    *PATIENTS RECORDS AVAILABLE IN [facility] UNLESS OTHERWISE INDICATED*      Patient Active Problem List   Diagnosis    Diabetic polyneuropathy associated with type 2 diabetes mellitus (H)    H/o CVA ~2013    Essential hypertension, benign    Type 2 diabetes mellitus with complication, without long-term current use of insulin (H)    Right bundle branch block    Antineutrophil cytoplasmic antibody (ANCA)  positive    High risk medication use    Dyslipidemia    Thrombocytopenia (H24)    Cirrhosis of liver with ascites, unspecified hepatic cirrhosis type (H)    Panuveitis of left eye, Dr. Abdi / Castillo    Iron deficiency anemia due to chronic blood loss    Angiodysplasia of stomach    History of colonic polyps    Secondary esophageal varices without bleeding (H)    Stage 3b chronic kidney disease (H)    Rheumatoid arthritis involving multiple sites with positive rheumatoid factor (H)    Personal history of immunosuppressive therapy       Current Outpatient Medications   Medication Sig Dispense Refill    acetaminophen (TYLENOL) 500 MG tablet Take 1 tablet (500 mg) by mouth every 6 hours as needed for mild pain      atorvastatin (LIPITOR) 40 MG tablet TAKE 1 TABLET BY MOUTH DAILY 90 tablet 2    blood glucose (ACCU-CHEK JACOB PLUS) test strip 1 strip by In Vitro route 2 times daily 200 strip 1    blood glucose meter (GLUCOMETER) [BLOOD GLUCOSE METER (GLUCOMETER)] Use 1 each As Directed 2 (two) times a day. Dispense glucometer brand per patient's insurance at pharmacy discretion. 1 each 0    blood glucose monitoring (SOFTCLIX) lancets Use 1 each As Directed 2 (two) times a day. Dispense brand per patient's insurance at pharmacy discretion. - Does not apply 100 each 3    cyanocobalamin, vitamin B-12, 2,500 mcg Tab [CYANOCOBALAMIN, VITAMIN B-12, 2,500 MCG TAB] Take 2,500 mcg by mouth daily.      ferrous sulfate (FEROSUL) 325 (65 Fe) MG tablet Take 1 tablet (325 mg) by mouth 3 times daily (with meals) 90 tablet 3    generic lancets (ACCU-CHEK SOFTCLIX LANCETS) [GENERIC LANCETS (ACCU-CHEK SOFTCLIX LANCETS)] Use 1 each As Directed 2 (two) times a day. Dispense brand per patient's insurance at pharmacy discretion. 100 each 3    inFLIXimab (REMICADE IV) Every 6weeks      lactobacillus rhamnosus, GG, (CULTURELL) capsule Take 1 capsule by mouth 3 times daily (before meals) (Patient not taking: Reported on 4/10/2024)       lisinopril (ZESTRIL) 10 MG tablet Take 1 tablet (10 mg) by mouth daily 90 tablet 3    magnesium oxide 250 mg Tab [MAGNESIUM OXIDE 250 MG TAB] Take 250 mg by mouth 2 (two) times a day.      metFORMIN (GLUCOPHAGE) 500 MG tablet TAKE 2 TABLETS BY MOUTH TWICE DAILY AT 6 AM AND AT 4  tablet 3    omeprazole (PRILOSEC) 40 MG DR capsule Take 1 capsule (40 mg) by mouth daily 90 capsule 4    pioglitazone (ACTOS) 30 MG tablet TAKE 1 TABLET BY MOUTH EVERY DAY 90 tablet 3    propranolol (INDERAL) 20 MG tablet TAKE 1 TABLET(20 MG) BY MOUTH TWICE DAILY 180 tablet 3    psyllium (METAMUCIL/KONSYL) capsule Take 3 capsules by mouth 2 times daily      sucralfate (CARAFATE) 1 GM/10ML suspension Take 10 mLs (1 g) by mouth 4 times daily (Patient not taking: Reported on 4/10/2024) 414 mL 0    tamsulosin (FLOMAX) 0.4 MG capsule TAKE 1 CAPSULE BY MOUTH EVERY DAY AFTER SUPPER 90 capsule 2       Allergies   Allergen Reactions    Morphine Nausea and Vomiting    Scopolamine Hbr [Scopolamine] Unknown       Kaycee Curtis RN on 4/10/2024 at 11:20 AM      Pt's wife will be his .

## 2024-04-23 ENCOUNTER — HOSPITAL ENCOUNTER (OUTPATIENT)
Facility: HOSPITAL | Age: 79
Discharge: HOME | End: 2024-04-23
Attending: INTERNAL MEDICINE | Admitting: INTERNAL MEDICINE
Payer: MEDICARE

## 2024-04-23 VITALS
HEIGHT: 70 IN | DIASTOLIC BLOOD PRESSURE: 69 MMHG | SYSTOLIC BLOOD PRESSURE: 118 MMHG | BODY MASS INDEX: 28.63 KG/M2 | RESPIRATION RATE: 16 BRPM | HEART RATE: 57 BPM | OXYGEN SATURATION: 96 % | TEMPERATURE: 98 F | WEIGHT: 200 LBS

## 2024-04-23 DIAGNOSIS — R94.39 ABNORMAL STRESS TEST: ICD-10-CM

## 2024-04-23 DIAGNOSIS — I25.119 CORONARY ARTERY DISEASE INVOLVING NATIVE CORONARY ARTERY OF NATIVE HEART WITH ANGINA PECTORIS (H): Primary | ICD-10-CM

## 2024-04-23 DIAGNOSIS — R06.09 EXERTIONAL DYSPNEA: Primary | ICD-10-CM

## 2024-04-23 DIAGNOSIS — R06.09 EXERTIONAL DYSPNEA: ICD-10-CM

## 2024-04-23 PROBLEM — Z98.890 STATUS POST CORONARY ANGIOGRAM: Status: ACTIVE | Noted: 2024-04-23

## 2024-04-23 LAB
ABO/RH(D): NORMAL
ANION GAP SERPL CALCULATED.3IONS-SCNC: 13 MMOL/L (ref 7–15)
ANTIBODY SCREEN: NEGATIVE
ATRIAL RATE - MUSE: 57 BPM
BUN SERPL-MCNC: 22.3 MG/DL (ref 8–23)
CALCIUM SERPL-MCNC: 9.2 MG/DL (ref 8.8–10.2)
CHLORIDE SERPL-SCNC: 102 MMOL/L (ref 98–107)
CHOLEST SERPL-MCNC: 126 MG/DL
CREAT SERPL-MCNC: 1.33 MG/DL (ref 0.67–1.17)
DEPRECATED HCO3 PLAS-SCNC: 24 MMOL/L (ref 22–29)
DIASTOLIC BLOOD PRESSURE - MUSE: NORMAL MMHG
EGFRCR SERPLBLD CKD-EPI 2021: 55 ML/MIN/1.73M2
ERYTHROCYTE [DISTWIDTH] IN BLOOD BY AUTOMATED COUNT: 13.1 % (ref 10–15)
FASTING STATUS PATIENT QL REPORTED: YES
GLUCOSE SERPL-MCNC: 162 MG/DL (ref 70–99)
HCT VFR BLD AUTO: 31.6 % (ref 40–53)
HDLC SERPL-MCNC: 60 MG/DL
HGB BLD-MCNC: 10.6 G/DL (ref 13.3–17.7)
INTERPRETATION ECG - MUSE: NORMAL
LDLC SERPL CALC-MCNC: 47 MG/DL
MCH RBC QN AUTO: 30.5 PG (ref 26.5–33)
MCHC RBC AUTO-ENTMCNC: 33.5 G/DL (ref 31.5–36.5)
MCV RBC AUTO: 91 FL (ref 78–100)
NONHDLC SERPL-MCNC: 66 MG/DL
P AXIS - MUSE: -3 DEGREES
PLATELET # BLD AUTO: 119 10E3/UL (ref 150–450)
POTASSIUM SERPL-SCNC: 4.2 MMOL/L (ref 3.4–5.3)
PR INTERVAL - MUSE: 172 MS
QRS DURATION - MUSE: 146 MS
QT - MUSE: 490 MS
QTC - MUSE: 476 MS
R AXIS - MUSE: 41 DEGREES
RBC # BLD AUTO: 3.47 10E6/UL (ref 4.4–5.9)
SODIUM SERPL-SCNC: 139 MMOL/L (ref 135–145)
SPECIMEN EXPIRATION DATE: NORMAL
SYSTOLIC BLOOD PRESSURE - MUSE: NORMAL MMHG
T AXIS - MUSE: 3 DEGREES
TRIGL SERPL-MCNC: 93 MG/DL
VENTRICULAR RATE- MUSE: 57 BPM
WBC # BLD AUTO: 4.6 10E3/UL (ref 4–11)

## 2024-04-23 PROCEDURE — 999N000054 HC STATISTIC EKG NON-CHARGEABLE

## 2024-04-23 PROCEDURE — C1894 INTRO/SHEATH, NON-LASER: HCPCS | Performed by: INTERNAL MEDICINE

## 2024-04-23 PROCEDURE — C1769 GUIDE WIRE: HCPCS | Performed by: INTERNAL MEDICINE

## 2024-04-23 PROCEDURE — 85027 COMPLETE CBC AUTOMATED: CPT | Performed by: INTERNAL MEDICINE

## 2024-04-23 PROCEDURE — 250N000011 HC RX IP 250 OP 636: Performed by: INTERNAL MEDICINE

## 2024-04-23 PROCEDURE — 250N000013 HC RX MED GY IP 250 OP 250 PS 637: Performed by: NURSE PRACTITIONER

## 2024-04-23 PROCEDURE — 93458 L HRT ARTERY/VENTRICLE ANGIO: CPT | Mod: 26 | Performed by: INTERNAL MEDICINE

## 2024-04-23 PROCEDURE — 93458 L HRT ARTERY/VENTRICLE ANGIO: CPT | Performed by: INTERNAL MEDICINE

## 2024-04-23 PROCEDURE — 250N000009 HC RX 250: Performed by: INTERNAL MEDICINE

## 2024-04-23 PROCEDURE — 93799 UNLISTED CV SVC/PROCEDURE: CPT | Performed by: INTERNAL MEDICINE

## 2024-04-23 PROCEDURE — 86900 BLOOD TYPING SEROLOGIC ABO: CPT | Performed by: INTERNAL MEDICINE

## 2024-04-23 PROCEDURE — 93005 ELECTROCARDIOGRAM TRACING: CPT

## 2024-04-23 PROCEDURE — 272N000001 HC OR GENERAL SUPPLY STERILE: Performed by: INTERNAL MEDICINE

## 2024-04-23 PROCEDURE — 258N000003 HC RX IP 258 OP 636: Performed by: INTERNAL MEDICINE

## 2024-04-23 PROCEDURE — 93571 IV DOP VEL&/PRESS C FLO 1ST: CPT | Mod: 26 | Performed by: INTERNAL MEDICINE

## 2024-04-23 PROCEDURE — 80048 BASIC METABOLIC PNL TOTAL CA: CPT | Performed by: INTERNAL MEDICINE

## 2024-04-23 PROCEDURE — 255N000002 HC RX 255 OP 636: Performed by: INTERNAL MEDICINE

## 2024-04-23 PROCEDURE — 36415 COLL VENOUS BLD VENIPUNCTURE: CPT | Performed by: INTERNAL MEDICINE

## 2024-04-23 PROCEDURE — 93010 ELECTROCARDIOGRAM REPORT: CPT | Mod: HOP | Performed by: INTERNAL MEDICINE

## 2024-04-23 PROCEDURE — 80061 LIPID PANEL: CPT | Performed by: INTERNAL MEDICINE

## 2024-04-23 RX ORDER — ASPIRIN 81 MG/1
81 TABLET ORAL DAILY
Qty: 90 TABLET | Refills: 3 | Status: SHIPPED | OUTPATIENT
Start: 2024-04-24

## 2024-04-23 RX ORDER — SODIUM CHLORIDE 9 MG/ML
INJECTION, SOLUTION INTRAVENOUS CONTINUOUS
Status: DISCONTINUED | OUTPATIENT
Start: 2024-04-23 | End: 2024-04-23 | Stop reason: HOSPADM

## 2024-04-23 RX ORDER — FENTANYL CITRATE 50 UG/ML
25 INJECTION, SOLUTION INTRAMUSCULAR; INTRAVENOUS
Status: DISCONTINUED | OUTPATIENT
Start: 2024-04-23 | End: 2024-04-23 | Stop reason: HOSPADM

## 2024-04-23 RX ORDER — DIAZEPAM 5 MG
5 TABLET ORAL ONCE
Status: COMPLETED | OUTPATIENT
Start: 2024-04-23 | End: 2024-04-23

## 2024-04-23 RX ORDER — NALOXONE HYDROCHLORIDE 0.4 MG/ML
0.2 INJECTION, SOLUTION INTRAMUSCULAR; INTRAVENOUS; SUBCUTANEOUS
Status: DISCONTINUED | OUTPATIENT
Start: 2024-04-23 | End: 2024-04-23 | Stop reason: HOSPADM

## 2024-04-23 RX ORDER — ATROPINE SULFATE 0.1 MG/ML
0.5 INJECTION INTRAVENOUS
Status: DISCONTINUED | OUTPATIENT
Start: 2024-04-23 | End: 2024-04-23 | Stop reason: HOSPADM

## 2024-04-23 RX ORDER — NITROGLYCERIN 5 MG/ML
VIAL (ML) INTRAVENOUS
Status: DISCONTINUED | OUTPATIENT
Start: 2024-04-23 | End: 2024-04-23 | Stop reason: HOSPADM

## 2024-04-23 RX ORDER — IODIXANOL 320 MG/ML
INJECTION, SOLUTION INTRAVASCULAR
Status: DISCONTINUED | OUTPATIENT
Start: 2024-04-23 | End: 2024-04-23 | Stop reason: HOSPADM

## 2024-04-23 RX ORDER — FLUMAZENIL 0.1 MG/ML
0.2 INJECTION, SOLUTION INTRAVENOUS
Status: DISCONTINUED | OUTPATIENT
Start: 2024-04-23 | End: 2024-04-23 | Stop reason: HOSPADM

## 2024-04-23 RX ORDER — NALOXONE HYDROCHLORIDE 0.4 MG/ML
0.4 INJECTION, SOLUTION INTRAMUSCULAR; INTRAVENOUS; SUBCUTANEOUS
Status: DISCONTINUED | OUTPATIENT
Start: 2024-04-23 | End: 2024-04-23 | Stop reason: HOSPADM

## 2024-04-23 RX ORDER — ACETAMINOPHEN 325 MG/1
650 TABLET ORAL EVERY 4 HOURS PRN
Status: DISCONTINUED | OUTPATIENT
Start: 2024-04-23 | End: 2024-04-23 | Stop reason: HOSPADM

## 2024-04-23 RX ORDER — OXYCODONE HYDROCHLORIDE 5 MG/1
10 TABLET ORAL EVERY 4 HOURS PRN
Status: DISCONTINUED | OUTPATIENT
Start: 2024-04-23 | End: 2024-04-23 | Stop reason: HOSPADM

## 2024-04-23 RX ORDER — ASPIRIN 81 MG/1
81 TABLET ORAL DAILY
Status: DISCONTINUED | OUTPATIENT
Start: 2024-04-24 | End: 2024-04-23 | Stop reason: HOSPADM

## 2024-04-23 RX ORDER — OXYCODONE HYDROCHLORIDE 5 MG/1
5 TABLET ORAL EVERY 4 HOURS PRN
Status: DISCONTINUED | OUTPATIENT
Start: 2024-04-23 | End: 2024-04-23 | Stop reason: HOSPADM

## 2024-04-23 RX ORDER — LIDOCAINE 40 MG/G
CREAM TOPICAL
Status: DISCONTINUED | OUTPATIENT
Start: 2024-04-23 | End: 2024-04-23 | Stop reason: HOSPADM

## 2024-04-23 RX ORDER — ASPIRIN 325 MG
325 TABLET ORAL ONCE
Status: COMPLETED | OUTPATIENT
Start: 2024-04-23 | End: 2024-04-23

## 2024-04-23 RX ORDER — HEPARIN SODIUM 1000 [USP'U]/ML
INJECTION, SOLUTION INTRAVENOUS; SUBCUTANEOUS
Status: DISCONTINUED | OUTPATIENT
Start: 2024-04-23 | End: 2024-04-23 | Stop reason: HOSPADM

## 2024-04-23 RX ORDER — NICOTINE POLACRILEX 4 MG
15-30 LOZENGE BUCCAL
Status: DISCONTINUED | OUTPATIENT
Start: 2024-04-23 | End: 2024-04-23 | Stop reason: HOSPADM

## 2024-04-23 RX ORDER — DEXTROSE MONOHYDRATE 25 G/50ML
25-50 INJECTION, SOLUTION INTRAVENOUS
Status: DISCONTINUED | OUTPATIENT
Start: 2024-04-23 | End: 2024-04-23 | Stop reason: HOSPADM

## 2024-04-23 RX ORDER — FENTANYL CITRATE 50 UG/ML
INJECTION, SOLUTION INTRAMUSCULAR; INTRAVENOUS
Status: DISCONTINUED | OUTPATIENT
Start: 2024-04-23 | End: 2024-04-23 | Stop reason: HOSPADM

## 2024-04-23 RX ORDER — HEPARIN SODIUM 200 [USP'U]/100ML
INJECTION, SOLUTION INTRAVENOUS
Status: DISCONTINUED | OUTPATIENT
Start: 2024-04-23 | End: 2024-04-23 | Stop reason: HOSPADM

## 2024-04-23 RX ORDER — ASPIRIN 81 MG/1
243 TABLET, CHEWABLE ORAL ONCE
Status: COMPLETED | OUTPATIENT
Start: 2024-04-23 | End: 2024-04-23

## 2024-04-23 RX ADMIN — DIAZEPAM 5 MG: 5 TABLET ORAL at 08:17

## 2024-04-23 RX ADMIN — SODIUM CHLORIDE: 9 INJECTION, SOLUTION INTRAVENOUS at 07:08

## 2024-04-23 ASSESSMENT — ACTIVITIES OF DAILY LIVING (ADL)
ADLS_ACUITY_SCORE: 38

## 2024-04-23 ASSESSMENT — EJECTION FRACTION: EF_VALUE: .25

## 2024-04-23 NOTE — PRE-PROCEDURE
GENERAL PRE-PROCEDURE:   Procedure:  Coronary angiogram with possible PCI  Date/Time:  4/23/2024 6:49 AM    Written consent obtained?: Yes    Risks and benefits: Risks, benefits and alternatives were discussed    Consent given by:  Patient  Patient states understanding of procedure being performed: Yes    Patient's understanding of procedure matches consent: Yes    Procedure consent matches procedure scheduled: Yes    Expected level of sedation:  Moderate  Appropriately NPO:  Yes  ASA Class:  3 (SCHNEIDER, abnormal stress test, HTN, HLD, DM Type II, CKD Stage IIIa, Fe+ deficiency anemia, hx of CVA, RA)  Mallampati  :  Grade 3- soft palate visible, posterior pharyngeal wall not visible  Lungs:  Lungs clear with good breath sounds bilaterally  Heart:  Normal heart sounds and rate  History & Physical reviewed:  History and physical reviewed and updates made (see comment)  H&P Comments:  Clinically Significant Risk Factors Present on Admission    Cardiovascular : SCHNEIDER, abnormal stress test, HTN, HLD, DM Type II    Fluid & Electrolyte Disorders : Not present on admission    Gastroenterology : Not present on admission    Hematology/Oncology : Fe+ deficiency anemia; stable    Nephrology : CKD Stage IIIa; stable, will minimize contrast    Neurology : hx of CVA    Pulmonology : Not present on admission    Systemic : Not present on admission  Statement of review:  I have reviewed the lab findings, diagnostic data, medications, and the plan for sedation

## 2024-04-23 NOTE — PROGRESS NOTES
Xiang Oliveros MD  4/23/2024 10:33 AM CDT Back to Top      We should get echo           Echo complete ordered. -alek

## 2024-04-23 NOTE — Clinical Note
Pre-calculated contrast dose 203 ml Quality 137: Melanoma: Continuity Of Care - Recall System: Patient information entered into a recall system that includes: target date for the next exam specified AND a process to follow up with patients regarding missed or unscheduled appointments Detail Level: Detailed Quality 110: Preventive Care And Screening: Influenza Immunization: Influenza Immunization not Administered because Patient Refused. Quality 224: Stage 0-Iic Melanoma: Overutilization Of Imaging Studies For Only Stage 0-Iic Melanoma: None of the following diagnostic imaging studies ordered: chest X-ray, CT, Ultrasound, MRI, PET, or nuclear medicine scans (ML) Quality 226: Preventive Care And Screening: Tobacco Use: Screening And Cessation Intervention: Patient screened for tobacco use and is an ex/non-smoker Quality 130: Documentation Of Current Medications In The Medical Record: Current Medications Documented Quality 131: Pain Assessment And Follow-Up: Pain assessment using a standardized tool is documented as negative, no follow-up plan required

## 2024-04-23 NOTE — PROGRESS NOTES
Brief CV progress note:    Frank Obando is a 78 y.o WM with past medical history of HTN, HLD, DM Type II, CKD Stage IIIa, Fe+ deficiency anemia, and prior CVA who underwent coronary angiography in evaluation of SCHNEIDER with mildly abnormal stress test results demonstrating anterior ischemia. Patient discussed with Dr. Jones. Coronary angiogram findings showed only moderate CAD which will be managed medically. He will continue on Statin and BB. Will add low dose ASA prior to discharge.

## 2024-04-23 NOTE — INTERVAL H&P NOTE
"I have reviewed the surgical (or preoperative) H&P that is linked to this encounter, and examined the patient. There are no significant changes    Clinical Conditions Present on Arrival:  Clinically Significant Risk Factors Present on Admission                 # Thrombocytopenia: Lowest platelets = 103 in last 30 days, will monitor for bleeding  # DMII: A1C = 7.2 % (Ref range: 0.0 - 5.6 %) within past 6 months  # Overweight: Estimated body mass index is 28.7 kg/m  as calculated from the following:    Height as of this encounter: 1.778 m (5' 10\").    Weight as of this encounter: 90.7 kg (200 lb).       "

## 2024-04-23 NOTE — DISCHARGE INSTRUCTIONS

## 2024-05-08 ENCOUNTER — INFUSION THERAPY VISIT (OUTPATIENT)
Dept: INFUSION THERAPY | Facility: HOSPITAL | Age: 79
End: 2024-05-08
Attending: INTERNAL MEDICINE
Payer: MEDICARE

## 2024-05-08 VITALS
OXYGEN SATURATION: 95 % | TEMPERATURE: 98.2 F | DIASTOLIC BLOOD PRESSURE: 63 MMHG | SYSTOLIC BLOOD PRESSURE: 111 MMHG | RESPIRATION RATE: 18 BRPM | HEART RATE: 69 BPM

## 2024-05-08 DIAGNOSIS — H44.112 PANUVEITIS OF LEFT EYE: Primary | ICD-10-CM

## 2024-05-08 PROCEDURE — 250N000013 HC RX MED GY IP 250 OP 250 PS 637: Performed by: INTERNAL MEDICINE

## 2024-05-08 PROCEDURE — 250N000011 HC RX IP 250 OP 636: Performed by: INTERNAL MEDICINE

## 2024-05-08 PROCEDURE — 96375 TX/PRO/DX INJ NEW DRUG ADDON: CPT

## 2024-05-08 PROCEDURE — 258N000003 HC RX IP 258 OP 636: Performed by: INTERNAL MEDICINE

## 2024-05-08 PROCEDURE — 96413 CHEMO IV INFUSION 1 HR: CPT

## 2024-05-08 RX ORDER — ACETAMINOPHEN 325 MG/1
650 TABLET ORAL ONCE
Status: COMPLETED | OUTPATIENT
Start: 2024-05-08 | End: 2024-05-08

## 2024-05-08 RX ORDER — DIPHENHYDRAMINE HCL 25 MG
25 CAPSULE ORAL ONCE
Status: COMPLETED | OUTPATIENT
Start: 2024-05-08 | End: 2024-05-08

## 2024-05-08 RX ORDER — ALBUTEROL SULFATE 0.83 MG/ML
2.5 SOLUTION RESPIRATORY (INHALATION)
Status: CANCELLED | OUTPATIENT
Start: 2024-05-09

## 2024-05-08 RX ORDER — HEPARIN SODIUM,PORCINE 10 UNIT/ML
5 VIAL (ML) INTRAVENOUS
Status: CANCELLED | OUTPATIENT
Start: 2024-05-09

## 2024-05-08 RX ORDER — MEPERIDINE HYDROCHLORIDE 50 MG/ML
25 INJECTION INTRAMUSCULAR; INTRAVENOUS; SUBCUTANEOUS EVERY 30 MIN PRN
Status: DISCONTINUED | OUTPATIENT
Start: 2024-05-08 | End: 2024-05-08 | Stop reason: HOSPADM

## 2024-05-08 RX ORDER — METHYLPREDNISOLONE SODIUM SUCCINATE 125 MG/2ML
125 INJECTION, POWDER, LYOPHILIZED, FOR SOLUTION INTRAMUSCULAR; INTRAVENOUS ONCE
Status: CANCELLED | OUTPATIENT
Start: 2024-05-09 | End: 2024-05-09

## 2024-05-08 RX ORDER — EPINEPHRINE 1 MG/ML
0.3 INJECTION, SOLUTION INTRAMUSCULAR; SUBCUTANEOUS EVERY 5 MIN PRN
Status: CANCELLED | OUTPATIENT
Start: 2024-05-09

## 2024-05-08 RX ORDER — ALBUTEROL SULFATE 90 UG/1
1-2 AEROSOL, METERED RESPIRATORY (INHALATION)
Status: CANCELLED
Start: 2024-05-09

## 2024-05-08 RX ORDER — MEPERIDINE HYDROCHLORIDE 50 MG/ML
25 INJECTION INTRAMUSCULAR; INTRAVENOUS; SUBCUTANEOUS EVERY 30 MIN PRN
Status: CANCELLED | OUTPATIENT
Start: 2024-05-09

## 2024-05-08 RX ORDER — ALBUTEROL SULFATE 0.83 MG/ML
2.5 SOLUTION RESPIRATORY (INHALATION)
Status: DISCONTINUED | OUTPATIENT
Start: 2024-05-08 | End: 2024-05-08 | Stop reason: HOSPADM

## 2024-05-08 RX ORDER — DIPHENHYDRAMINE HYDROCHLORIDE 50 MG/ML
50 INJECTION INTRAMUSCULAR; INTRAVENOUS
Status: CANCELLED
Start: 2024-05-09

## 2024-05-08 RX ORDER — METHYLPREDNISOLONE SODIUM SUCCINATE 125 MG/2ML
125 INJECTION, POWDER, LYOPHILIZED, FOR SOLUTION INTRAMUSCULAR; INTRAVENOUS
Status: CANCELLED
Start: 2024-05-09

## 2024-05-08 RX ORDER — HEPARIN SODIUM (PORCINE) LOCK FLUSH IV SOLN 100 UNIT/ML 100 UNIT/ML
5 SOLUTION INTRAVENOUS
Status: CANCELLED | OUTPATIENT
Start: 2024-05-09

## 2024-05-08 RX ORDER — METHYLPREDNISOLONE SODIUM SUCCINATE 125 MG/2ML
125 INJECTION, POWDER, LYOPHILIZED, FOR SOLUTION INTRAMUSCULAR; INTRAVENOUS
Status: DISCONTINUED | OUTPATIENT
Start: 2024-05-08 | End: 2024-05-08 | Stop reason: HOSPADM

## 2024-05-08 RX ORDER — DIPHENHYDRAMINE HYDROCHLORIDE 50 MG/ML
50 INJECTION INTRAMUSCULAR; INTRAVENOUS
Status: DISCONTINUED | OUTPATIENT
Start: 2024-05-08 | End: 2024-05-08 | Stop reason: HOSPADM

## 2024-05-08 RX ORDER — ALBUTEROL SULFATE 90 UG/1
1-2 AEROSOL, METERED RESPIRATORY (INHALATION)
Status: DISCONTINUED | OUTPATIENT
Start: 2024-05-08 | End: 2024-05-08 | Stop reason: HOSPADM

## 2024-05-08 RX ORDER — ACETAMINOPHEN 325 MG/1
650 TABLET ORAL ONCE
Status: CANCELLED
Start: 2024-05-09 | End: 2024-05-09

## 2024-05-08 RX ORDER — EPINEPHRINE 1 MG/ML
0.3 INJECTION, SOLUTION INTRAMUSCULAR; SUBCUTANEOUS EVERY 5 MIN PRN
Status: DISCONTINUED | OUTPATIENT
Start: 2024-05-08 | End: 2024-05-08 | Stop reason: HOSPADM

## 2024-05-08 RX ORDER — DIPHENHYDRAMINE HCL 25 MG
25 CAPSULE ORAL ONCE
Status: CANCELLED
Start: 2024-05-09 | End: 2024-05-09

## 2024-05-08 RX ORDER — METHYLPREDNISOLONE SODIUM SUCCINATE 125 MG/2ML
125 INJECTION, POWDER, LYOPHILIZED, FOR SOLUTION INTRAMUSCULAR; INTRAVENOUS ONCE
Status: COMPLETED | OUTPATIENT
Start: 2024-05-08 | End: 2024-05-08

## 2024-05-08 RX ADMIN — SODIUM CHLORIDE 250 ML: 9 INJECTION, SOLUTION INTRAVENOUS at 11:47

## 2024-05-08 RX ADMIN — METHYLPREDNISOLONE SODIUM SUCCINATE 125 MG: 125 INJECTION, POWDER, FOR SOLUTION INTRAMUSCULAR; INTRAVENOUS at 11:48

## 2024-05-08 RX ADMIN — INFLIXIMAB 400 MG: 100 INJECTION, POWDER, LYOPHILIZED, FOR SOLUTION INTRAVENOUS at 12:18

## 2024-05-08 RX ADMIN — ACETAMINOPHEN 650 MG: 325 TABLET ORAL at 11:48

## 2024-05-08 RX ADMIN — DIPHENHYDRAMINE HYDROCHLORIDE 25 MG: 25 CAPSULE ORAL at 11:48

## 2024-05-08 NOTE — PROGRESS NOTES
Infusion Nursing Note:  Frank Obando presents today for Infliximab.    Patient seen by provider today: No   present during visit today: Not Applicable.    Note: Antonio arrived ambulatory by himself for Infliximab. Plan of care reviewed and he has no questions. Biological checklist completed. Ok to proceed with treatment.   Pre medications Tylenol, oral Benadryl and solumedrol administered 30 minutes prior to treatment as ordered.    Intravenous Access:  Peripheral IV placed.    Treatment Conditions:  Biological Infusion Checklist:  ~~~ NOTE: If the patient answers yes to any of the questions below, hold the infusion and contact ordering provider or on-call provider.    Have you recently had an elevated temperature, fever, chills, productive cough, coughing for 3 weeks or longer or hemoptysis,  abnormal vital signs, night sweats,  chest pain or have you noticed a decrease in your appetite, unexplained weight loss or fatigue? No  Do you have any open wounds or new incisions? No  Do you have any upcoming hospitalizations or surgeries? Does not include esophagogastroduodenoscopy, colonoscopy, endoscopic retrograde cholangiopancreatography (ERCP), endoscopic ultrasound (EUS), dental procedures or joint aspiration/steroid injections No  Do you currently have any signs of illness or infection or are you on any antibiotics? No  Have you had any new, sudden or worsening abdominal pain? No  Have you or anyone in your household received a live vaccination in the past 4 weeks? Please note: No live vaccines while on biologic/chemotherapy until 6 months after the last treatment. Patient can receive the flu vaccine (shot only), pneumovax and the Covid vaccine. It is optimal for the patient to get these vaccines mid cycle, but they can be given at any time as long as it is not on the day of the infusion. No  Have you recently been diagnosed with any new nervous system diseases (ie. Multiple sclerosis, Guillain  Independence, seizures, neurological changes) or cancer diagnosis? Are you on any form of radiation or chemotherapy? No  Are you pregnant or breast feeding or do you have plans of pregnancy in the future? No  Have you been having any signs of worsening depression or suicidal ideations?  (benlysta only) No  Have there been any other new onset medical symptoms? No  Have you had any new blood clots? (IVIG only) No    Post Infusion Assessment:  Patient tolerated infusion without incident.  Blood return noted pre and post infusion.  Site patent and intact, free from redness, edema or discomfort.  Access discontinued per protocol.     Discharge Plan:   Patient will return June 19th for next appointment.  Patient discharged in stable condition accompanied by: self.  Departure Mode: Ambulatory.      Deena Lee RN

## 2024-05-09 DIAGNOSIS — K21.00 GASTROESOPHAGEAL REFLUX DISEASE WITH ESOPHAGITIS WITHOUT HEMORRHAGE: ICD-10-CM

## 2024-05-09 RX ORDER — OMEPRAZOLE 40 MG/1
40 CAPSULE, DELAYED RELEASE ORAL DAILY
Qty: 90 CAPSULE | Refills: 2 | Status: SHIPPED | OUTPATIENT
Start: 2024-05-09

## 2024-05-12 ENCOUNTER — HEALTH MAINTENANCE LETTER (OUTPATIENT)
Age: 79
End: 2024-05-12

## 2024-05-12 ENCOUNTER — HOSPITAL ENCOUNTER (EMERGENCY)
Facility: CLINIC | Age: 79
Discharge: HOME | End: 2024-05-12
Attending: EMERGENCY MEDICINE | Admitting: EMERGENCY MEDICINE
Payer: MEDICARE

## 2024-05-12 ENCOUNTER — APPOINTMENT (OUTPATIENT)
Dept: RADIOLOGY | Facility: CLINIC | Age: 79
End: 2024-05-12
Attending: EMERGENCY MEDICINE
Payer: MEDICARE

## 2024-05-12 VITALS
TEMPERATURE: 98.4 F | RESPIRATION RATE: 16 BRPM | BODY MASS INDEX: 28.55 KG/M2 | DIASTOLIC BLOOD PRESSURE: 77 MMHG | WEIGHT: 199 LBS | HEART RATE: 59 BPM | SYSTOLIC BLOOD PRESSURE: 138 MMHG | OXYGEN SATURATION: 93 %

## 2024-05-12 DIAGNOSIS — K21.00 GASTROESOPHAGEAL REFLUX DISEASE WITH ESOPHAGITIS WITHOUT HEMORRHAGE: ICD-10-CM

## 2024-05-12 LAB
ALBUMIN SERPL BCG-MCNC: 4.1 G/DL (ref 3.5–5.2)
ALP SERPL-CCNC: 79 U/L (ref 40–150)
ALT SERPL W P-5'-P-CCNC: 31 U/L (ref 0–70)
ANION GAP SERPL CALCULATED.3IONS-SCNC: 11 MMOL/L (ref 7–15)
APTT PPP: 30 SECONDS (ref 22–38)
AST SERPL W P-5'-P-CCNC: 33 U/L (ref 0–45)
ATRIAL RATE - MUSE: 62 BPM
BASOPHILS # BLD AUTO: 0 10E3/UL (ref 0–0.2)
BASOPHILS NFR BLD AUTO: 1 %
BILIRUB SERPL-MCNC: 0.6 MG/DL
BUN SERPL-MCNC: 19.6 MG/DL (ref 8–23)
CALCIUM SERPL-MCNC: 9.1 MG/DL (ref 8.8–10.2)
CHLORIDE SERPL-SCNC: 105 MMOL/L (ref 98–107)
CREAT SERPL-MCNC: 1.54 MG/DL (ref 0.67–1.17)
D DIMER PPP FEU-MCNC: 0.59 UG/ML FEU (ref 0–0.5)
DEPRECATED HCO3 PLAS-SCNC: 22 MMOL/L (ref 22–29)
DIASTOLIC BLOOD PRESSURE - MUSE: 72 MMHG
EGFRCR SERPLBLD CKD-EPI 2021: 46 ML/MIN/1.73M2
EOSINOPHIL # BLD AUTO: 0.2 10E3/UL (ref 0–0.7)
EOSINOPHIL NFR BLD AUTO: 3 %
ERYTHROCYTE [DISTWIDTH] IN BLOOD BY AUTOMATED COUNT: 13.3 % (ref 10–15)
GLUCOSE SERPL-MCNC: 116 MG/DL (ref 70–99)
HCT VFR BLD AUTO: 32.1 % (ref 40–53)
HGB BLD-MCNC: 10.9 G/DL (ref 13.3–17.7)
HOLD SPECIMEN: NORMAL
IMM GRANULOCYTES # BLD: 0 10E3/UL
IMM GRANULOCYTES NFR BLD: 0 %
INR PPP: 1 (ref 0.85–1.15)
INTERPRETATION ECG - MUSE: NORMAL
LIPASE SERPL-CCNC: 34 U/L (ref 13–60)
LYMPHOCYTES # BLD AUTO: 1.4 10E3/UL (ref 0.8–5.3)
LYMPHOCYTES NFR BLD AUTO: 21 %
MAGNESIUM SERPL-MCNC: 2 MG/DL (ref 1.7–2.3)
MCH RBC QN AUTO: 30.9 PG (ref 26.5–33)
MCHC RBC AUTO-ENTMCNC: 34 G/DL (ref 31.5–36.5)
MCV RBC AUTO: 91 FL (ref 78–100)
MONOCYTES # BLD AUTO: 0.6 10E3/UL (ref 0–1.3)
MONOCYTES NFR BLD AUTO: 9 %
NEUTROPHILS # BLD AUTO: 4.2 10E3/UL (ref 1.6–8.3)
NEUTROPHILS NFR BLD AUTO: 66 %
NRBC # BLD AUTO: 0 10E3/UL
NRBC BLD AUTO-RTO: 0 /100
P AXIS - MUSE: 6 DEGREES
PLATELET # BLD AUTO: 134 10E3/UL (ref 150–450)
POTASSIUM SERPL-SCNC: 4.5 MMOL/L (ref 3.4–5.3)
PR INTERVAL - MUSE: 166 MS
PROT SERPL-MCNC: 7.5 G/DL (ref 6.4–8.3)
QRS DURATION - MUSE: 138 MS
QT - MUSE: 464 MS
QTC - MUSE: 470 MS
R AXIS - MUSE: 35 DEGREES
RBC # BLD AUTO: 3.53 10E6/UL (ref 4.4–5.9)
SODIUM SERPL-SCNC: 138 MMOL/L (ref 135–145)
SYSTOLIC BLOOD PRESSURE - MUSE: 158 MMHG
T AXIS - MUSE: 7 DEGREES
TROPONIN T SERPL HS-MCNC: 20 NG/L
TROPONIN T SERPL HS-MCNC: 22 NG/L
VENTRICULAR RATE- MUSE: 62 BPM
WBC # BLD AUTO: 6.4 10E3/UL (ref 4–11)

## 2024-05-12 PROCEDURE — 93005 ELECTROCARDIOGRAM TRACING: CPT | Performed by: EMERGENCY MEDICINE

## 2024-05-12 PROCEDURE — 71045 X-RAY EXAM CHEST 1 VIEW: CPT

## 2024-05-12 PROCEDURE — 96374 THER/PROPH/DIAG INJ IV PUSH: CPT

## 2024-05-12 PROCEDURE — 83735 ASSAY OF MAGNESIUM: CPT | Performed by: EMERGENCY MEDICINE

## 2024-05-12 PROCEDURE — 85610 PROTHROMBIN TIME: CPT | Performed by: EMERGENCY MEDICINE

## 2024-05-12 PROCEDURE — 85379 FIBRIN DEGRADATION QUANT: CPT | Performed by: EMERGENCY MEDICINE

## 2024-05-12 PROCEDURE — 36415 COLL VENOUS BLD VENIPUNCTURE: CPT | Performed by: EMERGENCY MEDICINE

## 2024-05-12 PROCEDURE — 96375 TX/PRO/DX INJ NEW DRUG ADDON: CPT

## 2024-05-12 PROCEDURE — 99285 EMERGENCY DEPT VISIT HI MDM: CPT | Mod: 25

## 2024-05-12 PROCEDURE — 83690 ASSAY OF LIPASE: CPT | Performed by: EMERGENCY MEDICINE

## 2024-05-12 PROCEDURE — 85730 THROMBOPLASTIN TIME PARTIAL: CPT | Performed by: EMERGENCY MEDICINE

## 2024-05-12 PROCEDURE — 84484 ASSAY OF TROPONIN QUANT: CPT | Performed by: EMERGENCY MEDICINE

## 2024-05-12 PROCEDURE — 250N000013 HC RX MED GY IP 250 OP 250 PS 637: Performed by: EMERGENCY MEDICINE

## 2024-05-12 PROCEDURE — 80053 COMPREHEN METABOLIC PANEL: CPT | Performed by: EMERGENCY MEDICINE

## 2024-05-12 PROCEDURE — 85025 COMPLETE CBC W/AUTO DIFF WBC: CPT | Performed by: EMERGENCY MEDICINE

## 2024-05-12 PROCEDURE — 250N000011 HC RX IP 250 OP 636: Performed by: EMERGENCY MEDICINE

## 2024-05-12 RX ORDER — ONDANSETRON 2 MG/ML
4 INJECTION INTRAMUSCULAR; INTRAVENOUS ONCE
Status: COMPLETED | OUTPATIENT
Start: 2024-05-12 | End: 2024-05-12

## 2024-05-12 RX ORDER — MAGNESIUM HYDROXIDE/ALUMINUM HYDROXICE/SIMETHICONE 120; 1200; 1200 MG/30ML; MG/30ML; MG/30ML
15 SUSPENSION ORAL ONCE
Status: COMPLETED | OUTPATIENT
Start: 2024-05-12 | End: 2024-05-12

## 2024-05-12 RX ADMIN — ALUMINUM HYDROXIDE, MAGNESIUM HYDROXIDE, AND SIMETHICONE 15 ML: 200; 200; 20 SUSPENSION ORAL at 17:13

## 2024-05-12 RX ADMIN — FAMOTIDINE 20 MG: 10 INJECTION INTRAVENOUS at 15:48

## 2024-05-12 RX ADMIN — ONDANSETRON 4 MG: 2 INJECTION INTRAMUSCULAR; INTRAVENOUS at 17:11

## 2024-05-12 ASSESSMENT — ACTIVITIES OF DAILY LIVING (ADL)
ADLS_ACUITY_SCORE: 38
ADLS_ACUITY_SCORE: 36
ADLS_ACUITY_SCORE: 38
ADLS_ACUITY_SCORE: 38

## 2024-05-12 ASSESSMENT — COLUMBIA-SUICIDE SEVERITY RATING SCALE - C-SSRS
1. IN THE PAST MONTH, HAVE YOU WISHED YOU WERE DEAD OR WISHED YOU COULD GO TO SLEEP AND NOT WAKE UP?: NO
2. HAVE YOU ACTUALLY HAD ANY THOUGHTS OF KILLING YOURSELF IN THE PAST MONTH?: NO
6. HAVE YOU EVER DONE ANYTHING, STARTED TO DO ANYTHING, OR PREPARED TO DO ANYTHING TO END YOUR LIFE?: NO

## 2024-05-12 NOTE — ED PROVIDER NOTES
EMERGENCY DEPARTMENT ENCOUNTER      NAME: Frank Obando  AGE: 78 year old male  YOB: 1945  MRN: [id number]  EVALUATION DATE & TIME: 2024  2:57 PM    PCP: Flaco Lopez    ED PROVIDER: Sharyn Mcgrath DO      Chief Complaint   Patient presents with    Chest Pain    Gastrophageal Reflux         FINAL IMPRESSION:  1. Gastroesophageal reflux disease with esophagitis without hemorrhage          ED COURSE & MEDICAL DECISION MAKIN-year-old male presented to the ED for evaluation of epigastric abdominal pain that occurred after taking his medications last night and then again this morning.  The patient was slightly hypertensive upon arrival.  He was otherwise hemodynamically stable.  The patient did not appear to be in any obvious distress or discomfort at the time of his initial evaluation.  On exam the patient was noted to have minimal tenderness to palpation noted in the epigastric region.  The remainder of his physical exam was unremarkable.    The patient's EKG revealed normal sinus rhythm without any concerning ST or T wave changes.    Following his initial evaluation the patient was given dose of IV famotidine to treat suspected GERD.      CBC and BMP were unchanged from baseline.  The hepatic panel labs, lipase, magnesium, and troponin were all reassuring.  The age-adjusted D-dimer was normal.  Chest x-ray was nondiagnostic.    The patient was reevaluated and informed of the reassuring lab, EKG, and chest x-ray results.  Patient reports no change in his pain with the IV famotidine.  He also notes that his nausea seems to be getting worse.  The patient was then given a GI cocktail and IV Zofran.      The delta troponin was unremarkable.  The patient was reevaluated and informed of the reassuring laboratory results.  The patient did that his pain again mostly resolved shortly after receiving the GI cocktail.  He states that his nausea also resolved after receiving the IV Zofran.  The  patient was informed that his symptoms appear consistent with esophagitis and that this is likely due to the fact that he has not been taking his omeprazole for the last few days.  The patient was instructed to continue taking the home omeprazole daily as directed.  He was instructed to use over-the-counter Maalox or Mylanta as needed for any further episodes of similar pain.  Once the omeprazole starts to work within the next 1 to 2 days the pain should resolve completely.  The patient declined anything for nausea to return home with.  The patient was instructed to follow-up with his primary care provider for reevaluation or to return back to ED sooner for any worsening pain, vomiting, shortness breath, or any other new or concerning symptoms.    Pertinent Labs & Imaging studies reviewed. (See chart for details)  15:15 I met with the patient to gather history and to perform my initial exam. We discussed plans for the ED course, including diagnostic testing and treatment.       At the conclusion of the encounter I discussed the results of all of the tests and the disposition. The questions were answered. The patient or family acknowledged understanding and was agreeable with the care plan.     Medical Decision Making    History:  Supplemental history from: Documented in chart and Family Member/Significant Other  External Record(s) reviewed: Documented in chart    Work Up:  Chart documentation includes differential considered and any EKGs or imaging independently interpreted by provider, where specified.  In additional to work up documented, I considered the following work up: Documented in chart, if applicable.    External consultation:  Discussion of management with another provider: Documented in chart, if applicable    Complicating factors:  Care impacted by chronic illness: Cerebrovascular Disease, Chronic Kidney Disease, Diabetes, and Hypertension  Care affected by social determinants of health:  N/A    Disposition considerations: Discharge. I recommended the patient continue their current prescription strength medication(s): Omeprazole. See documentation for any additional details.      PPE worn: n95 mask, goggles    MEDICATIONS GIVEN IN THE EMERGENCY:  Medications   famotidine (PEPCID) injection 20 mg (20 mg Intravenous $Given 5/12/24 1548)   ondansetron (ZOFRAN) injection 4 mg (4 mg Intravenous $Given 5/12/24 1711)   alum & mag hydroxide-simethicone (MAALOX) suspension 15 mL (15 mLs Oral $Given 5/12/24 1713)       NEW PRESCRIPTIONS STARTED AT TODAY'S ER VISIT  New Prescriptions    No medications on file          =================================================================    HPI    Patient information was obtained from: Patient     Frank Obando is a 78 year old male who presents to this ED for evaluation of epigastric abdominal pain.  Patient notes that the epigastric pain began shortly after taking his nighttime medications last evening.  Patient states that the epigastric pain worsened to the middle of the night and woke up from sleep.  The patient took milk of magnesia which allowed him to fall back asleep.  Patient woke up this morning ate breakfast without any difficulty.  He took his morning medications and states that the epigastric pain returned shortly thereafter.  The patient states that the pain is worse with deep breathing and he states that the epigastric pain radiates up into his chest.  However, he denies any shortness of breath.  The patient denies any associated nausea, vomiting, dizziness, diaphoresis, cough, fevers, or diarrhea.  Of note, the patient states that his prescription for omeprazole simply ran out and that he has not taken it for the last 4 days.  The patient restarted his omeprazole this morning.  The patient's wife also notes that he has been doing a lot of physical activity the last few days without any symptoms.       REVIEW OF SYSTEMS   Review of Systems      PAST MEDICAL HISTORY:  Past Medical History:   Diagnosis Date    Anemia     Arthritis     osteoarthritis    Calculus of kidney     Diabetes mellitus (H)     Episcleritis of left eye     Hyperlipidemia     Hypertension     Iron deficiency anemia due to chronic blood loss 11/2/2021    Peripheral neuropathy     Stroke (H)        PAST SURGICAL HISTORY:  Past Surgical History:   Procedure Laterality Date    COLONOSCOPY N/A 11/16/2021    Procedure: COLONOSCOPY with polypectomy;  Surgeon: Dex Finch MD;  Location: [facility]    CV CORONARY ANGIOGRAM N/A 4/23/2024    Procedure: Coronary Angiogram;  Surgeon: Mark Jones MD;  Location: [facility] CV    CV INSTANTANEOUS WAVE-FREE RATIO N/A 4/23/2024    Procedure: Instantaneous Wave-Free Ratio;  Surgeon: Mark Jones MD;  Location: [facility] CV    CV LEFT HEART CATH N/A 4/23/2024    Procedure: Left Heart Catheterization;  Surgeon: Mark Jones MD;  Location: [facility] CV    CYSTOSCOPY TUMOR / CONDYLOMATA W/ LASER Left 7/18/2014    ESOPHAGOSCOPY, GASTROSCOPY, DUODENOSCOPY (EGD), COMBINED N/A 11/16/2021    Procedure: ESOPHAGOGASTRODUODENOSCOPY (EGD) with biopsies and argon plasma coagulation;  Surgeon: Dex Finch MD;  Location: [facility]    ESOPHAGOSCOPY, GASTROSCOPY, DUODENOSCOPY (EGD), COMBINED N/A 9/29/2023    Procedure: ESOPHAGOGASTRODUODENOSCOPY with BIOPSY;  Surgeon: Alli Lozada MD;  Location: [facility] OR    HC CYSTOSCOPY,INSERT URETERAL STENT      Description: Cystoscopy With Insertion Of Ureteral Stent Right;  Recorded: 10/14/2009;  Comments: stone    LAPAROSCOPIC CHOLECYSTECTOMY N/A 7/1/2023    Procedure: CHOLECYSTECTOMY, LAPAROSCOPIC;  Surgeon: Jadiel Argueta MD;  Location: [facility] OR           CURRENT MEDICATIONS:    acetaminophen (TYLENOL) 500 MG tablet  aspirin 81 MG EC tablet  atorvastatin (LIPITOR) 40 MG tablet  blood glucose (ACCU-CHEK JACOB PLUS) test strip  blood  glucose meter (GLUCOMETER)  blood glucose monitoring (SOFTCLIX) lancets  cyanocobalamin, vitamin B-12, 2,500 mcg Tab  ferrous sulfate (FEROSUL) 325 (65 Fe) MG tablet  generic lancets (ACCU-CHEK SOFTCLIX LANCETS)  inFLIXimab (REMICADE IV)  lactobacillus rhamnosus, GG, (CULTURELL) capsule  lisinopril (ZESTRIL) 10 MG tablet  magnesium oxide 250 mg Tab  metFORMIN (GLUCOPHAGE) 500 MG tablet  omeprazole (PRILOSEC) 40 MG DR capsule  pioglitazone (ACTOS) 30 MG tablet  propranolol (INDERAL) 20 MG tablet  psyllium (METAMUCIL/KONSYL) capsule  sucralfate (CARAFATE) 1 GM/10ML suspension  tamsulosin (FLOMAX) 0.4 MG capsule        ALLERGIES:  Allergies   Allergen Reactions    Morphine Nausea and Vomiting    Scopolamine Hbr [Scopolamine] Unknown       FAMILY HISTORY:  Family History   Problem Relation Age of Onset    Coronary Artery Disease Mother     Diabetes Mother     Lung Cancer Father     No Known Problems Son        SOCIAL HISTORY:   Social History     Socioeconomic History    Marital status:    Tobacco Use    Smoking status: Never    Smokeless tobacco: Never   Vaping Use    Vaping status: Never Used   Substance and Sexual Activity    Alcohol use: No    Drug use: No   Social History Narrative    Lives with his wife, Valerie.  Volunteers at [employer].  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  One son, Jaison.      Social Determinants of Health     Financial Resource Strain: Low Risk  (10/6/2023)    Financial Resource Strain     Within the past 12 months, have you or your family members you live with been unable to get utilities (heat, electricity) when it was really needed?: No   Food Insecurity: Low Risk  (10/6/2023)    Food Insecurity     Within the past 12 months, did you worry that your food would run out before you got money to buy more?: No     Within the past 12 months, did the food you bought just not last and you didn t have money to get more?: No   Transportation Needs: Low Risk  (10/6/2023)     Transportation Needs     Within the past 12 months, has lack of transportation kept you from medical appointments, getting your medicines, non-medical meetings or appointments, work, or from getting things that you need?: No   Interpersonal Safety: Low Risk  (10/6/2023)    Interpersonal Safety     Do you feel physically and emotionally safe where you currently live?: Yes     Within the past 12 months, have you been hit, slapped, kicked or otherwise physically hurt by someone?: No     Within the past 12 months, have you been humiliated or emotionally abused in other ways by your partner or ex-partner?: No   Housing Stability: Low Risk  (10/6/2023)    Housing Stability     Do you have housing? : Yes     Are you worried about losing your housing?: No       VITALS:  /73   Pulse 62   Temp 98.4  F (36.9  C) (Oral)   Resp 20   Wt 90.3 kg (199 lb)   SpO2 95%   BMI 28.55 kg/m      PHYSICAL EXAM    General presentation: Alert, Vital signs reviewed. NAD  HENT: ENT inspection is normal. Oropharynx is moist and clear.   Eye: Pupils are equal and reactive to light. EOMI  Neck: The neck is supple, with full ROM, with no evidence of meningismus.  Pulmonary: Currently in no acute respiratory distress. Normal, non labored respirations, the lung sounds are normal with good equal air movement. Clear to auscultation bilaterally.   Circulatory: Regular rate and rhythm. Peripheral pulses are strong and equal. No murmurs, rubs, or gallops.   Abdominal: The abdomen is soft.  Mild tenderness to palpation noted to the epigastric region.  No rigidity, guarding, or rebound. Bowel sounds normal.   Neurologic: Alert, oriented to person, place, and time. No motor deficit. No sensory deficit. Cranial nerves II through XII are intact.  Musculoskeletal: No extremity tenderness. Full range of motion in all extremities. No extremity edema.   Skin: Skin color is normal. No rash. Warm. Dry to touch.      LAB:  All pertinent labs reviewed and  interpreted.  Results for orders placed or performed during the hospital encounter of 05/12/24   XR Chest Port 1 View    Impression    IMPRESSION: Negative chest.   Extra Blue Top Tube   Result Value Ref Range    Hold Specimen JIC    Extra Green Top (Lithium Heparin) Tube   Result Value Ref Range    Hold Specimen JIC    Extra Purple Top Tube   Result Value Ref Range    Hold Specimen JIC    Result Value Ref Range    INR 1.00 0.85 - 1.15   Partial thromboplastin time   Result Value Ref Range    aPTT 30 22 - 38 Seconds   Comprehensive metabolic panel   Result Value Ref Range    Sodium 138 135 - 145 mmol/L    Potassium 4.5 3.4 - 5.3 mmol/L    Carbon Dioxide (CO2) 22 22 - 29 mmol/L    Anion Gap 11 7 - 15 mmol/L    Urea Nitrogen 19.6 8.0 - 23.0 mg/dL    Creatinine 1.54 (H) 0.67 - 1.17 mg/dL    GFR Estimate 46 (L) >60 mL/min/1.73m2    Calcium 9.1 8.8 - 10.2 mg/dL    Chloride 105 98 - 107 mmol/L    Glucose 116 (H) 70 - 99 mg/dL    Alkaline Phosphatase 79 40 - 150 U/L    AST 33 0 - 45 U/L    ALT 31 0 - 70 U/L    Protein Total 7.5 6.4 - 8.3 g/dL    Albumin 4.1 3.5 - 5.2 g/dL    Bilirubin Total 0.6 <=1.2 mg/dL   Result Value Ref Range    Magnesium 2.0 1.7 - 2.3 mg/dL   Result Value Ref Range    Troponin T, High Sensitivity 22 <=22 ng/L   Result Value Ref Range    Lipase 34 13 - 60 U/L   CBC with platelets and differential   Result Value Ref Range    WBC Count 6.4 4.0 - 11.0 10e3/uL    RBC Count 3.53 (L) 4.40 - 5.90 10e6/uL    Hemoglobin 10.9 (L) 13.3 - 17.7 g/dL    Hematocrit 32.1 (L) 40.0 - 53.0 %    MCV 91 78 - 100 fL    MCH 30.9 26.5 - 33.0 pg    MCHC 34.0 31.5 - 36.5 g/dL    RDW 13.3 10.0 - 15.0 %    Platelet Count 134 (L) 150 - 450 10e3/uL    % Neutrophils 66 %    % Lymphocytes 21 %    % Monocytes 9 %    % Eosinophils 3 %    % Basophils 1 %    % Immature Granulocytes 0 %    NRBCs per 100 WBC 0 <1 /100    Absolute Neutrophils 4.2 1.6 - 8.3 10e3/uL    Absolute Lymphocytes 1.4 0.8 - 5.3 10e3/uL    Absolute Monocytes 0.6 0.0  - 1.3 10e3/uL    Absolute Eosinophils 0.2 0.0 - 0.7 10e3/uL    Absolute Basophils 0.0 0.0 - 0.2 10e3/uL    Absolute Immature Granulocytes 0.0 <=0.4 10e3/uL    Absolute NRBCs 0.0 10e3/uL   D dimer quantitative   Result Value Ref Range    D-Dimer Quantitative 0.59 (H) 0.00 - 0.50 ug/mL FEU   ECG 12-LEAD WITH MUSE (LHE)   Result Value Ref Range    Systolic Blood Pressure 158 mmHg    Diastolic Blood Pressure 72 mmHg    Ventricular Rate 62 BPM    Atrial Rate 62 BPM    SC Interval 166 ms    QRS Duration 138 ms     ms    QTc 470 ms    P Axis 6 degrees    R AXIS 35 degrees    T Axis 7 degrees    Interpretation ECG       Sinus rhythm with occasional Premature ventricular complexes  Right bundle branch block  Abnormal ECG  When compared with ECG of 23-APR-2024 07:26,  Premature ventricular complexes are now Present  Confirmed by SEE ED PROVIDER NOTE FOR, ECG INTERPRETATION ([id number]),  ALPESH MACARIO ([id number]) on 5/12/2024 4:48:09 PM         RADIOLOGY:  Reviewed all pertinent imaging. Please see official radiology report.  XR Chest Port 1 View   Final Result   IMPRESSION: Negative chest.          EKG:    Normal sinus rhythm.  Rate of 62.  Occasional PVC noted.  Right bundle-branch block.  Normal QT.  No ST or T wave changes.  Compared to EKG on 4/23/2024 the PVC appears new.  No other significant changes are noted.      I have independently reviewed and interpreted the EKG(s) documented above.        Sharyn Mcgrath DO  Emergency Medicine  [facility] EMERGENCY ROOM  [address] [facility] [address] [phone]       Sharyn Mcgrath DO  05/12/24 1323

## 2024-05-12 NOTE — ED TRIAGE NOTES
Pt had indigestion last night after taking his pills.  Then this am he felt good.  He ate breakfast and then took his pills and began to have pain again after the pills.  States he has chest pain when he takes a deep breath     Triage Assessment (Adult)       Row Name 05/12/24 1421          Triage Assessment    Airway WDL WDL        Respiratory WDL    Respiratory WDL WDL        Skin Circulation/Temperature WDL    Skin Circulation/Temperature WDL WDL        Cardiac WDL    Cardiac WDL chest pain        Chest Pain Assessment    Chest Pain Location epigastric        Peripheral/Neurovascular WDL    Peripheral Neurovascular WDL WDL        Cognitive/Neuro/Behavioral WDL    Cognitive/Neuro/Behavioral WDL WDL

## 2024-05-12 NOTE — DISCHARGE INSTRUCTIONS
The EKG, chest x-ray, and laboratory test all appear reassuring here today in the emergency department.  Your symptoms appear consistent with worsening acid reflux which is likely related to the fact that you had been off your omeprazole for the last few days.      Please take the omeprazole daily as directed.  This medication can take a couple of days to become fully effective.  Therefore it is recommended that you use over-the-counter Maalox as needed for any further episodes of pain until the omeprazole starts to work.      Follow-up with your primary care provider for reevaluation or return back to ED sooner for any worsening pain, shortness of breath, or any other new or concerning symptoms.

## 2024-05-15 ENCOUNTER — ANCILLARY PROCEDURE (OUTPATIENT)
Dept: VASCULAR ULTRASOUND | Facility: CLINIC | Age: 79
End: 2024-05-15
Attending: HOSPITALIST
Payer: MEDICARE

## 2024-05-15 DIAGNOSIS — I73.9 CLAUDICATION OF BOTH LOWER EXTREMITIES (H): ICD-10-CM

## 2024-05-15 DIAGNOSIS — E11.8 TYPE 2 DIABETES MELLITUS WITH COMPLICATION, WITHOUT LONG-TERM CURRENT USE OF INSULIN (H): Chronic | ICD-10-CM

## 2024-05-15 DIAGNOSIS — M62.89 EXERCISE-INDUCED FATIGUE OF LOWER EXTREMITY: ICD-10-CM

## 2024-05-15 PROCEDURE — 93924 LWR XTR VASC STDY BILAT: CPT | Mod: 26 | Performed by: SURGERY

## 2024-05-15 PROCEDURE — 93924 LWR XTR VASC STDY BILAT: CPT

## 2024-05-21 ENCOUNTER — TRANSFERRED RECORDS (OUTPATIENT)
Dept: HEALTH INFORMATION MANAGEMENT | Facility: CLINIC | Age: 79
End: 2024-05-21
Payer: MEDICARE

## 2024-05-23 ENCOUNTER — OFFICE VISIT (OUTPATIENT)
Dept: VASCULAR SURGERY | Facility: CLINIC | Age: 79
End: 2024-05-23
Attending: NURSE PRACTITIONER
Payer: MEDICARE

## 2024-05-23 VITALS
DIASTOLIC BLOOD PRESSURE: 70 MMHG | RESPIRATION RATE: 16 BRPM | BODY MASS INDEX: 28.77 KG/M2 | TEMPERATURE: 97.9 F | WEIGHT: 201 LBS | SYSTOLIC BLOOD PRESSURE: 122 MMHG | HEIGHT: 70 IN | HEART RATE: 84 BPM

## 2024-05-23 DIAGNOSIS — R29.898 BILATERAL LEG WEAKNESS: Primary | ICD-10-CM

## 2024-05-23 PROCEDURE — 99213 OFFICE O/P EST LOW 20 MIN: CPT | Performed by: HOSPITALIST

## 2024-05-23 PROCEDURE — G0463 HOSPITAL OUTPT CLINIC VISIT: HCPCS | Performed by: HOSPITALIST

## 2024-05-23 ASSESSMENT — PAIN SCALES - GENERAL: PAINLEVEL: NO PAIN (0)

## 2024-05-23 NOTE — PROGRESS NOTES
[facility]        Patient is here for a consult to discuss Peripheral artery disease (PAD). Patient states he has generalized pain in his legs with activity and unable to pinpoint where on his legs.  He also reports legs feeling fatigued with activity.  He states he walks hunched over because it is more comfortable for him.     Pt is currently taking Aspirin and Statin.    /70   Pulse 84   Temp 97.9  F (36.6  C)   Resp 16     The provider has been notified that the patient has concerns of leg pain and fatigue.     Questions patient would like addressed today are: N/A.    Refills are needed: N/A    Has homecare services and agency name:  No

## 2024-05-23 NOTE — PROGRESS NOTES
"VASCULAR MEDICINE CONSULT NOTE          LOCATION:  [facility]       Date of Service: 5/23/2024      Primary Care Provider: Flaco Lopez  Referring provider;  Deysi Swenson      Reason for the visit/chief complaint:   R/o PAD      HPI:  Frank Obando is a pleasant 78 year old male who presents to our Vascular Medicine clinic for the above mentioned reason.    Mr. 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    He has been having issues with generalized fatigue with dyspnea on exertion of over 2 years now per his report.  He was noted to have iron deficiency anemia and was put on oral iron supplements for which this helped his fatigue however, continues to have issues with prolonged walking.  He reports feeling \" completely exhausted\" after walking very short distance such as from the car to the front door of our clinic that is not a long walk.  Once he sits down, then he will feel better in few minutes.  Denies pain in his lower extremities with walking.  He does report that if he is able to lean on a cart while shopping, then he is able to walk longer distances.  Denies lower extremity wounds.    He has been following up with different specialties to rule out different etiologies for his fatigue and dyspnea on exertion.  Most recently, he had coronary angiogram that showed moderate nonobstructive disease with normal LVEF.  Echocardiogram is pending to rule out valvular disease.  He has also been in follow-up with neurosurgery due to neurogenic claudication and does not seem to have significant spinal stenosis " "noted.    Cardiovascular risk factors:  Hypertension: Controlled on lisinopril 10 mg.  Diabetes mellitus: Well-controlled.  Hemoglobin A1c 7.2%.  Dyslipidemia, on atorvastatin 40 mg.  LDL 47  He never smoked.        REVIEW OF SYSTEMS:    A 12 point ROS was reviewed and is negative except what is mentioned in HPI.       Past medical history, surgical history, medications, family history, social history and allergies were reviewed. Pertinent points mentioned under HPI.        OBJECTIVE:    Vital signs:  /70   Pulse 84   Temp 97.9  F (36.6  C)   Resp 16   Ht 5' 10\" (1.778 m)   Wt 201 lb (91.2 kg)   BMI 28.84 kg/m    Wt Readings from Last 1 Encounters:   05/23/24 201 lb (91.2 kg)     Body mass index is 28.84 kg/m .    Physical exam:  General appearance: Pleasant male in no apparent distress.    HEENT: NC/AT.    Neck: Carotids +2/2 bilaterally without bruits.  No jugular venous distension.   Heart: RRR. Normal S1, S2.   Chest: Clear to auscultation bilaterally.  Abdomen: Soft, nontender, nondistended. No pulsatile mass.  No bruits.   Extremities: No lower extremity swelling.  Palpable DP and PT 2/2 bilaterally.  Skin: Normal skin color.  No dependent rubor or elevation pallor.  No wounds.  Neurological: Alert, awake and oriented       DIAGNOSTIC STUDIES:   Labs and diagnostics reviewed including outside records. Pertinent points are mentioned under HPI and assessment and plan sections.          ASSESSMENT AND PLAN:    Generalized fatigue with no evidence of PAD  Type 2 diabetes mellitus: Well-controlled  Hypertension: Well-controlled  Dyslipidemia: Well-controlled  CKD stage III  Iron deficiency anemia  Liver cirrhosis  Chronic thrombocytopenia    's symptoms are not consistent with intermittent claudication with generalized rather than lower extremity fatigue with exertion.  His exam was reassuring and I was able to palpate both DP and PT bilaterally.  LISSET test showed normal LISSET at baseline " 1.34 and 1.39 on the right and left respectively with normal TBI 1.06 and 1.04 on the right and left respectively.  His LISSET actually increased after 5 minutes of walking on a treadmill 1.52 and 1.51 on the right and left respectively raising concern for possible noncompressible vessels and falsely elevated LISSET.  Nonetheless, given the low clinical likelihood despite his risk factors and the reassuring physical exam, this rules out PAD.  More certainly rules out PAD as the underlying etiology for his symptoms.  He is already on appropriate medical management with antiplatelet therapy and high intensity statin with controlled risk factors.      Recommendations:  No evidence of PAD.  Recommend ongoing workup with PCP for his generalized fatigue.    It was a pleasure meeting with Mr. Obando in our clinic today.    No further follow-up is needed in our vascular medicine clinic.  Thank you for the opportunity to contribute to the care of your patient.  Please do not hesitate to reach out should any questions or concerns arise.    Boby Parada MD  Vascular Medicine  May 23, 2024

## 2024-05-23 NOTE — PATIENT INSTRUCTIONS
Katerin Marie,    Thank you for entrusting your care with us today. After your visit today with MD Boby Parada this is the plan that was discussed at your appointment.    Follow up with primary care provider regarding your fatigue.     No follow up needed with vascular at this time.         I am including additional information on these things and our contact information if you have any questions or concerns.   Please do not hesitate to reach out to us if you felt we did not answer your questions or you are unsure of the treatment plan after your visit today. Our number is [phone].Thank you for trusting us with your care.         Again thank you for your time.

## 2024-06-10 DIAGNOSIS — E11.9 TYPE 2 DIABETES MELLITUS (H): ICD-10-CM

## 2024-06-19 ENCOUNTER — INFUSION THERAPY VISIT (OUTPATIENT)
Dept: INFUSION THERAPY | Facility: HOSPITAL | Age: 79
End: 2024-06-19
Attending: INTERNAL MEDICINE
Payer: MEDICARE

## 2024-06-19 VITALS
DIASTOLIC BLOOD PRESSURE: 68 MMHG | RESPIRATION RATE: 18 BRPM | HEART RATE: 68 BPM | TEMPERATURE: 98 F | SYSTOLIC BLOOD PRESSURE: 106 MMHG | OXYGEN SATURATION: 96 %

## 2024-06-19 DIAGNOSIS — H44.112 PANUVEITIS OF LEFT EYE: Primary | ICD-10-CM

## 2024-06-19 PROCEDURE — 96413 CHEMO IV INFUSION 1 HR: CPT

## 2024-06-19 PROCEDURE — 250N000013 HC RX MED GY IP 250 OP 250 PS 637: Performed by: INTERNAL MEDICINE

## 2024-06-19 PROCEDURE — 258N000003 HC RX IP 258 OP 636: Mod: JZ | Performed by: INTERNAL MEDICINE

## 2024-06-19 PROCEDURE — 250N000011 HC RX IP 250 OP 636: Mod: JZ | Performed by: INTERNAL MEDICINE

## 2024-06-19 PROCEDURE — 96375 TX/PRO/DX INJ NEW DRUG ADDON: CPT

## 2024-06-19 RX ORDER — MEPERIDINE HYDROCHLORIDE 50 MG/ML
25 INJECTION INTRAMUSCULAR; INTRAVENOUS; SUBCUTANEOUS EVERY 30 MIN PRN
Status: CANCELLED | OUTPATIENT
Start: 2024-06-20

## 2024-06-19 RX ORDER — METHYLPREDNISOLONE SODIUM SUCCINATE 125 MG/2ML
125 INJECTION, POWDER, LYOPHILIZED, FOR SOLUTION INTRAMUSCULAR; INTRAVENOUS ONCE
Status: CANCELLED | OUTPATIENT
Start: 2024-06-20 | End: 2024-06-20

## 2024-06-19 RX ORDER — METHYLPREDNISOLONE SODIUM SUCCINATE 125 MG/2ML
125 INJECTION, POWDER, LYOPHILIZED, FOR SOLUTION INTRAMUSCULAR; INTRAVENOUS ONCE
Status: COMPLETED | OUTPATIENT
Start: 2024-06-19 | End: 2024-06-19

## 2024-06-19 RX ORDER — DIPHENHYDRAMINE HCL 25 MG
25 CAPSULE ORAL ONCE
Status: COMPLETED | OUTPATIENT
Start: 2024-06-19 | End: 2024-06-19

## 2024-06-19 RX ORDER — EPINEPHRINE 1 MG/ML
0.3 INJECTION, SOLUTION INTRAMUSCULAR; SUBCUTANEOUS EVERY 5 MIN PRN
Status: DISCONTINUED | OUTPATIENT
Start: 2024-06-19 | End: 2024-06-19 | Stop reason: HOSPADM

## 2024-06-19 RX ORDER — HEPARIN SODIUM,PORCINE 10 UNIT/ML
5 VIAL (ML) INTRAVENOUS
Status: CANCELLED | OUTPATIENT
Start: 2024-06-20

## 2024-06-19 RX ORDER — ALBUTEROL SULFATE 0.83 MG/ML
2.5 SOLUTION RESPIRATORY (INHALATION)
Status: DISCONTINUED | OUTPATIENT
Start: 2024-06-19 | End: 2024-06-19 | Stop reason: HOSPADM

## 2024-06-19 RX ORDER — HEPARIN SODIUM (PORCINE) LOCK FLUSH IV SOLN 100 UNIT/ML 100 UNIT/ML
5 SOLUTION INTRAVENOUS
Status: CANCELLED | OUTPATIENT
Start: 2024-06-20

## 2024-06-19 RX ORDER — METHYLPREDNISOLONE SODIUM SUCCINATE 125 MG/2ML
125 INJECTION, POWDER, LYOPHILIZED, FOR SOLUTION INTRAMUSCULAR; INTRAVENOUS
Status: DISCONTINUED | OUTPATIENT
Start: 2024-06-19 | End: 2024-06-19 | Stop reason: HOSPADM

## 2024-06-19 RX ORDER — ALBUTEROL SULFATE 90 UG/1
1-2 AEROSOL, METERED RESPIRATORY (INHALATION)
Status: CANCELLED
Start: 2024-06-20

## 2024-06-19 RX ORDER — ALBUTEROL SULFATE 90 UG/1
1-2 AEROSOL, METERED RESPIRATORY (INHALATION)
Status: DISCONTINUED | OUTPATIENT
Start: 2024-06-19 | End: 2024-06-19 | Stop reason: HOSPADM

## 2024-06-19 RX ORDER — ACETAMINOPHEN 325 MG/1
650 TABLET ORAL ONCE
Status: CANCELLED
Start: 2024-06-20 | End: 2024-06-20

## 2024-06-19 RX ORDER — METHYLPREDNISOLONE SODIUM SUCCINATE 125 MG/2ML
125 INJECTION, POWDER, LYOPHILIZED, FOR SOLUTION INTRAMUSCULAR; INTRAVENOUS
Status: CANCELLED
Start: 2024-06-20

## 2024-06-19 RX ORDER — ALBUTEROL SULFATE 0.83 MG/ML
2.5 SOLUTION RESPIRATORY (INHALATION)
Status: CANCELLED | OUTPATIENT
Start: 2024-06-20

## 2024-06-19 RX ORDER — DIPHENHYDRAMINE HYDROCHLORIDE 50 MG/ML
50 INJECTION INTRAMUSCULAR; INTRAVENOUS
Status: CANCELLED
Start: 2024-06-20

## 2024-06-19 RX ORDER — EPINEPHRINE 1 MG/ML
0.3 INJECTION, SOLUTION INTRAMUSCULAR; SUBCUTANEOUS EVERY 5 MIN PRN
Status: CANCELLED | OUTPATIENT
Start: 2024-06-20

## 2024-06-19 RX ORDER — DIPHENHYDRAMINE HCL 25 MG
25 CAPSULE ORAL ONCE
Status: CANCELLED
Start: 2024-06-20 | End: 2024-06-20

## 2024-06-19 RX ORDER — MEPERIDINE HYDROCHLORIDE 50 MG/ML
25 INJECTION INTRAMUSCULAR; INTRAVENOUS; SUBCUTANEOUS EVERY 30 MIN PRN
Status: DISCONTINUED | OUTPATIENT
Start: 2024-06-19 | End: 2024-06-19 | Stop reason: HOSPADM

## 2024-06-19 RX ORDER — ACETAMINOPHEN 325 MG/1
650 TABLET ORAL ONCE
Status: COMPLETED | OUTPATIENT
Start: 2024-06-19 | End: 2024-06-19

## 2024-06-19 RX ORDER — DIPHENHYDRAMINE HYDROCHLORIDE 50 MG/ML
50 INJECTION INTRAMUSCULAR; INTRAVENOUS
Status: DISCONTINUED | OUTPATIENT
Start: 2024-06-19 | End: 2024-06-19 | Stop reason: HOSPADM

## 2024-06-19 RX ADMIN — SODIUM CHLORIDE 250 ML: 9 INJECTION, SOLUTION INTRAVENOUS at 11:41

## 2024-06-19 RX ADMIN — ACETAMINOPHEN 650 MG: 325 TABLET, FILM COATED ORAL at 11:21

## 2024-06-19 RX ADMIN — DIPHENHYDRAMINE HYDROCHLORIDE 25 MG: 25 CAPSULE ORAL at 11:33

## 2024-06-19 RX ADMIN — METHYLPREDNISOLONE SODIUM SUCCINATE 125 MG: 125 INJECTION, POWDER, FOR SOLUTION INTRAMUSCULAR; INTRAVENOUS at 11:33

## 2024-06-19 RX ADMIN — INFLIXIMAB 400 MG: 100 INJECTION, POWDER, LYOPHILIZED, FOR SOLUTION INTRAVENOUS at 11:54

## 2024-06-19 NOTE — PROGRESS NOTES
Infusion Nursing Note:  Frank CRAIG Yazminmariia presents today for Remicade.    Patient seen by provider today: No   present during visit today: Not Applicable.    Note: pt got tylenol, benadryl po and solumedrol as pre meds.      Intravenous Access:  Peripheral IV placed.    Treatment Conditions:  Not Applicable.      Post Infusion Assessment:  Patient tolerated infusion without incident.       Discharge Plan:   Patient and/or family verbalized understanding of discharge instructions and all questions answered.  ~~~ NOTE: If the patient answers yes to any of the questions below, hold the infusion and contact ordering provider or on-call provider.    Do you currently have any signs of illness or infection or are you on any antibiotics? No  Have you recently had an elevated temperature, fever, chills, productive cough, coughing for 3 weeks or longer or hemoptysis, abnormal vital signs, night sweats, chest pain or have you noticed a decrease in your appetite, unexplained weight loss or fatigue? No  Have you had any new, sudden, or worsening abdominal pain? No  Do you have any open wounds or new incisions? (exclude for patients with hidradenitis suppurativa) No  Have you recently been diagnosed with any new nervous system diseases (ie. Multiple sclerosis, Guillain Belcourt, seizures, neurological changes) or cancer diagnosis? Are you on any form of radiation or chemotherapy? No  Have there been any other new onset medical symptoms? No  Are you pregnant or breast feeding or do you have plans of pregnancy in the future? No; N/A  Do you have any upcoming hospitalizations or surgeries? Does not include esophagogastroduodenoscopy, colonoscopy, endoscopic retrograde cholangiopancreatography (ERCP), endoscopic ultrasound (EUS), dental procedures (including cleanings, fillings, implants, extractions)  or joint aspiration/steroid injections No  Have you or anyone in your household received a live vaccination in the past 4  weeks? Please note: No live vaccines while on biologic/chemotherapy until 6 months after the last treatment. Patient can receive the flu vaccine (shot only).  It is optimal for the patient to get it mid cycle, but it can be given at any time as long as it is not on the day of the infusion. No  If applicable to prescribed medication, confirm negative PPD or quantiferon gold MTB. If positive, verify has negative chest x-ray or the patient is at least 4 weeks post initiation of INH/B6 therapy and have clearance from provider before infusion (Y/N:[id number])  If applicable to prescribed medication, confirm negative hepatitis B surface antigen or hepatitis C. If positive, clearance from provider before infusion. (Y/N: [id number])  Rheumatology patients receiving tocilizumab (Actemra): If labs were drawn within the past week, hold dosing until cleared to infuse If AST/ALT > 2 X upper limit normal; ANC < 1.0. NO; N/A  Patients receiving belimumab (Benlysta): Have you been having any signs of worsening depression or suicidal ideations? No; N/A     Jeny Epps RN

## 2024-06-26 ENCOUNTER — OFFICE VISIT (OUTPATIENT)
Dept: INTERNAL MEDICINE | Facility: CLINIC | Age: 79
End: 2024-06-26
Payer: MEDICARE

## 2024-06-26 VITALS
HEIGHT: 70 IN | TEMPERATURE: 97.7 F | WEIGHT: 191 LBS | RESPIRATION RATE: 17 BRPM | SYSTOLIC BLOOD PRESSURE: 97 MMHG | DIASTOLIC BLOOD PRESSURE: 60 MMHG | BODY MASS INDEX: 27.35 KG/M2 | OXYGEN SATURATION: 96 % | HEART RATE: 60 BPM

## 2024-06-26 DIAGNOSIS — R06.09 EXERTIONAL DYSPNEA: Primary | ICD-10-CM

## 2024-06-26 DIAGNOSIS — M48.062 SPINAL STENOSIS OF LUMBAR REGION WITH NEUROGENIC CLAUDICATION: ICD-10-CM

## 2024-06-26 DIAGNOSIS — M05.79 RHEUMATOID ARTHRITIS INVOLVING MULTIPLE SITES WITH POSITIVE RHEUMATOID FACTOR (H): ICD-10-CM

## 2024-06-26 DIAGNOSIS — E11.8 TYPE 2 DIABETES MELLITUS WITH COMPLICATION, WITHOUT LONG-TERM CURRENT USE OF INSULIN (H): Chronic | ICD-10-CM

## 2024-06-26 DIAGNOSIS — D50.0 IRON DEFICIENCY ANEMIA DUE TO CHRONIC BLOOD LOSS: ICD-10-CM

## 2024-06-26 DIAGNOSIS — N18.32 STAGE 3B CHRONIC KIDNEY DISEASE (H): ICD-10-CM

## 2024-06-26 DIAGNOSIS — I85.10 SECONDARY ESOPHAGEAL VARICES WITHOUT BLEEDING (H): ICD-10-CM

## 2024-06-26 DIAGNOSIS — I10 ESSENTIAL HYPERTENSION, BENIGN: ICD-10-CM

## 2024-06-26 LAB
CREAT UR-MCNC: 167 MG/DL
FERRITIN SERPL-MCNC: 47 NG/ML (ref 31–409)
HBA1C MFR BLD: 7 % (ref 0–5.6)
MICROALBUMIN UR-MCNC: <12 MG/L
MICROALBUMIN/CREAT UR: NORMAL MG/G{CREAT}

## 2024-06-26 PROCEDURE — 36415 COLL VENOUS BLD VENIPUNCTURE: CPT | Performed by: INTERNAL MEDICINE

## 2024-06-26 PROCEDURE — 82043 UR ALBUMIN QUANTITATIVE: CPT | Performed by: INTERNAL MEDICINE

## 2024-06-26 PROCEDURE — G2211 COMPLEX E/M VISIT ADD ON: HCPCS | Performed by: INTERNAL MEDICINE

## 2024-06-26 PROCEDURE — 82728 ASSAY OF FERRITIN: CPT | Performed by: INTERNAL MEDICINE

## 2024-06-26 PROCEDURE — 83036 HEMOGLOBIN GLYCOSYLATED A1C: CPT | Performed by: INTERNAL MEDICINE

## 2024-06-26 PROCEDURE — 99214 OFFICE O/P EST MOD 30 MIN: CPT | Performed by: INTERNAL MEDICINE

## 2024-06-26 PROCEDURE — 82570 ASSAY OF URINE CREATININE: CPT | Performed by: INTERNAL MEDICINE

## 2024-06-26 RX ORDER — METOPROLOL SUCCINATE 25 MG/1
25 TABLET, EXTENDED RELEASE ORAL DAILY
Qty: 90 TABLET | Refills: 4 | Status: SHIPPED | OUTPATIENT
Start: 2024-06-26 | End: 2024-07-26

## 2024-06-26 RX ORDER — RESPIRATORY SYNCYTIAL VIRUS VACCINE 120MCG/0.5
0.5 KIT INTRAMUSCULAR ONCE
Qty: 1 EACH | Refills: 0 | Status: CANCELLED | OUTPATIENT
Start: 2024-06-26 | End: 2024-06-26

## 2024-06-26 RX ORDER — DAPAGLIFLOZIN 10 MG/1
10 TABLET, FILM COATED ORAL DAILY
Qty: 90 TABLET | Refills: 1 | Status: SHIPPED | OUTPATIENT
Start: 2024-06-26 | End: 2024-07-02

## 2024-06-26 ASSESSMENT — PAIN SCALES - GENERAL: PAINLEVEL: MODERATE PAIN (5)

## 2024-06-26 NOTE — PATIENT INSTRUCTIONS
If dapagliflozin (Farxiga) is covered by insurance, then start this medication and stop pioglitazone.

## 2024-06-26 NOTE — PROGRESS NOTES
Office Visit - Follow Up   Frank Obando   78 year old male    Date of Visit: 6/26/2024    Chief Complaint   Patient presents with    Follow Up     3 month follow up - diabetes.  He has questions about all the specialists he has seen. He states they are not giving him answers.        Assessment and Plan   1. Exertional dyspnea  He is going to be following up with cardiology.  He is on propranolol for secondary prevention of esophageal bleeding but he is getting endoscopies annually.  My preference would be to stop this medication and see if some of his symptoms improve.  His heart rate is quite slow and his blood pressure is also low.  We will switch him to metoprolol once a day and then when I see him back we can likely stop this.  He will also be getting an echocardiogram and may have some diastolic dysfunction which could be addressed as well.    2. Spinal stenosis of lumbar region with neurogenic claudication  No significant stenosis noted on the MRI but symptoms very consistent with neurogenic claudication.  I did look at the MRI and there does appear to be a disc compressing the cord at L4-5.  I wonder if this could be more symptomatic from it looks.  We can revisit with the spine clinic pending cardiac evaluation med changes as above and below    3. Type 2 diabetes mellitus with complication, without long-term current use of insulin (H)  I would like him to stop Actos in case this could be contributing to some subtle fluid overload.  There is some potential benefit of Actos for liver fibrosis but I do not know that the benefit at this point outweighs the risk of sodium and fluid retention.  I would like him to start an SGLT2 inhibitor if covered by insurance.  Jardiance was cost prohibitive but maybe Farxiga is covered and this is sent in.  - Hemoglobin A1c; Future  - dapagliflozin (FARXIGA) 10 MG TABS tablet; Take 1 tablet (10 mg) by mouth daily  Dispense: 90 tablet; Refill: 1  - Hemoglobin  "A1c    4. Essential hypertension, benign  Changes in propranolol to metoprolol as above  - metoprolol succinate ER (TOPROL XL) 25 MG 24 hr tablet; Take 1 tablet (25 mg) by mouth daily  Dispense: 90 tablet; Refill: 4    5. Stage 3b chronic kidney disease (H)  - Albumin Random Urine Quantitative with Creat Ratio; Future  - dapagliflozin (FARXIGA) 10 MG TABS tablet; Take 1 tablet (10 mg) by mouth daily  Dispense: 90 tablet; Refill: 1  - Albumin Random Urine Quantitative with Creat Ratio    6. Iron deficiency anemia due to chronic blood loss  He has had some iron deficiency and is taking iron, will see where he is at 9  - Ferritin; Future  - Ferritin    7. Rheumatoid arthritis involving multiple sites with positive rheumatoid factor (H)  Continue with current management per rheumatology    8. Secondary esophageal varices without bleeding (H)  Annual esophageal varices screening is being performed therefore I do not know that we need to continue propranolol especially if it is contributing to his current symptoms    Return in about 4 weeks (around 7/24/2024) for Follow up.     History of Present Illness   This 78 year old man comes in for follow-up.  He has seen numerous specialist since this visit.  He saw the spine clinic, no significant stenosis noted on MRI.  He had vascular studies without any evidence of difficulty peripheral arterial disease he had a stress test which showed some abnormality but had an angiogram with nonobstructive disease.  An echocardiogram was recommended but he has not had this done yet.  He will be following up with cardiology       Physical Exam   General Appearance:   No acute distress    BP 97/60 (BP Location: Left arm, Patient Position: Sitting, Cuff Size: Adult Regular)   Pulse 60   Temp 97.7  F (36.5  C) (Tympanic)   Resp 17   Ht 1.778 m (5' 10\")   Wt 86.6 kg (191 lb)   SpO2 96%   BMI 27.41 kg/m      Heart rate controlled rhythm regular lungs clear to auscultation bilaterally no " edema     Additional Information   Current Outpatient Medications   Medication Sig Dispense Refill    acetaminophen (TYLENOL) 500 MG tablet Take 1 tablet (500 mg) by mouth every 6 hours as needed for mild pain      aspirin 81 MG EC tablet Take 1 tablet (81 mg) by mouth daily Start tomorrow morning. 90 tablet 3    atorvastatin (LIPITOR) 40 MG tablet TAKE 1 TABLET BY MOUTH DAILY 90 tablet 2    blood glucose (ACCU-CHEK JACOB PLUS) test strip 1 strip by In Vitro route 2 times daily 200 strip 1    blood glucose meter (GLUCOMETER) [BLOOD GLUCOSE METER (GLUCOMETER)] Use 1 each As Directed 2 (two) times a day. Dispense glucometer brand per patient's insurance at pharmacy discretion. 1 each 0    blood glucose monitoring (SOFTCLIX) lancets Use 1 each As Directed 2 (two) times a day. Dispense brand per patient's insurance at pharmacy discretion. - Does not apply 100 each 3    cyanocobalamin, vitamin B-12, 2,500 mcg Tab [CYANOCOBALAMIN, VITAMIN B-12, 2,500 MCG TAB] Take 2,500 mcg by mouth daily.      dapagliflozin (FARXIGA) 10 MG TABS tablet Take 1 tablet (10 mg) by mouth daily 90 tablet 1    ferrous sulfate (FEROSUL) 325 (65 Fe) MG tablet Take 1 tablet (325 mg) by mouth 3 times daily (with meals) 90 tablet 3    generic lancets (ACCU-CHEK SOFTCLIX LANCETS) [GENERIC LANCETS (ACCU-CHEK SOFTCLIX LANCETS)] Use 1 each As Directed 2 (two) times a day. Dispense brand per patient's insurance at pharmacy discretion. 100 each 3    inFLIXimab (REMICADE IV) Every 6weeks      lactobacillus rhamnosus, GG, (CULTURELL) capsule Take 1 capsule by mouth 3 times daily (before meals)      lisinopril (ZESTRIL) 10 MG tablet Take 1 tablet (10 mg) by mouth daily 90 tablet 3    magnesium oxide 250 mg Tab [MAGNESIUM OXIDE 250 MG TAB] Take 250 mg by mouth 2 (two) times a day.      metFORMIN (GLUCOPHAGE) 500 MG tablet TAKE 2 TABLETS BY MOUTH TWICE DAILY AT 6 AM AND AT 4  tablet 3    metoprolol succinate ER (TOPROL XL) 25 MG 24 hr tablet Take 1 tablet  (25 mg) by mouth daily 90 tablet 4    omeprazole (PRILOSEC) 40 MG DR capsule Take 1 capsule (40 mg) by mouth daily 90 capsule 2    pioglitazone (ACTOS) 30 MG tablet TAKE 1 TABLET BY MOUTH EVERY DAY 90 tablet 3    psyllium (METAMUCIL/KONSYL) capsule Take 3 capsules by mouth 2 times daily      sucralfate (CARAFATE) 1 GM/10ML suspension Take 10 mLs (1 g) by mouth 4 times daily 414 mL 0    tamsulosin (FLOMAX) 0.4 MG capsule TAKE 1 CAPSULE BY MOUTH EVERY DAY AFTER SUPPER 90 capsule 2       Time:     The longitudinal plan of care for the diagnosis(es)/condition(s) as documented were addressed during this visit. Due to the added complexity in care, I will continue to support Antonio in the subsequent management and with ongoing continuity of care.     Flaco Lopez MD  Answers submitted by the patient for this visit:  General Questionnaire (Submitted on 6/26/2024)  Chief Complaint: Chronic problems general questions HPI Form  What is the reason for your visit today? : check up  How many servings of fruits and vegetables do you eat daily?: 2-3  On average, how many sweetened beverages do you drink each day (Examples: soda, juice, sweet tea, etc.  Do NOT count diet or artificially sweetened beverages)?: 0  How many minutes a day do you exercise enough to make your heart beat faster?: 9 or less  How many days a week do you exercise enough to make your heart beat faster?: 3 or less  How many days per week do you miss taking your medication?: 0

## 2024-06-28 DIAGNOSIS — E11.65 TYPE 2 DIABETES MELLITUS WITH HYPERGLYCEMIA, WITHOUT LONG-TERM CURRENT USE OF INSULIN (H): ICD-10-CM

## 2024-06-28 DIAGNOSIS — K21.00 GASTROESOPHAGEAL REFLUX DISEASE WITH ESOPHAGITIS WITHOUT HEMORRHAGE: ICD-10-CM

## 2024-07-01 RX ORDER — LANCETS
EACH MISCELLANEOUS
Qty: 200 EACH | Refills: 0 | Status: SHIPPED | OUTPATIENT
Start: 2024-07-01

## 2024-07-01 RX ORDER — OMEPRAZOLE 40 MG/1
40 CAPSULE, DELAYED RELEASE ORAL DAILY
Qty: 90 CAPSULE | Refills: 2 | OUTPATIENT
Start: 2024-07-01

## 2024-07-02 ENCOUNTER — OFFICE VISIT (OUTPATIENT)
Dept: RHEUMATOLOGY | Facility: CLINIC | Age: 79
End: 2024-07-02
Payer: MEDICARE

## 2024-07-02 ENCOUNTER — HOSPITAL ENCOUNTER (OUTPATIENT)
Dept: GENERAL RADIOLOGY | Facility: HOSPITAL | Age: 79
Discharge: HOME | End: 2024-07-02
Attending: INTERNAL MEDICINE | Admitting: INTERNAL MEDICINE
Payer: MEDICARE

## 2024-07-02 VITALS
WEIGHT: 195.1 LBS | OXYGEN SATURATION: 97 % | DIASTOLIC BLOOD PRESSURE: 56 MMHG | HEART RATE: 64 BPM | BODY MASS INDEX: 27.99 KG/M2 | SYSTOLIC BLOOD PRESSURE: 104 MMHG

## 2024-07-02 DIAGNOSIS — Z79.899 HIGH RISK MEDICATION USE: ICD-10-CM

## 2024-07-02 DIAGNOSIS — R76.8 ANTINEUTROPHIL CYTOPLASMIC ANTIBODY (ANCA) POSITIVE: ICD-10-CM

## 2024-07-02 DIAGNOSIS — H44.112 PANUVEITIS OF LEFT EYE: Primary | ICD-10-CM

## 2024-07-02 DIAGNOSIS — M17.0 PRIMARY OSTEOARTHRITIS OF BOTH KNEES: ICD-10-CM

## 2024-07-02 DIAGNOSIS — M65.341 TRIGGER FINGER, RIGHT RING FINGER: ICD-10-CM

## 2024-07-02 PROCEDURE — 73562 X-RAY EXAM OF KNEE 3: CPT | Mod: 50

## 2024-07-02 PROCEDURE — 99214 OFFICE O/P EST MOD 30 MIN: CPT | Mod: 25 | Performed by: INTERNAL MEDICINE

## 2024-07-02 PROCEDURE — 20610 DRAIN/INJ JOINT/BURSA W/O US: CPT | Mod: RT | Performed by: INTERNAL MEDICINE

## 2024-07-02 RX ORDER — TRIAMCINOLONE ACETONIDE 40 MG/ML
40 INJECTION, SUSPENSION INTRA-ARTICULAR; INTRAMUSCULAR ONCE
Status: COMPLETED | OUTPATIENT
Start: 2024-07-02 | End: 2024-07-02

## 2024-07-02 RX ORDER — FERROUS SULFATE 325(65) MG
325 TABLET ORAL 3 TIMES DAILY
COMMUNITY

## 2024-07-02 RX ADMIN — TRIAMCINOLONE ACETONIDE 40 MG: 40 INJECTION, SUSPENSION INTRA-ARTICULAR; INTRAMUSCULAR at 10:59

## 2024-07-02 NOTE — PROGRESS NOTES
Rheumatology follow-up visit note     Frank is a 78 year old male presents today for follow-up.    Antonio was seen today for recheck.    Diagnoses and all orders for this visit:    Panuveitis of left eye    Antineutrophil cytoplasmic antibody (ANCA) positive    High risk medication use    Primary osteoarthritis of both knees  -     triamcinolone (KENALOG-40) injection 40 mg  -     ASPIRATION/INJECTION MAJOR JOINT  -     XR Knee Standing Bilateral 3 Views; Future    Trigger finger, right ring finger            From his description.  The uveitis is under good control with Remicade which is to continue.  He noted worsening pain in his knee on the right side.  He has minimal triggering of the right ring finger.  After pros and cons were discussed including risk of infection, bleeding, skin changes including thinning, pigmentary alteration and scarring to name a few, right knee injected as noted in the orders section. This was done with nontouch technique.  The patient tolerated the procedure well and had a brisk Marcaine effect.  The postinjection care was discussed.      Follow up in 3 months.    HPI    Frank Obando is a 78 year old male is here for follow-up of management of immunosuppression for left panuveitis.  He has worsening pain of the knees especially the right side.  He is known to have osteoarthritis.  He noted that he was seen in ophthalmology recently and was reassured.  He was told that he has developed cataract but surgery is to be deferred for now.  His recent labs are reviewed.  Mild leukopenia noted.  To be done again in the next 6 weeks.  He gets Remicade at [facility] every 6 weeks.  The left hand triggering digits have done well since the corticosteroid injections. ROS enquiry held for fever, ocular symptoms, rash, headache,  GI issues.  He has not had sinus symptoms, hemoptysis, hematuria, ear nose inflammation signals.         DETAILED EXAMINATION  07/02/24  :    Vitals:    07/02/24  1031   BP: 104/56   BP Location: Right arm   Patient Position: Sitting   Cuff Size: Adult Large   Pulse: 64   SpO2: 97%   Weight: 88.5 kg (195 lb 1.6 oz)     Alert oriented. Head including the face is examined for malar rash, heliotropes, scarring, lupus pernio. Eyes examined for redness such as in episcleritis/scleritis, periorbital lesions.   Neck/ Face examined for parotid gland swelling, range of motion of neck.  Left upper and lower and right upper and lower extremities examined for tenderness, swelling, warmth of the appendicular joints, range of motion, edema, rash.  Some of the important findings included: he does not have evidence of synovitis in the palpable joints of the upper extremities.  No significant deformities of the digits.  + Heberden nodes.  Range of motion of the shoulders   show full abduction.    JLT without effusion or warmth of the knees.  he does not have dactylitis of the digits.  He has mild triggering of the right ring finger with A1 nodularity which is minimally tender.      Patient Active Problem List    Diagnosis Date Noted    Exertional dyspnea 04/23/2024     Priority: Medium    Abnormal stress test 04/23/2024     Priority: Medium    Status post coronary angiogram 04/23/2024     Priority: Medium    Rheumatoid arthritis involving multiple sites with positive rheumatoid factor (H) 07/02/2023     Priority: Medium     Sees Dr Casas for inflammatory arthritis. Had uveitis also; on Remicade (July 2023)       Personal history of immunosuppressive therapy 07/02/2023     Priority: Medium     On Remicade for inflammatory arthritis described as rheumatoid.  Previously had been exposed to methotrexate and apparently it may have induced hepatic cirrhosis.      Stage 3b chronic kidney disease (H) 03/24/2023     Priority: Medium    Iron deficiency anemia due to chronic blood loss 11/02/2021     Priority: Medium    Angiodysplasia of stomach 09/27/2021     Priority: Medium    Secondary esophageal  varices without bleeding (H) 09/27/2021     Priority: Medium    Panuveitis of left eye, Dr. Abdi / Castillo 03/19/2019     Priority: Medium    Essential hypertension, benign      Priority: Medium     Created by Conversion  Replacement Utility updated for latest IMO load      Formatting of this note might be different from the original.  Created by Conversion    Replacement Utility updated for latest IMO load      Type 2 diabetes mellitus with complication, without long-term current use of insulin (H)      Priority: Medium     Created by Conversion      Formatting of this note might be different from the original.  Created by Conversion      Dyslipidemia      Priority: Medium    Thrombocytopenia (H24)      Priority: Medium    Cirrhosis of liver with ascites, unspecified hepatic cirrhosis type (H)      Priority: Medium     Non ETOH drinker; possibly from Methotrexate for RA RX       High risk medication use 09/26/2018     Priority: Medium    History of colonic polyps 05/07/2018     Priority: Medium    Antineutrophil cytoplasmic antibody (ANCA) positive 03/23/2017     Priority: Medium    Diabetic polyneuropathy associated with type 2 diabetes mellitus (H)      Priority: Medium     Created by Conversion  Replacement Utility updated for latest IMO load      Formatting of this note might be different from the original.  Created by Conversion    Replacement Utility updated for latest IMO load      Right bundle branch block 11/24/2014     Priority: Medium    H/o CVA ~2013      Priority: Medium     Created by Conversion         Past Surgical History:   Procedure Laterality Date    COLONOSCOPY N/A 11/16/2021    Procedure: COLONOSCOPY with polypectomy;  Surgeon: Dex Finch MD;  Location: [facility] GI    CV CORONARY ANGIOGRAM N/A 4/23/2024    Procedure: Coronary Angiogram;  Surgeon: Mark Jones MD;  Location: [facility] CATH LAB CV    CV INSTANTANEOUS WAVE-FREE RATIO N/A 4/23/2024    Procedure: Instantaneous  Wave-Free Ratio;  Surgeon: Mark Jones MD;  Location: [facility] CATH LAB CV    CV LEFT HEART CATH N/A 4/23/2024    Procedure: Left Heart Catheterization;  Surgeon: Mark Jones MD;  Location: [facility] CV    CYSTOSCOPY TUMOR / CONDYLOMATA W/ LASER Left 7/18/2014    ESOPHAGOSCOPY, GASTROSCOPY, DUODENOSCOPY (EGD), COMBINED N/A 11/16/2021    Procedure: ESOPHAGOGASTRODUODENOSCOPY (EGD) with biopsies and argon plasma coagulation;  Surgeon: Dex Finch MD;  Location: [facility] GI    ESOPHAGOSCOPY, GASTROSCOPY, DUODENOSCOPY (EGD), COMBINED N/A 9/29/2023    Procedure: ESOPHAGOGASTRODUODENOSCOPY with BIOPSY;  Surgeon: Alli Lozada MD;  Location: [facility] OR     CYSTOSCOPY,INSERT URETERAL STENT      Description: Cystoscopy With Insertion Of Ureteral Stent Right;  Recorded: 10/14/2009;  Comments: stone    LAPAROSCOPIC CHOLECYSTECTOMY N/A 7/1/2023    Procedure: CHOLECYSTECTOMY, LAPAROSCOPIC;  Surgeon: Jadiel Argueta MD;  Location: [facility] Main OR      Past Medical History:   Diagnosis Date    Anemia     Arthritis     osteoarthritis    Calculus of kidney     Diabetes mellitus (H)     Episcleritis of left eye     Hyperlipidemia     Hypertension     Iron deficiency anemia due to chronic blood loss 11/2/2021    Peripheral neuropathy     Stroke (H)      Allergies   Allergen Reactions    Morphine Nausea and Vomiting    Scopolamine Hbr [Scopolamine] Unknown     Current Outpatient Medications   Medication Sig Dispense Refill    acetaminophen (TYLENOL) 500 MG tablet Take 1 tablet (500 mg) by mouth every 6 hours as needed for mild pain      aspirin 81 MG EC tablet Take 1 tablet (81 mg) by mouth daily Start tomorrow morning. 90 tablet 3    atorvastatin (LIPITOR) 40 MG tablet TAKE 1 TABLET BY MOUTH DAILY 90 tablet 2    blood glucose (ACCU-CHEK JACOB PLUS) test strip 1 strip by In Vitro route 2 times daily 200 strip 1    blood glucose meter (GLUCOMETER) [BLOOD GLUCOSE METER (GLUCOMETER)]  Use 1 each As Directed 2 (two) times a day. Dispense glucometer brand per patient's insurance at pharmacy discretion. 1 each 0    blood glucose monitoring (SOFTCLIX) lancets USE 1 EACH TWICE DAILY 200 each 0    cyanocobalamin, vitamin B-12, 2,500 mcg Tab [CYANOCOBALAMIN, VITAMIN B-12, 2,500 MCG TAB] Take 2,500 mcg by mouth daily.      dapagliflozin (FARXIGA) 10 MG TABS tablet Take 1 tablet (10 mg) by mouth daily 90 tablet 1    generic lancets (ACCU-CHEK SOFTCLIX LANCETS) [GENERIC LANCETS (ACCU-CHEK SOFTCLIX LANCETS)] Use 1 each As Directed 2 (two) times a day. Dispense brand per patient's insurance at pharmacy discretion. 100 each 3    inFLIXimab (REMICADE IV) Every 6weeks      lactobacillus rhamnosus, GG, (CULTURELL) capsule Take 1 capsule by mouth 3 times daily (before meals)      lisinopril (ZESTRIL) 10 MG tablet Take 1 tablet (10 mg) by mouth daily 90 tablet 3    magnesium oxide 250 mg Tab [MAGNESIUM OXIDE 250 MG TAB] Take 250 mg by mouth 2 (two) times a day.      metFORMIN (GLUCOPHAGE) 500 MG tablet TAKE 2 TABLETS BY MOUTH TWICE DAILY AT 6 AM AND AT 4  tablet 3    metoprolol succinate ER (TOPROL XL) 25 MG 24 hr tablet Take 1 tablet (25 mg) by mouth daily 90 tablet 4    omeprazole (PRILOSEC) 40 MG DR capsule Take 1 capsule (40 mg) by mouth daily 90 capsule 2    pioglitazone (ACTOS) 30 MG tablet TAKE 1 TABLET BY MOUTH EVERY DAY 90 tablet 3    psyllium (METAMUCIL/KONSYL) capsule Take 3 capsules by mouth 2 times daily      sucralfate (CARAFATE) 1 GM/10ML suspension Take 10 mLs (1 g) by mouth 4 times daily 414 mL 0    tamsulosin (FLOMAX) 0.4 MG capsule TAKE 1 CAPSULE BY MOUTH EVERY DAY AFTER SUPPER 90 capsule 2     family history includes Coronary Artery Disease in his mother; Diabetes in his mother; Lung Cancer in his father; No Known Problems in his son.  Social Connections: Not on file          WBC Count   Date Value Ref Range Status   05/12/2024 6.4 4.0 - 11.0 10e3/uL Final     RBC Count   Date Value Ref  Range Status   05/12/2024 3.53 (L) 4.40 - 5.90 10e6/uL Final     Hemoglobin   Date Value Ref Range Status   05/12/2024 10.9 (L) 13.3 - 17.7 g/dL Final     Hematocrit   Date Value Ref Range Status   05/12/2024 32.1 (L) 40.0 - 53.0 % Final     MCV   Date Value Ref Range Status   05/12/2024 91 78 - 100 fL Final     MCH   Date Value Ref Range Status   05/12/2024 30.9 26.5 - 33.0 pg Final     Platelet Count   Date Value Ref Range Status   05/12/2024 134 (L) 150 - 450 10e3/uL Final     % Lymphocytes   Date Value Ref Range Status   05/12/2024 21 % Final     AST   Date Value Ref Range Status   05/12/2024 33 0 - 45 U/L Final     Comment:     Reference intervals for this test were updated on 6/12/2023 to more accurately reflect our healthy population. There may be differences in the flagging of prior results with similar values performed with this method. Interpretation of those prior results can be made in the context of the updated reference intervals.     ALT   Date Value Ref Range Status   05/12/2024 31 0 - 70 U/L Final     Comment:     Reference intervals for this test were updated on 6/12/2023 to more accurately reflect our healthy population. There may be differences in the flagging of prior results with similar values performed with this method. Interpretation of those prior results can be made in the context of the updated reference intervals.       Albumin   Date Value Ref Range Status   05/12/2024 4.1 3.5 - 5.2 g/dL Final   07/02/2023 2.8 (L) 3.5 - 5.0 g/dL Final     Alkaline Phosphatase   Date Value Ref Range Status   05/12/2024 79 40 - 150 U/L Final     Comment:     Reference intervals for this test were updated on 11/14/2023 to more accurately reflect our healthy population. There may be differences in the flagging of prior results with similar values performed with this method. Interpretation of those prior results can be made in the context of the updated reference intervals.     Creatinine   Date Value Ref  Range Status   05/12/2024 1.54 (H) 0.67 - 1.17 mg/dL Final     GFR Estimate   Date Value Ref Range Status   05/12/2024 46 (L) >60 mL/min/1.73m2 Final   02/08/2021 48 (L) >60 mL/min/1.73m2 Final     GFR Estimate If Black   Date Value Ref Range Status   02/08/2021 59 (L) >60 mL/min/1.73m2 Final     Creatinine Urine mg/dL   Date Value Ref Range Status   06/26/2024 167.0 mg/dL Final     Comment:     The reference ranges have not been established in urine creatinine. The results should be integrated into the clinical context for interpretation.   09/20/2021 116 mg/dL Final

## 2024-07-02 NOTE — PROGRESS NOTES
HEART CARE ENCOUNTER CONSULTATON NOTE      [facility]  [phone]      Assessment/Recommendations   Assessment:   Moderate nonobstructive coronary artery disease: Angiogram 4/23/2024 showing 60% stenosis of mid LAD, 30% and LM stenosis distally.  LCx and RCA with more mild disease.  Workup following abnormality on NM stress test and distal anterior LV.  LVEDP was normal and was without aortic valve stenosis.  - metoprolol succinate, aspirin  Dyspnea on exertion: Continued, stable. Transition from propranolol to metoprolol to today. HR is 73.  Appears compensated on exam.  Dyslipidemia: Controlled on atorvastatin  Hypertension: low normal on lisinopril, metoprolol succinate  T2DM      Plan:   Schedule echocardiogram  Continue metoprolol and appreciate effect on shortness of breath after.  Would favor continuation of beta-blocker if possible given CAD.        Follow up in 4-5 months with Dr. Oliveros     History of Present Illness/Subjective    HPI: Frank Obando is a 78 year old male with PMHx of nonobstructive CAD, dyspnea, HLD, T2DM presents for follow up.  Dyspnea with exertion, nonobstructive CAD found on coronary angiogram.  LVEDP normal and no evidence of stenosis.    Exertional dyspnea continued.  Out of breath walking into clinic today, resolved in a few minutes with of rest. Was transition from propranolol to metoprolol, evidence of slower heart rate and lower blood pressure.  Continues to deny chest pain.  Did not schedule echocardiogram yet suggested by Dr. Oliveros follow angiogram.  History of mild stable anemia on iron supplementation.  His work history of pesticide, .  Does recall inhaling pesticides, largely pyrethrin.  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  Some spontaneous bruising on aspirin is mild.    He denies lightheadedness, orthopnea, PND, palpitations, and lower extremity edema.        Coronary angiogram  4/23/2024  CONCLUSIONS:   Moderate nonobstructive coronary artery disease.  Normal left ventricular end-diastolic pressure.  No aortic valve stenosis.   LEFT MAIN: Large caliber vessel that bifurcates into left anterior descending and left circumflex arteries.  The proximal left main has mild, eccentric calcific disease followed by 30% calcific stenosis in the distal portion.  LEFT ANTERIOR DESCENDING: Large vessel that gives rise to multiple medium sized diagonal branches and septal perforators.  The LAD wraps around the apex distally.  The proximal LAD has moderate diffuse calcific stenosis followed by focal 60% calcific stenosis in the midportion.  Distally the LAD has mild diffuse disease.  Diagonal branches have mild to moderate diffuse disease.  iFR of the left anterior descending artery was 0.93.  LEFT CIRCUMFLEX: Nondominant large vessel that gives rise to a large OM1 branch, a large OM 2 branch, medium OM 3 branch, and terminates into a small vessel distally.  Proximal left circumflex has a 30 to 40% eccentric calcific stenosis mild by mild to moderate diffuse disease distally.  The OM1 branch has mild diffuse disease.  The OM 2 branch has 40% calcific stenosis in the proximal portion followed by mild diffuse disease distally.  The OM 3 branch has mild diffuse disease.  RIGHT CORONARY ARTERY: Large dominant vessel that gives rise to the right posterior descending artery and posterolateral system.  The right coronary artery and its branches have mild to moderate diffuse disease.     Physical Examination  Review of Systems   Vitals: BP 96/52 (BP Location: Right arm, Patient Position: Sitting, Cuff Size: Adult Large)   Pulse 73   Resp 18   Wt 89.4 kg (197 lb)   SpO2 97%   BMI 28.27 kg/m    BMI= Body mass index is 28.27 kg/m .  Wt Readings from Last 3 Encounters:   07/03/24 89.4 kg (197 lb)   07/02/24 88.5 kg (195 lb 1.6 oz)   06/26/24 86.6 kg (191 lb)           ENT/Mouth: membranes moist, no oral lesions  or bleeding gums.      EYES:  no scleral icterus, normal conjunctivae       Chest/Lungs:   lungs are clear to auscultation, no rales or wheezing,  equal chest wall expansion    Cardiovascular:   Regular. Normal first and second heart sounds with no murmurs, rubs, or gallops; the radial pulses are intact, absent edema bilaterally        Extremities: no cyanosis or clubbing   Skin: no xanthelasma, warm.    Neurologic: normal  bilateral, no tremors     Psychiatric: alert and oriented x3, calm        Please refer above for cardiac ROS details.        Medical History  Surgical History Family History Social History   Past Medical History:   Diagnosis Date    Anemia     Arthritis     osteoarthritis    Calculus of kidney     Diabetes mellitus (H)     Episcleritis of left eye     Hyperlipidemia     Hypertension     Iron deficiency anemia due to chronic blood loss 11/2/2021    Peripheral neuropathy     Stroke (H)      Past Surgical History:   Procedure Laterality Date    COLONOSCOPY N/A 11/16/2021    Procedure: COLONOSCOPY with polypectomy;  Surgeon: Dex Finch MD;  Location: [facility]    CV CORONARY ANGIOGRAM N/A 4/23/2024    Procedure: Coronary Angiogram;  Surgeon: Mark Jones MD;  Location: [facility]    CV INSTANTANEOUS WAVE-FREE RATIO N/A 4/23/2024    Procedure: Instantaneous Wave-Free Ratio;  Surgeon: Mark Jones MD;  Location: [facility]    CV LEFT HEART CATH N/A 4/23/2024    Procedure: Left Heart Catheterization;  Surgeon: Mark Jones MD;  Location: [facility] CV    CYSTOSCOPY TUMOR / CONDYLOMATA W/ LASER Left 7/18/2014    ESOPHAGOSCOPY, GASTROSCOPY, DUODENOSCOPY (EGD), COMBINED N/A 11/16/2021    Procedure: ESOPHAGOGASTRODUODENOSCOPY (EGD) with biopsies and argon plasma coagulation;  Surgeon: Dex Finch MD;  Location: [facility]    ESOPHAGOSCOPY, GASTROSCOPY, DUODENOSCOPY (EGD), COMBINED N/A 9/29/2023    Procedure:  ESOPHAGOGASTRODUODENOSCOPY with BIOPSY;  Surgeon: Alli Lozada MD;  Location: [facility] Main OR    HC CYSTOSCOPY,INSERT URETERAL STENT      Description: Cystoscopy With Insertion Of Ureteral Stent Right;  Recorded: 10/14/2009;  Comments: stone    LAPAROSCOPIC CHOLECYSTECTOMY N/A 7/1/2023    Procedure: CHOLECYSTECTOMY, LAPAROSCOPIC;  Surgeon: Jadiel Argueta MD;  Location: [facility] OR     Family History   Problem Relation Age of Onset    Coronary Artery Disease Mother     Diabetes Mother     Lung Cancer Father     No Known Problems Son         Social History     Socioeconomic History    Marital status:      Spouse name: Not on file    Number of children: Not on file    Years of education: Not on file    Highest education level: Not on file   Occupational History    Not on file   Tobacco Use    Smoking status: Never    Smokeless tobacco: Never   Vaping Use    Vaping status: Never Used   Substance and Sexual Activity    Alcohol use: No    Drug use: No    Sexual activity: Not on file   Other Topics Concern    Parent/sibling w/ CABG, MI or angioplasty before 65F 55M? Not Asked   Social History Narrative    Lives with his wife, Valerie.  Volunteers at [employer].  Retired  of [employer].  One son, Jaison.      Social Determinants of Health     Financial Resource Strain: Low Risk  (10/6/2023)    Financial Resource Strain     Within the past 12 months, have you or your family members you live with been unable to get utilities (heat, electricity) when it was really needed?: No   Food Insecurity: Low Risk  (10/6/2023)    Food Insecurity     Within the past 12 months, did you worry that your food would run out before you got money to buy more?: No     Within the past 12 months, did the food you bought just not last and you didn t have money to get more?: No   Transportation Needs: Low Risk  (10/6/2023)    Transportation Needs     Within the past 12 months, has lack of transportation  kept you from medical appointments, getting your medicines, non-medical meetings or appointments, work, or from getting things that you need?: No   Physical Activity: Not on file   Stress: Not on file   Social Connections: Not on file   Interpersonal Safety: Low Risk  (6/26/2024)    Interpersonal Safety     Do you feel physically and emotionally safe where you currently live?: Yes     Within the past 12 months, have you been hit, slapped, kicked or otherwise physically hurt by someone?: No     Within the past 12 months, have you been humiliated or emotionally abused in other ways by your partner or ex-partner?: No   Housing Stability: Low Risk  (10/6/2023)    Housing Stability     Do you have housing? : Yes     Are you worried about losing your housing?: No           Medications  Allergies   Current Outpatient Medications   Medication Sig Dispense Refill    acetaminophen (TYLENOL) 500 MG tablet Take 1 tablet (500 mg) by mouth every 6 hours as needed for mild pain      aspirin 81 MG EC tablet Take 1 tablet (81 mg) by mouth daily Start tomorrow morning. 90 tablet 3    atorvastatin (LIPITOR) 40 MG tablet TAKE 1 TABLET BY MOUTH DAILY 90 tablet 2    blood glucose (ACCU-CHEK JACOB PLUS) test strip 1 strip by In Vitro route 2 times daily 200 strip 1    blood glucose meter (GLUCOMETER) [BLOOD GLUCOSE METER (GLUCOMETER)] Use 1 each As Directed 2 (two) times a day. Dispense glucometer brand per patient's insurance at pharmacy discretion. 1 each 0    blood glucose monitoring (SOFTCLIX) lancets USE 1 EACH TWICE DAILY 200 each 0    cyanocobalamin, vitamin B-12, 2,500 mcg Tab [CYANOCOBALAMIN, VITAMIN B-12, 2,500 MCG TAB] Take 2,500 mcg by mouth daily.      ferrous sulfate (FEROSUL) 325 (65 Fe) MG tablet Take 325 mg by mouth 3 times daily      generic lancets (ACCU-CHEK SOFTCLIX LANCETS) [GENERIC LANCETS (ACCU-CHEK SOFTCLIX LANCETS)] Use 1 each As Directed 2 (two) times a day. Dispense brand per patient's insurance at pharmacy  discretion. 100 each 3    inFLIXimab (REMICADE IV) Every 6weeks      lisinopril (ZESTRIL) 10 MG tablet Take 1 tablet (10 mg) by mouth daily 90 tablet 3    magnesium oxide 250 mg Tab [MAGNESIUM OXIDE 250 MG TAB] Take 250 mg by mouth 2 (two) times a day.      metFORMIN (GLUCOPHAGE) 500 MG tablet TAKE 2 TABLETS BY MOUTH TWICE DAILY AT 6 AM AND AT 4  tablet 3    metoprolol succinate ER (TOPROL XL) 25 MG 24 hr tablet Take 1 tablet (25 mg) by mouth daily 90 tablet 4    omeprazole (PRILOSEC) 40 MG DR capsule Take 1 capsule (40 mg) by mouth daily 90 capsule 2    pioglitazone (ACTOS) 30 MG tablet TAKE 1 TABLET BY MOUTH EVERY DAY 90 tablet 3    psyllium (METAMUCIL/KONSYL) capsule Take 3 capsules by mouth 2 times daily      tamsulosin (FLOMAX) 0.4 MG capsule TAKE 1 CAPSULE BY MOUTH EVERY DAY AFTER SUPPER 90 capsule 2       Allergies   Allergen Reactions    Morphine Nausea and Vomiting    Scopolamine Hbr [Scopolamine] Unknown          Lab Results    Chemistry/lipid CBC Cardiac Enzymes/BNP/TSH/INR   Recent Labs   Lab Test 04/23/24  0702   CHOL 126   HDL 60   LDL 47   TRIG 93     Recent Labs   Lab Test 04/23/24  0702 04/04/23  1001 04/29/22  1240   LDL 47 46 65     Recent Labs   Lab Test 05/12/24  1429      POTASSIUM 4.5   CHLORIDE 105   CO2 22   *   BUN 19.6   CR 1.54*   GFRESTIMATED 46*   CINDY 9.1     Recent Labs   Lab Test 05/12/24  1429 04/23/24  0702 03/27/24  1058   CR 1.54* 1.33* 1.33*     Recent Labs   Lab Test 06/26/24  0731 02/16/24  1210 10/06/23  0942   A1C 7.0* 7.2* 6.7*          Recent Labs   Lab Test 05/12/24  1429   WBC 6.4   HGB 10.9*   HCT 32.1*   MCV 91   *     Recent Labs   Lab Test 05/12/24  1429 04/23/24  0702 03/27/24  1058   HGB 10.9* 10.6* 10.3*    Recent Labs   Lab Test 06/29/23  0316 06/28/23  2304   TROPONINI <0.01 <0.01     Recent Labs   Lab Test 02/16/24  1210 12/21/18  0106 08/06/18  0830   BNP  --  35 25   NTBNP 164  --   --      Recent Labs   Lab Test 11/18/22  0845   TSH  2.37     Recent Labs   Lab Test 05/12/24  1429 10/18/23  0942 07/01/23  1215   INR 1.00 1.0 1.34*          This note has been dictated using voice recognition software. Any grammatical, typographical, or context distortions are unintentional and inherent to the software    Manisha Gomez PA-C

## 2024-07-03 ENCOUNTER — OFFICE VISIT (OUTPATIENT)
Dept: CARDIOLOGY | Facility: CLINIC | Age: 79
End: 2024-07-03
Payer: MEDICARE

## 2024-07-03 VITALS
RESPIRATION RATE: 18 BRPM | SYSTOLIC BLOOD PRESSURE: 96 MMHG | DIASTOLIC BLOOD PRESSURE: 52 MMHG | HEART RATE: 73 BPM | BODY MASS INDEX: 28.27 KG/M2 | WEIGHT: 197 LBS | OXYGEN SATURATION: 97 %

## 2024-07-03 DIAGNOSIS — R06.09 EXERTIONAL DYSPNEA: Primary | ICD-10-CM

## 2024-07-03 DIAGNOSIS — I25.10 CORONARY ARTERY DISEASE INVOLVING NATIVE CORONARY ARTERY OF NATIVE HEART WITHOUT ANGINA PECTORIS: ICD-10-CM

## 2024-07-03 DIAGNOSIS — I10 ESSENTIAL HYPERTENSION, BENIGN: ICD-10-CM

## 2024-07-03 PROCEDURE — G2211 COMPLEX E/M VISIT ADD ON: HCPCS

## 2024-07-03 PROCEDURE — 99214 OFFICE O/P EST MOD 30 MIN: CPT

## 2024-07-03 NOTE — LETTER
7/3/2024    Flaco Lopez MD  [address] [facility] [address]    RE: Frank Obando       Dear Colleague,     I had the pleasure of seeing Frank Obando in the [facility].    HEART CARE ENCOUNTER CONSULTATON NOTE      [facility]  [phone]      Assessment/Recommendations   Assessment:   Moderate nonobstructive coronary artery disease: Angiogram 4/23/2024 showing 60% stenosis of mid LAD, 30% and LM stenosis distally.  LCx and RCA with more mild disease.  Workup following abnormality on NM stress test and distal anterior LV.  LVEDP was normal and was without aortic valve stenosis.  - metoprolol succinate, aspirin  Dyspnea on exertion: Continued, stable. Transition from propranolol to metoprolol to today. HR is 73.  Appears compensated on exam.  Dyslipidemia: Controlled on atorvastatin  Hypertension: low normal on lisinopril, metoprolol succinate  T2DM      Plan:   Schedule echocardiogram  Continue metoprolol and appreciate effect on shortness of breath after.  Would favor continuation of beta-blocker if possible given CAD.        Follow up in 4-5 months with Dr. Oliveros     History of Present Illness/Subjective    HPI: Frank Obando is a 78 year old male with PMHx of nonobstructive CAD, dyspnea, HLD, T2DM presents for follow up.  Dyspnea with exertion, nonobstructive CAD found on coronary angiogram.  LVEDP normal and no evidence of stenosis.    Exertional dyspnea continued.  Out of breath walking into clinic today, resolved in a few minutes with of rest. Was transition from propranolol to metoprolol, evidence of slower heart rate and lower blood pressure.  Continues to deny chest pain.  Did not schedule echocardiogram yet suggested by Dr. Oliveros follow angiogram.  History of mild stable anemia on iron supplementation.  His work history of pesticide, .  Does recall inhaling pesticides, largely pyrethrin.  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  Some spontaneous bruising on aspirin is mild.    He denies lightheadedness, orthopnea, PND, palpitations, and lower extremity edema.        Coronary angiogram 4/23/2024  CONCLUSIONS:   Moderate nonobstructive coronary artery disease.  Normal left ventricular end-diastolic pressure.  No aortic valve stenosis.   LEFT MAIN: Large caliber vessel that bifurcates into left anterior descending and left circumflex arteries.  The proximal left main has mild, eccentric calcific disease followed by 30% calcific stenosis in the distal portion.  LEFT ANTERIOR DESCENDING: Large vessel that gives rise to multiple medium sized diagonal branches and septal perforators.  The LAD wraps around the apex distally.  The proximal LAD has moderate diffuse calcific stenosis followed by focal 60% calcific stenosis in the midportion.  Distally the LAD has mild diffuse disease.  Diagonal branches have mild to moderate diffuse disease.  iFR of the left anterior descending artery was 0.93.  LEFT CIRCUMFLEX: Nondominant large vessel that gives rise to a large OM1 branch, a large OM 2 branch, medium OM 3 branch, and terminates into a small vessel distally.  Proximal left circumflex has a 30 to 40% eccentric calcific stenosis mild by mild to moderate diffuse disease distally.  The OM1 branch has mild diffuse disease.  The OM 2 branch has 40% calcific stenosis in the proximal portion followed by mild diffuse disease distally.  The OM 3 branch has mild diffuse disease.  RIGHT CORONARY ARTERY: Large dominant vessel that gives rise to the right posterior descending artery and posterolateral system.  The right coronary artery and its branches have mild to moderate diffuse disease.     Physical Examination  Review of Systems   Vitals: BP 96/52 (BP Location: Right arm, Patient Position: Sitting, Cuff Size: Adult Large)   Pulse 73   Resp 18   Wt 89.4 kg (197 lb)   SpO2 97%   BMI 28.27 kg/m     BMI= Body mass index is 28.27 kg/m .  Wt Readings from Last 3 Encounters:   07/03/24 89.4 kg (197 lb)   07/02/24 88.5 kg (195 lb 1.6 oz)   06/26/24 86.6 kg (191 lb)           ENT/Mouth: membranes moist, no oral lesions or bleeding gums.      EYES:  no scleral icterus, normal conjunctivae       Chest/Lungs:   lungs are clear to auscultation, no rales or wheezing,  equal chest wall expansion    Cardiovascular:   Regular. Normal first and second heart sounds with no murmurs, rubs, or gallops; the radial pulses are intact, absent edema bilaterally        Extremities: no cyanosis or clubbing   Skin: no xanthelasma, warm.    Neurologic: normal  bilateral, no tremors     Psychiatric: alert and oriented x3, calm        Please refer above for cardiac ROS details.        Medical History  Surgical History Family History Social History   Past Medical History:   Diagnosis Date    Anemia     Arthritis     osteoarthritis    Calculus of kidney     Diabetes mellitus (H)     Episcleritis of left eye     Hyperlipidemia     Hypertension     Iron deficiency anemia due to chronic blood loss 11/2/2021    Peripheral neuropathy     Stroke (H)      Past Surgical History:   Procedure Laterality Date    COLONOSCOPY N/A 11/16/2021    Procedure: COLONOSCOPY with polypectomy;  Surgeon: Dex Finch MD;  Location: [facility]    CV CORONARY ANGIOGRAM N/A 4/23/2024    Procedure: Coronary Angiogram;  Surgeon: Mark Jones MD;  Location: [facility] CV    CV INSTANTANEOUS WAVE-FREE RATIO N/A 4/23/2024    Procedure: Instantaneous Wave-Free Ratio;  Surgeon: Mark Jones MD;  Location: [facility] CV    CV LEFT HEART CATH N/A 4/23/2024    Procedure: Left Heart Catheterization;  Surgeon: Mark Jones MD;  Location: [facility] CV    CYSTOSCOPY TUMOR / CONDYLOMATA W/ LASER Left 7/18/2014    ESOPHAGOSCOPY, GASTROSCOPY, DUODENOSCOPY (EGD), COMBINED N/A 11/16/2021    Procedure: ESOPHAGOGASTRODUODENOSCOPY  (EGD) with biopsies and argon plasma coagulation;  Surgeon: Dex Finch MD;  Location: [facility] GI    ESOPHAGOSCOPY, GASTROSCOPY, DUODENOSCOPY (EGD), COMBINED N/A 9/29/2023    Procedure: ESOPHAGOGASTRODUODENOSCOPY with BIOPSY;  Surgeon: Alli Lozada MD;  Location: [facility] Main OR    HC CYSTOSCOPY,INSERT URETERAL STENT      Description: Cystoscopy With Insertion Of Ureteral Stent Right;  Recorded: 10/14/2009;  Comments: stone    LAPAROSCOPIC CHOLECYSTECTOMY N/A 7/1/2023    Procedure: CHOLECYSTECTOMY, LAPAROSCOPIC;  Surgeon: Jadiel Argueta MD;  Location: [facility] Main OR     Family History   Problem Relation Age of Onset    Coronary Artery Disease Mother     Diabetes Mother     Lung Cancer Father     No Known Problems Son         Social History     Socioeconomic History    Marital status:      Spouse name: Not on file    Number of children: Not on file    Years of education: Not on file    Highest education level: Not on file   Occupational History    Not on file   Tobacco Use    Smoking status: Never    Smokeless tobacco: Never   Vaping Use    Vaping status: Never Used   Substance and Sexual Activity    Alcohol use: No    Drug use: No    Sexual activity: Not on file   Other Topics Concern    Parent/sibling w/ CABG, MI or angioplasty before 65F 55M? Not Asked   Social History Narrative    Lives with his wife, Valerie.  Volunteers at [employer].  Retired  of [employer].  One son, Jaison.      Social Determinants of Health     Financial Resource Strain: Low Risk  (10/6/2023)    Financial Resource Strain     Within the past 12 months, have you or your family members you live with been unable to get utilities (heat, electricity) when it was really needed?: No   Food Insecurity: Low Risk  (10/6/2023)    Food Insecurity     Within the past 12 months, did you worry that your food would run out before you got money to buy more?: No     Within the past 12 months, did the  food you bought just not last and you didn t have money to get more?: No   Transportation Needs: Low Risk  (10/6/2023)    Transportation Needs     Within the past 12 months, has lack of transportation kept you from medical appointments, getting your medicines, non-medical meetings or appointments, work, or from getting things that you need?: No   Physical Activity: Not on file   Stress: Not on file   Social Connections: Not on file   Interpersonal Safety: Low Risk  (6/26/2024)    Interpersonal Safety     Do you feel physically and emotionally safe where you currently live?: Yes     Within the past 12 months, have you been hit, slapped, kicked or otherwise physically hurt by someone?: No     Within the past 12 months, have you been humiliated or emotionally abused in other ways by your partner or ex-partner?: No   Housing Stability: Low Risk  (10/6/2023)    Housing Stability     Do you have housing? : Yes     Are you worried about losing your housing?: No           Medications  Allergies   Current Outpatient Medications   Medication Sig Dispense Refill    acetaminophen (TYLENOL) 500 MG tablet Take 1 tablet (500 mg) by mouth every 6 hours as needed for mild pain      aspirin 81 MG EC tablet Take 1 tablet (81 mg) by mouth daily Start tomorrow morning. 90 tablet 3    atorvastatin (LIPITOR) 40 MG tablet TAKE 1 TABLET BY MOUTH DAILY 90 tablet 2    blood glucose (ACCU-CHEK JACOB PLUS) test strip 1 strip by In Vitro route 2 times daily 200 strip 1    blood glucose meter (GLUCOMETER) [BLOOD GLUCOSE METER (GLUCOMETER)] Use 1 each As Directed 2 (two) times a day. Dispense glucometer brand per patient's insurance at pharmacy discretion. 1 each 0    blood glucose monitoring (SOFTCLIX) lancets USE 1 EACH TWICE DAILY 200 each 0    cyanocobalamin, vitamin B-12, 2,500 mcg Tab [CYANOCOBALAMIN, VITAMIN B-12, 2,500 MCG TAB] Take 2,500 mcg by mouth daily.      ferrous sulfate (FEROSUL) 325 (65 Fe) MG tablet Take 325 mg by mouth 3 times  daily      generic lancets (ACCU-CHEK SOFTCLIX LANCETS) [GENERIC LANCETS (ACCU-CHEK SOFTCLIX LANCETS)] Use 1 each As Directed 2 (two) times a day. Dispense brand per patient's insurance at pharmacy discretion. 100 each 3    inFLIXimab (REMICADE IV) Every 6weeks      lisinopril (ZESTRIL) 10 MG tablet Take 1 tablet (10 mg) by mouth daily 90 tablet 3    magnesium oxide 250 mg Tab [MAGNESIUM OXIDE 250 MG TAB] Take 250 mg by mouth 2 (two) times a day.      metFORMIN (GLUCOPHAGE) 500 MG tablet TAKE 2 TABLETS BY MOUTH TWICE DAILY AT 6 AM AND AT 4  tablet 3    metoprolol succinate ER (TOPROL XL) 25 MG 24 hr tablet Take 1 tablet (25 mg) by mouth daily 90 tablet 4    omeprazole (PRILOSEC) 40 MG DR capsule Take 1 capsule (40 mg) by mouth daily 90 capsule 2    pioglitazone (ACTOS) 30 MG tablet TAKE 1 TABLET BY MOUTH EVERY DAY 90 tablet 3    psyllium (METAMUCIL/KONSYL) capsule Take 3 capsules by mouth 2 times daily      tamsulosin (FLOMAX) 0.4 MG capsule TAKE 1 CAPSULE BY MOUTH EVERY DAY AFTER SUPPER 90 capsule 2       Allergies   Allergen Reactions    Morphine Nausea and Vomiting    Scopolamine Hbr [Scopolamine] Unknown          Lab Results    Chemistry/lipid CBC Cardiac Enzymes/BNP/TSH/INR   Recent Labs   Lab Test 04/23/24  0702   CHOL 126   HDL 60   LDL 47   TRIG 93     Recent Labs   Lab Test 04/23/24  0702 04/04/23  1001 04/29/22  1240   LDL 47 46 65     Recent Labs   Lab Test 05/12/24  1429      POTASSIUM 4.5   CHLORIDE 105   CO2 22   *   BUN 19.6   CR 1.54*   GFRESTIMATED 46*   CINDY 9.1     Recent Labs   Lab Test 05/12/24  1429 04/23/24  0702 03/27/24  1058   CR 1.54* 1.33* 1.33*     Recent Labs   Lab Test 06/26/24  0731 02/16/24  1210 10/06/23  0942   A1C 7.0* 7.2* 6.7*          Recent Labs   Lab Test 05/12/24  1429   WBC 6.4   HGB 10.9*   HCT 32.1*   MCV 91   *     Recent Labs   Lab Test 05/12/24  1429 04/23/24  0702 03/27/24  1058   HGB 10.9* 10.6* 10.3*    Recent Labs   Lab Test 06/29/23  0312  06/28/23  2304   TROPONINI <0.01 <0.01     Recent Labs   Lab Test 02/16/24  1210 12/21/18  0106 08/06/18  0830   BNP  --  35 25   NTBNP 164  --   --      Recent Labs   Lab Test 11/18/22  0845   TSH 2.37     Recent Labs   Lab Test 05/12/24  1429 10/18/23  0942 07/01/23  1215   INR 1.00 1.0 1.34*          This note has been dictated using voice recognition software. Any grammatical, typographical, or context distortions are unintentional and inherent to the software    Manisha Gomez PA-C      Thank you for allowing me to participate in the care of your patient.      Sincerely,     Manisha Hughes PA-C     [facility]  cc:   Manisha Gomez PA-C  [address]  [address]  [place] [address]

## 2024-07-03 NOTE — PATIENT INSTRUCTIONS
It was a pleasure taking part in your care today:    - Schedule echocardiogram  - Continue metoprolol leading up to visit with Dr. Lopez, and appreciate change on shortness of breath off of propranolol  - Follow up with Dr. Oliveros in 4-5 months    Please call the [facility] clinic with any questions or concerns at [phone].     Manisha Gomez PA-C

## 2024-07-09 DIAGNOSIS — I10 ESSENTIAL HYPERTENSION, MALIGNANT: ICD-10-CM

## 2024-07-09 DIAGNOSIS — E11.9 TYPE 2 DIABETES MELLITUS (H): ICD-10-CM

## 2024-07-09 DIAGNOSIS — E11.65 TYPE 2 DIABETES MELLITUS WITH HYPERGLYCEMIA, WITHOUT LONG-TERM CURRENT USE OF INSULIN (H): ICD-10-CM

## 2024-07-09 RX ORDER — PIOGLITAZONEHYDROCHLORIDE 30 MG/1
TABLET ORAL
Qty: 90 TABLET | Refills: 3 | OUTPATIENT
Start: 2024-07-09

## 2024-07-09 RX ORDER — LISINOPRIL 10 MG/1
10 TABLET ORAL DAILY
Qty: 90 TABLET | Refills: 3 | OUTPATIENT
Start: 2024-07-09

## 2024-07-09 NOTE — TELEPHONE ENCOUNTER
Pt stating with the Pioglitazone his last refill was for only 7 days, he is requesting a 90 days supply as all of his other meds are

## 2024-07-12 ENCOUNTER — HOSPITAL ENCOUNTER (OUTPATIENT)
Dept: CARDIOLOGY | Facility: CLINIC | Age: 79
Discharge: HOME | End: 2024-07-12
Attending: INTERNAL MEDICINE | Admitting: INTERNAL MEDICINE
Payer: MEDICARE

## 2024-07-12 DIAGNOSIS — R06.09 EXERTIONAL DYSPNEA: ICD-10-CM

## 2024-07-12 DIAGNOSIS — R94.39 ABNORMAL STRESS TEST: ICD-10-CM

## 2024-07-12 LAB — LVEF ECHO: NORMAL

## 2024-07-12 PROCEDURE — 93306 TTE W/DOPPLER COMPLETE: CPT | Mod: 26 | Performed by: INTERNAL MEDICINE

## 2024-07-12 PROCEDURE — 93306 TTE W/DOPPLER COMPLETE: CPT

## 2024-07-26 ENCOUNTER — OFFICE VISIT (OUTPATIENT)
Dept: INTERNAL MEDICINE | Facility: CLINIC | Age: 79
End: 2024-07-26
Payer: MEDICARE

## 2024-07-26 VITALS
HEIGHT: 70 IN | TEMPERATURE: 97.3 F | BODY MASS INDEX: 27.62 KG/M2 | RESPIRATION RATE: 16 BRPM | DIASTOLIC BLOOD PRESSURE: 53 MMHG | SYSTOLIC BLOOD PRESSURE: 87 MMHG | HEART RATE: 63 BPM | OXYGEN SATURATION: 97 % | WEIGHT: 192.9 LBS

## 2024-07-26 DIAGNOSIS — R06.09 EXERTIONAL DYSPNEA: Primary | ICD-10-CM

## 2024-07-26 DIAGNOSIS — D50.0 IRON DEFICIENCY ANEMIA DUE TO CHRONIC BLOOD LOSS: ICD-10-CM

## 2024-07-26 DIAGNOSIS — K74.60 CIRRHOSIS OF LIVER WITH ASCITES, UNSPECIFIED HEPATIC CIRRHOSIS TYPE (H): ICD-10-CM

## 2024-07-26 DIAGNOSIS — I85.10 SECONDARY ESOPHAGEAL VARICES WITHOUT BLEEDING (H): ICD-10-CM

## 2024-07-26 DIAGNOSIS — R18.8 CIRRHOSIS OF LIVER WITH ASCITES, UNSPECIFIED HEPATIC CIRRHOSIS TYPE (H): ICD-10-CM

## 2024-07-26 DIAGNOSIS — E11.8 TYPE 2 DIABETES MELLITUS WITH COMPLICATION, WITHOUT LONG-TERM CURRENT USE OF INSULIN (H): Chronic | ICD-10-CM

## 2024-07-26 PROBLEM — R94.39 ABNORMAL STRESS TEST: Status: RESOLVED | Noted: 2024-04-23 | Resolved: 2024-07-26

## 2024-07-26 PROCEDURE — 99214 OFFICE O/P EST MOD 30 MIN: CPT | Performed by: INTERNAL MEDICINE

## 2024-07-26 PROCEDURE — G2211 COMPLEX E/M VISIT ADD ON: HCPCS | Performed by: INTERNAL MEDICINE

## 2024-07-26 NOTE — PROGRESS NOTES
"  Office Visit - Follow Up   Frank Obando   79 year old male    Date of Visit: 7/26/2024    Chief Complaint   Patient presents with    Follow Up     1 month f/u  Was told to stop taking iron but noticed a change in his energy levels so he is still taking this.        Assessment and Plan   1. Exertional dyspnea  Reviewed echocardiogram, normal, labs, cardiology evaluation.  I would like him to stop metoprolol now and see if he feels better off of this medication    2. Cirrhosis of liver with ascites, unspecified hepatic cirrhosis type (H)  3. Secondary esophageal varices without bleeding (H)  Stop propranolol and can continue with EGDs for esophageal varices surveillance, stop pioglitazone as could be contributing to his symptoms, ideally would start Jardiance but this is cost prohibitive at this point    4. Type 2 diabetes mellitus with complication, without long-term current use of insulin (H)  Stopping pioglitazone, consider SGLT2 inhibitor if we can figure out cost component    5. Iron deficiency anemia due to chronic blood loss  Hemoglobin now normal, remains on iron per his preference    Return in about 6 weeks (around 9/6/2024) for Follow up.     History of Present Illness   This 79 year old man comes in for follow-up.  He is feeling about the same.  He did stop propranolol and start metoprolol.  Still feeling short of breath and some fatigue in his legs when he walks, better if he leans forward.       Physical Exam   General Appearance:   No acute distress    BP (!) 87/53 (BP Location: Left arm, Patient Position: Sitting, Cuff Size: Adult Regular)   Pulse 63   Temp 97.3  F (36.3  C) (Tympanic)   Resp 16   Ht 1.778 m (5' 10\")   Wt 87.5 kg (192 lb 14.4 oz)   SpO2 97%   BMI 27.68 kg/m           Additional Information   Current Outpatient Medications   Medication Sig Dispense Refill    acetaminophen (TYLENOL) 500 MG tablet Take 1 tablet (500 mg) by mouth every 6 hours as needed for mild pain      " aspirin 81 MG EC tablet Take 1 tablet (81 mg) by mouth daily Start tomorrow morning. 90 tablet 3    atorvastatin (LIPITOR) 40 MG tablet TAKE 1 TABLET BY MOUTH DAILY 90 tablet 2    blood glucose (ACCU-CHEK JACOB PLUS) test strip 1 strip by In Vitro route 2 times daily 200 strip 1    blood glucose meter (GLUCOMETER) [BLOOD GLUCOSE METER (GLUCOMETER)] Use 1 each As Directed 2 (two) times a day. Dispense glucometer brand per patient's insurance at pharmacy discretion. 1 each 0    blood glucose monitoring (SOFTCLIX) lancets USE 1 EACH TWICE DAILY 200 each 0    cyanocobalamin, vitamin B-12, 2,500 mcg Tab [CYANOCOBALAMIN, VITAMIN B-12, 2,500 MCG TAB] Take 2,500 mcg by mouth daily.      ferrous sulfate (FEROSUL) 325 (65 Fe) MG tablet Take 325 mg by mouth 3 times daily      generic lancets (ACCU-CHEK SOFTCLIX LANCETS) [GENERIC LANCETS (ACCU-CHEK SOFTCLIX LANCETS)] Use 1 each As Directed 2 (two) times a day. Dispense brand per patient's insurance at pharmacy discretion. 100 each 3    inFLIXimab (REMICADE IV) Every 6weeks      lisinopril (ZESTRIL) 10 MG tablet Take 1 tablet (10 mg) by mouth daily 90 tablet 3    magnesium oxide 250 mg Tab [MAGNESIUM OXIDE 250 MG TAB] Take 250 mg by mouth 2 (two) times a day.      metFORMIN (GLUCOPHAGE) 500 MG tablet TAKE 2 TABLETS BY MOUTH TWICE DAILY AT 6 AM AND AT 4  tablet 3    omeprazole (PRILOSEC) 40 MG DR capsule Take 1 capsule (40 mg) by mouth daily 90 capsule 2    psyllium (METAMUCIL/KONSYL) capsule Take 3 capsules by mouth 2 times daily      tamsulosin (FLOMAX) 0.4 MG capsule TAKE 1 CAPSULE BY MOUTH EVERY DAY AFTER SUPPER 90 capsule 2       Time:     The longitudinal plan of care for the diagnosis(es)/condition(s) as documented were addressed during this visit. Due to the added complexity in care, I will continue to support Antonio in the subsequent management and with ongoing continuity of care.     Flaco Lopez MD  Answers submitted by the patient for this visit:  Hypertension  Visit (Submitted on 7/26/2024)  Chief Complaint: Chronic problems general questions HPI Form  Do you check your blood pressure regularly outside of the clinic?: No  Are your blood pressures ever more than 140 on the top number (systolic) OR more than 90 on the bottom number (diastolic)? (For example, greater than 140/90): No  Are you following a low salt diet?: No  Diabetes Visit (Submitted on 7/26/2024)  Chief Complaint: Chronic problems general questions HPI Form  Frequency of checking blood sugars:: a few times a month  What time of day are you checking your blood sugars : at bedtime  Have you had any blood sugars above 200?: No  Have you had any blood sugars below 70?: No  Hypoglycemia symptoms:: weakness  Diabetic concerns:: none  Paraesthesia present:: numbness in feet, excessive thirst  General Questionnaire (Submitted on 7/26/2024)  Chief Complaint: Chronic problems general questions HPI Form  How many servings of fruits and vegetables do you eat daily?: 0-1  On average, how many sweetened beverages do you drink each day (Examples: soda, juice, sweet tea, etc.  Do NOT count diet or artificially sweetened beverages)?: 0  How many minutes a day do you exercise enough to make your heart beat faster?: 9 or less  How many days a week do you exercise enough to make your heart beat faster?: 3 or less  How many days per week do you miss taking your medication?: 0

## 2024-07-31 ENCOUNTER — INFUSION THERAPY VISIT (OUTPATIENT)
Dept: INFUSION THERAPY | Facility: HOSPITAL | Age: 79
End: 2024-07-31
Attending: INTERNAL MEDICINE
Payer: MEDICARE

## 2024-07-31 VITALS
RESPIRATION RATE: 16 BRPM | OXYGEN SATURATION: 96 % | TEMPERATURE: 98.1 F | WEIGHT: 199 LBS | BODY MASS INDEX: 28.55 KG/M2 | DIASTOLIC BLOOD PRESSURE: 68 MMHG | HEART RATE: 76 BPM | SYSTOLIC BLOOD PRESSURE: 116 MMHG

## 2024-07-31 DIAGNOSIS — H44.112 PANUVEITIS OF LEFT EYE: Primary | ICD-10-CM

## 2024-07-31 LAB
ALBUMIN SERPL BCG-MCNC: 3.7 G/DL (ref 3.5–5.2)
ALT SERPL W P-5'-P-CCNC: 27 U/L (ref 0–70)
CREAT SERPL-MCNC: 1.59 MG/DL (ref 0.67–1.17)
EGFRCR SERPLBLD CKD-EPI 2021: 44 ML/MIN/1.73M2
ERYTHROCYTE [DISTWIDTH] IN BLOOD BY AUTOMATED COUNT: 13.3 % (ref 10–15)
HCT VFR BLD AUTO: 27.8 % (ref 40–53)
HGB BLD-MCNC: 9.2 G/DL (ref 13.3–17.7)
MCH RBC QN AUTO: 30.3 PG (ref 26.5–33)
MCHC RBC AUTO-ENTMCNC: 33.1 G/DL (ref 31.5–36.5)
MCV RBC AUTO: 91 FL (ref 78–100)
PLATELET # BLD AUTO: 108 10E3/UL (ref 150–450)
RBC # BLD AUTO: 3.04 10E6/UL (ref 4.4–5.9)
WBC # BLD AUTO: 4.6 10E3/UL (ref 4–11)

## 2024-07-31 PROCEDURE — 82040 ASSAY OF SERUM ALBUMIN: CPT

## 2024-07-31 PROCEDURE — 36415 COLL VENOUS BLD VENIPUNCTURE: CPT

## 2024-07-31 PROCEDURE — 82565 ASSAY OF CREATININE: CPT

## 2024-07-31 PROCEDURE — 250N000013 HC RX MED GY IP 250 OP 250 PS 637: Performed by: INTERNAL MEDICINE

## 2024-07-31 PROCEDURE — 258N000003 HC RX IP 258 OP 636: Performed by: INTERNAL MEDICINE

## 2024-07-31 PROCEDURE — 250N000011 HC RX IP 250 OP 636: Performed by: INTERNAL MEDICINE

## 2024-07-31 PROCEDURE — 96413 CHEMO IV INFUSION 1 HR: CPT

## 2024-07-31 PROCEDURE — 84460 ALANINE AMINO (ALT) (SGPT): CPT

## 2024-07-31 PROCEDURE — 85027 COMPLETE CBC AUTOMATED: CPT

## 2024-07-31 PROCEDURE — 96375 TX/PRO/DX INJ NEW DRUG ADDON: CPT

## 2024-07-31 RX ORDER — DIPHENHYDRAMINE HCL 25 MG
25 CAPSULE ORAL ONCE
Status: CANCELLED
Start: 2024-08-01 | End: 2024-08-01

## 2024-07-31 RX ORDER — HEPARIN SODIUM,PORCINE 10 UNIT/ML
5 VIAL (ML) INTRAVENOUS
Status: DISCONTINUED | OUTPATIENT
Start: 2024-07-31 | End: 2024-07-31 | Stop reason: HOSPADM

## 2024-07-31 RX ORDER — ALBUTEROL SULFATE 90 UG/1
1-2 AEROSOL, METERED RESPIRATORY (INHALATION)
Status: CANCELLED
Start: 2024-08-01

## 2024-07-31 RX ORDER — HEPARIN SODIUM (PORCINE) LOCK FLUSH IV SOLN 100 UNIT/ML 100 UNIT/ML
5 SOLUTION INTRAVENOUS
Status: DISCONTINUED | OUTPATIENT
Start: 2024-07-31 | End: 2024-07-31 | Stop reason: HOSPADM

## 2024-07-31 RX ORDER — ALBUTEROL SULFATE 0.83 MG/ML
2.5 SOLUTION RESPIRATORY (INHALATION)
Status: CANCELLED | OUTPATIENT
Start: 2024-08-01

## 2024-07-31 RX ORDER — ALBUTEROL SULFATE 0.83 MG/ML
2.5 SOLUTION RESPIRATORY (INHALATION)
Status: DISCONTINUED | OUTPATIENT
Start: 2024-07-31 | End: 2024-07-31 | Stop reason: HOSPADM

## 2024-07-31 RX ORDER — METHYLPREDNISOLONE SODIUM SUCCINATE 125 MG/2ML
125 INJECTION, POWDER, LYOPHILIZED, FOR SOLUTION INTRAMUSCULAR; INTRAVENOUS ONCE
Status: CANCELLED | OUTPATIENT
Start: 2024-08-01 | End: 2024-08-01

## 2024-07-31 RX ORDER — EPINEPHRINE 1 MG/ML
0.3 INJECTION, SOLUTION INTRAMUSCULAR; SUBCUTANEOUS EVERY 5 MIN PRN
Status: CANCELLED | OUTPATIENT
Start: 2024-08-01

## 2024-07-31 RX ORDER — METHYLPREDNISOLONE SODIUM SUCCINATE 125 MG/2ML
125 INJECTION, POWDER, LYOPHILIZED, FOR SOLUTION INTRAMUSCULAR; INTRAVENOUS
Status: CANCELLED
Start: 2024-08-01

## 2024-07-31 RX ORDER — MEPERIDINE HYDROCHLORIDE 50 MG/ML
25 INJECTION INTRAMUSCULAR; INTRAVENOUS; SUBCUTANEOUS EVERY 30 MIN PRN
Status: DISCONTINUED | OUTPATIENT
Start: 2024-07-31 | End: 2024-07-31 | Stop reason: HOSPADM

## 2024-07-31 RX ORDER — HEPARIN SODIUM,PORCINE 10 UNIT/ML
5 VIAL (ML) INTRAVENOUS
Status: CANCELLED | OUTPATIENT
Start: 2024-08-01

## 2024-07-31 RX ORDER — EPINEPHRINE 1 MG/ML
0.3 INJECTION, SOLUTION INTRAMUSCULAR; SUBCUTANEOUS EVERY 5 MIN PRN
Status: DISCONTINUED | OUTPATIENT
Start: 2024-07-31 | End: 2024-07-31 | Stop reason: HOSPADM

## 2024-07-31 RX ORDER — DIPHENHYDRAMINE HCL 25 MG
25 CAPSULE ORAL ONCE
Status: COMPLETED | OUTPATIENT
Start: 2024-07-31 | End: 2024-07-31

## 2024-07-31 RX ORDER — HEPARIN SODIUM (PORCINE) LOCK FLUSH IV SOLN 100 UNIT/ML 100 UNIT/ML
5 SOLUTION INTRAVENOUS
Status: CANCELLED | OUTPATIENT
Start: 2024-08-01

## 2024-07-31 RX ORDER — ACETAMINOPHEN 325 MG/1
650 TABLET ORAL ONCE
Status: COMPLETED | OUTPATIENT
Start: 2024-07-31 | End: 2024-07-31

## 2024-07-31 RX ORDER — MEPERIDINE HYDROCHLORIDE 50 MG/ML
25 INJECTION INTRAMUSCULAR; INTRAVENOUS; SUBCUTANEOUS EVERY 30 MIN PRN
Status: CANCELLED | OUTPATIENT
Start: 2024-08-01

## 2024-07-31 RX ORDER — ALBUTEROL SULFATE 90 UG/1
1-2 AEROSOL, METERED RESPIRATORY (INHALATION)
Status: DISCONTINUED | OUTPATIENT
Start: 2024-07-31 | End: 2024-07-31 | Stop reason: HOSPADM

## 2024-07-31 RX ORDER — DIPHENHYDRAMINE HYDROCHLORIDE 50 MG/ML
50 INJECTION INTRAMUSCULAR; INTRAVENOUS
Status: CANCELLED
Start: 2024-08-01

## 2024-07-31 RX ORDER — METHYLPREDNISOLONE SODIUM SUCCINATE 125 MG/2ML
125 INJECTION, POWDER, LYOPHILIZED, FOR SOLUTION INTRAMUSCULAR; INTRAVENOUS
Status: DISCONTINUED | OUTPATIENT
Start: 2024-07-31 | End: 2024-07-31 | Stop reason: HOSPADM

## 2024-07-31 RX ORDER — DIPHENHYDRAMINE HYDROCHLORIDE 50 MG/ML
50 INJECTION INTRAMUSCULAR; INTRAVENOUS
Status: DISCONTINUED | OUTPATIENT
Start: 2024-07-31 | End: 2024-07-31 | Stop reason: HOSPADM

## 2024-07-31 RX ORDER — METHYLPREDNISOLONE SODIUM SUCCINATE 125 MG/2ML
125 INJECTION, POWDER, LYOPHILIZED, FOR SOLUTION INTRAMUSCULAR; INTRAVENOUS ONCE
Status: COMPLETED | OUTPATIENT
Start: 2024-07-31 | End: 2024-07-31

## 2024-07-31 RX ORDER — ACETAMINOPHEN 325 MG/1
650 TABLET ORAL ONCE
Status: CANCELLED
Start: 2024-08-01 | End: 2024-08-01

## 2024-07-31 RX ADMIN — METHYLPREDNISOLONE SODIUM SUCCINATE 125 MG: 125 INJECTION, POWDER, FOR SOLUTION INTRAMUSCULAR; INTRAVENOUS at 11:16

## 2024-07-31 RX ADMIN — ACETAMINOPHEN 650 MG: 325 TABLET ORAL at 11:16

## 2024-07-31 RX ADMIN — INFLIXIMAB 500 MG: 100 INJECTION, POWDER, LYOPHILIZED, FOR SOLUTION INTRAVENOUS at 11:24

## 2024-07-31 RX ADMIN — DIPHENHYDRAMINE HYDROCHLORIDE 25 MG: 25 CAPSULE ORAL at 11:16

## 2024-07-31 RX ADMIN — SODIUM CHLORIDE 250 ML: 9 INJECTION, SOLUTION INTRAVENOUS at 11:15

## 2024-07-31 NOTE — PROGRESS NOTES
Infusion Nursing Note:  Frank Obando presents today for IV inflixmab.    Patient seen by provider today: No   present during visit today: Not Applicable.    Note: Tolerated infusion well, Offers no complaints.      Intravenous Access:  Labs drawn without difficulty.  Peripheral IV placed.    Treatment Conditions:  Not Applicable.      Post Infusion Assessment:  Patient tolerated infusion without incident.  Blood return noted pre and post infusion.  Site patent and intact, free from redness, edema or discomfort.  No evidence of extravasations.  Access discontinued per protocol.       Discharge Plan:   Patient and/or family verbalized understanding of discharge instructions and all questions answered.      Angelica Tejeda RN

## 2024-09-06 ENCOUNTER — OFFICE VISIT (OUTPATIENT)
Dept: INTERNAL MEDICINE | Facility: CLINIC | Age: 79
End: 2024-09-06
Payer: MEDICARE

## 2024-09-06 VITALS
SYSTOLIC BLOOD PRESSURE: 108 MMHG | WEIGHT: 199.2 LBS | RESPIRATION RATE: 14 BRPM | OXYGEN SATURATION: 95 % | TEMPERATURE: 96.8 F | HEART RATE: 84 BPM | BODY MASS INDEX: 28.52 KG/M2 | HEIGHT: 70 IN | DIASTOLIC BLOOD PRESSURE: 62 MMHG

## 2024-09-06 DIAGNOSIS — I25.10 CORONARY ARTERY DISEASE INVOLVING NATIVE CORONARY ARTERY OF NATIVE HEART WITHOUT ANGINA PECTORIS: ICD-10-CM

## 2024-09-06 DIAGNOSIS — E78.5 DYSLIPIDEMIA: ICD-10-CM

## 2024-09-06 DIAGNOSIS — M05.79 RHEUMATOID ARTHRITIS INVOLVING MULTIPLE SITES WITH POSITIVE RHEUMATOID FACTOR (H): ICD-10-CM

## 2024-09-06 DIAGNOSIS — I85.10 SECONDARY ESOPHAGEAL VARICES WITHOUT BLEEDING (H): ICD-10-CM

## 2024-09-06 DIAGNOSIS — E11.42 DIABETIC POLYNEUROPATHY ASSOCIATED WITH TYPE 2 DIABETES MELLITUS (H): ICD-10-CM

## 2024-09-06 DIAGNOSIS — I10 ESSENTIAL HYPERTENSION, BENIGN: ICD-10-CM

## 2024-09-06 DIAGNOSIS — Z86.0100 HISTORY OF COLONIC POLYPS: ICD-10-CM

## 2024-09-06 DIAGNOSIS — D69.6 THROMBOCYTOPENIA (H): ICD-10-CM

## 2024-09-06 DIAGNOSIS — E11.8 TYPE 2 DIABETES MELLITUS WITH COMPLICATION, WITHOUT LONG-TERM CURRENT USE OF INSULIN (H): ICD-10-CM

## 2024-09-06 DIAGNOSIS — Z00.00 ANNUAL PHYSICAL EXAM: Primary | ICD-10-CM

## 2024-09-06 DIAGNOSIS — K74.60 CIRRHOSIS OF LIVER WITH ASCITES, UNSPECIFIED HEPATIC CIRRHOSIS TYPE (H): ICD-10-CM

## 2024-09-06 DIAGNOSIS — H44.112 PANUVEITIS OF LEFT EYE: ICD-10-CM

## 2024-09-06 DIAGNOSIS — I63.322 CEREBROVASCULAR ACCIDENT (CVA) DUE TO THROMBOSIS OF LEFT ANTERIOR CEREBRAL ARTERY (H): ICD-10-CM

## 2024-09-06 DIAGNOSIS — N18.32 STAGE 3B CHRONIC KIDNEY DISEASE (H): ICD-10-CM

## 2024-09-06 DIAGNOSIS — R18.8 CIRRHOSIS OF LIVER WITH ASCITES, UNSPECIFIED HEPATIC CIRRHOSIS TYPE (H): ICD-10-CM

## 2024-09-06 DIAGNOSIS — M48.062 SPINAL STENOSIS OF LUMBAR REGION WITH NEUROGENIC CLAUDICATION: ICD-10-CM

## 2024-09-06 PROBLEM — R06.09 EXERTIONAL DYSPNEA: Status: RESOLVED | Noted: 2024-04-23 | Resolved: 2024-09-06

## 2024-09-06 PROBLEM — Z79.899 HIGH RISK MEDICATION USE: Status: RESOLVED | Noted: 2018-09-26 | Resolved: 2024-09-06

## 2024-09-06 PROBLEM — Z98.890 STATUS POST CORONARY ANGIOGRAM: Status: RESOLVED | Noted: 2024-04-23 | Resolved: 2024-09-06

## 2024-09-06 PROBLEM — D50.0 IRON DEFICIENCY ANEMIA DUE TO CHRONIC BLOOD LOSS: Status: RESOLVED | Noted: 2021-11-02 | Resolved: 2024-09-06

## 2024-09-06 LAB
AFP SERPL-MCNC: <1.8 NG/ML
ALBUMIN SERPL BCG-MCNC: 4.3 G/DL (ref 3.5–5.2)
ALBUMIN UR-MCNC: ABNORMAL MG/DL
ALP SERPL-CCNC: 88 U/L (ref 40–150)
ALT SERPL W P-5'-P-CCNC: 30 U/L (ref 0–70)
ANION GAP SERPL CALCULATED.3IONS-SCNC: 11 MMOL/L (ref 7–15)
APPEARANCE UR: CLEAR
AST SERPL W P-5'-P-CCNC: 36 U/L (ref 0–45)
BACTERIA #/AREA URNS HPF: ABNORMAL /HPF
BILIRUB SERPL-MCNC: 0.7 MG/DL
BILIRUB UR QL STRIP: NEGATIVE
BUN SERPL-MCNC: 27.3 MG/DL (ref 8–23)
CALCIUM SERPL-MCNC: 9.5 MG/DL (ref 8.8–10.4)
CHLORIDE SERPL-SCNC: 102 MMOL/L (ref 98–107)
CHOLEST SERPL-MCNC: 133 MG/DL
COLOR UR AUTO: YELLOW
CREAT SERPL-MCNC: 1.55 MG/DL (ref 0.67–1.17)
CREAT UR-MCNC: 204 MG/DL
EGFRCR SERPLBLD CKD-EPI 2021: 46 ML/MIN/1.73M2
FASTING STATUS PATIENT QL REPORTED: ABNORMAL
FASTING STATUS PATIENT QL REPORTED: NORMAL
GLUCOSE SERPL-MCNC: 163 MG/DL (ref 70–99)
GLUCOSE UR STRIP-MCNC: NEGATIVE MG/DL
HBA1C MFR BLD: 6.8 % (ref 0–5.6)
HCO3 SERPL-SCNC: 25 MMOL/L (ref 22–29)
HDLC SERPL-MCNC: 63 MG/DL
HGB UR QL STRIP: NEGATIVE
KETONES UR STRIP-MCNC: NEGATIVE MG/DL
LDLC SERPL CALC-MCNC: 53 MG/DL
LEUKOCYTE ESTERASE UR QL STRIP: NEGATIVE
MICROALBUMIN UR-MCNC: 17.8 MG/L
MICROALBUMIN/CREAT UR: 8.73 MG/G CR (ref 0–17)
NITRATE UR QL: NEGATIVE
NONHDLC SERPL-MCNC: 70 MG/DL
PH UR STRIP: 5.5 [PH] (ref 5–8)
POTASSIUM SERPL-SCNC: 4.9 MMOL/L (ref 3.4–5.3)
PROT SERPL-MCNC: 7.9 G/DL (ref 6.4–8.3)
RBC #/AREA URNS AUTO: ABNORMAL /HPF
SODIUM SERPL-SCNC: 138 MMOL/L (ref 135–145)
SP GR UR STRIP: >=1.03 (ref 1–1.03)
SQUAMOUS #/AREA URNS AUTO: ABNORMAL /LPF
TRIGL SERPL-MCNC: 83 MG/DL
UROBILINOGEN UR STRIP-ACNC: 0.2 E.U./DL
WBC #/AREA URNS AUTO: ABNORMAL /HPF

## 2024-09-06 PROCEDURE — 99214 OFFICE O/P EST MOD 30 MIN: CPT | Mod: 25 | Performed by: INTERNAL MEDICINE

## 2024-09-06 PROCEDURE — 80053 COMPREHEN METABOLIC PANEL: CPT | Performed by: INTERNAL MEDICINE

## 2024-09-06 PROCEDURE — 90662 IIV NO PRSV INCREASED AG IM: CPT | Performed by: INTERNAL MEDICINE

## 2024-09-06 PROCEDURE — 36415 COLL VENOUS BLD VENIPUNCTURE: CPT | Performed by: INTERNAL MEDICINE

## 2024-09-06 PROCEDURE — 80061 LIPID PANEL: CPT | Performed by: INTERNAL MEDICINE

## 2024-09-06 PROCEDURE — 82105 ALPHA-FETOPROTEIN SERUM: CPT | Performed by: INTERNAL MEDICINE

## 2024-09-06 PROCEDURE — 82043 UR ALBUMIN QUANTITATIVE: CPT | Performed by: INTERNAL MEDICINE

## 2024-09-06 PROCEDURE — G0008 ADMIN INFLUENZA VIRUS VAC: HCPCS | Performed by: INTERNAL MEDICINE

## 2024-09-06 PROCEDURE — G0439 PPPS, SUBSEQ VISIT: HCPCS | Performed by: INTERNAL MEDICINE

## 2024-09-06 PROCEDURE — 82570 ASSAY OF URINE CREATININE: CPT | Performed by: INTERNAL MEDICINE

## 2024-09-06 PROCEDURE — 83036 HEMOGLOBIN GLYCOSYLATED A1C: CPT | Performed by: INTERNAL MEDICINE

## 2024-09-06 PROCEDURE — 81001 URINALYSIS AUTO W/SCOPE: CPT | Performed by: INTERNAL MEDICINE

## 2024-09-06 NOTE — PROGRESS NOTES
Preventive Care Visit  [facility]  Flaco Lopez MD, Internal Medicine  Sep 6, 2024      SUBJECTIVE:   Antonio is a 79 year old, presenting for the following:  RECHECK        9/6/2024     7:39 AM   Additional Questions   Roomed by Eldon HUANG RN     Are you in the first 12 months of your Medicare coverage?  No    HPI        Have you ever done Advance Care Planning? (For example, a Health Directive, POLST, or a discussion with a medical provider or your loved ones about your wishes): No, advance care planning information given to patient to review.  Advanced care planning was discussed at today's visit.    Healthy Habits  Ability to successfully perform ADLs: Yes  Hearing impairment: Stable  Home Safety: Safe      2/15/2024    12:50 PM   PHQ-2 ( 1999 [employer])   Q1: Little interest or pleasure in doing things 0   Q2: Feeling down, depressed or hopeless 0   PHQ-2 Score 0   Q1: Little interest or pleasure in doing things Not at all   Q2: Feeling down, depressed or hopeless Not at all   PHQ-2 Score 0         7/6/2023     9:10 AM   PHQ   PHQ-9 Total Score 7   Q9: Thoughts of better off dead/self-harm past 2 weeks Not at all     Fall Risk: No falls in the last 12 months  Fall risk     Cognitive Screening   1) Repeat 3 items (Leader, Season, Table)    2) Clock draw: NORMAL  3) 3 item recall: Recalls 3 objects  Results: 3 items recalled: COGNITIVE IMPAIRMENT LESS LIKELY    Mini-CogTM Copyright S Vikas. Licensed by the author for use in [facility]; reprinted with permission (lucille@.[email or web address]). All rights reserved.      Reviewed and updated as needed this visit by clinical staff   Tobacco  Allergies  Meds              Reviewed and updated as needed this visit by Provider                  Social History     Tobacco Use    Smoking status: Never    Smokeless tobacco: Never   Substance Use Topics    Alcohol use: No             9/20/2021     8:27 AM   Alcohol Use   Prescreen: >3 drinks/day or >7  drinks/week? Not Applicable   Do you have a current opioid prescription? No  Do you use any other controlled substances or medications that are not prescribed by a provider? None    Current providers sharing in care for this patient include:   Patient Care Team:  Flaco Lopez MD as PCP - General (Internal Medicine)  Prerna Chong MBBS as Assigned Rheumatology Provider  Flaco Lopez MD as Assigned PCP  Xiang Oliveros MD as MD (Cardiovascular Disease)  Deysi Swenson APRN CNP as Assigned Neuroscience Provider  Manisha Gomez PA-C as Assigned Heart and Vascular Provider    The following health maintenance items are reviewed in Epic and correct as of today:  Health Maintenance   Topic Date Due    HEPATITIS A IMMUNIZATION (1 of 2 - Risk 2-dose series) Never done    HEPATITIS B IMMUNIZATION (1 of 3 - Risk 3-dose series) Never done    RSV VACCINE (1 - 1-dose 60+ series) Never done    DTAP/TDAP/TD IMMUNIZATION (2 - Td or Tdap) 12/06/2023    MEDICARE ANNUAL WELLNESS VISIT  04/04/2024    INFLUENZA VACCINE (1) 09/01/2024    COVID-19 Vaccine (7 - 2023-24 season) 09/01/2024    A1C  12/26/2024    MICROALBUMIN  12/26/2024    ANNUAL REVIEW OF HM ORDERS  02/16/2025    LIPID  04/23/2025    BMP  05/12/2025    EYE EXAM  05/21/2025    DIABETIC FOOT EXAM  06/26/2025    FALL RISK ASSESSMENT  06/26/2025    HEMOGLOBIN  07/31/2025    ADVANCE CARE PLANNING  04/04/2028    PARATHYROID  Completed    PHOSPHORUS  Completed    HEPATITIS C SCREENING  Completed    PHQ-2 (once per calendar year)  Completed    Pneumococcal Vaccine: 65+ Years  Completed    URINALYSIS  Completed    ALK PHOS  Completed    ZOSTER IMMUNIZATION  Completed    HPV IMMUNIZATION  Aged Out    MENINGITIS IMMUNIZATION  Aged Out    RSV MONOCLONAL ANTIBODY  Aged Out    COLORECTAL CANCER SCREENING  Discontinued     Review of Systems     OBJECTIVE:   /62 (BP Location: Left arm, Patient Position: Sitting, Cuff Size: Adult Regular)   Pulse 84   Temp  "96.8  F (36  C) (Tympanic)   Resp 14   Ht 1.778 m (5' 10\")   Wt 90.4 kg (199 lb 3.2 oz)   SpO2 95%   BMI 28.58 kg/m     Estimated body mass index is 28.58 kg/m  as calculated from the following:    Height as of this encounter: 1.778 m (5' 10\").    Weight as of this encounter: 90.4 kg (199 lb 3.2 oz).  Physical Exam  EYES: Eyelids, conjunctiva, and sclera were normal. Pupils were normal. Cornea, iris, and lens were normal bilaterally.  HEAD, EARS, NOSE, MOUTH, AND THROAT: Head and face were normal. Hearing was normal to voice and the ears were normal to external exam. Nose appearance was normal and there was no discharge.   NECK: Neck appearance was normal.   RESPIRATORY: Breathing pattern was normal and the chest moved symmetrically.  Lung sounds were normal and there were no abnormal sounds.  CARDIOVASCULAR: Heart rate and rhythm were normal.  S1 and S2 were normal and there were no extra sounds or murmurs.   There was no peripheral edema.  GASTROINTESTINAL: The abdomen was normal in contour.    NEUROLOGIC: The patient was alert and oriented to person, place, time, and circumstance. Speech was normal. Cranial nerves were normal. Motor strength was normal for age.   PSYCHIATRIC:  Mood and affect were normal and the patient had normal recent and remote memory. The patient's judgment and insight were normal.    ASSESSMENT / PLAN:   1. Annual physical exam  This is a 79-year-old man with issues as discussed below    2. History of colonic polyps  Last screening colonoscopy done in 2021    3. Coronary artery disease involving native coronary artery of native heart without angina pectoris  Continue current plan, no chest pain    4. Dyslipidemia  Continue statin  - Lipid panel reflex to direct LDL Fasting; Future    5. Essential hypertension, benign  Blood pressure still okay despite stopping beta-blocker    6. Type 2 diabetes mellitus with complication, without long-term current use of insulin (H)  Now off of Actos, " "discussed Jardiance, cost prohibitive  - Hemoglobin A1c; Future  - UA Macroscopic with reflex to Microscopic and Culture; Future  - Albumin Random Urine Quantitative with Creat Ratio; Future    7. Diabetic polyneuropathy associated with type 2 diabetes mellitus (H)  Stable    8. Stage 3b chronic kidney disease (H)  Continue lisinopril, GLT 2 inhibitors cost prohibitive  - Comprehensive metabolic panel; Future    9. Cirrhosis of liver with ascites, unspecified hepatic cirrhosis type (H)  Needs to follow-up with gastroenterology, needs surveillance endoscopy  - US Abdomen Limited; Future  - AFP tumor marker; Future    10. Secondary esophageal varices without bleeding (H)  As above    11. Rheumatoid arthritis involving multiple sites with positive rheumatoid factor (H)  Per rheumatology    12. Panuveitis of left eye, Dr. Abdi / Castillo    13. Thrombocytopenia (H24)    14. H/o CVA ~2013    15. Spinal stenosis of lumbar region with neurogenic claudication  I think the pain in his legs with walking that improves when he leans forward it is likely due to lumbar spinal stenosis which is reportedly mild by MRI but everything else seems to be checking out.  I like him to try some physical therapy  - Physical Therapy  Referral; Future    Counseling  Reviewed preventive health counseling, as reflected in patient instructions    BMI  Estimated body mass index is 28.58 kg/m  as calculated from the following:    Height as of this encounter: 1.778 m (5' 10\").    Weight as of this encounter: 90.4 kg (199 lb 3.2 oz).     He reports that he has never smoked. He has never used smokeless tobacco.      Appropriate preventive services were discussed with this patient, including applicable screening as appropriate for fall prevention, nutrition, physical activity, Tobacco-use cessation, weight loss and cognition.  Checklist reviewing preventive services available has been given to the patient.    Reviewed patients plan of care and " provided an AVS. The Basic Care Plan (routine screening as documented in Health Maintenance) for Frank meets the Care Plan requirement. This Care Plan has been established and reviewed with the Patient.          Signed Electronically by: Flaco Lopez MD    Identified Health Risks  I have reviewed Opioid Use Disorder and Substance Use Disorder risk factors and made any needed referrals.

## 2024-09-06 NOTE — PATIENT INSTRUCTIONS
Patient Education   Preventive Care Advice   This is general advice given by our system to help you stay healthy. However, your care team may have specific advice just for you. Please talk to your care team about your preventive care needs.  Nutrition  Eat 5 or more servings of fruits and vegetables each day.  Try wheat bread, brown rice and whole grain pasta (instead of white bread, rice, and pasta).  Get enough calcium and vitamin D. Check the label on foods and aim for 100% of the RDA (recommended daily allowance).  Lifestyle  Exercise at least 150 minutes each week  (30 minutes a day, 5 days a week).  Do muscle strengthening activities 2 days a week. These help control your weight and prevent disease.  No smoking.  Wear sunscreen to prevent skin cancer.  Have a dental exam and cleaning every 6 months.  Yearly exams  See your health care team every year to talk about:  Any changes in your health.  Any medicines your care team has prescribed.  Preventive care, family planning, and ways to prevent chronic diseases.  Shots (vaccines)   HPV shots (up to age 26), if you've never had them before.  Hepatitis B shots (up to age 59), if you've never had them before.  COVID-19 shot: Get this shot when it's due.  Flu shot: Get a flu shot every year.  Tetanus shot: Get a tetanus shot every 10 years.  Pneumococcal, hepatitis A, and RSV shots: Ask your care team if you need these based on your risk.  Shingles shot (for age 50 and up)  General health tests  Diabetes screening:  Starting at age 35, Get screened for diabetes at least every 3 years.  If you are younger than age 35, ask your care team if you should be screened for diabetes.  Cholesterol test: At age 39, start having a cholesterol test every 5 years, or more often if advised.  Bone density scan (DEXA): At age 50, ask your care team if you should have this scan for osteoporosis (brittle bones).  Hepatitis C: Get tested at least once in your life.  STIs (sexually  transmitted infections)  Before age 24: Ask your care team if you should be screened for STIs.  After age 24: Get screened for STIs if you're at risk. You are at risk for STIs (including HIV) if:  You are sexually active with more than one person.  You don't use condoms every time.  You or a partner was diagnosed with a sexually transmitted infection.  If you are at risk for HIV, ask about PrEP medicine to prevent HIV.  Get tested for HIV at least once in your life, whether you are at risk for HIV or not.  Cancer screening tests  Cervical cancer screening: If you have a cervix, begin getting regular cervical cancer screening tests starting at age 21.  Breast cancer scan (mammogram): If you've ever had breasts, begin having regular mammograms starting at age 40. This is a scan to check for breast cancer.  Colon cancer screening: It is important to start screening for colon cancer at age 45.  Have a colonoscopy test every 10 years (or more often if you're at risk) Or, ask your provider about stool tests like a FIT test every year or Cologuard test every 3 years.  To learn more about your testing options, visit:   .  For help making a decision, visit:   https://bit.ly/vu34210.  Prostate cancer screening test: If you have a prostate, ask your care team if a prostate cancer screening test (PSA) at age 55 is right for you.  Lung cancer screening: If you are a current or former smoker ages 50 to 80, ask your care team if ongoing lung cancer screenings are right for you.  For informational purposes only. Not to replace the advice of your health care provider. Copyright   2023 [employer]. All rights reserved. Clinically reviewed by the [facility] Transitions Program. [employer] 574677 - REV 01/24.

## 2024-09-06 NOTE — NURSING NOTE
Pt tolerated injection (Influenza Vaccine 65+: Fluzone HD). Site (Left deltoid) was cleansed with alcohol prior to injections. No pain, burning, swelling or redness at the site of the injection. Patient instructed to remain in clinic for 15 minutes afterwards, and to report any adverse reactions.

## 2024-09-10 ENCOUNTER — HOSPITAL ENCOUNTER (OUTPATIENT)
Dept: ULTRASOUND IMAGING | Facility: CLINIC | Age: 79
Discharge: HOME | End: 2024-09-10
Attending: INTERNAL MEDICINE | Admitting: INTERNAL MEDICINE
Payer: MEDICARE

## 2024-09-10 DIAGNOSIS — R18.8 CIRRHOSIS OF LIVER WITH ASCITES, UNSPECIFIED HEPATIC CIRRHOSIS TYPE (H): ICD-10-CM

## 2024-09-10 DIAGNOSIS — K74.60 CIRRHOSIS OF LIVER WITH ASCITES, UNSPECIFIED HEPATIC CIRRHOSIS TYPE (H): ICD-10-CM

## 2024-09-10 PROCEDURE — 76705 ECHO EXAM OF ABDOMEN: CPT

## 2024-09-11 ENCOUNTER — INFUSION THERAPY VISIT (OUTPATIENT)
Dept: INFUSION THERAPY | Facility: HOSPITAL | Age: 79
End: 2024-09-11
Attending: INTERNAL MEDICINE
Payer: MEDICARE

## 2024-09-11 VITALS
SYSTOLIC BLOOD PRESSURE: 141 MMHG | RESPIRATION RATE: 18 BRPM | HEART RATE: 82 BPM | OXYGEN SATURATION: 97 % | DIASTOLIC BLOOD PRESSURE: 78 MMHG | TEMPERATURE: 98.2 F

## 2024-09-11 DIAGNOSIS — H44.112 PANUVEITIS OF LEFT EYE: Primary | ICD-10-CM

## 2024-09-11 PROCEDURE — 258N000003 HC RX IP 258 OP 636: Performed by: INTERNAL MEDICINE

## 2024-09-11 PROCEDURE — 250N000013 HC RX MED GY IP 250 OP 250 PS 637: Performed by: INTERNAL MEDICINE

## 2024-09-11 PROCEDURE — 96375 TX/PRO/DX INJ NEW DRUG ADDON: CPT

## 2024-09-11 PROCEDURE — 96413 CHEMO IV INFUSION 1 HR: CPT

## 2024-09-11 PROCEDURE — 250N000011 HC RX IP 250 OP 636: Performed by: INTERNAL MEDICINE

## 2024-09-11 RX ORDER — MEPERIDINE HYDROCHLORIDE 50 MG/ML
25 INJECTION INTRAMUSCULAR; INTRAVENOUS; SUBCUTANEOUS EVERY 30 MIN PRN
Status: DISCONTINUED | OUTPATIENT
Start: 2024-09-11 | End: 2024-09-11 | Stop reason: HOSPADM

## 2024-09-11 RX ORDER — METHYLPREDNISOLONE SODIUM SUCCINATE 125 MG/2ML
125 INJECTION, POWDER, LYOPHILIZED, FOR SOLUTION INTRAMUSCULAR; INTRAVENOUS ONCE
Status: COMPLETED | OUTPATIENT
Start: 2024-09-11 | End: 2024-09-11

## 2024-09-11 RX ORDER — ACETAMINOPHEN 325 MG/1
650 TABLET ORAL ONCE
Start: 2024-09-12 | End: 2024-09-12

## 2024-09-11 RX ORDER — ALBUTEROL SULFATE 0.83 MG/ML
2.5 SOLUTION RESPIRATORY (INHALATION)
OUTPATIENT
Start: 2024-09-12

## 2024-09-11 RX ORDER — ALBUTEROL SULFATE 90 UG/1
1-2 AEROSOL, METERED RESPIRATORY (INHALATION)
Status: DISCONTINUED | OUTPATIENT
Start: 2024-09-11 | End: 2024-09-11 | Stop reason: HOSPADM

## 2024-09-11 RX ORDER — EPINEPHRINE 1 MG/ML
0.3 INJECTION, SOLUTION INTRAMUSCULAR; SUBCUTANEOUS EVERY 5 MIN PRN
Status: DISCONTINUED | OUTPATIENT
Start: 2024-09-11 | End: 2024-09-11 | Stop reason: HOSPADM

## 2024-09-11 RX ORDER — DIPHENHYDRAMINE HYDROCHLORIDE 50 MG/ML
50 INJECTION INTRAMUSCULAR; INTRAVENOUS
Status: DISCONTINUED | OUTPATIENT
Start: 2024-09-11 | End: 2024-09-11 | Stop reason: HOSPADM

## 2024-09-11 RX ORDER — MEPERIDINE HYDROCHLORIDE 50 MG/ML
25 INJECTION INTRAMUSCULAR; INTRAVENOUS; SUBCUTANEOUS EVERY 30 MIN PRN
OUTPATIENT
Start: 2024-09-12

## 2024-09-11 RX ORDER — HEPARIN SODIUM (PORCINE) LOCK FLUSH IV SOLN 100 UNIT/ML 100 UNIT/ML
5 SOLUTION INTRAVENOUS
OUTPATIENT
Start: 2024-09-12

## 2024-09-11 RX ORDER — DIPHENHYDRAMINE HYDROCHLORIDE 50 MG/ML
50 INJECTION INTRAMUSCULAR; INTRAVENOUS
Start: 2024-09-12

## 2024-09-11 RX ORDER — METHYLPREDNISOLONE SODIUM SUCCINATE 125 MG/2ML
125 INJECTION, POWDER, LYOPHILIZED, FOR SOLUTION INTRAMUSCULAR; INTRAVENOUS ONCE
OUTPATIENT
Start: 2024-09-12 | End: 2024-09-12

## 2024-09-11 RX ORDER — METHYLPREDNISOLONE SODIUM SUCCINATE 125 MG/2ML
125 INJECTION, POWDER, LYOPHILIZED, FOR SOLUTION INTRAMUSCULAR; INTRAVENOUS
Status: DISCONTINUED | OUTPATIENT
Start: 2024-09-11 | End: 2024-09-11 | Stop reason: HOSPADM

## 2024-09-11 RX ORDER — EPINEPHRINE 1 MG/ML
0.3 INJECTION, SOLUTION INTRAMUSCULAR; SUBCUTANEOUS EVERY 5 MIN PRN
OUTPATIENT
Start: 2024-09-12

## 2024-09-11 RX ORDER — DIPHENHYDRAMINE HCL 25 MG
25 CAPSULE ORAL ONCE
Start: 2024-09-12 | End: 2024-09-12

## 2024-09-11 RX ORDER — DIPHENHYDRAMINE HCL 25 MG
25 CAPSULE ORAL ONCE
Status: COMPLETED | OUTPATIENT
Start: 2024-09-11 | End: 2024-09-11

## 2024-09-11 RX ORDER — ALBUTEROL SULFATE 90 UG/1
1-2 AEROSOL, METERED RESPIRATORY (INHALATION)
Start: 2024-09-12

## 2024-09-11 RX ORDER — ALBUTEROL SULFATE 0.83 MG/ML
2.5 SOLUTION RESPIRATORY (INHALATION)
Status: DISCONTINUED | OUTPATIENT
Start: 2024-09-11 | End: 2024-09-11 | Stop reason: HOSPADM

## 2024-09-11 RX ORDER — ACETAMINOPHEN 325 MG/1
650 TABLET ORAL ONCE
Status: COMPLETED | OUTPATIENT
Start: 2024-09-11 | End: 2024-09-11

## 2024-09-11 RX ORDER — METHYLPREDNISOLONE SODIUM SUCCINATE 125 MG/2ML
125 INJECTION, POWDER, LYOPHILIZED, FOR SOLUTION INTRAMUSCULAR; INTRAVENOUS
Start: 2024-09-12

## 2024-09-11 RX ORDER — HEPARIN SODIUM,PORCINE 10 UNIT/ML
5 VIAL (ML) INTRAVENOUS
OUTPATIENT
Start: 2024-09-12

## 2024-09-11 RX ADMIN — ACETAMINOPHEN 650 MG: 325 TABLET ORAL at 10:48

## 2024-09-11 RX ADMIN — INFLIXIMAB 500 MG: 100 INJECTION, POWDER, LYOPHILIZED, FOR SOLUTION INTRAVENOUS at 11:08

## 2024-09-11 RX ADMIN — DIPHENHYDRAMINE HYDROCHLORIDE 25 MG: 25 CAPSULE ORAL at 10:48

## 2024-09-11 RX ADMIN — SODIUM CHLORIDE 250 ML: 9 INJECTION, SOLUTION INTRAVENOUS at 10:55

## 2024-09-11 RX ADMIN — METHYLPREDNISOLONE SODIUM SUCCINATE 125 MG: 125 INJECTION, POWDER, FOR SOLUTION INTRAMUSCULAR; INTRAVENOUS at 10:55

## 2024-09-11 NOTE — PROGRESS NOTES
Infusion Nursing Note:  Frank Obando presents today for remicade.    Patient seen by provider today: No   present during visit today: Not Applicable.    Note: BP (!) 141/78 (Patient Position: Sitting)   Pulse 82   Temp 98.2  F (36.8  C)   Resp 18   SpO2 97%   .      Intravenous Access:  Peripheral IV placed.    Treatment Conditions:  Biological Infusion Checklist:  ~~~ NOTE: If the patient answers yes to any of the questions below, hold the infusion and contact ordering provider or on-call provider.    Have you recently had an elevated temperature, fever, chills, productive cough, coughing for 3 weeks or longer or hemoptysis,  abnormal vital signs, night sweats,  chest pain or have you noticed a decrease in your appetite, unexplained weight loss or fatigue? No  Do you have any open wounds or new incisions? No  Do you have any upcoming hospitalizations or surgeries? Does not include esophagogastroduodenoscopy, colonoscopy, endoscopic retrograde cholangiopancreatography (ERCP), endoscopic ultrasound (EUS), dental procedures or joint aspiration/steroid injections No  Do you currently have any signs of illness or infection or are you on any antibiotics? No  Have you had any new, sudden or worsening abdominal pain? No  Have you or anyone in your household received a live vaccination in the past 4 weeks? Please note: No live vaccines while on biologic/chemotherapy until 6 months after the last treatment. Patient can receive the flu vaccine (shot only), pneumovax and the Covid vaccine. It is optimal for the patient to get these vaccines mid cycle, but they can be given at any time as long as it is not on the day of the infusion. No  Have you recently been diagnosed with any new nervous system diseases (ie. Multiple sclerosis, Guillain Rocky Face, seizures, neurological changes) or cancer diagnosis? Are you on any form of radiation or chemotherapy? No  Are you pregnant or breast feeding or do you have  plans of pregnancy in the future? No  Have you been having any signs of worsening depression or suicidal ideations?  (benlysta only) No  Have there been any other new onset medical symptoms? No  Have you had any new blood clots? (IVIG only) No      Post Infusion Assessment:  Patient tolerated infusion without incident.  Site patent and intact, free from redness, edema or discomfort.  No evidence of extravasations.  Access discontinued per protocol.       Discharge Plan:   Patient discharged in stable condition accompanied by: self.  Departure Mode: Ambulatory.      Tamiko Burnette RN

## 2024-09-12 ENCOUNTER — TRANSFERRED RECORDS (OUTPATIENT)
Dept: HEALTH INFORMATION MANAGEMENT | Facility: CLINIC | Age: 79
End: 2024-09-12
Payer: MEDICARE

## 2024-09-17 ENCOUNTER — TRANSFERRED RECORDS (OUTPATIENT)
Dept: HEALTH INFORMATION MANAGEMENT | Facility: CLINIC | Age: 79
End: 2024-09-17
Payer: MEDICARE

## 2024-09-17 LAB — RETINOPATHY: NEGATIVE

## 2024-09-18 DIAGNOSIS — I10 ESSENTIAL HYPERTENSION, MALIGNANT: ICD-10-CM

## 2024-09-18 RX ORDER — LISINOPRIL 10 MG/1
10 TABLET ORAL DAILY
Qty: 90 TABLET | Refills: 3 | Status: SHIPPED | OUTPATIENT
Start: 2024-09-18

## 2024-10-02 ENCOUNTER — OFFICE VISIT (OUTPATIENT)
Dept: RHEUMATOLOGY | Facility: CLINIC | Age: 79
End: 2024-10-02
Payer: MEDICARE

## 2024-10-02 VITALS
BODY MASS INDEX: 27.62 KG/M2 | WEIGHT: 192.5 LBS | OXYGEN SATURATION: 95 % | SYSTOLIC BLOOD PRESSURE: 118 MMHG | HEART RATE: 84 BPM | DIASTOLIC BLOOD PRESSURE: 66 MMHG

## 2024-10-02 DIAGNOSIS — M65.341 TRIGGER FINGER, RIGHT RING FINGER: ICD-10-CM

## 2024-10-02 DIAGNOSIS — Z79.899 HIGH RISK MEDICATION USE: ICD-10-CM

## 2024-10-02 DIAGNOSIS — H44.112 PANUVEITIS OF LEFT EYE: Primary | ICD-10-CM

## 2024-10-02 DIAGNOSIS — R76.8 ANTINEUTROPHIL CYTOPLASMIC ANTIBODY (ANCA) POSITIVE: ICD-10-CM

## 2024-10-02 PROCEDURE — 20550 NJX 1 TENDON SHEATH/LIGAMENT: CPT | Mod: F8 | Performed by: INTERNAL MEDICINE

## 2024-10-02 PROCEDURE — 99214 OFFICE O/P EST MOD 30 MIN: CPT | Mod: 25 | Performed by: INTERNAL MEDICINE

## 2024-10-02 RX ORDER — TRIAMCINOLONE ACETONIDE 40 MG/ML
20 INJECTION, SUSPENSION INTRA-ARTICULAR; INTRAMUSCULAR ONCE
Status: COMPLETED | OUTPATIENT
Start: 2024-10-02 | End: 2024-10-02

## 2024-10-02 RX ADMIN — TRIAMCINOLONE ACETONIDE 20 MG: 40 INJECTION, SUSPENSION INTRA-ARTICULAR; INTRAMUSCULAR at 10:03

## 2024-10-11 ENCOUNTER — TRANSFERRED RECORDS (OUTPATIENT)
Dept: HEALTH INFORMATION MANAGEMENT | Facility: CLINIC | Age: 79
End: 2024-10-11
Payer: MEDICARE

## 2024-10-15 ENCOUNTER — THERAPY VISIT (OUTPATIENT)
Dept: PHYSICAL THERAPY | Facility: REHABILITATION | Age: 79
End: 2024-10-15
Attending: INTERNAL MEDICINE
Payer: MEDICARE

## 2024-10-15 DIAGNOSIS — M48.062 SPINAL STENOSIS OF LUMBAR REGION WITH NEUROGENIC CLAUDICATION: ICD-10-CM

## 2024-10-15 PROCEDURE — 97110 THERAPEUTIC EXERCISES: CPT | Mod: GP | Performed by: PHYSICAL THERAPIST

## 2024-10-15 PROCEDURE — 97161 PT EVAL LOW COMPLEX 20 MIN: CPT | Mod: GP | Performed by: PHYSICAL THERAPIST

## 2024-10-15 NOTE — PROGRESS NOTES
PHYSICAL THERAPY EVALUATION  Type of Visit: Evaluation        Fall Risk Screen:  Fall screen completed by: PT  Have you fallen 2 or more times in the past year?: No  Have you fallen and had an injury in the past year?: No  Is patient a fall risk?: No    Subjective       Presenting condition or subjective complaint: I stoop over when I walk as it is more comfortable.  I can straighten up if I want.  My legs feel fatigued and will get shortness of breath.  From a car to the store I get fatigued. If I hold onto the cart I feel better. May have intermittent pain in back if he bends over but once he straightens up he is fine.  Date of onset: 09/06/24 (date of referral)    Relevant medical history: -- (diabetic polyneuropathy, diabetes mellitus, HX CVA, HTN, R BBB, RA, CKD III, cirrhosis of liver)   Dates & types of surgery: bladder    Prior diagnostic imaging/testing results: MRI; CT scan     Prior therapy history for the same diagnosis, illness or injury: No      Prior Level of Function  Transfers:   Ambulation:   ADL:   IADL:     Living Environment  Social support: With a significant other or spouse   Type of home: House   Stairs to enter the home: Yes 2 Is there a railing: Yes     Ramp: No   Stairs inside the home: Yes 12 (a flight of steps going up to 2nd level and flight going to basement) Is there a railing: Yes     Help at home: None  Equipment owned:       Employment: No    Hobbies/Interests:      Patient goals for therapy: walk    Pain assessment:      Objective       LUMBAR SPINE EVALUATION  PAIN:   INTEGUMENTARY (edema, incisions):   POSTURE:   mod + thoracic kyphosis, mod+ forward head, slight trunk flexion, decrease cervical, thoracic and lumbar lordosis, left toe out at hip,  GAIT:   Weightbearing Status:   Assistive Device(s):   Gait Deviations:  looking down toward floor, slouched  BALANCE/PROPRIOCEPTION:  APTA: 10 no hands, 1 leg stance: 5+ seconds B with glute weakness noted  WEIGHTBEARING ALIGNMENT:    NON-WEIGHTBEARING ALIGNMENT:    ROM:   (Degrees) Left AROM Left PROM  Right AROM Right PROM   Hip Flexion WFL  WFL    Hip Extension       Hip Abduction WFL  WFL    Hip Adduction       Hip Internal Rotation ~50% limited  ~ 50% limited    Hip External Rotation WFL  WFL    Knee Flexion       Knee Extension       Lumbar Side Bending WFL WFL   Lumbar Rotation     Lumbar Flexion WFL   Lumbar Extension WFL   Pain:   End feel:   STRENGTH (MYOTOMES):   */5 Left Right   Hip Flexion (L2) 5 5   Hip Extension (L3)     Hip Abduction     Hip Adduction      Hip Internal Rotation     Hip External Rotation     Knee Flexion 5 5   Knee Extension 5 5   Dorsiflexion (L4) 5 5   Great Toe Extension (L5) 5 5   Plantarflexion (S1)       DTR'S:   CORD SIGNS:   DERMATOMES:   FLEXIBILITY:  fair at hamstrings, hip flexors and heel cords      LUMBAR/HIP Special Tests:   Lumbar Special Tests Left Right   Quadrant test     Straight leg raise neg neg   Crossover response neg neg   Slump     Sit-up test    Trunk extensor endurance test    Prone instability test    Rosy's    Repeated flexion    Repeated extension    Other:    SI Tests Left Right   SI Compression     SI Distraction     POSH (Thigh Thrust)     Sacral Thrust     FADIR     HOLDEN Dawson's Test     Resisted Abduction     Pubic shotgun     Other:        PALPATION: denies pain to palpation    SPINAL SEGMENTAL CONCLUSIONS:  hypomobile throughout B lumbar spine      Assessment & Plan   CLINICAL IMPRESSIONS  Medical Diagnosis: Spinal Stenosis    Treatment Diagnosis: Spinal Stenosis   Impression/Assessment: Patient is a 79 year old male with leg weakness and pain complaints.  The following significant findings have been identified: Pain, Decreased ROM/flexibility, Decreased strength, and Decreased activity tolerance. These impairments interfere with their ability to perform community mobility and walking from car to building  as compared to previous level of function.     Clinical  Decision Making (Complexity):  Clinical Presentation: Stable/Uncomplicated  Clinical Presentation Rationale: based on medical and personal factors listed in PT evaluation  Clinical Decision Making (Complexity): Low complexity    PLAN OF CARE  Treatment Interventions:  Interventions: Manual Therapy, Neuromuscular Re-education, Therapeutic Activity, Therapeutic Exercise, Self-Care/Home Management    Long Term Goals     PT Goal 1  Goal Identifier: walk  Goal Description: Pt will be able to walk from parking lot to building with no difficulty and decrease symptoms of 0-1/10  Target Date: 01/13/25  PT Goal 2  Goal Identifier: carry flag  Goal Description: 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   Patient/Family/caregiver agrees with Plan of Care.     Evaluation Time:     PT Eval, Low Complexity Minutes (03243): 15       Signing Clinician: Orquidea Lopez, PT        [facility]                                                                                   OUTPATIENT PHYSICAL THERAPY      PLAN OF TREATMENT FOR OUTPATIENT REHABILITATION   Patient's Last Name, First Name, Frank Cerna YOB: 1945   Provider's Name   [facility]   Medical Record No.  [id number]     Onset Date: 09/06/24 (date of referral)  Start of Care Date: 10/15/24     Medical Diagnosis:  Spinal Stenosis      PT Treatment Diagnosis:  Spinal Stenosis Plan of Treatment  Frequency/Duration: 1x/week/ 10 visits    Certification date from 10/15/24 to 01/13/25         See note for plan of  treatment details and functional goals     Orquidea Lopez, PT                         I CERTIFY THE NEED FOR THESE SERVICES FURNISHED UNDER        THIS PLAN OF TREATMENT AND WHILE UNDER MY CARE     (Physician attestation of this document indicates review and certification of the therapy plan).              Referring Provider:  Flaco Lopez    Initial Assessment  See Epic Evaluation- Start of Care Date: 10/15/24

## 2024-10-22 RX ORDER — EPINEPHRINE 1 MG/ML
0.3 INJECTION, SOLUTION, CONCENTRATE INTRAVENOUS EVERY 5 MIN PRN
Status: CANCELLED | OUTPATIENT
Start: 2024-10-23

## 2024-10-22 RX ORDER — HEPARIN SODIUM,PORCINE 10 UNIT/ML
5 VIAL (ML) INTRAVENOUS
Status: CANCELLED | OUTPATIENT
Start: 2024-10-23

## 2024-10-22 RX ORDER — HEPARIN SODIUM (PORCINE) LOCK FLUSH IV SOLN 100 UNIT/ML 100 UNIT/ML
5 SOLUTION INTRAVENOUS
Status: CANCELLED | OUTPATIENT
Start: 2024-10-23

## 2024-10-22 RX ORDER — ACETAMINOPHEN 325 MG/1
650 TABLET ORAL ONCE
Status: CANCELLED
Start: 2024-10-23 | End: 2024-10-23

## 2024-10-22 RX ORDER — MEPERIDINE HYDROCHLORIDE 25 MG/ML
25 INJECTION INTRAMUSCULAR; INTRAVENOUS; SUBCUTANEOUS EVERY 30 MIN PRN
Status: CANCELLED | OUTPATIENT
Start: 2024-10-23

## 2024-10-22 RX ORDER — METHYLPREDNISOLONE SODIUM SUCCINATE 125 MG/2ML
125 INJECTION INTRAMUSCULAR; INTRAVENOUS ONCE
Status: CANCELLED | OUTPATIENT
Start: 2024-10-23 | End: 2024-10-23

## 2024-10-22 RX ORDER — ALBUTEROL SULFATE 90 UG/1
1-2 INHALANT RESPIRATORY (INHALATION)
Status: CANCELLED
Start: 2024-10-23

## 2024-10-22 RX ORDER — DIPHENHYDRAMINE HCL 25 MG
25 CAPSULE ORAL ONCE
Status: CANCELLED
Start: 2024-10-23 | End: 2024-10-23

## 2024-10-22 RX ORDER — DIPHENHYDRAMINE HYDROCHLORIDE 50 MG/ML
50 INJECTION INTRAMUSCULAR; INTRAVENOUS
Status: CANCELLED
Start: 2024-10-23

## 2024-10-22 RX ORDER — METHYLPREDNISOLONE SODIUM SUCCINATE 125 MG/2ML
125 INJECTION INTRAMUSCULAR; INTRAVENOUS
Status: CANCELLED
Start: 2024-10-23

## 2024-10-22 RX ORDER — ALBUTEROL SULFATE 0.83 MG/ML
2.5 SOLUTION RESPIRATORY (INHALATION)
Status: CANCELLED | OUTPATIENT
Start: 2024-10-23

## 2024-10-22 NOTE — ADDENDUM NOTE
Addended by: MAILE HARDING on: 10/22/2024 02:48 PM     Modules accepted: Orders     INSTRUMENTAL SWALLOW REPORT  MODIFIED BARIUM SWALLOW    NAME: Evelyn Kyle   : 1954  MRN: [id number]       Date of Eval: 2018     Ordering Physician: Dr. Deloise Merlin   Radiologist: Available upon consult   ENT: Dr. Deloise Merlin   Referring Diagnosis(es): Referring Diagnosis: Dysphagia     Past Medical History:  has a past medical history of Arthritis; Bipolar 1 disorder ([address] COPD (chronic obstructive pulmonary disease) ([facility] [address] Hx of blood clots; Hyperlipidemia; Hypertension; Neuropathy ([facility] [address] Schizo affective schizophrenia ([facility] [address] and Tremors of nervous system. Past Surgical History:  has a past surgical history that includes Hydrocele surgery (10+ yrs ago); IR GUIDED IVC FILTER PLACEMENT (N/A, 2016); joint replacement (); fracture surgery (age 23); Colonoscopy (last one ); and Dental surgery. Current Diet Solid Consistency: Regular  Current Diet Liquid Consistency: Thin    Date of Prior Study: n/a  Type of Study: Initial MBS         Patient Complaints/Reason for Referral:  Evelyn Kyle was referred for a MBS to assess the efficiency of his/her swallow function, assess for aspiration, and to make recommendations regarding safe dietary consistencies, effective compensatory strategies, and safe eating environment. Patient complaints: Globus sensation     Onset of problem:   Date of Onset: This admission             Behavior/Cognition/Vision/Hearing:  Behavior/Cognition: Alert, Cooperative, Pleasant mood  Vision: Within Functional Limits  Hearing: Within functional limits    Impressions:  Treatment Dx and ICD 10: Dysphagia R13.10  Evelyn Kyle was seen for an outpatient modified barium swallow study 18. Pt was pleasant, alert and oriented throughout assessment. Relevant medical hx includes COPD, hypertension and Bipolar disorder. Pt reports having globus sensation with solid foods over the past few months.   For today's procedure pt was positioned upright in 90 degrees bites/sips              Recommendations/Treatment  Requires SLP Intervention: No  Recommendations: GI Eval     D/C Recommendations: Home independently  Postural Changes and/or Swallow Maneuvers: Upright 90 degrees      Recommended Exercises:         Referral To: GI    Education: Images and recommendations were reviewed with Chase Rojo  following this exam.   Patient Education: results of study and recommendations   Patient Education Response: Verbalizes understanding    Prognosis  Prognosis for safe diet advancement: good  Barriers to reach goals: behavior, cognitive deficits  Duration/Frequency of Treatment  Duration/Frequency of Treatment: x1  Safety Devices  Safety Devices in place: Not Applicable      Goals:    Long Term:     Goal targeted this date 02/14/18         Short Term:     Goal 1: Pt raul demonstrate comprehension of results of study and recommendations  Goal Met- ST reviewed images and results of study. Pt demonstrated understanding of recommendations and is agreement with POC                       Oral Preparation / Oral Phase  Oral Phase: WFL        Pharyngeal Phase  Pharyngeal Phase: Impaired  Pharyngeal Phase - Major Contributing Deficits  Delayed Swallow Initiation: All  Pooling Valleculae: All  Shallow Penetration During: Thin cup  Deep Penetration During: Thin straw  Complete Retrieval (deep): All  Pharyngeal Residue - Valleculae: All      Esophageal Phase  Esophageal Screen: Impaired  Upper Esophageal Screen- Major Contributing Deficits  Esophageal Backflow Into The Upper Esophagus: Soft solid, Reg solid        Pain   Patient Currently in Pain: Denies       G-Code:  SLP G-Codes  Functional Assessment Tool Used: ANA LAURA NOMS   Functional Limitations: Swallowing  Swallow Current Status (): At least 1 percent but less than 20 percent impaired, limited or restricted  Swallow Goal Status ():  At least 1 percent but less than 20 percent impaired, limited or restricted  Swallow Discharge

## 2024-10-23 ENCOUNTER — INFUSION THERAPY VISIT (OUTPATIENT)
Dept: INFUSION THERAPY | Facility: HOSPITAL | Age: 79
End: 2024-10-23
Attending: INTERNAL MEDICINE
Payer: MEDICARE

## 2024-10-23 VITALS
HEART RATE: 96 BPM | DIASTOLIC BLOOD PRESSURE: 69 MMHG | TEMPERATURE: 98.7 F | RESPIRATION RATE: 16 BRPM | SYSTOLIC BLOOD PRESSURE: 126 MMHG | OXYGEN SATURATION: 95 %

## 2024-10-23 DIAGNOSIS — H44.112 PANUVEITIS OF LEFT EYE: Primary | ICD-10-CM

## 2024-10-23 LAB
ALBUMIN SERPL BCG-MCNC: 4.1 G/DL (ref 3.5–5.2)
ALT SERPL W P-5'-P-CCNC: 33 U/L (ref 0–70)
CREAT SERPL-MCNC: 1.3 MG/DL (ref 0.67–1.17)
EGFRCR SERPLBLD CKD-EPI 2021: 56 ML/MIN/1.73M2
ERYTHROCYTE [DISTWIDTH] IN BLOOD BY AUTOMATED COUNT: 13 % (ref 10–15)
HCT VFR BLD AUTO: 32.1 % (ref 40–53)
HGB BLD-MCNC: 10.9 G/DL (ref 13.3–17.7)
MCH RBC QN AUTO: 31 PG (ref 26.5–33)
MCHC RBC AUTO-ENTMCNC: 34 G/DL (ref 31.5–36.5)
MCV RBC AUTO: 91 FL (ref 78–100)
PLATELET # BLD AUTO: 112 10E3/UL (ref 150–450)
RBC # BLD AUTO: 3.52 10E6/UL (ref 4.4–5.9)
WBC # BLD AUTO: 5.9 10E3/UL (ref 4–11)

## 2024-10-23 PROCEDURE — 84460 ALANINE AMINO (ALT) (SGPT): CPT

## 2024-10-23 PROCEDURE — 96413 CHEMO IV INFUSION 1 HR: CPT

## 2024-10-23 PROCEDURE — 85027 COMPLETE CBC AUTOMATED: CPT

## 2024-10-23 PROCEDURE — 96375 TX/PRO/DX INJ NEW DRUG ADDON: CPT

## 2024-10-23 PROCEDURE — 82565 ASSAY OF CREATININE: CPT

## 2024-10-23 PROCEDURE — 250N000011 HC RX IP 250 OP 636: Performed by: INTERNAL MEDICINE

## 2024-10-23 PROCEDURE — 82040 ASSAY OF SERUM ALBUMIN: CPT

## 2024-10-23 PROCEDURE — 250N000013 HC RX MED GY IP 250 OP 250 PS 637: Performed by: INTERNAL MEDICINE

## 2024-10-23 PROCEDURE — 36415 COLL VENOUS BLD VENIPUNCTURE: CPT

## 2024-10-23 PROCEDURE — 258N000003 HC RX IP 258 OP 636: Performed by: INTERNAL MEDICINE

## 2024-10-23 RX ORDER — DIPHENHYDRAMINE HCL 25 MG
25 CAPSULE ORAL ONCE
Status: COMPLETED | OUTPATIENT
Start: 2024-10-23 | End: 2024-10-23

## 2024-10-23 RX ORDER — HEPARIN SODIUM,PORCINE 10 UNIT/ML
5 VIAL (ML) INTRAVENOUS
Status: DISCONTINUED | OUTPATIENT
Start: 2024-10-23 | End: 2024-10-23 | Stop reason: HOSPADM

## 2024-10-23 RX ORDER — ALBUTEROL SULFATE 0.83 MG/ML
2.5 SOLUTION RESPIRATORY (INHALATION)
OUTPATIENT
Start: 2024-10-24

## 2024-10-23 RX ORDER — METHYLPREDNISOLONE SODIUM SUCCINATE 125 MG/2ML
125 INJECTION INTRAMUSCULAR; INTRAVENOUS ONCE
OUTPATIENT
Start: 2024-10-24 | End: 2024-10-24

## 2024-10-23 RX ORDER — EPINEPHRINE 1 MG/ML
0.3 INJECTION, SOLUTION INTRAMUSCULAR; SUBCUTANEOUS EVERY 5 MIN PRN
OUTPATIENT
Start: 2024-10-24

## 2024-10-23 RX ORDER — MEPERIDINE HYDROCHLORIDE 50 MG/ML
25 INJECTION INTRAMUSCULAR; INTRAVENOUS; SUBCUTANEOUS EVERY 30 MIN PRN
Status: DISCONTINUED | OUTPATIENT
Start: 2024-10-23 | End: 2024-10-23 | Stop reason: HOSPADM

## 2024-10-23 RX ORDER — EPINEPHRINE 1 MG/ML
0.3 INJECTION, SOLUTION INTRAMUSCULAR; SUBCUTANEOUS EVERY 5 MIN PRN
Status: DISCONTINUED | OUTPATIENT
Start: 2024-10-23 | End: 2024-10-23 | Stop reason: HOSPADM

## 2024-10-23 RX ORDER — ACETAMINOPHEN 325 MG/1
650 TABLET ORAL ONCE
Status: COMPLETED | OUTPATIENT
Start: 2024-10-23 | End: 2024-10-23

## 2024-10-23 RX ORDER — ALBUTEROL SULFATE 0.83 MG/ML
2.5 SOLUTION RESPIRATORY (INHALATION)
Status: DISCONTINUED | OUTPATIENT
Start: 2024-10-23 | End: 2024-10-23 | Stop reason: HOSPADM

## 2024-10-23 RX ORDER — HEPARIN SODIUM,PORCINE 10 UNIT/ML
5 VIAL (ML) INTRAVENOUS
OUTPATIENT
Start: 2024-10-24

## 2024-10-23 RX ORDER — HEPARIN SODIUM (PORCINE) LOCK FLUSH IV SOLN 100 UNIT/ML 100 UNIT/ML
5 SOLUTION INTRAVENOUS
OUTPATIENT
Start: 2024-10-24

## 2024-10-23 RX ORDER — ALBUTEROL SULFATE 90 UG/1
1-2 INHALANT RESPIRATORY (INHALATION)
Status: DISCONTINUED | OUTPATIENT
Start: 2024-10-23 | End: 2024-10-23 | Stop reason: HOSPADM

## 2024-10-23 RX ORDER — METHYLPREDNISOLONE SODIUM SUCCINATE 125 MG/2ML
125 INJECTION INTRAMUSCULAR; INTRAVENOUS
Start: 2024-10-24

## 2024-10-23 RX ORDER — DIPHENHYDRAMINE HYDROCHLORIDE 50 MG/ML
50 INJECTION INTRAMUSCULAR; INTRAVENOUS
Start: 2024-10-24

## 2024-10-23 RX ORDER — METHYLPREDNISOLONE SODIUM SUCCINATE 125 MG/2ML
125 INJECTION INTRAMUSCULAR; INTRAVENOUS
Status: DISCONTINUED | OUTPATIENT
Start: 2024-10-23 | End: 2024-10-23 | Stop reason: HOSPADM

## 2024-10-23 RX ORDER — MEPERIDINE HYDROCHLORIDE 50 MG/ML
25 INJECTION INTRAMUSCULAR; INTRAVENOUS; SUBCUTANEOUS EVERY 30 MIN PRN
OUTPATIENT
Start: 2024-10-24

## 2024-10-23 RX ORDER — ALBUTEROL SULFATE 90 UG/1
1-2 INHALANT RESPIRATORY (INHALATION)
Start: 2024-10-24

## 2024-10-23 RX ORDER — ACETAMINOPHEN 325 MG/1
650 TABLET ORAL ONCE
Start: 2024-10-24 | End: 2024-10-24

## 2024-10-23 RX ORDER — DIPHENHYDRAMINE HCL 25 MG
25 CAPSULE ORAL ONCE
Start: 2024-10-24 | End: 2024-10-24

## 2024-10-23 RX ORDER — METHYLPREDNISOLONE SODIUM SUCCINATE 125 MG/2ML
125 INJECTION INTRAMUSCULAR; INTRAVENOUS ONCE
Status: COMPLETED | OUTPATIENT
Start: 2024-10-23 | End: 2024-10-23

## 2024-10-23 RX ORDER — DIPHENHYDRAMINE HYDROCHLORIDE 50 MG/ML
50 INJECTION INTRAMUSCULAR; INTRAVENOUS
Status: DISCONTINUED | OUTPATIENT
Start: 2024-10-23 | End: 2024-10-23 | Stop reason: HOSPADM

## 2024-10-23 RX ORDER — HEPARIN SODIUM (PORCINE) LOCK FLUSH IV SOLN 100 UNIT/ML 100 UNIT/ML
5 SOLUTION INTRAVENOUS
Status: DISCONTINUED | OUTPATIENT
Start: 2024-10-23 | End: 2024-10-23 | Stop reason: HOSPADM

## 2024-10-23 RX ADMIN — ACETAMINOPHEN 650 MG: 325 TABLET ORAL at 10:45

## 2024-10-23 RX ADMIN — METHYLPREDNISOLONE SODIUM SUCCINATE 125 MG: 125 INJECTION, POWDER, FOR SOLUTION INTRAMUSCULAR; INTRAVENOUS at 10:45

## 2024-10-23 RX ADMIN — INFLIXIMAB 500 MG: 100 INJECTION, POWDER, LYOPHILIZED, FOR SOLUTION INTRAVENOUS at 11:20

## 2024-10-23 RX ADMIN — DIPHENHYDRAMINE HYDROCHLORIDE 25 MG: 25 CAPSULE ORAL at 10:44

## 2024-10-23 NOTE — PROGRESS NOTES
Infusion Nursing Note:  Frank CRAIG Yazminmariia presents today for IV Remicade.    Patient seen by provider today: No   present during visit today: Not Applicable.    Note: Tolerated treatment well offers no complaints.  Discharge tohome ambulating.      Intravenous Access:  Peripheral IV placed.    Treatment Conditions:  Results reviewed, labs MET treatment parameters, ok to proceed with treatment.      Post Infusion Assessment:  Patient tolerated infusion without incident.  Blood return noted pre and post infusion.  Site patent and intact, free from redness, edema or discomfort.  No evidence of extravasations.  Access discontinued per protocol.       Discharge Plan:   Patient and/or family verbalized understanding of discharge instructions and all questions answered.      Angelica Tejeda RN

## 2024-10-23 NOTE — PROGRESS NOTES
~~~ NOTE: If the patient answers yes to any of the questions below, hold the infusion and contact ordering provider or on-call provider.    Do you currently have any signs of illness or infection or are you on any antibiotics? No  Have you recently had an elevated temperature, fever, chills, productive cough, coughing for 3 weeks or longer or hemoptysis, abnormal vital signs, night sweats, chest pain or have you noticed a decrease in your appetite, unexplained weight loss or fatigue? No  Have you had any new, sudden, or worsening abdominal pain? No  Do you have any open wounds or new incisions? (exclude for patients with hidradenitis suppurativa) No  Have you recently been diagnosed with any new nervous system diseases (ie. Multiple sclerosis, Guillain [place], seizures, neurological changes) or cancer diagnosis? Are you on any form of radiation or chemotherapy? No  Have there been any other new onset medical symptoms? No  Are you pregnant or breast feeding or do you have plans of pregnancy in the future? No; N/A  Do you have any upcoming hospitalizations or surgeries? Does not include esophagogastroduodenoscopy, colonoscopy, endoscopic retrograde cholangiopancreatography (ERCP), endoscopic ultrasound (EUS), dental procedures (including cleanings, fillings, implants, extractions)  or joint aspiration/steroid injections No  Have you or anyone in your household received a live vaccination in the past 4 weeks? Please note: No live vaccines while on biologic/chemotherapy until 6 months after the last treatment. Patient can receive the flu vaccine (shot only).  It is optimal for the patient to get it mid cycle, but it can be given at any time as long as it is not on the day of the infusion. No  If applicable to prescribed medication, confirm negative PPD or quantiferon gold MTB. If positive, verify has negative chest x-ray or the patient is at least 4 weeks post initiation of INH/B6 therapy and have clearance from provider  before infusion (Y/N:[id number])  If applicable to prescribed medication, confirm negative hepatitis B surface antigen or hepatitis C. If positive, clearance from provider before infusion. (Y/N: [id number])  Rheumatology patients receiving tocilizumab (Actemra): If labs were drawn within the past week, hold dosing until cleared to infuse If AST/ALT > 2 X upper limit normal; ANC < 1.0. NO; N/A  Patients receiving belimumab (Benlysta): Have you been having any signs of worsening depression or suicidal ideations? No; N/A

## 2024-11-06 ENCOUNTER — THERAPY VISIT (OUTPATIENT)
Dept: PHYSICAL THERAPY | Facility: REHABILITATION | Age: 79
End: 2024-11-06
Payer: MEDICARE

## 2024-11-06 DIAGNOSIS — M48.062 SPINAL STENOSIS OF LUMBAR REGION WITH NEUROGENIC CLAUDICATION: Primary | ICD-10-CM

## 2024-11-06 PROCEDURE — 97110 THERAPEUTIC EXERCISES: CPT | Mod: GP | Performed by: PHYSICAL THERAPIST

## 2024-11-20 ENCOUNTER — THERAPY VISIT (OUTPATIENT)
Dept: PHYSICAL THERAPY | Facility: REHABILITATION | Age: 79
End: 2024-11-20
Payer: MEDICARE

## 2024-11-20 DIAGNOSIS — M48.062 SPINAL STENOSIS OF LUMBAR REGION WITH NEUROGENIC CLAUDICATION: Primary | ICD-10-CM

## 2024-11-20 PROCEDURE — 97110 THERAPEUTIC EXERCISES: CPT | Mod: GP | Performed by: PHYSICAL THERAPIST

## 2024-11-20 RX ORDER — ALBUTEROL SULFATE 90 UG/1
1-2 INHALANT RESPIRATORY (INHALATION)
Start: 2024-11-20

## 2024-11-20 RX ORDER — EPINEPHRINE 1 MG/ML
0.3 INJECTION, SOLUTION, CONCENTRATE INTRAVENOUS EVERY 5 MIN PRN
OUTPATIENT
Start: 2024-11-20

## 2024-11-20 RX ORDER — ALBUTEROL SULFATE 0.83 MG/ML
2.5 SOLUTION RESPIRATORY (INHALATION)
OUTPATIENT
Start: 2024-11-20

## 2024-11-20 RX ORDER — DIPHENHYDRAMINE HYDROCHLORIDE 50 MG/ML
25 INJECTION INTRAMUSCULAR; INTRAVENOUS
Start: 2024-11-20

## 2024-11-20 RX ORDER — METHYLPREDNISOLONE SODIUM SUCCINATE 40 MG/ML
40 INJECTION INTRAMUSCULAR; INTRAVENOUS
Start: 2024-11-20

## 2024-11-20 RX ORDER — MEPERIDINE HYDROCHLORIDE 25 MG/ML
25 INJECTION INTRAMUSCULAR; INTRAVENOUS; SUBCUTANEOUS
OUTPATIENT
Start: 2024-11-20

## 2024-11-20 RX ORDER — DIPHENHYDRAMINE HYDROCHLORIDE 50 MG/ML
50 INJECTION INTRAMUSCULAR; INTRAVENOUS
Start: 2024-11-20

## 2024-11-26 ENCOUNTER — THERAPY VISIT (OUTPATIENT)
Dept: PHYSICAL THERAPY | Facility: REHABILITATION | Age: 79
End: 2024-11-26
Payer: MEDICARE

## 2024-11-26 DIAGNOSIS — M48.062 SPINAL STENOSIS OF LUMBAR REGION WITH NEUROGENIC CLAUDICATION: Primary | ICD-10-CM

## 2024-11-26 PROCEDURE — 97110 THERAPEUTIC EXERCISES: CPT | Mod: GP | Performed by: PHYSICAL THERAPIST

## 2024-11-26 NOTE — PROGRESS NOTES
DISCHARGE  Reason for Discharge: Patient has met all goals.  Pt felt he was ready to be done with therapy  Equipment Issued:     Discharge Plan: Patient to continue home program.    Referring Provider:  Flaco Lopez       11/26/24 0500   Appointment Info   Signing clinician's name / credentials Orquidea Lopez, PT   Visits Used 4   Medical Diagnosis Spinal Stenosis   PT Tx Diagnosis Spinal Stenosis   Progress Note/Certification   Start of Care Date 10/15/24   Onset of illness/injury or Date of Surgery 09/06/24  (date of referral)   Therapy Frequency 1x/week   Predicted Duration 10 visits   Certification date from 10/15/24   Certification date to 01/13/25   Progress Note Due Date 01/13/25   PT Goal 1   Goal Identifier walk   Goal Description Pt will be able to walk from parking lot to building with no difficulty and decrease symptoms of 0-1/10   Goal Progress partially met.  Pt is finding it a little easier to walk from the car to the building but knows strengthening is what will continue to help.  He will continue to do some daily.   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  Pt's APTA score improved to 13 and he is finding it easier to hold and carry the flag.  He will continue to work on his strength to continue to hold the flag longer.   Target Date 01/13/25   Date Met 11/26/24   Subjective Report   Subjective Report I do some exercises every day but don't get through all of them.  My knee feels better and I can hold the flag eaiser and longer with less pain.;  I can still struggle at times walking with no support (such as shopping cart).  Feels he has enough exercises   Objective Measure 1   Objective Measure JESE: 8%   Objective Measure 2    Objective Measure APTA: 10 no hands   Details APTA: 13 no hands on 12/26/24   Therapeutic Procedure/Exercise   Therapeutic Procedures: strength, endurance, ROM, flexibility minutes (45899) 20   Ther Proc 1 treadmill for warm up during subjective assessment   Ther Proc 2 APTA test-see above for results   Skilled Intervention brief discussion of ex, warm up with subjective assessment-goal review, APTA   Patient Response/Progress pt declined to do the ex in the clinic today.   Education   Learner/Method Patient   Plan   Plan for next session pt is discharged   Comments   Comments Assessment: Pt returns for physical therapy for spinal stenosis. He completes some of the exercises daily but not all of them.  Continues to state he knows he needs to change his mindset and do all them daily but haven't convinced myself yet.  He feels his goals are partially met to met.  He feels stronger, is able to walk more upright for longer times and is easier to hold the flag.  Pt felt he was at a point to manage his symptoms and didn't want more ex so pt is discharged

## 2024-12-04 ENCOUNTER — INFUSION THERAPY VISIT (OUTPATIENT)
Dept: INFUSION THERAPY | Facility: HOSPITAL | Age: 79
End: 2024-12-04
Attending: INTERNAL MEDICINE
Payer: MEDICARE

## 2024-12-04 VITALS
SYSTOLIC BLOOD PRESSURE: 118 MMHG | RESPIRATION RATE: 16 BRPM | OXYGEN SATURATION: 96 % | HEART RATE: 77 BPM | WEIGHT: 192.46 LBS | TEMPERATURE: 98.1 F | DIASTOLIC BLOOD PRESSURE: 71 MMHG | BODY MASS INDEX: 27.62 KG/M2

## 2024-12-04 DIAGNOSIS — H44.112 PANUVEITIS OF LEFT EYE: Primary | ICD-10-CM

## 2024-12-04 PROCEDURE — 96413 CHEMO IV INFUSION 1 HR: CPT

## 2024-12-04 PROCEDURE — 96375 TX/PRO/DX INJ NEW DRUG ADDON: CPT

## 2024-12-04 PROCEDURE — 250N000011 HC RX IP 250 OP 636: Mod: JW | Performed by: INTERNAL MEDICINE

## 2024-12-04 PROCEDURE — 258N000003 HC RX IP 258 OP 636: Performed by: INTERNAL MEDICINE

## 2024-12-04 PROCEDURE — 250N000013 HC RX MED GY IP 250 OP 250 PS 637: Performed by: INTERNAL MEDICINE

## 2024-12-04 RX ORDER — EPINEPHRINE 1 MG/ML
0.3 INJECTION, SOLUTION INTRAMUSCULAR; SUBCUTANEOUS EVERY 5 MIN PRN
Status: DISCONTINUED | OUTPATIENT
Start: 2024-12-04 | End: 2024-12-04 | Stop reason: HOSPADM

## 2024-12-04 RX ORDER — METHYLPREDNISOLONE SODIUM SUCCINATE 125 MG/2ML
125 INJECTION INTRAMUSCULAR; INTRAVENOUS ONCE
OUTPATIENT
Start: 2024-12-05 | End: 2024-12-05

## 2024-12-04 RX ORDER — EPINEPHRINE 1 MG/ML
0.3 INJECTION, SOLUTION INTRAMUSCULAR; SUBCUTANEOUS EVERY 5 MIN PRN
OUTPATIENT
Start: 2024-12-05

## 2024-12-04 RX ORDER — DIPHENHYDRAMINE HYDROCHLORIDE 50 MG/ML
50 INJECTION INTRAMUSCULAR; INTRAVENOUS
Start: 2024-12-05

## 2024-12-04 RX ORDER — ACETAMINOPHEN 325 MG/1
650 TABLET ORAL ONCE
Status: COMPLETED | OUTPATIENT
Start: 2024-12-04 | End: 2024-12-04

## 2024-12-04 RX ORDER — DIPHENHYDRAMINE HYDROCHLORIDE 50 MG/ML
25 INJECTION INTRAMUSCULAR; INTRAVENOUS
Start: 2024-12-05

## 2024-12-04 RX ORDER — ALBUTEROL SULFATE 0.83 MG/ML
2.5 SOLUTION RESPIRATORY (INHALATION)
OUTPATIENT
Start: 2024-12-05

## 2024-12-04 RX ORDER — DIPHENHYDRAMINE HCL 25 MG
25 CAPSULE ORAL ONCE
Status: COMPLETED | OUTPATIENT
Start: 2024-12-04 | End: 2024-12-04

## 2024-12-04 RX ORDER — ALBUTEROL SULFATE 0.83 MG/ML
2.5 SOLUTION RESPIRATORY (INHALATION)
Status: DISCONTINUED | OUTPATIENT
Start: 2024-12-04 | End: 2024-12-04 | Stop reason: HOSPADM

## 2024-12-04 RX ORDER — DIPHENHYDRAMINE HYDROCHLORIDE 50 MG/ML
50 INJECTION INTRAMUSCULAR; INTRAVENOUS
Status: DISCONTINUED | OUTPATIENT
Start: 2024-12-04 | End: 2024-12-04 | Stop reason: HOSPADM

## 2024-12-04 RX ORDER — METHYLPREDNISOLONE SODIUM SUCCINATE 40 MG/ML
40 INJECTION INTRAMUSCULAR; INTRAVENOUS
Status: DISCONTINUED | OUTPATIENT
Start: 2024-12-04 | End: 2024-12-04 | Stop reason: HOSPADM

## 2024-12-04 RX ORDER — MEPERIDINE HYDROCHLORIDE 50 MG/ML
25 INJECTION INTRAMUSCULAR; INTRAVENOUS; SUBCUTANEOUS
Status: DISCONTINUED | OUTPATIENT
Start: 2024-12-04 | End: 2024-12-04 | Stop reason: HOSPADM

## 2024-12-04 RX ORDER — ACETAMINOPHEN 325 MG/1
650 TABLET ORAL ONCE
Start: 2024-12-05 | End: 2024-12-05

## 2024-12-04 RX ORDER — ALBUTEROL SULFATE 90 UG/1
1-2 INHALANT RESPIRATORY (INHALATION)
Status: DISCONTINUED | OUTPATIENT
Start: 2024-12-04 | End: 2024-12-04 | Stop reason: HOSPADM

## 2024-12-04 RX ORDER — HEPARIN SODIUM (PORCINE) LOCK FLUSH IV SOLN 100 UNIT/ML 100 UNIT/ML
5 SOLUTION INTRAVENOUS
OUTPATIENT
Start: 2024-12-05

## 2024-12-04 RX ORDER — MEPERIDINE HYDROCHLORIDE 50 MG/ML
25 INJECTION INTRAMUSCULAR; INTRAVENOUS; SUBCUTANEOUS
OUTPATIENT
Start: 2024-12-05

## 2024-12-04 RX ORDER — METHYLPREDNISOLONE SODIUM SUCCINATE 125 MG/2ML
125 INJECTION INTRAMUSCULAR; INTRAVENOUS ONCE
Status: COMPLETED | OUTPATIENT
Start: 2024-12-04 | End: 2024-12-04

## 2024-12-04 RX ORDER — DIPHENHYDRAMINE HYDROCHLORIDE 50 MG/ML
25 INJECTION INTRAMUSCULAR; INTRAVENOUS
Status: DISCONTINUED | OUTPATIENT
Start: 2024-12-04 | End: 2024-12-04 | Stop reason: HOSPADM

## 2024-12-04 RX ORDER — ALBUTEROL SULFATE 90 UG/1
1-2 INHALANT RESPIRATORY (INHALATION)
Start: 2024-12-05

## 2024-12-04 RX ORDER — METHYLPREDNISOLONE SODIUM SUCCINATE 40 MG/ML
40 INJECTION INTRAMUSCULAR; INTRAVENOUS
Start: 2024-12-05

## 2024-12-04 RX ORDER — DIPHENHYDRAMINE HCL 25 MG
25 CAPSULE ORAL ONCE
Start: 2024-12-05 | End: 2024-12-05

## 2024-12-04 RX ORDER — HEPARIN SODIUM,PORCINE 10 UNIT/ML
5 VIAL (ML) INTRAVENOUS
OUTPATIENT
Start: 2024-12-05

## 2024-12-04 RX ADMIN — INFLIXIMAB 440 MG: 100 INJECTION, POWDER, LYOPHILIZED, FOR SOLUTION INTRAVENOUS at 11:52

## 2024-12-04 RX ADMIN — ACETAMINOPHEN 650 MG: 325 TABLET ORAL at 11:00

## 2024-12-04 RX ADMIN — DIPHENHYDRAMINE HYDROCHLORIDE 25 MG: 25 CAPSULE ORAL at 11:00

## 2024-12-04 RX ADMIN — METHYLPREDNISOLONE SODIUM SUCCINATE 125 MG: 125 INJECTION, POWDER, FOR SOLUTION INTRAMUSCULAR; INTRAVENOUS at 11:00

## 2024-12-04 NOTE — PROGRESS NOTES
Infusion Nursing Note:  Frank CRAIG Yazminmariia presents today for infliximab.    Patient seen by provider today: No   present during visit today: Not Applicable.    Note: /71 (Patient Position: Sitting)   Pulse 77   Temp 98.1  F (36.7  C)   Resp 16   Wt 87.3 kg (192 lb 7.4 oz)   SpO2 96%   BMI 27.62 kg/m    .      Intravenous Access:  Peripheral IV placed.    Treatment Conditions:  Biological Infusion Checklist:  ~~~ NOTE: If the patient answers yes to any of the questions below, hold the infusion and contact ordering provider or on-call provider.    Have you recently had an elevated temperature, fever, chills, productive cough, coughing for 3 weeks or longer or hemoptysis,  abnormal vital signs, night sweats,  chest pain or have you noticed a decrease in your appetite, unexplained weight loss or fatigue? No  Do you have any open wounds or new incisions? No  Do you have any upcoming hospitalizations or surgeries? Does not include esophagogastroduodenoscopy, colonoscopy, endoscopic retrograde cholangiopancreatography (ERCP), endoscopic ultrasound (EUS), dental procedures or joint aspiration/steroid injections No  Do you currently have any signs of illness or infection or are you on any antibiotics? No  Have you had any new, sudden or worsening abdominal pain? No  Have you or anyone in your household received a live vaccination in the past 4 weeks? Please note: No live vaccines while on biologic/chemotherapy until 6 months after the last treatment. Patient can receive the flu vaccine (shot only), pneumovax and the Covid vaccine. It is optimal for the patient to get these vaccines mid cycle, but they can be given at any time as long as it is not on the day of the infusion. No  Have you recently been diagnosed with any new nervous system diseases (ie. Multiple sclerosis, Guillain [place], seizures, neurological changes) or cancer diagnosis? Are you on any form of radiation or chemotherapy? No  Are you  pregnant or breast feeding or do you have plans of pregnancy in the future? No  Have you been having any signs of worsening depression or suicidal ideations?  (benlysta only) No  Have there been any other new onset medical symptoms? No  Have you had any new blood clots? (IVIG only) No      Post Infusion Assessment:  Patient tolerated infusion without incident.  Blood return noted pre and post infusion.  Site patent and intact, free from redness, edema or discomfort.  No evidence of extravasations.  Access discontinued per protocol.       Discharge Plan:   Patient discharged in stable condition accompanied by: self.  Departure Mode: Ambulatory.      Tamiko Burnette RN

## 2024-12-19 ENCOUNTER — TRANSFERRED RECORDS (OUTPATIENT)
Dept: HEALTH INFORMATION MANAGEMENT | Facility: CLINIC | Age: 79
End: 2024-12-19
Payer: MEDICARE

## 2024-12-23 ENCOUNTER — TRANSFERRED RECORDS (OUTPATIENT)
Dept: HEALTH INFORMATION MANAGEMENT | Facility: CLINIC | Age: 79
End: 2024-12-23
Payer: MEDICARE

## 2024-12-29 DIAGNOSIS — R35.0 BENIGN PROSTATIC HYPERPLASIA WITH URINARY FREQUENCY: ICD-10-CM

## 2024-12-29 DIAGNOSIS — K21.00 GASTROESOPHAGEAL REFLUX DISEASE WITH ESOPHAGITIS WITHOUT HEMORRHAGE: ICD-10-CM

## 2024-12-29 DIAGNOSIS — N40.1 BENIGN PROSTATIC HYPERPLASIA WITH URINARY FREQUENCY: ICD-10-CM

## 2024-12-31 RX ORDER — OMEPRAZOLE 40 MG/1
40 CAPSULE, DELAYED RELEASE ORAL DAILY
Qty: 90 CAPSULE | Refills: 2 | Status: SHIPPED | OUTPATIENT
Start: 2024-12-31

## 2024-12-31 RX ORDER — TAMSULOSIN HYDROCHLORIDE 0.4 MG/1
CAPSULE ORAL
Qty: 90 CAPSULE | Refills: 2 | Status: SHIPPED | OUTPATIENT
Start: 2024-12-31

## 2025-01-01 DIAGNOSIS — E78.00 HYPERCHOLESTEROLEMIA: ICD-10-CM

## 2025-01-02 DIAGNOSIS — E11.65 TYPE 2 DIABETES MELLITUS WITH HYPERGLYCEMIA, WITHOUT LONG-TERM CURRENT USE OF INSULIN (H): ICD-10-CM

## 2025-01-02 RX ORDER — LANCETS
EACH MISCELLANEOUS
Qty: 200 EACH | Refills: 1 | Status: SHIPPED | OUTPATIENT
Start: 2025-01-02

## 2025-01-02 RX ORDER — ATORVASTATIN CALCIUM 40 MG/1
TABLET, FILM COATED ORAL
Qty: 90 TABLET | Refills: 2 | Status: SHIPPED | OUTPATIENT
Start: 2025-01-02

## 2025-01-07 ENCOUNTER — TELEPHONE (OUTPATIENT)
Dept: INTERNAL MEDICINE | Facility: CLINIC | Age: 80
End: 2025-01-07
Payer: MEDICARE

## 2025-01-07 NOTE — TELEPHONE ENCOUNTER
January 7, 2025    [facility] Diabetic Standard Written Order was received via fax for Dr. Lopez.  Patient label was attached to paperwork and placed in provider's inbox to be signed.    Manisha Sylvester

## 2025-01-15 ENCOUNTER — INFUSION THERAPY VISIT (OUTPATIENT)
Dept: INFUSION THERAPY | Facility: HOSPITAL | Age: 80
End: 2025-01-15
Attending: INTERNAL MEDICINE
Payer: MEDICARE

## 2025-01-15 VITALS
HEART RATE: 77 BPM | TEMPERATURE: 97.9 F | SYSTOLIC BLOOD PRESSURE: 125 MMHG | OXYGEN SATURATION: 96 % | DIASTOLIC BLOOD PRESSURE: 73 MMHG | RESPIRATION RATE: 16 BRPM | BODY MASS INDEX: 27.98 KG/M2 | WEIGHT: 195 LBS

## 2025-01-15 DIAGNOSIS — H44.112 PANUVEITIS OF LEFT EYE: Primary | ICD-10-CM

## 2025-01-15 PROCEDURE — 250N000013 HC RX MED GY IP 250 OP 250 PS 637: Performed by: INTERNAL MEDICINE

## 2025-01-15 PROCEDURE — 96413 CHEMO IV INFUSION 1 HR: CPT

## 2025-01-15 PROCEDURE — 258N000003 HC RX IP 258 OP 636: Performed by: INTERNAL MEDICINE

## 2025-01-15 PROCEDURE — 96375 TX/PRO/DX INJ NEW DRUG ADDON: CPT

## 2025-01-15 PROCEDURE — 250N000011 HC RX IP 250 OP 636: Performed by: INTERNAL MEDICINE

## 2025-01-15 RX ORDER — METHYLPREDNISOLONE SODIUM SUCCINATE 40 MG/ML
40 INJECTION INTRAMUSCULAR; INTRAVENOUS
Status: DISCONTINUED | OUTPATIENT
Start: 2025-01-15 | End: 2025-01-15 | Stop reason: HOSPADM

## 2025-01-15 RX ORDER — DIPHENHYDRAMINE HYDROCHLORIDE 50 MG/ML
50 INJECTION INTRAMUSCULAR; INTRAVENOUS
Start: 2025-01-16

## 2025-01-15 RX ORDER — DIPHENHYDRAMINE HYDROCHLORIDE 50 MG/ML
50 INJECTION INTRAMUSCULAR; INTRAVENOUS
Status: DISCONTINUED | OUTPATIENT
Start: 2025-01-15 | End: 2025-01-15 | Stop reason: HOSPADM

## 2025-01-15 RX ORDER — ALBUTEROL SULFATE 0.83 MG/ML
2.5 SOLUTION RESPIRATORY (INHALATION)
Status: DISCONTINUED | OUTPATIENT
Start: 2025-01-15 | End: 2025-01-15 | Stop reason: HOSPADM

## 2025-01-15 RX ORDER — HEPARIN SODIUM,PORCINE 10 UNIT/ML
5 VIAL (ML) INTRAVENOUS
OUTPATIENT
Start: 2025-01-16

## 2025-01-15 RX ORDER — DIPHENHYDRAMINE HCL 25 MG
25 CAPSULE ORAL ONCE
Start: 2025-01-16 | End: 2025-01-16

## 2025-01-15 RX ORDER — MEPERIDINE HYDROCHLORIDE 25 MG/ML
25 INJECTION INTRAMUSCULAR; INTRAVENOUS; SUBCUTANEOUS
OUTPATIENT
Start: 2025-01-16

## 2025-01-15 RX ORDER — DIPHENHYDRAMINE HCL 25 MG
25 CAPSULE ORAL ONCE
Status: COMPLETED | OUTPATIENT
Start: 2025-01-15 | End: 2025-01-15

## 2025-01-15 RX ORDER — EPINEPHRINE 1 MG/ML
0.3 INJECTION, SOLUTION INTRAMUSCULAR; SUBCUTANEOUS EVERY 5 MIN PRN
OUTPATIENT
Start: 2025-01-16

## 2025-01-15 RX ORDER — METHYLPREDNISOLONE SODIUM SUCCINATE 125 MG/2ML
125 INJECTION INTRAMUSCULAR; INTRAVENOUS ONCE
OUTPATIENT
Start: 2025-01-16 | End: 2025-01-16

## 2025-01-15 RX ORDER — ACETAMINOPHEN 325 MG/1
650 TABLET ORAL ONCE
Status: COMPLETED | OUTPATIENT
Start: 2025-01-15 | End: 2025-01-15

## 2025-01-15 RX ORDER — ALBUTEROL SULFATE 90 UG/1
1-2 INHALANT RESPIRATORY (INHALATION)
Status: DISCONTINUED | OUTPATIENT
Start: 2025-01-15 | End: 2025-01-15 | Stop reason: HOSPADM

## 2025-01-15 RX ORDER — METHYLPREDNISOLONE SODIUM SUCCINATE 40 MG/ML
40 INJECTION INTRAMUSCULAR; INTRAVENOUS
Start: 2025-01-16

## 2025-01-15 RX ORDER — ALBUTEROL SULFATE 0.83 MG/ML
2.5 SOLUTION RESPIRATORY (INHALATION)
OUTPATIENT
Start: 2025-01-16

## 2025-01-15 RX ORDER — ALBUTEROL SULFATE 90 UG/1
1-2 INHALANT RESPIRATORY (INHALATION)
Start: 2025-01-16

## 2025-01-15 RX ORDER — MEPERIDINE HYDROCHLORIDE 25 MG/ML
25 INJECTION INTRAMUSCULAR; INTRAVENOUS; SUBCUTANEOUS
Status: DISCONTINUED | OUTPATIENT
Start: 2025-01-15 | End: 2025-01-15 | Stop reason: HOSPADM

## 2025-01-15 RX ORDER — HEPARIN SODIUM (PORCINE) LOCK FLUSH IV SOLN 100 UNIT/ML 100 UNIT/ML
5 SOLUTION INTRAVENOUS
OUTPATIENT
Start: 2025-01-16

## 2025-01-15 RX ORDER — DIPHENHYDRAMINE HYDROCHLORIDE 50 MG/ML
25 INJECTION INTRAMUSCULAR; INTRAVENOUS
Start: 2025-01-16

## 2025-01-15 RX ORDER — ACETAMINOPHEN 325 MG/1
650 TABLET ORAL ONCE
Start: 2025-01-16 | End: 2025-01-16

## 2025-01-15 RX ORDER — METHYLPREDNISOLONE SODIUM SUCCINATE 125 MG/2ML
125 INJECTION INTRAMUSCULAR; INTRAVENOUS ONCE
Status: COMPLETED | OUTPATIENT
Start: 2025-01-15 | End: 2025-01-15

## 2025-01-15 RX ORDER — EPINEPHRINE 1 MG/ML
0.3 INJECTION, SOLUTION INTRAMUSCULAR; SUBCUTANEOUS EVERY 5 MIN PRN
Status: DISCONTINUED | OUTPATIENT
Start: 2025-01-15 | End: 2025-01-15 | Stop reason: HOSPADM

## 2025-01-15 RX ORDER — DIPHENHYDRAMINE HYDROCHLORIDE 50 MG/ML
25 INJECTION INTRAMUSCULAR; INTRAVENOUS
Status: DISCONTINUED | OUTPATIENT
Start: 2025-01-15 | End: 2025-01-15 | Stop reason: HOSPADM

## 2025-01-15 RX ADMIN — INFLIXIMAB 440 MG: 100 INJECTION, POWDER, LYOPHILIZED, FOR SOLUTION INTRAVENOUS at 11:55

## 2025-01-15 RX ADMIN — ACETAMINOPHEN 650 MG: 325 TABLET ORAL at 11:25

## 2025-01-15 RX ADMIN — METHYLPREDNISOLONE SODIUM SUCCINATE 125 MG: 125 INJECTION, POWDER, FOR SOLUTION INTRAMUSCULAR; INTRAVENOUS at 11:26

## 2025-01-15 RX ADMIN — DIPHENHYDRAMINE HYDROCHLORIDE 25 MG: 25 CAPSULE ORAL at 11:25

## 2025-01-15 RX ADMIN — SODIUM CHLORIDE 250 ML: 9 INJECTION, SOLUTION INTRAVENOUS at 11:25

## 2025-01-15 NOTE — PROGRESS NOTES
Infusion Nursing Note:  Frank Obando presents today for Infliximab.    Patient seen by provider today: No   present during visit today: Not Applicable.    Note: Antonio arrived ambulatory by himself for Infliximab. Plan of care reviewed and he has no questions. Biological checklist completed. Ok to proceed with treatment.   Pre medications Tylenol, oral Benadryl and solumedrol administered 30 minutes prior to treatment as ordered. Antonio tolerates Infliximab infusions over 1 hour.    Intravenous Access:  Peripheral IV placed.    Treatment Conditions:  Biological Infusion Checklist:  ~~~ NOTE: If the patient answers yes to any of the questions below, hold the infusion and contact ordering provider or on-call provider.    Have you recently had an elevated temperature, fever, chills, productive cough, coughing for 3 weeks or longer or hemoptysis,  abnormal vital signs, night sweats,  chest pain or have you noticed a decrease in your appetite, unexplained weight loss or fatigue? No  Do you have any open wounds or new incisions? No  Do you have any upcoming hospitalizations or surgeries? Does not include esophagogastroduodenoscopy, colonoscopy, endoscopic retrograde cholangiopancreatography (ERCP), endoscopic ultrasound (EUS), dental procedures or joint aspiration/steroid injections No  Do you currently have any signs of illness or infection or are you on any antibiotics? No  Have you had any new, sudden or worsening abdominal pain? No  Have you or anyone in your household received a live vaccination in the past 4 weeks? Please note: No live vaccines while on biologic/chemotherapy until 6 months after the last treatment. Patient can receive the flu vaccine (shot only), pneumovax and the Covid vaccine. It is optimal for the patient to get these vaccines mid cycle, but they can be given at any time as long as it is not on the day of the infusion. No  Have you recently been diagnosed with any new nervous system  diseases (ie. Multiple sclerosis, Guillain [place], seizures, neurological changes) or cancer diagnosis? Are you on any form of radiation or chemotherapy? No  Are you pregnant or breast feeding or do you have plans of pregnancy in the future? NA  Have you been having any signs of worsening depression or suicidal ideations?  (benlysta only) NA  Have there been any other new onset medical symptoms? No  Have you had any new blood clots? (IVIG only) NA    Post Infusion Assessment:  Patient tolerated infusion without incident.  Blood return noted pre and post infusion.  Site patent and intact, free from redness, edema or discomfort.  Access discontinued per protocol.     Discharge Plan:   Patient will return Feb 27th for next appointment.   Patient discharged in stable condition accompanied by: self.  Departure Mode: Ambulatory.      Deena Lee RN

## 2025-01-28 ENCOUNTER — TELEPHONE (OUTPATIENT)
Dept: RHEUMATOLOGY | Facility: CLINIC | Age: 80
End: 2025-01-28
Payer: MEDICARE

## 2025-01-28 ENCOUNTER — TRANSFERRED RECORDS (OUTPATIENT)
Dept: HEALTH INFORMATION MANAGEMENT | Facility: CLINIC | Age: 80
End: 2025-01-28
Payer: MEDICARE

## 2025-01-28 NOTE — TELEPHONE ENCOUNTER
Per patient he is unable to do any of the appointments that are open this week. He scheduled for 2/25 which was the next available.

## 2025-01-28 NOTE — TELEPHONE ENCOUNTER
Patient needs follow up appointment now. Offer next available apppointment please.     Per last OV 10/2/2024- Follow up in 3 months.

## 2025-02-20 NOTE — PLAN OF CARE
Belongings sent with patient to the [facility].   02/20/25 0300   Patient Belongings   Belongings Transferred with Patient Clothing;Other (comment)  (Address book, black duffel bag)   Clothing Footwear;Socks;Underpants;Pants;Shirt;Sweater;Jacket/coat  (Gloves)        Problem: Cholecystectomy  Goal: Absence of Bleeding  Outcome: Progressing     Problem: Cholecystectomy  Goal: Absence of Infection Signs and Symptoms  Outcome: Progressing     A/O x 4,VSS on RA. R PIV SL. LULY drain in place with bright red output. LULY dressing and lap lauren dressings CDI. C/O R sided abdominal pain at LULY site, managed with scheduled Acetaminophen and rest. Tolerating 60 G Carb diet but declined to order dinner this evening.

## 2025-02-25 ENCOUNTER — OFFICE VISIT (OUTPATIENT)
Dept: RHEUMATOLOGY | Facility: CLINIC | Age: 80
End: 2025-02-25
Payer: MEDICARE

## 2025-02-25 VITALS
OXYGEN SATURATION: 96 % | WEIGHT: 189.2 LBS | BODY MASS INDEX: 27.15 KG/M2 | SYSTOLIC BLOOD PRESSURE: 116 MMHG | HEART RATE: 72 BPM | DIASTOLIC BLOOD PRESSURE: 62 MMHG

## 2025-02-25 DIAGNOSIS — R76.8 ANTINEUTROPHIL CYTOPLASMIC ANTIBODY (ANCA) POSITIVE: ICD-10-CM

## 2025-02-25 DIAGNOSIS — Z79.899 HIGH RISK MEDICATION USE: ICD-10-CM

## 2025-02-25 DIAGNOSIS — H44.112 PANUVEITIS OF LEFT EYE: Primary | ICD-10-CM

## 2025-02-25 DIAGNOSIS — M65.341 TRIGGER FINGER, RIGHT RING FINGER: ICD-10-CM

## 2025-02-25 DIAGNOSIS — M17.0 PRIMARY OSTEOARTHRITIS OF BOTH KNEES: ICD-10-CM

## 2025-02-25 PROCEDURE — 99214 OFFICE O/P EST MOD 30 MIN: CPT | Mod: 25 | Performed by: INTERNAL MEDICINE

## 2025-02-25 PROCEDURE — 20610 DRAIN/INJ JOINT/BURSA W/O US: CPT | Mod: RT | Performed by: INTERNAL MEDICINE

## 2025-02-25 RX ORDER — TRIAMCINOLONE ACETONIDE 40 MG/ML
40 INJECTION, SUSPENSION INTRA-ARTICULAR; INTRAMUSCULAR ONCE
Status: COMPLETED | OUTPATIENT
Start: 2025-02-25 | End: 2025-02-25

## 2025-02-25 RX ADMIN — TRIAMCINOLONE ACETONIDE 40 MG: 40 INJECTION, SUSPENSION INTRA-ARTICULAR; INTRAMUSCULAR at 09:52

## 2025-02-25 NOTE — PROGRESS NOTES
Rheumatology follow-up visit note     Frank is a 79 year old male presents today for follow-up.    Antonio was seen today for recheck.    Diagnoses and all orders for this visit:    Panuveitis of left eye    Antineutrophil cytoplasmic antibody (ANCA) positive    High risk medication use    Trigger finger, right ring finger    Primary osteoarthritis of both knees  -     ASPIRATION/INJECTION MAJOR JOINT  -     triamcinolone (KENALOG-40) injection 40 mg            Is right knee pain likely due to osteoarthritis would like to proceed with local injection.  Clinically as well as radiologically he has evidence of osteoarthritis in the knees.  After pros and cons were discussed including risk of infection, bleeding, skin changes including thinning, pigmentary alteration and scarring to name a few, right knee injected as noted in the orders section. This was done with nontouch technique.  The patient tolerated the procedure well and had a brisk Marcaine effect.  The postinjection care was discussed.      Follow up in 4 months.    HPI    Frank Obando is a 79 year old male is here for follow-up of    management of immunosuppression for left panuveitis for which he is on Remicade infusions.  He just recently followed up with ophthalmology.  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  Will continue as now..  He has noted worsening pain.  This is in his right knee joint worse with activity moderately severe, there has been no recent trauma.  He has not had swelling.  He gets Remicade at [facility] every 6 weeks.  T ROS enquiry held for fever, ocular symptoms, rash, headache,  GI issues.  He has not had sinus symptoms, hemoptysis, hematuria, ear nose inflammation signals.   He has longstanding numbness of the toes from neuropathy for alternative reason.           DETAILED EXAMINATION  02/25/25  :    Vitals:    02/25/25 0841   BP: 116/62   BP Location: Right arm   Pulse: 72   SpO2: 96%   Weight: 85.8 kg (189 lb 3.2 oz)      Alert oriented. Head including the face is examined for malar rash, heliotropes, scarring, lupus pernio. Eyes examined for redness such as in episcleritis/scleritis, periorbital lesions.   Neck/ Face examined for parotid gland swelling, range of motion of neck.  Left upper and lower and right upper and lower extremities examined for tenderness, swelling, warmth of the appendicular joints, range of motion, edema, rash.  Some of the important findings included: he does not have evidence of synovitis in the palpable joints of the upper extremities.  No significant deformities of the digits.  + [place] nodes.  Range of motion of the shoulders  show full abduction.  + JLT without effusion or warmth of the knees.  he does not have dactylitis of the digits.     Patient Active Problem List    Diagnosis Date Noted    Coronary artery disease involving native coronary artery of native heart without angina pectoris 07/03/2024     Priority: Medium    Rheumatoid arthritis involving multiple sites with positive rheumatoid factor (H) 07/02/2023     Priority: Medium     Sees Dr Casas for inflammatory arthritis. Had uveitis also; on Remicade (July 2023)       Personal history of immunosuppressive therapy 07/02/2023     Priority: Medium     On Remicade for inflammatory arthritis described as rheumatoid.  Previously had been exposed to methotrexate and apparently it may have induced hepatic cirrhosis.      Stage 3b chronic kidney disease (H) 03/24/2023     Priority: Medium    Angiodysplasia of stomach 09/27/2021     Priority: Medium    Secondary esophageal varices without bleeding (H) 09/27/2021     Priority: Medium    Panuveitis of left eye, Dr. Abdi / Castillo 03/19/2019     Priority: Medium    Essential hypertension, benign      Priority: Medium     Created by Conversion  Replacement Utility updated for latest IMO load      Formatting of this note might be different from the original.  Created by Conversion    Replacement Utility  updated for latest IMO load      Type 2 diabetes mellitus with complication, without long-term current use of insulin (H)      Priority: Medium     Created by Conversion      Formatting of this note might be different from the original.  Created by Conversion      Dyslipidemia      Priority: Medium    Thrombocytopenia      Priority: Medium    Cirrhosis of liver with ascites, unspecified hepatic cirrhosis type (H)      Priority: Medium     Non ETOH drinker; possibly from Methotrexate for RA RX       History of colonic polyps 05/07/2018     Priority: Medium    Antineutrophil cytoplasmic antibody (ANCA) positive 03/23/2017     Priority: Medium    Diabetic polyneuropathy associated with type 2 diabetes mellitus (H)      Priority: Medium     Created by Conversion  Replacement Utility updated for latest IMO load      Formatting of this note might be different from the original.  Created by Conversion    Replacement Utility updated for latest IMO load      Right bundle branch block 11/24/2014     Priority: Medium    H/o CVA ~2013      Priority: Medium     Created by Conversion         Past Surgical History:   Procedure Laterality Date    COLONOSCOPY N/A 11/16/2021    Procedure: COLONOSCOPY with polypectomy;  Surgeon: Dex Finch MD;  Location: [facility]    CV CORONARY ANGIOGRAM N/A 4/23/2024    Procedure: Coronary Angiogram;  Surgeon: Mark Jones MD;  Location: [facility] CV    CV INSTANTANEOUS WAVE-FREE RATIO N/A 4/23/2024    Procedure: Instantaneous Wave-Free Ratio;  Surgeon: Mark Jones MD;  Location: [facility] CV    CV LEFT HEART CATH N/A 4/23/2024    Procedure: Left Heart Catheterization;  Surgeon: Mark Jones MD;  Location: [facility] LAB CV    CYSTOSCOPY TUMOR / CONDYLOMATA W/ LASER Left 7/18/2014    ESOPHAGOSCOPY, GASTROSCOPY, DUODENOSCOPY (EGD), COMBINED N/A 11/16/2021    Procedure: ESOPHAGOGASTRODUODENOSCOPY (EGD) with biopsies and argon plasma coagulation;   Surgeon: eDx Finch MD;  Location: [facility] GI    ESOPHAGOSCOPY, GASTROSCOPY, DUODENOSCOPY (EGD), COMBINED N/A 9/29/2023    Procedure: ESOPHAGOGASTRODUODENOSCOPY with BIOPSY;  Surgeon: Alli Lozada MD;  Location: [facility] OR     CYSTOSCOPY,INSERT URETERAL STENT      Description: Cystoscopy With Insertion Of Ureteral Stent Right;  Recorded: 10/14/2009;  Comments: stone    LAPAROSCOPIC CHOLECYSTECTOMY N/A 7/1/2023    Procedure: CHOLECYSTECTOMY, LAPAROSCOPIC;  Surgeon: Jadiel Argueta MD;  Location: [facility] Main OR      Past Medical History:   Diagnosis Date    Anemia     Arthritis     osteoarthritis    Calculus of kidney     Diabetes mellitus (H)     Episcleritis of left eye     Hyperlipidemia     Hypertension     Iron deficiency anemia due to chronic blood loss 11/2/2021    Peripheral neuropathy     Stroke (H)      Allergies   Allergen Reactions    Morphine Nausea and Vomiting    Scopolamine Hbr [Scopolamine] Unknown     Current Outpatient Medications   Medication Sig Dispense Refill    acetaminophen (TYLENOL) 500 MG tablet Take 1 tablet (500 mg) by mouth every 6 hours as needed for mild pain      aspirin 81 MG EC tablet Take 1 tablet (81 mg) by mouth daily Start tomorrow morning. 90 tablet 3    atorvastatin (LIPITOR) 40 MG tablet TAKE 1 TABLET BY MOUTH DAILY 90 tablet 2    blood glucose (ACCU-CHEK JACOB PLUS) test strip 1 strip by In Vitro route 2 times daily 200 strip 1    blood glucose meter (GLUCOMETER) [BLOOD GLUCOSE METER (GLUCOMETER)] Use 1 each As Directed 2 (two) times a day. Dispense glucometer brand per patient's insurance at pharmacy discretion. 1 each 0    blood glucose monitoring (SOFTCLIX) lancets USE 1 LANCET TWICE DAILY 200 each 1    cyanocobalamin, vitamin B-12, 2,500 mcg Tab [CYANOCOBALAMIN, VITAMIN B-12, 2,500 MCG TAB] Take 2,500 mcg by mouth daily.      ferrous sulfate (FEROSUL) 325 (65 Fe) MG tablet Take 325 mg by mouth 3 times daily      generic lancets  (ACCU-CHEK SOFTCLIX LANCETS) [GENERIC LANCETS (ACCU-CHEK SOFTCLIX LANCETS)] Use 1 each As Directed 2 (two) times a day. Dispense brand per patient's insurance at pharmacy discretion. 100 each 3    inFLIXimab (REMICADE IV) Every 6weeks      lisinopril (ZESTRIL) 10 MG tablet TAKE 1 TABLET(10 MG) BY MOUTH DAILY 90 tablet 3    magnesium oxide 250 mg Tab [MAGNESIUM OXIDE 250 MG TAB] Take 250 mg by mouth 2 (two) times a day.      metFORMIN (GLUCOPHAGE) 500 MG tablet TAKE 2 TABLETS BY MOUTH TWICE DAILY AT 6 AM AND AT 4  tablet 3    omeprazole (PRILOSEC) 40 MG DR capsule Take 1 capsule (40 mg) by mouth daily. 90 capsule 2    psyllium (METAMUCIL/KONSYL) capsule Take 3 capsules by mouth 2 times daily      tamsulosin (FLOMAX) 0.4 MG capsule TAKE 1 CAPSULE BY MOUTH EVERY DAY AFTER SUPPER 90 capsule 2     family history includes Coronary Artery Disease in his mother; Diabetes in his mother; Lung Cancer in his father; No Known Problems in his son.  Social Connections: Not on file          WBC Count   Date Value Ref Range Status   10/23/2024 5.9 4.0 - 11.0 10e3/uL Final     RBC Count   Date Value Ref Range Status   10/23/2024 3.52 (L) 4.40 - 5.90 10e6/uL Final     Hemoglobin   Date Value Ref Range Status   10/23/2024 10.9 (L) 13.3 - 17.7 g/dL Final     Hematocrit   Date Value Ref Range Status   10/23/2024 32.1 (L) 40.0 - 53.0 % Final     MCV   Date Value Ref Range Status   10/23/2024 91 78 - 100 fL Final     MCH   Date Value Ref Range Status   10/23/2024 31.0 26.5 - 33.0 pg Final     Platelet Count   Date Value Ref Range Status   10/23/2024 112 (L) 150 - 450 10e3/uL Final     % Lymphocytes   Date Value Ref Range Status   05/12/2024 21 % Final     AST   Date Value Ref Range Status   09/06/2024 36 0 - 45 U/L Final     ALT   Date Value Ref Range Status   10/23/2024 33 0 - 70 U/L Final     Albumin   Date Value Ref Range Status   10/23/2024 4.1 3.5 - 5.2 g/dL Final   07/02/2023 2.8 (L) 3.5 - 5.0 g/dL Final     Alkaline  Phosphatase   Date Value Ref Range Status   09/06/2024 88 40 - 150 U/L Final     Creatinine   Date Value Ref Range Status   10/23/2024 1.30 (H) 0.67 - 1.17 mg/dL Final     GFR Estimate   Date Value Ref Range Status   10/23/2024 56 (L) >60 mL/min/1.73m2 Final     Comment:     eGFR calculated using 2021 CKD-EPI equation.   02/08/2021 48 (L) >60 mL/min/1.73m2 Final     GFR Estimate If Black   Date Value Ref Range Status   02/08/2021 59 (L) >60 mL/min/1.73m2 Final     Creatinine Urine mg/dL   Date Value Ref Range Status   09/06/2024 204.0 mg/dL Final     Comment:     The reference ranges have not been established in urine creatinine. The results should be integrated into the clinical context for interpretation.   09/20/2021 116 mg/dL Final

## 2025-02-26 ENCOUNTER — HOSPITAL ENCOUNTER (OUTPATIENT)
Dept: ULTRASOUND IMAGING | Facility: CLINIC | Age: 80
Discharge: HOME | End: 2025-02-26
Attending: INTERNAL MEDICINE
Payer: MEDICARE

## 2025-02-26 DIAGNOSIS — Z71.3 DIETARY COUNSELING AND SURVEILLANCE: ICD-10-CM

## 2025-02-26 DIAGNOSIS — K29.40 AUTOIMMUNE GASTRITIS: ICD-10-CM

## 2025-02-26 DIAGNOSIS — K74.60 UNSPECIFIED CIRRHOSIS OF LIVER (H): ICD-10-CM

## 2025-02-26 DIAGNOSIS — I85.10 SECONDARY ESOPHAGEAL VARICES WITHOUT BLEEDING (H): ICD-10-CM

## 2025-02-26 DIAGNOSIS — D50.9 IRON DEFICIENCY ANEMIA, UNSPECIFIED IRON DEFICIENCY ANEMIA TYPE: ICD-10-CM

## 2025-02-26 PROCEDURE — 76705 ECHO EXAM OF ABDOMEN: CPT

## 2025-02-27 ENCOUNTER — INFUSION THERAPY VISIT (OUTPATIENT)
Dept: INFUSION THERAPY | Facility: HOSPITAL | Age: 80
End: 2025-02-27
Attending: INTERNAL MEDICINE
Payer: MEDICARE

## 2025-02-27 ENCOUNTER — TRANSFERRED RECORDS (OUTPATIENT)
Dept: HEALTH INFORMATION MANAGEMENT | Facility: CLINIC | Age: 80
End: 2025-02-27

## 2025-02-27 VITALS
TEMPERATURE: 98.4 F | HEART RATE: 60 BPM | OXYGEN SATURATION: 96 % | DIASTOLIC BLOOD PRESSURE: 74 MMHG | RESPIRATION RATE: 16 BRPM | SYSTOLIC BLOOD PRESSURE: 125 MMHG

## 2025-02-27 DIAGNOSIS — H44.112 PANUVEITIS OF LEFT EYE: Primary | ICD-10-CM

## 2025-02-27 PROCEDURE — 250N000013 HC RX MED GY IP 250 OP 250 PS 637: Performed by: INTERNAL MEDICINE

## 2025-02-27 PROCEDURE — 258N000003 HC RX IP 258 OP 636: Performed by: INTERNAL MEDICINE

## 2025-02-27 PROCEDURE — 250N000011 HC RX IP 250 OP 636: Mod: JZ | Performed by: INTERNAL MEDICINE

## 2025-02-27 RX ORDER — HEPARIN SODIUM,PORCINE 10 UNIT/ML
5 VIAL (ML) INTRAVENOUS
OUTPATIENT
Start: 2025-04-10

## 2025-02-27 RX ORDER — METHYLPREDNISOLONE SODIUM SUCCINATE 40 MG/ML
40 INJECTION INTRAMUSCULAR; INTRAVENOUS
Status: DISCONTINUED | OUTPATIENT
Start: 2025-02-27 | End: 2025-02-27 | Stop reason: HOSPADM

## 2025-02-27 RX ORDER — ALBUTEROL SULFATE 90 UG/1
1-2 INHALANT RESPIRATORY (INHALATION)
Status: DISCONTINUED | OUTPATIENT
Start: 2025-02-27 | End: 2025-02-27 | Stop reason: HOSPADM

## 2025-02-27 RX ORDER — DIPHENHYDRAMINE HYDROCHLORIDE 50 MG/ML
50 INJECTION INTRAMUSCULAR; INTRAVENOUS
Status: DISCONTINUED | OUTPATIENT
Start: 2025-02-27 | End: 2025-02-27 | Stop reason: HOSPADM

## 2025-02-27 RX ORDER — MEPERIDINE HYDROCHLORIDE 25 MG/ML
25 INJECTION INTRAMUSCULAR; INTRAVENOUS; SUBCUTANEOUS
OUTPATIENT
Start: 2025-04-10

## 2025-02-27 RX ORDER — HEPARIN SODIUM (PORCINE) LOCK FLUSH IV SOLN 100 UNIT/ML 100 UNIT/ML
5 SOLUTION INTRAVENOUS
OUTPATIENT
Start: 2025-04-10

## 2025-02-27 RX ORDER — DIPHENHYDRAMINE HYDROCHLORIDE 50 MG/ML
50 INJECTION INTRAMUSCULAR; INTRAVENOUS
Start: 2025-04-10

## 2025-02-27 RX ORDER — EPINEPHRINE 1 MG/ML
0.3 INJECTION, SOLUTION INTRAMUSCULAR; SUBCUTANEOUS EVERY 5 MIN PRN
OUTPATIENT
Start: 2025-04-10

## 2025-02-27 RX ORDER — DIPHENHYDRAMINE HYDROCHLORIDE 50 MG/ML
25 INJECTION INTRAMUSCULAR; INTRAVENOUS
Start: 2025-04-10

## 2025-02-27 RX ORDER — METHYLPREDNISOLONE SODIUM SUCCINATE 40 MG/ML
40 INJECTION INTRAMUSCULAR; INTRAVENOUS
Start: 2025-04-10

## 2025-02-27 RX ORDER — DIPHENHYDRAMINE HCL 25 MG
25 CAPSULE ORAL ONCE
Start: 2025-04-10 | End: 2025-04-10

## 2025-02-27 RX ORDER — ACETAMINOPHEN 325 MG/1
650 TABLET ORAL ONCE
Start: 2025-04-10 | End: 2025-04-10

## 2025-02-27 RX ORDER — MEPERIDINE HYDROCHLORIDE 25 MG/ML
25 INJECTION INTRAMUSCULAR; INTRAVENOUS; SUBCUTANEOUS
Status: DISCONTINUED | OUTPATIENT
Start: 2025-02-27 | End: 2025-02-27 | Stop reason: HOSPADM

## 2025-02-27 RX ORDER — DIPHENHYDRAMINE HYDROCHLORIDE 50 MG/ML
25 INJECTION INTRAMUSCULAR; INTRAVENOUS
Status: DISCONTINUED | OUTPATIENT
Start: 2025-02-27 | End: 2025-02-27 | Stop reason: HOSPADM

## 2025-02-27 RX ORDER — EPINEPHRINE 1 MG/ML
0.3 INJECTION, SOLUTION INTRAMUSCULAR; SUBCUTANEOUS EVERY 5 MIN PRN
Status: DISCONTINUED | OUTPATIENT
Start: 2025-02-27 | End: 2025-02-27 | Stop reason: HOSPADM

## 2025-02-27 RX ORDER — METHYLPREDNISOLONE SODIUM SUCCINATE 125 MG/2ML
125 INJECTION INTRAMUSCULAR; INTRAVENOUS ONCE
Status: COMPLETED | OUTPATIENT
Start: 2025-02-27 | End: 2025-02-27

## 2025-02-27 RX ORDER — ALBUTEROL SULFATE 90 UG/1
1-2 INHALANT RESPIRATORY (INHALATION)
Start: 2025-04-10

## 2025-02-27 RX ORDER — ALBUTEROL SULFATE 0.83 MG/ML
2.5 SOLUTION RESPIRATORY (INHALATION)
Status: DISCONTINUED | OUTPATIENT
Start: 2025-02-27 | End: 2025-02-27 | Stop reason: HOSPADM

## 2025-02-27 RX ORDER — ACETAMINOPHEN 325 MG/1
650 TABLET ORAL ONCE
Status: COMPLETED | OUTPATIENT
Start: 2025-02-27 | End: 2025-02-27

## 2025-02-27 RX ORDER — DIPHENHYDRAMINE HCL 25 MG
25 CAPSULE ORAL ONCE
Status: COMPLETED | OUTPATIENT
Start: 2025-02-27 | End: 2025-02-27

## 2025-02-27 RX ORDER — METHYLPREDNISOLONE SODIUM SUCCINATE 125 MG/2ML
125 INJECTION INTRAMUSCULAR; INTRAVENOUS ONCE
OUTPATIENT
Start: 2025-04-10 | End: 2025-04-10

## 2025-02-27 RX ORDER — ALBUTEROL SULFATE 0.83 MG/ML
2.5 SOLUTION RESPIRATORY (INHALATION)
OUTPATIENT
Start: 2025-04-10

## 2025-02-27 RX ADMIN — ACETAMINOPHEN 650 MG: 325 TABLET ORAL at 11:23

## 2025-02-27 RX ADMIN — DIPHENHYDRAMINE HYDROCHLORIDE 25 MG: 25 CAPSULE ORAL at 11:23

## 2025-02-27 RX ADMIN — INFLIXIMAB 440 MG: 100 INJECTION, POWDER, LYOPHILIZED, FOR SOLUTION INTRAVENOUS at 11:51

## 2025-02-27 RX ADMIN — METHYLPREDNISOLONE SODIUM SUCCINATE 125 MG: 125 INJECTION, POWDER, FOR SOLUTION INTRAMUSCULAR; INTRAVENOUS at 11:23

## 2025-02-27 RX ADMIN — SODIUM CHLORIDE 250 ML: 0.9 INJECTION, SOLUTION INTRAVENOUS at 11:23

## 2025-02-27 NOTE — PROGRESS NOTES
Infusion Nursing Note:  Frank Obando presents today for Remicade.    Patient seen by provider today: No   present during visit today: Not Applicable.    Note: Antonio arrived ambulatory by himself for Infliximab. Plan of care reviewed and he has no questions. Biological checklist completed. Ok to proceed with treatment.   Pre medications Tylenol, oral Benadryl and solumedrol administered 30 minutes prior to treatment as ordered for history of itching following his infusion.   Antonio tolerates Infliximab infusions over 1 hour.    Intravenous Access:  Peripheral IV placed.    Treatment Conditions:  Biological Infusion Checklist:  ~~~ NOTE: If the patient answers yes to any of the questions below, hold the infusion and contact ordering provider or on-call provider.    Have you recently had an elevated temperature, fever, chills, productive cough, coughing for 3 weeks or longer or hemoptysis,  abnormal vital signs, night sweats,  chest pain or have you noticed a decrease in your appetite, unexplained weight loss or fatigue? No  Do you have any open wounds or new incisions? No  Do you have any upcoming hospitalizations or surgeries? Does not include esophagogastroduodenoscopy, colonoscopy, endoscopic retrograde cholangiopancreatography (ERCP), endoscopic ultrasound (EUS), dental procedures or joint aspiration/steroid injections No  Do you currently have any signs of illness or infection or are you on any antibiotics? No  Have you had any new, sudden or worsening abdominal pain? No  Have you or anyone in your household received a live vaccination in the past 4 weeks? Please note: No live vaccines while on biologic/chemotherapy until 6 months after the last treatment. Patient can receive the flu vaccine (shot only), pneumovax and the Covid vaccine. It is optimal for the patient to get these vaccines mid cycle, but they can be given at any time as long as it is not on the day of the infusion. No  Have you  recently been diagnosed with any new nervous system diseases (ie. Multiple sclerosis, Guillain [place], seizures, neurological changes) or cancer diagnosis? Are you on any form of radiation or chemotherapy? No  Are you pregnant or breast feeding or do you have plans of pregnancy in the future? NA  Have you been having any signs of worsening depression or suicidal ideations?  (benlysta only) NA  Have there been any other new onset medical symptoms? No  Have you had any new blood clots? (IVIG only) NA    Post Infusion Assessment:  Patient tolerated infusion without incident.  Blood return noted pre and post infusion.  Site patent and intact, free from redness, edema or discomfort.  Access discontinued per protocol.     Discharge Plan:   Patient will return April 10th for next appointment.   Patient discharged in stable condition accompanied by: self.  Departure Mode: Ambulatory.      Deena Lee RN

## 2025-03-06 ENCOUNTER — OFFICE VISIT (OUTPATIENT)
Dept: INTERNAL MEDICINE | Facility: CLINIC | Age: 80
End: 2025-03-06
Payer: MEDICARE

## 2025-03-06 VITALS
WEIGHT: 189 LBS | DIASTOLIC BLOOD PRESSURE: 60 MMHG | HEIGHT: 70 IN | OXYGEN SATURATION: 97 % | HEART RATE: 71 BPM | SYSTOLIC BLOOD PRESSURE: 116 MMHG | RESPIRATION RATE: 16 BRPM | BODY MASS INDEX: 27.06 KG/M2 | TEMPERATURE: 97.8 F

## 2025-03-06 DIAGNOSIS — R18.8 CIRRHOSIS OF LIVER WITH ASCITES, UNSPECIFIED HEPATIC CIRRHOSIS TYPE (H): ICD-10-CM

## 2025-03-06 DIAGNOSIS — K74.60 CIRRHOSIS OF LIVER WITH ASCITES, UNSPECIFIED HEPATIC CIRRHOSIS TYPE (H): ICD-10-CM

## 2025-03-06 DIAGNOSIS — E11.8 TYPE 2 DIABETES MELLITUS WITH COMPLICATION, WITHOUT LONG-TERM CURRENT USE OF INSULIN (H): Primary | ICD-10-CM

## 2025-03-06 DIAGNOSIS — I85.10 SECONDARY ESOPHAGEAL VARICES WITHOUT BLEEDING (H): ICD-10-CM

## 2025-03-06 DIAGNOSIS — E11.42 DIABETIC POLYNEUROPATHY ASSOCIATED WITH TYPE 2 DIABETES MELLITUS (H): ICD-10-CM

## 2025-03-06 DIAGNOSIS — N18.32 STAGE 3B CHRONIC KIDNEY DISEASE (H): ICD-10-CM

## 2025-03-06 DIAGNOSIS — H44.112 PANUVEITIS OF LEFT EYE: ICD-10-CM

## 2025-03-06 DIAGNOSIS — K76.6 PORTAL HYPERTENSION (H): ICD-10-CM

## 2025-03-06 DIAGNOSIS — M05.79 RHEUMATOID ARTHRITIS INVOLVING MULTIPLE SITES WITH POSITIVE RHEUMATOID FACTOR (H): ICD-10-CM

## 2025-03-06 LAB
ANION GAP SERPL CALCULATED.3IONS-SCNC: 9 MMOL/L (ref 7–15)
BUN SERPL-MCNC: 22 MG/DL (ref 8–23)
CALCIUM SERPL-MCNC: 9.7 MG/DL (ref 8.8–10.4)
CHLORIDE SERPL-SCNC: 101 MMOL/L (ref 98–107)
CREAT SERPL-MCNC: 1.18 MG/DL (ref 0.67–1.17)
CREAT UR-MCNC: 115 MG/DL
EGFRCR SERPLBLD CKD-EPI 2021: 63 ML/MIN/1.73M2
EST. AVERAGE GLUCOSE BLD GHB EST-MCNC: 157 MG/DL
GLUCOSE SERPL-MCNC: 173 MG/DL (ref 70–99)
HBA1C MFR BLD: 7.1 % (ref 0–5.6)
HCO3 SERPL-SCNC: 26 MMOL/L (ref 22–29)
MICROALBUMIN UR-MCNC: 14.7 MG/L
MICROALBUMIN/CREAT UR: 12.78 MG/G CR (ref 0–17)
POTASSIUM SERPL-SCNC: 4.4 MMOL/L (ref 3.4–5.3)
SODIUM SERPL-SCNC: 136 MMOL/L (ref 135–145)

## 2025-03-06 RX ORDER — CARVEDILOL 3.12 MG/1
3.12 TABLET ORAL 2 TIMES DAILY WITH MEALS
COMMUNITY
Start: 2025-03-06

## 2025-03-06 ASSESSMENT — PAIN SCALES - GENERAL: PAINLEVEL_OUTOF10: NO PAIN (0)

## 2025-03-06 NOTE — PROGRESS NOTES
"  Office Visit - Follow Up   Frank Obando   79 year old male    Date of Visit: 3/6/2025    Chief Complaint   Patient presents with    Follow Up     3 month follow up.         Assessment and Plan   1. Type 2 diabetes mellitus with complication, without long-term current use of insulin (H) (Primary)  Has been well-controlled, continue same  - HEMOGLOBIN A1C; Future    2. Diabetic polyneuropathy associated with type 2 diabetes mellitus (H)  Stable    3. Stage 3b chronic kidney disease (H)  Stable continue same check labs  - Albumin Random Urine Quantitative with Creat Ratio; Future  - Basic metabolic panel  (Ca, Cl, CO2, Creat, Gluc, K, Na, BUN); Future    4. Cirrhosis of liver with ascites, unspecified hepatic cirrhosis type (H)  Recent hepatoma screening negative, recently started on carvedilol for prevention of variceal bleeding    5. Panuveitis of left eye, Dr. Abdi / Castillo  6. Rheumatoid arthritis involving multiple sites with positive rheumatoid factor (H)  Recently saw a rheumatology notes reviewed    7. Portal hypertension (H)  As above    8. Secondary esophageal varices without bleeding (H)  As above    Return in about 3 months (around 6/6/2025) for Follow up.     History of Present Illness   This 79 year old comes in for follow-up.  Overall doing well reviewed recent visits with specialist       Physical Exam   General Appearance:   No acute distress    /60 (BP Location: Left arm, Patient Position: Sitting, Cuff Size: Adult Regular)   Pulse 71   Temp 97.8  F (36.6  C) (Temporal)   Resp 16   Ht 1.778 m (5' 10\")   Wt 85.7 kg (189 lb)   SpO2 97%   BMI 27.12 kg/m           Additional Information   Current Outpatient Medications   Medication Sig Dispense Refill    acetaminophen (TYLENOL) 500 MG tablet Take 1 tablet (500 mg) by mouth every 6 hours as needed for mild pain      aspirin 81 MG EC tablet Take 1 tablet (81 mg) by mouth daily Start tomorrow morning. 90 tablet 3    atorvastatin " (LIPITOR) 40 MG tablet TAKE 1 TABLET BY MOUTH DAILY 90 tablet 2    carvedilol (COREG) 3.125 MG tablet Take 1 tablet (3.125 mg) by mouth 2 times daily (with meals).      cyanocobalamin, vitamin B-12, 2,500 mcg Tab [CYANOCOBALAMIN, VITAMIN B-12, 2,500 MCG TAB] Take 2,500 mcg by mouth daily.      ferrous sulfate (FEROSUL) 325 (65 Fe) MG tablet Take 325 mg by mouth 3 times daily      inFLIXimab (REMICADE IV) Every 6weeks      lisinopril (ZESTRIL) 10 MG tablet TAKE 1 TABLET(10 MG) BY MOUTH DAILY 90 tablet 3    magnesium oxide 250 mg Tab [MAGNESIUM OXIDE 250 MG TAB] Take 250 mg by mouth 2 (two) times a day.      metFORMIN (GLUCOPHAGE) 500 MG tablet TAKE 2 TABLETS BY MOUTH TWICE DAILY AT 6 AM AND AT 4  tablet 3    omeprazole (PRILOSEC) 40 MG DR capsule Take 1 capsule (40 mg) by mouth daily. 90 capsule 2    psyllium (METAMUCIL/KONSYL) capsule Take 3 capsules by mouth 2 times daily      tamsulosin (FLOMAX) 0.4 MG capsule TAKE 1 CAPSULE BY MOUTH EVERY DAY AFTER SUPPER 90 capsule 2       Time:     The longitudinal plan of care for the diagnosis(es)/condition(s) as documented were addressed during this visit. Due to the added complexity in care, I will continue to support Antonio in the subsequent management and with ongoing continuity of care.     Flaco Lopez MD  Answers submitted by the patient for this visit:  General Questionnaire (Submitted on 3/6/2025)  Chief Complaint: Chronic problems general questions HPI Form  What is the reason for your visit today? : follow up  How many days per week do you miss taking your medication?: 0  Questionnaire about: Chronic problems general questions HPI Form (Submitted on 3/6/2025)  Chief Complaint: Chronic problems general questions HPI Form

## 2025-03-15 ENCOUNTER — HOSPITAL ENCOUNTER (EMERGENCY)
Facility: CLINIC | Age: 80
Discharge: HOME | End: 2025-03-15
Admitting: PHYSICIAN ASSISTANT
Payer: MEDICARE

## 2025-03-15 VITALS
SYSTOLIC BLOOD PRESSURE: 116 MMHG | DIASTOLIC BLOOD PRESSURE: 64 MMHG | HEIGHT: 70 IN | HEART RATE: 60 BPM | RESPIRATION RATE: 18 BRPM | OXYGEN SATURATION: 96 % | WEIGHT: 190 LBS | TEMPERATURE: 98 F | BODY MASS INDEX: 27.2 KG/M2

## 2025-03-15 DIAGNOSIS — J02.9 PHARYNGITIS, UNSPECIFIED ETIOLOGY: ICD-10-CM

## 2025-03-15 LAB — S PYO DNA THROAT QL NAA+PROBE: NOT DETECTED

## 2025-03-15 PROCEDURE — 99283 EMERGENCY DEPT VISIT LOW MDM: CPT

## 2025-03-15 PROCEDURE — 250N000012 HC RX MED GY IP 250 OP 636 PS 637: Mod: GZ | Performed by: PHYSICIAN ASSISTANT

## 2025-03-15 PROCEDURE — 87651 STREP A DNA AMP PROBE: CPT | Performed by: EMERGENCY MEDICINE

## 2025-03-15 PROCEDURE — 250N000013 HC RX MED GY IP 250 OP 250 PS 637: Performed by: PHYSICIAN ASSISTANT

## 2025-03-15 RX ORDER — MAGNESIUM HYDROXIDE/ALUMINUM HYDROXICE/SIMETHICONE 120; 1200; 1200 MG/30ML; MG/30ML; MG/30ML
30 SUSPENSION ORAL ONCE
Status: COMPLETED | OUTPATIENT
Start: 2025-03-15 | End: 2025-03-15

## 2025-03-15 RX ADMIN — ALUMINUM HYDROXIDE, MAGNESIUM HYDROXIDE, AND SIMETHICONE 30 ML: 1200; 120; 1200 SUSPENSION ORAL at 09:51

## 2025-03-15 RX ADMIN — Medication 10 MG: at 09:52

## 2025-03-15 ASSESSMENT — COLUMBIA-SUICIDE SEVERITY RATING SCALE - C-SSRS
1. IN THE PAST MONTH, HAVE YOU WISHED YOU WERE DEAD OR WISHED YOU COULD GO TO SLEEP AND NOT WAKE UP?: NO
6. HAVE YOU EVER DONE ANYTHING, STARTED TO DO ANYTHING, OR PREPARED TO DO ANYTHING TO END YOUR LIFE?: NO
2. HAVE YOU ACTUALLY HAD ANY THOUGHTS OF KILLING YOURSELF IN THE PAST MONTH?: NO

## 2025-03-15 NOTE — ED TRIAGE NOTES
Pt presents with left sided throat pain, primarily with swallowing. Denies any cough or fever. No apparent tonsillar swelling on inspection. No apparent distress. Speaking clearly in complete sentences.      Triage Assessment (Adult)       Row Name 03/15/25 0852          Triage Assessment    Airway WDL WDL        Respiratory WDL    Respiratory WDL WDL        Skin Circulation/Temperature WDL    Skin Circulation/Temperature WDL WDL        Cardiac WDL    Cardiac WDL WDL        Peripheral/Neurovascular WDL    Peripheral Neurovascular WDL WDL        Cognitive/Neuro/Behavioral WDL    Cognitive/Neuro/Behavioral WDL WDL

## 2025-03-15 NOTE — ED PROVIDER NOTES
EMERGENCY DEPARTMENT ENCOUNTER      NAME: Frank Obando  AGE: 79 year old male  YOB: 1945  MRN: [id number]  EVALUATION DATE & TIME: No admission date for patient encounter.    PCP: Flaco Lopez    ED PROVIDER: Edward Aceves PA-C      Chief Complaint   Patient presents with    Pharyngitis     FINAL IMPRESSION:  1. Pharyngitis, unspecified etiology      ED COURSE & MEDICAL DECISION MAKING:    Pertinent Labs & Imaging studies reviewed. (See chart for details)  9:01 AM I met the patient and performed my initial interview and exam.   9:52 AM Patient updated, plan for discharge.     79 year old male presents to the Emergency Department for evaluation of pharyngitis.     ED Course as of 03/15/25 1023   Sat Mar 15, 2025   0921 Patient is a 79-year-old male, past medical history of type 2 diabetes, polyneuropathy, presents emergency department for evaluation of pharyngitis.  Symptoms have been ongoing since Thursday.  Patient has discomfort with swallowing.  He notes the pain is primarily on the left side of his throat.  On examination here, he has mild posterior oropharyngeal erythema, however no uvula deviation or exudate, certainly not consistent with tonsillar abscess or deep space infection.  Mild tenderness over the left anterior neck, consistent with likely lymph node.  Patient is speaking in clear sentences, does not have any stridor, trismus, or other acute findings on his exam.  He reports that he still able to swallow, however notes it is uncomfortable, primarily when he takes his pills.  He does report that a couple of days ago he felt like a pill may be cut slightly stuck in his throat, however he denies any other recent ingestion of bones, food bolus or other symptoms that would cause esophageal impaction or perforation.  Strep throat swab collected here in the emergency department, if this is negative will trial GI cocktail and dose of dexamethasone, certainly possible that this  is viral.  Otherwise denies any other constitutional symptoms, no fevers or chills.  Well-appearing otherwise.   0923 Strep Group A PCR: Not Detected   0952     Seen reevaluate, strep swab negative.  The emergency department, suspect likely viral etiology or possibly some pill esophagitis given presentation.  Will give Decadron and GI cocktail here.  Patient be discharged at this time.  Discussed return precautions, he expressed understanding.       Medical Decision Making  Obtained supplemental history:Supplemental history obtained?: No  Reviewed external records: External records reviewed?: Outpatient Record: Outpatient office visit on 03/6/2025  Care impacted by chronic illness:Other: Portal hypertension, dyslipidemia, thrombocytopenia, cirrhosis, type 2 diabetes, polyneuropathy  Did you consider but not order tests?: Work up considered but not performed and documented in chart, if applicable  Did you interpret images independently?: Independent interpretation of ECG and images noted in documentation, when applicable.  Consultation discussion with other provider:Did you involve another provider (consultant, MH, pharmacy, etc.)?: No  Discharge. No recommendations on prescription strength medication(s). N/A.    MIPS (CTPE, Dental pain, [place], Sinusitis, Asthma/COPD, Head Trauma): Not Applicable    SEPSIS: None    At the conclusion of the encounter I discussed the results of all of the tests and the disposition. The questions were answered. The patient or family acknowledged understanding and was agreeable with the care plan.       0 minutes of critical care time     MEDICATIONS GIVEN IN THE EMERGENCY:  Medications   alum & mag hydroxide-simethicone (MAALOX) suspension 30 mL (30 mLs Oral $Given 3/15/25 0914)   dexAMETHasone (DECADRON) alcohol-free oral solution 10 mg (10 mg Oral $Given 3/15/25 0972)       NEW PRESCRIPTIONS STARTED AT TODAY'S ER VISIT  Discharge Medication List as of 3/15/2025  9:54 AM              =================================================================    HPI    Patient information was obtained from: Patient     Use of : N/A        Frank Obando is a 79 year old male with a pertinent history of dyslipidemia, thrombocytopenia, cirrhosis, esophageal varices, portal hypertension who presents to this ED for evaluation of sore throat. Thursday severe sore throat, sensation of foreign body. Never had anything like this before. Tried tylenol, cough drops. Never had emesis/chocking sensation, no history of reflux. No other URI symptoms.  Does report that he tried to swallow a pill a couple of days ago, felt like it may be got slightly stuck.    PAST MEDICAL HISTORY:  Past Medical History:   Diagnosis Date    Anemia     Arthritis     osteoarthritis    Calculus of kidney     Diabetes mellitus (H)     Episcleritis of left eye     Hyperlipidemia     Hypertension     Iron deficiency anemia due to chronic blood loss 11/2/2021    Peripheral neuropathy     Stroke (H)        PAST SURGICAL HISTORY:  Past Surgical History:   Procedure Laterality Date    COLONOSCOPY N/A 11/16/2021    Procedure: COLONOSCOPY with polypectomy;  Surgeon: Dex Finch MD;  Location: [facility]    CV CORONARY ANGIOGRAM N/A 4/23/2024    Procedure: Coronary Angiogram;  Surgeon: Mark Jones MD;  Location: [facility]    CV INSTANTANEOUS WAVE-FREE RATIO N/A 4/23/2024    Procedure: Instantaneous Wave-Free Ratio;  Surgeon: Mark Jones MD;  Location: [facility]    CV LEFT HEART CATH N/A 4/23/2024    Procedure: Left Heart Catheterization;  Surgeon: Mark Jones MD;  Location: [facility] CV    CYSTOSCOPY TUMOR / CONDYLOMATA W/ LASER Left 7/18/2014    ESOPHAGOSCOPY, GASTROSCOPY, DUODENOSCOPY (EGD), COMBINED N/A 11/16/2021    Procedure: ESOPHAGOGASTRODUODENOSCOPY (EGD) with biopsies and argon plasma coagulation;  Surgeon: Dex Finch MD;  Location: [facility]     ESOPHAGOSCOPY, GASTROSCOPY, DUODENOSCOPY (EGD), COMBINED N/A 9/29/2023    Procedure: ESOPHAGOGASTRODUODENOSCOPY with BIOPSY;  Surgeon: Alli Lozada MD;  Location: [facility] OR    HC CYSTOSCOPY,INSERT URETERAL STENT      Description: Cystoscopy With Insertion Of Ureteral Stent Right;  Recorded: 10/14/2009;  Comments: stone    LAPAROSCOPIC CHOLECYSTECTOMY N/A 7/1/2023    Procedure: CHOLECYSTECTOMY, LAPAROSCOPIC;  Surgeon: Jadiel Argueta MD;  Location: [facility] OR       CURRENT MEDICATIONS:    acetaminophen (TYLENOL) 500 MG tablet  aspirin 81 MG EC tablet  atorvastatin (LIPITOR) 40 MG tablet  carvedilol (COREG) 3.125 MG tablet  cyanocobalamin, vitamin B-12, 2,500 mcg Tab  ferrous sulfate (FEROSUL) 325 (65 Fe) MG tablet  inFLIXimab (REMICADE IV)  lisinopril (ZESTRIL) 10 MG tablet  magnesium oxide 250 mg Tab  metFORMIN (GLUCOPHAGE) 500 MG tablet  omeprazole (PRILOSEC) 40 MG DR capsule  psyllium (METAMUCIL/KONSYL) capsule  tamsulosin (FLOMAX) 0.4 MG capsule       ALLERGIES:  Allergies   Allergen Reactions    Morphine Nausea and Vomiting    Scopolamine Hbr [Scopolamine] Unknown       FAMILY HISTORY:  Family History   Problem Relation Age of Onset    Coronary Artery Disease Mother     Diabetes Mother     Lung Cancer Father     No Known Problems Son      SOCIAL HISTORY:   Social History     Socioeconomic History    Marital status:    Tobacco Use    Smoking status: Never    Smokeless tobacco: Never   Vaping Use    Vaping status: Never Used   Substance and Sexual Activity    Alcohol use: No    Drug use: No   Social History Narrative    Lives with his wife, Valerie.  Volunteers at [facility].  Retired  of [employer].  One son, Jaison.      Social Drivers of Health     Financial Resource Strain: Low Risk  (10/6/2023)    Financial Resource Strain     Within the past 12 months, have you or your family members you live with been unable to get utilities (heat, electricity) when it  "was really needed?: No   Food Insecurity: Low Risk  (10/6/2023)    Food Insecurity     Within the past 12 months, did you worry that your food would run out before you got money to buy more?: No     Within the past 12 months, did the food you bought just not last and you didn t have money to get more?: No   Transportation Needs: Low Risk  (10/6/2023)    Transportation Needs     Within the past 12 months, has lack of transportation kept you from medical appointments, getting your medicines, non-medical meetings or appointments, work, or from getting things that you need?: No   Interpersonal Safety: Low Risk  (3/6/2025)    Interpersonal Safety     Do you feel physically and emotionally safe where you currently live?: Yes     Within the past 12 months, have you been hit, slapped, kicked or otherwise physically hurt by someone?: No     Within the past 12 months, have you been humiliated or emotionally abused in other ways by your partner or ex-partner?: No   Housing Stability: Low Risk  (10/6/2023)    Housing Stability     Do you have housing? : Yes     Are you worried about losing your housing?: No       VITALS:  /64   Pulse 60   Temp 98  F (36.7  C) (Temporal)   Resp 18   Ht 1.778 m (5' 10\")   Wt 86.2 kg (190 lb)   SpO2 96%   BMI 27.26 kg/m      PHYSICAL EXAM    Physical Exam  Vitals and nursing note reviewed.   Constitutional:       General: He is not in acute distress.     Appearance: Normal appearance. He is normal weight. He is not ill-appearing, toxic-appearing or diaphoretic.   HENT:      Right Ear: External ear normal.      Left Ear: External ear normal.      Mouth/Throat:      Mouth: Mucous membranes are moist.      Pharynx: Posterior oropharyngeal erythema present. No oropharyngeal exudate or uvula swelling.      Tonsils: No tonsillar exudate or tonsillar abscesses.   Eyes:      Conjunctiva/sclera: Conjunctivae normal.   Cardiovascular:      Rate and Rhythm: Normal rate.   Pulmonary:      Effort: " Pulmonary effort is normal.      Breath sounds: No stridor. No wheezing.   Musculoskeletal:      Cervical back: Normal range of motion.   Neurological:      Mental Status: He is alert. Mental status is at baseline.         LAB:  All pertinent labs reviewed and interpreted.  Labs Ordered and Resulted from Time of ED Arrival to Time of ED Departure   GROUP A STREPTOCOCCUS PCR THROAT SWAB - Normal       Result Value    Group A strep by PCR Not Detected         RADIOLOGY:  Reviewed all pertinent imaging. Please see official radiology report.  No orders to display     PROCEDURES:   None.     Edward Aceves PA-C  Emergency Medicine  [facility] EMERGENCY ROOM  [address] [id number] [phone]  Dept: [phone]       Edward Aceves PA-C  03/15/25 0953       Edward Aceves PA-C  03/15/25 1023

## 2025-03-15 NOTE — DISCHARGE INSTRUCTIONS
You were seen here in the emergency department for evaluation of sore throat.  You do not have any evidence of strep for infection here.  It is possible that you have some mild irritation secondary to a pill that you swallowed, or a viral infection.  Continue with ibuprofen, Tylenol, salt water gargles, cough drops at home.  This should improve over the next couple of days.    The emergency department if you are physically unable to swallow, develop worsening fevers, or mass or swelling in your neck.    For pain or fever you may use:  -Tylenol 650 mg every 6 hours.  Max 4000 mg in 24 hours  Do not use thismedication with alcohol as it can cause liver problems.  -Ibuprofen 600 mg every 6 hours.  Max 3500 mg in 24 hours  Do not take this medication if you have a history of a GI bleed or have kidney problems.  You may use both of these medications at the same time or you can alternate them every 3 hours.  For example, Tylenol at 6 AM, ibuprofen at 9 AM, Tylenol at noon, etc.

## 2025-03-17 ENCOUNTER — PATIENT OUTREACH (OUTPATIENT)
Dept: INTERNAL MEDICINE | Facility: CLINIC | Age: 80
End: 2025-03-17
Payer: MEDICARE

## 2025-03-17 NOTE — TELEPHONE ENCOUNTER
Attempted to contact patient for TCM Outreach follow up from ER visit on 3/15/25. No answer. Will route to RN pool for additional attempts.

## 2025-04-17 ENCOUNTER — INFUSION THERAPY VISIT (OUTPATIENT)
Dept: INFUSION THERAPY | Facility: HOSPITAL | Age: 80
End: 2025-04-17
Attending: INTERNAL MEDICINE
Payer: MEDICARE

## 2025-04-17 VITALS
TEMPERATURE: 97.9 F | DIASTOLIC BLOOD PRESSURE: 79 MMHG | SYSTOLIC BLOOD PRESSURE: 129 MMHG | BODY MASS INDEX: 26.98 KG/M2 | OXYGEN SATURATION: 96 % | WEIGHT: 188 LBS | HEART RATE: 75 BPM | RESPIRATION RATE: 16 BRPM

## 2025-04-17 DIAGNOSIS — H44.112 PANUVEITIS OF LEFT EYE: Primary | ICD-10-CM

## 2025-04-17 PROCEDURE — 258N000003 HC RX IP 258 OP 636: Performed by: INTERNAL MEDICINE

## 2025-04-17 PROCEDURE — 250N000011 HC RX IP 250 OP 636: Mod: JZ | Performed by: INTERNAL MEDICINE

## 2025-04-17 PROCEDURE — 250N000013 HC RX MED GY IP 250 OP 250 PS 637: Performed by: INTERNAL MEDICINE

## 2025-04-17 RX ORDER — METHYLPREDNISOLONE SODIUM SUCCINATE 40 MG/ML
40 INJECTION INTRAMUSCULAR; INTRAVENOUS
Start: 2025-05-22

## 2025-04-17 RX ORDER — METHYLPREDNISOLONE SODIUM SUCCINATE 40 MG/ML
40 INJECTION INTRAMUSCULAR; INTRAVENOUS
Status: DISCONTINUED | OUTPATIENT
Start: 2025-04-17 | End: 2025-04-17 | Stop reason: HOSPADM

## 2025-04-17 RX ORDER — ALBUTEROL SULFATE 0.83 MG/ML
2.5 SOLUTION RESPIRATORY (INHALATION)
Status: DISCONTINUED | OUTPATIENT
Start: 2025-04-17 | End: 2025-04-17 | Stop reason: HOSPADM

## 2025-04-17 RX ORDER — DIPHENHYDRAMINE HYDROCHLORIDE 50 MG/ML
50 INJECTION, SOLUTION INTRAMUSCULAR; INTRAVENOUS
Start: 2025-05-22

## 2025-04-17 RX ORDER — DIPHENHYDRAMINE HYDROCHLORIDE 50 MG/ML
25 INJECTION, SOLUTION INTRAMUSCULAR; INTRAVENOUS
Start: 2025-05-22

## 2025-04-17 RX ORDER — METHYLPREDNISOLONE SODIUM SUCCINATE 125 MG/2ML
125 INJECTION INTRAMUSCULAR; INTRAVENOUS ONCE
Status: COMPLETED | OUTPATIENT
Start: 2025-04-17 | End: 2025-04-17

## 2025-04-17 RX ORDER — DIPHENHYDRAMINE HYDROCHLORIDE 50 MG/ML
50 INJECTION, SOLUTION INTRAMUSCULAR; INTRAVENOUS
Status: DISCONTINUED | OUTPATIENT
Start: 2025-04-17 | End: 2025-04-17 | Stop reason: HOSPADM

## 2025-04-17 RX ORDER — ALBUTEROL SULFATE 90 UG/1
1-2 INHALANT RESPIRATORY (INHALATION)
Status: DISCONTINUED | OUTPATIENT
Start: 2025-04-17 | End: 2025-04-17 | Stop reason: HOSPADM

## 2025-04-17 RX ORDER — MEPERIDINE HYDROCHLORIDE 25 MG/ML
25 INJECTION INTRAMUSCULAR; INTRAVENOUS; SUBCUTANEOUS
OUTPATIENT
Start: 2025-05-22

## 2025-04-17 RX ORDER — MEPERIDINE HYDROCHLORIDE 25 MG/ML
25 INJECTION INTRAMUSCULAR; INTRAVENOUS; SUBCUTANEOUS
Status: DISCONTINUED | OUTPATIENT
Start: 2025-04-17 | End: 2025-04-17 | Stop reason: HOSPADM

## 2025-04-17 RX ORDER — DIPHENHYDRAMINE HYDROCHLORIDE 50 MG/ML
25 INJECTION, SOLUTION INTRAMUSCULAR; INTRAVENOUS
Status: DISCONTINUED | OUTPATIENT
Start: 2025-04-17 | End: 2025-04-17 | Stop reason: HOSPADM

## 2025-04-17 RX ORDER — DIPHENHYDRAMINE HCL 25 MG
25 CAPSULE ORAL ONCE
Status: COMPLETED | OUTPATIENT
Start: 2025-04-17 | End: 2025-04-17

## 2025-04-17 RX ORDER — METHYLPREDNISOLONE SODIUM SUCCINATE 125 MG/2ML
125 INJECTION INTRAMUSCULAR; INTRAVENOUS ONCE
OUTPATIENT
Start: 2025-05-22 | End: 2025-05-22

## 2025-04-17 RX ORDER — ALBUTEROL SULFATE 90 UG/1
1-2 INHALANT RESPIRATORY (INHALATION)
Start: 2025-05-22

## 2025-04-17 RX ORDER — ACETAMINOPHEN 325 MG/1
650 TABLET ORAL ONCE
Start: 2025-05-22 | End: 2025-05-22

## 2025-04-17 RX ORDER — DIPHENHYDRAMINE HCL 25 MG
25 CAPSULE ORAL ONCE
Start: 2025-05-22 | End: 2025-05-22

## 2025-04-17 RX ORDER — HEPARIN SODIUM,PORCINE 10 UNIT/ML
5 VIAL (ML) INTRAVENOUS
OUTPATIENT
Start: 2025-05-22

## 2025-04-17 RX ORDER — HEPARIN SODIUM (PORCINE) LOCK FLUSH IV SOLN 100 UNIT/ML 100 UNIT/ML
5 SOLUTION INTRAVENOUS
OUTPATIENT
Start: 2025-05-22

## 2025-04-17 RX ORDER — ACETAMINOPHEN 325 MG/1
650 TABLET ORAL ONCE
Status: COMPLETED | OUTPATIENT
Start: 2025-04-17 | End: 2025-04-17

## 2025-04-17 RX ORDER — ALBUTEROL SULFATE 0.83 MG/ML
2.5 SOLUTION RESPIRATORY (INHALATION)
OUTPATIENT
Start: 2025-05-22

## 2025-04-17 RX ORDER — EPINEPHRINE 1 MG/ML
0.3 INJECTION, SOLUTION INTRAMUSCULAR; SUBCUTANEOUS EVERY 5 MIN PRN
OUTPATIENT
Start: 2025-05-22

## 2025-04-17 RX ORDER — EPINEPHRINE 1 MG/ML
0.3 INJECTION, SOLUTION INTRAMUSCULAR; SUBCUTANEOUS EVERY 5 MIN PRN
Status: DISCONTINUED | OUTPATIENT
Start: 2025-04-17 | End: 2025-04-17 | Stop reason: HOSPADM

## 2025-04-17 RX ADMIN — SODIUM CHLORIDE 400 MG: 0.9 INJECTION, SOLUTION INTRAVENOUS at 12:53

## 2025-04-17 RX ADMIN — SODIUM CHLORIDE 250 ML: 0.9 INJECTION, SOLUTION INTRAVENOUS at 12:18

## 2025-04-17 RX ADMIN — ACETAMINOPHEN 650 MG: 325 TABLET ORAL at 12:15

## 2025-04-17 RX ADMIN — METHYLPREDNISOLONE SODIUM SUCCINATE 125 MG: 125 INJECTION, POWDER, FOR SOLUTION INTRAMUSCULAR; INTRAVENOUS at 12:15

## 2025-04-17 RX ADMIN — DIPHENHYDRAMINE HYDROCHLORIDE 25 MG: 25 CAPSULE ORAL at 12:15

## 2025-04-17 NOTE — PROGRESS NOTES
"Infusion Nursing Note:  Frank Obando presents today for Remicade.    Patient seen by provider today: No   present during visit today: Not Applicable.    Note: Reviewed plan of care. Patient cancelled appt last week secondary to having a sore throat and cold. Patient denies a sore throat, states he has \"just a little (residual) stuffiness\", but otherwise feels well.    Premeds Given: benadryl PO, solumedrol, and tylenol 30 min prior to infusion    Intravenous Access:  Peripheral IV placed L posterior hand by Tamiko ZEPEDA; brisk blood return.    Treatment Conditions:  Biological Infusion Checklist:  ~~~ NOTE: If the patient answers yes to any of the questions below, hold the infusion and contact ordering provider or on-call provider.    Have you recently had an elevated temperature, fever, chills, productive cough, coughing for 3 weeks or longer or hemoptysis,  abnormal vital signs, night sweats,  chest pain or have you noticed a decrease in your appetite, unexplained weight loss or fatigue? No  Do you have any open wounds or new incisions? No  Do you have any upcoming hospitalizations or surgeries? Does not include esophagogastroduodenoscopy, colonoscopy, endoscopic retrograde cholangiopancreatography (ERCP), endoscopic ultrasound (EUS), dental procedures or joint aspiration/steroid injections No  Do you currently have any signs of illness or infection or are you on any antibiotics? No  Have you had any new, sudden or worsening abdominal pain? No  Have you or anyone in your household received a live vaccination in the past 4 weeks? Please note: No live vaccines while on biologic/chemotherapy until 6 months after the last treatment. Patient can receive the flu vaccine (shot only), pneumovax and the Covid vaccine. It is optimal for the patient to get these vaccines mid cycle, but they can be given at any time as long as it is not on the day of the infusion. No  Have you recently been diagnosed with any " new nervous system diseases (ie. Multiple sclerosis, Guillain [place], seizures, neurological changes) or cancer diagnosis? Are you on any form of radiation or chemotherapy? No  Are you pregnant or breast feeding or do you have plans of pregnancy in the future? No  Have you been having any signs of worsening depression or suicidal ideations?  (benlysta only) No  Have there been any other new onset medical symptoms? No  Have you had any new blood clots? (IVIG only) No      Post Infusion Assessment:  Patient tolerated infusion without incident.  Blood return noted pre and post infusion.  Site patent and intact, free from redness, edema or discomfort.  Access discontinued per protocol.       Discharge Plan:   Patient will return 5/29 for next appointment.   Patient discharged in stable condition accompanied by: self.  Departure Mode: Ambulatory.      Kristin Hatfield RN

## 2025-05-29 ENCOUNTER — INFUSION THERAPY VISIT (OUTPATIENT)
Dept: INFUSION THERAPY | Facility: HOSPITAL | Age: 80
End: 2025-05-29
Attending: INTERNAL MEDICINE
Payer: MEDICARE

## 2025-05-29 VITALS
SYSTOLIC BLOOD PRESSURE: 112 MMHG | OXYGEN SATURATION: 96 % | RESPIRATION RATE: 16 BRPM | TEMPERATURE: 97.6 F | DIASTOLIC BLOOD PRESSURE: 69 MMHG | BODY MASS INDEX: 27.26 KG/M2 | HEART RATE: 63 BPM | WEIGHT: 190 LBS

## 2025-05-29 DIAGNOSIS — H44.112 PANUVEITIS OF LEFT EYE: Primary | ICD-10-CM

## 2025-05-29 PROCEDURE — 258N000003 HC RX IP 258 OP 636: Performed by: INTERNAL MEDICINE

## 2025-05-29 PROCEDURE — 250N000011 HC RX IP 250 OP 636: Mod: JZ | Performed by: INTERNAL MEDICINE

## 2025-05-29 PROCEDURE — 250N000013 HC RX MED GY IP 250 OP 250 PS 637: Performed by: INTERNAL MEDICINE

## 2025-05-29 RX ORDER — METHYLPREDNISOLONE SODIUM SUCCINATE 125 MG/2ML
125 INJECTION INTRAMUSCULAR; INTRAVENOUS ONCE
Status: COMPLETED | OUTPATIENT
Start: 2025-05-29 | End: 2025-05-29

## 2025-05-29 RX ORDER — MEPERIDINE HYDROCHLORIDE 25 MG/ML
25 INJECTION INTRAMUSCULAR; INTRAVENOUS; SUBCUTANEOUS
Status: DISCONTINUED | OUTPATIENT
Start: 2025-05-29 | End: 2025-05-29 | Stop reason: HOSPADM

## 2025-05-29 RX ORDER — ALBUTEROL SULFATE 0.83 MG/ML
2.5 SOLUTION RESPIRATORY (INHALATION)
OUTPATIENT
Start: 2025-07-10

## 2025-05-29 RX ORDER — ALBUTEROL SULFATE 0.83 MG/ML
2.5 SOLUTION RESPIRATORY (INHALATION)
Status: DISCONTINUED | OUTPATIENT
Start: 2025-05-29 | End: 2025-05-29 | Stop reason: HOSPADM

## 2025-05-29 RX ORDER — ALBUTEROL SULFATE 90 UG/1
1-2 INHALANT RESPIRATORY (INHALATION)
Start: 2025-07-10

## 2025-05-29 RX ORDER — DIPHENHYDRAMINE HYDROCHLORIDE 50 MG/ML
50 INJECTION, SOLUTION INTRAMUSCULAR; INTRAVENOUS
Status: DISCONTINUED | OUTPATIENT
Start: 2025-05-29 | End: 2025-05-29 | Stop reason: HOSPADM

## 2025-05-29 RX ORDER — DIPHENHYDRAMINE HYDROCHLORIDE 50 MG/ML
25 INJECTION, SOLUTION INTRAMUSCULAR; INTRAVENOUS
Status: DISCONTINUED | OUTPATIENT
Start: 2025-05-29 | End: 2025-05-29 | Stop reason: HOSPADM

## 2025-05-29 RX ORDER — MEPERIDINE HYDROCHLORIDE 25 MG/ML
25 INJECTION INTRAMUSCULAR; INTRAVENOUS; SUBCUTANEOUS
OUTPATIENT
Start: 2025-07-10

## 2025-05-29 RX ORDER — METHYLPREDNISOLONE SODIUM SUCCINATE 40 MG/ML
40 INJECTION INTRAMUSCULAR; INTRAVENOUS
Start: 2025-07-10

## 2025-05-29 RX ORDER — HEPARIN SODIUM,PORCINE 10 UNIT/ML
5 VIAL (ML) INTRAVENOUS
OUTPATIENT
Start: 2025-07-10

## 2025-05-29 RX ORDER — DIPHENHYDRAMINE HYDROCHLORIDE 50 MG/ML
50 INJECTION, SOLUTION INTRAMUSCULAR; INTRAVENOUS
Start: 2025-07-10

## 2025-05-29 RX ORDER — EPINEPHRINE 1 MG/ML
0.3 INJECTION, SOLUTION INTRAMUSCULAR; SUBCUTANEOUS EVERY 5 MIN PRN
OUTPATIENT
Start: 2025-07-10

## 2025-05-29 RX ORDER — DIPHENHYDRAMINE HCL 25 MG
25 CAPSULE ORAL ONCE
Status: COMPLETED | OUTPATIENT
Start: 2025-05-29 | End: 2025-05-29

## 2025-05-29 RX ORDER — ALBUTEROL SULFATE 90 UG/1
1-2 INHALANT RESPIRATORY (INHALATION)
Status: DISCONTINUED | OUTPATIENT
Start: 2025-05-29 | End: 2025-05-29 | Stop reason: HOSPADM

## 2025-05-29 RX ORDER — METHYLPREDNISOLONE SODIUM SUCCINATE 125 MG/2ML
125 INJECTION INTRAMUSCULAR; INTRAVENOUS ONCE
OUTPATIENT
Start: 2025-07-10 | End: 2025-07-10

## 2025-05-29 RX ORDER — ACETAMINOPHEN 325 MG/1
650 TABLET ORAL ONCE
Status: COMPLETED | OUTPATIENT
Start: 2025-05-29 | End: 2025-05-29

## 2025-05-29 RX ORDER — DIPHENHYDRAMINE HCL 25 MG
25 CAPSULE ORAL ONCE
Start: 2025-07-10 | End: 2025-07-10

## 2025-05-29 RX ORDER — ACETAMINOPHEN 325 MG/1
650 TABLET ORAL ONCE
Start: 2025-07-10 | End: 2025-07-10

## 2025-05-29 RX ORDER — DIPHENHYDRAMINE HYDROCHLORIDE 50 MG/ML
25 INJECTION, SOLUTION INTRAMUSCULAR; INTRAVENOUS
Start: 2025-07-10

## 2025-05-29 RX ORDER — EPINEPHRINE 1 MG/ML
0.3 INJECTION, SOLUTION INTRAMUSCULAR; SUBCUTANEOUS EVERY 5 MIN PRN
Status: DISCONTINUED | OUTPATIENT
Start: 2025-05-29 | End: 2025-05-29 | Stop reason: HOSPADM

## 2025-05-29 RX ORDER — METHYLPREDNISOLONE SODIUM SUCCINATE 40 MG/ML
40 INJECTION INTRAMUSCULAR; INTRAVENOUS
Status: DISCONTINUED | OUTPATIENT
Start: 2025-05-29 | End: 2025-05-29 | Stop reason: HOSPADM

## 2025-05-29 RX ORDER — HEPARIN SODIUM (PORCINE) LOCK FLUSH IV SOLN 100 UNIT/ML 100 UNIT/ML
5 SOLUTION INTRAVENOUS
OUTPATIENT
Start: 2025-07-10

## 2025-05-29 RX ADMIN — DIPHENHYDRAMINE HYDROCHLORIDE 25 MG: 25 CAPSULE ORAL at 11:43

## 2025-05-29 RX ADMIN — SODIUM CHLORIDE 250 ML: 0.9 INJECTION, SOLUTION INTRAVENOUS at 11:53

## 2025-05-29 RX ADMIN — SODIUM CHLORIDE 400 MG: 0.9 INJECTION, SOLUTION INTRAVENOUS at 12:08

## 2025-05-29 RX ADMIN — METHYLPREDNISOLONE SODIUM SUCCINATE 125 MG: 125 INJECTION, POWDER, FOR SOLUTION INTRAMUSCULAR; INTRAVENOUS at 11:53

## 2025-05-29 RX ADMIN — ACETAMINOPHEN 650 MG: 325 TABLET ORAL at 11:43

## 2025-05-29 NOTE — PROGRESS NOTES
Infusion Nursing Note:  Frank Obando presents today for Remicade.    Patient seen by provider today: No    Note: Pt arrives ambulatory to [facility]. Pt reports no changes in current baseline.    Premeds Given: benadryl PO, solumedrol, and tylenol    Intravenous Access:  Peripheral IV placed in left forearm.    Treatment Conditions:  Biological Infusion Checklist:  ~~~ NOTE: If the patient answers yes to any of the questions below, hold the infusion and contact ordering provider or on-call provider.    Have you recently had an elevated temperature, fever, chills, productive cough, coughing for 3 weeks or longer or hemoptysis,  abnormal vital signs, night sweats,  chest pain or have you noticed a decrease in your appetite, unexplained weight loss or fatigue? No  Do you have any open wounds or new incisions? No  Do you have any upcoming hospitalizations or surgeries? Does not include esophagogastroduodenoscopy, colonoscopy, endoscopic retrograde cholangiopancreatography (ERCP), endoscopic ultrasound (EUS), dental procedures or joint aspiration/steroid injections No  Do you currently have any signs of illness or infection or are you on any antibiotics? No  Have you had any new, sudden or worsening abdominal pain? No  Have you or anyone in your household received a live vaccination in the past 4 weeks? Please note: No live vaccines while on biologic/chemotherapy until 6 months after the last treatment. Patient can receive the flu vaccine (shot only), pneumovax and the Covid vaccine. It is optimal for the patient to get these vaccines mid cycle, but they can be given at any time as long as it is not on the day of the infusion. No  Have you recently been diagnosed with any new nervous system diseases (ie. Multiple sclerosis, Guillain [place], seizures, neurological changes) or cancer diagnosis? Are you on any form of radiation or chemotherapy? No  Are you pregnant or breast feeding or do you have plans of  pregnancy in the future? N/A  Have you been having any signs of worsening depression or suicidal ideations?  (benlysta only) N/A  Have there been any other new onset medical symptoms? No  Have you had any new blood clots? (IVIG only) N/A    Post Infusion Assessment:  Patient tolerated infusion without incident.    Site patent and intact, free from redness, edema or discomfort.  No evidence of extravasations.  Access discontinued per protocol.     Discharge Plan:   Patient discharged in stable condition accompanied by: self.  Departure Mode: Ambulatory.      Carolyn Campoverde RN

## 2025-06-10 ENCOUNTER — RESULTS FOLLOW-UP (OUTPATIENT)
Dept: INTERNAL MEDICINE | Facility: CLINIC | Age: 80
End: 2025-06-10

## 2025-06-10 ENCOUNTER — OFFICE VISIT (OUTPATIENT)
Dept: INTERNAL MEDICINE | Facility: CLINIC | Age: 80
End: 2025-06-10
Payer: MEDICARE

## 2025-06-10 VITALS
HEART RATE: 64 BPM | DIASTOLIC BLOOD PRESSURE: 76 MMHG | WEIGHT: 188 LBS | RESPIRATION RATE: 14 BRPM | HEIGHT: 70 IN | OXYGEN SATURATION: 98 % | SYSTOLIC BLOOD PRESSURE: 110 MMHG | BODY MASS INDEX: 26.92 KG/M2 | TEMPERATURE: 98 F

## 2025-06-10 DIAGNOSIS — N18.32 STAGE 3B CHRONIC KIDNEY DISEASE (H): ICD-10-CM

## 2025-06-10 DIAGNOSIS — R18.8 CIRRHOSIS OF LIVER WITH ASCITES, UNSPECIFIED HEPATIC CIRRHOSIS TYPE (H): ICD-10-CM

## 2025-06-10 DIAGNOSIS — K76.6 PORTAL HYPERTENSION (H): ICD-10-CM

## 2025-06-10 DIAGNOSIS — K74.60 CIRRHOSIS OF LIVER WITH ASCITES, UNSPECIFIED HEPATIC CIRRHOSIS TYPE (H): ICD-10-CM

## 2025-06-10 DIAGNOSIS — I85.10 SECONDARY ESOPHAGEAL VARICES WITHOUT BLEEDING (H): ICD-10-CM

## 2025-06-10 DIAGNOSIS — E11.8 TYPE 2 DIABETES MELLITUS WITH COMPLICATION, WITHOUT LONG-TERM CURRENT USE OF INSULIN (H): Primary | Chronic | ICD-10-CM

## 2025-06-10 DIAGNOSIS — I10 ESSENTIAL HYPERTENSION, BENIGN: ICD-10-CM

## 2025-06-10 LAB
EST. AVERAGE GLUCOSE BLD GHB EST-MCNC: 154 MG/DL
HBA1C MFR BLD: 7 % (ref 0–5.6)

## 2025-06-10 PROCEDURE — 3051F HG A1C>EQUAL 7.0%<8.0%: CPT | Performed by: INTERNAL MEDICINE

## 2025-06-10 PROCEDURE — G2211 COMPLEX E/M VISIT ADD ON: HCPCS | Performed by: INTERNAL MEDICINE

## 2025-06-10 PROCEDURE — 3078F DIAST BP <80 MM HG: CPT | Performed by: INTERNAL MEDICINE

## 2025-06-10 PROCEDURE — 99214 OFFICE O/P EST MOD 30 MIN: CPT | Performed by: INTERNAL MEDICINE

## 2025-06-10 PROCEDURE — 1126F AMNT PAIN NOTED NONE PRSNT: CPT | Performed by: INTERNAL MEDICINE

## 2025-06-10 PROCEDURE — 83036 HEMOGLOBIN GLYCOSYLATED A1C: CPT | Performed by: INTERNAL MEDICINE

## 2025-06-10 PROCEDURE — 90480 ADMN SARSCOV2 VAC 1/ONLY CMP: CPT | Performed by: INTERNAL MEDICINE

## 2025-06-10 PROCEDURE — 91320 SARSCV2 VAC 30MCG TRS-SUC IM: CPT | Performed by: INTERNAL MEDICINE

## 2025-06-10 PROCEDURE — 36415 COLL VENOUS BLD VENIPUNCTURE: CPT | Performed by: INTERNAL MEDICINE

## 2025-06-10 PROCEDURE — 3074F SYST BP LT 130 MM HG: CPT | Performed by: INTERNAL MEDICINE

## 2025-06-10 ASSESSMENT — PAIN SCALES - GENERAL: PAINLEVEL_OUTOF10: NO PAIN (0)

## 2025-06-10 NOTE — PROGRESS NOTES
"  Office Visit - Follow Up   Frank Obando   79 year old male    Date of Visit: 6/10/2025    Chief Complaint   Patient presents with    Follow Up     Follow up - 3 month follow         Assessment and Plan   1. Type 2 diabetes mellitus with complication, without long-term current use of insulin (H) (Primary)  Well-controlled, continue same  - Hemoglobin A1c; Future    2. Essential hypertension, benign  Blood pressure okay continue same    3. Stage 3b chronic kidney disease (H)  Stable gets labs with rheumatology    4. Cirrhosis of liver with ascites, unspecified hepatic cirrhosis type (H)  5. Portal hypertension (H)  Continue current plan as outlined by [facility], hepatoma screening up-to-date    6. Secondary esophageal varices without bleeding (H)  Now on carvedilol, continue    Return in about 3 months (around 9/10/2025) for Routine preventive.     History of Present Illness   This 79 year old comes in for follow-up.  Overall feeling well/stable       Physical Exam   General Appearance:   No acute distress    /76 (BP Location: Left arm, Patient Position: Sitting, Cuff Size: Adult Regular)   Pulse 64   Temp 98  F (36.7  C) (Temporal)   Resp 14   Ht 1.778 m (5' 10\")   Wt 85.3 kg (188 lb)   SpO2 98%   BMI 26.98 kg/m      Heart rate controlled rhythm regular lungs clear to auscultation bilaterally no edema     Additional Information   Current Outpatient Medications   Medication Sig Dispense Refill    acetaminophen (TYLENOL) 500 MG tablet Take 1 tablet (500 mg) by mouth every 6 hours as needed for mild pain      aspirin 81 MG EC tablet Take 1 tablet (81 mg) by mouth daily Start tomorrow morning. 90 tablet 3    atorvastatin (LIPITOR) 40 MG tablet TAKE 1 TABLET BY MOUTH DAILY 90 tablet 2    carvedilol (COREG) 3.125 MG tablet Take 1 tablet (3.125 mg) by mouth 2 times daily (with meals).      cyanocobalamin, vitamin B-12, 2,500 mcg Tab [CYANOCOBALAMIN, VITAMIN B-12, 2,500 MCG TAB] Take 2,500 mcg by mouth " daily.      ferrous sulfate (FEROSUL) 325 (65 Fe) MG tablet Take 325 mg by mouth 3 times daily      inFLIXimab (REMICADE IV) Every 6weeks      lisinopril (ZESTRIL) 10 MG tablet TAKE 1 TABLET(10 MG) BY MOUTH DAILY 90 tablet 3    magnesium oxide 250 mg Tab [MAGNESIUM OXIDE 250 MG TAB] Take 250 mg by mouth 2 (two) times a day.      metFORMIN (GLUCOPHAGE) 500 MG tablet TAKE 2 TABLETS BY MOUTH TWICE DAILY AT 6 AM AND AT 4  tablet 3    omeprazole (PRILOSEC) 40 MG DR capsule Take 1 capsule (40 mg) by mouth daily. 90 capsule 2    psyllium (METAMUCIL/KONSYL) capsule Take 3 capsules by mouth 2 times daily      tamsulosin (FLOMAX) 0.4 MG capsule TAKE 1 CAPSULE BY MOUTH EVERY DAY AFTER SUPPER 90 capsule 2       Time:     The longitudinal plan of care for the diagnosis(es)/condition(s) as documented were addressed during this visit. Due to the added complexity in care, I will continue to support Antonio in the subsequent management and with ongoing continuity of care.     Flaco Lopez MD  Answers submitted by the patient for this visit:  General Questionnaire (Submitted on 6/10/2025)  Chief Complaint: Chronic problems general questions HPI Form  What is the reason for your visit today? : follow up - PCP  How many servings of fruits and vegetables do you eat daily?: 0-1  On average, how many sweetened beverages do you drink each day (Examples: soda, juice, sweet tea, etc.  Do NOT count diet or artificially sweetened beverages)?: 0  How many minutes a day do you exercise enough to make your heart beat faster?: 20 to 29  How many days a week do you exercise enough to make your heart beat faster?: 3 or less  How many days per week do you miss taking your medication?: 0  Questionnaire about: Chronic problems general questions HPI Form (Submitted on 6/10/2025)  Chief Complaint: Chronic problems general questions HPI Form

## 2025-06-19 ENCOUNTER — LAB (OUTPATIENT)
Dept: LAB | Facility: CLINIC | Age: 80
End: 2025-06-19
Payer: MEDICARE

## 2025-06-19 ENCOUNTER — RESULTS FOLLOW-UP (OUTPATIENT)
Dept: RHEUMATOLOGY | Facility: CLINIC | Age: 80
End: 2025-06-19

## 2025-06-19 DIAGNOSIS — H44.112 PANUVEITIS OF LEFT EYE: ICD-10-CM

## 2025-06-19 LAB
ALBUMIN SERPL BCG-MCNC: 4.1 G/DL (ref 3.5–5.2)
ALT SERPL W P-5'-P-CCNC: 31 U/L (ref 0–70)
CREAT SERPL-MCNC: 1.36 MG/DL (ref 0.67–1.17)
EGFRCR SERPLBLD CKD-EPI 2021: 53 ML/MIN/1.73M2
ERYTHROCYTE [DISTWIDTH] IN BLOOD BY AUTOMATED COUNT: 13 % (ref 10–15)
HCT VFR BLD AUTO: 31.1 % (ref 40–53)
HGB BLD-MCNC: 10.3 G/DL (ref 13.3–17.7)
MCH RBC QN AUTO: 30.5 PG (ref 26.5–33)
MCHC RBC AUTO-ENTMCNC: 33.1 G/DL (ref 31.5–36.5)
MCV RBC AUTO: 92 FL (ref 78–100)
PLATELET # BLD AUTO: 109 10E3/UL (ref 150–450)
RBC # BLD AUTO: 3.38 10E6/UL (ref 4.4–5.9)
WBC # BLD AUTO: 5 10E3/UL (ref 4–11)

## 2025-06-25 ENCOUNTER — OFFICE VISIT (OUTPATIENT)
Dept: RHEUMATOLOGY | Facility: CLINIC | Age: 80
End: 2025-06-25
Payer: MEDICARE

## 2025-06-25 VITALS
DIASTOLIC BLOOD PRESSURE: 60 MMHG | HEART RATE: 78 BPM | SYSTOLIC BLOOD PRESSURE: 120 MMHG | OXYGEN SATURATION: 96 % | WEIGHT: 185.5 LBS | BODY MASS INDEX: 26.62 KG/M2

## 2025-06-25 DIAGNOSIS — M65.332 TRIGGER FINGER, LEFT MIDDLE FINGER: ICD-10-CM

## 2025-06-25 DIAGNOSIS — H44.112 PANUVEITIS OF LEFT EYE: Primary | ICD-10-CM

## 2025-06-25 DIAGNOSIS — Z79.899 HIGH RISK MEDICATION USE: ICD-10-CM

## 2025-06-25 DIAGNOSIS — R76.8 ANTINEUTROPHIL CYTOPLASMIC ANTIBODY (ANCA) POSITIVE: ICD-10-CM

## 2025-06-25 DIAGNOSIS — N18.32 STAGE 3B CHRONIC KIDNEY DISEASE (H): ICD-10-CM

## 2025-06-25 DIAGNOSIS — M17.0 PRIMARY OSTEOARTHRITIS OF BOTH KNEES: ICD-10-CM

## 2025-06-25 PROCEDURE — G2211 COMPLEX E/M VISIT ADD ON: HCPCS | Performed by: INTERNAL MEDICINE

## 2025-06-25 PROCEDURE — 99214 OFFICE O/P EST MOD 30 MIN: CPT | Performed by: INTERNAL MEDICINE

## 2025-06-25 PROCEDURE — 3074F SYST BP LT 130 MM HG: CPT | Performed by: INTERNAL MEDICINE

## 2025-06-25 PROCEDURE — 3078F DIAST BP <80 MM HG: CPT | Performed by: INTERNAL MEDICINE

## 2025-06-25 NOTE — PROGRESS NOTES
Rheumatology follow-up visit note     Frank is a 79 year old male presents today for follow-up.    Antonio was seen today for recheck.    Diagnoses and all orders for this visit:    Panuveitis of left eye  -     ALT; Standing  -     Albumin level; Standing  -     CBC with platelets; Standing  -     Creatinine; Standing    Antineutrophil cytoplasmic antibody (ANCA) positive    High risk medication use  -     ALT; Standing  -     Albumin level; Standing  -     CBC with platelets; Standing  -     Creatinine; Standing    Primary osteoarthritis of both knees    Trigger finger, left middle finger    Stage 3b chronic kidney disease (H)        The longitudinal plan of care for the diagnosis(es)/condition(s) as documented were addressed during this visit. Due to the added complexity in care, I will continue to support Antonio in the subsequent management and with ongoing continuity of care.      Follow up in 6 months.  Or sooner if needed.  Labs prior.    HPI    Frank Obando is a 79 year old male is here for follow-up of  management of immunosuppression for left panuveitis for which he is on Remicade infusions every 6 week.  His most recent visit to ophthalmology was on 5/30/2025.  Those data are attached in the chart and reviewed.  Thankfully there has been no relapse of the inflammatory disease in his eyes.  He has done well with Remicade.  He has osteoarthritis and associated arthralgias.  He had good response to corticosteroid injection in his right knee on his previous visit which is continue to be durable intervention for him.  All in all he seems to be doing well with some discomfort in the left hand middle finger where there was triggering with this morning he did not feel that was going on it is about a week old now.  In the context of positive ANCA antibody is enquiry held for fever, ocular symptoms, rash, headache,  GI issues.  He has not had sinus symptoms, hemoptysis, hematuria, ear nose inflammation  signals.   He has longstanding numbness of the toes from neuropathy for alternative reason.  In review of the renal impairment he is aware to avoid nonsteroidals and acetaminophen will be the joints.    DETAILED EXAMINATION  06/25/25  :    Vitals:    06/25/25 0851   BP: 120/60   BP Location: Right arm   Pulse: 78   SpO2: 96%   Weight: 84.1 kg (185 lb 8 oz)     Alert oriented. Head including the face is examined for malar rash, heliotropes, scarring, lupus pernio. Eyes examined for redness such as in episcleritis/scleritis, periorbital lesions.   Neck/ Face examined for parotid gland swelling, range of motion of neck.  Left upper and lower and right upper and lower extremities examined for tenderness, swelling, warmth of the appendicular joints, range of motion, edema, rash.  Some of the important findings included: he does not have evidence of synovitis in the palpable joints of the upper extremities, with the exception of left middle finger flexor tendon and A1 level where he has mild tenderness there is no triggering today..        Patient Active Problem List    Diagnosis Date Noted    Portal hypertension (H) 03/06/2025     Priority: Medium    Coronary artery disease involving native coronary artery of native heart without angina pectoris 07/03/2024     Priority: Medium    Rheumatoid arthritis involving multiple sites with positive rheumatoid factor (H) 07/02/2023     Priority: Medium     Sees Dr Casas for inflammatory arthritis. Had uveitis also; on Remicade (July 2023)       Personal history of immunosuppressive therapy 07/02/2023     Priority: Medium     On Remicade for inflammatory arthritis described as rheumatoid.  Previously had been exposed to methotrexate and apparently it may have induced hepatic cirrhosis.      Stage 3b chronic kidney disease (H) 03/24/2023     Priority: Medium    Angiodysplasia of stomach 09/27/2021     Priority: Medium    Secondary esophageal varices without bleeding (H) 09/27/2021      Priority: Medium    Panuveitis of left eye, Dr. Abdi / Castillo 03/19/2019     Priority: Medium    Essential hypertension, benign      Priority: Medium     Created by Conversion  Replacement Utility updated for latest IMO load      Formatting of this note might be different from the original.  Created by Conversion    Replacement Utility updated for latest IMO load      Type 2 diabetes mellitus with complication, without long-term current use of insulin (H)      Priority: Medium     Created by Conversion      Formatting of this note might be different from the original.  Created by Conversion      Dyslipidemia      Priority: Medium    Thrombocytopenia      Priority: Medium    Cirrhosis of liver with ascites, unspecified hepatic cirrhosis type (H)      Priority: Medium     Non ETOH drinker; possibly from Methotrexate for RA RX       History of colonic polyps 05/07/2018     Priority: Medium    Antineutrophil cytoplasmic antibody (ANCA) positive 03/23/2017     Priority: Medium    Diabetic polyneuropathy associated with type 2 diabetes mellitus (H)      Priority: Medium     Created by Conversion  Replacement Utility updated for latest IMO load      Formatting of this note might be different from the original.  Created by Conversion    Replacement Utility updated for latest IMO load      Right bundle branch block 11/24/2014     Priority: Medium    H/o CVA ~2013      Priority: Medium     Created by Conversion         Past Surgical History:   Procedure Laterality Date    COLONOSCOPY N/A 11/16/2021    Procedure: COLONOSCOPY with polypectomy;  Surgeon: Dex Finch MD;  Location: [facility] GI    CV CORONARY ANGIOGRAM N/A 4/23/2024    Procedure: Coronary Angiogram;  Surgeon: Mark Jones MD;  Location: [facility] CATH LAB CV    CV INSTANTANEOUS WAVE-FREE RATIO N/A 4/23/2024    Procedure: Instantaneous Wave-Free Ratio;  Surgeon: Mark Jones MD;  Location: [facility] CATH LAB CV    CV LEFT HEART CATH N/A  4/23/2024    Procedure: Left Heart Catheterization;  Surgeon: Mark Jones MD;  Location: [facility] CATH LAB CV    CYSTOSCOPY TUMOR / CONDYLOMATA W/ LASER Left 7/18/2014    ESOPHAGOSCOPY, GASTROSCOPY, DUODENOSCOPY (EGD), COMBINED N/A 11/16/2021    Procedure: ESOPHAGOGASTRODUODENOSCOPY (EGD) with biopsies and argon plasma coagulation;  Surgeon: Dex Finch MD;  Location: [facility] GI    ESOPHAGOSCOPY, GASTROSCOPY, DUODENOSCOPY (EGD), COMBINED N/A 9/29/2023    Procedure: ESOPHAGOGASTRODUODENOSCOPY with BIOPSY;  Surgeon: Alli Lozada MD;  Location: [facility] CYSTOSCOPY,INSERT URETERAL STENT      Description: Cystoscopy With Insertion Of Ureteral Stent Right;  Recorded: 10/14/2009;  Comments: stone    LAPAROSCOPIC CHOLECYSTECTOMY N/A 7/1/2023    Procedure: CHOLECYSTECTOMY, LAPAROSCOPIC;  Surgeon: Jadiel Argueta MD;  Location: [facility] Main OR      Past Medical History:   Diagnosis Date    Anemia     Arthritis     osteoarthritis    Calculus of kidney     Diabetes mellitus (H)     Episcleritis of left eye     Hyperlipidemia     Hypertension     Iron deficiency anemia due to chronic blood loss 11/2/2021    Peripheral neuropathy     Stroke (H)      Allergies   Allergen Reactions    Morphine Nausea and Vomiting    Scopolamine Hbr [Scopolamine] Unknown     Current Outpatient Medications   Medication Sig Dispense Refill    acetaminophen (TYLENOL) 500 MG tablet Take 1 tablet (500 mg) by mouth every 6 hours as needed for mild pain      aspirin 81 MG EC tablet Take 1 tablet (81 mg) by mouth daily Start tomorrow morning. 90 tablet 3    atorvastatin (LIPITOR) 40 MG tablet TAKE 1 TABLET BY MOUTH DAILY 90 tablet 2    carvedilol (COREG) 3.125 MG tablet Take 1 tablet (3.125 mg) by mouth 2 times daily (with meals).      cyanocobalamin, vitamin B-12, 2,500 mcg Tab [CYANOCOBALAMIN, VITAMIN B-12, 2,500 MCG TAB] Take 2,500 mcg by mouth daily.      ferrous sulfate (FEROSUL) 325 (65 Fe) MG tablet  Take 325 mg by mouth 3 times daily      inFLIXimab (REMICADE IV) Every 6weeks      lisinopril (ZESTRIL) 10 MG tablet TAKE 1 TABLET(10 MG) BY MOUTH DAILY 90 tablet 3    magnesium oxide 250 mg Tab [MAGNESIUM OXIDE 250 MG TAB] Take 250 mg by mouth 2 (two) times a day.      metFORMIN (GLUCOPHAGE) 500 MG tablet TAKE 2 TABLETS BY MOUTH TWICE DAILY AT 6 AM AND AT 4  tablet 3    omeprazole (PRILOSEC) 40 MG DR capsule Take 1 capsule (40 mg) by mouth daily. 90 capsule 2    psyllium (METAMUCIL/KONSYL) capsule Take 3 capsules by mouth 2 times daily      tamsulosin (FLOMAX) 0.4 MG capsule TAKE 1 CAPSULE BY MOUTH EVERY DAY AFTER SUPPER 90 capsule 2     family history includes Coronary Artery Disease in his mother; Diabetes in his mother; Lung Cancer in his father; No Known Problems in his son.  Social Connections: Not on file          WBC Count   Date Value Ref Range Status   06/19/2025 5.0 4.0 - 11.0 10e3/uL Final     RBC Count   Date Value Ref Range Status   06/19/2025 3.38 (L) 4.40 - 5.90 10e6/uL Final     Hemoglobin   Date Value Ref Range Status   06/19/2025 10.3 (L) 13.3 - 17.7 g/dL Final     Hematocrit   Date Value Ref Range Status   06/19/2025 31.1 (L) 40.0 - 53.0 % Final     MCV   Date Value Ref Range Status   06/19/2025 92 78 - 100 fL Final     MCH   Date Value Ref Range Status   06/19/2025 30.5 26.5 - 33.0 pg Final     Platelet Count   Date Value Ref Range Status   06/19/2025 109 (L) 150 - 450 10e3/uL Final     % Lymphocytes   Date Value Ref Range Status   05/12/2024 21 % Final     AST   Date Value Ref Range Status   09/06/2024 36 0 - 45 U/L Final     ALT   Date Value Ref Range Status   06/19/2025 31 0 - 70 U/L Final     Albumin   Date Value Ref Range Status   06/19/2025 4.1 3.5 - 5.2 g/dL Final   07/02/2023 2.8 (L) 3.5 - 5.0 g/dL Final     Alkaline Phosphatase   Date Value Ref Range Status   09/06/2024 88 40 - 150 U/L Final     Creatinine   Date Value Ref Range Status   06/19/2025 1.36 (H) 0.67 - 1.17 mg/dL Final      GFR Estimate   Date Value Ref Range Status   06/19/2025 53 (L) >60 mL/min/1.73m2 Final     Comment:     eGFR calculated using 2021 CKD-EPI equation.   02/08/2021 48 (L) >60 mL/min/1.73m2 Final     GFR Estimate If Black   Date Value Ref Range Status   02/08/2021 59 (L) >60 mL/min/1.73m2 Final     Creatinine Urine mg/dL   Date Value Ref Range Status   03/06/2025 115.0 mg/dL Final     Comment:     The reference ranges have not been established in urine creatinine. The results should be integrated into the clinical context for interpretation.   09/20/2021 116 mg/dL Final

## 2025-07-09 DIAGNOSIS — E11.9 TYPE 2 DIABETES MELLITUS (H): ICD-10-CM

## 2025-07-10 ENCOUNTER — TELEPHONE (OUTPATIENT)
Dept: RHEUMATOLOGY | Facility: CLINIC | Age: 80
End: 2025-07-10
Payer: MEDICARE

## 2025-07-10 ENCOUNTER — INFUSION THERAPY VISIT (OUTPATIENT)
Dept: INFUSION THERAPY | Facility: HOSPITAL | Age: 80
End: 2025-07-10
Attending: INTERNAL MEDICINE
Payer: MEDICARE

## 2025-07-10 VITALS
HEART RATE: 70 BPM | DIASTOLIC BLOOD PRESSURE: 65 MMHG | SYSTOLIC BLOOD PRESSURE: 115 MMHG | RESPIRATION RATE: 16 BRPM | OXYGEN SATURATION: 97 % | BODY MASS INDEX: 26.33 KG/M2 | TEMPERATURE: 97.8 F | WEIGHT: 183.5 LBS

## 2025-07-10 DIAGNOSIS — H44.112 PANUVEITIS OF LEFT EYE: ICD-10-CM

## 2025-07-10 NOTE — TELEPHONE ENCOUNTER
[facility] Call Center    Phone Message    May a detailed message be left on voicemail: yes     Reason for Call: Other: Infusion   States pt is having dental surgery next week. Please call back asap to advise if pt can proceed with infusion. FYI - Patient is already upset and is currently there waiting. Thank you.     Action Taken: Other: Rheum    Travel Screening: Not Applicable     Date of Service: 7/10/25

## 2025-07-10 NOTE — PROGRESS NOTES
Infusion Nursing Note:  Frank Obando presents today for Remicade infusion.    Patient seen by provider today: No   present during visit today: Not Applicable.    Note: Antonio came ambulatory to the clinic for a Remicade infusion. The patient reported during the Biological checklist that he was having a dental procedure next week on 7/16 that consisted of three molars and bridge extraction. Communicated findings Dr. Prerna Chong's nurse Shilpa Pérez RN in rheumatology and she relay Dr. Chong message to hold the medication today and resume one week after the dental procedure. Communicated message to the patient and educated about immuno suppression and infection and the patient agreed to reschedule his infusion date.        Intravenous Access:  No Intravenous access/labs at this visit.    Treatment Conditions:  Biological Infusion Checklist:  ~~~ NOTE: If the patient answers yes to any of the questions below, hold the infusion and contact ordering provider or on-call provider.    Have you recently had an elevated temperature, fever, chills, productive cough, coughing for 3 weeks or longer or hemoptysis,  abnormal vital signs, night sweats,  chest pain or have you noticed a decrease in your appetite, unexplained weight loss or fatigue? No  Do you have any open wounds or new incisions? No  Do you have any upcoming hospitalizations or surgeries? Does not include esophagogastroduodenoscopy, colonoscopy, endoscopic retrograde cholangiopancreatography (ERCP), endoscopic ultrasound (EUS), dental procedures or joint aspiration/steroid injections Yes, tooth and bridge molar extractions x 3 next week 7/16/2025  Do you currently have any signs of illness or infection or are you on any antibiotics? No  Have you had any new, sudden or worsening abdominal pain? No  Have you or anyone in your household received a live vaccination in the past 4 weeks? Please note: No live vaccines while on biologic/chemotherapy  until 6 months after the last treatment. Patient can receive the flu vaccine (shot only), pneumovax and the Covid vaccine. It is optimal for the patient to get these vaccines mid cycle, but they can be given at any time as long as it is not on the day of the infusion. No  Have you recently been diagnosed with any new nervous system diseases (ie. Multiple sclerosis, Guillain [place], seizures, neurological changes) or cancer diagnosis? Are you on any form of radiation or chemotherapy? No  Are you pregnant or breast feeding or do you have plans of pregnancy in the future? No  Have you been having any signs of worsening depression or suicidal ideations?  (benlysta only) No  Have there been any other new onset medical symptoms? No  Have you had any new blood clots? (IVIG only) No      Discharge Plan:   [facility]  Patient will return after 7/23 for next appointment.   Patient discharged in stable condition accompanied by: self.  Departure Mode: Ambulatory.      Treri Venegas RN

## 2025-07-10 NOTE — TELEPHONE ENCOUNTER
Per the MD, medication should be held today and he can have the remicaide administered one week after the dental procedure (extraction of 3 teeth). Outpt infusion nurse informed.

## 2025-08-05 ENCOUNTER — TRANSFERRED RECORDS (OUTPATIENT)
Dept: MULTI SPECIALTY CLINIC | Facility: CLINIC | Age: 80
End: 2025-08-05
Payer: MEDICARE

## 2025-08-05 LAB
ALT SERPL-CCNC: 29 IU/L (ref 0–44)
AST SERPL-CCNC: 35 IU/L (ref 0–40)
CREATININE (EXTERNAL): 1.48 MG/DL (ref 0.76–1.27)
GFR ESTIMATED (EXTERNAL): 48 ML/MIN/1.73
GLUCOSE (EXTERNAL): 138 MG/DL (ref 70–99)
INR (EXTERNAL): 1 (ref 0.9–1.2)
POTASSIUM (EXTERNAL): 4.8 MMOL/L (ref 3.5–5.2)

## 2025-08-06 ENCOUNTER — TRANSFERRED RECORDS (OUTPATIENT)
Dept: ADMINISTRATIVE | Facility: CLINIC | Age: 80
End: 2025-08-06
Payer: MEDICARE

## 2025-08-27 ENCOUNTER — HOSPITAL ENCOUNTER (OUTPATIENT)
Dept: ULTRASOUND IMAGING | Facility: CLINIC | Age: 80
Discharge: HOME | End: 2025-08-27
Attending: INTERNAL MEDICINE
Payer: MEDICARE

## 2025-08-27 DIAGNOSIS — K74.60 UNSPECIFIED CIRRHOSIS OF LIVER (H): ICD-10-CM

## 2025-08-27 PROCEDURE — 76705 ECHO EXAM OF ABDOMEN: CPT

## 2025-09-04 ENCOUNTER — INFUSION THERAPY VISIT (OUTPATIENT)
Dept: INFUSION THERAPY | Facility: HOSPITAL | Age: 80
End: 2025-09-04
Attending: INTERNAL MEDICINE
Payer: MEDICARE

## 2025-09-04 VITALS
SYSTOLIC BLOOD PRESSURE: 110 MMHG | HEART RATE: 86 BPM | TEMPERATURE: 98.4 F | OXYGEN SATURATION: 95 % | DIASTOLIC BLOOD PRESSURE: 73 MMHG | RESPIRATION RATE: 16 BRPM

## 2025-09-04 DIAGNOSIS — H44.112 PANUVEITIS OF LEFT EYE: Primary | ICD-10-CM

## 2025-09-04 PROCEDURE — 250N000013 HC RX MED GY IP 250 OP 250 PS 637: Performed by: INTERNAL MEDICINE

## 2025-09-04 PROCEDURE — 258N000003 HC RX IP 258 OP 636: Performed by: INTERNAL MEDICINE

## 2025-09-04 PROCEDURE — 250N000011 HC RX IP 250 OP 636: Mod: JZ | Performed by: INTERNAL MEDICINE

## 2025-09-04 RX ORDER — DIPHENHYDRAMINE HCL 25 MG
25 CAPSULE ORAL ONCE
Start: 2025-09-05 | End: 2025-09-05

## 2025-09-04 RX ORDER — DIPHENHYDRAMINE HYDROCHLORIDE 50 MG/ML
50 INJECTION INTRAMUSCULAR; INTRAVENOUS
Start: 2025-09-05

## 2025-09-04 RX ORDER — METHYLPREDNISOLONE SODIUM SUCCINATE 40 MG/ML
40 INJECTION INTRAMUSCULAR; INTRAVENOUS
Start: 2025-09-05

## 2025-09-04 RX ORDER — ALBUTEROL SULFATE 90 UG/1
1-2 INHALANT RESPIRATORY (INHALATION)
Start: 2025-09-05

## 2025-09-04 RX ORDER — HEPARIN SODIUM,PORCINE 10 UNIT/ML
5 VIAL (ML) INTRAVENOUS
OUTPATIENT
Start: 2025-09-05

## 2025-09-04 RX ORDER — METHYLPREDNISOLONE SODIUM SUCCINATE 125 MG/2ML
125 INJECTION INTRAMUSCULAR; INTRAVENOUS ONCE
OUTPATIENT
Start: 2025-09-05 | End: 2025-09-05

## 2025-09-04 RX ORDER — EPINEPHRINE 1 MG/ML
0.3 INJECTION, SOLUTION INTRAMUSCULAR; SUBCUTANEOUS EVERY 5 MIN PRN
OUTPATIENT
Start: 2025-09-05

## 2025-09-04 RX ORDER — DIPHENHYDRAMINE HCL 25 MG
25 CAPSULE ORAL ONCE
Status: COMPLETED | OUTPATIENT
Start: 2025-09-04 | End: 2025-09-04

## 2025-09-04 RX ORDER — ACETAMINOPHEN 325 MG/1
650 TABLET ORAL ONCE
Start: 2025-09-05 | End: 2025-09-05

## 2025-09-04 RX ORDER — METHYLPREDNISOLONE SODIUM SUCCINATE 125 MG/2ML
125 INJECTION INTRAMUSCULAR; INTRAVENOUS ONCE
Status: COMPLETED | OUTPATIENT
Start: 2025-09-04 | End: 2025-09-04

## 2025-09-04 RX ORDER — ALBUTEROL SULFATE 0.83 MG/ML
2.5 SOLUTION RESPIRATORY (INHALATION)
OUTPATIENT
Start: 2025-09-05

## 2025-09-04 RX ORDER — MEPERIDINE HYDROCHLORIDE 50 MG/ML
25 INJECTION INTRAMUSCULAR; INTRAVENOUS; SUBCUTANEOUS
OUTPATIENT
Start: 2025-09-05

## 2025-09-04 RX ORDER — ACETAMINOPHEN 325 MG/1
650 TABLET ORAL ONCE
Status: COMPLETED | OUTPATIENT
Start: 2025-09-04 | End: 2025-09-04

## 2025-09-04 RX ORDER — DIPHENHYDRAMINE HYDROCHLORIDE 50 MG/ML
25 INJECTION INTRAMUSCULAR; INTRAVENOUS
Start: 2025-09-05

## 2025-09-04 RX ORDER — HEPARIN SODIUM (PORCINE) LOCK FLUSH IV SOLN 100 UNIT/ML 100 UNIT/ML
5 SOLUTION INTRAVENOUS
OUTPATIENT
Start: 2025-09-05

## 2025-09-04 RX ADMIN — SODIUM CHLORIDE 250 ML: 0.9 INJECTION, SOLUTION INTRAVENOUS at 11:31

## 2025-09-04 RX ADMIN — ACETAMINOPHEN 650 MG: 325 TABLET ORAL at 11:29

## 2025-09-04 RX ADMIN — DIPHENHYDRAMINE HYDROCHLORIDE 25 MG: 25 CAPSULE ORAL at 11:29

## 2025-09-04 RX ADMIN — METHYLPREDNISOLONE SODIUM SUCCINATE 125 MG: 125 INJECTION, POWDER, FOR SOLUTION INTRAMUSCULAR; INTRAVENOUS at 11:30

## 2025-09-04 RX ADMIN — SODIUM CHLORIDE 400 MG: 0.9 INJECTION, SOLUTION INTRAVENOUS at 11:48

## 2109-07-30 ENCOUNTER — RECORDS - HEALTHEAST (OUTPATIENT)
Dept: ADMINISTRATIVE | Facility: OTHER | Age: OVER 89
End: 2109-07-30

## (undated) DEVICE — SU SILK 3-0 SH 30" K832H

## (undated) DEVICE — TUBING IV EXTENSION SET ANESTHESIA 34" MLL 2C6227

## (undated) DEVICE — CONNECTOR ONE-LINK INJECTION SITE LF 7N8399

## (undated) DEVICE — PROBE ARGON 2.3MM X 230CM

## (undated) DEVICE — PLATE GROUNDING ADULT W/CORD 9165L

## (undated) DEVICE — GLOVE BIOGEL PI ORTHOPRO SZ 7.5 47675

## (undated) DEVICE — SUCTION STRYKERFLOW II 250-070-500

## (undated) DEVICE — Device

## (undated) DEVICE — CATH CHOLANGIOGRAM 4.5FR TAUT METAL TIP 20018-M55

## (undated) DEVICE — TUBING SUCTION MEDI-VAC 1/4"X20' N620A - HE

## (undated) DEVICE — SUCTION MANIFOLD NEPTUNE 2 SYS 4 PORT 0702-020-000

## (undated) DEVICE — SWABCAP DISINFECTANT GREEN CFF10-250

## (undated) DEVICE — FORCEP BIOPSY DISP 000386

## (undated) DEVICE — SOL RINGERS LACTATED 1000ML BAG 2B2324X

## (undated) DEVICE — DRAIN RESERVOIR 100ML JP 0070740

## (undated) DEVICE — ENDO TROCAR SLEEVE KII Z-THREADED 05X100MM CTS02

## (undated) DEVICE — GUIDEWIRE VASCULAR COMET II 185CML PRESSURE H74939359110

## (undated) DEVICE — TUBING SMOKE EVAC PNEUMOCLEAR HIGH FLOW 0620050250

## (undated) DEVICE — ENDO TROCAR FIRST ENTRY KII FIOS Z-THRD 05X100MM CTF03

## (undated) DEVICE — CATH IV 14GA 2IN REM FLASHPLUG DEHP-FR PVC FR 4251717-02

## (undated) DEVICE — SU MONOCRYL+ 4-0 18IN PS2 UND MCP496G

## (undated) DEVICE — PREP CHLORAPREP 26ML TINTED HI-LITE ORANGE 930815

## (undated) DEVICE — SYR 03ML LL W/O NDL 309657

## (undated) DEVICE — CLIP LIGACLIP LG YELLOW LT400

## (undated) DEVICE — MANIFOLD KIT ANGIO AUTOMATED 014613

## (undated) DEVICE — SOL WATER IRRIG 1000ML BOTTLE 2F7114

## (undated) DEVICE — ENDO SHEARS RENEW LAP ENDOCUT SCISSOR TIP 16.5MM 3142

## (undated) DEVICE — DECANTER VIAL 2006S

## (undated) DEVICE — BASIN EMESIS STERILE  SSK9005A

## (undated) DEVICE — SYR 20ML LL W/O NDL 302830

## (undated) DEVICE — CATH ANGIO JUDKINS JL3.5 6FRX100CM INFINITI 534618T

## (undated) DEVICE — STOPCOCK 3 WAY 500 PSI M3SNC

## (undated) DEVICE — SYR ANGIOGRAPHY MULTIUSE KIT ACIST 014612

## (undated) DEVICE — SU VICRYL+ 0 27 UR6 VLT VCP603H

## (undated) DEVICE — KIT IV START DYNDV1431

## (undated) DEVICE — SHEATH CPLI 8.0 38 073779

## (undated) DEVICE — CUSTOM PACK CORONARY SAN5BCRHEA

## (undated) DEVICE — SUCTION CANISTER 1000ML 65651-510

## (undated) DEVICE — KIT SCOPE CLEANING ENDOSCOPY BX00719705

## (undated) DEVICE — DRAPE C-ARM 60X42" 1013

## (undated) DEVICE — CLIP APPLIER ENDO ROTATING 10MM MED/LG ER320

## (undated) DEVICE — VALVE HEMOSTASIS .096" COPILOT MECH 1003331

## (undated) DEVICE — KIT HAND CONTROL ACIST 014644 AR-P54

## (undated) DEVICE — WARMER SCOPE DISPOSABLE DLW510

## (undated) DEVICE — SLEEVE TR BAND RADIAL COMPRESSION DEVICE 24CM TRB24-REG

## (undated) DEVICE — TUBING LAP SUCT/IRRIG STRYKER 250070500

## (undated) DEVICE — SOL WATER IRRIG 500ML BOTTLE 2F7113

## (undated) DEVICE — CUSTOM PACK LAP CHOLE SBA5BLCHEA

## (undated) DEVICE — TUBING ENDOGATOR + H2O PORT CO

## (undated) DEVICE — KIT CONNECTOR FOR OLYMPUS ENDOSCOPES DEFENDO 100310

## (undated) DEVICE — ELECTRODE DEFIB CADENCE 22550R

## (undated) DEVICE — ENDO TROCAR FIRST ENTRY KII FIOS Z-THRD 11X100MM CTF33

## (undated) DEVICE — SUCTION MANIFOLD NEPTUNE 2 SYS 1 PORT 702-025-000

## (undated) DEVICE — SHTH INTRO 0.021IN ID 6FR DIA

## (undated) DEVICE — SNARE OVAL SMALL DISP 100600

## (undated) DEVICE — CANNULA NASAL COMFORT SOFT 25FT 0537

## (undated) DEVICE — CATH ANGIO JUDKINS R4 6FRX100CM INFINITI 534621T

## (undated) RX ORDER — FENTANYL CITRATE 50 UG/ML
INJECTION, SOLUTION INTRAMUSCULAR; INTRAVENOUS
Status: DISPENSED
Start: 2023-07-01

## (undated) RX ORDER — FENTANYL CITRATE 50 UG/ML
INJECTION, SOLUTION INTRAMUSCULAR; INTRAVENOUS
Status: DISPENSED
Start: 2024-04-23

## (undated) RX ORDER — DIAZEPAM 5 MG
TABLET ORAL
Status: DISPENSED
Start: 2024-04-23